# Patient Record
Sex: MALE | Race: WHITE | NOT HISPANIC OR LATINO | Employment: FULL TIME | URBAN - METROPOLITAN AREA
[De-identification: names, ages, dates, MRNs, and addresses within clinical notes are randomized per-mention and may not be internally consistent; named-entity substitution may affect disease eponyms.]

---

## 2017-02-15 RX ORDER — CYANOCOBALAMIN 1000 UG/ML
INJECTION, SOLUTION INTRAMUSCULAR; SUBCUTANEOUS
Qty: 1 ML | Refills: 11 | Status: SHIPPED | OUTPATIENT
Start: 2017-02-15 | End: 2018-02-24 | Stop reason: SDUPTHER

## 2017-04-20 ENCOUNTER — OFFICE VISIT (OUTPATIENT)
Dept: FAMILY MEDICINE CLINIC | Facility: CLINIC | Age: 57
End: 2017-04-20

## 2017-04-20 VITALS
SYSTOLIC BLOOD PRESSURE: 140 MMHG | OXYGEN SATURATION: 98 % | BODY MASS INDEX: 27.96 KG/M2 | DIASTOLIC BLOOD PRESSURE: 64 MMHG | WEIGHT: 211 LBS | TEMPERATURE: 98.2 F | HEIGHT: 73 IN | HEART RATE: 59 BPM

## 2017-04-20 DIAGNOSIS — M79.605 PAIN IN BOTH LOWER EXTREMITIES: Primary | ICD-10-CM

## 2017-04-20 DIAGNOSIS — M79.604 PAIN IN BOTH LOWER EXTREMITIES: Primary | ICD-10-CM

## 2017-04-20 DIAGNOSIS — M54.5 LOW BACK PAIN, UNSPECIFIED BACK PAIN LATERALITY, UNSPECIFIED CHRONICITY, WITH SCIATICA PRESENCE UNSPECIFIED: ICD-10-CM

## 2017-04-20 PROCEDURE — 99213 OFFICE O/P EST LOW 20 MIN: CPT | Performed by: FAMILY MEDICINE

## 2017-04-20 PROCEDURE — 72100 X-RAY EXAM L-S SPINE 2/3 VWS: CPT | Performed by: FAMILY MEDICINE

## 2017-04-20 NOTE — PROGRESS NOTES
SUBJECTIVE:  The patient is a 57-year-old white male who comes in with a one-week history of pain in the back of his thighs.  It is worse on the left.  At times the back of his legs get numb.  He doesn't have that much in the way of new back pain but has ongoing chronic back pain.  He denies chest pain or shortness of breath.  He has a history last year of having a DVT and PE.  He was on [place]      PAST MEDICAL HISTORY:  Reviewed.    REVIEW OF SYSTEMS:  Please see above; 14 point ROS otherwise negative.      OBJECTIVE: Vitals signs are reviewed and are stable.    HEENT: PERRLA.  []  Neck:  Supple.  []  Lungs:  Clear.    Heart:  Regular rate and rhythm.  []  Abdomen:   Soft, nontender.  []  Extremities:  No cyanosis, clubbing or edema.  []Full range of motion.  There is no Tenderness in the popliteal region.   Homans signs are negative.  There is no calf tenderness.  There is discomfort to palpation in both thighs posteriorly above the knee and below the buttocks.  Back: Full range of motion.  No tenderness.  Two-view x-ray of the lumbar spine is done here and interpreted by me.  Indication back pain and leg pain.  No old x-rays available for comparison.  X-ray shows degenerative changes with spurring.    ASSESSMENT:    []Bilateral thigh pain  Ongoing back pain    PLAN:  []There is no clinical evidence for suspicion of DVT at this time.  Should his situation change he will notify me or go to emergency room.  He is prescribed meloxicam 15 mg daily with food and Flexeril 5 mg 3 times a day when necessary.  He'll use warm compresses.  He will follow-up next week and let me know if he's better or not.  Further workup pending.    Much of this encounter note is an electronic transcription/translation of spoken language to printed text.  The electronic translation of spoken language may permit erroneous, or at times, nonsensical words or phrases to be inadvertently transcribed.  Although I have reviewed the note for such  errors, some may still exist.

## 2017-06-09 ENCOUNTER — HOSPITAL ENCOUNTER (OUTPATIENT)
Dept: CARDIOLOGY | Facility: HOSPITAL | Age: 57
Discharge: HOME | End: 2017-06-09
Admitting: FAMILY MEDICINE

## 2017-06-09 ENCOUNTER — OFFICE VISIT (OUTPATIENT)
Dept: FAMILY MEDICINE CLINIC | Facility: CLINIC | Age: 57
End: 2017-06-09

## 2017-06-09 ENCOUNTER — APPOINTMENT (OUTPATIENT)
Dept: CARDIOLOGY | Facility: HOSPITAL | Age: 57
End: 2017-06-09

## 2017-06-09 VITALS
RESPIRATION RATE: 18 BRPM | WEIGHT: 210 LBS | SYSTOLIC BLOOD PRESSURE: 130 MMHG | HEART RATE: 74 BPM | HEIGHT: 73 IN | TEMPERATURE: 98.6 F | DIASTOLIC BLOOD PRESSURE: 70 MMHG | OXYGEN SATURATION: 99 % | BODY MASS INDEX: 27.83 KG/M2

## 2017-06-09 DIAGNOSIS — M79.604 PAIN IN BOTH LOWER EXTREMITIES: Primary | ICD-10-CM

## 2017-06-09 DIAGNOSIS — M79.605 PAIN IN BOTH LOWER EXTREMITIES: ICD-10-CM

## 2017-06-09 DIAGNOSIS — M79.605 PAIN IN BOTH LOWER EXTREMITIES: Primary | ICD-10-CM

## 2017-06-09 DIAGNOSIS — M79.604 PAIN IN BOTH LOWER EXTREMITIES: ICD-10-CM

## 2017-06-09 DIAGNOSIS — Z86.711 HISTORY OF PULMONARY EMBOLUS (PE): ICD-10-CM

## 2017-06-09 LAB
BH CV LOWER VASCULAR LEFT COMMON FEMORAL AUGMENT: NORMAL
BH CV LOWER VASCULAR LEFT COMMON FEMORAL COMPETENT: NORMAL
BH CV LOWER VASCULAR LEFT COMMON FEMORAL COMPRESS: NORMAL
BH CV LOWER VASCULAR LEFT COMMON FEMORAL PHASIC: NORMAL
BH CV LOWER VASCULAR LEFT COMMON FEMORAL SPONT: NORMAL
BH CV LOWER VASCULAR LEFT DISTAL FEMORAL COMPRESS: NORMAL
BH CV LOWER VASCULAR LEFT GASTRONEMIUS COMPRESS: NORMAL
BH CV LOWER VASCULAR LEFT GREATER SAPH AK COMPRESS: NORMAL
BH CV LOWER VASCULAR LEFT GREATER SAPH BK COMPRESS: NORMAL
BH CV LOWER VASCULAR LEFT MID FEMORAL AUGMENT: NORMAL
BH CV LOWER VASCULAR LEFT MID FEMORAL COMPETENT: NORMAL
BH CV LOWER VASCULAR LEFT MID FEMORAL COMPRESS: NORMAL
BH CV LOWER VASCULAR LEFT MID FEMORAL PHASIC: NORMAL
BH CV LOWER VASCULAR LEFT MID FEMORAL SPONT: NORMAL
BH CV LOWER VASCULAR LEFT PERONEAL COMPRESS: NORMAL
BH CV LOWER VASCULAR LEFT POPLITEAL AUGMENT: NORMAL
BH CV LOWER VASCULAR LEFT POPLITEAL COMPETENT: NORMAL
BH CV LOWER VASCULAR LEFT POPLITEAL COMPRESS: NORMAL
BH CV LOWER VASCULAR LEFT POPLITEAL PHASIC: NORMAL
BH CV LOWER VASCULAR LEFT POPLITEAL SPONT: NORMAL
BH CV LOWER VASCULAR LEFT POSTERIOR TIBIAL COMPRESS: NORMAL
BH CV LOWER VASCULAR LEFT PROXIMAL FEMORAL COMPRESS: NORMAL
BH CV LOWER VASCULAR LEFT SAPHENOFEMORAL JUNCTION AUGMENT: NORMAL
BH CV LOWER VASCULAR LEFT SAPHENOFEMORAL JUNCTION COMPETENT: NORMAL
BH CV LOWER VASCULAR LEFT SAPHENOFEMORAL JUNCTION COMPRESS: NORMAL
BH CV LOWER VASCULAR LEFT SAPHENOFEMORAL JUNCTION PHASIC: NORMAL
BH CV LOWER VASCULAR LEFT SAPHENOFEMORAL JUNCTION SPONT: NORMAL
BH CV LOWER VASCULAR RIGHT COMMON FEMORAL AUGMENT: NORMAL
BH CV LOWER VASCULAR RIGHT COMMON FEMORAL COMPETENT: NORMAL
BH CV LOWER VASCULAR RIGHT COMMON FEMORAL COMPRESS: NORMAL
BH CV LOWER VASCULAR RIGHT COMMON FEMORAL PHASIC: NORMAL
BH CV LOWER VASCULAR RIGHT COMMON FEMORAL SPONT: NORMAL
BH CV LOWER VASCULAR RIGHT DISTAL FEMORAL COMPRESS: NORMAL
BH CV LOWER VASCULAR RIGHT GASTRONEMIUS COMPRESS: NORMAL
BH CV LOWER VASCULAR RIGHT GREATER SAPH AK COMPRESS: NORMAL
BH CV LOWER VASCULAR RIGHT GREATER SAPH BK COMPRESS: NORMAL
BH CV LOWER VASCULAR RIGHT MID FEMORAL AUGMENT: NORMAL
BH CV LOWER VASCULAR RIGHT MID FEMORAL COMPETENT: NORMAL
BH CV LOWER VASCULAR RIGHT MID FEMORAL COMPRESS: NORMAL
BH CV LOWER VASCULAR RIGHT MID FEMORAL PHASIC: NORMAL
BH CV LOWER VASCULAR RIGHT MID FEMORAL SPONT: NORMAL
BH CV LOWER VASCULAR RIGHT PERONEAL COMPRESS: NORMAL
BH CV LOWER VASCULAR RIGHT POPLITEAL AUGMENT: NORMAL
BH CV LOWER VASCULAR RIGHT POPLITEAL COMPETENT: NORMAL
BH CV LOWER VASCULAR RIGHT POPLITEAL COMPRESS: NORMAL
BH CV LOWER VASCULAR RIGHT POPLITEAL PHASIC: NORMAL
BH CV LOWER VASCULAR RIGHT POPLITEAL SPONT: NORMAL
BH CV LOWER VASCULAR RIGHT POSTERIOR TIBIAL COMPRESS: NORMAL
BH CV LOWER VASCULAR RIGHT PROXIMAL FEMORAL COMPRESS: NORMAL
BH CV LOWER VASCULAR RIGHT SAPHENOFEMORAL JUNCTION AUGMENT: NORMAL
BH CV LOWER VASCULAR RIGHT SAPHENOFEMORAL JUNCTION COMPETENT: NORMAL
BH CV LOWER VASCULAR RIGHT SAPHENOFEMORAL JUNCTION COMPRESS: NORMAL
BH CV LOWER VASCULAR RIGHT SAPHENOFEMORAL JUNCTION PHASIC: NORMAL
BH CV LOWER VASCULAR RIGHT SAPHENOFEMORAL JUNCTION SPONT: NORMAL

## 2017-06-09 PROCEDURE — 99213 OFFICE O/P EST LOW 20 MIN: CPT | Performed by: FAMILY MEDICINE

## 2017-06-09 PROCEDURE — 93970 EXTREMITY STUDY: CPT

## 2017-06-09 RX ORDER — MELOXICAM 15 MG/1
15 TABLET ORAL DAILY
Qty: 30 TABLET | Refills: 1 | Status: SHIPPED | OUTPATIENT
Start: 2017-06-09 | End: 2017-07-31

## 2017-06-09 NOTE — PROGRESS NOTES
SUBJECTIVE:  The patient is a 57-year-old white male was here because of bilateral posterior leg pain.  It's been going on several weeks.  He gets relief when he stands.  He denies any back pain.  He denies numbness or tingling in his legs.  He has a history of pulmonary embolus.  No recent injury.  No chest pain or shortness of breath.     PAST MEDICAL HISTORY:  Reviewed.    REVIEW OF SYSTEMS:  Please see above; 14 point ROS otherwise negative.      OBJECTIVE: Vitals signs are reviewed and are stable.    HEENT: PERRLA.    Neck:  Supple.    Lungs:  Clear.    Heart:  Regular rate and rhythm.    Abdomen:   Soft, nontender.    Back: Nontender full range of motion.  Extremities:  No cyanosis, clubbing or edema.  Dolores is present bilateral calves and bilateral posterior thighs.  Full range of motion is present.    ASSESSMENT:    Bilateral posterior leg pain  History of pulmonary embolus.    PLAN:  Venous Doppler both lower extremities is ordered.  I told the patient the presentation is somewhat atypical however with a history of pulmonary emboli this test should be done today.  If the test is negative he'll be prescribed meloxicam 15 mg daily with food.  He'll up date me on how he is doing over the next few days.  Should he have any problems he's advised to go the emergency room.    Much of this encounter note is an electronic transcription/translation of spoken language to printed text.  The electronic translation of spoken language may permit erroneous, or at times, nonsensical words or phrases to be inadvertently transcribed.  Although I have reviewed the note for such errors, some may still exist.

## 2017-07-31 ENCOUNTER — HOSPITAL ENCOUNTER (OUTPATIENT)
Dept: CARDIOLOGY | Facility: HOSPITAL | Age: 57
Discharge: HOME | End: 2017-07-31

## 2017-07-31 ENCOUNTER — OFFICE VISIT (OUTPATIENT)
Dept: FAMILY MEDICINE CLINIC | Facility: CLINIC | Age: 57
End: 2017-07-31

## 2017-07-31 VITALS
HEIGHT: 74 IN | HEART RATE: 71 BPM | DIASTOLIC BLOOD PRESSURE: 64 MMHG | RESPIRATION RATE: 16 BRPM | TEMPERATURE: 98.8 F | BODY MASS INDEX: 26.98 KG/M2 | OXYGEN SATURATION: 98 % | WEIGHT: 210.2 LBS | SYSTOLIC BLOOD PRESSURE: 112 MMHG

## 2017-07-31 VITALS — OXYGEN SATURATION: 98 % | DIASTOLIC BLOOD PRESSURE: 84 MMHG | SYSTOLIC BLOOD PRESSURE: 156 MMHG

## 2017-07-31 DIAGNOSIS — R53.83 FATIGUE, UNSPECIFIED TYPE: ICD-10-CM

## 2017-07-31 DIAGNOSIS — R07.9 CHEST PAIN, UNSPECIFIED TYPE: Primary | ICD-10-CM

## 2017-07-31 DIAGNOSIS — R07.9 CHEST PAIN, UNSPECIFIED TYPE: ICD-10-CM

## 2017-07-31 PROBLEM — D51.0 PERNICIOUS ANEMIA: Status: ACTIVE | Noted: 2017-07-31

## 2017-07-31 LAB
ALBUMIN SERPL-MCNC: 4.1 G/DL (ref 3.5–5.2)
ALBUMIN/GLOB SERPL: 1.5 G/DL
ALP SERPL-CCNC: 63 U/L (ref 39–117)
ALT SERPL W P-5'-P-CCNC: 23 U/L (ref 1–41)
ANION GAP SERPL CALCULATED.3IONS-SCNC: 13.7 MMOL/L
AST SERPL-CCNC: 21 U/L (ref 1–40)
BASOPHILS # BLD AUTO: 0.02 10*3/MM3 (ref 0–0.2)
BASOPHILS NFR BLD AUTO: 0.5 % (ref 0–1.5)
BILIRUB SERPL-MCNC: 0.5 MG/DL (ref 0.1–1.2)
BUN BLD-MCNC: 11 MG/DL (ref 6–20)
BUN/CREAT SERPL: 11.7 (ref 7–25)
CALCIUM SPEC-SCNC: 8.7 MG/DL (ref 8.6–10.5)
CHLORIDE SERPL-SCNC: 103 MMOL/L (ref 98–107)
CK MB BLD-MCNC: 1 NG/ML
CO2 SERPL-SCNC: 24.3 MMOL/L (ref 22–29)
CREAT BLD-MCNC: 0.94 MG/DL (ref 0.76–1.27)
DEPRECATED D DIMER PPP-ACNC: 100
DEPRECATED RDW RBC AUTO: 43.2 FL (ref 37–54)
EOSINOPHIL # BLD AUTO: 0.09 10*3/MM3 (ref 0–0.7)
EOSINOPHIL NFR BLD AUTO: 2.3 % (ref 0.3–6.2)
ERYTHROCYTE [DISTWIDTH] IN BLOOD BY AUTOMATED COUNT: 12.9 % (ref 11.5–14.5)
GFR SERPL CREATININE-BSD FRML MDRD: 83 ML/MIN/1.73
GLOBULIN UR ELPH-MCNC: 2.8 GM/DL
GLUCOSE BLD-MCNC: 130 MG/DL (ref 65–99)
HCT VFR BLD AUTO: 41.3 % (ref 40.4–52.2)
HGB BLD-MCNC: 14.2 G/DL (ref 13.7–17.6)
IMM GRANULOCYTES # BLD: 0 10*3/MM3 (ref 0–0.03)
IMM GRANULOCYTES NFR BLD: 0 % (ref 0–0.5)
LYMPHOCYTES # BLD AUTO: 1.09 10*3/MM3 (ref 0.9–4.8)
LYMPHOCYTES NFR BLD AUTO: 27.3 % (ref 19.6–45.3)
MCH RBC QN AUTO: 31.9 PG (ref 27–32.7)
MCHC RBC AUTO-ENTMCNC: 34.4 G/DL (ref 32.6–36.4)
MCV RBC AUTO: 92.8 FL (ref 79.8–96.2)
MONOCYTES # BLD AUTO: 0.5 10*3/MM3 (ref 0.2–1.2)
MONOCYTES NFR BLD AUTO: 12.5 % (ref 5–12)
NEUTROPHILS # BLD AUTO: 2.3 10*3/MM3 (ref 1.9–8.1)
NEUTROPHILS NFR BLD AUTO: 57.4 % (ref 42.7–76)
NT-PROBNP SERPL-MCNC: 55 PG/ML (ref 5–900)
PLATELET # BLD AUTO: 223 10*3/MM3 (ref 140–500)
PMV BLD AUTO: 10.3 FL (ref 6–12)
POC MYOGLOBIN: 51.8 NG/ML
POTASSIUM BLD-SCNC: 3.8 MMOL/L (ref 3.5–5.2)
PROT SERPL-MCNC: 6.9 G/DL (ref 6–8.5)
RBC # BLD AUTO: 4.45 10*6/MM3 (ref 4.6–6)
SODIUM BLD-SCNC: 141 MMOL/L (ref 136–145)
TROPONIN I SERPL-MCNC: 0.05 NG/ML (ref 0–0.6)
WBC NRBC COR # BLD: 4 10*3/MM3 (ref 4.5–10.7)

## 2017-07-31 PROCEDURE — 93017 CV STRESS TEST TRACING ONLY: CPT

## 2017-07-31 PROCEDURE — 93016 CV STRESS TEST SUPVJ ONLY: CPT | Performed by: INTERNAL MEDICINE

## 2017-07-31 PROCEDURE — 78452 HT MUSCLE IMAGE SPECT MULT: CPT

## 2017-07-31 PROCEDURE — 0 TECHNETIUM TETROFOSMIN KIT

## 2017-07-31 PROCEDURE — 93005 ELECTROCARDIOGRAM TRACING: CPT | Performed by: PHYSICIAN ASSISTANT

## 2017-07-31 PROCEDURE — A9502 TC99M TETROFOSMIN: HCPCS

## 2017-07-31 PROCEDURE — 78452 HT MUSCLE IMAGE SPECT MULT: CPT | Performed by: INTERNAL MEDICINE

## 2017-07-31 PROCEDURE — 99213 OFFICE O/P EST LOW 20 MIN: CPT | Performed by: NURSE PRACTITIONER

## 2017-07-31 PROCEDURE — 93018 CV STRESS TEST I&R ONLY: CPT | Performed by: INTERNAL MEDICINE

## 2017-07-31 PROCEDURE — 93000 ELECTROCARDIOGRAM COMPLETE: CPT | Performed by: NURSE PRACTITIONER

## 2017-07-31 PROCEDURE — 93010 ELECTROCARDIOGRAM REPORT: CPT | Performed by: PHYSICIAN ASSISTANT

## 2017-07-31 PROCEDURE — 99214 OFFICE O/P EST MOD 30 MIN: CPT | Performed by: PHYSICIAN ASSISTANT

## 2017-07-31 RX ORDER — NITROGLYCERIN 0.4 MG/1
0.4 TABLET SUBLINGUAL
Status: DISCONTINUED | OUTPATIENT
Start: 2017-07-31 | End: 2017-07-31

## 2017-07-31 RX ORDER — SODIUM CHLORIDE 0.9 % (FLUSH) 0.9 %
10 SYRINGE (ML) INJECTION AS NEEDED
Status: CANCELLED | OUTPATIENT
Start: 2017-07-31

## 2017-07-31 RX ORDER — SODIUM CHLORIDE 0.9 % (FLUSH) 0.9 %
10 SYRINGE (ML) INJECTION AS NEEDED
Status: DISCONTINUED | OUTPATIENT
Start: 2017-07-31 | End: 2017-07-31

## 2017-07-31 RX ORDER — NITROGLYCERIN 0.4 MG/1
0.4 TABLET SUBLINGUAL
Status: CANCELLED | OUTPATIENT
Start: 2017-07-31

## 2017-07-31 RX ADMIN — TETROFOSMIN 1 DOSE: 1.38 INJECTION, POWDER, LYOPHILIZED, FOR SOLUTION INTRAVENOUS at 14:35

## 2017-07-31 NOTE — PATIENT INSTRUCTIONS
EKG today, WNL.  D/t chest pain current, patient sent to chest pain clinic at this time, do not stop or eat/drink on the way, he was given copy of EKG to take with him, report called to nurse, Irma.  He will f/u in office next week, if symptoms worsens advised to go to the ER.  Refer to sleep medicine, will call with appt.  Can consider anemia f/u if fatigue persists.   Increase fluid intake, get plenty of rest.   Patient agrees with plan of care and understands instructions. Call if worsening symptoms or any problems or concerns.

## 2017-07-31 NOTE — PROGRESS NOTES
Procedure     ECG 12 Lead  Date/Time: 7/31/2017 12:01 PM  Performed by: ROGELIO MASON  Authorized by: ROGELIO MASON   Previous ECG: no previous ECG available  Rhythm: sinus rhythm  Rate: normal  QRS axis: normal  Clinical impression: normal ECG  Comments: Discussed with Dr. Peña.

## 2017-07-31 NOTE — PROGRESS NOTES
"Subjective   Niranjan Peacock is a 57 y.o. male.     History of Present Illness   C/o chest pain, he states his chest feels heavy, he feels achy, he has been fatigued, he states he feels like something is in his throat, denies productive cough, denies fever, he states he saw cardiology about 15 years to be checked after physical. He does have HA, denies SOA, he denies LE edema, change in vision. He states he has been sweaty at night. He states his brothers and father all had bypass surgeries. He states he has felt symptoms for about 1 week. He has a personal hx of PE. Hx of pernicious anemia, per chart he saw Dr. Mancilla last in 6/2/16 for fatigue, he was scheduled for sleep study but did not go to [facility] because he states \" he knows people that have gone, got equipment and do not use it.\" he states he does snore some.  He denies GERD or heartburn symptoms.     The following portions of the patient's history were reviewed and updated as appropriate: allergies, current medications, past family history, past medical history, past social history, past surgical history and problem list.    Review of Systems   Constitutional: Positive for diaphoresis and fatigue. Negative for chills and fever.   HENT: Negative for congestion, ear pain, sinus pressure and sore throat.    Eyes: Negative for photophobia, pain and visual disturbance.   Respiratory: Negative for cough, shortness of breath and wheezing.    Cardiovascular: Negative for palpitations and leg swelling.   Musculoskeletal: Positive for arthralgias and myalgias.   Skin: Negative for pallor.   Neurological: Positive for headaches. Negative for dizziness, syncope, weakness and light-headedness.   All other systems reviewed and are negative.      Objective   Physical Exam   Constitutional: He is oriented to person, place, and time. He appears well-developed and well-nourished.   HENT:   Head: Normocephalic.   Eyes: Pupils are equal, round, and reactive to light.   Neck: " Normal range of motion. Neck supple.   Cardiovascular: Normal rate, regular rhythm, normal heart sounds and intact distal pulses.    Pulses:       Radial pulses are 2+ on the right side, and 2+ on the left side.        Dorsalis pedis pulses are 2+ on the right side, and 2+ on the left side.        Posterior tibial pulses are 2+ on the right side, and 2+ on the left side.   Pulmonary/Chest: Effort normal and breath sounds normal. No respiratory distress. He has no decreased breath sounds. He has no wheezes.   Musculoskeletal: Normal range of motion.   Lymphadenopathy:     He has no cervical adenopathy.   Neurological: He is alert and oriented to person, place, and time.   Skin: Skin is warm and dry.   Psychiatric: He has a normal mood and affect. His behavior is normal. Judgment and thought content normal.   Nursing note and vitals reviewed.      Assessment/Plan   Niranjan was seen today for uri.    Diagnoses and all orders for this visit:    Chest pain, unspecified type    Fatigue, unspecified type  -     Ambulatory Referral to Sleep Medicine        EKG today, WNL.  D/t chest pain current, patient sent to chest pain clinic at this time, do not stop or eat/drink on the way, he was given copy of EKG to take with him, report called to nurse, Irma.  He will f/u in office next week, if symptoms worsens advised to go to the ER.  Refer to sleep medicine, will call with appt.  Can consider anemia f/u if fatigue persists.   Increase fluid intake, get plenty of rest.   Discussed plan of care and ekg with Dr. Peña.   Patient agrees with plan of care and understands instructions. Call if worsening symptoms or any problems or concerns.

## 2017-07-31 NOTE — PROGRESS NOTES
Date of Office Visit: [unfilled]  Encounter Provider: CADY Mahmood  Place of Service: [facility] CARDIOLOGY  Patient Name: Niranjan Peacock  :1960    Chief Complaint   Patient presents with   • Chest Pain   :     HPI: Niranjan Peacock is a 57 y.o. male , new to me, who presents today for complaints of chest pain.  He was referred by his primary care nurse practioner Luiza Dallas to [facility].  For about the past month he has been having pressure in the center of his chest on and off.  It has been particularly bad for the past week.  He is also had fatigue for about a year.  He presented to our office to see Dr. Mancilla in 2016 with similar complaints.  Dr. Mancilla remarked in his note from that visit that the patient had a normal nuclear stress test as well as a normal echocardiogram in .  He felt that his symptoms were unlikely to be cardiac in etiology, and recommended a sleep study because he felt his fatigue was probably sleep apnea.  The patient describes the chest pain as a heaviness that is in the center of his chest and radiates to both arms and shoulders.  It is not associated with shortness of breath, however he does have some nausea with it.  He has not vomited, he denies any diaphoresis.  He does state that he wakes up at night with the sheets wet.  He says the chest pain does not necessarily wake him up, but when he does wake up he notices that he has chest pain.  He has also been achy all over for about a year.  He denies any past medical history.  He denies any hypertension, hyperlipidemia, or diabetes.  He does not smoke and never did.  He drinks occasional alcohol on the weekends that none during the week.  He has not had a physical exam for several years according to the patient, and is really kind of unsure of his lipid status.  The chest pain was its worst last night, he rates it as a 5 out of 10.  He states that is pretty much  constant for the past week.  He did have pain when he walks to our office from his car today, and it was better when he sat down in the chair in our office.  He still had the pain at rest, he rates it a 1 out of 10 at rest.  He does have a strong family history of heart disease with 2 brothers and a father all having coronary artery bypass grafting.  One of his brothers had a CABG at age 43, his father had his CABG in his 60s.    Past Medical History:   Diagnosis Date   • B12 deficiency anemia    • Chest pain    • Headache    • History of blood clots     blood clot found in right lung        Past Surgical History:   Procedure Laterality Date   • COLONOSCOPY         Social History     Social History   • Marital status:      Spouse name: N/A   • Number of children: N/A   • Years of education: N/A     Occupational History   • Not on file.     Social History Main Topics   • Smoking status: Never Smoker   • Smokeless tobacco: Not on file   • Alcohol use Yes      Comment: social   • Drug use: No   • Sexual activity: Not on file     Other Topics Concern   • Not on file     Social History Narrative       Family History   Problem Relation Age of Onset   • Heart disease Other    • Hypertension Other    • Heart disease Father    • Prostate cancer Father    • Heart disease Mother        Review of Systems   Constitution: Positive for malaise/fatigue and night sweats. Negative for chills, fever, weight gain and weight loss.   HENT: Negative for ear pain, headaches, hearing loss, nosebleeds and sore throat.    Eyes: Negative for double vision, pain and visual disturbance.   Cardiovascular: Positive for chest pain. Negative for dyspnea on exertion, irregular heartbeat, leg swelling, near-syncope, orthopnea, palpitations, paroxysmal nocturnal dyspnea and syncope.   Respiratory: Negative for cough, shortness of breath, sleep disturbances due to breathing, snoring and wheezing.    Endocrine: Negative for cold intolerance, heat  intolerance and polyuria.   Skin: Negative for itching and rash.   Musculoskeletal: Positive for myalgias. Negative for joint pain and joint swelling.   Gastrointestinal: Negative for abdominal pain, diarrhea, melena, nausea and vomiting.   Genitourinary: Negative for frequency, hematuria and hesitancy.   Neurological: Negative for excessive daytime sleepiness, light-headedness, numbness, paresthesias and seizures.   Psychiatric/Behavioral: Negative for altered mental status and depression.   Allergic/Immunologic: Negative.    All other systems reviewed and are negative.      Allergies   Allergen Reactions   • Albuterol Anxiety         Current Outpatient Prescriptions:   •  cyanocobalamin 1000 MCG/ML injection, Inject 1 mL into the shoulder, thigh, or buttocks every 28 (twenty-eight) days., Disp: 2000 mL, Rfl: 3  No current facility-administered medications for this encounter.      Objective:     Vitals:    07/31/17 1238 07/31/17 1240   BP: 156/84 156/84   BP Location: Right arm Right arm   Patient Position: Sitting Sitting   SpO2: 98%      There is no height or weight on file to calculate BMI.    PHYSICAL EXAM:    Physical Exam   Constitutional: He is oriented to person, place, and time. He appears well-developed and well-nourished. No distress.   HENT:   Head: Normocephalic and atraumatic.   Eyes: Pupils are equal, round, and reactive to light.   Neck: No JVD present. No thyromegaly present.   Cardiovascular: Normal rate, regular rhythm, normal heart sounds and intact distal pulses.    No murmur heard.  Pulmonary/Chest: Effort normal and breath sounds normal. No respiratory distress.   Abdominal: Soft. Bowel sounds are normal. He exhibits no distension. There is no splenomegaly or hepatomegaly. There is no tenderness.   Musculoskeletal: Normal range of motion. He exhibits no edema.   Neurological: He is alert and oriented to person, place, and time.   Skin: Skin is warm and dry. He is not diaphoretic. No erythema.    Psychiatric: He has a normal mood and affect. His behavior is normal. Judgment normal.         ECG 12 Lead  Date/Time: 7/31/2017 4:00 PM  Performed by: JACK MARTIN.  Authorized by: JACK MARTIN   Comparison: not compared with previous ECG   Previous ECG: no previous ECG available  Rhythm: sinus rhythm  BPM: 63  Clinical impression: normal ECG  Comments: Indication: Chest pain        LABS:    Glucose 1:30, BUN 11, creatinine 0.94, sodium 141, potassium 3.8, chloride 103, CO2 24.3, CBC 4.0, hemoglobin 14.2, hematocrit 41.3, platelets 223.  ProBNP, troponin, CK-MB, myoglobin, and d-dimer are all within normal limits.      Assessment:      [unfilled]  Orders Placed This Encounter   Procedures   • Comprehensive Metabolic Panel   • proBNP     Standing Status:   Future     Number of Occurrences:   1     Standing Expiration Date:   7/31/2018   • proBNP     Standing Status:   Standing     Number of Occurrences:   1   • CBC Auto Differential   • NPO Diet     Standing Status:   Standing     Number of Occurrences:   1   • Cardiac Monitoring     Standing Status:   Standing     Number of Occurrences:   1   • Vital Signs - Once     Standing Status:   Standing     Number of Occurrences:   1   • Vital Signs - As Needed     Standing Status:   Standing     Number of Occurrences:   59909   • Bathroom Privileges With Assistance     Standing Status:   Standing     Number of Occurrences:   1   • Pulse Oximetry     Standing Status:   Standing     Number of Occurrences:   81825   • Oxygen Therapy- Nasal Cannula; Titrate for SPO2: 92%, equal to or greater than     1-4 lpm if patient symptomatic or if O2 sat is below 92%     Standing Status:   Standing     Number of Occurrences:   38617     Order Specific Question:   Device     Answer:   Nasal Cannula     Order Specific Question:   Titrate for SPO2     Answer:   92%     Order Specific Question:   Titrate for SPO2     Answer:   equal to or greater than   • Stress Test With Myocardial  Perfusion One Day     Standing Status:   Future     Number of Occurrences:   1     Standing Expiration Date:   7/31/2018     Order Specific Question:   Rest/stress, rest only or stress only?     Answer:   Rest Only     Order Specific Question:   What stress agent will be used?     Answer:   Exercise with possible pharmacologic     Comments:   rest only     Order Specific Question:   Reason for exam?     Answer:   Chest Pain     Order Specific Question:   Chest pain specification?     Answer:   Suspected CAD   • Stress Test With Myocardial Perfusion One Day     Standing Status:   Future     Standing Expiration Date:   7/31/2018     Order Specific Question:   Rest/stress, rest only or stress only?     Answer:   Stress Only     Order Specific Question:   What stress agent will be used?     Answer:   Exercise with possible pharmacologic     Order Specific Question:   Reason for exam?     Answer:   Chest Pain     Order Specific Question:   Chest pain specification?     Answer:   Suspected CAD   • POCT Troponin I   • POC CKMB, Rapid   • POCT Myoglobin   • POCT D-Dimer   • Insert Peripheral IV     Standing Status:   Standing     Number of Occurrences:   1   • CBC & Differential     Order Specific Question:   Manual Differential     Answer:   No          Plan:       The patient was having active chest pain, albeit pretty minimal.  Because of this I elected to avoid a stress test and just do a resting myocardial perfusion.  This was normal.  I'm recommending that he come back tomorrow for a stress myocardial perfusion study.  I've instructed the patient that he needs to go to the emergency room if he has any worsening chest pain between now and the time he comes back for his test tomorrow.  He indicated that he understood.  If all this comes back normal, I am going to refer him back to his primary care doctor for further workup.  I do think that he probably needs a sleep study, I think that some of his fatigue could easily  be explained by sleep apnea.  I discussed the plan of care with the patient, he indicated that he understood.  I also discussed this plan of care with Dr. Novak.    As always, it has been a pleasure to participate in your patient's care.      Sincerely,         Jolie Sanderson PA-C  I spoke with this patient after his resting nuclear perfusion scan that was not significantly abnormal.  His symptoms were vague agree with plan.

## 2017-08-01 ENCOUNTER — HOSPITAL ENCOUNTER (OUTPATIENT)
Dept: CARDIOLOGY | Facility: HOSPITAL | Age: 57
Discharge: HOME | End: 2017-08-01
Admitting: PHYSICIAN ASSISTANT

## 2017-08-01 ENCOUNTER — APPOINTMENT (OUTPATIENT)
Dept: CARDIOLOGY | Facility: HOSPITAL | Age: 57
End: 2017-08-01

## 2017-08-01 ENCOUNTER — TELEPHONE (OUTPATIENT)
Dept: CARDIOLOGY | Facility: CLINIC | Age: 57
End: 2017-08-01

## 2017-08-01 DIAGNOSIS — R94.39 ABNORMAL STRESS ECG: Primary | ICD-10-CM

## 2017-08-01 LAB
BH CV NUCLEAR PRIOR STUDY: 2
BH CV NUCLEAR PRIOR STUDY: 3
BH CV STRESS BP STAGE 1: NORMAL
BH CV STRESS BP STAGE 2: NORMAL
BH CV STRESS BP STAGE 3: NORMAL
BH CV STRESS DURATION MIN STAGE 1: 3
BH CV STRESS DURATION MIN STAGE 2: 3
BH CV STRESS DURATION MIN STAGE 3: 3
BH CV STRESS DURATION SEC STAGE 1: 0
BH CV STRESS DURATION SEC STAGE 2: 0
BH CV STRESS DURATION SEC STAGE 3: 0
BH CV STRESS GRADE STAGE 1: 10
BH CV STRESS GRADE STAGE 2: 12
BH CV STRESS GRADE STAGE 3: 14
BH CV STRESS HR STAGE 1: 91
BH CV STRESS HR STAGE 2: 118
BH CV STRESS HR STAGE 3: 147
BH CV STRESS METS STAGE 1: 5
BH CV STRESS METS STAGE 2: 7.5
BH CV STRESS METS STAGE 3: 10
BH CV STRESS PROTOCOL 1: NORMAL
BH CV STRESS RECOVERY BP: NORMAL MMHG
BH CV STRESS RECOVERY HR: 147 BPM
BH CV STRESS SPEED STAGE 1: 1.7
BH CV STRESS SPEED STAGE 2: 2.5
BH CV STRESS SPEED STAGE 3: 3.4
BH CV STRESS STAGE 1: 1
BH CV STRESS STAGE 2: 2
BH CV STRESS STAGE 3: 3
LV EF NUC BP: 69 %
MAXIMAL PREDICTED HEART RATE: 164 BPM
MAXIMAL PREDICTED HEART RATE: 164 BPM
PERCENT MAX PREDICTED HR: 89.63 %
STRESS BASELINE BP: NORMAL MMHG
STRESS BASELINE HR: 74 BPM
STRESS PERCENT HR: 105 %
STRESS POST ESTIMATED WORKLOAD: 10 METS
STRESS POST EXERCISE DUR MIN: 9 MIN
STRESS POST EXERCISE DUR SEC: 0 SEC
STRESS POST PEAK BP: NORMAL MMHG
STRESS POST PEAK HR: 147 BPM
STRESS TARGET HR: 139 BPM
STRESS TARGET HR: 139 BPM

## 2017-08-01 RX ADMIN — TETROFOSMIN 1 DOSE: 1.38 INJECTION, POWDER, LYOPHILIZED, FOR SOLUTION INTRAVENOUS at 08:45

## 2017-08-01 NOTE — TELEPHONE ENCOUNTER
I left a message for the patient to call me with his test results.  He has an abnormal stress test and a strong family history of coronary disease.  I did discuss this with Dr. Novak, we are recommending cardiac catheterization.

## 2017-08-02 ENCOUNTER — LAB (OUTPATIENT)
Dept: LAB | Facility: HOSPITAL | Age: 57
End: 2017-08-02
Attending: INTERNAL MEDICINE

## 2017-08-02 ENCOUNTER — TRANSCRIBE ORDERS (OUTPATIENT)
Dept: CARDIOLOGY | Facility: CLINIC | Age: 57
End: 2017-08-02

## 2017-08-02 DIAGNOSIS — Z01.810 PRE-OPERATIVE CARDIOVASCULAR EXAMINATION: Primary | ICD-10-CM

## 2017-08-02 DIAGNOSIS — I25.10 LIMITATION OF ACTIVITIES DUE TO CARDIOVASCULAR DISORDER: ICD-10-CM

## 2017-08-02 DIAGNOSIS — Z73.6 LIMITATION OF ACTIVITIES DUE TO CARDIOVASCULAR DISORDER: ICD-10-CM

## 2017-08-02 DIAGNOSIS — R94.39 OTHER NONSPECIFIC ABNORMAL CARDIOVASCULAR SYSTEM FUNCTION STUDY: ICD-10-CM

## 2017-08-02 DIAGNOSIS — Z01.810 PRE-OPERATIVE CARDIOVASCULAR EXAMINATION: ICD-10-CM

## 2017-08-02 LAB
ANION GAP SERPL CALCULATED.3IONS-SCNC: 11.9 MMOL/L
BASOPHILS # BLD AUTO: 0.02 10*3/MM3 (ref 0–0.2)
BASOPHILS NFR BLD AUTO: 0.4 % (ref 0–1.5)
BUN BLD-MCNC: 12 MG/DL (ref 6–20)
BUN/CREAT SERPL: 11.2 (ref 7–25)
CALCIUM SPEC-SCNC: 9.6 MG/DL (ref 8.6–10.5)
CHLORIDE SERPL-SCNC: 101 MMOL/L (ref 98–107)
CO2 SERPL-SCNC: 28.1 MMOL/L (ref 22–29)
CREAT BLD-MCNC: 1.07 MG/DL (ref 0.76–1.27)
DEPRECATED RDW RBC AUTO: 44.1 FL (ref 37–54)
EOSINOPHIL # BLD AUTO: 0.11 10*3/MM3 (ref 0–0.7)
EOSINOPHIL NFR BLD AUTO: 2.3 % (ref 0.3–6.2)
ERYTHROCYTE [DISTWIDTH] IN BLOOD BY AUTOMATED COUNT: 12.9 % (ref 11.5–14.5)
GFR SERPL CREATININE-BSD FRML MDRD: 71 ML/MIN/1.73
GLUCOSE BLD-MCNC: 99 MG/DL (ref 65–99)
HCT VFR BLD AUTO: 42.5 % (ref 40.4–52.2)
HGB BLD-MCNC: 14.1 G/DL (ref 13.7–17.6)
IMM GRANULOCYTES # BLD: 0 10*3/MM3 (ref 0–0.03)
IMM GRANULOCYTES NFR BLD: 0 % (ref 0–0.5)
LYMPHOCYTES # BLD AUTO: 1.35 10*3/MM3 (ref 0.9–4.8)
LYMPHOCYTES NFR BLD AUTO: 27.7 % (ref 19.6–45.3)
MCH RBC QN AUTO: 31.1 PG (ref 27–32.7)
MCHC RBC AUTO-ENTMCNC: 33.2 G/DL (ref 32.6–36.4)
MCV RBC AUTO: 93.6 FL (ref 79.8–96.2)
MONOCYTES # BLD AUTO: 0.49 10*3/MM3 (ref 0.2–1.2)
MONOCYTES NFR BLD AUTO: 10 % (ref 5–12)
NEUTROPHILS # BLD AUTO: 2.91 10*3/MM3 (ref 1.9–8.1)
NEUTROPHILS NFR BLD AUTO: 59.6 % (ref 42.7–76)
PLATELET # BLD AUTO: 223 10*3/MM3 (ref 140–500)
PMV BLD AUTO: 10.3 FL (ref 6–12)
POTASSIUM BLD-SCNC: 4 MMOL/L (ref 3.5–5.2)
RBC # BLD AUTO: 4.54 10*6/MM3 (ref 4.6–6)
SODIUM BLD-SCNC: 141 MMOL/L (ref 136–145)
WBC NRBC COR # BLD: 4.88 10*3/MM3 (ref 4.5–10.7)

## 2017-08-02 PROCEDURE — 36415 COLL VENOUS BLD VENIPUNCTURE: CPT

## 2017-08-02 PROCEDURE — 85025 COMPLETE CBC W/AUTO DIFF WBC: CPT

## 2017-08-02 PROCEDURE — 80048 BASIC METABOLIC PNL TOTAL CA: CPT

## 2017-08-02 RX ORDER — CYANOCOBALAMIN 1000 UG/ML
1000 INJECTION, SOLUTION INTRAMUSCULAR; SUBCUTANEOUS ONCE
COMMUNITY
End: 2018-04-04 | Stop reason: SDUPTHER

## 2017-08-03 ENCOUNTER — HOSPITAL ENCOUNTER (OUTPATIENT)
Facility: HOSPITAL | Age: 57
Setting detail: HOSPITAL OUTPATIENT SURGERY
Discharge: HOME | End: 2017-08-03
Attending: INTERNAL MEDICINE | Admitting: INTERNAL MEDICINE

## 2017-08-03 VITALS
HEIGHT: 74 IN | OXYGEN SATURATION: 97 % | HEART RATE: 66 BPM | DIASTOLIC BLOOD PRESSURE: 87 MMHG | RESPIRATION RATE: 16 BRPM | SYSTOLIC BLOOD PRESSURE: 169 MMHG | TEMPERATURE: 98.7 F

## 2017-08-03 DIAGNOSIS — R94.39 ABNORMAL STRESS ECG: ICD-10-CM

## 2017-08-03 PROCEDURE — 93458 L HRT ARTERY/VENTRICLE ANGIO: CPT | Performed by: INTERNAL MEDICINE

## 2017-08-03 PROCEDURE — 85347 COAGULATION TIME ACTIVATED: CPT

## 2017-08-03 PROCEDURE — 0 IOPAMIDOL PER 1 ML: Performed by: INTERNAL MEDICINE

## 2017-08-03 PROCEDURE — 93571 IV DOP VEL&/PRESS C FLO 1ST: CPT | Performed by: INTERNAL MEDICINE

## 2017-08-03 PROCEDURE — 25010000002 BH (CUPID ONLY) ADENOSINE 6 MG/100ML MIXTURE: Performed by: INTERNAL MEDICINE

## 2017-08-03 PROCEDURE — C1769 GUIDE WIRE: HCPCS | Performed by: INTERNAL MEDICINE

## 2017-08-03 PROCEDURE — C9600 PERC DRUG-EL COR STENT SING: HCPCS | Performed by: INTERNAL MEDICINE

## 2017-08-03 PROCEDURE — 25010000002 MIDAZOLAM PER 1 MG: Performed by: INTERNAL MEDICINE

## 2017-08-03 PROCEDURE — C1874 STENT, COATED/COV W/DEL SYS: HCPCS | Performed by: INTERNAL MEDICINE

## 2017-08-03 PROCEDURE — C1887 CATHETER, GUIDING: HCPCS | Performed by: INTERNAL MEDICINE

## 2017-08-03 PROCEDURE — 93010 ELECTROCARDIOGRAM REPORT: CPT | Performed by: INTERNAL MEDICINE

## 2017-08-03 PROCEDURE — 25010000002 HEPARIN (PORCINE) PER 1000 UNITS: Performed by: INTERNAL MEDICINE

## 2017-08-03 PROCEDURE — 25010000002 FENTANYL CITRATE (PF) 100 MCG/2ML SOLUTION: Performed by: INTERNAL MEDICINE

## 2017-08-03 PROCEDURE — 99153 MOD SED SAME PHYS/QHP EA: CPT | Performed by: INTERNAL MEDICINE

## 2017-08-03 PROCEDURE — 93005 ELECTROCARDIOGRAM TRACING: CPT | Performed by: INTERNAL MEDICINE

## 2017-08-03 PROCEDURE — C1894 INTRO/SHEATH, NON-LASER: HCPCS | Performed by: INTERNAL MEDICINE

## 2017-08-03 PROCEDURE — 99152 MOD SED SAME PHYS/QHP 5/>YRS: CPT | Performed by: INTERNAL MEDICINE

## 2017-08-03 PROCEDURE — 92928 PRQ TCAT PLMT NTRAC ST 1 LES: CPT | Performed by: INTERNAL MEDICINE

## 2017-08-03 PROCEDURE — C1725 CATH, TRANSLUMIN NON-LASER: HCPCS | Performed by: INTERNAL MEDICINE

## 2017-08-03 DEVICE — XIENCE ALPINE EVEROLIMUS ELUTING CORONARY STENT SYSTEM 3.25 MM X 38 MM / RAPID-EXCHANGE
Type: IMPLANTABLE DEVICE | Status: FUNCTIONAL
Brand: XIENCE ALPINE

## 2017-08-03 RX ORDER — SODIUM CHLORIDE 0.9 % (FLUSH) 0.9 %
1-10 SYRINGE (ML) INJECTION AS NEEDED
Status: DISCONTINUED | OUTPATIENT
Start: 2017-08-03 | End: 2017-08-03 | Stop reason: HOSPADM

## 2017-08-03 RX ORDER — MIDAZOLAM HYDROCHLORIDE 1 MG/ML
INJECTION INTRAMUSCULAR; INTRAVENOUS AS NEEDED
Status: DISCONTINUED | OUTPATIENT
Start: 2017-08-03 | End: 2017-08-03 | Stop reason: HOSPADM

## 2017-08-03 RX ORDER — LIDOCAINE HYDROCHLORIDE 10 MG/ML
0.1 INJECTION, SOLUTION EPIDURAL; INFILTRATION; INTRACAUDAL; PERINEURAL ONCE AS NEEDED
Status: DISCONTINUED | OUTPATIENT
Start: 2017-08-03 | End: 2017-08-03 | Stop reason: HOSPADM

## 2017-08-03 RX ORDER — LIDOCAINE HYDROCHLORIDE 20 MG/ML
INJECTION, SOLUTION INFILTRATION; PERINEURAL AS NEEDED
Status: DISCONTINUED | OUTPATIENT
Start: 2017-08-03 | End: 2017-08-03 | Stop reason: HOSPADM

## 2017-08-03 RX ORDER — CLOPIDOGREL BISULFATE 75 MG/1
75 TABLET ORAL DAILY
Qty: 30 TABLET | Refills: 11 | Status: SHIPPED | OUTPATIENT
Start: 2017-08-03 | End: 2018-04-04 | Stop reason: SDUPTHER

## 2017-08-03 RX ORDER — ACETAMINOPHEN 325 MG/1
650 TABLET ORAL EVERY 4 HOURS PRN
Status: DISCONTINUED | OUTPATIENT
Start: 2017-08-03 | End: 2017-08-03 | Stop reason: HOSPADM

## 2017-08-03 RX ORDER — ATORVASTATIN CALCIUM 40 MG/1
40 TABLET, FILM COATED ORAL DAILY
Qty: 30 TABLET | Refills: 3 | Status: SHIPPED | OUTPATIENT
Start: 2017-08-03 | End: 2017-12-06 | Stop reason: SDUPTHER

## 2017-08-03 RX ORDER — CLOPIDOGREL BISULFATE 75 MG/1
TABLET ORAL AS NEEDED
Status: DISCONTINUED | OUTPATIENT
Start: 2017-08-03 | End: 2017-08-03 | Stop reason: HOSPADM

## 2017-08-03 RX ORDER — NITROGLYCERIN 5 MG/ML
INJECTION, SOLUTION INTRAVENOUS AS NEEDED
Status: DISCONTINUED | OUTPATIENT
Start: 2017-08-03 | End: 2017-08-03 | Stop reason: HOSPADM

## 2017-08-03 RX ORDER — SODIUM CHLORIDE 9 MG/ML
100 INJECTION, SOLUTION INTRAVENOUS CONTINUOUS
Status: DISCONTINUED | OUTPATIENT
Start: 2017-08-03 | End: 2017-08-03 | Stop reason: HOSPADM

## 2017-08-03 RX ORDER — FENTANYL CITRATE 50 UG/ML
INJECTION, SOLUTION INTRAMUSCULAR; INTRAVENOUS AS NEEDED
Status: DISCONTINUED | OUTPATIENT
Start: 2017-08-03 | End: 2017-08-03 | Stop reason: HOSPADM

## 2017-08-03 RX ORDER — VERAPAMIL HYDROCHLORIDE 2.5 MG/ML
INJECTION, SOLUTION INTRAVENOUS AS NEEDED
Status: DISCONTINUED | OUTPATIENT
Start: 2017-08-03 | End: 2017-08-03 | Stop reason: HOSPADM

## 2017-08-03 RX ORDER — ASPIRIN 325 MG
TABLET ORAL AS NEEDED
Status: DISCONTINUED | OUTPATIENT
Start: 2017-08-03 | End: 2017-08-03 | Stop reason: HOSPADM

## 2017-08-03 RX ORDER — SODIUM CHLORIDE 9 MG/ML
75 INJECTION, SOLUTION INTRAVENOUS CONTINUOUS
Status: DISCONTINUED | OUTPATIENT
Start: 2017-08-03 | End: 2017-08-03 | Stop reason: HOSPADM

## 2017-08-03 RX ORDER — HEPARIN SODIUM 1000 [USP'U]/ML
INJECTION, SOLUTION INTRAVENOUS; SUBCUTANEOUS AS NEEDED
Status: DISCONTINUED | OUTPATIENT
Start: 2017-08-03 | End: 2017-08-03 | Stop reason: HOSPADM

## 2017-08-03 RX ADMIN — SODIUM CHLORIDE 75 ML/HR: 9 INJECTION, SOLUTION INTRAVENOUS at 11:01

## 2017-08-03 NOTE — H&P (VIEW-ONLY)
Date of Office Visit: [unfilled]  Encounter Provider: CADY Mahmood  Place of Service: [facility] CARDIOLOGY  Patient Name: Niranjan Peacock  :1960    Chief Complaint   Patient presents with   • Chest Pain   :     HPI: Niranjan Peacock is a 57 y.o. male , new to me, who presents today for complaints of chest pain.  He was referred by his primary care nurse practioner Luiza Dallas [facility].  For about the past month he has been having pressure in the center of his chest on and off.  It has been particularly bad for the past week.  He is also had fatigue for about a year.  He presented to our office to see Dr. Mancilla in 2016 with similar complaints.  Dr. Mancilla remarked in his note from that visit that the patient had a normal nuclear stress test as well as a normal echocardiogram in .  He felt that his symptoms were unlikely to be cardiac in etiology, and recommended a sleep study because he felt his fatigue was probably sleep apnea.  The patient describes the chest pain as a heaviness that is in the center of his chest and radiates to both arms and shoulders.  It is not associated with shortness of breath, however he does have some nausea with it.  He has not vomited, he denies any diaphoresis.  He does state that he wakes up at night with the sheets wet.  He says the chest pain does not necessarily wake him up, but when he does wake up he notices that he has chest pain.  He has also been achy all over for about a year.  He denies any past medical history.  He denies any hypertension, hyperlipidemia, or diabetes.  He does not smoke and never did.  He drinks occasional alcohol on the weekends that none during the week.  He has not had a physical exam for several years according to the patient, and is really kind of unsure of his lipid status.  The chest pain was its worst last night, he rates it as a 5 out of 10.  He states that is pretty much  constant for the past week.  He did have pain when he walks to our office from his car today, and it was better when he sat down in the chair in our office.  He still had the pain at rest, he rates it a 1 out of 10 at rest.  He does have a strong family history of heart disease with 2 brothers and a father all having coronary artery bypass grafting.  One of his brothers had a CABG at age 43, his father had his CABG in his 60s.    Past Medical History:   Diagnosis Date   • B12 deficiency anemia    • Chest pain    • Headache    • History of blood clots     blood clot found in right lung        Past Surgical History:   Procedure Laterality Date   • COLONOSCOPY         Social History     Social History   • Marital status:      Spouse name: N/A   • Number of children: N/A   • Years of education: N/A     Occupational History   • Not on file.     Social History Main Topics   • Smoking status: Never Smoker   • Smokeless tobacco: Not on file   • Alcohol use Yes      Comment: social   • Drug use: No   • Sexual activity: Not on file     Other Topics Concern   • Not on file     Social History Narrative       Family History   Problem Relation Age of Onset   • Heart disease Other    • Hypertension Other    • Heart disease Father    • Prostate cancer Father    • Heart disease Mother        Review of Systems   Constitution: Positive for malaise/fatigue and night sweats. Negative for chills, fever, weight gain and weight loss.   HENT: Negative for ear pain, headaches, hearing loss, nosebleeds and sore throat.    Eyes: Negative for double vision, pain and visual disturbance.   Cardiovascular: Positive for chest pain. Negative for dyspnea on exertion, irregular heartbeat, leg swelling, near-syncope, orthopnea, palpitations, paroxysmal nocturnal dyspnea and syncope.   Respiratory: Negative for cough, shortness of breath, sleep disturbances due to breathing, snoring and wheezing.    Endocrine: Negative for cold intolerance, heat  intolerance and polyuria.   Skin: Negative for itching and rash.   Musculoskeletal: Positive for myalgias. Negative for joint pain and joint swelling.   Gastrointestinal: Negative for abdominal pain, diarrhea, melena, nausea and vomiting.   Genitourinary: Negative for frequency, hematuria and hesitancy.   Neurological: Negative for excessive daytime sleepiness, light-headedness, numbness, paresthesias and seizures.   Psychiatric/Behavioral: Negative for altered mental status and depression.   Allergic/Immunologic: Negative.    All other systems reviewed and are negative.      Allergies   Allergen Reactions   • Albuterol Anxiety         Current Outpatient Prescriptions:   •  cyanocobalamin 1000 MCG/ML injection, Inject 1 mL into the shoulder, thigh, or buttocks every 28 (twenty-eight) days., Disp: 2000 mL, Rfl: 3  No current facility-administered medications for this encounter.      Objective:     Vitals:    07/31/17 1238 07/31/17 1240   BP: 156/84 156/84   BP Location: Right arm Right arm   Patient Position: Sitting Sitting   SpO2: 98%      There is no height or weight on file to calculate BMI.    PHYSICAL EXAM:    Physical Exam   Constitutional: He is oriented to person, place, and time. He appears well-developed and well-nourished. No distress.   HENT:   Head: Normocephalic and atraumatic.   Eyes: Pupils are equal, round, and reactive to light.   Neck: No JVD present. No thyromegaly present.   Cardiovascular: Normal rate, regular rhythm, normal heart sounds and intact distal pulses.    No murmur heard.  Pulmonary/Chest: Effort normal and breath sounds normal. No respiratory distress.   Abdominal: Soft. Bowel sounds are normal. He exhibits no distension. There is no splenomegaly or hepatomegaly. There is no tenderness.   Musculoskeletal: Normal range of motion. He exhibits no edema.   Neurological: He is alert and oriented to person, place, and time.   Skin: Skin is warm and dry. He is not diaphoretic. No erythema.    Psychiatric: He has a normal mood and affect. His behavior is normal. Judgment normal.         ECG 12 Lead  Date/Time: 7/31/2017 4:00 PM  Performed by: JACK MARTIN.  Authorized by: JACK MARTIN   Comparison: not compared with previous ECG   Previous ECG: no previous ECG available  Rhythm: sinus rhythm  BPM: 63  Clinical impression: normal ECG  Comments: Indication: Chest pain        LABS:    Glucose 1:30, BUN 11, creatinine 0.94, sodium 141, potassium 3.8, chloride 103, CO2 24.3, CBC 4.0, hemoglobin 14.2, hematocrit 41.3, platelets 223.  ProBNP, troponin, CK-MB, myoglobin, and d-dimer are all within normal limits.      Assessment:      [unfilled]  Orders Placed This Encounter   Procedures   • Comprehensive Metabolic Panel   • proBNP     Standing Status:   Future     Number of Occurrences:   1     Standing Expiration Date:   7/31/2018   • proBNP     Standing Status:   Standing     Number of Occurrences:   1   • CBC Auto Differential   • NPO Diet     Standing Status:   Standing     Number of Occurrences:   1   • Cardiac Monitoring     Standing Status:   Standing     Number of Occurrences:   1   • Vital Signs - Once     Standing Status:   Standing     Number of Occurrences:   1   • Vital Signs - As Needed     Standing Status:   Standing     Number of Occurrences:   07839   • Bathroom Privileges With Assistance     Standing Status:   Standing     Number of Occurrences:   1   • Pulse Oximetry     Standing Status:   Standing     Number of Occurrences:   95859   • Oxygen Therapy- Nasal Cannula; Titrate for SPO2: 92%, equal to or greater than     1-4 lpm if patient symptomatic or if O2 sat is below 92%     Standing Status:   Standing     Number of Occurrences:   90529     Order Specific Question:   Device     Answer:   Nasal Cannula     Order Specific Question:   Titrate for SPO2     Answer:   92%     Order Specific Question:   Titrate for SPO2     Answer:   equal to or greater than   • Stress Test With Myocardial  Perfusion One Day     Standing Status:   Future     Number of Occurrences:   1     Standing Expiration Date:   7/31/2018     Order Specific Question:   Rest/stress, rest only or stress only?     Answer:   Rest Only     Order Specific Question:   What stress agent will be used?     Answer:   Exercise with possible pharmacologic     Comments:   rest only     Order Specific Question:   Reason for exam?     Answer:   Chest Pain     Order Specific Question:   Chest pain specification?     Answer:   Suspected CAD   • Stress Test With Myocardial Perfusion One Day     Standing Status:   Future     Standing Expiration Date:   7/31/2018     Order Specific Question:   Rest/stress, rest only or stress only?     Answer:   Stress Only     Order Specific Question:   What stress agent will be used?     Answer:   Exercise with possible pharmacologic     Order Specific Question:   Reason for exam?     Answer:   Chest Pain     Order Specific Question:   Chest pain specification?     Answer:   Suspected CAD   • POCT Troponin I   • POC CKMB, Rapid   • POCT Myoglobin   • POCT D-Dimer   • Insert Peripheral IV     Standing Status:   Standing     Number of Occurrences:   1   • CBC & Differential     Order Specific Question:   Manual Differential     Answer:   No          Plan:       The patient was having active chest pain, albeit pretty minimal.  Because of this I elected to avoid a stress test and just do a resting myocardial perfusion.  This was normal.  I'm recommending that he come back tomorrow for a stress myocardial perfusion study.  I've instructed the patient that he needs to go to the emergency room if he has any worsening chest pain between now and the time he comes back for his test tomorrow.  He indicated that he understood.  If all this comes back normal, I am going to refer him back to his primary care doctor for further workup.  I do think that he probably needs a sleep study, I think that some of his fatigue could easily  be explained by sleep apnea.  I discussed the plan of care with the patient, he indicated that he understood.  I also discussed this plan of care with Dr. Novak.    As always, it has been a pleasure to participate in your patient's care.      Sincerely,         Jolie Sanderson PA-C  I spoke with this patient after his resting nuclear perfusion scan that was not significantly abnormal.  His symptoms were vague agree with plan.

## 2017-08-03 NOTE — INTERVAL H&P NOTE
H&P updated. The patient was examined and the following changes are noted:  Treadmill portion of stress test was abnormal.  Plan for cardiac catheterization.  Procedure, risk, benefits explained to patient and wife and he agrees to proceed.

## 2017-08-03 NOTE — PLAN OF CARE
Problem: Patient Care Overview (Adult)  Goal: Plan of Care Review  Outcome: Outcome(s) achieved Date Met:  08/03/17 08/03/17 1955   Coping/Psychosocial Response Interventions   Plan Of Care Reviewed With patient;spouse   Patient Care Overview   Progress improving   Outcome Evaluation   Outcome Summary/Follow up Plan ready for discharge       Goal: Adult Individualization and Mutuality  Outcome: Outcome(s) achieved Date Met:  08/03/17  Goal: Discharge Needs Assessment  Outcome: Outcome(s) achieved Date Met:  08/03/17    Problem: Cardiac Catheterization with/without PCI (Adult)  Goal: Signs and Symptoms of Listed Potential Problems Will be Absent or Manageable (Cardiac Catheterization with/without PCI)  Outcome: Outcome(s) achieved Date Met:  08/03/17 08/03/17 1955   Cardiac Catheterization with/without PCI   Problems Assessed (Cardiac Catheterization) all   Problems Present (Cardiac Catheterization) none

## 2017-08-04 LAB
ACT BLD: 263 SECONDS (ref 82–152)
ACT BLD: 274 SECONDS (ref 82–152)

## 2017-08-09 ENCOUNTER — TELEPHONE (OUTPATIENT)
Dept: CARDIAC REHAB | Facility: HOSPITAL | Age: 57
End: 2017-08-09

## 2017-08-10 ENCOUNTER — OFFICE VISIT (OUTPATIENT)
Dept: CARDIOLOGY | Facility: CLINIC | Age: 57
End: 2017-08-10

## 2017-08-10 VITALS
HEART RATE: 61 BPM | SYSTOLIC BLOOD PRESSURE: 150 MMHG | HEIGHT: 74 IN | DIASTOLIC BLOOD PRESSURE: 78 MMHG | BODY MASS INDEX: 27.08 KG/M2 | WEIGHT: 211 LBS

## 2017-08-10 DIAGNOSIS — I25.10 CORONARY ARTERY DISEASE INVOLVING NATIVE CORONARY ARTERY OF NATIVE HEART WITHOUT ANGINA PECTORIS: Primary | ICD-10-CM

## 2017-08-10 PROCEDURE — 93000 ELECTROCARDIOGRAM COMPLETE: CPT | Performed by: NURSE PRACTITIONER

## 2017-08-10 PROCEDURE — 99213 OFFICE O/P EST LOW 20 MIN: CPT | Performed by: NURSE PRACTITIONER

## 2017-08-14 NOTE — PROGRESS NOTES
Date of Office Visit: 08/10/2017  Encounter Provider: GREG Herrera  Place of Service: [facility] CARDIOLOGY  Patient Name: Niranjan Peacock  :1960    Chief Complaint   Patient presents with   • Chest Pain   :     HPI: Niranjan Peacock is a 57 y.o. male comes in today for Hospital follow-up.  He is a patient of Dr. Mancilla.  I am seeing him for the first time today and have reviewed his records.      He has a history of coronary disease, syncope, and pulmonary embolism.  He was originally seen by Dr. Mancilla in 2016.  He had had a prior echocardiogram and stress test in .  The patient presented with atypical chest pain not thought to be related to ischemia.  He was referred for a sleep apnea workup.      On 2017, the patient was referred to the [facility] by his primary care physician.  He had been having chest pressure in the center of his chest and continued to feel fatigued.  It was noted that he has a strong family history of coronary artery disease with two brothers and a father all having coronary artery bypass grafting, one at the age of 43 and his father at the age of 60.  He was having chest pain while here, so a resting myocardial perfusion study was performed, which was normal.  He came back the next day for stress images, which was abnormal.  It was recommended that he have a cardiac catheterization.  On 2017, he underwent a cardiac catheterization showing zero stenosis of his left main, 10% proximal left anterior descending stenosis, 60% to 70% mid-left anterior descending stenosis with a FFR of 0.78 making this a significant lesion.  The mid to distal left anterior descending had a 50% stenosis and the remainder was zero percent.  The left circumflex had a 30% proximal stenosis and the right coronary artery had a 10% proximal stenosis.  He underwent drug-eluting stent to the mid-left anterior descending.  He was started  on Plavix and aspirin.      Today, he comes in for follow-up and has been feeling well.  He does not have any more chest pain.  He has been experiencing a cough with sore throat related to a head cold and sinus headache but otherwise has been feeling well.  He denies any palpitations, tachycardia, or edema. He denies dizziness or chest pain.  He denies syncope or presyncope.  He denies orthopnea or paroxysmal nocturnal dyspnea.  He does agree to go to cardiac rehabilitation.          Past Medical History:   Diagnosis Date   • B12 deficiency anemia    • Chest pain    • Coronary artery disease involving native coronary artery of native heart without angina pectoris 8/10/2017   • Headache    • History of blood clots     blood clot found in right lung    • Syncope     2 yrs ago one time       Past Surgical History:   Procedure Laterality Date   • CARDIAC CATHETERIZATION N/A 8/3/2017    Procedure: Coronary angiography;  Surgeon: Cynthia Farley MD;  Location: [facility] CATH INVASIVE LOCATION;  Service:    • CARDIAC CATHETERIZATION  8/3/2017    Procedure: Functional Flow [place];  Surgeon: Cynthia Farley MD;  Location: [facility] CATH INVASIVE LOCATION;  Service:    • CARDIAC CATHETERIZATION N/A 8/3/2017    Procedure: Stent YI coronary;  Surgeon: Cynthia Farley MD;  Location: [facility] CATH INVASIVE LOCATION;  Service:    • CARDIAC CATHETERIZATION N/A 8/3/2017    Procedure: Left ventriculography;  Surgeon: Cynthia Farley MD;  Location: [facility] CATH INVASIVE LOC[facility];  Service:    • CARDIAC CATHETERIZATION N/A 8/3/2017    Procedure: Left Heart Cath;  Surgeon: Cynthia Farley MD;  Location: [facility] CATH INVASIVE LOCATION;  Service:    • COLONOSCOPY             Review of Systems   Constitution: Negative for fever and malaise/fatigue.   HENT: Positive for stridor. Negative for ear pain, hearing loss, nosebleeds and sore throat.    Eyes: Negative for double vision, pain, vision loss in left eye, vision loss in right eye and visual  "disturbance.   Cardiovascular: Negative for claudication and leg swelling.   Respiratory: Positive for cough. Negative for snoring and wheezing.    Endocrine: Negative for cold intolerance, heat intolerance and polyuria.   Skin: Negative for color change, itching and rash.   Musculoskeletal: Positive for myalgias. Negative for joint pain, joint swelling and muscle cramps.   Gastrointestinal: Negative for abdominal pain, diarrhea, melena, nausea and vomiting.   Genitourinary: Negative for bladder incontinence and hematuria.   Neurological: Negative for excessive daytime sleepiness, dizziness, light-headedness, paresthesias and seizures.   Psychiatric/Behavioral: Negative for depression. The patient is not nervous/anxious.    All other systems reviewed and are negative.    All other systems reviewed and are negative    Allergies   Allergen Reactions   • Albuterol Anxiety       All aspects of family and social history reviewed.          Objective:     Vitals:    08/10/17 1410   BP: 150/78   BP Location: Left arm   Pulse: 61   Weight: 211 lb (95.7 kg)   Height: 74\" (188 cm)     Body mass index is 27.09 kg/(m^2).    PHYSICAL EXAM:  Physical Exam   Constitutional: He is oriented to person, place, and time. He appears well-developed and well-nourished.   HENT:   Head: Normocephalic and atraumatic.   Neck: Neck supple. No JVD present.   Cardiovascular: Normal rate, regular rhythm, normal heart sounds and intact distal pulses.    Pulses:       Carotid pulses are 2+ on the right side, and 2+ on the left side.       Radial pulses are 2+ on the right side, and 2+ on the left side.        Dorsalis pedis pulses are 2+ on the right side, and 2+ on the left side.   Pulmonary/Chest: Effort normal and breath sounds normal. No accessory muscle usage. No respiratory distress. He has no rales.   Abdominal: Soft. Normal appearance and bowel sounds are normal. There is no tenderness.   Musculoskeletal: Normal range of motion. He exhibits " no edema.   Neurological: He is alert and oriented to person, place, and time.   Skin: Skin is warm, dry and intact. He is not diaphoretic.   Psychiatric: He has a normal mood and affect. His speech is normal and behavior is normal. Judgment and thought content normal. Cognition and memory are normal.         ECG 12 Lead  Date/Time: 8/10/2017 1:13 PM  Performed by: XIANG SHIRLEY  Authorized by: XIANG SHIRLEY   Comparison: compared with previous ECG from 8/3/2017  Similar to previous ECG  Comparison to previous ECG: Previous with Q wave in V2  Rhythm: sinus rhythm  Rate: normal  Conduction: conduction normal  ST Segments: ST segments normal  T Waves: T waves normal  QRS axis: normal  Other: no other findings  Clinical impression: normal ECG  Comments: Indication: CAD                  Assessment:       Diagnosis Plan   1. Coronary artery disease involving native coronary artery of native heart without angina pectoris  Ambulatory Referral to Cardiac Rehab        Orders Placed This Encounter   Procedures   • Ambulatory Referral to Cardiac Rehab     Referral Priority:   Routine     Referral Type:   Rehabilitation - Outpatient     Referral Location:   [facility]     Number of Visits Requested:   1   • ECG 12 Lead     This order was created via procedure documentation       Current Outpatient Prescriptions   Medication Sig Dispense Refill   • aspirin 81 MG tablet Take 1 tablet by mouth Daily. 30 tablet 11   • atorvastatin (LIPITOR) 40 MG tablet Take 1 tablet by mouth Daily. 30 tablet 3   • clopidogrel (PLAVIX) 75 MG tablet Take 1 tablet by mouth Daily. 30 tablet 11   • cyanocobalamin 1000 MCG/ML injection Inject 1,000 mcg into the shoulder, thigh, or buttocks 1 (One) Time.     • Multiple Vitamin (MULTI VITAMIN DAILY PO) Take  by mouth Daily.       No current facility-administered medications for this visit.             Plan:       1. Coronary Artery Disease  Assessment  • The patient has no angina  • There  is a new diagnosis of stable angina in the past 12 months  • The patient is having symptoms consistent with unstable angina     Plan  • Lifestyle modifications discussed include adhering to a heart healthy diet, maintenance of a healthy weight, medication compliance, regular exercise and regular monitoring of cholesterol and blood pressure    Subjective - Objective  • There has been a previous stent procedure using YI  • Current antiplatelet therapy includes aspirin 81 mg and clopidogrel 75 mg  • The patient qualifies for cardiac rehabilitation, and has been referred to cardiac rehab      Stent placed to mid LAD. Atorvastatin 40 mg daily. Goal LDL < 70      Follow up in office in 3 weeks with Dr. Mancilla    As always, it has been a pleasure to participate in this patient's care.      Sincerely,      GREG Herrera

## 2017-08-25 ENCOUNTER — TELEPHONE (OUTPATIENT)
Dept: CARDIOLOGY | Facility: CLINIC | Age: 57
End: 2017-08-25

## 2017-08-25 NOTE — TELEPHONE ENCOUNTER
Having muscle and joint pain-advised to stop lipitor.  He had some swelling in his right hand, in the back of his hand.  This has resolved.  He had a small bruise next to his catheter site which he reports was new after his visit.  This is stable.  Has good feeling in his hand.  If worsens, will come in to the emergency room or come in on Monday to have it looked at.  Advised to increase his water intake for his dizziness.    I spoke to patient

## 2017-08-25 NOTE — TELEPHONE ENCOUNTER
Pt called and states that he is having muscle and joint pain, swelling in right arm, occasional lightheaded/ dizziness, headache.     Pt current med   aspirin 81 mg daily  Lipitor 40 mg daily  Plavix 75 mg daily    Pt wants to know if that is normal after his procedure (8/3/17) or does he need to come in? Pt is scheduled to see MI  9/7/17 at 2:20pm. Pt can be reached at [phone].....Please advise    Thanks  Ryann PEDRAZA

## 2017-09-05 ENCOUNTER — TRANSCRIBE ORDERS (OUTPATIENT)
Dept: CARDIAC REHAB | Facility: HOSPITAL | Age: 57
End: 2017-09-05

## 2017-09-07 ENCOUNTER — OFFICE VISIT (OUTPATIENT)
Dept: CARDIOLOGY | Facility: CLINIC | Age: 57
End: 2017-09-07

## 2017-09-07 VITALS
WEIGHT: 212 LBS | BODY MASS INDEX: 27.21 KG/M2 | SYSTOLIC BLOOD PRESSURE: 152 MMHG | HEIGHT: 74 IN | HEART RATE: 59 BPM | DIASTOLIC BLOOD PRESSURE: 90 MMHG

## 2017-09-07 DIAGNOSIS — I25.10 CORONARY ARTERY DISEASE INVOLVING NATIVE CORONARY ARTERY OF NATIVE HEART WITHOUT ANGINA PECTORIS: Primary | ICD-10-CM

## 2017-09-07 DIAGNOSIS — E78.2 MIXED HYPERLIPIDEMIA: ICD-10-CM

## 2017-09-07 DIAGNOSIS — I10 ESSENTIAL HYPERTENSION: ICD-10-CM

## 2017-09-07 PROCEDURE — 99214 OFFICE O/P EST MOD 30 MIN: CPT | Performed by: INTERNAL MEDICINE

## 2017-09-07 PROCEDURE — 93000 ELECTROCARDIOGRAM COMPLETE: CPT | Performed by: INTERNAL MEDICINE

## 2017-09-07 RX ORDER — LISINOPRIL 2.5 MG/1
2.5 TABLET ORAL DAILY
Qty: 30 TABLET | Refills: 11 | Status: SHIPPED | OUTPATIENT
Start: 2017-09-07 | End: 2017-12-07 | Stop reason: SDUPTHER

## 2017-09-07 NOTE — PROGRESS NOTES
Date of Office Visit: 17  Encounter Provider: Jason Mancilla MD  Place of Service: [facility] CARDIOLOGY  Patient Name: Niranjan Peacock  :1960      Chief Complaint   Patient presents with   • Coronary Artery Disease     History of Present Illness  HPI Comments: Mr Peacock is a pleasant 56 yo gentleman presented in 2017 with chest pain worrisome for angina.  He had a stress test that was abnormal he then underwent cardiac catheterization which showed normal left ventricular systolic function.  Severe disease of the proximal mid left anterior descending fractional flow wire reserve was abnormal he then underwent stenting with a 3.25 mm right 20 mm drug-eluting stent.  He also has hyperlipidemia and hypertension.  He was placed on atorvastatin.  He was having some atypical muscle aches stopped that.  He comes in for follow-up cystoscopy muscle aches really haven't changed any in the past week of being off of it.  He also gets headaches.  He'll set a cough.  He had a cough prior to all this was felt to be due to some sinus problems he got better on antibiotics but now it's back some.  He's noted that his blood pressure has been intermittently high.  On a positive note he's had no further chest pain or pressure.  He's had no shortness of breath, orthopnea, or PND.  He is starting cardiac rehabilitation next week.    Coronary Artery Disease   Pertinent negatives include no dizziness, muscle weakness or weight gain.         Past Medical History:   Diagnosis Date   • B12 deficiency anemia    • Chest pain    • Coronary artery disease involving native coronary artery of native heart without angina pectoris 8/10/2017   • Headache    • History of blood clots     blood clot found in right lung    • Syncope     2 yrs ago one time         Past Surgical History:   Procedure Laterality Date   • CARDIAC CATHETERIZATION N/A 8/3/2017    Procedure: Coronary angiography;  Surgeon: Cynthia  MD Martine;  [facility];  Service:    • CARDIAC CATHETERIZATION  8/3/2017    Procedure: Functional Flow [place];  Surgeon: Cynthia Farley MD;  [facility];  Service:    • CARDIAC CATHETERIZATION N/A 8/3/2017    Procedure: Stent YI coronary;  Surgeon: Cynthia Farley MD;  [facility];  Service:    • CARDIAC CATHETERIZATION N/A 8/3/2017    Procedure: Left ventriculography;  Surgeon: Cynthia Farley MD;  [facility];  Service:    • CARDIAC CATHETERIZATION N/A 8/3/2017    Procedure: Left Heart Cath;  Surgeon: Cynthia Farley MD;  Location: [facility] CATH INVASIVE LOCATION;  Service:    • COLONOSCOPY           Current Outpatient Prescriptions on File Prior to Visit   Medication Sig Dispense Refill   • aspirin 81 MG tablet Take 1 tablet by mouth Daily. 30 tablet 11   • clopidogrel (PLAVIX) 75 MG tablet Take 1 tablet by mouth Daily. 30 tablet 11   • cyanocobalamin 1000 MCG/ML injection Inject 1,000 mcg into the shoulder, thigh, or buttocks 1 (One) Time.     • Multiple Vitamin (MULTI VITAMIN DAILY PO) Take  by mouth Daily.     • atorvastatin (LIPITOR) 40 MG tablet Take 1 tablet by mouth Daily. 30 tablet 3     No current facility-administered medications on file prior to visit.          Social History     Social History   • Marital status:      Spouse name: N/A   • Number of children: N/A   • Years of education: N/A     Occupational History   • Not on file.     Social History Main Topics   • Smoking status: Never Smoker   • Smokeless tobacco: Not on file   • Alcohol use 1.2 - 6.0 oz/week     2 - 10 Cans of beer per week      Comment: social   • Drug use: No   • Sexual activity: Defer     Other Topics Concern   • Not on file     Social History Narrative       Family History   Problem Relation Age of Onset   • Heart disease Other    • Hypertension Other    • Heart disease Father    • Prostate cancer Father    •  "Heart disease Mother          Review of Systems   Constitution: Positive for malaise/fatigue. Negative for decreased appetite, diaphoresis, fever, weakness, weight gain and weight loss.   HENT: Positive for headaches. Negative for congestion, hearing loss, nosebleeds and tinnitus.    Eyes: Negative for blurred vision, double vision, vision loss in left eye, vision loss in right eye and visual disturbance.   Cardiovascular:        As noted in HPI   Respiratory:        As noted HPI   Endocrine: Negative for cold intolerance and heat intolerance.   Hematologic/Lymphatic: Negative for bleeding problem. Does not bruise/bleed easily.   Skin: Negative for color change, flushing, itching and rash.   Musculoskeletal: Positive for myalgias. Negative for arthritis, back pain, joint pain, joint swelling and muscle weakness.   Gastrointestinal: Negative for bloating, abdominal pain, constipation, diarrhea, dysphagia, heartburn, hematemesis, hematochezia, melena, nausea and vomiting.   Genitourinary: Negative for bladder incontinence, dysuria, frequency, nocturia and urgency.   Neurological: Negative for dizziness, focal weakness, light-headedness, loss of balance, numbness, paresthesias and vertigo.   Psychiatric/Behavioral: Negative for depression, memory loss and substance abuse.       Procedures      ECG 12 Lead  Date/Time: 9/7/2017 2:56 PM  Performed by: JUAN CLARK  Authorized by: JUAN CLARK   Comparison: compared with previous ECG   Similar to previous ECG  Rhythm: sinus rhythm  Rate: normal  QRS axis: normal                 Objective:    /90 (BP Location: Right arm)  Pulse 59  Ht 74\" (188 cm)  Wt 212 lb (96.2 kg)  BMI 27.22 kg/m2       Physical Exam  Physical Exam   Constitutional: He is oriented to person, place, and time. He appears well-developed and well-nourished. No distress.   HENT:   Head: Normocephalic.   Eyes: Conjunctivae are normal. Pupils are equal, round, and reactive to light. No " scleral icterus.   Neck: Normal carotid pulses, no hepatojugular reflux and no JVD present. Carotid bruit is not present. No tracheal deviation, no edema and no erythema present. No thyromegaly present.   Cardiovascular: Normal rate, regular rhythm, S1 normal, S2 normal, normal heart sounds and intact distal pulses.   No extrasystoles are present. PMI is not displaced.  Exam reveals no gallop, no distant heart sounds and no friction rub.    No murmur heard.  Pulses:       Carotid pulses are 2+ on the right side, and 2+ on the left side.       Radial pulses are 2+ on the right side, and 2+ on the left side.        Femoral pulses are 2+ on the right side, and 2+ on the left side.       Dorsalis pedis pulses are 2+ on the right side, and 2+ on the left side.        Posterior tibial pulses are 2+ on the right side, and 2+ on the left side.   Pulmonary/Chest: Effort normal and breath sounds normal. No respiratory distress. He has no decreased breath sounds. He has no wheezes. He has no rhonchi. He has no rales. He exhibits no tenderness.   Abdominal: Soft. Bowel sounds are normal. He exhibits no distension and no mass. There is no hepatosplenomegaly. There is no tenderness. There is no rebound and no guarding.   Musculoskeletal: He exhibits no edema, tenderness or deformity.   Neurological: He is alert and oriented to person, place, and time.   Skin: Skin is warm and dry. No rash noted. He is not diaphoretic. No cyanosis or erythema. No pallor. Nails show no clubbing.   Psychiatric: He has a normal mood and affect. His speech is normal and behavior is normal. Judgment and thought content normal.           Assessment:   1.  57-year-old gentleman with history of severe coronary disease preserved left ventricular systolic function status post 3.25 mm x 20 mm drug-eluting stent placed left anterior descending in August 2017.    He's now doing well.  He is to start cardiac rehabilitation on Monday he'll see skin follow up in  4 months.    2.  Hyperlipidemia on target dose atorvastatin I don't believe the muscle aches she's been having is from that he's can go back on the atorvastatin if they worsen he'll call us will then try him on rosuvastatin.  If stable will remain on that same dose.  3.  Hypertension blood pressures been elevated and elevated today.  Always got this cough but doesn't sound that significant and may be related to his sinuses and his heart rates too low to tolerate a beta blocker and expressed drug for him to be on would be an ACE inhibitor therefore been a start him on lisinopril 2.5 mg a day and is to keep track of his blood pressure.  4.  B12 deficiency.          Plan:

## 2017-09-11 ENCOUNTER — OFFICE VISIT (OUTPATIENT)
Dept: CARDIAC REHAB | Facility: HOSPITAL | Age: 57
End: 2017-09-11

## 2017-09-11 VITALS — HEIGHT: 74 IN | BODY MASS INDEX: 26.89 KG/M2 | WEIGHT: 209.5 LBS

## 2017-09-11 DIAGNOSIS — Z95.5 STATUS POST INSERTION OF DRUG ELUTING CORONARY ARTERY STENT: Primary | ICD-10-CM

## 2017-09-11 PROCEDURE — 93797 PHYS/QHP OP CAR RHAB WO ECG: CPT

## 2017-09-11 NOTE — PROGRESS NOTES
Cardiac Rehab Initial Assessment      Name: Niranjan Peacock  :1960 Allergies:Albuterol   MRN: [id number] 57 y.o. Physician: Silvano Peña MD   Primary Diagnosis:    Diagnosis Plan   1. Status post insertion of drug eluting coronary artery stent      Event Date:8/3/17 Specialist: Dr. Mancilla   Secondary Diagnosis:  Risk Stratification:Moderate Risk Note Author: Katiuska Posada RN     Cardiovascular History: Comments first heart event, has had lower energy and not feeling well was seen by PCP and had Cardiac work-up.      EXERCISE AT HOME  yes  60min  3 days per week Walks outside for an hour.    EF: 60%      Source: Cath 8/3/17          Ambulatory Status:Independent  Ambulatory Fall Risk Assessed on Initial Visit: yes 6 Minute Walk Pre- Cardiac Rehab:  Distance:1844ft      RPE:1  Max. HR: 93       SPO2:98    MET: 3.7  MPH: 3.5             RPD: 1  Resting BP: 138/78 /80 RA    Peak BP: 162/74  Recovery BP: 138/78  Comments: tolerated well.       NUTRITION  Lipids:have called PCP for results.  If yes, labs as follows;  Total: No components found for: CHOLESTEROL  HDL: No results found for: HDL Lipids continued:  LDL:No results found for: LDL  Triglyceride: No components found for: TRIGLYCERIDE   Weight Management:                 Weight: 209.5  Height: 74                           BMI: Body mass index is 26.9 kg/(m^2).  Waist Circumference: 41.25  inches   Alcohol Use: defer Diabetes:No    Last HGBA1C with date if applicable:No components found for: A1C         SOCIAL HISTORY  Social History     Social History   • Marital status:      Spouse name: N/A   • Number of children: N/A   • Years of education: N/A     Social History Main Topics   • Smoking status: Never Smoker   • Smokeless tobacco: Not on file   • Alcohol use 1.2 - 6.0 oz/week     2 - 10 Cans of beer per week      Comment: social   • Drug use: No   • Sexual activity: Defer     Other Topics Concern   • Not on file     Social History  Narrative       Educational Level (choose one that applies) [employer] Learning Barriers:Ready to Learn    Family Support:yes    Living Arrangement: lives with their spouse    Risk Factors: Stress  Yes, Clinical Depression  No, Heredity  Yes If Yes father and brothers one brother at 60 and one at about 45 years, Hyperlipidemia  No, Diabetes  No If Yes: Do you check blood glucose daily  N/A Today's glucose level na, Exercise prior to event  Yes If Yes: Activity walking , Minutes per day30, Days per week 3, Obesity  No and 0     Tobacco Adjunct: No  Never smoked      Comorbidities: none of these     PSYCHOSOCIAL  Clinical Depression: no    Stress: yes     Assess presence or absence of depression using a valid screening tool: yes      PHYSICAL ASSESSMENT  Influenza vaccine: no  Pneumococcal vaccine: no          Angina: no    Describe angina scale of 0 - 4: 0 = none    Today are you having incisional pain? N/A. If, Yes, Scale: na        Today are you having any other pain? Yes. If, Yes, Scale: 2    Some muscle aches appears to be from Lipitor.  Dr. Mancilla is aware of this.  Diagnosed with Hypertension:no    Heart Sounds: regular rhythm  Without murmur.       Lung Sounds: normal air entry, lungs clear to auscultation         Assessment: Has some slight ankle edema but this has been present prior to stent.   Orthopedic Problems: Left knee causes some discomfort with climbing steps or a ladder.    Are you being hurt, hit, or frightened by anyone at home or in your life? no    Are you being neglected by a caregiver? N/A Shoulder flexibility/Range of motion: Average   Some right shoulder pain with certain movements such as throwing over-handed.  Recommended arm activity: Any    Chair sit and reach within: 11 inches   Leg flexibility: Below average    Leg Strength/Balance/Five times sit to stand: 7 seconds.     Chose one: Average    Recommended stretching: Standing    Assessment: Good balance demonstrated.     Family  attends IA: no Time of arrival: 0925  Time of departure: 1015     Patient Goals: To continue cutting grass and weed-eating.  Doing a house renovation and might need to carry up to 100 lbs of material.    To be able to continue to play with grand-kids.           9/11/2017  10:25 AM  Katiuska Posada RN

## 2017-09-13 ENCOUNTER — TREATMENT (OUTPATIENT)
Dept: CARDIAC REHAB | Facility: HOSPITAL | Age: 57
End: 2017-09-13

## 2017-09-13 DIAGNOSIS — Z95.5 STATUS POST INSERTION OF DRUG ELUTING CORONARY ARTERY STENT: Primary | ICD-10-CM

## 2017-09-13 PROCEDURE — 93798 PHYS/QHP OP CAR RHAB W/ECG: CPT

## 2017-09-15 ENCOUNTER — TREATMENT (OUTPATIENT)
Dept: CARDIAC REHAB | Facility: HOSPITAL | Age: 57
End: 2017-09-15

## 2017-09-15 DIAGNOSIS — Z95.5 STATUS POST INSERTION OF DRUG ELUTING CORONARY ARTERY STENT: Primary | ICD-10-CM

## 2017-09-15 PROCEDURE — 93798 PHYS/QHP OP CAR RHAB W/ECG: CPT

## 2017-09-18 ENCOUNTER — TRANSCRIBE ORDERS (OUTPATIENT)
Dept: SLEEP MEDICINE | Facility: HOSPITAL | Age: 57
End: 2017-09-18

## 2017-09-18 ENCOUNTER — HOSPITAL ENCOUNTER (OUTPATIENT)
Dept: SLEEP MEDICINE | Facility: HOSPITAL | Age: 57
Discharge: HOME | End: 2017-09-18
Admitting: NURSE PRACTITIONER

## 2017-09-18 ENCOUNTER — TREATMENT (OUTPATIENT)
Dept: CARDIAC REHAB | Facility: HOSPITAL | Age: 57
End: 2017-09-18

## 2017-09-18 DIAGNOSIS — Z95.5 STATUS POST INSERTION OF DRUG ELUTING CORONARY ARTERY STENT: Primary | ICD-10-CM

## 2017-09-18 DIAGNOSIS — G47.33 OSA (OBSTRUCTIVE SLEEP APNEA): Primary | ICD-10-CM

## 2017-09-18 DIAGNOSIS — R53.83 FATIGUE, UNSPECIFIED TYPE: ICD-10-CM

## 2017-09-18 PROCEDURE — 93798 PHYS/QHP OP CAR RHAB W/ECG: CPT

## 2017-09-18 PROCEDURE — G0463 HOSPITAL OUTPT CLINIC VISIT: HCPCS

## 2017-09-18 NOTE — CONSULTS
Sleep Consultation    Patient Name: Niranjan Peacock  Age/Sex: 57 y.o. male  : 1960  MRN: [id number]    Date of Encounter Visit: 17  Encounter Provider: GREG Pinto  Referring Provider: Dr. Silvano Peña  Place of Service: [facility]  Patient Care Team:  Silvano Peña MD as PCP - General  Silvano Peña MD as PCP - Family Medicine    Subjective:     Reason for Consult: Excessive fatigue that has worsened after recent stent due to coronary artery disease    History of Present Illness:  Niranjan Peacock is a 57 y.o. male is here for evaluation of CHANTEL due to excessive fatigue.  He reports that he has had worsening fatigue symptoms and he did have mild improvement after having stenting for his coronary artery disease, but continues to complain of fatigue.  Please see sleep questionnaire on page 2 for more details.     Patient complains of daytime fatigue and sleepiness with an [place] Sleepiness Scale (ESS) of 5.  Patient complains of loud snoring that occurs mainly when lying on his back.  He does awaken with coughing, choking, respiratory discomfort and occasionally was sore throat or dry mouth.  He does complain of pain in his legs at night that is not typical of restless leg syndrome but may be due to addition of statin medication as the symptoms began with the initiation of statin therapy.  Patient has overall restless sleep, but denies any excessive sweating at night.  Patient denies any cataplexy, sleep paralysis or other symptoms to suggest narcolepsy.  Patient denies any parasomnias.  Denies any history of seizure disorder or recent head trauma.  Patient does complain of difficulty falling asleep as well as staying asleep and has frequent awakenings with averaging of 2 trips to the restroom at night.  Patient also reports that he feels more fatigued when he increases his amount of sleep time.  Patient spends adequate amount of time in bed with no evidence of sleep  restriction or improper sleep hygiene.  He reports that he goes to bed around 9 PM and wakes up around 6 AM averaging about 7 hours of sleep and this does not change from the weekend to the weekday.  He reports that it takes him 15 minutes to fall sleep, but he wakes up feeling tired in the mornings and occasionally takes a nap.  He denies any rotating or night shift work.  Onset of symptoms have been gradual over the past several years.  Comorbidities include: Coronary artery disease status post stenting and overweight.    Review of Systems:   Positive for nasal congestion, poor appetite, cough, shortness of breath, rash and changes in nails.  A twelve-system review was conducted and was otherwise negative.   Please refer to page 4 of on the sleep questionnaire for more details on system review findings.    Past Medical History:  Past Medical History:   Diagnosis Date   • B12 deficiency anemia    • Chest pain    • Coronary artery disease involving native coronary artery of native heart without angina pectoris 8/10/2017   • Headache    • History of blood clots     blood clot found in right lung    • Syncope     2 yrs ago one time       Past Surgical History:   Procedure Laterality Date   • CARDIAC CATHETERIZATION N/A 8/3/2017    Procedure: Coronary angiography;  Surgeon: Cynthia Farley MD;  Location:  INVASIVE LOCATION;  Service:    • CARDIAC CATHETERIZATION  8/3/2017    Procedure: Functional Flow [place];  Surgeon: Cynthia Farley MD;  Location:  INVASIVE LOCATION;  Service:    • CARDIAC CATHETERIZATION N/A 8/3/2017    Procedure: Stent YI coronary;  Surgeon: Cynthia Farley MD;  Location:  INVASIVE LOCATION;  Service:    • CARDIAC CATHETERIZATION N/A 8/3/2017    Procedure: Left ventriculography;  Surgeon: Cynthia Farley MD;  Location:  INVASIVE LOCATION;  Service:    • CARDIAC CATHETERIZATION N/A 8/3/2017    Procedure: Left Heart Cath;  Surgeon: Cynthia Farley MD;   Location: [facility] INVASIVE LOCATION;  Service:    • COLONOSCOPY         Home Medications:     Current Outpatient Prescriptions:   •  aspirin 81 MG tablet, Take 1 tablet by mouth Daily., Disp: 30 tablet, Rfl: 11  •  atorvastatin (LIPITOR) 40 MG tablet, Take 1 tablet by mouth Daily., Disp: 30 tablet, Rfl: 3  •  clopidogrel (PLAVIX) 75 MG tablet, Take 1 tablet by mouth Daily., Disp: 30 tablet, Rfl: 11  •  cyanocobalamin 1000 MCG/ML injection, Inject 1,000 mcg into the shoulder, thigh, or buttocks 1 (One) Time., Disp: , Rfl:   •  lisinopril (PRINIVIL,ZESTRIL) 2.5 MG tablet, Take 1 tablet by mouth Daily., Disp: 30 tablet, Rfl: 11  •  Multiple Vitamin (MULTI VITAMIN DAILY PO), Take  by mouth Daily., Disp: , Rfl:     Allergies:  Allergies   Allergen Reactions   • Albuterol Anxiety       Past Social History:  Social History     Social History   • Marital status:      Spouse name: N/A   • Number of children: N/A   • Years of education: N/A     Occupational History   •       Social History Main Topics   • Smoking status: Never Smoker   • Smokeless tobacco: None   • Alcohol use 1.2 - 6.0 oz/week     2 - 10 Cans of beer per week      Comment: social   • Drug use: No      Comment: Drinks 2-4 caffeinated beverages per day   • Sexual activity: Defer     Other Topics Concern   • None     Social History Narrative     Past Family History:  Family History   Problem Relation Age of Onset   • Heart disease Other    • Hypertension Other    • Thyroid disease Other    • Heart disease Father    • Prostate cancer Father    • Heart disease Mother        Objective:   Done and documented on sleep disorders center physical examination sheet   VS: Ht: 74 inches, Wt: 209 lbs, BMI 26, /68, HR 62, Neck size 16.5 inches    Physical Exam:   General: AAOx3. Normal mood and affect.   HEENT:  Conjunctiva normal.  PERRLA.  Bruising around the corner of the lateral right eye from recent blunt injury.  Moist mucous membranes.   Septum midline. Mallampati 4 airways. Normal tongue and edematous uvula. Enlarged tonsils.  No cervical or supraclavicular lymphadenopathy.  Neck supple trachea midline.  Normal thyroid.  LUNGS: Non-labored breathing. CTAB.  No wheezing  HEART: regular rate and rhythm. No murmur. Normal s1/s2  EXTREMITIES: no edema. No cyanosis or clubbing. Normal gait    Diagnostic Data:  none    Assessment and Plan:   1. Obstructive sleep apnea  2. Subjective excessive daytime sleepiness with ESS of 5  3. Coronary artery disease status post recent stenting to the LAD  4. Overweight      Recommendations:     Patient was educated in depth about CHANTEL and cardiovascular consequences if left untreated, including but not limited to CHF, CAD, arrhythmias, CVA, and/or HTN. Education also provided about the diagnostic tools for CHANTEL, including the polysomnography and the treatment modalities, including the CPAP. Adherence to the CPAP is a key factor in successful treatment of CHANTEL and the patient was encouraged to contact us in case of problem with the CPAP or the mask that can limit the tolerance of the compliance with the therapy.    Discussed home sleep study versus in lab sleep study with titration inpatient agreed to proceed with home sleep study.    Home sleep study with CPAP if AHI greater than 5.    Encourage weight loss as can help reduce symptoms of sleep apnea and improve overall health.    Hypersomnia likely secondary to obstructive sleep apnea and expected to improve with treatment of underlying sleep apnea.  Will treat sleep apnea and then reevaluate.    Follow up in 2 months.    GREG Pinto dictating for Dr. Ryley Ramirez  [facility]   09/18/17  5:25 PM    EMR Dragon/Transcription disclaimer:   Much of this encounter note is an electronic transcription/translation of spoken language to printed text. The electronic translation of spoken language may permit erroneous, or at times, nonsensical words or  phrases to be inadvertently transcribed; Although I have reviewed the note for such errors, some may still exist.

## 2017-09-28 ENCOUNTER — TRANSCRIBE ORDERS (OUTPATIENT)
Dept: ADMINISTRATIVE | Facility: HOSPITAL | Age: 57
End: 2017-09-28

## 2017-09-28 ENCOUNTER — HOSPITAL ENCOUNTER (OUTPATIENT)
Dept: CARDIOLOGY | Facility: HOSPITAL | Age: 57
Discharge: HOME | End: 2017-09-28
Admitting: NURSE PRACTITIONER

## 2017-09-28 ENCOUNTER — OFFICE VISIT (OUTPATIENT)
Dept: FAMILY MEDICINE CLINIC | Facility: CLINIC | Age: 57
End: 2017-09-28

## 2017-09-28 VITALS
HEIGHT: 74 IN | HEART RATE: 62 BPM | DIASTOLIC BLOOD PRESSURE: 70 MMHG | SYSTOLIC BLOOD PRESSURE: 112 MMHG | OXYGEN SATURATION: 98 % | WEIGHT: 209 LBS | BODY MASS INDEX: 26.82 KG/M2 | TEMPERATURE: 98.3 F | RESPIRATION RATE: 16 BRPM

## 2017-09-28 DIAGNOSIS — S89.92XA LEFT LEG INJURY, INITIAL ENCOUNTER: ICD-10-CM

## 2017-09-28 DIAGNOSIS — S89.92XA LEG INJURY, LEFT, INITIAL ENCOUNTER: Primary | ICD-10-CM

## 2017-09-28 DIAGNOSIS — M79.89 LEFT LEG SWELLING: ICD-10-CM

## 2017-09-28 DIAGNOSIS — S89.92XA LEG INJURY, LEFT, INITIAL ENCOUNTER: ICD-10-CM

## 2017-09-28 DIAGNOSIS — J06.9 ACUTE URI: ICD-10-CM

## 2017-09-28 DIAGNOSIS — J30.2 SEASONAL ALLERGIC RHINITIS, UNSPECIFIED ALLERGIC RHINITIS TRIGGER: ICD-10-CM

## 2017-09-28 DIAGNOSIS — Z86.711 HISTORY OF PULMONARY EMBOLISM: ICD-10-CM

## 2017-09-28 DIAGNOSIS — M79.89 LEFT LEG SWELLING: Primary | ICD-10-CM

## 2017-09-28 DIAGNOSIS — Z12.11 ENCOUNTER FOR SCREENING COLONOSCOPY: ICD-10-CM

## 2017-09-28 LAB
BH CV LOWER VASCULAR LEFT COMMON FEMORAL AUGMENT: NORMAL
BH CV LOWER VASCULAR LEFT COMMON FEMORAL COMPETENT: NORMAL
BH CV LOWER VASCULAR LEFT COMMON FEMORAL COMPRESS: NORMAL
BH CV LOWER VASCULAR LEFT COMMON FEMORAL PHASIC: NORMAL
BH CV LOWER VASCULAR LEFT COMMON FEMORAL SPONT: NORMAL
BH CV LOWER VASCULAR LEFT DISTAL FEMORAL COMPRESS: NORMAL
BH CV LOWER VASCULAR LEFT GASTRONEMIUS COMPRESS: NORMAL
BH CV LOWER VASCULAR LEFT GREATER SAPH AK COMPRESS: NORMAL
BH CV LOWER VASCULAR LEFT GREATER SAPH BK COMPRESS: NORMAL
BH CV LOWER VASCULAR LEFT MID FEMORAL AUGMENT: NORMAL
BH CV LOWER VASCULAR LEFT MID FEMORAL COMPETENT: NORMAL
BH CV LOWER VASCULAR LEFT MID FEMORAL COMPRESS: NORMAL
BH CV LOWER VASCULAR LEFT MID FEMORAL PHASIC: NORMAL
BH CV LOWER VASCULAR LEFT MID FEMORAL SPONT: NORMAL
BH CV LOWER VASCULAR LEFT PERONEAL COMPRESS: NORMAL
BH CV LOWER VASCULAR LEFT POPLITEAL AUGMENT: NORMAL
BH CV LOWER VASCULAR LEFT POPLITEAL COMPETENT: NORMAL
BH CV LOWER VASCULAR LEFT POPLITEAL COMPRESS: NORMAL
BH CV LOWER VASCULAR LEFT POPLITEAL PHASIC: NORMAL
BH CV LOWER VASCULAR LEFT POPLITEAL SPONT: NORMAL
BH CV LOWER VASCULAR LEFT POSTERIOR TIBIAL COMPRESS: NORMAL
BH CV LOWER VASCULAR LEFT PROXIMAL FEMORAL COMPRESS: NORMAL
BH CV LOWER VASCULAR LEFT SAPHENOFEMORAL JUNCTION AUGMENT: NORMAL
BH CV LOWER VASCULAR LEFT SAPHENOFEMORAL JUNCTION COMPETENT: NORMAL
BH CV LOWER VASCULAR LEFT SAPHENOFEMORAL JUNCTION COMPRESS: NORMAL
BH CV LOWER VASCULAR LEFT SAPHENOFEMORAL JUNCTION PHASIC: NORMAL
BH CV LOWER VASCULAR LEFT SAPHENOFEMORAL JUNCTION SPONT: NORMAL
BH CV LOWER VASCULAR RIGHT COMMON FEMORAL AUGMENT: NORMAL
BH CV LOWER VASCULAR RIGHT COMMON FEMORAL COMPETENT: NORMAL
BH CV LOWER VASCULAR RIGHT COMMON FEMORAL COMPRESS: NORMAL
BH CV LOWER VASCULAR RIGHT COMMON FEMORAL PHASIC: NORMAL
BH CV LOWER VASCULAR RIGHT COMMON FEMORAL SPONT: NORMAL

## 2017-09-28 PROCEDURE — 93971 EXTREMITY STUDY: CPT

## 2017-09-28 PROCEDURE — 99214 OFFICE O/P EST MOD 30 MIN: CPT | Performed by: NURSE PRACTITIONER

## 2017-09-28 RX ORDER — ASPIRIN 81 MG/1
81 TABLET ORAL DAILY
COMMUNITY
End: 2018-04-04 | Stop reason: SDUPTHER

## 2017-09-28 RX ORDER — AMOXICILLIN AND CLAVULANATE POTASSIUM 875; 125 MG/1; MG/1
1 TABLET, FILM COATED ORAL 2 TIMES DAILY
Qty: 14 TABLET | Refills: 0 | Status: SHIPPED | OUTPATIENT
Start: 2017-09-28 | End: 2017-10-05

## 2017-09-28 RX ORDER — BENZONATATE 100 MG/1
100 CAPSULE ORAL 3 TIMES DAILY PRN
Qty: 30 CAPSULE | Refills: 0 | Status: SHIPPED | OUTPATIENT
Start: 2017-09-28 | End: 2019-01-16

## 2017-09-28 RX ORDER — ATORVASTATIN CALCIUM 40 MG/1
40 TABLET, FILM COATED ORAL DAILY
COMMUNITY
End: 2018-08-20 | Stop reason: SDUPTHER

## 2017-09-28 RX ORDER — LISINOPRIL 2.5 MG/1
2.5 TABLET ORAL DAILY
COMMUNITY
End: 2018-04-04 | Stop reason: SDUPTHER

## 2017-09-28 RX ORDER — CLOPIDOGREL BISULFATE 75 MG/1
75 TABLET ORAL DAILY
COMMUNITY
End: 2019-01-16 | Stop reason: SDUPTHER

## 2017-09-28 NOTE — PROGRESS NOTES
Subjective   Niranjan Peacock is a 57 y.o. male.     History of Present Illness   C/o left leg injury, he states he was cutting grass, left fell in hole where water meter is, he kept moving forward, did not fall, he has soreness on his left tibia, he has noted warmth on leg, he denies trouble walking, he denies pain just soreness, this injury was about 10 days ago, he noticed a cut with bleeding at the time, he put ice on his leg, he states he did notice swelling at that time, he kept it covered with band aid.   He also c/o cough, congestion, HA, he coughs mostly at night, states he had sinus infection about 2 months ago, he went to [facility], was given amoxicillin which helped but cough persists. He has a productive cough, clear in color, he does get seasonal allergies, denies fever, he denies sore throat, he denies wheezing, he did not try any medication at home.   Sees Dr. Farley, cardiology, went for stess test on 8/3/17, had LAD-stent placed, he is currently on plavix, baby asa, and lipitor, lisinopril. He denies CP today. He has f/u with Dr. Farley 1 week ago and sees again in 3-6 months.     The following portions of the patient's history were reviewed and updated as appropriate: allergies, current medications, past family history, past medical history, past social history, past surgical history and problem list.    Review of Systems   Constitutional: Negative for chills, diaphoresis and fever.   HENT: Positive for congestion, postnasal drip and rhinorrhea. Negative for ear pain, sinus pressure and sore throat.    Respiratory: Positive for cough. Negative for chest tightness, shortness of breath and wheezing.    Cardiovascular: Negative for chest pain and leg swelling.   Musculoskeletal: Negative for arthralgias and myalgias.   Skin: Positive for wound.   Allergic/Immunologic: Positive for environmental allergies.   Neurological: Negative for dizziness, light-headedness and headaches.   All other systems reviewed and  are negative.      Objective   Physical Exam   Constitutional: He is oriented to person, place, and time. He appears well-developed and well-nourished.   HENT:   Head: Normocephalic.   Right Ear: Tympanic membrane and ear canal normal.   Left Ear: Tympanic membrane and ear canal normal.   Nose: Nose normal.   Mouth/Throat: Uvula is midline and mucous membranes are normal. Posterior oropharyngeal erythema present.   Eyes: Pupils are equal, round, and reactive to light.   Neck: Neck supple.   Cardiovascular: Normal rate, regular rhythm and normal heart sounds.    Pulmonary/Chest: Effort normal and breath sounds normal. He has no decreased breath sounds. He has no wheezes. He has no rhonchi. He has no rales.   Musculoskeletal: Normal range of motion.   Neurological: He is alert and oriented to person, place, and time.   Skin: Skin is warm and dry. Bruising and laceration noted. No ecchymosis noted. Rash is not urticarial. There is erythema.        Psychiatric: He has a normal mood and affect. His behavior is normal. Judgment and thought content normal.   Nursing note and vitals reviewed.      Assessment/Plan   Niranjan was seen today for leg injury and cough.    Diagnoses and all orders for this visit:    Leg injury, left, initial encounter  -     Duplex Venous Lower Extremity - Left CAR; Future    Left leg swelling  -     Duplex Venous Lower Extremity - Left CAR; Future    History of pulmonary embolism  -     Duplex Venous Lower Extremity - Left CAR; Future    Acute URI    Seasonal allergic rhinitis, unspecified allergic rhinitis trigger    Encounter for screening colonoscopy  -     Ambulatory Referral For Screening Colonoscopy    Other orders  -     amoxicillin-clavulanate (AUGMENTIN) 875-125 MG per tablet; Take 1 tablet by mouth 2 (Two) Times a Day for 7 days.  -     benzonatate (TESSALON PERLES) 100 MG capsule; Take 1 capsule by mouth 3 (Three) Times a Day As Needed for Cough. 1-2 tabs q8h prn  #30      Start  augmentin 875-125mg 2x day for 7 days.  May try flonase or coricidin hbp OTC as needed.  Tessalon perles 100mg up to 3x day as needed for cough, may try mucinex plain OTC as needed for cough.  May apply bacitracin OTC as needed to wound.   Stat doppler today, will call with results.  Refer for screening colonoscopy, will call with appt.  Discussed plan of care with Dr. Peña.  Cont scheduled f/u with cardiology.   Increase fluid intake, get plenty of rest.   If any SOA, CP, advised to go to the ER.   Patient agrees with plan of care and understands instructions. Call if worsening symptoms or any problems or concerns.

## 2017-09-28 NOTE — PATIENT INSTRUCTIONS
Start augmentin 875-125mg 2x day for 7 days.  May try flonase or coricidin hbp OTC as needed.  Tessalon perles 100mg up to 3x day as needed for cough, may try mucinex plain OTC as needed for cough.  May apply bacitracin OTC as needed to wound.   Stat doppler today, will call with results.  Refer for screening colonoscopy, will call with appt.  Discussed plan of care with Dr. Peña.  Cont scheduled f/u with cardiology.   Increase fluid intake, get plenty of rest.   If any SOA, CP, advised to go to the ER.   Patient agrees with plan of care and understands instructions. Call if worsening symptoms or any problems or concerns.

## 2017-10-09 ENCOUNTER — TREATMENT (OUTPATIENT)
Dept: CARDIAC REHAB | Facility: HOSPITAL | Age: 57
End: 2017-10-09

## 2017-10-09 DIAGNOSIS — Z95.5 STATUS POST INSERTION OF DRUG ELUTING CORONARY ARTERY STENT: Primary | ICD-10-CM

## 2017-10-09 PROCEDURE — 93798 PHYS/QHP OP CAR RHAB W/ECG: CPT

## 2017-10-11 ENCOUNTER — TREATMENT (OUTPATIENT)
Dept: CARDIAC REHAB | Facility: HOSPITAL | Age: 57
End: 2017-10-11

## 2017-10-11 DIAGNOSIS — Z95.5 STATUS POST INSERTION OF DRUG ELUTING CORONARY ARTERY STENT: Primary | ICD-10-CM

## 2017-10-11 PROCEDURE — 93798 PHYS/QHP OP CAR RHAB W/ECG: CPT

## 2017-10-16 ENCOUNTER — HOSPITAL ENCOUNTER (OUTPATIENT)
Dept: SLEEP MEDICINE | Facility: HOSPITAL | Age: 57
Discharge: HOME | End: 2017-10-16
Admitting: INTERNAL MEDICINE

## 2017-10-16 ENCOUNTER — TREATMENT (OUTPATIENT)
Dept: CARDIAC REHAB | Facility: HOSPITAL | Age: 57
End: 2017-10-16

## 2017-10-16 DIAGNOSIS — Z95.5 STATUS POST INSERTION OF DRUG ELUTING CORONARY ARTERY STENT: Primary | ICD-10-CM

## 2017-10-16 DIAGNOSIS — G47.33 OSA (OBSTRUCTIVE SLEEP APNEA): ICD-10-CM

## 2017-10-16 PROCEDURE — 95806 SLEEP STUDY UNATT&RESP EFFT: CPT

## 2017-10-16 PROCEDURE — 93798 PHYS/QHP OP CAR RHAB W/ECG: CPT

## 2017-10-18 ENCOUNTER — TREATMENT (OUTPATIENT)
Dept: CARDIAC REHAB | Facility: HOSPITAL | Age: 57
End: 2017-10-18

## 2017-10-18 DIAGNOSIS — Z95.5 STATUS POST INSERTION OF DRUG ELUTING CORONARY ARTERY STENT: Primary | ICD-10-CM

## 2017-10-18 PROCEDURE — 93798 PHYS/QHP OP CAR RHAB W/ECG: CPT

## 2017-10-20 ENCOUNTER — TREATMENT (OUTPATIENT)
Dept: CARDIAC REHAB | Facility: HOSPITAL | Age: 57
End: 2017-10-20

## 2017-10-20 DIAGNOSIS — Z95.5 STATUS POST INSERTION OF DRUG ELUTING CORONARY ARTERY STENT: Primary | ICD-10-CM

## 2017-10-20 PROCEDURE — 93798 PHYS/QHP OP CAR RHAB W/ECG: CPT

## 2017-10-23 ENCOUNTER — TREATMENT (OUTPATIENT)
Dept: CARDIAC REHAB | Facility: HOSPITAL | Age: 57
End: 2017-10-23

## 2017-10-23 DIAGNOSIS — Z95.5 STATUS POST INSERTION OF DRUG ELUTING CORONARY ARTERY STENT: Primary | ICD-10-CM

## 2017-10-23 PROCEDURE — 93798 PHYS/QHP OP CAR RHAB W/ECG: CPT

## 2017-10-23 NOTE — PROGRESS NOTES
CARDIAC/PULMONARY REHAB NUTRITION EDUCATION/ASSESSMENT      57 y.o.         Height: 74 in    Weight: 209  lb     BMI: 26.9 IBW:  200-220 lb.        Diet Survey Score: 59  Cardiac Risk Factors: HTN,Stress,Family Hisory Weight Assessment: Normal     Current Diet: Moderate calorie, low saturated fat  Appetite: good    Food records reviewed? Yes     Occupation:    Job Activity Level:mild    Routine Exercise: mild     Who does the patient live with: wife  Meals at home twenty percent of the time       Pertinent Lab Values:   Total: No components found for: CHOLESTEROL  HDL: No results found for: HDL  LDL:No results found for: LDL  Triglyceride: No components found for: TRIGLYCERIDE  Last HGBA1C with date if applicable:No components found for: A1C  Glucose:   Glucose   Date Value Ref Range Status   07/31/2017 130 (H) 65 - 99 mg/dL Final    Nutritional Supplements: Multivitamin, B-12 (1000mg)    Pertinent Nutrition-Related Medications:  N/A         Stated Problem Areas / Concerns: Overcoming family history     Assessment / Recommendations: We reviewed AHA guidelines, alternative theories and the benefits of a plant based diet. We discussed the Dash diet and Choose Your Plate. Supportive nutrition information was provided.  Motivation level toward diet compliance: moderate            Goals:  [place] diet changes to include more emphasis on high fiber, low saturated fat choices. Include good sources of Omega Fatty Acids                 11:04 AM  10/23/2017  Safia Hughes RD

## 2017-10-25 ENCOUNTER — TREATMENT (OUTPATIENT)
Dept: CARDIAC REHAB | Facility: HOSPITAL | Age: 57
End: 2017-10-25

## 2017-10-25 DIAGNOSIS — Z95.5 STATUS POST INSERTION OF DRUG ELUTING CORONARY ARTERY STENT: Primary | ICD-10-CM

## 2017-10-25 PROCEDURE — 93798 PHYS/QHP OP CAR RHAB W/ECG: CPT

## 2017-10-27 ENCOUNTER — TREATMENT (OUTPATIENT)
Dept: CARDIAC REHAB | Facility: HOSPITAL | Age: 57
End: 2017-10-27

## 2017-10-27 DIAGNOSIS — Z95.5 STATUS POST INSERTION OF DRUG ELUTING CORONARY ARTERY STENT: Primary | ICD-10-CM

## 2017-10-27 PROCEDURE — 93798 PHYS/QHP OP CAR RHAB W/ECG: CPT

## 2017-10-30 ENCOUNTER — TREATMENT (OUTPATIENT)
Dept: CARDIAC REHAB | Facility: HOSPITAL | Age: 57
End: 2017-10-30

## 2017-10-30 DIAGNOSIS — Z95.5 STATUS POST INSERTION OF DRUG ELUTING CORONARY ARTERY STENT: Primary | ICD-10-CM

## 2017-10-30 PROCEDURE — 93798 PHYS/QHP OP CAR RHAB W/ECG: CPT

## 2017-10-31 ENCOUNTER — TELEPHONE (OUTPATIENT)
Dept: SLEEP MEDICINE | Facility: HOSPITAL | Age: 57
End: 2017-10-31

## 2017-10-31 NOTE — TELEPHONE ENCOUNTER
Tech called pt reviewed study results and informed sending orders to [facility]. Pt requested to wait as he was unsure what he wanted to do. Pt stated he would not wear pap therapy as he has already tried once before. Pt is to return call with decision if will or will not use pap equipment. MARIELLA

## 2017-11-01 ENCOUNTER — TREATMENT (OUTPATIENT)
Dept: CARDIAC REHAB | Facility: HOSPITAL | Age: 57
End: 2017-11-01

## 2017-11-01 DIAGNOSIS — Z95.5 STATUS POST INSERTION OF DRUG ELUTING CORONARY ARTERY STENT: Primary | ICD-10-CM

## 2017-11-01 PROCEDURE — 93798 PHYS/QHP OP CAR RHAB W/ECG: CPT

## 2017-11-03 ENCOUNTER — TREATMENT (OUTPATIENT)
Dept: CARDIAC REHAB | Facility: HOSPITAL | Age: 57
End: 2017-11-03

## 2017-11-03 DIAGNOSIS — Z95.5 STATUS POST INSERTION OF DRUG ELUTING CORONARY ARTERY STENT: Primary | ICD-10-CM

## 2017-11-03 PROCEDURE — 93798 PHYS/QHP OP CAR RHAB W/ECG: CPT

## 2017-11-06 ENCOUNTER — TREATMENT (OUTPATIENT)
Dept: CARDIAC REHAB | Facility: HOSPITAL | Age: 57
End: 2017-11-06

## 2017-11-06 DIAGNOSIS — Z95.5 STATUS POST INSERTION OF DRUG ELUTING CORONARY ARTERY STENT: Primary | ICD-10-CM

## 2017-11-06 PROCEDURE — 93798 PHYS/QHP OP CAR RHAB W/ECG: CPT

## 2017-11-08 ENCOUNTER — TREATMENT (OUTPATIENT)
Dept: CARDIAC REHAB | Facility: HOSPITAL | Age: 57
End: 2017-11-08

## 2017-11-08 DIAGNOSIS — Z95.5 STATUS POST INSERTION OF DRUG ELUTING CORONARY ARTERY STENT: Primary | ICD-10-CM

## 2017-11-08 PROCEDURE — 93798 PHYS/QHP OP CAR RHAB W/ECG: CPT

## 2017-11-13 ENCOUNTER — TREATMENT (OUTPATIENT)
Dept: CARDIAC REHAB | Facility: HOSPITAL | Age: 57
End: 2017-11-13

## 2017-11-13 DIAGNOSIS — Z95.5 STATUS POST INSERTION OF DRUG ELUTING CORONARY ARTERY STENT: Primary | ICD-10-CM

## 2017-11-13 PROCEDURE — 93798 PHYS/QHP OP CAR RHAB W/ECG: CPT

## 2017-11-15 ENCOUNTER — TREATMENT (OUTPATIENT)
Dept: CARDIAC REHAB | Facility: HOSPITAL | Age: 57
End: 2017-11-15

## 2017-11-15 DIAGNOSIS — I25.10 CORONARY ARTERY DISEASE INVOLVING NATIVE CORONARY ARTERY OF NATIVE HEART WITHOUT ANGINA PECTORIS: Primary | ICD-10-CM

## 2017-11-15 PROCEDURE — 93798 PHYS/QHP OP CAR RHAB W/ECG: CPT

## 2017-11-17 ENCOUNTER — TREATMENT (OUTPATIENT)
Dept: CARDIAC REHAB | Facility: HOSPITAL | Age: 57
End: 2017-11-17

## 2017-11-17 DIAGNOSIS — I25.10 CORONARY ARTERY DISEASE INVOLVING NATIVE CORONARY ARTERY OF NATIVE HEART WITHOUT ANGINA PECTORIS: Primary | ICD-10-CM

## 2017-11-17 PROCEDURE — 93798 PHYS/QHP OP CAR RHAB W/ECG: CPT

## 2017-11-20 ENCOUNTER — OFFICE VISIT (OUTPATIENT)
Dept: FAMILY MEDICINE CLINIC | Facility: CLINIC | Age: 57
End: 2017-11-20

## 2017-11-20 ENCOUNTER — TREATMENT (OUTPATIENT)
Dept: CARDIAC REHAB | Facility: HOSPITAL | Age: 57
End: 2017-11-20

## 2017-11-20 VITALS
BODY MASS INDEX: 26.44 KG/M2 | TEMPERATURE: 98.1 F | SYSTOLIC BLOOD PRESSURE: 124 MMHG | HEART RATE: 63 BPM | HEIGHT: 74 IN | OXYGEN SATURATION: 98 % | DIASTOLIC BLOOD PRESSURE: 64 MMHG | WEIGHT: 206 LBS

## 2017-11-20 DIAGNOSIS — I10 ESSENTIAL HYPERTENSION: ICD-10-CM

## 2017-11-20 DIAGNOSIS — I25.10 CORONARY ARTERY DISEASE INVOLVING NATIVE CORONARY ARTERY OF NATIVE HEART WITHOUT ANGINA PECTORIS: Primary | ICD-10-CM

## 2017-11-20 DIAGNOSIS — D51.0 PERNICIOUS ANEMIA: ICD-10-CM

## 2017-11-20 PROCEDURE — 93798 PHYS/QHP OP CAR RHAB W/ECG: CPT

## 2017-11-20 PROCEDURE — 99213 OFFICE O/P EST LOW 20 MIN: CPT | Performed by: FAMILY MEDICINE

## 2017-11-20 NOTE — PROGRESS NOTES
SUBJECTIVE:  The patient is  a 57-year-old white male who comes in for follow-up.  He has a history of coronary artery disease.  He states his blood pressures of been running high for the last couple weeks.  He brings his readings from home in the 130s and 140s systolic.  He got a stent about 2 months ago.  He feels much better since being stented.  He currently takes lisinopril 2.5 mg daily    PAST MEDICAL HISTORY:  Reviewed.    REVIEW OF SYSTEMS:  Please see above; 14 point ROS otherwise negative.      OBJECTIVE: Vitals signs are reviewed and are stable.    HEENT: PERRLA.    Neck:  Supple.    Lungs:  Clear.    Heart:  Regular rate and rhythm.    Abdomen:   Soft, nontender.    Extremities:  No cyanosis, clubbing or edema.      ASSESSMENT:    Coronary artery disease  Hyperlipidemia  Elevated blood pressures    PLAN:  He will increase lisinopril to 5 mg daily.  He will call me his readings.  CMP fasting lipids and CBC are ordered.  He will follow up on his labs.  He will let me know if any problems.    Much of this encounter note is an electronic transcription/translation of spoken language to printed text.  The electronic translation of spoken language may permit erroneous, or at times, nonsensical words or phrases to be inadvertently transcribed.  Although I have reviewed the note for such errors, some may still exist.

## 2017-11-21 LAB
ALBUMIN SERPL-MCNC: 4.2 G/DL (ref 3.5–5.2)
ALBUMIN/GLOB SERPL: 1.7 G/DL
ALP SERPL-CCNC: 61 U/L (ref 39–117)
ALT SERPL W P-5'-P-CCNC: 32 U/L (ref 1–41)
ANION GAP SERPL CALCULATED.3IONS-SCNC: 14.6 MMOL/L
AST SERPL-CCNC: 28 U/L (ref 1–40)
BILIRUB SERPL-MCNC: 0.5 MG/DL (ref 0.1–1.2)
BUN BLD-MCNC: 13 MG/DL (ref 6–20)
BUN/CREAT SERPL: 12.7 (ref 7–25)
CALCIUM SPEC-SCNC: 9.4 MG/DL (ref 8.6–10.5)
CHLORIDE SERPL-SCNC: 108 MMOL/L (ref 98–107)
CHOLEST SERPL-MCNC: 111 MG/DL (ref 0–200)
CO2 SERPL-SCNC: 26.4 MMOL/L (ref 22–29)
CREAT BLD-MCNC: 1.02 MG/DL (ref 0.76–1.27)
ERYTHROCYTE [DISTWIDTH] IN BLOOD BY AUTOMATED COUNT: 12.5 % (ref 4.5–15)
GFR SERPL CREATININE-BSD FRML MDRD: 76 ML/MIN/1.73
GLOBULIN UR ELPH-MCNC: 2.5 GM/DL
GLUCOSE BLD-MCNC: 96 MG/DL (ref 65–99)
HCT VFR BLD AUTO: 42.4 % (ref 35–60)
HDLC SERPL-MCNC: 40 MG/DL (ref 40–60)
HGB BLD-MCNC: 14 G/DL (ref 13.5–18)
LDLC SERPL CALC-MCNC: 58 MG/DL (ref 0–100)
LDLC/HDLC SERPL: 1.45 {RATIO}
LYMPHOCYTES # BLD AUTO: 1.2 10*3/MM3 (ref 1.2–3.4)
LYMPHOCYTES NFR BLD AUTO: 22 % (ref 21–51)
MCH RBC QN AUTO: 30.6 PG (ref 26.1–33.1)
MCHC RBC AUTO-ENTMCNC: 33 G/DL (ref 33–37)
MCV RBC AUTO: 92.8 FL (ref 80–99)
MONOCYTES # BLD AUTO: 0.2 10*3/MM3 (ref 0.1–0.6)
MONOCYTES NFR BLD AUTO: 4.5 % (ref 2–9)
NEUTROPHILS # BLD AUTO: 3.9 10*3/MM3 (ref 1.4–6.5)
NEUTROPHILS NFR BLD AUTO: 73.5 % (ref 42–75)
PLATELET # BLD AUTO: 249 10*3/MM3 (ref 150–450)
PMV BLD AUTO: 7.5 FL (ref 7.1–10.5)
POTASSIUM BLD-SCNC: 4.5 MMOL/L (ref 3.5–5.2)
PROT SERPL-MCNC: 6.7 G/DL (ref 6–8.5)
RBC # BLD AUTO: 4.56 10*6/MM3 (ref 4–6)
SODIUM BLD-SCNC: 149 MMOL/L (ref 136–145)
TRIGL SERPL-MCNC: 65 MG/DL (ref 0–150)
VLDLC SERPL-MCNC: 13 MG/DL (ref 5–40)
WBC NRBC COR # BLD: 5.3 10*3/MM3 (ref 4.5–10)

## 2017-11-21 PROCEDURE — 36415 COLL VENOUS BLD VENIPUNCTURE: CPT | Performed by: FAMILY MEDICINE

## 2017-11-21 PROCEDURE — 80061 LIPID PANEL: CPT | Performed by: FAMILY MEDICINE

## 2017-11-21 PROCEDURE — 80053 COMPREHEN METABOLIC PANEL: CPT | Performed by: FAMILY MEDICINE

## 2017-11-21 PROCEDURE — 85025 COMPLETE CBC W/AUTO DIFF WBC: CPT | Performed by: FAMILY MEDICINE

## 2017-11-27 ENCOUNTER — TREATMENT (OUTPATIENT)
Dept: CARDIAC REHAB | Facility: HOSPITAL | Age: 57
End: 2017-11-27

## 2017-11-27 DIAGNOSIS — I25.10 CORONARY ARTERY DISEASE INVOLVING NATIVE CORONARY ARTERY OF NATIVE HEART WITHOUT ANGINA PECTORIS: Primary | ICD-10-CM

## 2017-11-27 PROCEDURE — 93798 PHYS/QHP OP CAR RHAB W/ECG: CPT

## 2017-12-04 ENCOUNTER — APPOINTMENT (OUTPATIENT)
Dept: CARDIAC REHAB | Facility: HOSPITAL | Age: 57
End: 2017-12-04

## 2017-12-06 ENCOUNTER — APPOINTMENT (OUTPATIENT)
Dept: CARDIAC REHAB | Facility: HOSPITAL | Age: 57
End: 2017-12-06

## 2017-12-07 ENCOUNTER — TELEPHONE (OUTPATIENT)
Dept: FAMILY MEDICINE CLINIC | Facility: CLINIC | Age: 57
End: 2017-12-07

## 2017-12-07 RX ORDER — LISINOPRIL 2.5 MG/1
2.5 TABLET ORAL DAILY
Qty: 90 TABLET | Refills: 3 | Status: SHIPPED | OUTPATIENT
Start: 2017-12-07 | End: 2017-12-07 | Stop reason: DRUGHIGH

## 2017-12-07 RX ORDER — ATORVASTATIN CALCIUM 40 MG/1
TABLET, FILM COATED ORAL
Qty: 30 TABLET | Refills: 2 | Status: SHIPPED | OUTPATIENT
Start: 2017-12-07 | End: 2018-03-06 | Stop reason: SDUPTHER

## 2017-12-07 RX ORDER — LISINOPRIL 2.5 MG/1
2.5 TABLET ORAL 2 TIMES DAILY
Qty: 60 TABLET | Refills: 5 | Status: SHIPPED | OUTPATIENT
Start: 2017-12-07 | End: 2018-06-06 | Stop reason: SDUPTHER

## 2017-12-07 NOTE — TELEPHONE ENCOUNTER
Pt bp for oct-nov was average 138-72 and dec 129/70  Also since gwd doubled his lisinopril from 1 a day to 2 a day he is running out needs new rx    jaspreet parrish

## 2017-12-08 ENCOUNTER — APPOINTMENT (OUTPATIENT)
Dept: CARDIAC REHAB | Facility: HOSPITAL | Age: 57
End: 2017-12-08

## 2017-12-11 ENCOUNTER — TELEPHONE (OUTPATIENT)
Dept: CARDIOLOGY | Facility: HOSPITAL | Age: 57
End: 2017-12-11

## 2017-12-11 ENCOUNTER — APPOINTMENT (OUTPATIENT)
Dept: CARDIAC REHAB | Facility: HOSPITAL | Age: 57
End: 2017-12-11

## 2017-12-13 ENCOUNTER — APPOINTMENT (OUTPATIENT)
Dept: CARDIAC REHAB | Facility: HOSPITAL | Age: 57
End: 2017-12-13

## 2017-12-15 ENCOUNTER — APPOINTMENT (OUTPATIENT)
Dept: CARDIAC REHAB | Facility: HOSPITAL | Age: 57
End: 2017-12-15

## 2017-12-18 ENCOUNTER — APPOINTMENT (OUTPATIENT)
Dept: CARDIAC REHAB | Facility: HOSPITAL | Age: 57
End: 2017-12-18

## 2017-12-20 ENCOUNTER — APPOINTMENT (OUTPATIENT)
Dept: CARDIAC REHAB | Facility: HOSPITAL | Age: 57
End: 2017-12-20

## 2017-12-22 ENCOUNTER — APPOINTMENT (OUTPATIENT)
Dept: CARDIAC REHAB | Facility: HOSPITAL | Age: 57
End: 2017-12-22

## 2017-12-27 ENCOUNTER — APPOINTMENT (OUTPATIENT)
Dept: CARDIAC REHAB | Facility: HOSPITAL | Age: 57
End: 2017-12-27

## 2017-12-29 ENCOUNTER — APPOINTMENT (OUTPATIENT)
Dept: CARDIAC REHAB | Facility: HOSPITAL | Age: 57
End: 2017-12-29

## 2018-01-18 ENCOUNTER — OFFICE VISIT (OUTPATIENT)
Dept: CARDIOLOGY | Facility: CLINIC | Age: 58
End: 2018-01-18

## 2018-01-18 VITALS
BODY MASS INDEX: 26.18 KG/M2 | HEART RATE: 64 BPM | SYSTOLIC BLOOD PRESSURE: 150 MMHG | WEIGHT: 204 LBS | HEIGHT: 74 IN | DIASTOLIC BLOOD PRESSURE: 78 MMHG

## 2018-01-18 DIAGNOSIS — I10 ESSENTIAL HYPERTENSION: ICD-10-CM

## 2018-01-18 DIAGNOSIS — E78.2 MIXED HYPERLIPIDEMIA: ICD-10-CM

## 2018-01-18 DIAGNOSIS — I25.10 CORONARY ARTERY DISEASE INVOLVING NATIVE CORONARY ARTERY OF NATIVE HEART WITHOUT ANGINA PECTORIS: Primary | ICD-10-CM

## 2018-01-18 PROCEDURE — 99214 OFFICE O/P EST MOD 30 MIN: CPT | Performed by: INTERNAL MEDICINE

## 2018-01-18 PROCEDURE — 93000 ELECTROCARDIOGRAM COMPLETE: CPT | Performed by: INTERNAL MEDICINE

## 2018-01-18 NOTE — PROGRESS NOTES
Date of Office Visit: 18  Encounter Provider: Jason Mancilla MD  Place of Service: [facility] CARDIOLOGY  Patient Name: Niranjan Peacock  :1960      Chief Complaint   Patient presents with   • Coronary Artery Disease     History of Present Illness  HPI Comments: Mr Peacock is a pleasant 56 yo gentleman presented in 2017 with chest pain worrisome for angina.  He had a stress test that was abnormal he then underwent cardiac catheterization which showed normal left ventricular systolic function.  Severe disease of the proximal mid left anterior descending fractional flow wire reserve was abnormal he then underwent stenting with a 3.25 mm right 20 mm drug-eluting stent.  He also has hyperlipidemia and hypertension.  He was placed on atorvastatin.  He was having some atypical muscle aches stopped that.  He comes in for follow-up. The patient denies chest pain, pressure and heaviness. No shortness of breath, othopnea or PND. No palpitations, near syncope or syncope. No stroke type symptoms like paralysis, paresthesia, abrupt vision loss and dysarthria. No bleeding like blood in the stool or dark stools.   He's had no real further muscle aches.  He gets some dizziness if he changes position quickly he exercises in our [place] every day 10 minutes on elliptical and the treadmill for 25 minutes at 3.5 miles per hour and a 2% grade and lives weights for 20 minutes again without any problems.  Overall he feels he's doing great.  Things at home are good.    Coronary Artery Disease   Pertinent negatives include no dizziness, muscle weakness or weight gain.         Past Medical History:   Diagnosis Date   • B12 deficiency anemia    • Chest pain    • Coronary artery disease involving native coronary artery of native heart without angina pectoris 8/10/2017   • Headache    • History of blood clots     blood clot found in right lung    • Syncope     2 yrs ago one time         Past Surgical  History:   Procedure Laterality Date   • CARDIAC CATHETERIZATION N/A 8/3/2017    Procedure: Coronary angiography;  Surgeon: Cynthia Farley MD;  Location:  [facility];  Service:    • CARDIAC CATHETERIZATION  8/3/2017    Procedure: Functional Flow [place];  Surgeon: Cynthia Farley MD;  Location:  [facility];  Service:    • CARDIAC CATHETERIZATION N/A 8/3/2017    Procedure: Stent YI coronary;  Surgeon: Cynthia Farley MD;  Location:  [facility];  Service:    • CARDIAC CATHETERIZATION N/A 8/3/2017    Procedure: Left ventriculography;  Surgeon: Cynthia Farley MD;  Location:  [facility];  Service:    • CARDIAC CATHETERIZATION N/A 8/3/2017    Procedure: Left Heart Cath;  Surgeon: Cynthia Farley MD;  Location:  [facility];  Service:    • CAROTID STENT     • COLONOSCOPY           Current Outpatient Prescriptions on File Prior to Visit   Medication Sig Dispense Refill   • aspirin 81 MG tablet Take 1 tablet by mouth Daily. 30 tablet 11   • atorvastatin (LIPITOR) 40 MG tablet TAKE ONE TABLET BY MOUTH DAILY 30 tablet 2   • clopidogrel (PLAVIX) 75 MG tablet Take 1 tablet by mouth Daily. 30 tablet 11   • cyanocobalamin 1000 MCG/ML injection Inject 1,000 mcg into the shoulder, thigh, or buttocks 1 (One) Time.     • lisinopril (ZESTRIL) 2.5 MG tablet Take 1 tablet by mouth 2 (Two) Times a Day. 60 tablet 5   • Multiple Vitamin (MULTI VITAMIN DAILY PO) Take  by mouth Daily.       No current facility-administered medications on file prior to visit.          Social History     Social History   • Marital status:      Spouse name: N/A   • Number of children: N/A   • Years of education: N/A     Occupational History   •       Social History Main Topics   • Smoking status: Never Smoker   • Smokeless tobacco: Not on file   • Alcohol use 1.2 - 6.0 oz/week     2 - 10 Cans of beer per week      Comment: social   • Drug use: No       Comment: Drinks 2-4 caffeinated beverages per day   • Sexual activity: Defer     Other Topics Concern   • Not on file     Social History Narrative       Family History   Problem Relation Age of Onset   • Heart disease Other    • Hypertension Other    • Thyroid disease Other    • Heart disease Father    • Prostate cancer Father    • Heart disease Mother          Review of Systems   Constitution: Negative for decreased appetite, diaphoresis, fever, weakness, malaise/fatigue, weight gain and weight loss.   HENT: Negative for congestion, hearing loss, nosebleeds and tinnitus.    Eyes: Negative for blurred vision, double vision, vision loss in left eye, vision loss in right eye and visual disturbance.   Cardiovascular:        As noted in HPI   Respiratory:        As noted HPI   Endocrine: Negative for cold intolerance and heat intolerance.   Hematologic/Lymphatic: Negative for bleeding problem. Does not bruise/bleed easily.   Skin: Positive for itching. Negative for color change, flushing and rash.   Musculoskeletal: Negative for arthritis, back pain, joint pain, joint swelling, muscle weakness and myalgias.   Gastrointestinal: Negative for bloating, abdominal pain, constipation, diarrhea, dysphagia, heartburn, hematemesis, hematochezia, melena, nausea and vomiting.   Genitourinary: Positive for frequency. Negative for bladder incontinence, dysuria, nocturia and urgency.   Neurological: Negative for dizziness, focal weakness, headaches, light-headedness, loss of balance, numbness, paresthesias and vertigo.   Psychiatric/Behavioral: Negative for depression, memory loss and substance abuse.       Procedures      ECG 12 Lead  Date/Time: 1/18/2018 2:03 PM  Performed by: JUAN CLARK  Authorized by: JUAN CLARK   Comparison: compared with previous ECG   Similar to previous ECG  Rhythm: sinus rhythm  Rate: normal  QRS axis: normal                 Objective:    /78 (BP Location: Left arm)  Pulse 64  Ht 188 cm  "(74\")  Wt 92.5 kg (204 lb)  BMI 26.19 kg/m2       Physical Exam  Physical Exam   Constitutional: He is oriented to person, place, and time. He appears well-developed and well-nourished. No distress.   HENT:   Head: Normocephalic.   Eyes: Conjunctivae are normal. Pupils are equal, round, and reactive to light. No scleral icterus.   Neck: Normal carotid pulses, no hepatojugular reflux and no JVD present. Carotid bruit is not present. No tracheal deviation, no edema and no erythema present. No thyromegaly present.   Cardiovascular: Normal rate, regular rhythm, S1 normal, S2 normal, normal heart sounds and intact distal pulses.   No extrasystoles are present. PMI is not displaced.  Exam reveals no gallop, no distant heart sounds and no friction rub.    No murmur heard.  Pulses:       Carotid pulses are 2+ on the right side, and 2+ on the left side.       Radial pulses are 2+ on the right side, and 2+ on the left side.        Femoral pulses are 2+ on the right side, and 2+ on the left side.       Dorsalis pedis pulses are 2+ on the right side, and 2+ on the left side.        Posterior tibial pulses are 2+ on the right side, and 2+ on the left side.   Pulmonary/Chest: Effort normal and breath sounds normal. No respiratory distress. He has no decreased breath sounds. He has no wheezes. He has no rhonchi. He has no rales. He exhibits no tenderness.   Abdominal: Soft. Bowel sounds are normal. He exhibits no distension and no mass. There is no hepatosplenomegaly. There is no tenderness. There is no rebound and no guarding.   Musculoskeletal: He exhibits no edema, tenderness or deformity.   Neurological: He is alert and oriented to person, place, and time.   Skin: Skin is warm and dry. No rash noted. He is not diaphoretic. No cyanosis or erythema. No pallor. Nails show no clubbing.   Psychiatric: He has a normal mood and affect. His speech is normal and behavior is normal. Judgment and thought content normal.         "   Assessment:   1.  57-year-old gentleman with history of severe coronary disease preserved left ventricular systolic function status post 3.25 mm x 20 mm drug-eluting stent placed left anterior descending in August 2017.  He has a dual antiplatelet score of 0 which is low risk.   Coronary Artery Disease  Assessment  • The patient has no angina  • There is a new diagnosis of stable angina in the past 12 months  • The patient is having symptoms consistent with unstable angina     Plan  • Lifestyle modifications discussed include adhering to a heart healthy diet, avoidance of tobacco products, maintenance of a healthy weight, medication compliance, regular exercise and regular monitoring of cholesterol and blood pressure    Subjective - Objective  • There has been a previous stent procedure using YI  • Current antiplatelet therapy includes aspirin 81 mg and clopidogrel 75 mg  • The patient qualifies for cardiac rehabilitation, and has been referred to cardiac rehab      He is to continue the same.  He's to return for follow-up in a year at that time will consider stopping the clopidogrel.     2.  Hyperlipidemia on target dose atorvastatin last LDL was 58.  3.  Hypertension blood pressures this is been under good control.  He brought in a number blood pressure readings from home that looked great he's to continue the same.  4.  B12 deficiency.           Plan:

## 2018-01-29 ENCOUNTER — OFFICE VISIT (OUTPATIENT)
Dept: FAMILY MEDICINE CLINIC | Facility: CLINIC | Age: 58
End: 2018-01-29

## 2018-01-29 VITALS
TEMPERATURE: 98.2 F | WEIGHT: 207 LBS | OXYGEN SATURATION: 99 % | HEIGHT: 74 IN | BODY MASS INDEX: 26.56 KG/M2 | HEART RATE: 65 BPM | DIASTOLIC BLOOD PRESSURE: 60 MMHG | SYSTOLIC BLOOD PRESSURE: 104 MMHG

## 2018-01-29 DIAGNOSIS — H10.11 ALLERGIC CONJUNCTIVITIS, RIGHT EYE: ICD-10-CM

## 2018-01-29 DIAGNOSIS — H10.12 ALLERGIC CONJUNCTIVITIS, LEFT EYE: ICD-10-CM

## 2018-01-29 DIAGNOSIS — B35.4 TINEA CORPORIS: Primary | ICD-10-CM

## 2018-01-29 PROCEDURE — 99213 OFFICE O/P EST LOW 20 MIN: CPT | Performed by: NURSE PRACTITIONER

## 2018-01-29 RX ORDER — CLOTRIMAZOLE 1 %
CREAM (GRAM) TOPICAL EVERY 12 HOURS SCHEDULED
Qty: 45 G | Refills: 0 | Status: SHIPPED | OUTPATIENT
Start: 2018-01-29 | End: 2019-01-16

## 2018-01-29 NOTE — PROGRESS NOTES
"Subjective   Niranjan Peacock is a 57 y.o. male.     History of Present Illness   C/o eye burning, itching, he states he has \"stressed eyes\" at night, he does look at a computer, wears glasses, last eye exam recent, normal. He does get seasonal allergies, he has noticed drainage from eyes, noticed crusting in corners of eyes, started about 2 weeks ago, he has noticed redness. Denies morning crusty drainage.  He also c/o rash under bilat arms, circular rash, started about 2 weeks, spread to other arm, around back. He states the rash itches, denies pain, denies burning. He did try lotion, helps some, he denies changes in detergent, soap, lotions, he states he started drinking orange juice d/t cold but stopped and rash still persists. He does see derm, saw last in summer.     The following portions of the patient's history were reviewed and updated as appropriate: allergies, current medications, past family history, past medical history, past social history, past surgical history and problem list.    Review of Systems   Constitutional: Negative for chills, diaphoresis and fever.   Eyes: Positive for discharge and itching. Negative for photophobia, pain, redness and visual disturbance.   Respiratory: Negative for cough and shortness of breath.    Cardiovascular: Negative for chest pain.   Musculoskeletal: Negative for arthralgias and myalgias.   Skin: Positive for rash.   Allergic/Immunologic: Positive for environmental allergies.   Neurological: Negative for dizziness, light-headedness and headaches.   All other systems reviewed and are negative.      Objective   Physical Exam   Constitutional: He is oriented to person, place, and time. He appears well-developed and well-nourished.   HENT:   Head: Normocephalic.   Eyes: Conjunctivae, EOM and lids are normal. Pupils are equal, round, and reactive to light. Right eye exhibits no discharge and no exudate. Left eye exhibits no discharge and no exudate.   Neck: Neck supple. "   Cardiovascular: Normal rate, regular rhythm and normal heart sounds.    Pulmonary/Chest: Effort normal and breath sounds normal.   Musculoskeletal: Normal range of motion.   Neurological: He is alert and oriented to person, place, and time.   Skin: Skin is warm and dry. Rash noted. Rash is maculopapular and urticarial. There is erythema.        Psychiatric: He has a normal mood and affect. His behavior is normal. Judgment and thought content normal.   Nursing note and vitals reviewed.      Assessment/Plan   Niranjan was seen today for rash and burning eyes.    Diagnoses and all orders for this visit:    Tinea corporis    Allergic conjunctivitis, left eye    Allergic conjunctivitis, right eye    Other orders  -     clotrimazole (LOTRIMIN) 1 % cream; Apply  topically Every 12 (Twelve) Hours.          Apply clortrimazole cream to rash areas, extend about 1 inch past rash. Keep skin clean and dry. Avoid excess sweating and change clothes and shower after work outs.   May try OTC zaditor eye drops for itching, if persists recommend to f/u with patient's eye doctor.   If rash persists call office.   Increase fluid intake, get plenty of rest.   Patient agrees with plan of care and understands instructions. Call if worsening symptoms or any problems or concerns.

## 2018-01-29 NOTE — PATIENT INSTRUCTIONS
Apply clortrimazole cream to rash areas, extend about 1 inch past rash. Keep skin clean and dry. Avoid excess sweating and change clothes and shower after work outs.   May try OTC zaditor eye drops for itching, if persists recommend to f/u with patient's eye doctor.   If rash persists call office.   Increase fluid intake, get plenty of rest.   Patient agrees with plan of care and understands instructions. Call if worsening symptoms or any problems or concerns.

## 2018-02-24 RX ORDER — CYANOCOBALAMIN 1000 UG/ML
INJECTION, SOLUTION INTRAMUSCULAR; SUBCUTANEOUS
Qty: 1000 ML | Refills: 10 | Status: SHIPPED | OUTPATIENT
Start: 2018-02-24 | End: 2019-03-19 | Stop reason: SDUPTHER

## 2018-03-06 RX ORDER — ATORVASTATIN CALCIUM 40 MG/1
TABLET, FILM COATED ORAL
Qty: 30 TABLET | Refills: 5 | Status: SHIPPED | OUTPATIENT
Start: 2018-03-06 | End: 2018-04-04 | Stop reason: SDUPTHER

## 2018-04-04 ENCOUNTER — OFFICE VISIT (OUTPATIENT)
Dept: FAMILY MEDICINE CLINIC | Facility: CLINIC | Age: 58
End: 2018-04-04

## 2018-04-04 ENCOUNTER — TELEPHONE (OUTPATIENT)
Dept: FAMILY MEDICINE CLINIC | Facility: CLINIC | Age: 58
End: 2018-04-04

## 2018-04-04 VITALS
HEART RATE: 78 BPM | OXYGEN SATURATION: 98 % | HEIGHT: 74 IN | DIASTOLIC BLOOD PRESSURE: 56 MMHG | TEMPERATURE: 99.1 F | SYSTOLIC BLOOD PRESSURE: 116 MMHG | BODY MASS INDEX: 26.44 KG/M2 | WEIGHT: 206 LBS

## 2018-04-04 DIAGNOSIS — H10.33 ACUTE CONJUNCTIVITIS OF BOTH EYES, UNSPECIFIED ACUTE CONJUNCTIVITIS TYPE: ICD-10-CM

## 2018-04-04 DIAGNOSIS — B35.3 TINEA PEDIS OF BOTH FEET: ICD-10-CM

## 2018-04-04 DIAGNOSIS — L30.9 ECZEMA, UNSPECIFIED TYPE: Primary | ICD-10-CM

## 2018-04-04 PROCEDURE — 99213 OFFICE O/P EST LOW 20 MIN: CPT | Performed by: FAMILY MEDICINE

## 2018-04-04 RX ORDER — GENTAMICIN SULFATE 3 MG/ML
1 SOLUTION/ DROPS OPHTHALMIC 4 TIMES DAILY
Qty: 5 ML | Refills: 0 | Status: SHIPPED | OUTPATIENT
Start: 2018-04-04 | End: 2019-06-05

## 2018-04-04 RX ORDER — CLOTRIMAZOLE AND BETAMETHASONE DIPROPIONATE 10; .64 MG/G; MG/G
CREAM TOPICAL 2 TIMES DAILY
Qty: 1 EACH | Refills: 0 | Status: SHIPPED | OUTPATIENT
Start: 2018-04-04 | End: 2019-01-16

## 2018-04-04 NOTE — PROGRESS NOTES
SUBJECTIVE:  The patient is a 58-year-old white male who presents because of rash and itching of his eyes.  Rashes on his eyelids.  Sometimes worse another.  His eyes matted up and are crusty in the morning.  They become red in the evening.  He had a rash on his trunk with this improved with Lotrisone lotion.  He also applied to the same lotion to his feet but although improved this did not clear.     PAST MEDICAL HISTORY:  Reviewed.    REVIEW OF SYSTEMS:  Please see above; 14 point ROS otherwise negative.      OBJECTIVE: Vitals signs are reviewed and are stable.    HEENT: PERRLA.  [minimal redness of the conjunctiva. dry scaliness over the upper eyelids.  Neck:  Supple.    Lungs:  Clear.    Heart:  Regular rate and rhythm.    Abdomen:   Soft, nontender.    Extremities:  No cyanosis, clubbing or edema.  Dry scaliness present on both lower aspects of the feet.  Area involves roughly 3.5 cm.    ASSESSMENT:    Tinea pedis  Eczema  Conjunctivitis    PLAN:  The patient be treated with gentamicin ophthalmic drops 2 to affected eye 4 times a day. Eucrisa cream samples are given to apply twice a day to the area upper eyelids.  Repeat apical Lotrisone is prescribed.  A drug interaction is possible with oral ketoconazole.  Therefore, this will not be used due to the fact that he is on a statin.  He will call me if not improved in the next week or so.

## 2018-04-04 NOTE — TELEPHONE ENCOUNTER
04/04 Per Dr. Peña call pt and tell him he found a cross reaction with the Lotion for his feet. He will call something else in. [facility]  For pt.

## 2018-06-06 RX ORDER — LISINOPRIL 2.5 MG/1
TABLET ORAL
Qty: 60 TABLET | Refills: 4 | Status: SHIPPED | OUTPATIENT
Start: 2018-06-06 | End: 2018-11-11 | Stop reason: SDUPTHER

## 2018-07-12 ENCOUNTER — TELEPHONE (OUTPATIENT)
Dept: FAMILY MEDICINE CLINIC | Facility: CLINIC | Age: 58
End: 2018-07-12

## 2018-07-12 NOTE — TELEPHONE ENCOUNTER
PT STATES THAT HE HASN'T BEEN FEELING WELL RECENTLY, AND HE WOULD PREFER TO SEE DR. REID RATHER THAN ONE OF THE APRNS

## 2018-07-12 NOTE — TELEPHONE ENCOUNTER
Patient informed Dr Peña is out of office tomorrow apt scheduled for Monday and patient advised to go to ER if sx worsen over weekend.

## 2018-07-16 ENCOUNTER — OFFICE VISIT (OUTPATIENT)
Dept: FAMILY MEDICINE CLINIC | Facility: CLINIC | Age: 58
End: 2018-07-16

## 2018-07-16 VITALS
HEIGHT: 74 IN | SYSTOLIC BLOOD PRESSURE: 110 MMHG | TEMPERATURE: 98.4 F | OXYGEN SATURATION: 98 % | HEART RATE: 57 BPM | DIASTOLIC BLOOD PRESSURE: 60 MMHG | WEIGHT: 202 LBS | BODY MASS INDEX: 25.93 KG/M2 | RESPIRATION RATE: 18 BRPM

## 2018-07-16 DIAGNOSIS — R53.83 OTHER FATIGUE: ICD-10-CM

## 2018-07-16 DIAGNOSIS — I25.10 CORONARY ARTERY DISEASE INVOLVING NATIVE CORONARY ARTERY OF NATIVE HEART WITHOUT ANGINA PECTORIS: Primary | ICD-10-CM

## 2018-07-16 DIAGNOSIS — R06.09 DYSPNEA ON EXERTION: ICD-10-CM

## 2018-07-16 LAB
ALBUMIN SERPL-MCNC: 4.5 G/DL (ref 3.5–5.2)
ALBUMIN/GLOB SERPL: 3 G/DL
ALP SERPL-CCNC: 61 U/L (ref 39–117)
ALT SERPL W P-5'-P-CCNC: 22 U/L (ref 1–41)
ANION GAP SERPL CALCULATED.3IONS-SCNC: 12.2 MMOL/L
AST SERPL-CCNC: 23 U/L (ref 1–40)
BILIRUB SERPL-MCNC: 0.9 MG/DL (ref 0.1–1.2)
BUN BLD-MCNC: 13 MG/DL (ref 6–20)
BUN/CREAT SERPL: 13.4 (ref 7–25)
CALCIUM SPEC-SCNC: 9.9 MG/DL (ref 8.6–10.5)
CHLORIDE SERPL-SCNC: 100 MMOL/L (ref 98–107)
CHOLEST SERPL-MCNC: 131 MG/DL (ref 0–200)
CO2 SERPL-SCNC: 28.8 MMOL/L (ref 22–29)
CREAT BLD-MCNC: 0.97 MG/DL (ref 0.76–1.27)
ERYTHROCYTE [DISTWIDTH] IN BLOOD BY AUTOMATED COUNT: 13 % (ref 4.5–15)
GFR SERPL CREATININE-BSD FRML MDRD: 80 ML/MIN/1.73
GLOBULIN UR ELPH-MCNC: 1.5 GM/DL
GLUCOSE BLD-MCNC: 97 MG/DL (ref 65–99)
HCT VFR BLD AUTO: 44.1 % (ref 35–60)
HDLC SERPL-MCNC: 47 MG/DL (ref 40–60)
HGB BLD-MCNC: 14.8 G/DL (ref 13.5–18)
LDLC SERPL CALC-MCNC: 64 MG/DL (ref 0–100)
LDLC/HDLC SERPL: 1.37 {RATIO}
LYMPHOCYTES # BLD AUTO: 1.3 10*3/MM3 (ref 1.2–3.4)
LYMPHOCYTES NFR BLD AUTO: 24 % (ref 21–51)
MCH RBC QN AUTO: 31 PG (ref 26.1–33.1)
MCHC RBC AUTO-ENTMCNC: 33.6 G/DL (ref 33–37)
MCV RBC AUTO: 92.1 FL (ref 80–99)
MONOCYTES # BLD AUTO: 0.3 10*3/MM3 (ref 0.1–0.6)
MONOCYTES NFR BLD AUTO: 5.8 % (ref 2–9)
NEUTROPHILS # BLD AUTO: 3.9 10*3/MM3 (ref 1.4–6.5)
NEUTROPHILS NFR BLD AUTO: 70.2 % (ref 42–75)
PLATELET # BLD AUTO: 264 10*3/MM3 (ref 150–450)
PMV BLD AUTO: 7.5 FL (ref 7.1–10.5)
POTASSIUM BLD-SCNC: 4.4 MMOL/L (ref 3.5–5.2)
PROT SERPL-MCNC: 6 G/DL (ref 6–8.5)
RBC # BLD AUTO: 4.78 10*6/MM3 (ref 4–6)
SODIUM BLD-SCNC: 141 MMOL/L (ref 136–145)
T-UPTAKE NFR SERPL: 1.14 TBI (ref 0.8–1.3)
T4 SERPL-MCNC: 7.74 MCG/DL (ref 4.5–11.7)
TRIGL SERPL-MCNC: 98 MG/DL (ref 0–150)
TSH SERPL DL<=0.05 MIU/L-ACNC: 2.03 MIU/ML (ref 0.27–4.2)
VLDLC SERPL-MCNC: 19.6 MG/DL (ref 5–40)
WBC NRBC COR # BLD: 5.5 10*3/MM3 (ref 4.5–10)

## 2018-07-16 PROCEDURE — 85025 COMPLETE CBC W/AUTO DIFF WBC: CPT | Performed by: FAMILY MEDICINE

## 2018-07-16 PROCEDURE — 36415 COLL VENOUS BLD VENIPUNCTURE: CPT | Performed by: FAMILY MEDICINE

## 2018-07-16 PROCEDURE — 84436 ASSAY OF TOTAL THYROXINE: CPT | Performed by: FAMILY MEDICINE

## 2018-07-16 PROCEDURE — 99214 OFFICE O/P EST MOD 30 MIN: CPT | Performed by: FAMILY MEDICINE

## 2018-07-16 PROCEDURE — 80053 COMPREHEN METABOLIC PANEL: CPT | Performed by: FAMILY MEDICINE

## 2018-07-16 PROCEDURE — 84479 ASSAY OF THYROID (T3 OR T4): CPT | Performed by: FAMILY MEDICINE

## 2018-07-16 PROCEDURE — 93000 ELECTROCARDIOGRAM COMPLETE: CPT | Performed by: FAMILY MEDICINE

## 2018-07-16 PROCEDURE — 84443 ASSAY THYROID STIM HORMONE: CPT | Performed by: FAMILY MEDICINE

## 2018-07-16 PROCEDURE — 80061 LIPID PANEL: CPT | Performed by: FAMILY MEDICINE

## 2018-07-16 RX ORDER — UREA 40 %
CREAM (GRAM) TOPICAL
Refills: 2 | COMMUNITY
Start: 2018-07-06 | End: 2019-01-16

## 2018-07-24 ENCOUNTER — HOSPITAL ENCOUNTER (OUTPATIENT)
Dept: CARDIOLOGY | Facility: HOSPITAL | Age: 58
Discharge: HOME | End: 2018-07-24
Admitting: FAMILY MEDICINE

## 2018-07-24 VITALS
HEART RATE: 64 BPM | HEIGHT: 74 IN | WEIGHT: 202 LBS | SYSTOLIC BLOOD PRESSURE: 110 MMHG | BODY MASS INDEX: 25.93 KG/M2 | DIASTOLIC BLOOD PRESSURE: 62 MMHG

## 2018-07-24 LAB
ASCENDING AORTA: 3.9 CM
BH CV ECHO MEAS - ACS: 2.3 CM
BH CV ECHO MEAS - AO MAX PG: 5 MMHG
BH CV ECHO MEAS - AO ROOT AREA (BSA CORRECTED): 1.6
BH CV ECHO MEAS - AO ROOT AREA: 9.8 CM^2
BH CV ECHO MEAS - AO ROOT DIAM: 3.5 CM
BH CV ECHO MEAS - AO V2 MAX: 111.8 CM/SEC
BH CV ECHO MEAS - ASC AORTA: 3.9 CM
BH CV ECHO MEAS - BSA(HAYCOCK): 2.2 M^2
BH CV ECHO MEAS - BSA: 2.2 M^2
BH CV ECHO MEAS - BZI_BMI: 25.9 KILOGRAMS/M^2
BH CV ECHO MEAS - BZI_METRIC_HEIGHT: 188 CM
BH CV ECHO MEAS - BZI_METRIC_WEIGHT: 91.6 KG
BH CV ECHO MEAS - CONTRAST EF (2CH): 57.6 ML/M^2
BH CV ECHO MEAS - CONTRAST EF 4CH: 75.4 ML/M^2
BH CV ECHO MEAS - EDV(MOD-SP2): 59 ML
BH CV ECHO MEAS - EDV(MOD-SP4): 65 ML
BH CV ECHO MEAS - EDV(TEICH): 109.7 ML
BH CV ECHO MEAS - EF(CUBED): 78.8 %
BH CV ECHO MEAS - EF(MOD-BP): 59 %
BH CV ECHO MEAS - EF(MOD-SP2): 57.6 %
BH CV ECHO MEAS - EF(MOD-SP4): 75.4 %
BH CV ECHO MEAS - EF(TEICH): 70.9 %
BH CV ECHO MEAS - ESV(MOD-SP2): 25 ML
BH CV ECHO MEAS - ESV(MOD-SP4): 16 ML
BH CV ECHO MEAS - ESV(TEICH): 31.9 ML
BH CV ECHO MEAS - IVS/LVPW: 1.1
BH CV ECHO MEAS - IVSD: 1.1 CM
BH CV ECHO MEAS - LAT PEAK E' VEL: 11 CM/SEC
BH CV ECHO MEAS - LV DIASTOLIC VOL/BSA (35-75): 29.8 ML/M^2
BH CV ECHO MEAS - LV MASS(C)D: 186.3 GRAMS
BH CV ECHO MEAS - LV MASS(C)DI: 85.4 GRAMS/M^2
BH CV ECHO MEAS - LV SYSTOLIC VOL/BSA (12-30): 7.3 ML/M^2
BH CV ECHO MEAS - LVIDD: 4.8 CM
BH CV ECHO MEAS - LVIDS: 2.9 CM
BH CV ECHO MEAS - LVLD AP2: 7.8 CM
BH CV ECHO MEAS - LVLD AP4: 7.7 CM
BH CV ECHO MEAS - LVLS AP2: 7.2 CM
BH CV ECHO MEAS - LVLS AP4: 5.9 CM
BH CV ECHO MEAS - LVPWD: 1 CM
BH CV ECHO MEAS - MED PEAK E' VEL: 10 CM/SEC
BH CV ECHO MEAS - MV A DUR: 0.11 SEC
BH CV ECHO MEAS - MV A MAX VEL: 61.7 CM/SEC
BH CV ECHO MEAS - MV DEC SLOPE: 330.8 CM/SEC^2
BH CV ECHO MEAS - MV DEC TIME: 0.23 SEC
BH CV ECHO MEAS - MV E MAX VEL: 85.3 CM/SEC
BH CV ECHO MEAS - MV E/A: 1.4
BH CV ECHO MEAS - MV P1/2T MAX VEL: 90.4 CM/SEC
BH CV ECHO MEAS - MV P1/2T: 80.1 MSEC
BH CV ECHO MEAS - MVA P1/2T LCG: 2.4 CM^2
BH CV ECHO MEAS - MVA(P1/2T): 2.7 CM^2
BH CV ECHO MEAS - PULM A REVS DUR: 0.12 SEC
BH CV ECHO MEAS - PULM A REVS VEL: 28 CM/SEC
BH CV ECHO MEAS - PULM DIAS VEL: 40.3 CM/SEC
BH CV ECHO MEAS - PULM S/D: 0.59
BH CV ECHO MEAS - PULM SYS VEL: 23.8 CM/SEC
BH CV ECHO MEAS - SI(CUBED): 41 ML/M^2
BH CV ECHO MEAS - SI(MOD-SP2): 15.6 ML/M^2
BH CV ECHO MEAS - SI(MOD-SP4): 22.5 ML/M^2
BH CV ECHO MEAS - SI(TEICH): 35.6 ML/M^2
BH CV ECHO MEAS - SV(CUBED): 89.4 ML
BH CV ECHO MEAS - SV(MOD-SP2): 34 ML
BH CV ECHO MEAS - SV(MOD-SP4): 49 ML
BH CV ECHO MEAS - SV(TEICH): 77.8 ML
BH CV ECHO MEAS - TAPSE (>1.6): 2.6 CM2
BH CV ECHO MEASUREMENTS AVERAGE E/E' RATIO: 8.12
BH CV STRESS BP STAGE 1: NORMAL
BH CV STRESS BP STAGE 2: NORMAL
BH CV STRESS BP STAGE 3: NORMAL
BH CV STRESS DURATION MIN STAGE 1: 3
BH CV STRESS DURATION MIN STAGE 2: 3
BH CV STRESS DURATION MIN STAGE 3: 3
BH CV STRESS DURATION MIN STAGE 4: 1
BH CV STRESS DURATION SEC STAGE 1: 0
BH CV STRESS DURATION SEC STAGE 2: 0
BH CV STRESS DURATION SEC STAGE 3: 0
BH CV STRESS DURATION SEC STAGE 4: 0
BH CV STRESS ECHO POST STRESS EJECTION FRACTION EF: 75 %
BH CV STRESS GRADE STAGE 1: 10
BH CV STRESS GRADE STAGE 2: 12
BH CV STRESS GRADE STAGE 3: 14
BH CV STRESS GRADE STAGE 4: 16
BH CV STRESS HR STAGE 1: 107
BH CV STRESS HR STAGE 2: 110
BH CV STRESS HR STAGE 3: 139
BH CV STRESS HR STAGE 4: 148
BH CV STRESS METS STAGE 1: 5
BH CV STRESS METS STAGE 2: 7.5
BH CV STRESS METS STAGE 3: 10
BH CV STRESS METS STAGE 4: 13.5
BH CV STRESS PROTOCOL 1: NORMAL
BH CV STRESS SPEED STAGE 1: 1.7
BH CV STRESS SPEED STAGE 2: 2.5
BH CV STRESS SPEED STAGE 3: 3.4
BH CV STRESS SPEED STAGE 4: 4.2
BH CV STRESS STAGE 1: 1
BH CV STRESS STAGE 2: 2
BH CV STRESS STAGE 3: 3
BH CV STRESS STAGE 4: 4
BH CV XLRA - RV BASE: 2.8 CM
BH CV XLRA - TDI S': 12 CM/SEC
LEFT ATRIUM VOLUME INDEX: 18 ML/M2
LV EF 2D ECHO EST: 59 %
MAXIMAL PREDICTED HEART RATE: 163 BPM
PERCENT MAX PREDICTED HR: 90.8 %
SINUS: 3.9 CM
STJ: 3.5 CM
STRESS BASELINE BP: NORMAL MMHG
STRESS BASELINE HR: 64 BPM
STRESS PERCENT HR: 107 %
STRESS POST ESTIMATED WORKLOAD: 11 METS
STRESS POST EXERCISE DUR MIN: 10 MIN
STRESS POST EXERCISE DUR SEC: 0 SEC
STRESS POST PEAK BP: NORMAL MMHG
STRESS POST PEAK HR: 148 BPM
STRESS TARGET HR: 139 BPM

## 2018-07-24 PROCEDURE — 93017 CV STRESS TEST TRACING ONLY: CPT

## 2018-07-24 PROCEDURE — 93350 STRESS TTE ONLY: CPT

## 2018-07-24 PROCEDURE — 93016 CV STRESS TEST SUPVJ ONLY: CPT | Performed by: INTERNAL MEDICINE

## 2018-07-24 PROCEDURE — 93325 DOPPLER ECHO COLOR FLOW MAPG: CPT

## 2018-07-24 PROCEDURE — 93320 DOPPLER ECHO COMPLETE: CPT

## 2018-07-24 PROCEDURE — 93018 CV STRESS TEST I&R ONLY: CPT | Performed by: INTERNAL MEDICINE

## 2018-07-24 PROCEDURE — 93325 DOPPLER ECHO COLOR FLOW MAPG: CPT | Performed by: INTERNAL MEDICINE

## 2018-07-24 PROCEDURE — 93350 STRESS TTE ONLY: CPT | Performed by: INTERNAL MEDICINE

## 2018-07-24 PROCEDURE — 93320 DOPPLER ECHO COMPLETE: CPT | Performed by: INTERNAL MEDICINE

## 2018-07-31 RX ORDER — CLOPIDOGREL BISULFATE 75 MG/1
TABLET ORAL
Qty: 30 TABLET | Refills: 10 | Status: SHIPPED | OUTPATIENT
Start: 2018-07-31 | End: 2019-06-05

## 2018-08-01 ENCOUNTER — TELEPHONE (OUTPATIENT)
Dept: FAMILY MEDICINE CLINIC | Facility: CLINIC | Age: 58
End: 2018-08-01

## 2018-08-20 RX ORDER — ATORVASTATIN CALCIUM 40 MG/1
TABLET, FILM COATED ORAL
Qty: 30 TABLET | Refills: 4 | Status: SHIPPED | OUTPATIENT
Start: 2018-08-20 | End: 2019-01-28 | Stop reason: SDUPTHER

## 2018-11-12 RX ORDER — LISINOPRIL 2.5 MG/1
TABLET ORAL
Qty: 60 TABLET | Refills: 0 | Status: SHIPPED | OUTPATIENT
Start: 2018-11-12 | End: 2018-12-08 | Stop reason: SDUPTHER

## 2018-12-08 RX ORDER — LISINOPRIL 2.5 MG/1
TABLET ORAL
Qty: 60 TABLET | Refills: 0 | Status: SHIPPED | OUTPATIENT
Start: 2018-12-08 | End: 2019-01-11 | Stop reason: SDUPTHER

## 2018-12-15 ENCOUNTER — APPOINTMENT (OUTPATIENT)
Dept: CT IMAGING | Facility: HOSPITAL | Age: 58
End: 2018-12-15

## 2018-12-15 ENCOUNTER — HOSPITAL ENCOUNTER (EMERGENCY)
Facility: HOSPITAL | Age: 58
Discharge: HOME | End: 2018-12-15
Attending: EMERGENCY MEDICINE | Admitting: EMERGENCY MEDICINE

## 2018-12-15 VITALS
HEIGHT: 74 IN | TEMPERATURE: 96.3 F | BODY MASS INDEX: 26.98 KG/M2 | WEIGHT: 210.2 LBS | HEART RATE: 63 BPM | SYSTOLIC BLOOD PRESSURE: 150 MMHG | DIASTOLIC BLOOD PRESSURE: 80 MMHG | OXYGEN SATURATION: 98 % | RESPIRATION RATE: 18 BRPM

## 2018-12-15 DIAGNOSIS — R07.89 ATYPICAL CHEST PAIN: Primary | ICD-10-CM

## 2018-12-15 LAB
ALBUMIN SERPL-MCNC: 3.8 G/DL (ref 3.5–5.2)
ALBUMIN/GLOB SERPL: 1.7 G/DL
ALP SERPL-CCNC: 57 U/L (ref 39–117)
ALT SERPL W P-5'-P-CCNC: 22 U/L (ref 1–41)
ANION GAP SERPL CALCULATED.3IONS-SCNC: 7.1 MMOL/L
AST SERPL-CCNC: 21 U/L (ref 1–40)
BASOPHILS # BLD AUTO: 0.02 10*3/MM3 (ref 0–0.2)
BASOPHILS NFR BLD AUTO: 0.4 % (ref 0–1.5)
BILIRUB SERPL-MCNC: 0.3 MG/DL (ref 0.1–1.2)
BUN BLD-MCNC: 15 MG/DL (ref 6–20)
BUN/CREAT SERPL: 15 (ref 7–25)
CALCIUM SPEC-SCNC: 8.8 MG/DL (ref 8.6–10.5)
CHLORIDE SERPL-SCNC: 106 MMOL/L (ref 98–107)
CO2 SERPL-SCNC: 26.9 MMOL/L (ref 22–29)
CREAT BLD-MCNC: 1 MG/DL (ref 0.76–1.27)
D DIMER PPP FEU-MCNC: <0.27 MCGFEU/ML (ref 0–0.49)
DEPRECATED RDW RBC AUTO: 41.8 FL (ref 37–54)
EOSINOPHIL # BLD AUTO: 0.2 10*3/MM3 (ref 0–0.7)
EOSINOPHIL NFR BLD AUTO: 3.6 % (ref 0.3–6.2)
ERYTHROCYTE [DISTWIDTH] IN BLOOD BY AUTOMATED COUNT: 12.8 % (ref 11.5–14.5)
GFR SERPL CREATININE-BSD FRML MDRD: 77 ML/MIN/1.73
GLOBULIN UR ELPH-MCNC: 2.2 GM/DL
GLUCOSE BLD-MCNC: 128 MG/DL (ref 65–99)
HCT VFR BLD AUTO: 36.3 % (ref 40.4–52.2)
HGB BLD-MCNC: 12.6 G/DL (ref 13.7–17.6)
IMM GRANULOCYTES # BLD: 0.01 10*3/MM3 (ref 0–0.03)
IMM GRANULOCYTES NFR BLD: 0.2 % (ref 0–0.5)
INR PPP: 1.06 (ref 0.9–1.1)
LYMPHOCYTES # BLD AUTO: 1.79 10*3/MM3 (ref 0.9–4.8)
LYMPHOCYTES NFR BLD AUTO: 31.9 % (ref 19.6–45.3)
MCH RBC QN AUTO: 31.1 PG (ref 27–32.7)
MCHC RBC AUTO-ENTMCNC: 34.7 G/DL (ref 32.6–36.4)
MCV RBC AUTO: 89.6 FL (ref 79.8–96.2)
MONOCYTES # BLD AUTO: 0.43 10*3/MM3 (ref 0.2–1.2)
MONOCYTES NFR BLD AUTO: 7.7 % (ref 5–12)
NEUTROPHILS # BLD AUTO: 3.17 10*3/MM3 (ref 1.9–8.1)
NEUTROPHILS NFR BLD AUTO: 56.4 % (ref 42.7–76)
NRBC BLD MANUAL-RTO: 0 /100 WBC (ref 0–0)
PLATELET # BLD AUTO: 203 10*3/MM3 (ref 140–500)
PMV BLD AUTO: 9.6 FL (ref 6–12)
POTASSIUM BLD-SCNC: 3.6 MMOL/L (ref 3.5–5.2)
PROT SERPL-MCNC: 6 G/DL (ref 6–8.5)
PROTHROMBIN TIME: 13.6 SECONDS (ref 11.7–14.2)
RBC # BLD AUTO: 4.05 10*6/MM3 (ref 4.6–6)
SODIUM BLD-SCNC: 140 MMOL/L (ref 136–145)
TROPONIN T SERPL-MCNC: <0.01 NG/ML (ref 0–0.03)
WBC NRBC COR # BLD: 5.61 10*3/MM3 (ref 4.5–10.7)

## 2018-12-15 PROCEDURE — 84484 ASSAY OF TROPONIN QUANT: CPT | Performed by: EMERGENCY MEDICINE

## 2018-12-15 PROCEDURE — 85610 PROTHROMBIN TIME: CPT | Performed by: EMERGENCY MEDICINE

## 2018-12-15 PROCEDURE — 0 IOPAMIDOL PER 1 ML: Performed by: EMERGENCY MEDICINE

## 2018-12-15 PROCEDURE — 93005 ELECTROCARDIOGRAM TRACING: CPT

## 2018-12-15 PROCEDURE — 80053 COMPREHEN METABOLIC PANEL: CPT | Performed by: EMERGENCY MEDICINE

## 2018-12-15 PROCEDURE — 99284 EMERGENCY DEPT VISIT MOD MDM: CPT

## 2018-12-15 PROCEDURE — 85025 COMPLETE CBC W/AUTO DIFF WBC: CPT | Performed by: EMERGENCY MEDICINE

## 2018-12-15 PROCEDURE — 93005 ELECTROCARDIOGRAM TRACING: CPT | Performed by: EMERGENCY MEDICINE

## 2018-12-15 PROCEDURE — 71275 CT ANGIOGRAPHY CHEST: CPT

## 2018-12-15 PROCEDURE — 93010 ELECTROCARDIOGRAM REPORT: CPT | Performed by: INTERNAL MEDICINE

## 2018-12-15 PROCEDURE — 85379 FIBRIN DEGRADATION QUANT: CPT | Performed by: EMERGENCY MEDICINE

## 2018-12-15 RX ORDER — SODIUM CHLORIDE 0.9 % (FLUSH) 0.9 %
10 SYRINGE (ML) INJECTION AS NEEDED
Status: DISCONTINUED | OUTPATIENT
Start: 2018-12-15 | End: 2018-12-16 | Stop reason: HOSPADM

## 2018-12-15 RX ORDER — LANSOPRAZOLE 30 MG/1
30 CAPSULE, DELAYED RELEASE ORAL DAILY
Qty: 30 CAPSULE | Refills: 0 | Status: SHIPPED | OUTPATIENT
Start: 2018-12-15 | End: 2019-01-16 | Stop reason: ALTCHOICE

## 2018-12-15 RX ORDER — ALUMINA, MAGNESIA, AND SIMETHICONE 2400; 2400; 240 MG/30ML; MG/30ML; MG/30ML
15 SUSPENSION ORAL ONCE
Status: COMPLETED | OUTPATIENT
Start: 2018-12-15 | End: 2018-12-15

## 2018-12-15 RX ADMIN — IOPAMIDOL 95 ML: 755 INJECTION, SOLUTION INTRAVENOUS at 21:15

## 2018-12-15 RX ADMIN — LIDOCAINE HYDROCHLORIDE 15 ML: 20 SOLUTION ORAL; TOPICAL at 21:44

## 2018-12-15 RX ADMIN — NITROGLYCERIN 0.5 INCH: 20 OINTMENT TOPICAL at 20:22

## 2018-12-15 RX ADMIN — ALUMINUM HYDROXIDE, MAGNESIUM HYDROXIDE, AND DIMETHICONE 15 ML: 400; 400; 40 SUSPENSION ORAL at 21:44

## 2018-12-16 NOTE — ED NOTES
Pt reports having CP for about 3 weeks and it just got really bad in the last day or two. Pt reports that he feels a weight sitting on his chest and his pain radiates to his shoulders. Pt reports some SOB. Pt reports hx of cardiac stents.      Guera Posada RN  12/15/18 1944

## 2018-12-16 NOTE — DISCHARGE INSTRUCTIONS
Try the prevacid and see if it helps your symptoms.  You can use over the counter Tylenol as needed.  Do follow up with your primary doctor.  Please return to the ED if symptoms worsen with increasing pain, dizziness or sudden sweating.

## 2018-12-16 NOTE — ED PROVIDER NOTES
" EMERGENCY DEPARTMENT ENCOUNTER    CHIEF COMPLAINT  Chief Complaint: Chest pain  History given by: pt  History limited by: none  Room Number: 41/41  PMD: Silvano Peña MD      HPI:  Pt is a 58 y.o. male who presents complaining of constant, waxing and waning, \"weight on chest\" pain that started two weeks ago and became progressively worse yesterday. Pt reports yesterday he also noticed pain going down bilateral upper extremities but reports this has resolved. Pt admits to SOA and productive cough with clear sputum but denies diaphoresis, nausea, and vomiting. Pt reports the pain is not exertional and is not worsened by a deep breath. Pt reports his job requires him to travel in a vehicle quite a bit. Pt pain is pleuritic.     Duration:  yesterday  Onset: gradual  Timing: constant wax and wane  Location: chest  Radiation: into bilateral shoulders  Quality: \"pain\"  Intensity/Severity: moderate  Progression: progressively worse  Associated Symptoms: SOA, productive cough with clear sputum  Aggravating Factors: none  Alleviating Factors: none  Previous Episodes: Pt has struggled with similar pain for two weeks.  Treatment before arrival: none    PAST MEDICAL HISTORY  Active Ambulatory Problems     Diagnosis Date Noted   • Multiple thyroid nodules    • Pernicious anemia    • Chronic fatigue 03/09/2016   • H/O multiple allergies 03/09/2016   • Pernicious anemia 07/31/2017   • Abnormal stress ECG 08/01/2017   • Coronary artery disease involving native coronary artery of native heart without angina pectoris 08/10/2017     Resolved Ambulatory Problems     Diagnosis Date Noted   • No Resolved Ambulatory Problems     Past Medical History:   Diagnosis Date   • B12 deficiency anemia    • Chest pain    • Coronary artery disease involving native coronary artery of native heart without angina pectoris 8/10/2017   • Headache    • History of blood clots    • Multiple thyroid nodules    • Multiple thyroid nodules    • Pernicious " anemia    • Syncope        PAST SURGICAL HISTORY  Past Surgical History:   Procedure Laterality Date   • CAROTID STENT     • COLONOSCOPY  MMVI    NORMAL.   • COLONOSCOPY     • FINE NEEDLE ASPIRATION  12/2015    Left thyroid nodule.  [place] category II.  Dr. Socrates Sanchez       FAMILY HISTORY  Family History   Problem Relation Age of Onset   • Heart disease Other    • Hypertension Other    • Thyroid disease Other    • Heart disease Father    • Prostate cancer Father    • Heart disease Mother    • Hypertension Mother    • Hypertension Father    • Heart disease Brother    • Thyroid disease Sister    • Heart disease Brother        SOCIAL HISTORY  Social History     Socioeconomic History   • Marital status:      Spouse name: Not on file   • Number of children: Not on file   • Years of education: Not on file   • Highest education level: Not on file   Social Needs   • Financial resource strain: Not on file   • Food insecurity - worry: Not on file   • Food insecurity - inability: Not on file   • Transportation needs - medical: Not on file   • Transportation needs - non-medical: Not on file   Occupational History   • Occupation:    Tobacco Use   • Smoking status: Never Smoker   • Smokeless tobacco: Never Used   Substance and Sexual Activity   • Alcohol use: Yes     Alcohol/week: 1.2 - 6.0 oz     Types: 2 - 10 Cans of beer per week     Comment: social   • Drug use: No     Comment: Drinks 2-4 caffeinated beverages per day   • Sexual activity: Defer   Other Topics Concern   • Not on file   Social History Narrative    ** Merged History Encounter **            ALLERGIES  Albuterol    REVIEW OF SYSTEMS  Review of Systems   Constitutional: Negative for activity change, appetite change and fever.   HENT: Negative for congestion and sore throat.    Eyes: Negative.    Respiratory: Positive for cough (productive; clear sputum) and shortness of breath.    Cardiovascular: Positive for chest pain. Negative for leg  swelling.   Gastrointestinal: Negative for abdominal pain, diarrhea, nausea and vomiting.   Endocrine: Negative.    Genitourinary: Negative for decreased urine volume and dysuria.   Musculoskeletal: Negative for neck pain.   Skin: Negative for rash and wound.   Allergic/Immunologic: Negative.    Neurological: Negative for weakness, numbness and headaches.   Hematological: Negative.    Psychiatric/Behavioral: Negative.    All other systems reviewed and are negative.      PHYSICAL EXAM  ED Triage Vitals   Temp Heart Rate Resp BP SpO2   12/15/18 1944 12/15/18 1944 12/15/18 1944 12/15/18 2000 12/15/18 1944   96.3 °F (35.7 °C) 80 16 154/84 96 %      Temp src Heart Rate Source Patient Position BP Location FiO2 (%)   12/15/18 1944 12/15/18 1944 12/15/18 2000 12/15/18 2000 --   Tympanic Monitor Lying Left arm        Physical Exam   Constitutional: He is oriented to person, place, and time. No distress.   HENT:   Head: Normocephalic and atraumatic.   Mouth/Throat: Oropharynx is clear and moist.   Eyes: EOM are normal. Pupils are equal, round, and reactive to light.   Neck: Normal range of motion. Neck supple.   Cardiovascular: Normal rate, regular rhythm and normal heart sounds.   No murmur heard.  Pulmonary/Chest: Effort normal and breath sounds normal. No respiratory distress. He exhibits no tenderness.   Abdominal: Soft. Bowel sounds are normal. He exhibits no distension. There is no tenderness. There is no rebound and no guarding.   Musculoskeletal: Normal range of motion. He exhibits no edema (LE) or tenderness (calf).   Lymphadenopathy:     He has no cervical adenopathy.   Neurological: He is alert and oriented to person, place, and time. He has normal sensation and normal strength.   Skin: Skin is warm and dry.   Psychiatric: Mood and affect normal.   Nursing note and vitals reviewed.      LAB RESULTS  Lab Results (last 24 hours)     Procedure Component Value Units Date/Time    CBC & Differential [[id number]]  Collected:  12/15/18 2020    Specimen:  Blood Updated:  12/15/18 2035    Narrative:       The following orders were created for panel order CBC & Differential.  Procedure                               Abnormality         Status                     ---------                               -----------         ------                     CBC Auto Differential[[id number]]        Abnormal            Final result                 Please view results for these tests on the individual orders.    Comprehensive Metabolic Panel [[id number]]  (Abnormal) Collected:  12/15/18 2020    Specimen:  Blood Updated:  12/15/18 2058     Glucose 128 mg/dL      BUN 15 mg/dL      Creatinine 1.00 mg/dL      Sodium 140 mmol/L      Potassium 3.6 mmol/L      Chloride 106 mmol/L      CO2 26.9 mmol/L      Calcium 8.8 mg/dL      Total Protein 6.0 g/dL      Albumin 3.80 g/dL      ALT (SGPT) 22 U/L      AST (SGOT) 21 U/L      Alkaline Phosphatase 57 U/L      Total Bilirubin 0.3 mg/dL      eGFR Non African Amer 77 mL/min/1.73      Globulin 2.2 gm/dL      A/G Ratio 1.7 g/dL      BUN/Creatinine Ratio 15.0     Anion Gap 7.1 mmol/L     D-dimer, Quantitative [[id number]]  (Normal) Collected:  12/15/18 2020    Specimen:  Blood Updated:  12/15/18 2053     D-Dimer, Quantitative <0.27 MCGFEU/mL     Narrative:       The [place] D-Dimer test used in conjunction with a clinical pretest probability (PTP) assessment model, has been approved by the FDA to rule out the presence of venous thromboembolism (VTE) in outpatients suspected of deep venous thrombosis (DVT) or pulmonary embolism (PE).     Troponin [[id number]]  (Normal) Collected:  12/15/18 2020    Specimen:  Blood Updated:  12/15/18 2058     Troponin T <0.010 ng/mL     Narrative:       Troponin T Reference Ranges:  Less than 0.03 ng/mL:    Negative for AMI  0.03 to 0.09 ng/mL:      Indeterminant for AMI  Greater than 0.09 ng/mL: Positive for AMI    Protime-INR [[id number]]  (Normal) Collected:  12/15/18 2020     Specimen:  Blood Updated:  12/15/18 2053     Protime 13.6 Seconds      INR 1.06    CBC Auto Differential [[id number]]  (Abnormal) Collected:  12/15/18 2020    Specimen:  Blood Updated:  12/15/18 2035     WBC 5.61 10*3/mm3      RBC 4.05 10*6/mm3      Hemoglobin 12.6 g/dL      Hematocrit 36.3 %      MCV 89.6 fL      MCH 31.1 pg      MCHC 34.7 g/dL      RDW 12.8 %      RDW-SD 41.8 fl      MPV 9.6 fL      Platelets 203 10*3/mm3      Neutrophil % 56.4 %      Lymphocyte % 31.9 %      Monocyte % 7.7 %      Eosinophil % 3.6 %      Basophil % 0.4 %      Immature Grans % 0.2 %      Neutrophils, Absolute 3.17 10*3/mm3      Lymphocytes, Absolute 1.79 10*3/mm3      Monocytes, Absolute 0.43 10*3/mm3      Eosinophils, Absolute 0.20 10*3/mm3      Basophils, Absolute 0.02 10*3/mm3      Immature Grans, Absolute 0.01 10*3/mm3      nRBC 0.0 /100 WBC           I ordered the above labs and reviewed the results    RADIOLOGY  CT Angiogram Chest With Contrast   Final Result   No acute intrathoracic process identified.       Radiation dose reduction techniques were utilized, including automated   exposure control and exposure modulation based on body size.       This report was finalized on 12/15/2018 9:41 PM by Dr. Sheridan Hernandez M.D.               I ordered the above noted radiological studies. Interpreted by radiologist. Reviewed by me in PACS.       PROCEDURES  Procedures    EKG          EKG time: 1947  Rhythm/Rate: sinus rhythm rate of 71  P waves and OK: normal  QRS, axis: normal   ST and T waves: 1mm ST depression inferiorly     Interpreted Contemporaneously by me, independently viewed  changed compared to prior 8/3/2017    PROGRESS AND CONSULTS       2014  Ordered labs and Chest CTA for further viewing. Ordered NTG for chest pain.    2104  Rechecked patient who is resting but reports his pain was not changed with the NTG. Discussed all lab and test results. Discussed plan to do chest CTA. Pt understands and agrees with the plan.  All questions have been answered.    2122  Ordered maalox max and xylocaine for chest pain to r/o GERD.    2146  Rechecked patient who is resting comfortably. Discussed all lab and test results. Discussed plan to discharge. Pt understands and agrees with the plan. All questions have been answered.    2149  With constant pain for 1-2 weeks and a negative troponin, it is doubtful that pain is cardiac in origin. This was discussed with the pt in detail.      MEDICAL DECISION MAKING  Results were reviewed/discussed with the patient and they were also made aware of online access. Pt also made aware that some labs, such as cultures, will not be resulted during ER visit and follow up with PMD is necessary.     MDM  Number of Diagnoses or Management Options     Amount and/or Complexity of Data Reviewed  Clinical lab tests: ordered and reviewed (Negative Troponin, Negative D- Dimer, RBC 4.05,   HGB 12.6)  Tests in the radiology section of CPT®: reviewed and ordered (Chest CTA - NAD)  Tests in the medicine section of CPT®: ordered and reviewed (Refer to the procedure section of the note for EKG results)  Decide to obtain previous medical records or to obtain history from someone other than the patient: yes (EPIC)  Review and summarize past medical records: yes (Stent placed in mid LAD in 8/2017. Pt has minimal disease in left circe (30 %) and RCA was 10%.  Seen yesterday at [facility] and diagnosed with acute bronchitis and prescribed doxycycline.  Chest XR from yesterday was negative)          HEART Score (for prediction of 6-week risk of major adverse cardiac event) reviewed and/or performed as part of the patient evaluation and treatment planning process.  The result associated with this review/performance is: 3  Due to age, nonspecific repolarization disturbance on EKG, and 1-2 risk factors    DIAGNOSIS  Final diagnoses:   Atypical chest pain       DISPOSITION  DISCHARGE    Patient discharged in stable  condition.    Reviewed implications of results, diagnosis, meds, responsibility to follow up, warning signs and symptoms of possible worsening, potential complications and reasons to return to ER.    Patient/Family voiced understanding of above instructions.    Discussed plan for discharge, as there is no emergent indication for admission. Patient referred to primary care provider for BP management due to today's BP. Pt/family is agreeable and understands need for follow up and repeat testing.  Pt is aware that discharge does not mean that nothing is wrong but it indicates no emergency is present that requires admission and they must continue care with follow-up as given below or physician of their choice.     FOLLOW-UP  Silvano Peña MD  [address] [id number] [phone]    Schedule an appointment as soon as possible for a visit       Jason Mancilla MD  [address] [facility] [address]  [facility] [id number] [phone]    Schedule an appointment as soon as possible for a visit            Medication List      New Prescriptions    lansoprazole 30 MG capsule  Commonly known as:  PREVACID  Take 1 capsule by mouth Daily.              Latest Documented Vital Signs:  As of 9:54 PM  BP- 153/83 HR- 59 Temp- 96.3 °F (35.7 °C) (Tympanic) O2 sat- 97%    --  Documentation assistance provided by ady Thompson for Dr Eaton.  Information recorded by the scribe was done at my direction and has been verified and validated by me.         Cori Thompson  12/15/18 2154       Niranjan Eaton MD  12/15/18 2200

## 2019-01-06 ENCOUNTER — HOSPITAL ENCOUNTER (EMERGENCY)
Facility: HOSPITAL | Age: 59
Discharge: HOME | End: 2019-01-06
Attending: EMERGENCY MEDICINE | Admitting: EMERGENCY MEDICINE

## 2019-01-06 ENCOUNTER — APPOINTMENT (OUTPATIENT)
Dept: CT IMAGING | Facility: HOSPITAL | Age: 59
End: 2019-01-06

## 2019-01-06 VITALS
SYSTOLIC BLOOD PRESSURE: 144 MMHG | WEIGHT: 200 LBS | HEIGHT: 74 IN | DIASTOLIC BLOOD PRESSURE: 71 MMHG | OXYGEN SATURATION: 98 % | RESPIRATION RATE: 16 BRPM | HEART RATE: 70 BPM | TEMPERATURE: 98.2 F | BODY MASS INDEX: 25.67 KG/M2

## 2019-01-06 DIAGNOSIS — S01.81XA FOREHEAD LACERATION, INITIAL ENCOUNTER: Primary | ICD-10-CM

## 2019-01-06 PROCEDURE — 99283 EMERGENCY DEPT VISIT LOW MDM: CPT

## 2019-01-06 PROCEDURE — 70450 CT HEAD/BRAIN W/O DYE: CPT

## 2019-01-06 RX ORDER — LIDOCAINE HYDROCHLORIDE AND EPINEPHRINE 10; 10 MG/ML; UG/ML
10 INJECTION, SOLUTION INFILTRATION; PERINEURAL ONCE
Status: COMPLETED | OUTPATIENT
Start: 2019-01-06 | End: 2019-01-06

## 2019-01-06 RX ADMIN — LIDOCAINE HYDROCHLORIDE,EPINEPHRINE BITARTRATE 10 ML: 10; .01 INJECTION, SOLUTION INFILTRATION; PERINEURAL at 19:56

## 2019-01-06 NOTE — ED PROVIDER NOTES
EMERGENCY DEPARTMENT ENCOUNTER    Room Number:  04/04  Date seen:  1/6/2019  Time seen: 6:40 PM  PCP: Silvano Peña MD    HPI:  Chief complaint: head laceration  Context:Niranjan Peacock is a 58 y.o. male who presents to the ED with c/o head laceration which occurred w/ blunt end of hammer at 0900 today. There is no active bleeding. Pt is currently taking Plavix. Hx CAD w/ cardiac stenting. TDAP UTD, August 2013.     Duration: 10 hrs  Location: forehead  Radiation: none  Quality: lac  Intensity/Severity: mild  Associated Symptoms: none  Aggravating Factors: none  Alleviating Factors: none  Previous Episodes: none  Treatment before arrival: bandage to lac      ALLERGIES  Albuterol    PAST MEDICAL HISTORY  Active Ambulatory Problems     Diagnosis Date Noted   • Multiple thyroid nodules    • Pernicious anemia    • Chronic fatigue 03/09/2016   • H/O multiple allergies 03/09/2016   • Pernicious anemia 07/31/2017   • Abnormal stress ECG 08/01/2017   • Coronary artery disease involving native coronary artery of native heart without angina pectoris 08/10/2017     Resolved Ambulatory Problems     Diagnosis Date Noted   • No Resolved Ambulatory Problems     Past Medical History:   Diagnosis Date   • B12 deficiency anemia    • Chest pain    • Coronary artery disease involving native coronary artery of native heart without angina pectoris 8/10/2017   • Headache    • History of blood clots    • Multiple thyroid nodules    • Multiple thyroid nodules    • Pernicious anemia    • Syncope        PAST SURGICAL HISTORY  Past Surgical History:   Procedure Laterality Date   • CARDIAC CATHETERIZATION N/A 8/3/2017    Procedure: Coronary angiography;  Surgeon: Cynthia Farley MD;  Location: [facility] CATH INVASIVE LOCATION;  Service:    • CARDIAC CATHETERIZATION  8/3/2017    Procedure: Functional Flow [place];  Surgeon: Cynthia Farley MD;  Location: [facility] CATH INVASIVE LOCATION;  Service:    • CARDIAC CATHETERIZATION N/A 8/3/2017     Procedure: Stent YI coronary;  Surgeon: Cynthia Farley MD;  Location:  [facility] CATH INVASIVE LOCATION;  Service:    • CARDIAC CATHETERIZATION N/A 8/3/2017    Procedure: Left ventriculography;  Surgeon: Cynthia Farley MD;  Location:  [facility] CATH INVASIVE LOCATION;  Service:    • CARDIAC CATHETERIZATION N/A 8/3/2017    Procedure: Left Heart Cath;  Surgeon: Cynthia Farley MD;  Location:  [facility] CATH INVASIVE LOCATION;  Service:    • CAROTID STENT     • COLONOSCOPY  MMVI    NORMAL.   • COLONOSCOPY     • FINE NEEDLE ASPIRATION  12/2015    Left thyroid nodule.  [place] category II.  Dr. Socrates Sanchez       FAMILY HISTORY  Family History   Problem Relation Age of Onset   • Heart disease Other    • Hypertension Other    • Thyroid disease Other    • Heart disease Father    • Prostate cancer Father    • Heart disease Mother    • Hypertension Mother    • Hypertension Father    • Heart disease Brother    • Thyroid disease Sister    • Heart disease Brother        SOCIAL HISTORY  Social History     Socioeconomic History   • Marital status:      Spouse name: Not on file   • Number of children: Not on file   • Years of education: Not on file   • Highest education level: Not on file   Social Needs   • Financial resource strain: Not on file   • Food insecurity - worry: Not on file   • Food insecurity - inability: Not on file   • Transportation needs - medical: Not on file   • Transportation needs - non-medical: Not on file   Occupational History   • Occupation:    Tobacco Use   • Smoking status: Never Smoker   • Smokeless tobacco: Never Used   Substance and Sexual Activity   • Alcohol use: Yes     Alcohol/week: 1.2 - 6.0 oz     Types: 2 - 10 Cans of beer per week     Comment: social   • Drug use: No     Comment: Drinks 2-4 caffeinated beverages per day   • Sexual activity: Defer   Other Topics Concern   • Not on file   Social History Narrative    ** Merged History Encounter **            REVIEW OF SYSTEMS  Review of  Systems   Constitutional: Negative for activity change, appetite change, diaphoresis and fever.   HENT: Negative for trouble swallowing.    Eyes: Negative for visual disturbance.   Respiratory: Negative for cough, chest tightness, shortness of breath and wheezing.    Cardiovascular: Negative for chest pain, palpitations and leg swelling.   Gastrointestinal: Negative for abdominal pain, diarrhea, nausea and vomiting.   Genitourinary: Negative for dysuria.   Musculoskeletal: Negative for back pain.   Skin: Positive for wound (S shaped lac to forehead). Negative for rash.   Allergic/Immunologic: Negative for food allergies.   Neurological: Negative for dizziness, speech difficulty and light-headedness.       PHYSICAL EXAM  ED Triage Vitals [01/06/19 1825]   Temp Heart Rate Resp BP SpO2   98.2 °F (36.8 °C) 88 16 -- 97 %      Temp src Heart Rate Source Patient Position BP Location FiO2 (%)   Tympanic Monitor -- -- --     Physical Exam   Constitutional: He is oriented to person, place, and time and well-developed, well-nourished, and in no distress. No distress.   HENT:   Head: Normocephalic.       Eyes: EOM are normal. Pupils are equal, round, and reactive to light.   Neck: Normal range of motion. Neck supple.   Cardiovascular: Normal rate, regular rhythm, S1 normal, S2 normal and normal heart sounds. Exam reveals no gallop and no friction rub.   No murmur heard.  Pulmonary/Chest: Effort normal and breath sounds normal. No respiratory distress. He has no wheezes. He has no rales. He exhibits no tenderness.   Abdominal: Soft. There is no rebound and no guarding.   Musculoskeletal: Normal range of motion. He exhibits no deformity.   Lymphadenopathy:     He has no cervical adenopathy.   Neurological: He is alert and oriented to person, place, and time.   Skin: Skin is warm and dry.   S shaped lac to forehead; hemostasis achieved.   Psychiatric: Affect normal.   Nursing note and vitals reviewed.        RADIOLOGY  CT Head  Without Contrast   Final Result   1. No acute intracranial abnormality.                           This report was finalized on 1/6/2019 7:56 PM by Rigoberto White M.D.              I ordered the above noted radiological studies and reviewed the images on the PACS system.      MEDICATIONS GIVEN IN ER  Medications   lidocaine-EPINEPHrine (XYLOCAINE W/EPI) 1 %-1:837246 injection 10 mL (10 mL Injection Given by Other 1/6/19 1956)       EKG  Interpreted by ED Physician    PROCEDURES  Laceration Repair  Date/Time: 1/6/2019 8:04 PM  Performed by: Messi Coyle MD  Authorized by: Messi Coyle MD     Consent:     Consent obtained:  Verbal    Consent given by:  Patient    Risks discussed:  Poor cosmetic result and need for additional repair    Alternatives discussed:  Delayed treatment and no treatment  Anesthesia (see MAR for exact dosages):     Anesthesia method:  Local infiltration    Local anesthetic:  Lidocaine 1% WITH epi  Laceration details:     Location:  Face    Face location:  Forehead  Repair type:     Repair type:  Simple  Pre-procedure details:     Preparation:  Patient was prepped and draped in usual sterile fashion and imaging obtained to evaluate for foreign bodies  Exploration:     Hemostasis obtained with: Hemostasis acheived PTA.    Wound exploration: wound explored through full range of motion and entire depth of wound probed and visualized    Treatment:     Area cleansed with:  Shur-Clens    Amount of cleaning:  Extensive    Irrigation solution:  Sterile saline    Irrigation method:  Syringe    Visualized foreign bodies/material removed: no    Skin repair:     Repair method:  Sutures    Suture size:  7-0    Suture material:  Nylon    Suture technique: 3 simple interupted and running.    Number of sutures:  4  Approximation:     Approximation:  Close    Vermilion border: well-aligned    Post-procedure details:     Dressing:  Antibiotic ointment and adhesive bandage    Patient tolerance of procedure:   "Tolerated well, no immediate complications          COURSE & MEDICAL DECISION MAKING  Pertinent Labs and Imaging studies that were ordered and reviewed are noted above.  Results were reviewed/discussed with the patient and they were also made aware of online access.  Pt also made aware that some labs, such as cultures, will not be resulted during ER visit and follow up with PMD is necessary.     PROGRESS AND CONSULTS    Progress Notes:       1846 - Ordered xylocaine and CT head    1850 - Reviewed pt's history and workup with Dr. Coyle.  After a bedside evaluation, Dr. Coyle agrees with the plan of care.    2020 -The patient's history, physical exam, and lab findings were discussed with the physician, who also performed a face to face history and physical exam.  I discussed all results and noted any abnormalities with patient.  Discussed absoute need to recheck abnormalities with their family physician.  I answered any of the patient's questions.  Discussed plan for discharge, as there is no emergent indication for admission.  Pt is agreeable and understands need for follow up and repeat testing.  Pt is aware that discharge does not mean that nothing is wrong but it indicates no emergency is present and they must continue care with their family physician.  Pt is discharged with instructions to follow up with primary care doctor to have their blood pressure rechecked.     Disposition vitals:  /79 (Patient Position: Lying)   Pulse 88   Temp 98.2 °F (36.8 °C) (Tympanic)   Resp 16   Ht 188 cm (74\")   Wt 90.7 kg (200 lb)   SpO2 97%   BMI 25.68 kg/m²       DIAGNOSIS  Final diagnoses:   Forehead laceration, initial encounter       FOLLOW UP   Silvano Peña MD  [address] [place], [place] [id number] [phone]    Schedule an appointment as soon as possible for a visit   5-7 days, For suture removal      RX     Medication List      No changes were made to your prescriptions during this visit.   "       Documentation assistance provided by ady eRinoso for GREG Colmenares.  Information recorded by the ady was done at my direction and has been verified and validated by me.  Electronically signed by Natalia Reinoso on 1/6/2019 at time 8:24 PM                Natalia Reinoso  01/06/19 2024       Danay Orr APRN  01/06/19 2027

## 2019-01-07 NOTE — ED PROVIDER NOTES
Pt presents to the ED c/o head pain and laceration secondary to accidental strike to the head by a hammer. Pt was attempting to hammer apart a board when the hammer came through and struck him in the head. He denies LOC, HA, vision disturbances, and other complaints    PHYSICAL EXAM  GENERAL: not distressed  HENT: nares patent, irregular laceration to the center of the forehead b/w the eyebrows, no active bleeding  EYES: EOMI, PERRL  NECK: FROM  CV: regular rhythm, regular rate  RESPIRATORY: normal effort  MUSCULOSKELETAL: no deformity  NEURO: alert, oriented X 3  SKIN: warm, dry    Vital signs and nursing notes reviewed.    LAB RESULTS AND RADIOLOGY  I have reviewed the patient's labs and imaging studies.     PROCEDURE    PROGRESS NOTES  1940  Spoke to midlevel provider GREG Colmenares, about the pt. After performing my own physical exam, I agree w/ the plan of care.    Attestation:    The GIOVANNI and I have discussed this patient's history, physical exam, and treatment plan.  I have reviewed the documentation and personally had a face to face interaction with the patient. I affirm the documentation and agree with the treatment and plan.  The attached note describes my personal findings.    Documentation assistance provided by ady Tineo for Dr. Coyle. Information recorded by the scribe was done at my direction and has been verified and validated by me.     Petra Tineo  01/06/19 1940       Messi Coyle MD  01/06/19 2111

## 2019-01-07 NOTE — DISCHARGE INSTRUCTIONS
Apply topical antibiotic ointment to the suture line twice a day, clean with soap and water twice a day.    After sutures are removed in 5-7 days (not any longer than 7) apply vaseline to suture line nightly.  After 2 weeks use a daily sunblock for faces with minimal SPF 30    Return Precautions    Although you are being discharged from the ED today, I encourage you to return for worsening symptoms.  Things can, and do, change such that treatment at home with medication may not be adequate.      Specifically, return for any of the following:    Chest pain, shortness of breath, pain or nausea and vomiting not controlled by medications provided.    Please make a follow up with your Primary Care Provider for a blood pressure recheck.

## 2019-01-11 RX ORDER — LISINOPRIL 2.5 MG/1
TABLET ORAL
Qty: 60 TABLET | Refills: 0 | Status: SHIPPED | OUTPATIENT
Start: 2019-01-11 | End: 2019-02-08 | Stop reason: SDUPTHER

## 2019-01-16 ENCOUNTER — LAB (OUTPATIENT)
Dept: LAB | Facility: HOSPITAL | Age: 59
End: 2019-01-16

## 2019-01-16 ENCOUNTER — OFFICE VISIT (OUTPATIENT)
Dept: CARDIOLOGY | Facility: CLINIC | Age: 59
End: 2019-01-16

## 2019-01-16 ENCOUNTER — TELEPHONE (OUTPATIENT)
Dept: CARDIOLOGY | Facility: CLINIC | Age: 59
End: 2019-01-16

## 2019-01-16 VITALS
HEART RATE: 64 BPM | DIASTOLIC BLOOD PRESSURE: 52 MMHG | HEIGHT: 74 IN | BODY MASS INDEX: 27.34 KG/M2 | SYSTOLIC BLOOD PRESSURE: 140 MMHG | WEIGHT: 213 LBS

## 2019-01-16 DIAGNOSIS — I10 ESSENTIAL HYPERTENSION: ICD-10-CM

## 2019-01-16 DIAGNOSIS — I25.10 CORONARY ARTERY DISEASE INVOLVING NATIVE CORONARY ARTERY OF NATIVE HEART WITHOUT ANGINA PECTORIS: Primary | ICD-10-CM

## 2019-01-16 DIAGNOSIS — E78.2 MIXED HYPERLIPIDEMIA: ICD-10-CM

## 2019-01-16 DIAGNOSIS — Z86.711 HISTORY OF PULMONARY EMBOLISM: ICD-10-CM

## 2019-01-16 LAB
ALBUMIN SERPL-MCNC: 4.2 G/DL (ref 3.5–5.2)
ALP SERPL-CCNC: 57 U/L (ref 39–117)
ALT SERPL W P-5'-P-CCNC: 30 U/L (ref 1–41)
AST SERPL-CCNC: 24 U/L (ref 1–40)
BILIRUB CONJ SERPL-MCNC: <0.2 MG/DL (ref 0–0.3)
BILIRUB INDIRECT SERPL-MCNC: NORMAL MG/DL
BILIRUB SERPL-MCNC: 0.5 MG/DL (ref 0.1–1.2)
CHOLEST SERPL-MCNC: 121 MG/DL (ref 0–200)
HDLC SERPL-MCNC: 46 MG/DL (ref 40–60)
LDLC SERPL CALC-MCNC: 44 MG/DL (ref 0–100)
LDLC/HDLC SERPL: 0.95 {RATIO}
PROT SERPL-MCNC: 6.9 G/DL (ref 6–8.5)
TRIGL SERPL-MCNC: 157 MG/DL (ref 0–150)
VLDLC SERPL-MCNC: 31.4 MG/DL (ref 5–40)

## 2019-01-16 PROCEDURE — 93000 ELECTROCARDIOGRAM COMPLETE: CPT | Performed by: INTERNAL MEDICINE

## 2019-01-16 PROCEDURE — 36415 COLL VENOUS BLD VENIPUNCTURE: CPT

## 2019-01-16 PROCEDURE — 80061 LIPID PANEL: CPT | Performed by: INTERNAL MEDICINE

## 2019-01-16 PROCEDURE — 80076 HEPATIC FUNCTION PANEL: CPT

## 2019-01-16 PROCEDURE — 99214 OFFICE O/P EST MOD 30 MIN: CPT | Performed by: INTERNAL MEDICINE

## 2019-01-16 NOTE — PROGRESS NOTES
Date of Office Visit: 19  Encounter Provider: Jason Mancilla MD  Place of Service: [facility] CARDIOLOGY  Patient Name: Niranjan Peacock  :1960  Referral Provider:No ref. provider found      Chief Complaint   Patient presents with   • Coronary Artery Disease     History of Present Illness  Mr Peacock is a pleasant 59 yo gentleman presented in 2017 with chest pain worrisome for angina.  He had a stress test that was abnormal he then underwent cardiac catheterization which showed normal left ventricular systolic function.  Severe disease of the proximal mid left anterior descending fractional flow wire reserve was abnormal he then underwent stenting with a 3.25 mm right 20 mm drug-eluting stent.  He also has hyperlipidemia and hypertension.  He was placed on atorvastatin.  He was having some atypical muscle aches stopped that.  He comes in for follow-up. The patient denies chest pain, pressure and heaviness. No shortness of breath, othopnea or PND. No palpitations, near syncope or syncope. No stroke type symptoms like paralysis, paresthesia, abrupt vision loss and dysarthria. No bleeding like blood in the stool or dark stools.  He doesn't do any structured exercise but is very active [facility] [place] [facility] he has been under a lot of stress and is been a little tired.  Also in December he had some bronchitis and still has a little bit of dyspnea and strain from that.  Overall though he feels like he's doing well.  She feels better when he's active.      Coronary Artery Disease   Pertinent negatives include no dizziness, muscle weakness or weight gain.         Past Medical History:   Diagnosis Date   • B12 deficiency anemia    • Chest pain    • Coronary artery disease involving native coronary artery of native heart without angina pectoris 8/10/2017   • Headache    • History of blood clots     blood clot found in right lung    • Multiple thyroid nodules    • Multiple thyroid  nodules    • Pernicious anemia    • Syncope     2 yrs ago one time         Past Surgical History:   Procedure Laterality Date   • CARDIAC CATHETERIZATION N/A 8/3/2017    Procedure: Coronary angiography;  Surgeon: Cynthia Farley MD;  Location:  [facility];  Service:    • CARDIAC CATHETERIZATION  8/3/2017    Procedure: Functional Flow [place];  Surgeon: Cynthia Farley MD;  Location:  [facility];  Service:    • CARDIAC CATHETERIZATION N/A 8/3/2017    Procedure: Stent YI coronary;  Surgeon: Cynthia Farley MD;  Location:  [facility];  Service:    • CARDIAC CATHETERIZATION N/A 8/3/2017    Procedure: Left ventriculography;  Surgeon: Cynthia Farley MD;  Location:  [facility];  Service:    • CARDIAC CATHETERIZATION N/A 8/3/2017    Procedure: Left Heart Cath;  Surgeon: Cynthia Farley MD;  Location:  [facility];  Service:    • CAROTID STENT     • COLONOSCOPY  MMVI    NORMAL.   • COLONOSCOPY     • FINE NEEDLE ASPIRATION  12/2015    Left thyroid nodule.  [place] category II.  Dr. Socrates Sanchez         Current Outpatient Medications on File Prior to Visit   Medication Sig Dispense Refill   • aspirin 81 MG tablet Take 1 tablet by mouth Daily. 30 tablet 11   • atorvastatin (LIPITOR) 40 MG tablet TAKE ONE TABLET BY MOUTH DAILY 30 tablet 4   • clopidogrel (PLAVIX) 75 MG tablet TAKE ONE TABLET BY MOUTH DAILY 30 tablet 10   • cyanocobalamin 1000 MCG/ML injection INJECT 1 MILLILITER EVERY 30 DAYS AS DIRECTED 1000 mL 10   • gentamicin (GARAMYCIN) 0.3 % ophthalmic solution Administer 1 drop to both eyes 4 (Four) Times a Day. 5 mL 0   • lisinopril (PRINIVIL,ZESTRIL) 2.5 MG tablet TAKE ONE TABLET BY MOUTH TWICE A DAY 60 tablet 0   • Multiple Vitamin (MULTI VITAMIN DAILY PO) Take  by mouth Daily.     • [DISCONTINUED] benzonatate (TESSALON PERLES) 100 MG capsule Take 1 capsule by mouth 3 (Three) Times a Day As Needed for Cough. 1-2 tabs q8h prn   #30 30 capsule 0   • [DISCONTINUED] clopidogrel (PLAVIX) 75 MG tablet Take 75 mg by mouth Daily.     • [DISCONTINUED] clotrimazole (LOTRIMIN) 1 % cream Apply  topically Every 12 (Twelve) Hours. 45 g 0   • [DISCONTINUED] clotrimazole-betamethasone (LOTRISONE) 1-0.05 % cream Apply  topically 2 (Two) Times a Day. aaa BID prn 1 each 0   • [DISCONTINUED] lansoprazole (PREVACID) 30 MG capsule Take 1 capsule by mouth Daily. 30 capsule 0   • [DISCONTINUED] urea (CARMOL) 40 % cream   2     No current facility-administered medications on file prior to visit.          Social History     Socioeconomic History   • Marital status:      Spouse name: Not on file   • Number of children: Not on file   • Years of education: Not on file   • Highest education level: Not on file   Social Needs   • Financial resource strain: Not on file   • Food insecurity - worry: Not on file   • Food insecurity - inability: Not on file   • Transportation needs - medical: Not on file   • Transportation needs - non-medical: Not on file   Occupational History   • Occupation:    Tobacco Use   • Smoking status: Never Smoker   • Smokeless tobacco: Never Used   Substance and Sexual Activity   • Alcohol use: Yes     Alcohol/week: 1.2 - 6.0 oz     Types: 2 - 10 Cans of beer per week     Comment: social   • Drug use: No     Comment: Drinks 2-4 caffeinated beverages per day   • Sexual activity: Defer   Other Topics Concern   • Not on file   Social History Narrative    ** Merged History Encounter **            Family History   Problem Relation Age of Onset   • Heart disease Other    • Hypertension Other    • Thyroid disease Other    • Heart disease Father    • Prostate cancer Father    • Hypertension Father    • Stroke Father    • Heart disease Mother    • Hypertension Mother    • Heart disease Brother    • Thyroid disease Sister    • Heart disease Brother          Review of Systems   Constitution: Negative for decreased appetite, diaphoresis,  "fever, weakness, malaise/fatigue, weight gain and weight loss.   HENT: Negative for congestion, hearing loss, nosebleeds and tinnitus.    Eyes: Negative for blurred vision, double vision, vision loss in left eye, vision loss in right eye and visual disturbance.   Cardiovascular:        As noted in HPI   Respiratory:        As noted HPI   Endocrine: Negative for cold intolerance and heat intolerance.   Hematologic/Lymphatic: Negative for bleeding problem. Does not bruise/bleed easily.   Skin: Negative for color change, flushing, itching and rash.   Musculoskeletal: Negative for arthritis, back pain, joint pain, joint swelling, muscle weakness and myalgias.   Gastrointestinal: Negative for bloating, abdominal pain, constipation, diarrhea, dysphagia, heartburn, hematemesis, hematochezia, melena, nausea and vomiting.   Genitourinary: Negative for bladder incontinence, dysuria, frequency, nocturia and urgency.   Neurological: Negative for dizziness, focal weakness, headaches, light-headedness, loss of balance, numbness, paresthesias and vertigo.   Psychiatric/Behavioral: Negative for depression, memory loss and substance abuse.       Procedures      ECG 12 Lead  Date/Time: 1/16/2019 12:45 PM  Performed by: Jason Mancilla MD  Authorized by: Jason Mancilla MD   Comparison: compared with previous ECG   Similar to previous ECG  Rhythm: sinus rhythm  Rate: normal  QRS axis: normal                  Objective:    /52 (BP Location: Left arm, Patient Position: Sitting)   Pulse 64   Ht 188 cm (74\")   Wt 96.6 kg (213 lb)   BMI 27.35 kg/m²        Physical Exam  Physical Exam   Constitutional: He is oriented to person, place, and time. He appears well-developed and well-nourished. No distress.   HENT:   Head: Normocephalic.   Eyes: Conjunctivae are normal. Pupils are equal, round, and reactive to light. No scleral icterus.   Neck: Normal carotid pulses, no hepatojugular reflux and no JVD present. Carotid bruit is " not present. No tracheal deviation, no edema and no erythema present. No thyromegaly present.   Cardiovascular: Normal rate, regular rhythm, S1 normal, S2 normal, normal heart sounds and intact distal pulses.  No extrasystoles are present. PMI is not displaced. Exam reveals no gallop, no distant heart sounds and no friction rub.   No murmur heard.  Pulses:       Carotid pulses are 2+ on the right side, and 2+ on the left side.       Radial pulses are 2+ on the right side, and 2+ on the left side.        Femoral pulses are 2+ on the right side, and 2+ on the left side.       Dorsalis pedis pulses are 2+ on the right side, and 2+ on the left side.        Posterior tibial pulses are 2+ on the right side, and 2+ on the left side.   Pulmonary/Chest: Effort normal and breath sounds normal. No respiratory distress. He has no decreased breath sounds. He has no wheezes. He has no rhonchi. He has no rales. He exhibits no tenderness.   Abdominal: Soft. Bowel sounds are normal. He exhibits no distension and no mass. There is no hepatosplenomegaly. There is no tenderness. There is no rebound and no guarding.   Musculoskeletal: He exhibits no edema, tenderness or deformity.   Neurological: He is alert and oriented to person, place, and time.   Skin: Skin is warm and dry. No rash noted. He is not diaphoretic. No cyanosis or erythema. No pallor. Nails show no clubbing.   Psychiatric: He has a normal mood and affect. His speech is normal and behavior is normal. Judgment and thought content normal.           Assessment:   1.  57-year-old gentleman with history of severe coronary disease preserved left ventricular systolic function status post 3.25 mm x 20 mm drug-eluting stent placed left anterior descending in August 2017.  He has a dual antiplatelet score of 0 which is low risk. Normal stress echocardiogram July 2018.  Coronary Artery Disease  Assessment  • The patient has no angina  • There is a new diagnosis of stable angina in  the past 12 months  • The patient is having symptoms consistent with unstable angina     Plan  • Lifestyle modifications discussed include adhering to a heart healthy diet, avoidance of tobacco products, maintenance of a healthy weight, medication compliance, regular exercise and regular monitoring of cholesterol and blood pressure    Subjective - Objective  • There has been a previous stent procedure using YI  • Current antiplatelet therapy includes aspirin 81 mg and clopidogrel 75 mg  • The patient qualifies for cardiac rehabilitation, and has been referred to cardiac rehab      He is to stop the clopidogrel.  She will see us skin in follow-up in a year and call back if problems.     2.  Hyperlipidemia on target dose atorvastatin.  He's have follow-up lipid profile today.  3.  Hypertension blood pressures this is been under good control.   continue the same.  4.  B12 deficiency.            Plan:

## 2019-01-16 NOTE — TELEPHONE ENCOUNTER
Niranjan Peacock  Male, 58 y.o., 1960  PCP:   Silvano Peña MD  Language:   English  Need Interp:   No  Last Weight:   96.6 kg (213 lb)  Phone:   M: [phone] H: [phone]  Allergies  Albuterol  Health Maintenance:   Due  FYI:   None  Primary Ins.:   [employer] ROACH  MRN:   [id number]  MyChart:   Active  Pharmacy:   [address] [phone] [facility] [phone] FX [[id number]]  Preferred Lab:   None  Next Appt with Me:   01/16/2020  Next Appt Date by Dept:   01/16/2020        Contains abnormal data Lipid Panel   Order: [id number]   Status:  Final result   Visible to patient:  No (Not Released) Dx:  Mixed hyperlipidemia    Ref Range & Units 13:01 6mo ago   Total Cholesterol 0 - 200 mg/dL 121  131    Triglycerides 0 - 150 mg/dL 157 Abnormally high   98    HDL Cholesterol 40 - 60 mg/dL 46  47    LDL Cholesterol  0 - 100 mg/dL 44  64    VLDL Cholesterol 5 - 40 mg/dL 31.4  19.6    LDL/HDL Ratio  0.95  1.37    Resulting Agency   [facility]      Narrative   Performed by:  [facility]   Cholesterol Reference Ranges  (U.S. Department of Health and Human Services ATP III Classifications)    Desirable          <200 mg/dL  Borderline High    200-239 mg/dL  High Risk          >240 mg/dL      Triglyceride Reference Ranges  (U.S. Department of Health and Human Services ATP III Classifications)    Normal           <150 mg/dL  Borderline High  150-199 mg/dL  High             200-499 mg/dL  Very High        >500 mg/dL    HDL Reference Ranges  (U.S. Department of Health and Human Services ATP III Classifcations)    Low     <40 mg/dl (major risk factor for CHD)  High    >60 mg/dl ('negative' risk factor for CHD)        LDL Reference Ranges  (U.S. Department of Health and Human Services ATP III Classifcations)    Optimal          <100 mg/dL  Near Optimal     100-129 mg/dL  Borderline High  130-159 mg/dL  High             160-189 mg/dL  Very High         >189 mg/dL      Specimen Collected: 01/16/19 13:01 Last Resulted: 01/16/19 13:56         Lab Flowsheet       Order Details       View Encounter       Lab and Collection Details       Routing       Result History            Related Result Highlights         Hepatic Function Panel  Final result 1/16/2019                  Status of Other Orders     Completed     Hepatic Function Panel  01/16/19   ECG 12 Lead  01/16/19          Encounter Notes      All notes         Routing History     Priority Sent On From To Message Type    1/16/2019  1:56 PM Lab, Background User Jason Mancilla MD Results

## 2019-01-28 RX ORDER — ATORVASTATIN CALCIUM 40 MG/1
TABLET, FILM COATED ORAL
Qty: 30 TABLET | Refills: 6 | Status: SHIPPED | OUTPATIENT
Start: 2019-01-28 | End: 2019-08-31 | Stop reason: SDUPTHER

## 2019-02-06 ENCOUNTER — OFFICE VISIT (OUTPATIENT)
Dept: FAMILY MEDICINE CLINIC | Facility: CLINIC | Age: 59
End: 2019-02-06

## 2019-02-06 VITALS
SYSTOLIC BLOOD PRESSURE: 134 MMHG | HEIGHT: 74 IN | TEMPERATURE: 97.8 F | BODY MASS INDEX: 27.72 KG/M2 | OXYGEN SATURATION: 98 % | DIASTOLIC BLOOD PRESSURE: 66 MMHG | WEIGHT: 216 LBS | HEART RATE: 67 BPM

## 2019-02-06 DIAGNOSIS — J20.9 ACUTE BRONCHITIS, UNSPECIFIED ORGANISM: Primary | ICD-10-CM

## 2019-02-06 PROCEDURE — 99213 OFFICE O/P EST LOW 20 MIN: CPT | Performed by: FAMILY MEDICINE

## 2019-02-06 RX ORDER — BENZONATATE 100 MG/1
100 CAPSULE ORAL 3 TIMES DAILY PRN
Qty: 21 CAPSULE | Refills: 0 | Status: SHIPPED | OUTPATIENT
Start: 2019-02-06 | End: 2019-06-05

## 2019-02-06 NOTE — PROGRESS NOTES
SUBJECTIVE:  The patient is a 59-year-old white male who is here for follow-up.  He was diagnosed with the flu roughly a week and a half ago.  He didn't 4 days into his symptoms and was elected not to treating with Tamiflu.  He was given a Z-Jorge for secondary infection.  He presents today stating he feels much better but he still has an annoying cough.  It's productive of whitish sputum    PAST MEDICAL HISTORY:  Reviewed.    REVIEW OF SYSTEMS:  Please see above; 14 point ROS negative.      OBJECTIVE: Vitals signs are reviewed and are stable.    HEENT: PERRLA.   Neck:  Supple.   Lungs:  Clear.    Heart:  Regular rate and rhythm.   Abdomen:   Soft, nontender.   Extremities:  No cyanosis, clubbing or edema.     ASSESSMENT:    Influenza A-improved  Secondary bronchitis-improving  PLAN: Tessalon Perles one to 2 every 8 hours when necessary cough.  Mucinex.  Follow-up if symptoms don't resolve.  Call if problems.    Dictated utilizing Dragon dictation.

## 2019-02-08 RX ORDER — LISINOPRIL 2.5 MG/1
TABLET ORAL
Qty: 60 TABLET | Refills: 3 | Status: SHIPPED | OUTPATIENT
Start: 2019-02-08 | End: 2019-06-07 | Stop reason: SDUPTHER

## 2019-03-19 RX ORDER — CYANOCOBALAMIN 1000 UG/ML
INJECTION, SOLUTION INTRAMUSCULAR; SUBCUTANEOUS
Qty: 1 ML | Refills: 3 | Status: SHIPPED | OUTPATIENT
Start: 2019-03-19 | End: 2019-08-03 | Stop reason: SDUPTHER

## 2019-03-28 ENCOUNTER — TELEPHONE (OUTPATIENT)
Dept: FAMILY MEDICINE CLINIC | Facility: CLINIC | Age: 59
End: 2019-03-28

## 2019-03-28 NOTE — TELEPHONE ENCOUNTER
PATIENT NEEDS NEEDLES TO GIVE HIMSELF HIS B12 INJECTIONS CALLED IN     PATIENT WOULD LIKE 12 NEEDLES FOR THE WHOLE YEAR

## 2019-05-02 ENCOUNTER — OFFICE VISIT (OUTPATIENT)
Dept: FAMILY MEDICINE CLINIC | Facility: CLINIC | Age: 59
End: 2019-05-02

## 2019-05-02 VITALS
DIASTOLIC BLOOD PRESSURE: 66 MMHG | WEIGHT: 212 LBS | BODY MASS INDEX: 27.21 KG/M2 | HEART RATE: 60 BPM | TEMPERATURE: 98.6 F | OXYGEN SATURATION: 98 % | HEIGHT: 74 IN | SYSTOLIC BLOOD PRESSURE: 126 MMHG

## 2019-05-02 DIAGNOSIS — Z00.00 ENCOUNTER FOR PREVENTATIVE ADULT HEALTH CARE EXAMINATION: Primary | ICD-10-CM

## 2019-05-02 LAB
25(OH)D3 SERPL-MCNC: 28.5 NG/ML (ref 30–100)
ALBUMIN SERPL-MCNC: 4.2 G/DL (ref 3.5–5.2)
ALBUMIN/GLOB SERPL: 1.6 G/DL
ALP SERPL-CCNC: 64 U/L (ref 39–117)
ALT SERPL W P-5'-P-CCNC: 28 U/L (ref 1–41)
ANION GAP SERPL CALCULATED.3IONS-SCNC: 8.9 MMOL/L
AST SERPL-CCNC: 25 U/L (ref 1–40)
BACTERIA UR QL AUTO: NORMAL /HPF
BILIRUB SERPL-MCNC: 0.6 MG/DL (ref 0.2–1.2)
BILIRUB UR QL STRIP: NEGATIVE
BUN BLD-MCNC: 13 MG/DL (ref 6–20)
BUN/CREAT SERPL: 12.4 (ref 7–25)
CALCIUM SPEC-SCNC: 9.8 MG/DL (ref 8.6–10.5)
CHLORIDE SERPL-SCNC: 103 MMOL/L (ref 98–107)
CHOLEST SERPL-MCNC: 121 MG/DL (ref 0–200)
CLARITY UR: CLEAR
CO2 SERPL-SCNC: 28.1 MMOL/L (ref 22–29)
COLOR UR: YELLOW
CREAT BLD-MCNC: 1.05 MG/DL (ref 0.76–1.27)
DEVELOPER EXPIRATION DATE: NORMAL
DEVELOPER LOT NUMBER: NORMAL
ERYTHROCYTE [DISTWIDTH] IN BLOOD BY AUTOMATED COUNT: 12.6 % (ref 12.3–15.4)
EXPIRATION DATE: NORMAL
FECAL OCCULT BLOOD SCREEN, POC: NEGATIVE
GFR SERPL CREATININE-BSD FRML MDRD: 73 ML/MIN/1.73
GLOBULIN UR ELPH-MCNC: 2.7 GM/DL
GLUCOSE BLD-MCNC: 104 MG/DL (ref 65–99)
GLUCOSE UR STRIP-MCNC: NEGATIVE MG/DL
HCT VFR BLD AUTO: 43.3 % (ref 37.5–51)
HDLC SERPL-MCNC: 49 MG/DL (ref 40–60)
HGB BLD-MCNC: 14.5 G/DL (ref 13–17.7)
HGB UR QL STRIP.AUTO: NEGATIVE
KETONES UR QL STRIP: ABNORMAL
LDLC SERPL CALC-MCNC: 59 MG/DL (ref 0–100)
LDLC/HDLC SERPL: 1.2 {RATIO}
LEUKOCYTE ESTERASE UR QL STRIP.AUTO: NEGATIVE
LYMPHOCYTES # BLD AUTO: 1.6 10*3/MM3 (ref 0.7–3.1)
LYMPHOCYTES NFR BLD AUTO: 26.6 % (ref 19.6–45.3)
Lab: NORMAL
MCH RBC QN AUTO: 31.2 PG (ref 26.6–33)
MCHC RBC AUTO-ENTMCNC: 33.5 G/DL (ref 31.5–35.7)
MCV RBC AUTO: 93.1 FL (ref 79–97)
MONOCYTES # BLD AUTO: 0.5 10*3/MM3 (ref 0.1–0.9)
MONOCYTES NFR BLD AUTO: 7.4 % (ref 5–12)
NEGATIVE CONTROL: NEGATIVE
NEUTROPHILS # BLD AUTO: 4 10*3/MM3 (ref 1.7–7)
NEUTROPHILS NFR BLD AUTO: 66 % (ref 42.7–76)
NITRITE UR QL STRIP: NEGATIVE
PH UR STRIP.AUTO: 6.5 [PH] (ref 4.6–8)
PLATELET # BLD AUTO: 259 10*3/MM3 (ref 140–450)
PMV BLD AUTO: 7.4 FL (ref 6–12)
POSITIVE CONTROL: POSITIVE
POTASSIUM BLD-SCNC: 4 MMOL/L (ref 3.5–5.2)
PROT SERPL-MCNC: 6.9 G/DL (ref 6–8.5)
PROT UR QL STRIP: NEGATIVE
PSA SERPL-MCNC: 2.66 NG/ML (ref 0–4)
RBC # BLD AUTO: 4.65 10*6/MM3 (ref 4.14–5.8)
RBC # UR: NORMAL /HPF
REF LAB TEST METHOD: NORMAL
SODIUM BLD-SCNC: 140 MMOL/L (ref 136–145)
SP GR UR STRIP: 1.02 (ref 1–1.03)
SQUAMOUS #/AREA URNS HPF: NORMAL /HPF
T-UPTAKE NFR SERPL: 1.15 TBI (ref 0.8–1.3)
T4 SERPL-MCNC: 6.32 MCG/DL (ref 4.5–11.7)
TRIGL SERPL-MCNC: 65 MG/DL (ref 0–150)
TSH SERPL DL<=0.05 MIU/L-ACNC: 1.8 MIU/ML (ref 0.27–4.2)
UROBILINOGEN UR QL STRIP: ABNORMAL
VLDLC SERPL-MCNC: 13 MG/DL (ref 5–40)
WBC NRBC COR # BLD: 6.1 10*3/MM3 (ref 3.4–10.8)
WBC UR QL AUTO: NORMAL /HPF

## 2019-05-02 PROCEDURE — 82270 OCCULT BLOOD FECES: CPT | Performed by: FAMILY MEDICINE

## 2019-05-02 PROCEDURE — 99396 PREV VISIT EST AGE 40-64: CPT | Performed by: FAMILY MEDICINE

## 2019-05-02 PROCEDURE — 82306 VITAMIN D 25 HYDROXY: CPT | Performed by: FAMILY MEDICINE

## 2019-05-02 PROCEDURE — 84436 ASSAY OF TOTAL THYROXINE: CPT | Performed by: FAMILY MEDICINE

## 2019-05-02 PROCEDURE — 85025 COMPLETE CBC W/AUTO DIFF WBC: CPT | Performed by: FAMILY MEDICINE

## 2019-05-02 PROCEDURE — 81001 URINALYSIS AUTO W/SCOPE: CPT | Performed by: FAMILY MEDICINE

## 2019-05-02 PROCEDURE — 84479 ASSAY OF THYROID (T3 OR T4): CPT | Performed by: FAMILY MEDICINE

## 2019-05-02 PROCEDURE — G0103 PSA SCREENING: HCPCS | Performed by: FAMILY MEDICINE

## 2019-05-02 PROCEDURE — 84443 ASSAY THYROID STIM HORMONE: CPT | Performed by: FAMILY MEDICINE

## 2019-05-02 PROCEDURE — 80053 COMPREHEN METABOLIC PANEL: CPT | Performed by: FAMILY MEDICINE

## 2019-05-02 PROCEDURE — 80061 LIPID PANEL: CPT | Performed by: FAMILY MEDICINE

## 2019-05-02 PROCEDURE — 36415 COLL VENOUS BLD VENIPUNCTURE: CPT | Performed by: FAMILY MEDICINE

## 2019-05-02 NOTE — PROGRESS NOTES
SUBJECTIVE:   Niranjan Peacock is a 59 y.o., male, who presents for a complete physical examination.    Occupation: Reviewed  Family History: Reviewed  Past Medical History: Reviewed  Surgical History: Reviewed  Social History: Reviewed    Review of Systems:  Constitutional:  Negative for chills, fever, dizziness, weight loss, diaphoresis, the patient does complain of feeling weak not having energy.  Eyes:  Negative for blurred vision, redness, discharge, diplopia, photophobia or itching.  ENT:  Negative for earache, ST, toothache, rhinorrhea, hoarseness, or PND.  Musculoskeletal:  Negative for neck pain.  No heat, myalgia, redness or joint pain.  Cardiovascular:  Negative for orthopnea, chest pain, LE edema, palpitations, or rapid heart rate.    Skin:  Negative for bruising, swelling, rash, abrasions, or itching.    Respiratory:  Negatie for pleuritic chest pain, shortness of air, nonproductive/productive cough, hemoptysis, or BOWMAN.    Neurologic:  Negative for history of weakness, numbness, paresthesia, loss of consciousness, speech change, or ataxia.    Gastrointestinal:  Negative for melena, hematochezia, nausea, vomiting, change in bowel habits.    Genitourinary:  Negative for flank pain, dysuria, frequency, or hematuria.    Psychiatric:  Negative for agitation, suicidal ideation, change in mental status, depression, confusion, or insomnia.  Hematologic:  Negative for nodes, bruising, bleeding, or petechiae.  Immunologic:  Negative for atopic dermatitis, sneezing, rhinorrhea, or hives.  Integument: No suspicious lesions. Skin turgor good.  Patient complains of dry lesions on his feet which has been worked up before but this is not gone away.    ALLERGIES: Reviewed    OBJECTIVE:    Vital Signs: Reviewed  Constitutional:  Alert and oriented x3, in no acute distress.  Eyes:  Sclerae white, conjunctivae clear.  Lids are without lag.  PERRLA. Discs sharp.    Ears:  No scars, lesions or masses.  Tympanic membranes  translucent, nonbulging, and mobile. Canal walls are pink.  Septum is midline.    Mouth:  Lips are pink and symmetrical.  Gums are pink.  Good dentition.    Throat:  Oral mucosa pink and moist.  Salivery glands intact.  Soft and hard palates contiguous.  Tongue moist without ulcers.  Gag reflex present.    Neck:  Full range of motion.  Trachea midline position.  No thyromegaly.   Respiratory:  Respirations are even and unlabored.  Lung fields with no flatness, dullness, or hyperresonance.  Clear and equal breath sounds with no adventitious sounds bilaterally.    Cardiovascular:  No lifts, heaves, or thrills.  PMI present.  S1 and S2 not exaggerated or diminished.  Regular rate and rhythm, without murmurs, rubs or gallops.  Normal carotids.  Pedal pulses are within normal limits bilaterally.  No edema.  No varicosities.    Chest:  Equal bilateral expansion.  Abdomen:  No masses or tenderness.  Bowel sounds active x4 quadrants.  Liver and spleen are without tenderness or enlargement.  No hernias. No organomegaly.   Digital Rectal Exam:  No masses.  Hemoccult negative.  Prostate soft 1-2+.  Right lobe slightly larger.  Genitalia: Normal male.  No scrotal masses or tenderness  Lymphatic:  Areas palpated are not enlarged.    Extremities:  Full range of motion. No cyanosis, clubbing, or edema.  Musculoskeletal:  Gait coordinated and smooth.    Skin:  No rashes, lesions, or ulcers.  No discoloration.  Warm and dry.  Normal turgor.    Neurologic:  Cranial nerves are intact.  Deep tendon reflexes are 2+ bilaterally.  Superficial touch and pain sensation intact bilaterally.  Psychiatric:  Judgement and insight are normal.  Orientation to time, place and person.  Normal mood and affect.  Memory intact.    EKG-patient has had a previous EKG recently  Chest x-rays ordered.  The patient left prior to having this done.  He was called and he will return for a chest x-ray at his convenience.  LABORATORY: CBC CMP PSA fasting lipids  CBC urinalysis vitamin D level TSH thyroid profile ordered    ASSESSMENT:  Complete physical examination.      PLSAM: Healthy lifestyle discussed.  He is going to see his dermatologist.  He is going to schedule his colonoscopy which is due.  He will follow-up on labs.  Healthy lifestyle discussed.

## 2019-05-03 ENCOUNTER — DOCUMENTATION (OUTPATIENT)
Dept: FAMILY MEDICINE CLINIC | Facility: CLINIC | Age: 59
End: 2019-05-03

## 2019-05-03 DIAGNOSIS — E55.9 VITAMIN D DEFICIENCY: Primary | ICD-10-CM

## 2019-06-04 ENCOUNTER — TELEPHONE (OUTPATIENT)
Dept: CARDIOLOGY | Facility: CLINIC | Age: 59
End: 2019-06-04

## 2019-06-04 NOTE — TELEPHONE ENCOUNTER
06/04/19  10:16 AM  Niranjan Peacock  1960  Home Phone [phone]   Mobile [phone]       Niranjan Peacock is a patient of Dr. Mancilla. He is calling in this morning saying he is having extreme fatigue, chest heaviness on exertion X 2 weeks, radiating in to his shoulders and arms stacey, w/BOWMAN, and diaphoresis. He denies any nausea w/these s/s.    Current cardiac meds are:    Lisinopril 2.5mg BID  Atorvastatin 40mg daily  ASA 81mg daily    He was at work and had no way to take a BP/HR.    Shira: How do you wish to proceed w/this pt?    Anna Cleary RN

## 2019-06-05 ENCOUNTER — OFFICE VISIT (OUTPATIENT)
Dept: CARDIOLOGY | Facility: CLINIC | Age: 59
End: 2019-06-05

## 2019-06-05 VITALS
HEIGHT: 74 IN | SYSTOLIC BLOOD PRESSURE: 160 MMHG | WEIGHT: 211 LBS | BODY MASS INDEX: 27.08 KG/M2 | HEART RATE: 60 BPM | DIASTOLIC BLOOD PRESSURE: 74 MMHG

## 2019-06-05 DIAGNOSIS — I25.10 CORONARY ARTERY DISEASE INVOLVING NATIVE CORONARY ARTERY OF NATIVE HEART WITHOUT ANGINA PECTORIS: Primary | ICD-10-CM

## 2019-06-05 DIAGNOSIS — R06.09 DYSPNEA ON EXERTION: ICD-10-CM

## 2019-06-05 DIAGNOSIS — E78.2 MIXED HYPERLIPIDEMIA: ICD-10-CM

## 2019-06-05 DIAGNOSIS — R07.2 PRECORDIAL PAIN: ICD-10-CM

## 2019-06-05 DIAGNOSIS — I10 ESSENTIAL HYPERTENSION: ICD-10-CM

## 2019-06-05 PROCEDURE — 93000 ELECTROCARDIOGRAM COMPLETE: CPT | Performed by: NURSE PRACTITIONER

## 2019-06-05 PROCEDURE — 99214 OFFICE O/P EST MOD 30 MIN: CPT | Performed by: NURSE PRACTITIONER

## 2019-06-05 NOTE — PROGRESS NOTES
Date of Office Visit: 2019  Encounter Provider: GREG Carrasquillo  Place of Service: [facility] CARDIOLOGY  Patient Name: Niranjan Peacock  :1960    Chief Complaint   Patient presents with   • Coronary Artery Disease   • Follow-up   • Shortness of Breath   • Chest Pain   :     HPI: Niranjan Peacock is a 59 y.o. male is a patient of Dr. Mancilla I will be seeing him today for the first time and have reviewed his medical record.    His past medical history is significant of coronary artery disease status post coronary artery stent placement, hypertension, and hyperlipidemia.    In 2017 he presented with chest pain worrisome for angina.  He had a stress test which was abnormal with ECG changes and underwent cardiac catheterization which showed normal left ventricular systolic function.  There was severe disease of the proximal mid LAD with abnormal fractional flow wire reserve and he underwent stenting with a 3.25 mm x 20 mm drug-eluting stent.  Dual antiplatelet score was low so after his year Plavix was dropped.  He complained of dyspnea on exertion at the end of 2018 and had a stress echocardiogram which was negative for ischemia.  Left ventricular ejection fraction was 59, diastolic function was normal.  There was no significant valvular disease.    Patient presents today for reevaluation.  He called yesterday complaining of chest heaviness with exertion x2 weeks with associated fatigue and diaphoreses.  The pain will radiate across his chest down both arms to the elbow.  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  He does not perform any structured exercise.  He has been doing physical work helping to build a house and has noticed increased dyspnea compared to 6 months ago.  It is unclear if the symptoms correlate with his experience with stent placement.  He recalls just feeling extreme fatigue overall.  He  states his blood pressure usually mid 120 systolic at home.      Allergies   Allergen Reactions   • Albuterol Anxiety       Past Medical History:   Diagnosis Date   • B12 deficiency anemia    • Chest pain    • Coronary artery disease involving native coronary artery of native heart without angina pectoris 8/10/2017   • Headache    • History of blood clots     blood clot found in right lung    • Multiple thyroid nodules    • Multiple thyroid nodules    • Pernicious anemia    • Syncope     2 yrs ago one time       Past Surgical History:   Procedure Laterality Date   • CARDIAC CATHETERIZATION N/A 8/3/2017    Procedure: Coronary angiography;  Surgeon: Cynthia Farley MD;  Location:  [facility];  Service:    • CARDIAC CATHETERIZATION  8/3/2017    Procedure: Functional Flow Dorothy;  Surgeon: Cynthia Farley MD;  Location:  [facility];  Service:    • CARDIAC CATHETERIZATION N/A 8/3/2017    Procedure: Stent YI coronary;  Surgeon: Cynthia Farley MD;  Location:  [facility];  Service:    • CARDIAC CATHETERIZATION N/A 8/3/2017    Procedure: Left ventriculography;  Surgeon: Cynthia Farley MD;  Location:  [facility];  Service:    • CARDIAC CATHETERIZATION N/A 8/3/2017    Procedure: Left Heart Cath;  Surgeon: Cynthia Farley MD;  Location:  [facility];  Service:    • CAROTID STENT     • COLONOSCOPY  MMVI    NORMAL.   • COLONOSCOPY     • FINE NEEDLE ASPIRATION  12/2015    Left thyroid nodule.  [place] category II.  Dr. Socrates Sanchez         Family and social history reviewed.     Review of Systems   Constitution: Positive for malaise/fatigue.   Cardiovascular: Positive for chest pain and dyspnea on exertion.   Neurological: Positive for excessive daytime sleepiness.   Psychiatric/Behavioral: The patient is nervous/anxious.      All other systems were reviewed and are negative          Objective:     Vitals:    06/05/19 1459   BP: 160/74  "  BP Location: Left arm   Patient Position: Sitting   Pulse: 60   Weight: 95.7 kg (211 lb)   Height: 188 cm (74\")     Body mass index is 27.09 kg/m².    PHYSICAL EXAM:  Physical Exam   Constitutional: He is oriented to person, place, and time. He appears well-developed and well-nourished. No distress.   HENT:   Head: Normocephalic.   Eyes: Conjunctivae are normal.   Neck: Normal range of motion. No JVD present.   Cardiovascular: Normal rate, regular rhythm, normal heart sounds and intact distal pulses.   No murmur heard.  Pulses:       Carotid pulses are 2+ on the right side, and 2+ on the left side.       Radial pulses are 2+ on the right side, and 2+ on the left side.        Posterior tibial pulses are 2+ on the right side, and 2+ on the left side.   Pulmonary/Chest: Effort normal and breath sounds normal. No respiratory distress. He has no wheezes. He has no rhonchi. He has no rales. He exhibits no tenderness.   Abdominal: Soft. Bowel sounds are normal. He exhibits no distension.   Musculoskeletal: Normal range of motion. He exhibits no edema.   Neurological: He is alert and oriented to person, place, and time.   Skin: Skin is warm, dry and intact. No rash noted. He is not diaphoretic. No cyanosis.   Psychiatric: He has a normal mood and affect. His behavior is normal. Judgment and thought content normal.         ECG 12 Lead  Date/Time: 6/5/2019 4:05 PM  Performed by: Shira Isaacs APRN  Authorized by: Shira Isaacs APRN   Comparison: compared with previous ECG from 1/16/2019  Similar to previous ECG  Rhythm: sinus rhythm  Rate: normal  T flattening: III  QRS axis: normal    Clinical impression: non-specific ECG  Comments: No significant change form prior            Current Outpatient Medications   Medication Sig Dispense Refill   • aspirin 81 MG tablet Take 1 tablet by mouth Daily. 30 tablet 11   • atorvastatin (LIPITOR) 40 MG tablet TAKE ONE TABLET BY MOUTH DAILY 30 tablet 6   • Cholecalciferol (VITAMIN D3) " "5000 units capsule capsule Take 5,000 Units by mouth Daily.     • cyanocobalamin 1000 MCG/ML injection INJECT 1 MILLILITER INTRAMUSCULARLY EVERY 30 DAYS AS DIRECTED 1 mL 3   • lisinopril (PRINIVIL,ZESTRIL) 2.5 MG tablet TAKE ONE TABLET BY MOUTH TWICE A DAY 60 tablet 3   • Multiple Vitamin (MULTI VITAMIN DAILY PO) Take  by mouth Daily.     • Needle, Disp, (BD DISP NEEDLES) 25G X 5/8\" misc 1 mL Every 30 (Thirty) Days. 12 each 3   • clopidogrel (PLAVIX) 75 MG tablet TAKE ONE TABLET BY MOUTH DAILY 30 tablet 10     No current facility-administered medications for this visit.      Assessment:       Diagnosis Plan   1. Coronary artery disease involving native coronary artery of native heart without angina pectoris  Stress Test With Myocardial Perfusion One Day    ECG 12 Lead   2. Mixed hyperlipidemia     3. Essential hypertension  ECG 12 Lead   4. Precordial pain  Stress Test With Myocardial Perfusion One Day    ECG 12 Lead   5. Dyspnea on exertion  Stress Test With Myocardial Perfusion One Day    ECG 12 Lead        Orders Placed This Encounter   Procedures   • Stress Test With Myocardial Perfusion One Day     Standing Status:   Future     Standing Expiration Date:   6/4/2020     Order Specific Question:   What stress agent will be used?     Answer:   Exercise with possible pharmacologic     Order Specific Question:   Reason for exam?     Answer:   Chest Pain     Order Specific Question:   Chest pain specification?     Answer:   Ischemic Equivalent   • ECG 12 Lead     This order was created via procedure documentation         Plan:         1.  History of severe coronary artery disease with preserved left ventricular systolic function status post 3.25 x 20 mm drug-eluting stent to the LAD 08/2018.  Normal stress echocardiogram July 2018.  Now with recurrent chest pain radiating down the bilateral arms with associated dyspnea and diaphoresis.  He will return for walking protocol perfusion stress test to rule out ischemia.  " That is unremarkable he continues to have worrisome symptoms we will need to proceed with cardiac catheterization.  Coronary Artery Disease  Assessment  • The patient has no angina  • There is a new diagnosis of stable angina in the past 12 months  • The patient has stable angina     Plan  • Lifestyle modifications discussed include adhering to a heart healthy diet, avoidance of tobacco products, maintenance of a healthy weight, medication compliance, regular exercise and regular monitoring of cholesterol and blood pressure    Subjective - Objective  • There has been a previous stent procedure using YI  • Current antiplatelet therapy includes aspirin 81 mg  • The patient qualifies for cardiac rehabilitation, and has been referred to cardiac rehab        2.  Hypertension-blood pressure is elevated in the office today but reportedly better at home.  We discussed his goal 130/80 or less.  3.  Hyperlipidemia on target dose atorvastatin reviewed lipid panel May 2019 LDL 59 continue the same  4.  B12 anemia  5.  Excessive daytime sleepiness.  We may need to consider sleep study.      Follow up in 01/2020 as scheduled if stress test is unremarkable.       It has been a pleasure to participate in this patient's care.      Thank you,  GREG Carrasquillo      **Abeba Disclaimer:**  Much of this encounter note is an electronic transcription/translation of spoken language to printed text. The electronic translation of spoken language may permit erroneous, or at times, nonsensical words or phrases to be inadvertently transcribed. Although I have reviewed the note for such errors, some may still exist.

## 2019-06-07 RX ORDER — LISINOPRIL 2.5 MG/1
TABLET ORAL
Qty: 60 TABLET | Refills: 2 | Status: SHIPPED | OUTPATIENT
Start: 2019-06-07 | End: 2019-08-31 | Stop reason: SDUPTHER

## 2019-06-12 ENCOUNTER — TELEPHONE (OUTPATIENT)
Dept: CARDIOLOGY | Facility: CLINIC | Age: 59
End: 2019-06-12

## 2019-06-12 ENCOUNTER — HOSPITAL ENCOUNTER (OUTPATIENT)
Dept: CARDIOLOGY | Facility: HOSPITAL | Age: 59
Discharge: HOME | End: 2019-06-12
Admitting: NURSE PRACTITIONER

## 2019-06-12 VITALS — WEIGHT: 211.64 LBS | BODY MASS INDEX: 27.16 KG/M2 | HEIGHT: 74 IN

## 2019-06-12 DIAGNOSIS — R07.2 PRECORDIAL PAIN: ICD-10-CM

## 2019-06-12 DIAGNOSIS — R06.09 DYSPNEA ON EXERTION: ICD-10-CM

## 2019-06-12 DIAGNOSIS — I25.10 CORONARY ARTERY DISEASE INVOLVING NATIVE CORONARY ARTERY OF NATIVE HEART WITHOUT ANGINA PECTORIS: ICD-10-CM

## 2019-06-12 LAB
BH CV NUCLEAR PRIOR STUDY: 3
BH CV STRESS BP STAGE 1: NORMAL
BH CV STRESS BP STAGE 2: NORMAL
BH CV STRESS BP STAGE 3: NORMAL
BH CV STRESS DURATION MIN STAGE 1: 3
BH CV STRESS DURATION MIN STAGE 2: 3
BH CV STRESS DURATION MIN STAGE 3: 3
BH CV STRESS DURATION SEC STAGE 1: 0
BH CV STRESS DURATION SEC STAGE 2: 0
BH CV STRESS DURATION SEC STAGE 3: 0
BH CV STRESS GRADE STAGE 1: 10
BH CV STRESS GRADE STAGE 2: 12
BH CV STRESS GRADE STAGE 3: 14
BH CV STRESS HR STAGE 1: 92
BH CV STRESS HR STAGE 2: 113
BH CV STRESS HR STAGE 3: 141
BH CV STRESS METS STAGE 1: 5
BH CV STRESS METS STAGE 2: 7.5
BH CV STRESS METS STAGE 3: 10
BH CV STRESS PROTOCOL 1: NORMAL
BH CV STRESS RECOVERY BP: NORMAL MMHG
BH CV STRESS RECOVERY HR: 91 BPM
BH CV STRESS SPEED STAGE 1: 1.7
BH CV STRESS SPEED STAGE 2: 2.5
BH CV STRESS SPEED STAGE 3: 3.4
BH CV STRESS STAGE 1: 1
BH CV STRESS STAGE 2: 2
BH CV STRESS STAGE 3: 3
LV EF NUC BP: 66 %
MAXIMAL PREDICTED HEART RATE: 162 BPM
PERCENT MAX PREDICTED HR: 87.04 %
STRESS BASELINE BP: NORMAL MMHG
STRESS BASELINE HR: 68 BPM
STRESS PERCENT HR: 102 %
STRESS POST ESTIMATED WORKLOAD: 10 METS
STRESS POST EXERCISE DUR MIN: 9 MIN
STRESS POST EXERCISE DUR SEC: 0 SEC
STRESS POST PEAK BP: NORMAL MMHG
STRESS POST PEAK HR: 141 BPM
STRESS TARGET HR: 138 BPM

## 2019-06-12 PROCEDURE — 78452 HT MUSCLE IMAGE SPECT MULT: CPT

## 2019-06-12 PROCEDURE — 93018 CV STRESS TEST I&R ONLY: CPT | Performed by: INTERNAL MEDICINE

## 2019-06-12 PROCEDURE — 93017 CV STRESS TEST TRACING ONLY: CPT

## 2019-06-12 PROCEDURE — 93016 CV STRESS TEST SUPVJ ONLY: CPT | Performed by: INTERNAL MEDICINE

## 2019-06-12 PROCEDURE — A9502 TC99M TETROFOSMIN: HCPCS | Performed by: NURSE PRACTITIONER

## 2019-06-12 PROCEDURE — 0 TECHNETIUM TETROFOSMIN KIT: Performed by: NURSE PRACTITIONER

## 2019-06-12 PROCEDURE — 78452 HT MUSCLE IMAGE SPECT MULT: CPT | Performed by: INTERNAL MEDICINE

## 2019-06-12 RX ADMIN — TETROFOSMIN 1 DOSE: 1.38 INJECTION, POWDER, LYOPHILIZED, FOR SOLUTION INTRAVENOUS at 07:40

## 2019-06-12 RX ADMIN — TETROFOSMIN 1 DOSE: 1.38 INJECTION, POWDER, LYOPHILIZED, FOR SOLUTION INTRAVENOUS at 08:43

## 2019-06-12 NOTE — TELEPHONE ENCOUNTER
Called nuclear negative for ischemia. He had no CP on treadmill.  Reports some shortness of breath.  We agree that he continue to follow clinically for now and if he has persistent or worsening dyspnea to call we would need to proceed to cardiac cath.

## 2019-07-22 ENCOUNTER — TELEPHONE (OUTPATIENT)
Dept: FAMILY MEDICINE CLINIC | Facility: CLINIC | Age: 59
End: 2019-07-22

## 2019-07-24 ENCOUNTER — LAB (OUTPATIENT)
Dept: FAMILY MEDICINE CLINIC | Facility: CLINIC | Age: 59
End: 2019-07-24

## 2019-07-24 DIAGNOSIS — E55.9 VITAMIN D DEFICIENCY: ICD-10-CM

## 2019-07-24 LAB — 25(OH)D3 SERPL-MCNC: 40.2 NG/ML (ref 30–100)

## 2019-07-24 PROCEDURE — 82306 VITAMIN D 25 HYDROXY: CPT | Performed by: FAMILY MEDICINE

## 2019-07-24 PROCEDURE — 36415 COLL VENOUS BLD VENIPUNCTURE: CPT | Performed by: FAMILY MEDICINE

## 2019-07-30 ENCOUNTER — TELEPHONE (OUTPATIENT)
Dept: CARDIOLOGY | Facility: CLINIC | Age: 59
End: 2019-07-30

## 2019-07-30 NOTE — TELEPHONE ENCOUNTER
He is cleared. He had a negative nuclear 06/12/2019.. Ok to hold Plavix 3-5 days. Continue ASA throughout procedure.     He may actually stop Plavix all together after he completes his current bottle and continue on ASA 81 mg long term. Please notify him.      AL

## 2019-07-30 NOTE — TELEPHONE ENCOUNTER
I called patient and relayed Shira's instructions.  Patient stated he may not even be taking Plavix currently even though he asked me about it he was just reading it off the note from Dr. Benton.  I informed patient he can't stop ASA per Shira.  Patient verbalized understanding.     I called and left a message for Kaylene with Dr. Benton's office [phone] Ext. [phone] informing her patient is cleared and she can call me if she needs it in writing or view the telephone message in Epic. / RAMY

## 2019-07-30 NOTE — TELEPHONE ENCOUNTER
Patient called to report he is scheduled for a colonoscopy next Thursday.  He needs clearance and advise re holding Plavix and ASA./ RAMY     Patient's phone number is [phone]

## 2019-08-03 RX ORDER — CYANOCOBALAMIN 1000 UG/ML
INJECTION, SOLUTION INTRAMUSCULAR; SUBCUTANEOUS
Qty: 30 ML | Refills: 2 | Status: SHIPPED | OUTPATIENT
Start: 2019-08-03 | End: 2020-09-17

## 2019-08-16 ENCOUNTER — OFFICE VISIT (OUTPATIENT)
Dept: FAMILY MEDICINE CLINIC | Facility: CLINIC | Age: 59
End: 2019-08-16

## 2019-08-16 ENCOUNTER — HOSPITAL ENCOUNTER (OUTPATIENT)
Dept: CARDIOLOGY | Facility: HOSPITAL | Age: 59
Discharge: HOME | End: 2019-08-16
Admitting: INTERNAL MEDICINE

## 2019-08-16 VITALS
DIASTOLIC BLOOD PRESSURE: 74 MMHG | BODY MASS INDEX: 27.8 KG/M2 | WEIGHT: 216.6 LBS | OXYGEN SATURATION: 99 % | TEMPERATURE: 98.5 F | SYSTOLIC BLOOD PRESSURE: 124 MMHG | HEIGHT: 74 IN | HEART RATE: 58 BPM

## 2019-08-16 VITALS
SYSTOLIC BLOOD PRESSURE: 134 MMHG | HEART RATE: 59 BPM | OXYGEN SATURATION: 97 % | DIASTOLIC BLOOD PRESSURE: 80 MMHG | BODY MASS INDEX: 26.95 KG/M2 | HEIGHT: 74 IN | WEIGHT: 210 LBS

## 2019-08-16 DIAGNOSIS — M25.50 ARTHRALGIA, UNSPECIFIED JOINT: ICD-10-CM

## 2019-08-16 DIAGNOSIS — R53.83 FATIGUE, UNSPECIFIED TYPE: ICD-10-CM

## 2019-08-16 DIAGNOSIS — I20.8 ANGINA AT REST (HCC): Primary | ICD-10-CM

## 2019-08-16 DIAGNOSIS — I25.10 CORONARY ARTERY DISEASE INVOLVING NATIVE CORONARY ARTERY OF NATIVE HEART WITHOUT ANGINA PECTORIS: ICD-10-CM

## 2019-08-16 DIAGNOSIS — R07.9 CHEST PAIN, UNSPECIFIED TYPE: Primary | ICD-10-CM

## 2019-08-16 DIAGNOSIS — D64.9 ANEMIA, UNSPECIFIED TYPE: ICD-10-CM

## 2019-08-16 DIAGNOSIS — R05.9 COUGH: ICD-10-CM

## 2019-08-16 DIAGNOSIS — R94.31 ABNORMAL EKG: ICD-10-CM

## 2019-08-16 DIAGNOSIS — R07.9 CHEST PAIN, UNSPECIFIED TYPE: ICD-10-CM

## 2019-08-16 DIAGNOSIS — Z83.49 FH: THYROID CONDITION: ICD-10-CM

## 2019-08-16 DIAGNOSIS — R06.02 SHORTNESS OF BREATH: ICD-10-CM

## 2019-08-16 PROBLEM — I20.89 ANGINA AT REST: Status: ACTIVE | Noted: 2019-08-16

## 2019-08-16 LAB
ALBUMIN SERPL-MCNC: 4.3 G/DL (ref 3.5–5.2)
ALBUMIN SERPL-MCNC: 4.7 G/DL (ref 3.5–5.2)
ALBUMIN/GLOB SERPL: 1.7 G/DL
ALBUMIN/GLOB SERPL: 2.6 G/DL
ALP SERPL-CCNC: 60 U/L (ref 39–117)
ALP SERPL-CCNC: 64 U/L (ref 39–117)
ALT SERPL W P-5'-P-CCNC: 20 U/L (ref 1–41)
ALT SERPL W P-5'-P-CCNC: 23 U/L (ref 1–41)
ANION GAP SERPL CALCULATED.3IONS-SCNC: 10.2 MMOL/L (ref 5–15)
ANION GAP SERPL CALCULATED.3IONS-SCNC: 10.8 MMOL/L (ref 5–15)
AST SERPL-CCNC: 22 U/L (ref 1–40)
AST SERPL-CCNC: 24 U/L (ref 1–40)
BASOPHILS # BLD AUTO: 0.03 10*3/MM3 (ref 0–0.2)
BASOPHILS NFR BLD AUTO: 0.5 % (ref 0–1.5)
BILIRUB SERPL-MCNC: 0.6 MG/DL (ref 0.2–1.2)
BILIRUB SERPL-MCNC: 0.6 MG/DL (ref 0.2–1.2)
BUN BLD-MCNC: 14 MG/DL (ref 6–20)
BUN BLD-MCNC: 14 MG/DL (ref 6–20)
BUN/CREAT SERPL: 13.5 (ref 7–25)
BUN/CREAT SERPL: 14.6 (ref 7–25)
CALCIUM SPEC-SCNC: 9.1 MG/DL (ref 8.6–10.5)
CALCIUM SPEC-SCNC: 9.5 MG/DL (ref 8.6–10.5)
CHLORIDE SERPL-SCNC: 102 MMOL/L (ref 98–107)
CHLORIDE SERPL-SCNC: 103 MMOL/L (ref 98–107)
CHROMATIN AB SERPL-ACNC: <10 IU/ML (ref 0–14)
CO2 SERPL-SCNC: 25.8 MMOL/L (ref 22–29)
CO2 SERPL-SCNC: 28.2 MMOL/L (ref 22–29)
CORTIS SERPL-MCNC: 7.7 MCG/DL
CREAT BLD-MCNC: 0.96 MG/DL (ref 0.76–1.27)
CREAT BLD-MCNC: 1.04 MG/DL (ref 0.76–1.27)
CRP SERPL-MCNC: 0.04 MG/DL (ref 0–0.5)
D DIMER PPP FEU-MCNC: <0.27 MCGFEU/ML (ref 0–0.49)
DEPRECATED RDW RBC AUTO: 42.8 FL (ref 37–54)
EOSINOPHIL # BLD AUTO: 0.23 10*3/MM3 (ref 0–0.4)
EOSINOPHIL NFR BLD AUTO: 3.9 % (ref 0.3–6.2)
ERYTHROCYTE [DISTWIDTH] IN BLOOD BY AUTOMATED COUNT: 12.4 % (ref 12.3–15.4)
ERYTHROCYTE [DISTWIDTH] IN BLOOD BY AUTOMATED COUNT: 12.7 % (ref 12.3–15.4)
ERYTHROCYTE [SEDIMENTATION RATE] IN BLOOD: 5 MM/HR (ref 0–20)
FERRITIN SERPL-MCNC: 29.8 NG/ML (ref 30–400)
GFR SERPL CREATININE-BSD FRML MDRD: 73 ML/MIN/1.73
GFR SERPL CREATININE-BSD FRML MDRD: 80 ML/MIN/1.73
GLOBULIN UR ELPH-MCNC: 1.8 GM/DL
GLOBULIN UR ELPH-MCNC: 2.6 GM/DL
GLUCOSE BLD-MCNC: 106 MG/DL (ref 65–99)
GLUCOSE BLD-MCNC: 113 MG/DL (ref 65–99)
HCT VFR BLD AUTO: 44.3 % (ref 37.5–51)
HCT VFR BLD AUTO: 44.8 % (ref 37.5–51)
HGB BLD-MCNC: 14.7 G/DL (ref 13–17.7)
HGB BLD-MCNC: 14.7 G/DL (ref 13–17.7)
IMM GRANULOCYTES # BLD AUTO: 0.02 10*3/MM3 (ref 0–0.05)
IMM GRANULOCYTES NFR BLD AUTO: 0.3 % (ref 0–0.5)
IRON 24H UR-MRATE: 87 MCG/DL (ref 59–158)
IRON SATN MFR SERPL: 24 % (ref 20–50)
LYMPHOCYTES # BLD AUTO: 1.58 10*3/MM3 (ref 0.7–3.1)
LYMPHOCYTES # BLD AUTO: 1.8 10*3/MM3 (ref 0.7–3.1)
LYMPHOCYTES NFR BLD AUTO: 25.6 % (ref 19.6–45.3)
LYMPHOCYTES NFR BLD AUTO: 26.6 % (ref 19.6–45.3)
MCH RBC QN AUTO: 30.8 PG (ref 26.6–33)
MCH RBC QN AUTO: 30.8 PG (ref 26.6–33)
MCHC RBC AUTO-ENTMCNC: 32.8 G/DL (ref 31.5–35.7)
MCHC RBC AUTO-ENTMCNC: 33.2 G/DL (ref 31.5–35.7)
MCV RBC AUTO: 92.8 FL (ref 79–97)
MCV RBC AUTO: 93.7 FL (ref 79–97)
MONOCYTES # BLD AUTO: 0.3 10*3/MM3 (ref 0.1–0.9)
MONOCYTES # BLD AUTO: 0.45 10*3/MM3 (ref 0.1–0.9)
MONOCYTES NFR BLD AUTO: 4.3 % (ref 5–12)
MONOCYTES NFR BLD AUTO: 7.6 % (ref 5–12)
NEUTROPHILS # BLD AUTO: 3.62 10*3/MM3 (ref 1.7–7)
NEUTROPHILS # BLD AUTO: 4.9 10*3/MM3 (ref 1.7–7)
NEUTROPHILS NFR BLD AUTO: 61.1 % (ref 42.7–76)
NEUTROPHILS NFR BLD AUTO: 70.1 % (ref 42.7–76)
NRBC BLD AUTO-RTO: 0 /100 WBC (ref 0–0.2)
NT-PROBNP SERPL-MCNC: 64.3 PG/ML (ref 5–900)
PLATELET # BLD AUTO: 253 10*3/MM3 (ref 140–450)
PLATELET # BLD AUTO: 266 10*3/MM3 (ref 140–450)
PMV BLD AUTO: 10.1 FL (ref 6–12)
PMV BLD AUTO: 7.8 FL (ref 6–12)
POTASSIUM BLD-SCNC: 4 MMOL/L (ref 3.5–5.2)
POTASSIUM BLD-SCNC: 4.4 MMOL/L (ref 3.5–5.2)
PROT SERPL-MCNC: 6.5 G/DL (ref 6–8.5)
PROT SERPL-MCNC: 6.9 G/DL (ref 6–8.5)
RBC # BLD AUTO: 4.77 10*6/MM3 (ref 4.14–5.8)
RBC # BLD AUTO: 4.78 10*6/MM3 (ref 4.14–5.8)
SODIUM BLD-SCNC: 139 MMOL/L (ref 136–145)
SODIUM BLD-SCNC: 141 MMOL/L (ref 136–145)
TIBC SERPL-MCNC: 370 MCG/DL (ref 298–536)
TRANSFERRIN SERPL-MCNC: 248 MG/DL (ref 200–360)
TROPONIN T SERPL-MCNC: <0.01 NG/ML (ref 0–0.03)
TROPONIN T SERPL-MCNC: <0.01 NG/ML (ref 0–0.03)
TSH SERPL DL<=0.05 MIU/L-ACNC: 1.64 MIU/ML (ref 0.27–4.2)
WBC NRBC COR # BLD: 5.93 10*3/MM3 (ref 3.4–10.8)
WBC NRBC COR # BLD: 7 10*3/MM3 (ref 3.4–10.8)

## 2019-08-16 PROCEDURE — 36415 COLL VENOUS BLD VENIPUNCTURE: CPT | Performed by: INTERNAL MEDICINE

## 2019-08-16 PROCEDURE — 71046 X-RAY EXAM CHEST 2 VIEWS: CPT | Performed by: INTERNAL MEDICINE

## 2019-08-16 PROCEDURE — 93010 ELECTROCARDIOGRAM REPORT: CPT | Performed by: INTERNAL MEDICINE

## 2019-08-16 PROCEDURE — 85652 RBC SED RATE AUTOMATED: CPT | Performed by: INTERNAL MEDICINE

## 2019-08-16 PROCEDURE — 99214 OFFICE O/P EST MOD 30 MIN: CPT | Performed by: INTERNAL MEDICINE

## 2019-08-16 PROCEDURE — 93000 ELECTROCARDIOGRAM COMPLETE: CPT | Performed by: INTERNAL MEDICINE

## 2019-08-16 PROCEDURE — 83540 ASSAY OF IRON: CPT | Performed by: INTERNAL MEDICINE

## 2019-08-16 PROCEDURE — 82728 ASSAY OF FERRITIN: CPT | Performed by: INTERNAL MEDICINE

## 2019-08-16 PROCEDURE — 85025 COMPLETE CBC W/AUTO DIFF WBC: CPT | Performed by: INTERNAL MEDICINE

## 2019-08-16 PROCEDURE — 86431 RHEUMATOID FACTOR QUANT: CPT | Performed by: INTERNAL MEDICINE

## 2019-08-16 PROCEDURE — 86140 C-REACTIVE PROTEIN: CPT | Performed by: INTERNAL MEDICINE

## 2019-08-16 PROCEDURE — 36415 COLL VENOUS BLD VENIPUNCTURE: CPT

## 2019-08-16 PROCEDURE — 82533 TOTAL CORTISOL: CPT | Performed by: INTERNAL MEDICINE

## 2019-08-16 PROCEDURE — 80053 COMPREHEN METABOLIC PANEL: CPT | Performed by: INTERNAL MEDICINE

## 2019-08-16 PROCEDURE — 84466 ASSAY OF TRANSFERRIN: CPT | Performed by: INTERNAL MEDICINE

## 2019-08-16 PROCEDURE — 84484 ASSAY OF TROPONIN QUANT: CPT | Performed by: INTERNAL MEDICINE

## 2019-08-16 PROCEDURE — 83880 ASSAY OF NATRIURETIC PEPTIDE: CPT | Performed by: INTERNAL MEDICINE

## 2019-08-16 PROCEDURE — 85379 FIBRIN DEGRADATION QUANT: CPT | Performed by: INTERNAL MEDICINE

## 2019-08-16 PROCEDURE — 94760 N-INVAS EAR/PLS OXIMETRY 1: CPT

## 2019-08-16 PROCEDURE — 84443 ASSAY THYROID STIM HORMONE: CPT | Performed by: INTERNAL MEDICINE

## 2019-08-16 PROCEDURE — 93005 ELECTROCARDIOGRAM TRACING: CPT | Performed by: INTERNAL MEDICINE

## 2019-08-16 RX ORDER — NITROGLYCERIN 0.4 MG/1
0.4 TABLET SUBLINGUAL
Status: DISCONTINUED | OUTPATIENT
Start: 2019-08-16 | End: 2021-02-02

## 2019-08-16 RX ORDER — SODIUM CHLORIDE 0.9 % (FLUSH) 0.9 %
10 SYRINGE (ML) INJECTION AS NEEDED
Status: DISCONTINUED | OUTPATIENT
Start: 2019-08-16 | End: 2021-02-02

## 2019-08-16 NOTE — PROGRESS NOTES
[facility]      Patient Name: Niranjan Peacock  :1960  Age: 59 y.o.  Encounter Provider:  SIOMARA CPU      Chief Complaint:   Chief Complaint   Patient presents with   • Chest Pain     Patient c/o chest pain for one month. Pain is heaviness but sometimes stabbing pain in the center of chest.  Does not get worse with exertion. Positive for SOA and fatigue         HPI  Niranjan Peacock is a 59 y.o. male past medical history of coronary artery disease status post PCI to LAD in 2017, hyperlipidemia who presents for evaluation of chest pain and abnormal EKG.  For the past 2-1/2 months patient has been experiencing transient chest pain and fatigue.  For these reasons he underwent treadmill nuclear stress study in  which showed no evidence of perfusion defect but showed 1 mm horizontal ST depression in the inferolateral leads at peak stress.  Over the past 2 weeks he feels that the frequency and intensity of the chest pain is increased.  The pain that he describes as typical and atypical features.  It is similar to his previous presentation which resulted in drug-eluting stent to LAD.  He notes significant fatigue and has decreased exercise tolerance.  He denies palpitations dizziness or syncope.  He denies orthopnea PND or edema.  He is a non-smoker social alcohol use and denies illicit drug use.  Family history reviewed and noncontributory.  EKG at primary care office abnormal and he was sent to the [facility] for evaluation.      The following portions of the patient's history were reviewed and updated as appropriate: allergies, current medications, past family history, past medical history, past social history, past surgical history and problem list.      Review of Systems   Constitution: Positive for malaise/fatigue. Negative for chills and fever.   HENT: Negative for hoarse voice and sore throat.    Eyes: Negative for double vision and photophobia.   Cardiovascular: Positive for chest pain.  "Negative for leg swelling, near-syncope, orthopnea, palpitations, paroxysmal nocturnal dyspnea and syncope.   Respiratory: Negative for cough and wheezing.    Skin: Negative for poor wound healing and rash.   Musculoskeletal: Negative for arthritis and joint swelling.   Gastrointestinal: Negative for bloating, abdominal pain, hematemesis and hematochezia.   Neurological: Negative for dizziness and focal weakness.   Psychiatric/Behavioral: Negative for depression and suicidal ideas.       OBJECTIVE:   Vital Signs  Vitals:    08/16/19 1017   BP: 134/80   Pulse: 59   SpO2: 97%     Estimated body mass index is 26.96 kg/m² as calculated from the following:    Height as of this encounter: 188 cm (74\").    Weight as of this encounter: 95.3 kg (210 lb).    Physical Exam   Constitutional: He is oriented to person, place, and time. He appears well-developed and well-nourished.   HENT:   Head: Normocephalic and atraumatic.   Eyes: Conjunctivae are normal. Pupils are equal, round, and reactive to light.   Neck: No JVD present. No thyromegaly present.   Cardiovascular: Exam reveals no gallop and no friction rub.   No murmur heard.  Pulmonary/Chest: No respiratory distress. He exhibits no tenderness.   Abdominal: Bowel sounds are normal. He exhibits no distension.   Musculoskeletal: He exhibits no edema or tenderness.   Neurological: He is alert and oriented to person, place, and time.   Skin: No rash noted. No erythema.   Psychiatric: He has a normal mood and affect. Judgment normal.   Vitals reviewed.        ECG 12 Lead  Date/Time: 8/16/2019 2:45 PM  Performed by: Tiago Srivastava Jr., MD  Authorized by: Tiago Srivastava Jr., MD   Comparison: compared with previous ECG from 6/5/2019  Comparison to previous ECG: ST depression in inferior and lateral leads are less pronounced on current EKG  Rhythm: sinus rhythm  ST Depression: III and aVF  T inversion: III    Clinical impression: abnormal EKG            Cardiac Procedures:  1. Left " heart catheterization 2017 with drug-eluting stent to mid LAD  2. Nuclear perfusion study June 2019 with no perfusion defect but positive EKG changes        ASSESSMENT:      Diagnosis Plan   1. Angina at rest (CMS/HCC)  Case Request Cath Lab: Left Heart Cath   2. Chest pain, unspecified type  Cardiac Monitoring    Vital Signs - Once    Vital Signs - As Needed    Pulse Oximetry    Insert Peripheral IV    sodium chloride 0.9 % flush 10 mL    nitroglycerin (NITROSTAT) SL tablet 0.4 mg    NPO Diet    Bathroom Privileges With Assistance    CBC & Differential    Comprehensive Metabolic Panel    Troponin T    D-Dimer    proBNP    Cardiac Monitoring    Vital Signs - Once    Insert Peripheral IV    NPO Diet    Bathroom Privileges With Assistance    Troponin T    Troponin T    D-Dimer    D-Dimer    proBNP    proBNP    CBC Auto Differential    Troponin   3. Abnormal EKG  Cardiac Monitoring    Vital Signs - Once    Vital Signs - As Needed    Pulse Oximetry    Insert Peripheral IV    sodium chloride 0.9 % flush 10 mL    nitroglycerin (NITROSTAT) SL tablet 0.4 mg    NPO Diet    Bathroom Privileges With Assistance    CBC & Differential    Comprehensive Metabolic Panel    Troponin T    D-Dimer    proBNP    Cardiac Monitoring    Vital Signs - Once    Insert Peripheral IV    NPO Diet    Bathroom Privileges With Assistance    Troponin T    Troponin T    D-Dimer    D-Dimer    proBNP    proBNP    CBC Auto Differential    Case Request Cath Lab: Left Heart Cath   4. Shortness of breath  Cardiac Monitoring    Vital Signs - Once    Vital Signs - As Needed    Pulse Oximetry    Insert Peripheral IV    sodium chloride 0.9 % flush 10 mL    nitroglycerin (NITROSTAT) SL tablet 0.4 mg    NPO Diet    Bathroom Privileges With Assistance    CBC & Differential    Comprehensive Metabolic Panel    Troponin T    D-Dimer    proBNP    Cardiac Monitoring    Vital Signs - Once    Insert Peripheral IV    NPO Diet    Bathroom Privileges With Assistance     "Troponin T    Troponin T    D-Dimer    D-Dimer    proBNP    proBNP    CBC Auto Differential   5. Coronary artery disease involving native coronary artery of native heart without angina pectoris           PLAN OF CARE:     1. Chest pain -concerning for unstable angina.  Recent stress test with no imaging evidence for ischemia but positive EKG changes.  Given the recurrent nature of the pain we will perform a left heart catheterization.  Continue aspirin ACE inhibitor and statin.  We will add long-acting nitrate.  Resting bradycardia is noted cannot start beta-blocker.  One concern for the fatigue would be chronotropic incompetence however he was able to easily achieve target heart rate during treadmill study.  He was noted to have exercise-induced hypertension with systolic blood pressure nearing 200 mmHg.  I have asked him to check a twice daily but blood pressure log.  2. Hypertension -as above  3. Hyperlipidemia -continue statin    Follow-up with Dr. Mancilla as scheduled           Discharge Medications      Continue These Medications      Instructions Start Date   Needle (Disp) 25G X 5/8\" misc  Commonly known as:  BD DISP NEEDLES   1 mL, Does not apply, Every 30 Days         ASK your doctor about these medications      Instructions Start Date   aspirin 81 MG tablet   81 mg, Oral, Daily      atorvastatin 40 MG tablet  Commonly known as:  LIPITOR   TAKE ONE TABLET BY MOUTH DAILY      cyanocobalamin 1000 MCG/ML injection   INJECT 1ML INTRAMUSCULARY EVERY 30 DAYS AS DIRECTED      lisinopril 2.5 MG tablet  Commonly known as:  PRINIVIL,ZESTRIL   TAKE ONE TABLET BY MOUTH TWICE A DAY      MULTI VITAMIN DAILY PO   Oral, Daily      vitamin D3 5000 units capsule capsule   5,000 Units, Oral, Daily             Thank you for allowing me to participate in the care of your patient,      Sincerely,   Tiago Srivastava Jr, MD  [facility]  08/16/19  2:39 PM    **Abeba Disclaimer:**  Much of this encounter note is an " electronic transcription/translation of spoken language to printed text. The electronic translation of spoken language may permit erroneous, or at times, nonsensical words or phrases to be inadvertently transcribed. Although I have reviewed the note for such errors, some may still exist.

## 2019-08-16 NOTE — PROGRESS NOTES
Subjective   Niranjan Peacock is a 59 y.o. male.   Tired for a lng while additional nonexertional chest pain more so last week with known stent fatigue is what bothers patient's most minimal cough which has subsided  History of Present Illness   Fatigue over years duration history of mild anemia no overt etiology no personal history of thyroid but family history for same.  No known cortisol or other problems does have known CAD with cardiac stent having some nonexertional heavy sensation on his chest more so last week will occur intermittently had a work-up for chest pain in June with I believe a PET rubidium not showing any active problems.  He is also had minimal coughing which is subsided with small amount of curious thick piece of sputum versus other no reported temperature with this also arthralgias of all joints including hands and feet very short-lived morning stiffness only a few minutes resolved with stretching no family history for rheumatologic disorders.  Weights been stable recent colonoscopy with the last years time is not known to have sleep apnea be a loud snore we will await pending lab if all these tests are negative strong consider getting sleep lab evaluation.  Chest pain is dull heavy sensation but is not exertional related last several minutes at a time relieved with resting.  Drug allergies are albuterol caused anxiety.  Non-smoker.  Family history positive for heart disease hypertension thyroid as reference prostate cancer stroke.  Review of Systems   Constitutional: Positive for fatigue.   Musculoskeletal: Positive for myalgias.   All other systems reviewed and are negative.      Objective   Vitals:    08/16/19 0905   BP: 124/74   Pulse: 58   Temp: 98.5 °F (36.9 °C)   SpO2: 99%   Weight: 98.2 kg (216 lb 9.6 oz)     Physical Exam   Constitutional: He appears well-developed and well-nourished.   HENT:   Head: Normocephalic and atraumatic.   Eyes: Conjunctivae are normal. Pupils are equal,  round, and reactive to light. No scleral icterus.   Neck:   No carotid bruits left right carotid are 2+   Cardiovascular: Normal rate, regular rhythm and normal heart sounds.   Pulmonary/Chest: Effort normal and breath sounds normal.   Abdominal: Soft. Bowel sounds are normal.   Neurological: He is alert.   Unremarkable gait and station   Skin: Skin is warm and dry.   Nursing note and vitals reviewed.      Lab Results   Component Value Date    INR 1.06 12/15/2018    INR 0.93 08/17/2016         ECG 12 Lead  Date/Time: 8/16/2019 9:49 AM  Performed by: Socrates Prakash Jr., MD  Authorized by: Socrates Prakash Jr., MD   Comparison: compared with previous ECG   Comparison to previous ECG: Comparison EKG from 6/5/2019 prior EKG had T wave inversion in lead III but not the one today it has inversion V1 and lead III.  Sinus bradycardia with a rate of 52.  Which is a minor change as well.  Rhythm: sinus bradycardia  Rate: bradycardic  T inversion: III and V1  QRS axis: normal    Clinical impression: abnormal EKG  Comments: Shows sinus bradycardia with a rate of 52 T wave inversion in V1 and V3.  Rate 52 this reference.  PA interval is 176 MS, QRS duration 80 MS, QT to QT duration is 391 MS.  The T wave inversion was not present in comparison EKG of 6/5/2019 which was a sinus rhythm not a sinus bradycardia as well          Chest x-ray PA lateral obtained.  No comparisons available.  No active parenchymal infiltrate seen.  No bony or soft tissue normalities are noted.  Assessment/Plan   1.  Chest pain undefined plan to chest pain unit now copy of EKG    2.  Fatigue await pending lab further direction tests are negative consider sleep evaluation    3.  Anemia mild by history get updated values he does take B12 injections and will pursue mainly that artificially anemias    4.  Family history of thyroid disease getting TSH to the work-up    5.  Arthralgias undefined await rheumatologic studies    6.  Cough by history has  subsided chest x-ray is unremarkable observe for now.      Much of this encounter note is an electronic transcription/translation of spoken language to printed text.  The electronic translation of spoken language may permit erroneous, or at times, nonsensical words or phrases to be inadvertently transcribed.  Although I have reviewed the note for such errors, some may still exist. If there are questions or for further clarification, please contact me.

## 2019-08-16 NOTE — H&P (VIEW-ONLY)
[facility]      Patient Name: Niranjan Peacock  :1960  Age: 59 y.o.  Encounter Provider:  SIOMARA CPU      Chief Complaint:   Chief Complaint   Patient presents with   • Chest Pain     Patient c/o chest pain for one month. Pain is heaviness but sometimes stabbing pain in the center of chest.  Does not get worse with exertion. Positive for SOA and fatigue         HPI  Niranjan Peacock is a 59 y.o. male past medical history of coronary artery disease status post PCI to LAD in 2017, hyperlipidemia who presents for evaluation of chest pain and abnormal EKG.  For the past 2-1/2 months patient has been experiencing transient chest pain and fatigue.  For these reasons he underwent treadmill nuclear stress study in  which showed no evidence of perfusion defect but showed 1 mm horizontal ST depression in the inferolateral leads at peak stress.  Over the past 2 weeks he feels that the frequency and intensity of the chest pain is increased.  The pain that he describes as typical and atypical features.  It is similar to his previous presentation which resulted in drug-eluting stent to LAD.  He notes significant fatigue and has decreased exercise tolerance.  He denies palpitations dizziness or syncope.  He denies orthopnea PND or edema.  He is a non-smoker social alcohol use and denies illicit drug use.  Family history reviewed and noncontributory.  EKG at primary care office abnormal and he was sent to the [facility] for evaluation.      The following portions of the patient's history were reviewed and updated as appropriate: allergies, current medications, past family history, past medical history, past social history, past surgical history and problem list.      Review of Systems   Constitution: Positive for malaise/fatigue. Negative for chills and fever.   HENT: Negative for hoarse voice and sore throat.    Eyes: Negative for double vision and photophobia.   Cardiovascular: Positive for chest pain.  "Negative for leg swelling, near-syncope, orthopnea, palpitations, paroxysmal nocturnal dyspnea and syncope.   Respiratory: Negative for cough and wheezing.    Skin: Negative for poor wound healing and rash.   Musculoskeletal: Negative for arthritis and joint swelling.   Gastrointestinal: Negative for bloating, abdominal pain, hematemesis and hematochezia.   Neurological: Negative for dizziness and focal weakness.   Psychiatric/Behavioral: Negative for depression and suicidal ideas.       OBJECTIVE:   Vital Signs  Vitals:    08/16/19 1017   BP: 134/80   Pulse: 59   SpO2: 97%     Estimated body mass index is 26.96 kg/m² as calculated from the following:    Height as of this encounter: 188 cm (74\").    Weight as of this encounter: 95.3 kg (210 lb).    Physical Exam   Constitutional: He is oriented to person, place, and time. He appears well-developed and well-nourished.   HENT:   Head: Normocephalic and atraumatic.   Eyes: Conjunctivae are normal. Pupils are equal, round, and reactive to light.   Neck: No JVD present. No thyromegaly present.   Cardiovascular: Exam reveals no gallop and no friction rub.   No murmur heard.  Pulmonary/Chest: No respiratory distress. He exhibits no tenderness.   Abdominal: Bowel sounds are normal. He exhibits no distension.   Musculoskeletal: He exhibits no edema or tenderness.   Neurological: He is alert and oriented to person, place, and time.   Skin: No rash noted. No erythema.   Psychiatric: He has a normal mood and affect. Judgment normal.   Vitals reviewed.        ECG 12 Lead  Date/Time: 8/16/2019 2:45 PM  Performed by: Tiago Srivastava Jr., MD  Authorized by: Tiago Srivastava Jr., MD   Comparison: compared with previous ECG from 6/5/2019  Comparison to previous ECG: ST depression in inferior and lateral leads are less pronounced on current EKG  Rhythm: sinus rhythm  ST Depression: III and aVF  T inversion: III    Clinical impression: abnormal EKG            Cardiac Procedures:  1. Left " heart catheterization 2017 with drug-eluting stent to mid LAD  2. Nuclear perfusion study June 2019 with no perfusion defect but positive EKG changes        ASSESSMENT:      Diagnosis Plan   1. Angina at rest (CMS/HCC)  Case Request Cath Lab: Left Heart Cath   2. Chest pain, unspecified type  Cardiac Monitoring    Vital Signs - Once    Vital Signs - As Needed    Pulse Oximetry    Insert Peripheral IV    sodium chloride 0.9 % flush 10 mL    nitroglycerin (NITROSTAT) SL tablet 0.4 mg    NPO Diet    Bathroom Privileges With Assistance    CBC & Differential    Comprehensive Metabolic Panel    Troponin T    D-Dimer    proBNP    Cardiac Monitoring    Vital Signs - Once    Insert Peripheral IV    NPO Diet    Bathroom Privileges With Assistance    Troponin T    Troponin T    D-Dimer    D-Dimer    proBNP    proBNP    CBC Auto Differential    Troponin   3. Abnormal EKG  Cardiac Monitoring    Vital Signs - Once    Vital Signs - As Needed    Pulse Oximetry    Insert Peripheral IV    sodium chloride 0.9 % flush 10 mL    nitroglycerin (NITROSTAT) SL tablet 0.4 mg    NPO Diet    Bathroom Privileges With Assistance    CBC & Differential    Comprehensive Metabolic Panel    Troponin T    D-Dimer    proBNP    Cardiac Monitoring    Vital Signs - Once    Insert Peripheral IV    NPO Diet    Bathroom Privileges With Assistance    Troponin T    Troponin T    D-Dimer    D-Dimer    proBNP    proBNP    CBC Auto Differential    Case Request Cath Lab: Left Heart Cath   4. Shortness of breath  Cardiac Monitoring    Vital Signs - Once    Vital Signs - As Needed    Pulse Oximetry    Insert Peripheral IV    sodium chloride 0.9 % flush 10 mL    nitroglycerin (NITROSTAT) SL tablet 0.4 mg    NPO Diet    Bathroom Privileges With Assistance    CBC & Differential    Comprehensive Metabolic Panel    Troponin T    D-Dimer    proBNP    Cardiac Monitoring    Vital Signs - Once    Insert Peripheral IV    NPO Diet    Bathroom Privileges With Assistance     "Troponin T    Troponin T    D-Dimer    D-Dimer    proBNP    proBNP    CBC Auto Differential   5. Coronary artery disease involving native coronary artery of native heart without angina pectoris           PLAN OF CARE:     1. Chest pain -concerning for unstable angina.  Recent stress test with no imaging evidence for ischemia but positive EKG changes.  Given the recurrent nature of the pain we will perform a left heart catheterization.  Continue aspirin ACE inhibitor and statin.  We will add long-acting nitrate.  Resting bradycardia is noted cannot start beta-blocker.  One concern for the fatigue would be chronotropic incompetence however he was able to easily achieve target heart rate during treadmill study.  He was noted to have exercise-induced hypertension with systolic blood pressure nearing 200 mmHg.  I have asked him to check a twice daily but blood pressure log.  2. Hypertension -as above  3. Hyperlipidemia -continue statin    Follow-up with Dr. Mancilla as scheduled           Discharge Medications      Continue These Medications      Instructions Start Date   Needle (Disp) 25G X 5/8\" misc  Commonly known as:  BD DISP NEEDLES   1 mL, Does not apply, Every 30 Days         ASK your doctor about these medications      Instructions Start Date   aspirin 81 MG tablet   81 mg, Oral, Daily      atorvastatin 40 MG tablet  Commonly known as:  LIPITOR   TAKE ONE TABLET BY MOUTH DAILY      cyanocobalamin 1000 MCG/ML injection   INJECT 1ML INTRAMUSCULARY EVERY 30 DAYS AS DIRECTED      lisinopril 2.5 MG tablet  Commonly known as:  PRINIVIL,ZESTRIL   TAKE ONE TABLET BY MOUTH TWICE A DAY      MULTI VITAMIN DAILY PO   Oral, Daily      vitamin D3 5000 units capsule capsule   5,000 Units, Oral, Daily             Thank you for allowing me to participate in the care of your patient,      Sincerely,   Tiago Srivastava Jr, MD  [facility]  08/16/19  2:39 PM    **Abeba Disclaimer:**  Much of this encounter note is an " electronic transcription/translation of spoken language to printed text. The electronic translation of spoken language may permit erroneous, or at times, nonsensical words or phrases to be inadvertently transcribed. Although I have reviewed the note for such errors, some may still exist.

## 2019-08-19 LAB — ANA SER QL: NEGATIVE

## 2019-08-20 ENCOUNTER — HOSPITAL ENCOUNTER (OUTPATIENT)
Facility: HOSPITAL | Age: 59
Setting detail: HOSPITAL OUTPATIENT SURGERY
Discharge: HOME | End: 2019-08-20
Attending: INTERNAL MEDICINE | Admitting: INTERNAL MEDICINE

## 2019-08-20 VITALS
BODY MASS INDEX: 26.95 KG/M2 | WEIGHT: 210 LBS | TEMPERATURE: 98.7 F | OXYGEN SATURATION: 97 % | HEIGHT: 74 IN | HEART RATE: 54 BPM | RESPIRATION RATE: 16 BRPM | SYSTOLIC BLOOD PRESSURE: 150 MMHG | DIASTOLIC BLOOD PRESSURE: 85 MMHG

## 2019-08-20 DIAGNOSIS — I20.8 ANGINA AT REST (HCC): ICD-10-CM

## 2019-08-20 DIAGNOSIS — R94.31 ABNORMAL EKG: ICD-10-CM

## 2019-08-20 PROCEDURE — 93458 L HRT ARTERY/VENTRICLE ANGIO: CPT | Performed by: INTERNAL MEDICINE

## 2019-08-20 PROCEDURE — 99152 MOD SED SAME PHYS/QHP 5/>YRS: CPT | Performed by: INTERNAL MEDICINE

## 2019-08-20 PROCEDURE — 25010000002 FENTANYL CITRATE (PF) 100 MCG/2ML SOLUTION: Performed by: INTERNAL MEDICINE

## 2019-08-20 PROCEDURE — 0 IOPAMIDOL PER 1 ML: Performed by: INTERNAL MEDICINE

## 2019-08-20 PROCEDURE — 25010000002 MIDAZOLAM PER 1 MG: Performed by: INTERNAL MEDICINE

## 2019-08-20 PROCEDURE — 25010000002 HEPARIN (PORCINE) PER 1000 UNITS: Performed by: INTERNAL MEDICINE

## 2019-08-20 PROCEDURE — C1894 INTRO/SHEATH, NON-LASER: HCPCS | Performed by: INTERNAL MEDICINE

## 2019-08-20 PROCEDURE — C1769 GUIDE WIRE: HCPCS | Performed by: INTERNAL MEDICINE

## 2019-08-20 RX ORDER — SODIUM CHLORIDE 0.9 % (FLUSH) 0.9 %
3-10 SYRINGE (ML) INJECTION AS NEEDED
Status: DISCONTINUED | OUTPATIENT
Start: 2019-08-20 | End: 2019-08-20 | Stop reason: HOSPADM

## 2019-08-20 RX ORDER — MIDAZOLAM HYDROCHLORIDE 1 MG/ML
INJECTION INTRAMUSCULAR; INTRAVENOUS AS NEEDED
Status: DISCONTINUED | OUTPATIENT
Start: 2019-08-20 | End: 2019-08-20 | Stop reason: HOSPADM

## 2019-08-20 RX ORDER — SODIUM CHLORIDE 9 MG/ML
125 INJECTION, SOLUTION INTRAVENOUS CONTINUOUS
Status: DISCONTINUED | OUTPATIENT
Start: 2019-08-20 | End: 2019-08-20 | Stop reason: HOSPADM

## 2019-08-20 RX ORDER — SODIUM CHLORIDE 0.9 % (FLUSH) 0.9 %
3 SYRINGE (ML) INJECTION EVERY 12 HOURS SCHEDULED
Status: DISCONTINUED | OUTPATIENT
Start: 2019-08-20 | End: 2019-08-20 | Stop reason: HOSPADM

## 2019-08-20 RX ORDER — SODIUM CHLORIDE 9 MG/ML
100 INJECTION, SOLUTION INTRAVENOUS CONTINUOUS
Status: DISCONTINUED | OUTPATIENT
Start: 2019-08-20 | End: 2019-08-20 | Stop reason: HOSPADM

## 2019-08-20 RX ORDER — FENTANYL CITRATE 50 UG/ML
INJECTION, SOLUTION INTRAMUSCULAR; INTRAVENOUS AS NEEDED
Status: DISCONTINUED | OUTPATIENT
Start: 2019-08-20 | End: 2019-08-20 | Stop reason: HOSPADM

## 2019-08-20 RX ORDER — LIDOCAINE HYDROCHLORIDE 10 MG/ML
0.1 INJECTION, SOLUTION EPIDURAL; INFILTRATION; INTRACAUDAL; PERINEURAL ONCE AS NEEDED
Status: DISCONTINUED | OUTPATIENT
Start: 2019-08-20 | End: 2019-08-20 | Stop reason: HOSPADM

## 2019-08-20 RX ORDER — LIDOCAINE HYDROCHLORIDE 20 MG/ML
INJECTION, SOLUTION INFILTRATION; PERINEURAL AS NEEDED
Status: DISCONTINUED | OUTPATIENT
Start: 2019-08-20 | End: 2019-08-20 | Stop reason: HOSPADM

## 2019-08-20 RX ADMIN — SODIUM CHLORIDE 125 ML/HR: 9 INJECTION, SOLUTION INTRAVENOUS at 10:22

## 2019-09-01 RX ORDER — LISINOPRIL 2.5 MG/1
TABLET ORAL
Qty: 60 TABLET | Refills: 1 | Status: SHIPPED | OUTPATIENT
Start: 2019-09-01 | End: 2019-10-30 | Stop reason: SDUPTHER

## 2019-09-03 RX ORDER — ATORVASTATIN CALCIUM 40 MG/1
TABLET, FILM COATED ORAL
Qty: 30 TABLET | Refills: 5 | Status: SHIPPED | OUTPATIENT
Start: 2019-09-03 | End: 2020-03-03

## 2019-09-19 ENCOUNTER — OFFICE VISIT (OUTPATIENT)
Dept: CARDIOLOGY | Facility: CLINIC | Age: 59
End: 2019-09-19

## 2019-09-19 VITALS
DIASTOLIC BLOOD PRESSURE: 80 MMHG | HEIGHT: 74 IN | BODY MASS INDEX: 27.39 KG/M2 | HEART RATE: 63 BPM | SYSTOLIC BLOOD PRESSURE: 150 MMHG | WEIGHT: 213.4 LBS

## 2019-09-19 DIAGNOSIS — E78.2 MIXED HYPERLIPIDEMIA: ICD-10-CM

## 2019-09-19 DIAGNOSIS — I25.10 CORONARY ARTERY DISEASE INVOLVING NATIVE CORONARY ARTERY OF NATIVE HEART WITHOUT ANGINA PECTORIS: Primary | ICD-10-CM

## 2019-09-19 DIAGNOSIS — I10 ESSENTIAL HYPERTENSION: ICD-10-CM

## 2019-09-19 PROCEDURE — 93000 ELECTROCARDIOGRAM COMPLETE: CPT | Performed by: INTERNAL MEDICINE

## 2019-09-19 PROCEDURE — 99213 OFFICE O/P EST LOW 20 MIN: CPT | Performed by: INTERNAL MEDICINE

## 2019-09-19 NOTE — PROGRESS NOTES
Date of Office Visit: 19  Encounter Provider: Jason Mancilla MD  Place of Service: [facility] CARDIOLOGY  Patient Name: Niranjan Peacock  :1960  Referral Provider:No ref. provider found      Chief Complaint   Patient presents with   • Follow-up     History of Present Illness  Mr Peacock is a pleasant 60 yo gentleman presented in 2017 with chest pain worrisome for angina.  He had a stress test that was abnormal he then underwent cardiac catheterization which showed normal left ventricular systolic function.  Severe disease of the proximal mid left anterior descending fractional flow wire reserve was abnormal he then underwent stenting with a 3.25 mm right 20 mm drug-eluting stent.  He also has hyperlipidemia and hypertension.  He was placed on atorvastatin.  He was having some atypical muscle aches stopped that.   In 2019 he presented had a normal perfusion stress test.  He then presented to hospital 2019 ruled out for infarct.  But had cardiac catheterization that showed 40 to 50% stenosis of the distal left anterior descending 40% stenosis of the posterior lateral artery but no significant disease.  He comes in for follow-up.  At some chest pain may be 2 times a week but this typically occurs at the end of a long day when he sitting at rest it resolved spontaneously no exertional pain or pressure.  No shortness of breath, othopnea or PND. No palpitations, near syncope or syncope. No stroke type symptoms like paralysis, paresthesia, abrupt vision loss and dysarthria. No bleeding like blood in the stool or dark stools.  Overall he feels like it is probably not heart related.      Coronary Artery Disease   Pertinent negatives include no dizziness, muscle weakness or weight gain.         Past Medical History:   Diagnosis Date   • B12 deficiency anemia    • Chest pain    • Coronary artery disease involving native coronary artery of native heart without angina  pectoris 8/10/2017   • Deep vein thrombosis (CMS/HCC)    • Headache    • History of blood clots     blood clot found in right lung    • Multiple thyroid nodules    • Multiple thyroid nodules    • Pernicious anemia    • Pulmonary embolism (CMS/HCC)     2015   • Syncope     2 yrs ago one time         Past Surgical History:   Procedure Laterality Date   • CARDIAC CATHETERIZATION N/A 8/3/2017    Procedure: Coronary angiography;  Surgeon: Cynthia Farley MD;  Location:  [facility];  Service:    • CARDIAC CATHETERIZATION  8/3/2017    Procedure: Functional Flow [place];  Surgeon: Cynthia Farley MD;  Location:  [facility];  Service:    • CARDIAC CATHETERIZATION N/A 8/3/2017    Procedure: Stent YI coronary;  Surgeon: Cynthia Farley MD;  Location:  [facility];  Service:    • CARDIAC CATHETERIZATION N/A 8/3/2017    Procedure: Left ventriculography;  Surgeon: Cynthia Farley MD;  Location[facility];  Service:    • CARDIAC CATHETERIZATION N/A 8/3/2017    Procedure: Left Heart Cath;  Surgeon: Cynthia Farley MD;  Location: [facility] CATH [facility] LOC[facility];  Service:    • CARDIAC CATHETERIZATION N/A 8/20/2019    Procedure: Left Heart Cath;  Surgeon: Mulugeta Ac MD;  Location: [facility] CATH [facility] LOC[facility];  Service: Cardiology   • CARDIAC CATHETERIZATION N/A 8/20/2019    Procedure: Coronary angiography;  Surgeon: Mulugeta Ac MD;  Location: [facility] CATH [facility] LOC[facility];  Service: Cardiology   • CARDIAC CATHETERIZATION N/A 8/20/2019    Procedure: Left ventriculography;  Surgeon: Mulugeta Ac MD;  Location: [facility] CATH INVASIVE LOCATION;  Service: Cardiology   • COLONOSCOPY  MMVI    NORMAL.   • COLONOSCOPY     • FINE NEEDLE ASPIRATION  12/2015    Left thyroid nodule.  [place] category II.  Dr. Socrates Sanchez         Current Outpatient Medications on File Prior to Visit   Medication Sig Dispense Refill   • aspirin 81 MG tablet Take 1 tablet by  mouth Daily. 30 tablet 11   • atorvastatin (LIPITOR) 40 MG tablet TAKE ONE TABLET BY MOUTH DAILY 30 tablet 5   • Cholecalciferol (VITAMIN D3) 5000 units capsule capsule Take 5,000 Units by mouth Daily.     • cyanocobalamin 1000 MCG/ML injection INJECT 1ML INTRAMUSCULARY EVERY 30 DAYS AS DIRECTED 30 mL 2   • lisinopril (PRINIVIL,ZESTRIL) 2.5 MG tablet TAKE ONE TABLET BY MOUTH TWICE A DAY 60 tablet 1   • Multiple Vitamin (MULTI VITAMIN DAILY PO) Take  by mouth Daily.       Current Facility-Administered Medications on File Prior to Visit   Medication Dose Route Frequency Provider Last Rate Last Dose   • nitroglycerin (NITROSTAT) SL tablet 0.4 mg  0.4 mg Sublingual Q5 Min PRN Tiago Srivastava Jr., MD       • sodium chloride 0.9 % flush 10 mL  10 mL Intravenous PRN Tiago Srivastava Jr., MD             Social History     Socioeconomic History   • Marital status:      Spouse name: Not on file   • Number of children: Not on file   • Years of education: Not on file   • Highest education level: Not on file   Occupational History   • Occupation:    Tobacco Use   • Smoking status: Never Smoker   • Smokeless tobacco: Never Used   • Tobacco comment: caffeine use    Substance and Sexual Activity   • Alcohol use: Yes     Binge frequency: Monthly     Comment: occasional   • Drug use: No     Comment: Drinks 2-4 caffeinated beverages per day   • Sexual activity: Defer   Lifestyle   • Physical activity:     Days per week: Patient refused     Minutes per session: Patient refused   • Stress: Not on file   Social History Narrative    ** Merged History Encounter **            Family History   Problem Relation Age of Onset   • Heart disease Other    • Hypertension Other    • Thyroid disease Other    • Heart disease Father    • Prostate cancer Father    • Hypertension Father    • Stroke Father    • Heart disease Mother    • Hypertension Mother    • Heart disease Brother    • Hypertension Brother    • Thyroid disease Sister    •  "Heart disease Brother    • Hypertension Brother          Review of Systems   Constitution: Negative for decreased appetite, diaphoresis, fever, weakness, malaise/fatigue, weight gain and weight loss.   HENT: Positive for hearing loss. Negative for congestion, nosebleeds and tinnitus.    Eyes: Negative for blurred vision, double vision, vision loss in left eye, vision loss in right eye and visual disturbance.   Cardiovascular:        As noted in HPI   Respiratory:        As noted HPI   Endocrine: Negative for cold intolerance and heat intolerance.   Hematologic/Lymphatic: Negative for bleeding problem. Does not bruise/bleed easily.   Skin: Negative for color change, flushing, itching and rash.   Musculoskeletal: Positive for joint pain. Negative for arthritis, back pain, joint swelling, muscle weakness and myalgias.   Gastrointestinal: Negative for bloating, abdominal pain, constipation, diarrhea, dysphagia, heartburn, hematemesis, hematochezia, melena, nausea and vomiting.   Genitourinary: Positive for hesitancy. Negative for bladder incontinence, dysuria, frequency, nocturia and urgency.   Neurological: Negative for dizziness, focal weakness, headaches, light-headedness, loss of balance, numbness, paresthesias and vertigo.   Psychiatric/Behavioral: Negative for depression, memory loss and substance abuse.       Procedures      ECG 12 Lead  Date/Time: 9/19/2019 10:20 AM  Performed by: Jason Mancilla MD  Authorized by: Jason Mancilla MD   Comparison: compared with previous ECG   Similar to previous ECG  Rhythm: sinus rhythm  Rate: normal  QRS axis: normal                  Objective:    /80 (BP Location: Right arm)   Pulse 63   Ht 188 cm (74\")   Wt 96.8 kg (213 lb 6.4 oz)   BMI 27.40 kg/m²        Physical Exam  Physical Exam   Constitutional: He is oriented to person, place, and time. He appears well-developed and well-nourished. No distress.   HENT:   Head: Normocephalic.   Eyes: Conjunctivae are " normal. Pupils are equal, round, and reactive to light. No scleral icterus.   Neck: Normal carotid pulses, no hepatojugular reflux and no JVD present. Carotid bruit is not present. No tracheal deviation, no edema and no erythema present. No thyromegaly present.   Cardiovascular: Normal rate, regular rhythm, S1 normal, S2 normal, normal heart sounds and intact distal pulses.  No extrasystoles are present. PMI is not displaced. Exam reveals no gallop, no distant heart sounds and no friction rub.   No murmur heard.  Pulses:       Carotid pulses are 2+ on the right side, and 2+ on the left side.       Radial pulses are 2+ on the right side, and 2+ on the left side.        Femoral pulses are 2+ on the right side, and 2+ on the left side.       Dorsalis pedis pulses are 2+ on the right side, and 2+ on the left side.        Posterior tibial pulses are 2+ on the right side, and 2+ on the left side.   Pulmonary/Chest: Effort normal and breath sounds normal. No respiratory distress. He has no decreased breath sounds. He has no wheezes. He has no rhonchi. He has no rales. He exhibits no tenderness.   Abdominal: Soft. Bowel sounds are normal. He exhibits no distension and no mass. There is no hepatosplenomegaly. There is no tenderness. There is no rebound and no guarding.   Musculoskeletal: He exhibits no edema, tenderness or deformity.   Neurological: He is alert and oriented to person, place, and time.   Skin: Skin is warm and dry. No rash noted. He is not diaphoretic. No cyanosis or erythema. No pallor. Nails show no clubbing.   Psychiatric: He has a normal mood and affect. His speech is normal and behavior is normal. Judgment and thought content normal.           Assessment:   1.  57-year-old gentleman with history of severe coronary disease preserved left ventricular systolic function status post 3.25 mm x 20 mm drug-eluting stent placed left anterior descending in August 2017.  He has a dual antiplatelet score of 0 which  is low risk. Normal stress echocardiogram July 2018.  Normal perfusion stress test June 2019.  Cardiac catheterization August 2019 no evidence of restenosis, mild to moderate disease 40% to 50% stenosis of the distal left anterior descending and posterior lateral artery and normal LV function.  Coronary Artery Disease  Assessment  • The patient has no angina  • There is a new diagnosis of stable angina in the past 12 months  • The patient is having symptoms consistent with unstable angina      Plan  • Lifestyle modifications discussed include adhering to a heart healthy diet, avoidance of tobacco products, maintenance of a healthy weight, medication compliance, regular exercise and regular monitoring of cholesterol and blood pressure     Subjective - Objective  • There has been a previous stent procedure using YI  • Current antiplatelet therapy includes aspirin 81 mg and clopidogrel 75 mg  • The patient qualifies for cardiac rehabilitation, and has been referred to cardiac rehab  His pain is very atypical and I do not believe it is cardiac.  We did discuss over switching him to amlodipine from lisinopril to see if this helps he prefer to stay the same he will see our nurse practitioner in 6 months.  See me in a year and he will call if he has any problems.     2.  Hyperlipidemia on target dose atorvastatin.    Continue the same.  3.  Hypertension blood pressures this is been under good control.   continue the same.  4.  B12 deficiency.             Plan:

## 2019-10-02 NOTE — PERIOPERATIVE NURSING NOTE
CHIEF COMPLAINT:  Chief Complaint   Patient presents with   • Routine Foot Care     last visit Maggy Ely 6/10/19       SUBJECTIVE:     Sandra Bangura is a 67 year old female who presents to the clinic today complaining of long, thickened and painful toenails which are difficult to debride by the patient. Patient relates pain has been present for several months' duration. The patient denies any nausea, vomiting, fever, chills or calf pain.  No other significant pedal complaints are noted at this time.  The patient does relate that periodic debridements with a podiatric physician do help resolve symptoms. Patient is not diabetic.    PHYSICAL EXAMINATION:  INTEGUMENT:  Nails are long, thickened, discolored, dystrophic, and friable with subungual debris 1-5 bilaterally and painful on palpation bilaterally.  Skin is somewhat cool, mildly dry, and somewhat atrophic. No current open lesions or signs of infection. Pedal hair is absent. No calluses are present. No other significant integumentary findings.  Vascular: Dorsalis pedis 1/4; posterior tibial 1/4 bilaterally. No Edema present bilaterally. Capillary Filling Time = 3 seconds bilaterally  Neurologic: Intact to light touch and painful stimuli as well as [place] monofilament.  Musculoskeletal: Dorsally contracted digits 2-5 and mildly laterally deviated hallux bilaterally. Pain at all nails on palpation bilaterally.    ASSESSMENT:  1. Dermatophytosis of nail    2. Bilateral foot pain    3. Metatarsalgia of left foot        PLAN:  Discussed in depth, in detail with the patient conditions as well as treatment options.  All nails were debrided sharply and mechanically as medically necessary to a level safe and comfortable for the patient to include all necrotic nail down to the nailbed.  I did discuss proper foot care and hygiene with the patient.  Advised patient on use of emollients.  I did advise the patient to continue to monitor for signs of infection and  Cardiac Rehab nurse at bedside to speak with patient and family   return to the clinic in [place] in about 3 months (around 12/25/2019).    Russell Mcclure DPM

## 2019-10-30 RX ORDER — LISINOPRIL 2.5 MG/1
2.5 TABLET ORAL 2 TIMES DAILY
Qty: 60 TABLET | Refills: 2 | Status: SHIPPED | OUTPATIENT
Start: 2019-10-30 | End: 2020-01-19

## 2020-01-16 ENCOUNTER — OFFICE VISIT (OUTPATIENT)
Dept: CARDIOLOGY | Facility: CLINIC | Age: 60
End: 2020-01-16

## 2020-01-16 VITALS
HEART RATE: 60 BPM | DIASTOLIC BLOOD PRESSURE: 70 MMHG | BODY MASS INDEX: 28.06 KG/M2 | SYSTOLIC BLOOD PRESSURE: 130 MMHG | WEIGHT: 218.6 LBS | HEIGHT: 74 IN

## 2020-01-16 DIAGNOSIS — I10 ESSENTIAL HYPERTENSION: ICD-10-CM

## 2020-01-16 DIAGNOSIS — E78.2 MIXED HYPERLIPIDEMIA: ICD-10-CM

## 2020-01-16 DIAGNOSIS — Z86.711 HISTORY OF PULMONARY EMBOLISM: ICD-10-CM

## 2020-01-16 DIAGNOSIS — R09.89 RIGHT CAROTID BRUIT: ICD-10-CM

## 2020-01-16 DIAGNOSIS — I25.10 CORONARY ARTERY DISEASE INVOLVING NATIVE CORONARY ARTERY OF NATIVE HEART WITHOUT ANGINA PECTORIS: Primary | ICD-10-CM

## 2020-01-16 PROCEDURE — 99214 OFFICE O/P EST MOD 30 MIN: CPT | Performed by: INTERNAL MEDICINE

## 2020-01-16 PROCEDURE — 93000 ELECTROCARDIOGRAM COMPLETE: CPT | Performed by: INTERNAL MEDICINE

## 2020-01-16 NOTE — PROGRESS NOTES
Date of Office Visit: 20  Encounter Provider: Jason Mancilla MD  Place of Service: [facility] CARDIOLOGY  Patient Name: Niranjan Peacock  :1960  Referral Provider:No ref. provider found      Chief Complaint   Patient presents with   • Follow-up     History of Present Illness  Mr Peacock is a pleasant 58 yo gentleman presented in 2017 with chest pain worrisome for angina.  He had a stress test that was abnormal he then underwent cardiac catheterization which showed normal left ventricular systolic function.  Severe disease of the proximal mid left anterior descending fractional flow wire reserve was abnormal he then underwent stenting with a 3.25 mm right 20 mm drug-eluting stent.  He also has hyperlipidemia and hypertension.  He was placed on atorvastatin.  He was having some atypical muscle aches stopped that.   In 2019 he presented had a normal perfusion stress test.  He then presented to hospital 2019 ruled out for infarct.  But had cardiac catheterization that showed 40 to 50% stenosis of the distal left anterior descending 40% stenosis of the posterior lateral artery but no significant disease.  He comes in for follow-up.  He may get some occasional chest pain with mental stress, no exertional chest pain, pressure and heaviness. No shortness of breath, othopnea or PND. No palpitations, near syncope or syncope. No stroke type symptoms like paralysis, paresthesia, abrupt vision loss and dysarthria. No bleeding like blood in the stool or dark stools.  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    Coronary Artery Disease   Pertinent negatives include no dizziness, muscle weakness or weight gain.         Past Medical History:   Diagnosis Date   • B12 deficiency anemia    • Chest pain    • Coronary artery disease involving native coronary  artery of native heart without angina pectoris 8/10/2017   • Deep vein thrombosis (CMS/HCC)    • Headache    • History of blood clots     blood clot found in right lung    • Multiple thyroid nodules    • Multiple thyroid nodules    • Pernicious anemia    • Pulmonary embolism (CMS/HCC)     2015   • Syncope     2 yrs ago one time         Past Surgical History:   Procedure Laterality Date   • CARDIAC CATHETERIZATION N/A 8/3/2017    Procedure: Coronary angiography;  Surgeon: Cynthia Farley MD;  Location:  [facility]VASIVE [facility];  Service:    • CARDIAC CATHETERIZATION  8/3/2017    Procedure: Functional Flow [place];  Surgeon: Cynthia Farley MD;  Location:  [facility];  Service:    • CARDIAC CATHETERIZATION N/A 8/3/2017    Procedure: Stent YI coronary;  Surgeon: Cynthia Farley MD;  Location:  [facility];  Service:    • CARDIAC CATHETERIZATION N/A 8/3/2017    Procedure: Left ventriculography;  Surgeon: Cynthia Farley MD;  Location: [facility] CATH INVASIVE LOCATION;  Service:    • CARDIAC CATHETERIZATION N/A 8/3/2017    Procedure: Left Heart Cath;  Surgeon: Cynthia Farley MD;  Location: [facility] CATH INVASIVE LOCATION;  Service:    • CARDIAC CATHETERIZATION N/A 8/20/2019    Procedure: Left Heart Cath;  Surgeon: Mulugeta Ac MD;  Location: [facility] CATH INVASIVE LOCATION;  Service: Cardiology   • CARDIAC CATHETERIZATION N/A 8/20/2019    Procedure: Coronary angiography;  Surgeon: Mulugeta Ac MD;  Location: [facility] CATH INVASIVE LOCATION;  Service: Cardiology   • CARDIAC CATHETERIZATION N/A 8/20/2019    Procedure: Left ventriculography;  Surgeon: Mulugeta Ac MD;  Location: [facility] CATH INVASIVE LOCATION;  Service: Cardiology   • COLONOSCOPY  MMVI    NORMAL.   • COLONOSCOPY     • FINE NEEDLE ASPIRATION  12/2015    Left thyroid nodule.  [place] category II.  Dr. Socrates Sanchez         Current Outpatient Medications on File Prior to Visit   Medication Sig Dispense Refill   •  aspirin 81 MG tablet Take 1 tablet by mouth Daily. 30 tablet 11   • atorvastatin (LIPITOR) 40 MG tablet TAKE ONE TABLET BY MOUTH DAILY 30 tablet 5   • Cholecalciferol (VITAMIN D3) 5000 units capsule capsule Take 5,000 Units by mouth Daily.     • cyanocobalamin 1000 MCG/ML injection INJECT 1ML INTRAMUSCULARY EVERY 30 DAYS AS DIRECTED 30 mL 2   • lisinopril (PRINIVIL,ZESTRIL) 2.5 MG tablet Take 1 tablet by mouth 2 (Two) Times a Day. 60 tablet 2   • Multiple Vitamin (MULTI VITAMIN DAILY PO) Take  by mouth Daily.       Current Facility-Administered Medications on File Prior to Visit   Medication Dose Route Frequency Provider Last Rate Last Dose   • nitroglycerin (NITROSTAT) SL tablet 0.4 mg  0.4 mg Sublingual Q5 Min PRN Tiago Srivastava Jr., MD       • sodium chloride 0.9 % flush 10 mL  10 mL Intravenous PRN Tiago Srivastava Jr., MD             Social History     Socioeconomic History   • Marital status:      Spouse name: Not on file   • Number of children: Not on file   • Years of education: Not on file   • Highest education level: Not on file   Occupational History   • Occupation:    Tobacco Use   • Smoking status: Never Smoker   • Smokeless tobacco: Never Used   • Tobacco comment: caffeine use    Substance and Sexual Activity   • Alcohol use: Yes     Binge frequency: Monthly     Comment: occasional   • Drug use: No     Comment: Drinks 2-4 caffeinated beverages per day   • Sexual activity: Defer   Lifestyle   • Physical activity:     Days per week: 0 days     Minutes per session: Not on file   • Stress: Not on file   Social History Narrative    ** Merged History Encounter **            Family History   Problem Relation Age of Onset   • Heart disease Other    • Hypertension Other    • Thyroid disease Other    • Heart disease Father    • Prostate cancer Father    • Hypertension Father    • Stroke Father    • Heart disease Mother    • Hypertension Mother    • Heart disease Brother    • Hypertension Brother   "  • Thyroid disease Sister    • Heart disease Brother    • Hypertension Brother          Review of Systems   Constitution: Positive for malaise/fatigue. Negative for decreased appetite, diaphoresis, fever, weight gain and weight loss.   HENT: Negative for congestion, hearing loss, nosebleeds and tinnitus.    Eyes: Negative for blurred vision, double vision, vision loss in left eye, vision loss in right eye and visual disturbance.   Cardiovascular:        As noted in HPI   Respiratory:        As noted HPI   Endocrine: Negative for cold intolerance and heat intolerance.   Hematologic/Lymphatic: Negative for bleeding problem. Does not bruise/bleed easily.   Skin: Positive for itching. Negative for color change, flushing and rash.   Musculoskeletal: Positive for joint pain. Negative for arthritis, back pain, joint swelling, muscle weakness and myalgias.   Gastrointestinal: Negative for bloating, abdominal pain, constipation, diarrhea, dysphagia, heartburn, hematemesis, hematochezia, melena, nausea and vomiting.   Genitourinary: Negative for bladder incontinence, dysuria, frequency, nocturia and urgency.   Neurological: Negative for dizziness, focal weakness, headaches, light-headedness, loss of balance, numbness, paresthesias, vertigo and weakness.   Psychiatric/Behavioral: Negative for depression, memory loss and substance abuse.       Procedures      ECG 12 Lead  Date/Time: 1/16/2020 12:29 PM  Performed by: Jason Mancilla MD  Authorized by: Jason Mancilla MD   Comparison: compared with previous ECG   Similar to previous ECG  Rhythm: sinus rhythm  Rate: normal  QRS axis: normal                  Objective:    /70 (BP Location: Right arm)   Pulse 60   Ht 188 cm (74\")   Wt 99.2 kg (218 lb 9.6 oz)   BMI 28.07 kg/m²        Physical Exam  Physical Exam   Constitutional: He is oriented to person, place, and time. He appears well-developed and well-nourished. No distress.   HENT:   Head: Normocephalic. "   Eyes: Pupils are equal, round, and reactive to light. Conjunctivae are normal. No scleral icterus.   Neck: Normal carotid pulses, no hepatojugular reflux and no JVD present. Carotid bruit is not present. No tracheal deviation, no edema and no erythema present. No thyromegaly present.   Cardiovascular: Normal rate, regular rhythm, S1 normal, S2 normal, normal heart sounds and intact distal pulses.  No extrasystoles are present. PMI is not displaced. Exam reveals no gallop, no distant heart sounds and no friction rub.   No murmur heard.  Pulses:       Carotid pulses are 2+ on the right side with bruit, and 2+ on the left side.       Radial pulses are 2+ on the right side, and 2+ on the left side.        Femoral pulses are 2+ on the right side, and 2+ on the left side.       Dorsalis pedis pulses are 2+ on the right side, and 2+ on the left side.        Posterior tibial pulses are 2+ on the right side, and 2+ on the left side.   Pulmonary/Chest: Effort normal and breath sounds normal. No respiratory distress. He has no decreased breath sounds. He has no wheezes. He has no rhonchi. He has no rales. He exhibits no tenderness.   Abdominal: Soft. Bowel sounds are normal. He exhibits no distension and no mass. There is no hepatosplenomegaly. There is no tenderness. There is no rebound and no guarding.   Musculoskeletal: He exhibits no edema, tenderness or deformity.   Neurological: He is alert and oriented to person, place, and time.   Skin: Skin is warm and dry. No rash noted. He is not diaphoretic. No cyanosis or erythema. No pallor. Nails show no clubbing.   Psychiatric: He has a normal mood and affect. His speech is normal and behavior is normal. Judgment and thought content normal.           Assessment:   1.  59-year-old gentleman with history of severe coronary disease preserved left ventricular systolic function status post 3.25 mm x 20 mm drug-eluting stent placed left anterior descending in August 2017.  He has a  dual antiplatelet score of 0 which is low risk. Normal stress echocardiogram July 2018.  Normal perfusion stress test June 2019.  Cardiac catheterization August 2019 no evidence of restenosis, mild to moderate disease 40% to 50% stenosis of the distal left anterior descending and posterior lateral artery and normal LV function.  He seems to be doing very well.  He had no angina or heart failure.  He is to continue the same will see me in follow-up in a year.  2.  Hyperlipidemia on target dose atorvastatin.      LDL May 2019 was 59 at goal.  Continue the same.  3.  Hypertension blood pressures this is been under good control.   continue the same.  4.  B12 deficiency.      5.  Right carotid bruit I do not believe I have heard this in the past he will return for carotid ultrasound.         Plan:

## 2020-01-19 RX ORDER — LISINOPRIL 2.5 MG/1
TABLET ORAL
Qty: 60 TABLET | Refills: 1 | Status: SHIPPED | OUTPATIENT
Start: 2020-01-19 | End: 2020-01-28

## 2020-01-21 ENCOUNTER — TELEPHONE (OUTPATIENT)
Dept: CARDIOLOGY | Facility: CLINIC | Age: 60
End: 2020-01-21

## 2020-01-21 ENCOUNTER — HOSPITAL ENCOUNTER (OUTPATIENT)
Dept: CARDIOLOGY | Facility: HOSPITAL | Age: 60
Discharge: HOME | End: 2020-01-21
Admitting: INTERNAL MEDICINE

## 2020-01-21 LAB
BH CV XLRA MEAS LEFT DIST CCA EDV: 32.9 CM/SEC
BH CV XLRA MEAS LEFT DIST CCA PSV: 156.1 CM/SEC
BH CV XLRA MEAS LEFT DIST ICA EDV: -42.7 CM/SEC
BH CV XLRA MEAS LEFT DIST ICA PSV: -124.4 CM/SEC
BH CV XLRA MEAS LEFT ICA/CCA RATIO: 1
BH CV XLRA MEAS LEFT MID CCA EDV: 31.7 CM/SEC
BH CV XLRA MEAS LEFT MID CCA PSV: 154.9 CM/SEC
BH CV XLRA MEAS LEFT MID ICA EDV: -51.2 CM/SEC
BH CV XLRA MEAS LEFT MID ICA PSV: -159.8 CM/SEC
BH CV XLRA MEAS LEFT PROX CCA EDV: 32.9 CM/SEC
BH CV XLRA MEAS LEFT PROX CCA PSV: 140.2 CM/SEC
BH CV XLRA MEAS LEFT PROX ECA PSV: -178 CM/SEC
BH CV XLRA MEAS LEFT PROX ICA EDV: -40.2 CM/SEC
BH CV XLRA MEAS LEFT PROX ICA PSV: -139 CM/SEC
BH CV XLRA MEAS LEFT PROX SCLA PSV: 191 CM/SEC
BH CV XLRA MEAS LEFT VERTEBRAL A PSV: -69.5 CM/SEC
BH CV XLRA MEAS RIGHT DIST CCA EDV: 25.2 CM/SEC
BH CV XLRA MEAS RIGHT DIST CCA PSV: 99.5 CM/SEC
BH CV XLRA MEAS RIGHT DIST ICA EDV: -32.9 CM/SEC
BH CV XLRA MEAS RIGHT DIST ICA PSV: -107.4 CM/SEC
BH CV XLRA MEAS RIGHT ICA/CCA RATIO: 1.1
BH CV XLRA MEAS RIGHT MID CCA EDV: 20.3 CM/SEC
BH CV XLRA MEAS RIGHT MID CCA PSV: 105.8 CM/SEC
BH CV XLRA MEAS RIGHT MID ICA EDV: -29 CM/SEC
BH CV XLRA MEAS RIGHT MID ICA PSV: -95.6 CM/SEC
BH CV XLRA MEAS RIGHT PROX CCA EDV: 20.3 CM/SEC
BH CV XLRA MEAS RIGHT PROX CCA PSV: 123.3 CM/SEC
BH CV XLRA MEAS RIGHT PROX ECA PSV: -135.6 CM/SEC
BH CV XLRA MEAS RIGHT PROX ICA EDV: -24.3 CM/SEC
BH CV XLRA MEAS RIGHT PROX ICA PSV: -79.2 CM/SEC
BH CV XLRA MEAS RIGHT PROX SCLA PSV: 222.6 CM/SEC
BH CV XLRA MEAS RIGHT VERTEBRAL A PSV: -56.4 CM/SEC

## 2020-01-21 PROCEDURE — 93880 EXTRACRANIAL BILAT STUDY: CPT

## 2020-01-21 PROCEDURE — 93880 EXTRACRANIAL BILAT STUDY: CPT | Performed by: INTERNAL MEDICINE

## 2020-01-21 NOTE — TELEPHONE ENCOUNTER
Reading Physician Reading Date Result Priority   Jason Mancilla MD 1/21/2020 Routine      Result Text     · Right CCA Dist: Imaging indicates stenosis. < 50%.  · Right ICA Prox: Imaging indicates 1%-15% stenosis.  · Left CCA Dist: Imaging indicates stenosis <50%.  · Left ICA Prox: Imaging indicates 1-15% stenosis.

## 2020-01-28 RX ORDER — LISINOPRIL 2.5 MG/1
TABLET ORAL
Qty: 60 TABLET | Refills: 1 | Status: SHIPPED | OUTPATIENT
Start: 2020-01-28 | End: 2020-04-11

## 2020-02-17 ENCOUNTER — HOSPITAL ENCOUNTER (OUTPATIENT)
Dept: CARDIOLOGY | Facility: HOSPITAL | Age: 60
Discharge: HOME | End: 2020-02-17
Admitting: INTERNAL MEDICINE

## 2020-02-17 ENCOUNTER — HOSPITAL ENCOUNTER (OUTPATIENT)
Dept: CARDIOLOGY | Facility: HOSPITAL | Age: 60
Discharge: HOME | End: 2020-02-17

## 2020-02-17 ENCOUNTER — OFFICE VISIT (OUTPATIENT)
Dept: FAMILY MEDICINE CLINIC | Facility: CLINIC | Age: 60
End: 2020-02-17

## 2020-02-17 VITALS
OXYGEN SATURATION: 97 % | SYSTOLIC BLOOD PRESSURE: 141 MMHG | WEIGHT: 215 LBS | HEART RATE: 62 BPM | HEIGHT: 74 IN | DIASTOLIC BLOOD PRESSURE: 82 MMHG | BODY MASS INDEX: 27.59 KG/M2

## 2020-02-17 VITALS
WEIGHT: 218 LBS | HEIGHT: 74 IN | SYSTOLIC BLOOD PRESSURE: 132 MMHG | BODY MASS INDEX: 27.98 KG/M2 | HEART RATE: 58 BPM | TEMPERATURE: 98.1 F | DIASTOLIC BLOOD PRESSURE: 64 MMHG | OXYGEN SATURATION: 98 %

## 2020-02-17 DIAGNOSIS — I25.10 CORONARY ARTERY DISEASE INVOLVING NATIVE CORONARY ARTERY OF NATIVE HEART WITHOUT ANGINA PECTORIS: ICD-10-CM

## 2020-02-17 DIAGNOSIS — R07.9 CHEST PAIN, UNSPECIFIED TYPE: ICD-10-CM

## 2020-02-17 DIAGNOSIS — R06.02 SHORTNESS OF BREATH: ICD-10-CM

## 2020-02-17 DIAGNOSIS — R06.02 SOB (SHORTNESS OF BREATH) ON EXERTION: ICD-10-CM

## 2020-02-17 DIAGNOSIS — M79.672 PAIN OF LEFT HEEL: ICD-10-CM

## 2020-02-17 DIAGNOSIS — R07.9 CHEST PAIN, UNSPECIFIED TYPE: Primary | ICD-10-CM

## 2020-02-17 LAB
ALBUMIN SERPL-MCNC: 4.3 G/DL (ref 3.5–5.2)
ALBUMIN/GLOB SERPL: 2 G/DL
ALP SERPL-CCNC: 59 U/L (ref 39–117)
ALT SERPL W P-5'-P-CCNC: 22 U/L (ref 1–41)
ANION GAP SERPL CALCULATED.3IONS-SCNC: 10.1 MMOL/L (ref 5–15)
AORTIC ARCH: 2.3 CM
AORTIC ROOT ANNULUS: 2.2 CM
ASCENDING AORTA: 3.1 CM
AST SERPL-CCNC: 25 U/L (ref 1–40)
BASOPHILS # BLD AUTO: 0.03 10*3/MM3 (ref 0–0.2)
BASOPHILS NFR BLD AUTO: 0.6 % (ref 0–1.5)
BH CV ECHO MEAS - ACS: 2.3 CM
BH CV ECHO MEAS - AO MAX PG (FULL): 1 MMHG
BH CV ECHO MEAS - AO MAX PG: 4.8 MMHG
BH CV ECHO MEAS - AO MEAN PG (FULL): 1 MMHG
BH CV ECHO MEAS - AO MEAN PG: 3 MMHG
BH CV ECHO MEAS - AO V2 MAX: 109 CM/SEC
BH CV ECHO MEAS - AO V2 MEAN: 74.5 CM/SEC
BH CV ECHO MEAS - AO V2 VTI: 26.2 CM
BH CV ECHO MEAS - ASC AORTA: 3.1 CM
BH CV ECHO MEAS - AVA(I,A): 2.9 CM^2
BH CV ECHO MEAS - AVA(I,D): 2.9 CM^2
BH CV ECHO MEAS - AVA(V,A): 2.8 CM^2
BH CV ECHO MEAS - AVA(V,D): 2.8 CM^2
BH CV ECHO MEAS - BSA(HAYCOCK): 2.3 M^2
BH CV ECHO MEAS - BSA: 2.2 M^2
BH CV ECHO MEAS - BZI_BMI: 27.6 KILOGRAMS/M^2
BH CV ECHO MEAS - BZI_METRIC_HEIGHT: 188 CM
BH CV ECHO MEAS - BZI_METRIC_WEIGHT: 97.5 KG
BH CV ECHO MEAS - EDV(MOD-SP2): 75 ML
BH CV ECHO MEAS - EDV(MOD-SP4): 94 ML
BH CV ECHO MEAS - EDV(TEICH): 78.6 ML
BH CV ECHO MEAS - EF(CUBED): 83.6 %
BH CV ECHO MEAS - EF(MOD-BP): 61 %
BH CV ECHO MEAS - EF(MOD-SP2): 60 %
BH CV ECHO MEAS - EF(MOD-SP4): 61.7 %
BH CV ECHO MEAS - EF(TEICH): 76.9 %
BH CV ECHO MEAS - ESV(MOD-SP2): 30 ML
BH CV ECHO MEAS - ESV(MOD-SP4): 36 ML
BH CV ECHO MEAS - ESV(TEICH): 18.1 ML
BH CV ECHO MEAS - FS: 45.2 %
BH CV ECHO MEAS - IVS/LVPW: 1.2
BH CV ECHO MEAS - IVSD: 1.2 CM
BH CV ECHO MEAS - LAT PEAK E' VEL: 12 CM/SEC
BH CV ECHO MEAS - LV DIASTOLIC VOL/BSA (35-75): 41.9 ML/M^2
BH CV ECHO MEAS - LV MASS(C)D: 157.1 GRAMS
BH CV ECHO MEAS - LV MASS(C)DI: 70.1 GRAMS/M^2
BH CV ECHO MEAS - LV MAX PG: 3.7 MMHG
BH CV ECHO MEAS - LV MEAN PG: 2 MMHG
BH CV ECHO MEAS - LV SYSTOLIC VOL/BSA (12-30): 16.1 ML/M^2
BH CV ECHO MEAS - LV V1 MAX: 96.6 CM/SEC
BH CV ECHO MEAS - LV V1 MEAN: 69.8 CM/SEC
BH CV ECHO MEAS - LV V1 VTI: 24 CM
BH CV ECHO MEAS - LVIDD: 4.2 CM
BH CV ECHO MEAS - LVIDS: 2.3 CM
BH CV ECHO MEAS - LVLD AP2: 9 CM
BH CV ECHO MEAS - LVLD AP4: 8.9 CM
BH CV ECHO MEAS - LVLS AP2: 7.9 CM
BH CV ECHO MEAS - LVLS AP4: 7.4 CM
BH CV ECHO MEAS - LVOT AREA (M): 3.1 CM^2
BH CV ECHO MEAS - LVOT AREA: 3.1 CM^2
BH CV ECHO MEAS - LVOT DIAM: 2 CM
BH CV ECHO MEAS - LVPWD: 1 CM
BH CV ECHO MEAS - MED PEAK E' VEL: 9 CM/SEC
BH CV ECHO MEAS - MR MAX PG: 9.1 MMHG
BH CV ECHO MEAS - MR MAX VEL: 151 CM/SEC
BH CV ECHO MEAS - MV A DUR: 0.12 SEC
BH CV ECHO MEAS - MV A MAX VEL: 73.7 CM/SEC
BH CV ECHO MEAS - MV DEC SLOPE: 539 CM/SEC^2
BH CV ECHO MEAS - MV DEC TIME: 0.17 SEC
BH CV ECHO MEAS - MV E MAX VEL: 92.5 CM/SEC
BH CV ECHO MEAS - MV E/A: 1.3
BH CV ECHO MEAS - MV MAX PG: 3.3 MMHG
BH CV ECHO MEAS - MV MEAN PG: 1 MMHG
BH CV ECHO MEAS - MV P1/2T MAX VEL: 91.3 CM/SEC
BH CV ECHO MEAS - MV P1/2T: 49.6 MSEC
BH CV ECHO MEAS - MV V2 MAX: 91.2 CM/SEC
BH CV ECHO MEAS - MV V2 MEAN: 45.6 CM/SEC
BH CV ECHO MEAS - MV V2 VTI: 33.1 CM
BH CV ECHO MEAS - MVA P1/2T LCG: 2.4 CM^2
BH CV ECHO MEAS - MVA(P1/2T): 4.4 CM^2
BH CV ECHO MEAS - MVA(VTI): 2.3 CM^2
BH CV ECHO MEAS - PA ACC TIME: 0.18 SEC
BH CV ECHO MEAS - PA MAX PG (FULL): 2.6 MMHG
BH CV ECHO MEAS - PA MAX PG: 4.2 MMHG
BH CV ECHO MEAS - PA PR(ACCEL): -0.2 MMHG
BH CV ECHO MEAS - PA V2 MAX: 103 CM/SEC
BH CV ECHO MEAS - PULM A REVS DUR: 0.13 SEC
BH CV ECHO MEAS - PULM A REVS VEL: 35.7 CM/SEC
BH CV ECHO MEAS - PULM DIAS VEL: 49.5 CM/SEC
BH CV ECHO MEAS - PULM S/D: 1.2
BH CV ECHO MEAS - PULM SYS VEL: 57.6 CM/SEC
BH CV ECHO MEAS - PVA(V,A): 1.6 CM^2
BH CV ECHO MEAS - PVA(V,D): 1.6 CM^2
BH CV ECHO MEAS - QP/QS: 0.5
BH CV ECHO MEAS - RAP SYSTOLE: 3 MMHG
BH CV ECHO MEAS - RV MAX PG: 1.6 MMHG
BH CV ECHO MEAS - RV MEAN PG: 1 MMHG
BH CV ECHO MEAS - RV V1 MAX: 63.3 CM/SEC
BH CV ECHO MEAS - RV V1 MEAN: 43.5 CM/SEC
BH CV ECHO MEAS - RV V1 VTI: 14.8 CM
BH CV ECHO MEAS - RVOT AREA: 2.5 CM^2
BH CV ECHO MEAS - RVOT DIAM: 1.8 CM
BH CV ECHO MEAS - SI(CUBED): 27.6 ML/M^2
BH CV ECHO MEAS - SI(LVOT): 33.6 ML/M^2
BH CV ECHO MEAS - SI(MOD-SP2): 20.1 ML/M^2
BH CV ECHO MEAS - SI(MOD-SP4): 25.9 ML/M^2
BH CV ECHO MEAS - SI(TEICH): 27 ML/M^2
BH CV ECHO MEAS - SUP REN AO DIAM: 2.2 CM
BH CV ECHO MEAS - SV(CUBED): 61.9 ML
BH CV ECHO MEAS - SV(LVOT): 75.4 ML
BH CV ECHO MEAS - SV(MOD-SP2): 45 ML
BH CV ECHO MEAS - SV(MOD-SP4): 58 ML
BH CV ECHO MEAS - SV(RVOT): 37.7 ML
BH CV ECHO MEAS - SV(TEICH): 60.5 ML
BH CV ECHO MEAS - TAPSE (>1.6): 2.3 CM2
BH CV ECHO MEASUREMENTS AVERAGE E/E' RATIO: 8.81
BH CV XLRA - RV BASE: 2.7 CM
BH CV XLRA - RV LENGTH: 6.8 CM
BH CV XLRA - RV MID: 2.1 CM
BH CV XLRA - TDI S': 15 CM/SEC
BILIRUB SERPL-MCNC: 0.8 MG/DL (ref 0.2–1.2)
BUN BLD-MCNC: 13 MG/DL (ref 6–20)
BUN/CREAT SERPL: 14.3 (ref 7–25)
CALCIUM SPEC-SCNC: 8.9 MG/DL (ref 8.6–10.5)
CHLORIDE SERPL-SCNC: 101 MMOL/L (ref 98–107)
CO2 SERPL-SCNC: 26.9 MMOL/L (ref 22–29)
CREAT BLD-MCNC: 0.91 MG/DL (ref 0.76–1.27)
D DIMER PPP FEU-MCNC: <0.27 MCGFEU/ML (ref 0–0.49)
DEPRECATED RDW RBC AUTO: 41 FL (ref 37–54)
EOSINOPHIL # BLD AUTO: 0.1 10*3/MM3 (ref 0–0.4)
EOSINOPHIL NFR BLD AUTO: 2.1 % (ref 0.3–6.2)
ERYTHROCYTE [DISTWIDTH] IN BLOOD BY AUTOMATED COUNT: 12.3 % (ref 12.3–15.4)
GFR SERPL CREATININE-BSD FRML MDRD: 85 ML/MIN/1.73
GLOBULIN UR ELPH-MCNC: 2.2 GM/DL
GLUCOSE BLD-MCNC: 102 MG/DL (ref 65–99)
HCT VFR BLD AUTO: 42.2 % (ref 37.5–51)
HGB BLD-MCNC: 14.4 G/DL (ref 13–17.7)
IMM GRANULOCYTES # BLD AUTO: 0.01 10*3/MM3 (ref 0–0.05)
IMM GRANULOCYTES NFR BLD AUTO: 0.2 % (ref 0–0.5)
LEFT ATRIUM VOLUME INDEX: 25 ML/M2
LYMPHOCYTES # BLD AUTO: 1.38 10*3/MM3 (ref 0.7–3.1)
LYMPHOCYTES NFR BLD AUTO: 28.9 % (ref 19.6–45.3)
MAXIMAL PREDICTED HEART RATE: 161 BPM
MCH RBC QN AUTO: 31.4 PG (ref 26.6–33)
MCHC RBC AUTO-ENTMCNC: 34.1 G/DL (ref 31.5–35.7)
MCV RBC AUTO: 91.9 FL (ref 79–97)
MONOCYTES # BLD AUTO: 0.42 10*3/MM3 (ref 0.1–0.9)
MONOCYTES NFR BLD AUTO: 8.8 % (ref 5–12)
NEUTROPHILS # BLD AUTO: 2.83 10*3/MM3 (ref 1.7–7)
NEUTROPHILS NFR BLD AUTO: 59.4 % (ref 42.7–76)
NRBC BLD AUTO-RTO: 0 /100 WBC (ref 0–0.2)
NT-PROBNP SERPL-MCNC: 64.6 PG/ML (ref 5–900)
PLATELET # BLD AUTO: 233 10*3/MM3 (ref 140–450)
PMV BLD AUTO: 9.7 FL (ref 6–12)
POTASSIUM BLD-SCNC: 3.9 MMOL/L (ref 3.5–5.2)
PROT SERPL-MCNC: 6.5 G/DL (ref 6–8.5)
RBC # BLD AUTO: 4.59 10*6/MM3 (ref 4.14–5.8)
SINUS: 3.3 CM
SODIUM BLD-SCNC: 138 MMOL/L (ref 136–145)
STJ: 2.9 CM
STRESS TARGET HR: 137 BPM
TROPONIN T SERPL-MCNC: <0.01 NG/ML (ref 0–0.03)
WBC NRBC COR # BLD: 4.77 10*3/MM3 (ref 3.4–10.8)

## 2020-02-17 PROCEDURE — 93000 ELECTROCARDIOGRAM COMPLETE: CPT | Performed by: NURSE PRACTITIONER

## 2020-02-17 PROCEDURE — 85025 COMPLETE CBC W/AUTO DIFF WBC: CPT | Performed by: INTERNAL MEDICINE

## 2020-02-17 PROCEDURE — 94760 N-INVAS EAR/PLS OXIMETRY 1: CPT

## 2020-02-17 PROCEDURE — 93306 TTE W/DOPPLER COMPLETE: CPT | Performed by: INTERNAL MEDICINE

## 2020-02-17 PROCEDURE — 99213 OFFICE O/P EST LOW 20 MIN: CPT | Performed by: NURSE PRACTITIONER

## 2020-02-17 PROCEDURE — 80053 COMPREHEN METABOLIC PANEL: CPT | Performed by: INTERNAL MEDICINE

## 2020-02-17 PROCEDURE — 93306 TTE W/DOPPLER COMPLETE: CPT

## 2020-02-17 PROCEDURE — 84484 ASSAY OF TROPONIN QUANT: CPT | Performed by: INTERNAL MEDICINE

## 2020-02-17 PROCEDURE — 83880 ASSAY OF NATRIURETIC PEPTIDE: CPT | Performed by: INTERNAL MEDICINE

## 2020-02-17 PROCEDURE — 36415 COLL VENOUS BLD VENIPUNCTURE: CPT

## 2020-02-17 PROCEDURE — 85379 FIBRIN DEGRADATION QUANT: CPT | Performed by: INTERNAL MEDICINE

## 2020-02-17 RX ORDER — NITROGLYCERIN 0.4 MG/1
0.4 TABLET SUBLINGUAL
Status: DISCONTINUED | OUTPATIENT
Start: 2020-02-17 | End: 2020-12-25 | Stop reason: HOSPADM

## 2020-02-17 RX ORDER — SODIUM CHLORIDE 0.9 % (FLUSH) 0.9 %
10 SYRINGE (ML) INJECTION AS NEEDED
Status: DISCONTINUED | OUTPATIENT
Start: 2020-02-17 | End: 2021-02-02

## 2020-02-17 NOTE — PATIENT INSTRUCTIONS
ekg today,   Patient sent to [facility] chest pain unit at this time for further eval, deferred ambulance, advised to go straight to office at this time.   He will try ice, stretches, tylenol OTC, shoe inserts and good shoes, if symptoms persist call office will refer to ortho.  If any worsening symptoms, chest pain SOA advised ER.   Increase fluid intake, get plenty of rest.   Patient agrees with plan of care and understands instructions. Call if worsening symptoms or any problems or concerns.

## 2020-02-17 NOTE — PROGRESS NOTES
Procedure     ECG 12 Lead  Date/Time: 2/17/2020 11:46 AM  Performed by: Luiza Healy APRN  Authorized by: Luiza Healy APRN   Comparison: compared with previous ECG from 9/19/2019  Comparison to previous ECG: Sinus rhythm   Rhythm: sinus bradycardia  Rate: bradycardic  T inversion: III  QRS axis: normal    Clinical impression: non-specific ECG  Comments: Patient sent to chest pain unit.

## 2020-02-17 NOTE — PROGRESS NOTES
Subjective   Niranjan Peacock is a 60 y.o. male.     History of Present Illness   C/o fatigue, SOA, hx of CAD, sees cardiology Dr. Mancilla, hx of stent, last visit 1/16/2020, he has chest pressure, heaviness, he states started about 1 week ago, he feels palpitations, denies dizziness, does have HA at times, he does not check BP at home, denies LE edema, denies cough. Taking baby asa 81mg daily, also taking lipitor 40mg daily, also taking lisinopril 2.5mg daily. He does have chest heaviness currently. Denies smoking, does have family hx of heart disease. States SOB with exertion and rest. 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   Also c/o left heel pain, states sore to touch, trouble bending foot in am, denies any injury, started about 1 week ago. He did not try anything for this at home.       The following portions of the patient's history were reviewed and updated as appropriate: allergies, current medications, past family history, past medical history, past social history, past surgical history and problem list.    Review of Systems   Constitutional: Positive for fatigue. Negative for chills, diaphoresis and fever.   Respiratory: Positive for shortness of breath. Negative for cough and wheezing.    Cardiovascular: Positive for chest pain and palpitations. Negative for leg swelling.   Musculoskeletal: Positive for arthralgias. Negative for myalgias.   Neurological: Negative for dizziness, light-headedness and headache.   All other systems reviewed and are negative.      Objective   Physical Exam   Constitutional: He is oriented to person, place, and time. He appears well-developed and well-nourished.   HENT:   Head: Normocephalic.   Eyes: Pupils are equal, round, and reactive to light.   Neck: Normal range of motion.   Cardiovascular: Normal rate, regular rhythm and normal heart sounds.   Pulses:       Radial pulses are 2+ on the right side, and 2+ on  the left side.        Dorsalis pedis pulses are 2+ on the right side, and 2+ on the left side.        Posterior tibial pulses are 2+ on the right side, and 2+ on the left side.   Pulmonary/Chest: Effort normal and breath sounds normal.   Musculoskeletal: Normal range of motion.        Left foot: There is tenderness. There is normal range of motion, no bony tenderness, no swelling and normal capillary refill.   Neurological: He is alert and oriented to person, place, and time.   Skin: Skin is warm and dry.   Psychiatric: He has a normal mood and affect. His behavior is normal.   Nursing note and vitals reviewed.        Assessment/Plan   Niranjan was seen today for fatigue, shortness of breath and foot problem.    Diagnoses and all orders for this visit:    Chest pain, unspecified type  -     ECG 12 Lead    SOB (shortness of breath) on exertion  -     ECG 12 Lead    Coronary artery disease involving native coronary artery of native heart without angina pectoris  -     ECG 12 Lead    Pain of left heel        ekg today,   Patient sent to [facility] chest pain unit at this time for further eval, deferred ambulance, advised to go straight to office at this time.   He will try ice, stretches, tylenol OTC, shoe inserts and good shoes, if symptoms persist call office will refer to ortho.  If any worsening symptoms, chest pain SOA advised ER.   Increase fluid intake, get plenty of rest.   Patient agrees with plan of care and understands instructions. Call if worsening symptoms or any problems or concerns.

## 2020-02-17 NOTE — PROGRESS NOTES
Date of Hospital Visit:20  Encounter Provider: Jason Mancilla MD  Place of Service: [facility] CARDIOLOGY  Patient Name: Niranjan Peacock  :1960  Referral Provider: Luiza Healy APRN    Chief complaint: CP    History of Present Illness:  Mr Peacock is a pleasant 59 yo gentleman presented in 2017 with chest pain worrisome for angina.  He had a stress test that was abnormal he then underwent cardiac catheterization which showed normal left ventricular systolic function.  Severe disease of the proximal mid left anterior descending fractional flow wire reserve was abnormal he then underwent stenting with a 3.25 mm right 20 mm drug-eluting stent.  He also has hyperlipidemia and hypertension.  He was placed on atorvastatin.  He was having some atypical muscle aches stopped that.   In 2019 he presented had a normal perfusion stress test.  He then presented to hospital 2019 ruled out for infarct.  But had cardiac catheterization that showed 40 to 50% stenosis of the distal left anterior descending 40% stenosis of the posterior lateral artery but no significant disease.  He now comes to our chest pain unit with 3 days of constant chest pain.  Sort of an achy nests.  It is actually better if he is up moving around, takes his mind off of it.  No associated shortness of breath, nausea vomiting or diaphoresis.  No fever chills or sweats.  No palpitations, near-syncope or syncope.  He has been under a lot of stress there is been a lot going on he feels like he tends to notice it more with that.        Past Medical History:   Diagnosis Date   • B12 deficiency anemia    • Chest pain    • Coronary artery disease involving native coronary artery of native heart without angina pectoris 8/10/2017   • Deep vein thrombosis (CMS/HCC)    • Headache    • History of blood clots     blood clot found in right lung    • Multiple thyroid nodules    • Multiple thyroid nodules    •  Pernicious anemia    • Pulmonary embolism (CMS/HCC)     2015   • Syncope     2 yrs ago one time       Past Surgical History:   Procedure Laterality Date   • CARDIAC CATHETERIZATION N/A 8/3/2017    Procedure: Coronary angiography;  Surgeon: Cynthia Farley MD;  Location:  [facility] C[facility] INVASIVE [facility];  Service:    • CARDIAC CATHETERIZATION  8/3/2017    Procedure: Functional Flow [place];  Surgeon: Cynthia Farley MD;  [facility];  Service:    • CARDIAC CATHETERIZATION N/A 8/3/2017    Procedure: Stent YI coronary;  Surgeon: Cynthia Farley MD;  Location:  [facility] INVASIVE [facility];  Service:    • CARDIAC CATHETERIZATION N/A 8/3/2017    Procedure: Left ventriculography;  Surgeon: Cynthia Farley MD;  Location: [facility] CATH INV[facility] LOCATION;  Service:    • CARDIAC CATHETERIZATION N/A 8/3/2017    Procedure: Left Heart Cath;  Surgeon: Cynthia Farley MD;  Location: [facility] CATH INVASIVE LOCATION;  Service:    • CARDIAC CATHETERIZATION N/A 8/20/2019    Procedure: Left Heart Cath;  Surgeon: Mulugeta Ac MD;  Location: [facility] CATH INVASIVE LOCATION;  Service: Cardiology   • CARDIAC CATHETERIZATION N/A 8/20/2019    Procedure: Coronary angiography;  Surgeon: Mulugeta Ac MD;  Location: [facility] CATH INVASIVE LOCATION;  Service: Cardiology   • CARDIAC CATHETERIZATION N/A 8/20/2019    Procedure: Left ventriculography;  Surgeon: Mulugeta Ac MD;  Location: [facility] CATH INVASIVE LOCATION;  Service: Cardiology   • COLONOSCOPY  MMVI    NORMAL.   • COLONOSCOPY     • FINE NEEDLE ASPIRATION  12/2015    Left thyroid nodule.  [place] category II.  Dr. Socrates Sanchez       Current Outpatient Medications on File Prior to Encounter   Medication Sig Dispense Refill   • aspirin 81 MG tablet Take 1 tablet by mouth Daily. 30 tablet 11   • atorvastatin (LIPITOR) 40 MG tablet TAKE ONE TABLET BY MOUTH DAILY 30 tablet 5   • Cholecalciferol (VITAMIN D3) 5000 units capsule capsule Take 5,000 Units by mouth  Daily.     • cyanocobalamin 1000 MCG/ML injection INJECT 1ML INTRAMUSCULARY EVERY 30 DAYS AS DIRECTED 30 mL 2   • lisinopril (PRINIVIL,ZESTRIL) 2.5 MG tablet TAKE ONE TABLET BY MOUTH TWICE A DAY 60 tablet 1   • Multiple Vitamin (MULTI VITAMIN DAILY PO) Take  by mouth Daily.       Current Facility-Administered Medications on File Prior to Encounter   Medication Dose Route Frequency Provider Last Rate Last Dose   • nitroglycerin (NITROSTAT) SL tablet 0.4 mg  0.4 mg Sublingual Q5 Min PRN Tiago Srivastava Jr., MD       • sodium chloride 0.9 % flush 10 mL  10 mL Intravenous PRN Tiago Srivastava Jr., MD           Social History     Socioeconomic History   • Marital status:      Spouse name: Not on file   • Number of children: Not on file   • Years of education: Not on file   • Highest education level: Not on file   Occupational History   • Occupation:    Tobacco Use   • Smoking status: Never Smoker   • Smokeless tobacco: Never Used   • Tobacco comment: caffeine use    Substance and Sexual Activity   • Alcohol use: Yes     Binge frequency: Monthly     Comment: occasional   • Drug use: No     Comment: Drinks 2-4 caffeinated beverages per day   • Sexual activity: Defer   Lifestyle   • Physical activity:     Days per week: 0 days     Minutes per session: Not on file   • Stress: Not on file   Social History Narrative    ** Merged History Encounter **            Family History   Problem Relation Age of Onset   • Heart disease Other    • Hypertension Other    • Thyroid disease Other    • Heart disease Father    • Prostate cancer Father    • Hypertension Father    • Stroke Father    • Heart disease Mother    • Hypertension Mother    • Heart disease Brother    • Hypertension Brother    • Thyroid disease Sister    • Heart disease Brother    • Hypertension Brother         REVIEW OF SYSTEMS:   All ROS was performed and is Negative except as outlined in HPI     Objective:     Vitals:    02/17/20 1209   BP: 141/82   BP  "Location: Right arm   Patient Position: Sitting   Pulse: 62   SpO2: 97%   Weight: 97.5 kg (215 lb)   Height: 188 cm (74\")     Body mass index is 27.6 kg/m².  Flowsheet Rows      First Filed Value   Admission Height  188 cm (74\") Documented at 02/17/2020 1209   Admission Weight  97.5 kg (215 lb) Documented at 02/17/2020 1209          Physical Exam   Physical Exam   Constitutional: He is oriented to person, place, and time. He appears well-developed and well-nourished. No distress.   HENT:   Head: Normocephalic.   Eyes: Pupils are equal, round, and reactive to light. Conjunctivae are normal. No scleral icterus.   Neck: Normal carotid pulses, no hepatojugular reflux and no JVD present. Carotid bruit is not present. No tracheal deviation, no edema and no erythema present. No thyromegaly present.   Cardiovascular: Normal rate, regular rhythm, S1 normal, S2 normal, normal heart sounds and intact distal pulses.  No extrasystoles are present. PMI is not displaced. Exam reveals no gallop, no distant heart sounds and no friction rub.   No murmur heard.  Pulses:       Carotid pulses are 2+ on the right side with bruit, and 2+ on the left side with bruit.       Radial pulses are 2+ on the right side, and 2+ on the left side.        Femoral pulses are 2+ on the right side, and 2+ on the left side.       Dorsalis pedis pulses are 2+ on the right side, and 2+ on the left side.        Posterior tibial pulses are 2+ on the right side, and 2+ on the left side.   Pulmonary/Chest: Effort normal and breath sounds normal. No respiratory distress. He has no decreased breath sounds. He has no wheezes. He has no rhonchi. He has no rales. He exhibits no tenderness.   Abdominal: Soft. Bowel sounds are normal. He exhibits no distension and no mass. There is no hepatosplenomegaly. There is no tenderness. There is no rebound and no guarding.   Musculoskeletal: He exhibits no edema, tenderness or deformity.   Neurological: He is alert and " oriented to person, place, and time.   Skin: Skin is warm and dry. No rash noted. He is not diaphoretic. No cyanosis or erythema. No pallor. Nails show no clubbing.   Psychiatric: He has a normal mood and affect. His speech is normal and behavior is normal. Judgment and thought content normal.       Lab Review:                                            @LABRCNT(bnp)@    I personally viewed and interpreted the patient's EKG/Telemetry data     Assessment:   1.  60-year-old gentleman with very nonspecific chest pain clearly noncardiac gets better when he is up moving around its been constant for 3 days if his troponins negative we are also checking an echocardiogram and if his wall motion is normal will discharge him home to follow back up with his PCP.  2. History of severe coronary disease preserved left ventricular systolic function status post 3.25 mm x 20 mm drug-eluting stent placed left anterior descending in August 2017.  He has a dual antiplatelet score of 0 which is low risk. Normal stress echocardiogram July 2018.  Normal perfusion stress test June 2019.  Cardiac catheterization August 2019 no evidence of restenosis, mild to moderate disease 40% to 50% stenosis of the distal left anterior descending and posterior lateral artery and normal LV function.   As above  3.  Hyperlipidemia on target dose atorvastatin.      LDL May 2019 was 59 at goal.  Continue the same.  4.  Hypertension blood pressures this is been under good control.   continue the same.  5.  B12 deficiency.      6.    Carotid stenosis.  Carotid ultrasound January 2020 showed mild disease of the left common carotid artery and ICA and mild disease of the right common carotid artery and ICA.                  Plan:

## 2020-02-24 DIAGNOSIS — M79.672 PAIN OF LEFT HEEL: Primary | ICD-10-CM

## 2020-03-03 RX ORDER — ATORVASTATIN CALCIUM 40 MG/1
TABLET, FILM COATED ORAL
Qty: 30 TABLET | Refills: 3 | Status: SHIPPED | OUTPATIENT
Start: 2020-03-03 | End: 2020-06-29

## 2020-04-11 RX ORDER — LISINOPRIL 2.5 MG/1
TABLET ORAL
Qty: 60 TABLET | Refills: 0 | Status: SHIPPED | OUTPATIENT
Start: 2020-04-11 | End: 2020-05-18

## 2020-05-04 NOTE — DISCHARGE INSTRUCTIONS
[facility]  [address] [place] [address]    Coronary Angiogram with Stent (Radial Approach) After Care    Refer to this sheet in the next few weeks. These instructions provide you with information on caring for yourself after your procedure. Your health care provider may also give you more specific instructions. Your treatment has been planned according to current medical practices, but problems sometimes occur. Call your health care provider if you have any problems or questions after your procedure.       Home Care Instructions:  · You may shower the day after the procedure. Remove the bandage (dressing) and gently wash the site with plain soap and water. Gently pat the site dry. You may apply a band aid daily for 2 days if desired.    · Do not apply powder or lotion to the site.  · Do not submerge the affected site in water for 3 to 5 days or until the site is completely healed.   · Do not flex or bend the affected arm for 24 hours.  · Do not lift, push or pull anything over 10 pounds for 2 days after your procedure.  · Do not operate machinery or power tools for 24 hours.  · Inspect the site at least twice daily. You may notice some bruising at the site and it may be tender for 1 to 2 weeks.     · Increase your fluid intake for the next 2 days.    · Keep arm elevated for 24 hours.  For the remainder of the day, keep your arm in the “Pledge of Allegiance” position when up and about.    · Limit your activity for the next 48 hours and avoid strenuous activity as directed by your physician.   · Cardiac Rehab may or may not be ordered.  Please consult with your physician  · You may drive 24 hours after the procedure unless otherwise instructed by your caregiver.  · A responsible adult should be with you for the first 24 hours after you arrive home.   · Do not make any important legal decisions or sign legal papers for 24 hours.    · Take medicines only as directed by your health care provider.   Blood thinners may be prescribed after your procedure to improve blood flow through the stent.    · Eat a heart-healthy diet. This should include plenty of fresh fruits and vegetables. Meat should be lean cuts. Avoid the following types of food:    ¨ Food that is high in salt.    ¨ Canned or highly processed food.    ¨ Food that is high in saturated fat or sugar.    ¨ Fried food.    · Make any other lifestyle changes recommended by your health care provider. This may include:    ¨ Not using any tobacco products including cigarettes, chewing tobacco, or electronic cigarettes.   ¨ Managing your weight.    ¨ Getting regular exercise.    ¨ Managing your blood pressure.    ¨ Limiting your alcohol intake.    ¨ Managing other health problems, such as diabetes.    · If you need an MRI after your heart stent was placed, be sure to tell the health care provider who orders the MRI that you have a heart stent.    · Keep all follow-up visits as directed by your health care provider.    ·   Call Your Doctor If:  · You have unusual pain at the radial/ulnar (wrist) site.  · You have redness, warmth, swelling, or pain at the radial/ulnar (wrist) site.  · You have drainage (other than a small amount of blood on the dressing).  · `You have chills or a fever > 101.  · Your arm becomes pale or dark, cool, tingly, or numb.  · You develop chest pain, shortness of breath, feel faint or pass out.    · You have heavy bleeding from the site, hold pressure on the site for 20 minutes.  If the bleeding stops, apply a fresh bandage and call your cardiologist.  However, if you continue to have bleeding, call 911.        Make Sure You:   · Understand these instructions.  · Will watch your condition.  · Will get help right away if you are not doing well or get worse.       Split-Thickness Skin Graft Text: The defect edges were debeveled with a #15 scalpel blade.  Given the location of the defect, shape of the defect and the proximity to free margins a split thickness skin graft was deemed most appropriate.  Using a sterile surgical marker, the primary defect shape was transferred to the donor site. The split thickness graft was then harvested.  The skin graft was then placed in the primary defect and oriented appropriately.

## 2020-05-18 RX ORDER — LISINOPRIL 2.5 MG/1
TABLET ORAL
Qty: 60 TABLET | Refills: 0 | Status: SHIPPED | OUTPATIENT
Start: 2020-05-18 | End: 2020-06-17

## 2020-06-17 RX ORDER — LISINOPRIL 2.5 MG/1
TABLET ORAL
Qty: 60 TABLET | Refills: 0 | Status: SHIPPED | OUTPATIENT
Start: 2020-06-17 | End: 2020-07-19

## 2020-06-29 RX ORDER — ATORVASTATIN CALCIUM 40 MG/1
TABLET, FILM COATED ORAL
Qty: 30 TABLET | Refills: 3 | Status: SHIPPED | OUTPATIENT
Start: 2020-06-29 | End: 2020-10-16 | Stop reason: SDUPTHER

## 2020-07-19 RX ORDER — LISINOPRIL 2.5 MG/1
TABLET ORAL
Qty: 60 TABLET | Refills: 0 | Status: SHIPPED | OUTPATIENT
Start: 2020-07-19 | End: 2020-08-22

## 2020-07-22 ENCOUNTER — TELEPHONE (OUTPATIENT)
Dept: CARDIOLOGY | Facility: CLINIC | Age: 60
End: 2020-07-22

## 2020-08-13 ENCOUNTER — HOSPITAL ENCOUNTER (OUTPATIENT)
Dept: CARDIOLOGY | Facility: HOSPITAL | Age: 60
Discharge: HOME | End: 2020-08-13
Admitting: FAMILY MEDICINE

## 2020-08-13 ENCOUNTER — OFFICE VISIT (OUTPATIENT)
Dept: FAMILY MEDICINE CLINIC | Facility: CLINIC | Age: 60
End: 2020-08-13

## 2020-08-13 VITALS
HEART RATE: 76 BPM | HEIGHT: 74 IN | BODY MASS INDEX: 26.95 KG/M2 | WEIGHT: 210 LBS | DIASTOLIC BLOOD PRESSURE: 70 MMHG | RESPIRATION RATE: 20 BRPM | TEMPERATURE: 97.8 F | OXYGEN SATURATION: 98 % | SYSTOLIC BLOOD PRESSURE: 122 MMHG

## 2020-08-13 DIAGNOSIS — M79.604 RIGHT LEG PAIN: Primary | ICD-10-CM

## 2020-08-13 DIAGNOSIS — M79.604 RIGHT LEG PAIN: ICD-10-CM

## 2020-08-13 LAB
BH CV LOWER VASCULAR LEFT COMMON FEMORAL AUGMENT: NORMAL
BH CV LOWER VASCULAR LEFT COMMON FEMORAL COMPETENT: NORMAL
BH CV LOWER VASCULAR LEFT COMMON FEMORAL COMPRESS: NORMAL
BH CV LOWER VASCULAR LEFT COMMON FEMORAL PHASIC: NORMAL
BH CV LOWER VASCULAR LEFT COMMON FEMORAL SPONT: NORMAL
BH CV LOWER VASCULAR RIGHT COMMON FEMORAL AUGMENT: NORMAL
BH CV LOWER VASCULAR RIGHT COMMON FEMORAL COMPETENT: NORMAL
BH CV LOWER VASCULAR RIGHT COMMON FEMORAL COMPRESS: NORMAL
BH CV LOWER VASCULAR RIGHT COMMON FEMORAL PHASIC: NORMAL
BH CV LOWER VASCULAR RIGHT COMMON FEMORAL SPONT: NORMAL
BH CV LOWER VASCULAR RIGHT DISTAL FEMORAL COMPRESS: NORMAL
BH CV LOWER VASCULAR RIGHT GASTRONEMIUS COMPRESS: NORMAL
BH CV LOWER VASCULAR RIGHT GREATER SAPH AK COMPRESS: NORMAL
BH CV LOWER VASCULAR RIGHT GREATER SAPH BK COMPRESS: NORMAL
BH CV LOWER VASCULAR RIGHT MID FEMORAL AUGMENT: NORMAL
BH CV LOWER VASCULAR RIGHT MID FEMORAL COMPETENT: NORMAL
BH CV LOWER VASCULAR RIGHT MID FEMORAL COMPRESS: NORMAL
BH CV LOWER VASCULAR RIGHT MID FEMORAL PHASIC: NORMAL
BH CV LOWER VASCULAR RIGHT MID FEMORAL SPONT: NORMAL
BH CV LOWER VASCULAR RIGHT PERONEAL COMPRESS: NORMAL
BH CV LOWER VASCULAR RIGHT POPLITEAL AUGMENT: NORMAL
BH CV LOWER VASCULAR RIGHT POPLITEAL COMPETENT: NORMAL
BH CV LOWER VASCULAR RIGHT POPLITEAL COMPRESS: NORMAL
BH CV LOWER VASCULAR RIGHT POPLITEAL PHASIC: NORMAL
BH CV LOWER VASCULAR RIGHT POPLITEAL SPONT: NORMAL
BH CV LOWER VASCULAR RIGHT POSTERIOR TIBIAL COMPRESS: NORMAL
BH CV LOWER VASCULAR RIGHT PROXIMAL FEMORAL COMPRESS: NORMAL
BH CV LOWER VASCULAR RIGHT SAPHENOFEMORAL JUNCTION COMPRESS: NORMAL

## 2020-08-13 PROCEDURE — 93971 EXTREMITY STUDY: CPT

## 2020-08-13 PROCEDURE — 99213 OFFICE O/P EST LOW 20 MIN: CPT | Performed by: FAMILY MEDICINE

## 2020-08-13 RX ORDER — CYCLOBENZAPRINE HCL 10 MG
10 TABLET ORAL
COMMUNITY
Start: 2020-07-14 | End: 2021-02-02 | Stop reason: ALTCHOICE

## 2020-08-13 NOTE — PROGRESS NOTES
"SUBJECTIVE:  The patient is 60-year-old white male.  Over the last week he has been having right leg pain.  He also has numbness.  He is currently getting epidurals for lumbar and radicular pain.  He is not sure whether his leg pain is from his back or a blood clot.  He has had a blood clot before and states this feels similar.    PAST MEDICAL HISTORY:  Reviewed.    REVIEW OF SYSTEMS:  Please see above.  All others reviewed and are negative.      OBJECTIVE:   /70 (BP Location: Left arm, Patient Position: Sitting)   Pulse 76   Temp 97.8 °F (36.6 °C) (Infrared)   Resp 20   Ht 188 cm (74\")   Wt 95.3 kg (210 lb)   SpO2 98%   BMI 26.96 kg/m²    Vitals signs are reviewed and are stable.    General:  Well-nourished.  Alert and oriented x3 in no acute distress.  HEENT: PERRLA.   Neck:  Supple.   Lungs:  Clear.    Heart:  Regular rate and rhythm.   Abdomen:   Soft, nontender.   Extremities:  No cyanosis, clubbing or edema.  Tenderness is present in the superior portion of the right popliteal region.  No calf tenderness.  Neurological:  Grossly intact without motor or sensory deficits.     ASSESSMENT:      Niranjan was seen today for right leg pain.    Diagnoses and all orders for this visit:    Right leg pain         PLAN: Stat venous Doppler..  If this is positive he will get samples of Xarelto or Eliquis from this office.  If not he will follow-up with his pain management people regarding his radicular pain.    Dictated utilizing Dragon dictation.  "

## 2020-08-22 RX ORDER — LISINOPRIL 2.5 MG/1
TABLET ORAL
Qty: 60 TABLET | Refills: 0 | Status: SHIPPED | OUTPATIENT
Start: 2020-08-22 | End: 2020-09-17

## 2020-09-17 RX ORDER — LISINOPRIL 2.5 MG/1
TABLET ORAL
Qty: 60 TABLET | Refills: 0 | Status: SHIPPED | OUTPATIENT
Start: 2020-09-17 | End: 2020-10-16

## 2020-09-17 RX ORDER — CYANOCOBALAMIN 1000 UG/ML
INJECTION, SOLUTION INTRAMUSCULAR; SUBCUTANEOUS
Qty: 30 ML | Refills: 2 | Status: SHIPPED | OUTPATIENT
Start: 2020-09-17 | End: 2021-04-19 | Stop reason: SDUPTHER

## 2020-10-16 ENCOUNTER — OFFICE VISIT (OUTPATIENT)
Dept: INTERNAL MEDICINE | Facility: CLINIC | Age: 60
End: 2020-10-16

## 2020-10-16 VITALS
BODY MASS INDEX: 27.05 KG/M2 | WEIGHT: 210.8 LBS | OXYGEN SATURATION: 98 % | HEART RATE: 74 BPM | HEIGHT: 74 IN | SYSTOLIC BLOOD PRESSURE: 130 MMHG | DIASTOLIC BLOOD PRESSURE: 78 MMHG

## 2020-10-16 DIAGNOSIS — D51.0 PERNICIOUS ANEMIA: ICD-10-CM

## 2020-10-16 DIAGNOSIS — Z00.00 ANNUAL PHYSICAL EXAM: Primary | ICD-10-CM

## 2020-10-16 DIAGNOSIS — M54.16 RIGHT LUMBAR RADICULOPATHY: ICD-10-CM

## 2020-10-16 DIAGNOSIS — M51.26 HERNIATED NUCLEUS PULPOSUS, L3-4 RIGHT: ICD-10-CM

## 2020-10-16 DIAGNOSIS — E04.2 MULTIPLE THYROID NODULES: ICD-10-CM

## 2020-10-16 DIAGNOSIS — I25.10 CORONARY ARTERY DISEASE INVOLVING NATIVE CORONARY ARTERY OF NATIVE HEART WITHOUT ANGINA PECTORIS: ICD-10-CM

## 2020-10-16 DIAGNOSIS — H11.001 PTERYGIUM OF RIGHT EYE: ICD-10-CM

## 2020-10-16 PROBLEM — I20.8 ANGINA AT REST: Status: RESOLVED | Noted: 2019-08-16 | Resolved: 2020-10-16

## 2020-10-16 PROBLEM — I20.89 ANGINA AT REST: Status: RESOLVED | Noted: 2019-08-16 | Resolved: 2020-10-16

## 2020-10-16 PROCEDURE — 99396 PREV VISIT EST AGE 40-64: CPT | Performed by: FAMILY MEDICINE

## 2020-10-16 RX ORDER — ATORVASTATIN CALCIUM 40 MG/1
40 TABLET, FILM COATED ORAL DAILY
Qty: 90 TABLET | Refills: 3 | Status: SHIPPED | OUTPATIENT
Start: 2020-10-16 | End: 2021-10-11

## 2020-10-16 RX ORDER — LISINOPRIL 2.5 MG/1
TABLET ORAL
Qty: 180 TABLET | Refills: 1 | Status: SHIPPED | OUTPATIENT
Start: 2020-10-16 | End: 2021-02-09 | Stop reason: SDUPTHER

## 2020-10-16 RX ORDER — KETOTIFEN FUMARATE 0.35 MG/ML
1 SOLUTION/ DROPS OPHTHALMIC 2 TIMES DAILY PRN
Qty: 5 ML | Refills: 3 | Status: SHIPPED | OUTPATIENT
Start: 2020-10-16 | End: 2021-02-02 | Stop reason: SDDI

## 2020-10-16 RX ORDER — GABAPENTIN 100 MG/1
100 CAPSULE ORAL 3 TIMES DAILY PRN
Qty: 90 CAPSULE | Refills: 0 | Status: SHIPPED | OUTPATIENT
Start: 2020-10-16 | End: 2020-11-16

## 2020-10-16 NOTE — PROGRESS NOTES
Date of Encounter: 10/16/2020  Patient: Niranjan Peacock,  1960    Subjective   History of Presenting Illness  Chief complaint: Annual physical    Right lumbar radiculopathy: Herniated disc with persistent right lower leg symptoms down to his foot, to cause difficulty with walking.  He has had this for approximately 5 months.  Status post physical therapy, 2 epidural injections with minimal improvement in symptoms.  Using Tylenol currently.  Would like to avoid surgery if possible.    Pernicious anemia on twice monthly B12 injections.    Multiple thyroid nodules, last ultrasound in  with history of attempted biopsy.  No recent follow-up.    Coronary artery disease: Angina with subsequent LAD stent 2017, recatheterization 2019 showed 40 to 50% restenosis.  No angina.    Pterygium right eye followed by ophthalmology.  Occasional bilateral eye itching possibly related to allergies.    Lives with wife Nolvia.  Children are adults.  Never smoker.  Less than 5 alcoholic drinks per week.  Exercises by biking 2-3 times per week.  Is a healthy diet low in red meat.  Works as an  for commercial real estate.  Does not have a living will.  Last colonoscopy  with no polyps.  Up-to-date on vaccinations with the exception of Shingrix.    Review of Systems:  Negative for fever, congestion, chest pain upon exertion, shortness of breath, vision changes, vomiting, dysuria, lymphadenopathy, muscle weakness, mood changes, rashes.    Positive for numbness right lower leg    The following portions of the patient's history were reviewed and updated as appropriate: allergies, current medications, past family history, past medical history, past social history, past surgical history and problem list.    Patient Active Problem List   Diagnosis   • Coronary artery disease involving native coronary artery of native heart without angina pectoris   • Multiple thyroid nodules   • Pernicious anemia   • H/O multiple  allergies   • Abnormal EKG   • Herniated nucleus pulposus, L3-4 right   • Right lumbar radiculopathy   • Pterygium of right eye     Past Medical History:   Diagnosis Date   • Angina at rest (CMS/HCC) 8/16/2019    Added automatically from request for surgery [id number]   • B12 deficiency anemia    • Chest pain    • Chronic fatigue 3/9/2016   • Coronary artery disease involving native coronary artery of native heart without angina pectoris 8/10/2017   • Deep vein thrombosis (CMS/[facility])    • Headache    • History of blood clots     blood clot found in right lung    • Multiple thyroid nodules    • Multiple thyroid nodules    • Pernicious anemia    • Pulmonary embolism (CMS/[facility])     2015   • Syncope     2 yrs ago one time     Past Surgical History:   Procedure Laterality Date   • CARDIAC CATHETERIZATION N/A 8/3/2017    Procedure: Coronary angiography;  Surgeon: Cynthia Farley MD;  Location: [facility] CATH INVASIVE LOCATION;  Service:    • CARDIAC CATHETERIZATION  8/3/2017    Procedure: Functional Flow [place];  Surgeon: Cynthia Farley MD;  Location: [facility] C[facility]ASIVE LOC[facility];  Service:    • CARDIAC CATHETERIZATION N/A 8/3/2017    Procedure: Stent YI coronary;  Surgeon: Cynthia Farley MD;  Location: [facility] CATH INVASIVE LOCATION;  Service:    • CARDIAC CATHETERIZATION N/A 8/3/2017    Procedure: Left ventriculography;  Surgeon: Cynthia Farley MD;  Location: [facility] CATH INVASIVE LOCATION;  Service:    • CARDIAC CATHETERIZATION N/A 8/3/2017    Procedure: Left Heart Cath;  Surgeon: Cynthia Farley MD;  Location: [facility] CATH INVASIVE LOCATION;  Service:    • CARDIAC CATHETERIZATION N/A 8/20/2019    Procedure: Left Heart Cath;  Surgeon: uMlugeta Ac MD;  Location: [facility] CATH INVASIVE LOCATION;  Service: Cardiology   • CARDIAC CATHETERIZATION N/A 8/20/2019    Procedure: Coronary angiography;  Surgeon: Mulugeta Ac MD;  Location: [facility] CATH INVASIVE LOCATION;  Service: Cardiology   • CARDIAC CATHETERIZATION N/A  8/20/2019    Procedure: Left ventriculography;  Surgeon: Mulugeta Ac MD;  Location: [facility] INVASIVE LOCATION;  Service: Cardiology   • COLONOSCOPY  MMVI    NORMAL.   • COLONOSCOPY     • FINE NEEDLE ASPIRATION  12/2015    Left thyroid nodule.  [place] category II.  Dr. Socrates Sanchez     Family History   Problem Relation Age of Onset   • Heart disease Other    • Hypertension Other    • Thyroid disease Other    • Heart disease Father    • Prostate cancer Father    • Hypertension Father    • Stroke Father    • Heart disease Mother    • Hypertension Mother    • Heart disease Brother    • Hypertension Brother    • Thyroid disease Sister    • Heart disease Brother    • Hypertension Brother        Current Outpatient Medications:   •  aspirin 81 MG tablet, Take 1 tablet by mouth Daily., Disp: 30 tablet, Rfl: 11  •  atorvastatin (LIPITOR) 40 MG tablet, Take 1 tablet by mouth Daily., Disp: 90 tablet, Rfl: 3  •  Cholecalciferol (VITAMIN D3) 5000 units capsule capsule, Take 5,000 Units by mouth Daily., Disp: , Rfl:   •  cyanocobalamin 1000 MCG/ML injection, INJECT 1ML INTRAMUSCULARLY  EVERY  30 DAYS AS DIRECTED, Disp: 30 mL, Rfl: 2  •  Multiple Vitamin (MULTI VITAMIN DAILY PO), Take  by mouth Daily., Disp: , Rfl:   •  cyclobenzaprine (FLEXERIL) 10 MG tablet, Take 10 mg by mouth., Disp: , Rfl:   •  gabapentin (NEURONTIN) 100 MG capsule, Take 1 capsule by mouth 3 (Three) Times a Day As Needed (leg numbness)., Disp: 90 capsule, Rfl: 0  •  ketotifen (ZADITOR) 0.025 % ophthalmic solution, Administer 1 drop to both eyes 2 (Two) Times a Day As Needed (eye itching)., Disp: 5 mL, Rfl: 3  •  lisinopril (PRINIVIL,ZESTRIL) 2.5 MG tablet, TAKE ONE TABLET BY MOUTH TWICE A DAY, Disp: 180 tablet, Rfl: 1    Current Facility-Administered Medications:   •  nitroglycerin (NITROSTAT) SL tablet 0.4 mg, 0.4 mg, Sublingual, Q5 Min PRN, Tiago Srivastava Jr., MD  •  nitroglycerin (NITROSTAT) SL tablet 0.4 mg, 0.4 mg, Sublingual, Q5 Min PRN,  "Jason Mancilla MD  •  sodium chloride 0.9 % flush 10 mL, 10 mL, Intravenous, PRN, Tiago Srivastava Jr., MD  •  sodium chloride 0.9 % flush 10 mL, 10 mL, Intravenous, PRN, Jason Mancilla MD  No Active Allergies  Social History     Tobacco Use   • Smoking status: Never Smoker   • Smokeless tobacco: Never Used   • Tobacco comment: caffeine use    Substance Use Topics   • Alcohol use: Yes     Binge frequency: Monthly     Comment: occasional   • Drug use: No     Comment: Drinks 2-4 caffeinated beverages per day          Objective   Physical Exam  Vitals:    10/16/20 0827   BP: 130/78   Pulse: 74   SpO2: 98%   Weight: 95.6 kg (210 lb 12.8 oz)   Height: 188 cm (74\")     Body mass index is 27.07 kg/m².    Constitutional: NAD.  Eyes: EOMI. PERRLA.  Medial pterygium right eye not occluding the pupil.  Ear, nose, mouth, throat: No tonsillar exudates or erythema.   Normal nasal mucosa. Normal external ear canals and TMs bilaterally.  Cardiovascular: RRR. No murmurs. No LE edema b/l. Radial pulses 2+ bilaterally.  Pulmonary: CTA b/l. Good effort.  Integumentary: No rashes or wounds on face or upper extremities.  Lymphatic: No anterior cervical lymphadenopathy.  Endocrine: No thyromegaly or palpable thyroid nodules.  Psychiatric: Normal affect. Normal thought content.     Assessment/Plan   Assessment and Plan  Pleasant 60-year-old male with CAD, hypertension, pernicious anemia, multiple thyroid nodules, right lumbar radiculopathy, who presents with the following:    Diagnoses and all orders for this visit:    1. Annual physical exam (Primary):We discussed preventative care including age and patient-appropriate immunizations and cancer screening. We also discussed the importance of exercise and a healthy diet. This is their annual preventative exam.  Encourage continued exercise, Shingrix vaccination.    2. Right lumbar radiculopathy: Discussed the risks and benefits of gabapentin.  Okay to fill for up to 3 months, after " that time we will do a UDS and controlled substance contract if he wants to continue.  -     gabapentin (NEURONTIN) 100 MG capsule; Take 1 capsule by mouth 3 (Three) Times a Day As Needed (leg numbness).  Dispense: 90 capsule; Refill: 0  3. Herniated nucleus pulposus, L3-4 right    4. Pernicious anemia  -     CBC & Differential  -     Vitamin B12    5. Multiple thyroid nodules: Ultrasound for follow-up due to greater than 1 cm nodules.  -     Thyroid Panel With TSH  -     US Thyroid    6. Coronary artery disease involving native coronary artery of native heart without angina pectoris: Stable  -     Comprehensive Metabolic Panel  -     Hemoglobin A1c  -     Lipid Panel    7. Pterygium of right eye  -     ketotifen (ZADITOR) 0.025 % ophthalmic solution; Administer 1 drop to both eyes 2 (Two) Times a Day As Needed (eye itching).  Dispense: 5 mL; Refill: 3      Follow-up in 3 months for gabapentin refill if effective, otherwise return to office in 1 year for annual physical or earlier as needed.    Brian Marr MD  Family Medicine  O: [phone]  C: [phone]    Disclaimer: Parts of this note were dictated by speech recognition. Minor errors in transcription may be present. Please call if questions.

## 2020-10-17 LAB
ALBUMIN SERPL-MCNC: 4.3 G/DL (ref 3.8–4.9)
ALBUMIN/GLOB SERPL: 1.9 {RATIO} (ref 1.2–2.2)
ALP SERPL-CCNC: 78 IU/L (ref 39–117)
ALT SERPL-CCNC: 27 IU/L (ref 0–44)
AST SERPL-CCNC: 30 IU/L (ref 0–40)
BASOPHILS # BLD AUTO: 0.1 X10E3/UL (ref 0–0.2)
BASOPHILS NFR BLD AUTO: 1 %
BILIRUB SERPL-MCNC: 0.7 MG/DL (ref 0–1.2)
BUN SERPL-MCNC: 8 MG/DL (ref 6–24)
BUN/CREAT SERPL: 8 (ref 9–20)
CALCIUM SERPL-MCNC: 9.5 MG/DL (ref 8.7–10.2)
CHLORIDE SERPL-SCNC: 102 MMOL/L (ref 96–106)
CHOLEST SERPL-MCNC: 129 MG/DL (ref 100–199)
CO2 SERPL-SCNC: 26 MMOL/L (ref 20–29)
CREAT SERPL-MCNC: 0.97 MG/DL (ref 0.76–1.27)
EOSINOPHIL # BLD AUTO: 0.3 X10E3/UL (ref 0–0.4)
EOSINOPHIL NFR BLD AUTO: 5 %
ERYTHROCYTE [DISTWIDTH] IN BLOOD BY AUTOMATED COUNT: 12.3 % (ref 11.6–15.4)
FT4I SERPL CALC-MCNC: 2 (ref 1.2–4.9)
GLOBULIN SER CALC-MCNC: 2.3 G/DL (ref 1.5–4.5)
GLUCOSE SERPL-MCNC: 98 MG/DL (ref 65–99)
HBA1C MFR BLD: 5.6 % (ref 4.8–5.6)
HCT VFR BLD AUTO: 45.5 % (ref 37.5–51)
HDLC SERPL-MCNC: 43 MG/DL
HGB BLD-MCNC: 15.4 G/DL (ref 13–17.7)
IMM GRANULOCYTES # BLD AUTO: 0 X10E3/UL (ref 0–0.1)
IMM GRANULOCYTES NFR BLD AUTO: 0 %
LDLC SERPL CALC-MCNC: 68 MG/DL (ref 0–99)
LYMPHOCYTES # BLD AUTO: 1.7 X10E3/UL (ref 0.7–3.1)
LYMPHOCYTES NFR BLD AUTO: 29 %
MCH RBC QN AUTO: 31.4 PG (ref 26.6–33)
MCHC RBC AUTO-ENTMCNC: 33.8 G/DL (ref 31.5–35.7)
MCV RBC AUTO: 93 FL (ref 79–97)
MONOCYTES # BLD AUTO: 0.4 X10E3/UL (ref 0.1–0.9)
MONOCYTES NFR BLD AUTO: 7 %
NEUTROPHILS # BLD AUTO: 3.4 X10E3/UL (ref 1.4–7)
NEUTROPHILS NFR BLD AUTO: 58 %
PLATELET # BLD AUTO: 317 X10E3/UL (ref 150–450)
POTASSIUM SERPL-SCNC: 4.8 MMOL/L (ref 3.5–5.2)
PROT SERPL-MCNC: 6.6 G/DL (ref 6–8.5)
RBC # BLD AUTO: 4.9 X10E6/UL (ref 4.14–5.8)
SODIUM SERPL-SCNC: 141 MMOL/L (ref 134–144)
T3RU NFR SERPL: 26 % (ref 24–39)
T4 SERPL-MCNC: 7.5 UG/DL (ref 4.5–12)
TRIGL SERPL-MCNC: 96 MG/DL (ref 0–149)
TSH SERPL DL<=0.005 MIU/L-ACNC: 1.83 UIU/ML (ref 0.45–4.5)
VIT B12 SERPL-MCNC: 243 PG/ML (ref 232–1245)
VLDLC SERPL CALC-MCNC: 18 MG/DL (ref 5–40)
WBC # BLD AUTO: 6 X10E3/UL (ref 3.4–10.8)

## 2020-10-21 ENCOUNTER — TELEPHONE (OUTPATIENT)
Dept: INTERNAL MEDICINE | Facility: CLINIC | Age: 60
End: 2020-10-21

## 2020-10-21 NOTE — TELEPHONE ENCOUNTER
Pt called back for lab results. Pt was advised that all lab results are stable, and no changes will be made to medications at this time.  Pt demonstrated understanding. KD

## 2020-10-21 NOTE — TELEPHONE ENCOUNTER
----- Message from Charlee Powell MD sent at 10/20/2020 10:53 AM EDT -----  Please call him with test results.  Blood count is normal--not currently anemic and B12 is within normal range.  Lipid panel looks great.  Metabolic panel is normal with normal sugar, electrolytes, liver and kidneys.  No change in medications.

## 2020-10-28 ENCOUNTER — HOSPITAL ENCOUNTER (OUTPATIENT)
Dept: ULTRASOUND IMAGING | Facility: HOSPITAL | Age: 60
Discharge: HOME | End: 2020-10-28
Admitting: FAMILY MEDICINE

## 2020-10-28 PROCEDURE — 76536 US EXAM OF HEAD AND NECK: CPT

## 2020-11-14 DIAGNOSIS — M54.16 RIGHT LUMBAR RADICULOPATHY: ICD-10-CM

## 2020-11-16 RX ORDER — GABAPENTIN 100 MG/1
CAPSULE ORAL
Qty: 90 CAPSULE | Refills: 0 | Status: SHIPPED | OUTPATIENT
Start: 2020-11-16 | End: 2020-12-14

## 2020-11-16 NOTE — TELEPHONE ENCOUNTER
Last OV 10/16/2020  Next OV not Scheduled  Last RF 10/16/2020 #90 No RF  KSP Not On File  Contract Not On File  UDS Not On File

## 2020-12-14 ENCOUNTER — OFFICE VISIT (OUTPATIENT)
Dept: INTERNAL MEDICINE | Facility: CLINIC | Age: 60
End: 2020-12-14

## 2020-12-14 VITALS
OXYGEN SATURATION: 99 % | DIASTOLIC BLOOD PRESSURE: 76 MMHG | HEART RATE: 61 BPM | TEMPERATURE: 97.3 F | HEIGHT: 74 IN | BODY MASS INDEX: 27.72 KG/M2 | SYSTOLIC BLOOD PRESSURE: 138 MMHG | WEIGHT: 216 LBS

## 2020-12-14 DIAGNOSIS — R42 DIZZINESS: ICD-10-CM

## 2020-12-14 DIAGNOSIS — G44.86 CERVICOGENIC HEADACHE: Primary | ICD-10-CM

## 2020-12-14 PROCEDURE — 99214 OFFICE O/P EST MOD 30 MIN: CPT | Performed by: FAMILY MEDICINE

## 2020-12-14 NOTE — PROGRESS NOTES
Date of Encounter: 2020  Patient: Niranjan Peacock,  1960    Subjective   History of Presenting Illness  Chief complaint: Headache    Dull, constant right sided headache radiating from the back of his neck to the front scalp. Has had headaches like this for several years. Feels it is worsening.  Associated with a lot of crepitus, pain in his neck.  She had multiple neck injuries.  Occasionally takes acetaminophen for the discomfort.  No weakness or numbness in extremities.  There was one episode of dizziness after standing up from sitting on the couch, patient called EMS who performed an EKG which was unremarkable.  The dizziness resolved and he did not go to the ER.  He has not had any dizziness since.    Review of Systems:  Negative for fever, congestion, cough, shortness of breath, focal weakness or numbness, abdominal pain, slurring of speech, syncope    The following portions of the patient's history were reviewed and updated as appropriate: allergies, current medications, past family history, past medical history, past social history, past surgical history and problem list.    Patient Active Problem List   Diagnosis   • Coronary artery disease involving native coronary artery of native heart without angina pectoris   • Multiple thyroid nodules   • Pernicious anemia   • H/O multiple allergies   • Abnormal EKG   • Herniated nucleus pulposus, L3-4 right   • Right lumbar radiculopathy   • Pterygium of right eye     Past Medical History:   Diagnosis Date   • Angina at rest (CMS/HCC) 2019    Added automatically from request for surgery [id number]   • B12 deficiency anemia    • Chest pain    • Chronic fatigue 3/9/2016   • Coronary artery disease involving native coronary artery of native heart without angina pectoris 8/10/2017   • Deep vein thrombosis (CMS/HCC)    • Headache    • History of blood clots     blood clot found in right lung    • Multiple thyroid nodules    • Multiple thyroid nodules    •  Pernicious anemia    • Pulmonary embolism (CMS/HCC)     2015   • Syncope     2 yrs ago one time     Past Surgical History:   Procedure Laterality Date   • CARDIAC CATHETERIZATION N/A 8/3/2017    Procedure: Coronary angiography;  Surgeon: Cynthia Farley MD;  Location:  [facility] CATH INVASIVE LOCATION;  Service:    • CARDIAC CATHETERIZATION  8/3/2017    Procedure: Functional Flow [place];  Surgeon: Cynthia Farley MD;  Location:  [facility] CATH INVASIVE LOCATION;  Service:    • CARDIAC CATHETERIZATION N/A 8/3/2017    Procedure: Stent YI coronary;  Surgeon: Cynthia Farley MD;  Location:  [facility] CATH INVASIVE LOCATION;  Service:    • CARDIAC CATHETERIZATION N/A 8/3/2017    Procedure: Left ventriculography;  Surgeon: Cynthia Farley MD;  Location:  [facility] CATH INVASIVE LOCATION;  Service:    • CARDIAC CATHETERIZATION N/A 8/3/2017    Procedure: Left Heart Cath;  Surgeon: Cynthia Farley MD;  [facility];  Service:    • CARDIAC CATHETERIZATION N/A 8/20/2019    Procedure: Left Heart Cath;  Surgeon: Mulugeta Ac MD;  Location: [facility] CATH [facility]IVE LOCATION;  Service: Cardiology   • CARDIAC CATHETERIZATION N/A 8/20/2019    Procedure: Coronary angiography;  Surgeon: Mulugeta Ac MD;  Location: [facility] CATH [facility] LOC[facility];  Service: Cardiology   • CARDIAC CATHETERIZATION N/A 8/20/2019    Procedure: Left ventriculography;  Surgeon: Mulugeat Ac MD;  Location: [facility] C[facility] LOC[facility];  Service: Cardiology   • COLONOSCOPY  MMVI    NORMAL.   • COLONOSCOPY     • FINE NEEDLE ASPIRATION  12/2015    Left thyroid nodule.  [place] category II.  Dr. Socrates Sanchez     Family History   Problem Relation Age of Onset   • Heart disease Other    • Hypertension Other    • Thyroid disease Other    • Heart disease Father    • Prostate cancer Father    • Hypertension Father    • Stroke Father    • Heart disease Mother    • Hypertension Mother    • Heart disease Brother    • Hypertension Brother    • Thyroid  "disease Sister    • Heart disease Brother    • Hypertension Brother        Current Outpatient Medications:   •  aspirin 81 MG tablet, Take 1 tablet by mouth Daily., Disp: 30 tablet, Rfl: 11  •  atorvastatin (LIPITOR) 40 MG tablet, Take 1 tablet by mouth Daily., Disp: 90 tablet, Rfl: 3  •  Cholecalciferol (VITAMIN D3) 5000 units capsule capsule, Take 5,000 Units by mouth Daily., Disp: , Rfl:   •  cyanocobalamin 1000 MCG/ML injection, INJECT 1ML INTRAMUSCULARLY  EVERY  30 DAYS AS DIRECTED, Disp: 30 mL, Rfl: 2  •  cyclobenzaprine (FLEXERIL) 10 MG tablet, Take 10 mg by mouth., Disp: , Rfl:   •  ketotifen (ZADITOR) 0.025 % ophthalmic solution, Administer 1 drop to both eyes 2 (Two) Times a Day As Needed (eye itching)., Disp: 5 mL, Rfl: 3  •  lisinopril (PRINIVIL,ZESTRIL) 2.5 MG tablet, TAKE ONE TABLET BY MOUTH TWICE A DAY, Disp: 180 tablet, Rfl: 1  •  Multiple Vitamin (MULTI VITAMIN DAILY PO), Take  by mouth Daily., Disp: , Rfl:     Current Facility-Administered Medications:   •  nitroglycerin (NITROSTAT) SL tablet 0.4 mg, 0.4 mg, Sublingual, Q5 Min PRN, Tiago Srivastava Jr., MD  •  nitroglycerin (NITROSTAT) SL tablet 0.4 mg, 0.4 mg, Sublingual, Q5 Min PRN, Jason Mancilla MD  •  sodium chloride 0.9 % flush 10 mL, 10 mL, Intravenous, PRN, Tiago Srivastava Jr., MD  •  sodium chloride 0.9 % flush 10 mL, 10 mL, Intravenous, PRN, Jason Mancilla MD  No Known Allergies  Social History     Tobacco Use   • Smoking status: Never Smoker   • Smokeless tobacco: Never Used   • Tobacco comment: caffeine use    Substance Use Topics   • Alcohol use: Yes     Binge frequency: Monthly     Comment: occasional   • Drug use: No     Comment: Drinks 2-4 caffeinated beverages per day          Objective   Physical Exam  Vitals:    12/14/20 1047   BP: 138/76   BP Location: Right arm   Patient Position: Sitting   Pulse: 61   Temp: 97.3 °F (36.3 °C)   TempSrc: Temporal   SpO2: 99%   Weight: 98 kg (216 lb)   Height: 188 cm (74\")     Body mass " index is 27.73 kg/m².    Constitutional: NAD.  Eyes: EOMI. PERRLA. Normal conjunctiva.  Ear, nose, mouth, throat: No tonsillar exudates or erythema.   Normal nasal mucosa. Normal external ear canals and TMs bilaterally.  Cardiovascular: RRR. No murmurs. No LE edema b/l. Radial pulses 2+ bilaterally.  Pulmonary: CTA b/l. Good effort.  Integumentary: No rashes or wounds on face or upper extremities.  Lymphatic: No anterior cervical lymphadenopathy.  Endocrine: No thyromegaly or palpable thyroid nodules.  Psychiatric: Normal affect. Normal thought content.  Neurologic: Cranial nerves intact.  Normal Romberg.  Normal heel shin.  Normal finger-nose.  Musculoskeletal: Reduced range of motion in rotation, flexion, and extension of neck.  Discomfort with these maneuvers.  No tenderness to palpation of the cervical spine.     Assessment/Plan   Assessment and Plan  Pleasant 60-year-old male with CAD, hypertension, pernicious anemia, multiple thyroid nodules, right lumbar radiculopathy, who presents with the following:     Diagnoses and all orders for this visit:    1. Cervicogenic headache (Primary): Exam most consistent with cervical genic headache from cervical degenerative disc disease.  Would likely benefit physical therapy.  X-ray before referral.  Continue acetaminophen or ibuprofen as needed.  -     XR Spine Cervical 2 or 3 View    2. Dizziness: Reassuringly recent CT head 2019, normal cranial nerve exam, EKG strip he brings from EMS is normal sinus rhythm.  No chest discomfort, recent catheterization with patent LAD stent. I think this is probably vasovagal since it was positional and has not recurred.  Recommend he increase fluid intake.  If having additional episodes would want to reevaluate.    Brian Marr MD  Family Medicine  O: [phone]  C: [phone]    Disclaimer: Parts of this note were dictated by speech recognition. Minor errors in transcription may be present. Please call if questions.

## 2020-12-15 ENCOUNTER — HOSPITAL ENCOUNTER (OUTPATIENT)
Dept: GENERAL RADIOLOGY | Facility: HOSPITAL | Age: 60
Discharge: HOME | End: 2020-12-15
Admitting: FAMILY MEDICINE

## 2020-12-15 DIAGNOSIS — M54.16 RIGHT LUMBAR RADICULOPATHY: ICD-10-CM

## 2020-12-15 PROCEDURE — 72040 X-RAY EXAM NECK SPINE 2-3 VW: CPT

## 2020-12-15 RX ORDER — GABAPENTIN 100 MG/1
CAPSULE ORAL
Qty: 90 CAPSULE | Refills: 0 | OUTPATIENT
Start: 2020-12-15

## 2020-12-16 ENCOUNTER — TELEPHONE (OUTPATIENT)
Dept: INTERNAL MEDICINE | Facility: CLINIC | Age: 60
End: 2020-12-16

## 2020-12-16 DIAGNOSIS — M47.812 SPONDYLOSIS OF CERVICAL REGION WITHOUT MYELOPATHY OR RADICULOPATHY: Primary | ICD-10-CM

## 2020-12-22 ENCOUNTER — OFFICE VISIT (OUTPATIENT)
Dept: INTERNAL MEDICINE | Facility: CLINIC | Age: 60
End: 2020-12-22

## 2020-12-22 ENCOUNTER — HOSPITAL ENCOUNTER (EMERGENCY)
Facility: HOSPITAL | Age: 60
Discharge: HOME | End: 2020-12-22
Attending: EMERGENCY MEDICINE | Admitting: EMERGENCY MEDICINE

## 2020-12-22 VITALS
RESPIRATION RATE: 20 BRPM | HEIGHT: 74 IN | TEMPERATURE: 98.9 F | WEIGHT: 216 LBS | SYSTOLIC BLOOD PRESSURE: 128 MMHG | OXYGEN SATURATION: 97 % | HEART RATE: 74 BPM | DIASTOLIC BLOOD PRESSURE: 70 MMHG | BODY MASS INDEX: 27.72 KG/M2

## 2020-12-22 VITALS
HEIGHT: 74 IN | OXYGEN SATURATION: 98 % | DIASTOLIC BLOOD PRESSURE: 68 MMHG | SYSTOLIC BLOOD PRESSURE: 120 MMHG | TEMPERATURE: 97.3 F | WEIGHT: 218.2 LBS | BODY MASS INDEX: 28 KG/M2 | HEART RATE: 69 BPM

## 2020-12-22 DIAGNOSIS — L03.115 CELLULITIS OF LEG, RIGHT: Primary | ICD-10-CM

## 2020-12-22 DIAGNOSIS — L03.115 CELLULITIS OF RIGHT LOWER EXTREMITY: Primary | ICD-10-CM

## 2020-12-22 PROBLEM — R94.31 ABNORMAL EKG: Status: RESOLVED | Noted: 2019-08-16 | Resolved: 2020-12-22

## 2020-12-22 PROCEDURE — 99282 EMERGENCY DEPT VISIT SF MDM: CPT

## 2020-12-22 PROCEDURE — 99213 OFFICE O/P EST LOW 20 MIN: CPT | Performed by: FAMILY MEDICINE

## 2020-12-22 RX ORDER — CEPHALEXIN 500 MG/1
500 CAPSULE ORAL 3 TIMES DAILY
Qty: 21 CAPSULE | Refills: 0 | Status: SHIPPED | OUTPATIENT
Start: 2020-12-22 | End: 2020-12-25 | Stop reason: HOSPADM

## 2020-12-22 RX ORDER — SULFAMETHOXAZOLE AND TRIMETHOPRIM 800; 160 MG/1; MG/1
1 TABLET ORAL 2 TIMES DAILY
COMMUNITY
Start: 2020-12-20 | End: 2020-12-25 | Stop reason: HOSPADM

## 2020-12-22 NOTE — PROGRESS NOTES
Date of Encounter: 2020  Patient: Niranjan Peacock,  1960    Subjective   History of Presenting Illness  Chief complaint: Leg infection    Patient presents with 4 days of right knee cellulitis, initially seen at [facility]  and started on Bactrim DS.  He has noticed no improvement in the cellulitis and in fact thinks the problem is worsening.  He describes new swelling extending up the inside of his right thigh, worsening pain, and chills.  He is currently limping when he walks.  He denies fever.    Review of Systems:  Negative for fever, cough, shortness of breath, loss of taste or smell    The following portions of the patient's history were reviewed and updated as appropriate: allergies, current medications, past family history, past medical history, past social history, past surgical history and problem list.    Patient Active Problem List   Diagnosis   • Coronary artery disease involving native coronary artery of native heart without angina pectoris   • Multiple thyroid nodules   • Pernicious anemia   • H/O multiple allergies   • Abnormal EKG   • Herniated nucleus pulposus, L3-4 right   • Right lumbar radiculopathy   • Pterygium of right eye   • Degenerative arthritis of cervical spine     Past Medical History:   Diagnosis Date   • Angina at rest (CMS/HCC) 2019    Added automatically from request for surgery [id number]   • B12 deficiency anemia    • Chest pain    • Chronic fatigue 3/9/2016   • Coronary artery disease involving native coronary artery of native heart without angina pectoris 8/10/2017   • Deep vein thrombosis (CMS/HCC)    • Headache    • History of blood clots     blood clot found in right lung    • Multiple thyroid nodules    • Multiple thyroid nodules    • Pernicious anemia    • Pulmonary embolism (CMS/HCC)        • Syncope     2 yrs ago one time     Past Surgical History:   Procedure Laterality Date   • CARDIAC CATHETERIZATION N/A 8/3/2017    Procedure: Coronary  angiography;  Surgeon: Cynthia Farley MD;  Location: [facility] CATH INVASIVE LOCATION;  Service:    • CARDIAC CATHETERIZATION  8/3/2017    Procedure: Functional Flow [place];  Surgeon: Cynthia Farley MD;  Location: [facility] CATH [facility]ASIVE LOCATION;  Service:    • CARDIAC CATHETERIZATION N/A 8/3/2017    Procedure: Stent YI coronary;  Surgeon: Cynthia Farley MD;  Location: [facility] CATH INVASIVE LOCATION;  Service:    • CARDIAC CATHETERIZATION N/A 8/3/2017    Procedure: Left ventriculography;  Surgeon: Cynthia Farley MD;  Location: [facility] CATH INVASIVE LOCATION;  Service:    • CARDIAC CATHETERIZATION N/A 8/3/2017    Procedure: Left Heart Cath;  Surgeon: Cynthia Farley MD;  Location: [facility] C[facility] LOC[facility];  Service:    • CARDIAC CATHETERIZATION N/A 8/20/2019    Procedure: Left Heart Cath;  Surgeon: Mulugeta Ac MD;  Location: [facility] CATH INVASIVE LOCATION;  Service: Cardiology   • CARDIAC CATHETERIZATION N/A 8/20/2019    Procedure: Coronary angiography;  Surgeon: Mulugeta Ac MD;  Location: [facility] CATH INVASIVE LOCATION;  Service: Cardiology   • CARDIAC CATHETERIZATION N/A 8/20/2019    Procedure: Left ventriculography;  Surgeon: Mulugeta Ac MD;  Location: [facility] CATH INVASIVE LOCATION;  Service: Cardiology   • COLONOSCOPY  MMVI    NORMAL.   • COLONOSCOPY     • FINE NEEDLE ASPIRATION  12/2015    Left thyroid nodule.  [place] category II.  Dr. Socrates Sanchez     Family History   Problem Relation Age of Onset   • Heart disease Other    • Hypertension Other    • Thyroid disease Other    • Heart disease Father    • Prostate cancer Father    • Hypertension Father    • Stroke Father    • Heart disease Mother    • Hypertension Mother    • Heart disease Brother    • Hypertension Brother    • Thyroid disease Sister    • Heart disease Brother    • Hypertension Brother        Current Outpatient Medications:   •  aspirin 81 MG tablet, Take 1 tablet by mouth Daily., Disp: 30 tablet, Rfl: 11  •  atorvastatin  "(LIPITOR) 40 MG tablet, Take 1 tablet by mouth Daily., Disp: 90 tablet, Rfl: 3  •  Cholecalciferol (VITAMIN D3) 5000 units capsule capsule, Take 5,000 Units by mouth Daily., Disp: , Rfl:   •  cyanocobalamin 1000 MCG/ML injection, INJECT 1ML INTRAMUSCULARLY  EVERY  30 DAYS AS DIRECTED, Disp: 30 mL, Rfl: 2  •  cyclobenzaprine (FLEXERIL) 10 MG tablet, Take 10 mg by mouth., Disp: , Rfl:   •  ketotifen (ZADITOR) 0.025 % ophthalmic solution, Administer 1 drop to both eyes 2 (Two) Times a Day As Needed (eye itching)., Disp: 5 mL, Rfl: 3  •  lisinopril (PRINIVIL,ZESTRIL) 2.5 MG tablet, TAKE ONE TABLET BY MOUTH TWICE A DAY, Disp: 180 tablet, Rfl: 1  •  Multiple Vitamin (MULTI VITAMIN DAILY PO), Take  by mouth Daily., Disp: , Rfl:   •  sulfamethoxazole-trimethoprim (BACTRIM DS,SEPTRA DS) 800-160 MG per tablet, Take 1 tablet by mouth., Disp: , Rfl:     Current Facility-Administered Medications:   •  nitroglycerin (NITROSTAT) SL tablet 0.4 mg, 0.4 mg, Sublingual, Q5 Min PRN, Tiago Srivastava Jr., MD  •  nitroglycerin (NITROSTAT) SL tablet 0.4 mg, 0.4 mg, Sublingual, Q5 Min PRN, Jason Mancilla MD  •  sodium chloride 0.9 % flush 10 mL, 10 mL, Intravenous, PRN, Tiago Srivastava Jr., MD  •  sodium chloride 0.9 % flush 10 mL, 10 mL, Intravenous, PRN, Jason Mancilla MD  No Known Allergies  Social History     Tobacco Use   • Smoking status: Never Smoker   • Smokeless tobacco: Never Used   • Tobacco comment: caffeine use    Substance Use Topics   • Alcohol use: Yes     Binge frequency: Monthly     Comment: occasional   • Drug use: No     Comment: Drinks 2-4 caffeinated beverages per day          Objective   Physical Exam  Vitals:    12/22/20 1042   BP: 120/68   Pulse: 69   Temp: 97.3 °F (36.3 °C)   TempSrc: Temporal   SpO2: 98%   Weight: 99 kg (218 lb 3.2 oz)   Height: 188 cm (74\")     Body mass index is 28.02 kg/m².    Constitutional: NAD.  Eyes: EOMI. Normal conjunctiva.  Right lower extremity: 6.5 cm area of erythema and " induration, just superior and medial to the right knee joint, with palpable edema and swelling with tenderness on the medial aspect of the thigh to the groin.  There is no tenderness to palpation of the knee joint including the medial and lateral aspects and the popliteal fossa.  He does not have any calf tenderness to palpation.  He is walking with a limp.  Psychiatric: Normal affect. Normal thought content.     Assessment/Plan   Assessment and Plan  Pleasant 60-year-old male with CAD, hypertension, pernicious anemia, multiple thyroid nodules, right lumbar radiculopathy, who presents with the following:    Diagnoses and all orders for this visit:    1. Cellulitis of leg, right (Primary)    Concern for phlegmon development tracking up the right thigh with possible thrombophlebitis despite Bactrim.  I do not think that there is knee joint involvement at this time but he needs imaging and labs for further evaluation, and likely IV antibiotics with surgery consultation.    Discussed this with the patient, recommend he proceed to the nearest emergency room, we decided upon [facility] and I have given them a verbal checkout.    Brian Marr MD  Family Medicine  O: [phone]  C: [phone]    Disclaimer: Parts of this note were dictated by speech recognition. Minor errors in transcription may be present. Please call if questions.

## 2020-12-22 NOTE — ED PROVIDER NOTES
EMERGENCY DEPARTMENT ENCOUNTER      Room Number: HALA/HA    History is provided by the patient, no translation services needed    HPI:    Chief complaint: Tender red area    Location: Right leg    Quality/Severity: Aching/3 out of 10    Timing/Duration: 4 days/constant    Modifying Factors: Worsening with movement    Associated Symptoms: Chills    Narrative: Pt is a 60 y.o. male who presents complaining of right leg redness and swelling x4 days.  Patient states the 4 days ago he noticed a small little bump to the area which he squeezed and had a clear discharge.  Patient states that since then area has become larger with redness and tenderness.  Patient states that he was seen in urgent care and started on Bactrim.  Patient states that yesterday the swelling was worse but today the redness and swelling has seemed to improve.  Patient denies any drainage at this time.  Patient states that he has had chills but no fever.  Patient states that this pain is worse with walking.  Patient states that he feels a little bit stiff when he first stands up due to the pain but that after walking for a while it gets much better.      PMD: Brian Marr MD    REVIEW OF SYSTEMS  Review of Systems   Constitutional: Positive for chills. Negative for fever.   Eyes: Negative for pain and visual disturbance.   Respiratory: Negative for cough and shortness of breath.    Cardiovascular: Negative for chest pain and palpitations.   Gastrointestinal: Negative for abdominal pain, nausea and vomiting.   Genitourinary: Negative for difficulty urinating, dysuria and flank pain.   Musculoskeletal: Positive for gait problem. Negative for myalgias.   Skin: Positive for color change and wound. Negative for rash.   Neurological: Negative for dizziness, syncope, numbness and headaches.   Psychiatric/Behavioral: Negative for confusion and suicidal ideas. The patient is not nervous/anxious.          PAST MEDICAL HISTORY  Active Ambulatory Problems      Diagnosis Date Noted   • Multiple thyroid nodules    • Pernicious anemia    • H/O multiple allergies 03/09/2016   • Coronary artery disease involving native coronary artery of native heart without angina pectoris 08/10/2017   • Herniated nucleus pulposus, L3-4 right 09/09/2020   • Right lumbar radiculopathy 09/09/2020   • Pterygium of right eye 10/16/2020   • Degenerative arthritis of cervical spine 12/16/2020     Resolved Ambulatory Problems     Diagnosis Date Noted   • Chronic fatigue 03/09/2016   • Pernicious anemia 07/31/2017   • Abnormal EKG 08/16/2019   • Angina at rest (CMS/HCC) 08/16/2019     Past Medical History:   Diagnosis Date   • B12 deficiency anemia    • Chest pain    • Deep vein thrombosis (CMS/HCC)    • Headache    • History of blood clots    • Pulmonary embolism (CMS/HCC)    • Syncope        PAST SURGICAL HISTORY  Past Surgical History:   Procedure Laterality Date   • CARDIAC CATHETERIZATION N/A 8/3/2017    Procedure: Coronary angiography;  Surgeon: Cynthia Farley MD;  Location: [facility] CATH INVASIVE LOCATION;  Service:    • CARDIAC CATHETERIZATION  8/3/2017    Procedure: Functional Flow [place];  Surgeon: Cynthia Farley MD;  Location: [facility] CATH INVASIVE LOCATION;  Service:    • CARDIAC CATHETERIZATION N/A 8/3/2017    Procedure: Stent YI coronary;  Surgeon: Cynthia Farley MD;  Location: [facility] CATH INVASIVE LOCATION;  Service:    • CARDIAC CATHETERIZATION N/A 8/3/2017    Procedure: Left ventriculography;  Surgeon: Cynthia Farley MD;  Location: [facility] CATH INVASIVE LOCATION;  Service:    • CARDIAC CATHETERIZATION N/A 8/3/2017    Procedure: Left Heart Cath;  Surgeon: Cynthia Farley MD;  Location: [facility] CATH INVASIVE LOCATION;  Service:    • CARDIAC CATHETERIZATION N/A 8/20/2019    Procedure: Left Heart Cath;  Surgeon: Mulugeta Ac MD;  Location: [facility] CATH INVASIVE LOCATION;  Service: Cardiology   • CARDIAC CATHETERIZATION N/A 8/20/2019    Procedure: Coronary angiography;  Surgeon:  Mulugeta Ac MD;  Location:  [facility] CATH INVASIVE LOCATION;  Service: Cardiology   • CARDIAC CATHETERIZATION N/A 8/20/2019    Procedure: Left ventriculography;  Surgeon: Mulugeta Ac MD;  Location:  [facility] CATH INVASIVE LOCATION;  Service: Cardiology   • COLONOSCOPY  MMVI    NORMAL.   • COLONOSCOPY     • FINE NEEDLE ASPIRATION  12/2015    Left thyroid nodule.  [place] category II.  Dr. Socrates Sanchez       FAMILY HISTORY  Family History   Problem Relation Age of Onset   • Heart disease Other    • Hypertension Other    • Thyroid disease Other    • Heart disease Father    • Prostate cancer Father    • Hypertension Father    • Stroke Father    • Heart disease Mother    • Hypertension Mother    • Heart disease Brother    • Hypertension Brother    • Thyroid disease Sister    • Heart disease Brother    • Hypertension Brother        SOCIAL HISTORY  Social History     Socioeconomic History   • Marital status:      Spouse name: Not on file   • Number of children: Not on file   • Years of education: Not on file   • Highest education level: Not on file   Occupational History   • Occupation:    Tobacco Use   • Smoking status: Never Smoker   • Smokeless tobacco: Never Used   • Tobacco comment: caffeine use    Substance and Sexual Activity   • Alcohol use: Yes     Binge frequency: Monthly     Comment: occasional   • Drug use: No     Comment: Drinks 2-4 caffeinated beverages per day   • Sexual activity: Defer   Lifestyle   • Physical activity     Days per week: 0 days     Minutes per session: Not on file   • Stress: Not on file   Social History Narrative    ** Merged History Encounter **            ALLERGIES  Patient has no known allergies.      Current Facility-Administered Medications:   •  nitroglycerin (NITROSTAT) SL tablet 0.4 mg, 0.4 mg, Sublingual, Q5 Min PRN, Tiago Srivastava Jr., MD  •  nitroglycerin (NITROSTAT) SL tablet 0.4 mg, 0.4 mg, Sublingual, Q5 Min PRN, Jason Mancilla MD  •  sodium  chloride 0.9 % flush 10 mL, 10 mL, Intravenous, PRN, Tiago Srivastava Jr., MD  •  sodium chloride 0.9 % flush 10 mL, 10 mL, Intravenous, PRN, Jason Mancilla MD    Current Outpatient Medications:   •  aspirin 81 MG tablet, Take 1 tablet by mouth Daily., Disp: 30 tablet, Rfl: 11  •  atorvastatin (LIPITOR) 40 MG tablet, Take 1 tablet by mouth Daily., Disp: 90 tablet, Rfl: 3  •  Cholecalciferol (VITAMIN D3) 5000 units capsule capsule, Take 5,000 Units by mouth Daily., Disp: , Rfl:   •  cyanocobalamin 1000 MCG/ML injection, INJECT 1ML INTRAMUSCULARLY  EVERY  30 DAYS AS DIRECTED, Disp: 30 mL, Rfl: 2  •  lisinopril (PRINIVIL,ZESTRIL) 2.5 MG tablet, TAKE ONE TABLET BY MOUTH TWICE A DAY, Disp: 180 tablet, Rfl: 1  •  Multiple Vitamin (MULTI VITAMIN DAILY PO), Take  by mouth Daily., Disp: , Rfl:   •  sulfamethoxazole-trimethoprim (BACTRIM DS,SEPTRA DS) 800-160 MG per tablet, Take 1 tablet by mouth 2 (Two) Times a Day. For 7 days, Disp: , Rfl:   •  cephalexin (KEFLEX) 500 MG capsule, Take 1 capsule by mouth 3 (Three) Times a Day for 7 days., Disp: 21 capsule, Rfl: 0  •  cyclobenzaprine (FLEXERIL) 10 MG tablet, Take 10 mg by mouth., Disp: , Rfl:   •  ketotifen (ZADITOR) 0.025 % ophthalmic solution, Administer 1 drop to both eyes 2 (Two) Times a Day As Needed (eye itching)., Disp: 5 mL, Rfl: 3    PHYSICAL EXAM  ED Triage Vitals [12/22/20 1233]   Temp Heart Rate Resp BP SpO2   98.9 °F (37.2 °C) 74 20 128/70 97 %      Temp src Heart Rate Source Patient Position BP Location FiO2 (%)   Oral Monitor Sitting Left arm --       Physical Exam   Constitutional: He is oriented to person, place, and time and well-developed, well-nourished, and in no distress.   HENT:   Head: Normocephalic and atraumatic.   Eyes: Conjunctivae and EOM are normal.   Neck: Normal range of motion. Neck supple.   Cardiovascular: Normal rate, regular rhythm and normal heart sounds.   Pulmonary/Chest: Effort normal and breath sounds normal. No respiratory  distress.   Abdominal: Soft. Bowel sounds are normal. He exhibits no distension. There is no abdominal tenderness.   Musculoskeletal: Normal range of motion.         General: No edema.   Neurological: He is alert and oriented to person, place, and time. GCS score is 15.   Skin: Skin is warm and dry.        Psychiatric: Mood and affect normal.   Nursing note and vitals reviewed.        LAB RESULTS  Lab Results (last 24 hours)     ** No results found for the last 24 hours. **            I ordered the above labs and reviewed the results    RADIOLOGY  No Radiology Exams Resulted Within Past 24 Hours    I ordered the above radiologic testing and reviewed the results    PROCEDURES  Procedures      PROGRESS AND CONSULTS  ED Course as of Dec 22 1434   Tue Dec 22, 2020   1429 60-year-old male known to the ED with complaints of erythematous tender area to his right leg.  Patient states that the area started 4 days ago with a small bump.  Patient states that he was seen in another facility and was given antibiotics which she is taking.  Patient states that he began the antibiotics 3 days ago.  Patient states that yesterday he noticed that the area was worsening but today it has improved slightly.  Patient denies any drainage from the area.  After history and physical exam patient was noted to have an erythematous warm area to his medial right leg.  Patient was noted to have an area with a scab with no drainage.  Ultrasound was used to see if there is any areas of fluid collection which did not find any.  Discussed with the patient that I would add Keflex to his antibiotic regimen.  Instructed patient that he would need to follow-up with his PCP.  Also instructed patient that he could use warm compress to the area.  Instructed patient that if he had any worsening symptoms to return to the ED.  Patient expressed verbal understanding of plan of care.    [GT]   9143 Dr. Brian Marr the patient's PCP was contacted regarding plan of  care.  He has agreed to see patient in the office as follow-up.    [GT]   1431 Ultrasound was used to see if there was any fluid collections, cobblestoning was seen but no fluid collection.    [GT]      ED Course User Index  [GT] Angelika Jorge PA-C           MEDICAL DECISION MAKING  Results were reviewed/discussed with the patient and they were also made aware of online access. Pt also made aware that some labs, such as cultures, will not be resulted during ER visit and follow up with PMD is necessary.     MDM       DIAGNOSIS  Final diagnoses:   Cellulitis of right lower extremity       Latest Documented Vital Signs:  As of 14:34 EST  BP- 128/70 HR- 74 Temp- 98.9 °F (37.2 °C) (Oral) O2 sat- 97%    DISPOSITION  Discharged home        Discussed pertinent labs and imaging findings with the patient/family.  Patient/Family voiced understanding of need to follow-up for recheck, further testing as needed.  Return to the emergency Department warnings were given.         Medication List      New Prescriptions    cephalexin 500 MG capsule  Commonly known as: KEFLEX  Take 1 capsule by mouth 3 (Three) Times a Day for 7 days.           Where to Get Your Medications      These medications were sent to [address] [facility] AT [address] - [phone]  - [phone]   [address] [facility] [address]    Phone: [phone]   · cephalexin 500 MG capsule             Follow-up Information     Brian Marr MD. Call in 1 day.    Specialties: Family Medicine, Emergency Medicine, Urgent Care  Why: To schedule a follow up appointment  Contact information:  [address] [facility] [address] [phone]                     Dictated utilizing Dragon dictation     Angelika Jorge PA-C  12/22/20 0345

## 2020-12-23 ENCOUNTER — HOSPITAL ENCOUNTER (OUTPATIENT)
Facility: HOSPITAL | Age: 60
Setting detail: OBSERVATION
Discharge: HOME | End: 2020-12-25
Attending: EMERGENCY MEDICINE | Admitting: STUDENT IN AN ORGANIZED HEALTH CARE EDUCATION/TRAINING PROGRAM

## 2020-12-23 ENCOUNTER — APPOINTMENT (OUTPATIENT)
Dept: GENERAL RADIOLOGY | Facility: HOSPITAL | Age: 60
End: 2020-12-23

## 2020-12-23 DIAGNOSIS — L02.415 ABSCESS OF SKIN OF RIGHT KNEE: ICD-10-CM

## 2020-12-23 DIAGNOSIS — L03.115 CELLULITIS OF RIGHT KNEE: Primary | ICD-10-CM

## 2020-12-23 PROBLEM — M71.061 ABSCESS OF BURSA OF RIGHT KNEE: Status: ACTIVE | Noted: 2020-12-23

## 2020-12-23 LAB
ALBUMIN SERPL-MCNC: 3.7 G/DL (ref 3.5–5.2)
ALBUMIN/GLOB SERPL: 1.2 G/DL
ALP SERPL-CCNC: 66 U/L (ref 39–117)
ALT SERPL W P-5'-P-CCNC: 21 U/L (ref 1–41)
ANION GAP SERPL CALCULATED.3IONS-SCNC: 9.3 MMOL/L (ref 5–15)
AST SERPL-CCNC: 20 U/L (ref 1–40)
BASOPHILS # BLD AUTO: 0.04 10*3/MM3 (ref 0–0.2)
BASOPHILS NFR BLD AUTO: 0.4 % (ref 0–1.5)
BILIRUB SERPL-MCNC: 0.6 MG/DL (ref 0–1.2)
BUN SERPL-MCNC: 11 MG/DL (ref 6–20)
BUN/CREAT SERPL: 10.5 (ref 7–25)
CALCIUM SPEC-SCNC: 9 MG/DL (ref 8.6–10.5)
CHLORIDE SERPL-SCNC: 100 MMOL/L (ref 98–107)
CO2 SERPL-SCNC: 25.7 MMOL/L (ref 22–29)
CREAT SERPL-MCNC: 1.05 MG/DL (ref 0.76–1.27)
D-LACTATE SERPL-SCNC: 0.9 MMOL/L (ref 0.5–2)
DEPRECATED RDW RBC AUTO: 40.6 FL (ref 37–54)
EOSINOPHIL # BLD AUTO: 0.08 10*3/MM3 (ref 0–0.4)
EOSINOPHIL NFR BLD AUTO: 0.7 % (ref 0.3–6.2)
ERYTHROCYTE [DISTWIDTH] IN BLOOD BY AUTOMATED COUNT: 11.7 % (ref 12.3–15.4)
GFR SERPL CREATININE-BSD FRML MDRD: 72 ML/MIN/1.73
GLOBULIN UR ELPH-MCNC: 3.2 GM/DL
GLUCOSE SERPL-MCNC: 109 MG/DL (ref 65–99)
HCT VFR BLD AUTO: 41.3 % (ref 37.5–51)
HGB BLD-MCNC: 14 G/DL (ref 13–17.7)
IMM GRANULOCYTES # BLD AUTO: 0.04 10*3/MM3 (ref 0–0.05)
IMM GRANULOCYTES NFR BLD AUTO: 0.4 % (ref 0–0.5)
LYMPHOCYTES # BLD AUTO: 1.62 10*3/MM3 (ref 0.7–3.1)
LYMPHOCYTES NFR BLD AUTO: 14.6 % (ref 19.6–45.3)
MCH RBC QN AUTO: 32 PG (ref 26.6–33)
MCHC RBC AUTO-ENTMCNC: 33.9 G/DL (ref 31.5–35.7)
MCV RBC AUTO: 94.5 FL (ref 79–97)
MONOCYTES # BLD AUTO: 1 10*3/MM3 (ref 0.1–0.9)
MONOCYTES NFR BLD AUTO: 9 % (ref 5–12)
NEUTROPHILS NFR BLD AUTO: 74.9 % (ref 42.7–76)
NEUTROPHILS NFR BLD AUTO: 8.31 10*3/MM3 (ref 1.7–7)
NRBC BLD AUTO-RTO: 0 /100 WBC (ref 0–0.2)
PLATELET # BLD AUTO: 267 10*3/MM3 (ref 140–450)
PMV BLD AUTO: 9.5 FL (ref 6–12)
POTASSIUM SERPL-SCNC: 4 MMOL/L (ref 3.5–5.2)
PROCALCITONIN SERPL-MCNC: 0.08 NG/ML (ref 0–0.25)
PROT SERPL-MCNC: 6.9 G/DL (ref 6–8.5)
RBC # BLD AUTO: 4.37 10*6/MM3 (ref 4.14–5.8)
SODIUM SERPL-SCNC: 135 MMOL/L (ref 136–145)
WBC # BLD AUTO: 11.09 10*3/MM3 (ref 3.4–10.8)

## 2020-12-23 PROCEDURE — 96367 TX/PROPH/DG ADDL SEQ IV INF: CPT

## 2020-12-23 PROCEDURE — 25010000002 ONDANSETRON PER 1 MG: Performed by: EMERGENCY MEDICINE

## 2020-12-23 PROCEDURE — 73562 X-RAY EXAM OF KNEE 3: CPT

## 2020-12-23 PROCEDURE — 96375 TX/PRO/DX INJ NEW DRUG ADDON: CPT

## 2020-12-23 PROCEDURE — 87040 BLOOD CULTURE FOR BACTERIA: CPT | Performed by: EMERGENCY MEDICINE

## 2020-12-23 PROCEDURE — 25010000002 VANCOMYCIN 10 G RECONSTITUTED SOLUTION: Performed by: EMERGENCY MEDICINE

## 2020-12-23 PROCEDURE — 80053 COMPREHEN METABOLIC PANEL: CPT | Performed by: EMERGENCY MEDICINE

## 2020-12-23 PROCEDURE — 25010000002 CEFAZOLIN 1-4 GM/50ML-% SOLUTION: Performed by: EMERGENCY MEDICINE

## 2020-12-23 PROCEDURE — 96365 THER/PROPH/DIAG IV INF INIT: CPT

## 2020-12-23 PROCEDURE — 99284 EMERGENCY DEPT VISIT MOD MDM: CPT

## 2020-12-23 PROCEDURE — 84145 PROCALCITONIN (PCT): CPT | Performed by: EMERGENCY MEDICINE

## 2020-12-23 PROCEDURE — 83605 ASSAY OF LACTIC ACID: CPT | Performed by: EMERGENCY MEDICINE

## 2020-12-23 PROCEDURE — 25010000002 MORPHINE PER 10 MG: Performed by: EMERGENCY MEDICINE

## 2020-12-23 PROCEDURE — 85025 COMPLETE CBC W/AUTO DIFF WBC: CPT | Performed by: EMERGENCY MEDICINE

## 2020-12-23 RX ORDER — BISACODYL 10 MG
10 SUPPOSITORY, RECTAL RECTAL DAILY PRN
Status: DISCONTINUED | OUTPATIENT
Start: 2020-12-23 | End: 2020-12-25 | Stop reason: HOSPADM

## 2020-12-23 RX ORDER — CEFAZOLIN SODIUM 1 G/50ML
1 INJECTION, SOLUTION INTRAVENOUS ONCE
Status: COMPLETED | OUTPATIENT
Start: 2020-12-23 | End: 2020-12-23

## 2020-12-23 RX ORDER — BISACODYL 5 MG/1
5 TABLET, DELAYED RELEASE ORAL DAILY PRN
Status: DISCONTINUED | OUTPATIENT
Start: 2020-12-23 | End: 2020-12-25 | Stop reason: HOSPADM

## 2020-12-23 RX ORDER — ONDANSETRON 2 MG/ML
4 INJECTION INTRAMUSCULAR; INTRAVENOUS ONCE
Status: COMPLETED | OUTPATIENT
Start: 2020-12-23 | End: 2020-12-23

## 2020-12-23 RX ORDER — ONDANSETRON 4 MG/1
4 TABLET, FILM COATED ORAL EVERY 6 HOURS PRN
Status: DISCONTINUED | OUTPATIENT
Start: 2020-12-23 | End: 2020-12-25 | Stop reason: HOSPADM

## 2020-12-23 RX ORDER — MORPHINE SULFATE 2 MG/ML
4 INJECTION, SOLUTION INTRAMUSCULAR; INTRAVENOUS ONCE
Status: COMPLETED | OUTPATIENT
Start: 2020-12-23 | End: 2020-12-23

## 2020-12-23 RX ORDER — ONDANSETRON 2 MG/ML
4 INJECTION INTRAMUSCULAR; INTRAVENOUS EVERY 6 HOURS PRN
Status: DISCONTINUED | OUTPATIENT
Start: 2020-12-23 | End: 2020-12-25 | Stop reason: HOSPADM

## 2020-12-23 RX ORDER — ALUMINA, MAGNESIA, AND SIMETHICONE 2400; 2400; 240 MG/30ML; MG/30ML; MG/30ML
15 SUSPENSION ORAL EVERY 6 HOURS PRN
Status: DISCONTINUED | OUTPATIENT
Start: 2020-12-23 | End: 2020-12-25 | Stop reason: HOSPADM

## 2020-12-23 RX ORDER — SODIUM CHLORIDE 0.9 % (FLUSH) 0.9 %
10 SYRINGE (ML) INJECTION AS NEEDED
Status: DISCONTINUED | OUTPATIENT
Start: 2020-12-23 | End: 2020-12-25 | Stop reason: HOSPADM

## 2020-12-23 RX ORDER — ACETAMINOPHEN 325 MG/1
650 TABLET ORAL EVERY 4 HOURS PRN
Status: DISCONTINUED | OUTPATIENT
Start: 2020-12-23 | End: 2020-12-25 | Stop reason: HOSPADM

## 2020-12-23 RX ORDER — LIDOCAINE HYDROCHLORIDE AND EPINEPHRINE 10; 10 MG/ML; UG/ML
10 INJECTION, SOLUTION INFILTRATION; PERINEURAL ONCE
Status: COMPLETED | OUTPATIENT
Start: 2020-12-23 | End: 2020-12-24

## 2020-12-23 RX ORDER — SODIUM CHLORIDE 0.9 % (FLUSH) 0.9 %
10 SYRINGE (ML) INJECTION AS NEEDED
Status: DISCONTINUED | OUTPATIENT
Start: 2020-12-23 | End: 2020-12-24

## 2020-12-23 RX ADMIN — CEFAZOLIN SODIUM 1 G: 1 INJECTION, SOLUTION INTRAVENOUS at 22:12

## 2020-12-23 RX ADMIN — MORPHINE SULFATE 4 MG: 2 INJECTION, SOLUTION INTRAMUSCULAR; INTRAVENOUS at 22:08

## 2020-12-23 RX ADMIN — VANCOMYCIN HYDROCHLORIDE 2000 MG: 10 INJECTION, POWDER, LYOPHILIZED, FOR SOLUTION INTRAVENOUS at 23:01

## 2020-12-23 RX ADMIN — ONDANSETRON 4 MG: 2 INJECTION INTRAMUSCULAR; INTRAVENOUS at 22:07

## 2020-12-24 PROBLEM — L03.115 CELLULITIS OF RIGHT LOWER EXTREMITY: Status: ACTIVE | Noted: 2020-12-24

## 2020-12-24 PROBLEM — I10 ESSENTIAL HYPERTENSION: Status: ACTIVE | Noted: 2020-12-24

## 2020-12-24 PROBLEM — L03.119 CELLULITIS OF EXTREMITY: Status: ACTIVE | Noted: 2020-12-24

## 2020-12-24 PROBLEM — Z86.718 HISTORY OF DVT (DEEP VEIN THROMBOSIS): Status: ACTIVE | Noted: 2020-12-24

## 2020-12-24 LAB
ANION GAP SERPL CALCULATED.3IONS-SCNC: 5.1 MMOL/L (ref 5–15)
BUN SERPL-MCNC: 11 MG/DL (ref 6–20)
BUN/CREAT SERPL: 10.4 (ref 7–25)
CALCIUM SPEC-SCNC: 8.9 MG/DL (ref 8.6–10.5)
CHLORIDE SERPL-SCNC: 103 MMOL/L (ref 98–107)
CO2 SERPL-SCNC: 27.9 MMOL/L (ref 22–29)
CREAT SERPL-MCNC: 1.06 MG/DL (ref 0.76–1.27)
DEPRECATED RDW RBC AUTO: 36.4 FL (ref 37–54)
ERYTHROCYTE [DISTWIDTH] IN BLOOD BY AUTOMATED COUNT: 11.3 % (ref 12.3–15.4)
GFR SERPL CREATININE-BSD FRML MDRD: 72 ML/MIN/1.73
GLUCOSE SERPL-MCNC: 88 MG/DL (ref 65–99)
HCT VFR BLD AUTO: 40.2 % (ref 37.5–51)
HGB BLD-MCNC: 13.9 G/DL (ref 13–17.7)
MCH RBC QN AUTO: 31.1 PG (ref 26.6–33)
MCHC RBC AUTO-ENTMCNC: 34.6 G/DL (ref 31.5–35.7)
MCV RBC AUTO: 89.9 FL (ref 79–97)
PLATELET # BLD AUTO: 277 10*3/MM3 (ref 140–450)
PMV BLD AUTO: 9.7 FL (ref 6–12)
POTASSIUM SERPL-SCNC: 4.6 MMOL/L (ref 3.5–5.2)
RBC # BLD AUTO: 4.47 10*6/MM3 (ref 4.14–5.8)
SARS-COV-2 ORF1AB RESP QL NAA+PROBE: NOT DETECTED
SODIUM SERPL-SCNC: 136 MMOL/L (ref 136–145)
WBC # BLD AUTO: 8.05 10*3/MM3 (ref 3.4–10.8)

## 2020-12-24 PROCEDURE — 87186 SC STD MICRODIL/AGAR DIL: CPT | Performed by: EMERGENCY MEDICINE

## 2020-12-24 PROCEDURE — 36415 COLL VENOUS BLD VENIPUNCTURE: CPT | Performed by: NURSE PRACTITIONER

## 2020-12-24 PROCEDURE — 96366 THER/PROPH/DIAG IV INF ADDON: CPT

## 2020-12-24 PROCEDURE — 87070 CULTURE OTHR SPECIMN AEROBIC: CPT | Performed by: EMERGENCY MEDICINE

## 2020-12-24 PROCEDURE — G0378 HOSPITAL OBSERVATION PER HR: HCPCS

## 2020-12-24 PROCEDURE — 25010000002 CEFAZOLIN 1-4 GM/50ML-% SOLUTION: Performed by: NURSE PRACTITIONER

## 2020-12-24 PROCEDURE — 87147 CULTURE TYPE IMMUNOLOGIC: CPT | Performed by: EMERGENCY MEDICINE

## 2020-12-24 PROCEDURE — 96367 TX/PROPH/DG ADDL SEQ IV INF: CPT

## 2020-12-24 PROCEDURE — 87205 SMEAR GRAM STAIN: CPT | Performed by: EMERGENCY MEDICINE

## 2020-12-24 PROCEDURE — 25010000002 VANCOMYCIN PER 500 MG: Performed by: NURSE PRACTITIONER

## 2020-12-24 PROCEDURE — 25010000003 CEFAZOLIN IN DEXTROSE 2-4 GM/100ML-% SOLUTION: Performed by: NURSE PRACTITIONER

## 2020-12-24 PROCEDURE — 80048 BASIC METABOLIC PNL TOTAL CA: CPT | Performed by: NURSE PRACTITIONER

## 2020-12-24 PROCEDURE — U0004 COV-19 TEST NON-CDC HGH THRU: HCPCS | Performed by: EMERGENCY MEDICINE

## 2020-12-24 PROCEDURE — 85027 COMPLETE CBC AUTOMATED: CPT | Performed by: NURSE PRACTITIONER

## 2020-12-24 RX ORDER — CEFAZOLIN SODIUM 2 G/100ML
2 INJECTION, SOLUTION INTRAVENOUS EVERY 8 HOURS
Status: DISCONTINUED | OUTPATIENT
Start: 2020-12-24 | End: 2020-12-25

## 2020-12-24 RX ORDER — LISINOPRIL 2.5 MG/1
2.5 TABLET ORAL 2 TIMES DAILY
Status: DISCONTINUED | OUTPATIENT
Start: 2020-12-24 | End: 2020-12-25 | Stop reason: HOSPADM

## 2020-12-24 RX ORDER — VANCOMYCIN HYDROCHLORIDE 1 G/200ML
1000 INJECTION, SOLUTION INTRAVENOUS EVERY 12 HOURS
Status: DISCONTINUED | OUTPATIENT
Start: 2020-12-24 | End: 2020-12-25

## 2020-12-24 RX ORDER — ATORVASTATIN CALCIUM 20 MG/1
40 TABLET, FILM COATED ORAL DAILY
Status: DISCONTINUED | OUTPATIENT
Start: 2020-12-24 | End: 2020-12-25 | Stop reason: HOSPADM

## 2020-12-24 RX ORDER — HYDROCODONE BITARTRATE AND ACETAMINOPHEN 5; 325 MG/1; MG/1
1 TABLET ORAL EVERY 6 HOURS PRN
Status: DISCONTINUED | OUTPATIENT
Start: 2020-12-24 | End: 2020-12-25 | Stop reason: HOSPADM

## 2020-12-24 RX ORDER — DIPHENOXYLATE HYDROCHLORIDE AND ATROPINE SULFATE 2.5; .025 MG/1; MG/1
1 TABLET ORAL DAILY
Status: DISCONTINUED | OUTPATIENT
Start: 2020-12-24 | End: 2020-12-25 | Stop reason: HOSPADM

## 2020-12-24 RX ORDER — CEFAZOLIN SODIUM 1 G/50ML
1 INJECTION, SOLUTION INTRAVENOUS EVERY 8 HOURS
Status: DISCONTINUED | OUTPATIENT
Start: 2020-12-24 | End: 2020-12-24

## 2020-12-24 RX ORDER — ASPIRIN 81 MG/1
81 TABLET ORAL DAILY
Status: DISCONTINUED | OUTPATIENT
Start: 2020-12-24 | End: 2020-12-25 | Stop reason: HOSPADM

## 2020-12-24 RX ADMIN — LIDOCAINE HYDROCHLORIDE AND EPINEPHRINE 10 ML: 10; 10 INJECTION, SOLUTION INFILTRATION; PERINEURAL at 00:07

## 2020-12-24 RX ADMIN — LISINOPRIL 2.5 MG: 2.5 TABLET ORAL at 19:55

## 2020-12-24 RX ADMIN — VANCOMYCIN HYDROCHLORIDE 1000 MG: 1 INJECTION, SOLUTION INTRAVENOUS at 11:04

## 2020-12-24 RX ADMIN — HYDROCODONE BITARTRATE AND ACETAMINOPHEN 1 TABLET: 5; 325 TABLET ORAL at 01:51

## 2020-12-24 RX ADMIN — CEFAZOLIN SODIUM 1 G: 1 INJECTION, SOLUTION INTRAVENOUS at 05:21

## 2020-12-24 RX ADMIN — HYDROCODONE BITARTRATE AND ACETAMINOPHEN 1 TABLET: 5; 325 TABLET ORAL at 13:43

## 2020-12-24 RX ADMIN — HYDROCODONE BITARTRATE AND ACETAMINOPHEN 1 TABLET: 5; 325 TABLET ORAL at 19:50

## 2020-12-24 RX ADMIN — LISINOPRIL 2.5 MG: 2.5 TABLET ORAL at 07:46

## 2020-12-24 RX ADMIN — ATORVASTATIN CALCIUM 40 MG: 20 TABLET, FILM COATED ORAL at 07:46

## 2020-12-24 RX ADMIN — Medication 1 TABLET: at 07:46

## 2020-12-24 RX ADMIN — ASPIRIN 81 MG: 81 TABLET, COATED ORAL at 07:46

## 2020-12-24 RX ADMIN — VANCOMYCIN HYDROCHLORIDE 1000 MG: 1 INJECTION, SOLUTION INTRAVENOUS at 20:00

## 2020-12-24 RX ADMIN — CEFAZOLIN SODIUM 2 G: 2 INJECTION, SOLUTION INTRAVENOUS at 22:39

## 2020-12-24 RX ADMIN — HYDROCODONE BITARTRATE AND ACETAMINOPHEN 1 TABLET: 5; 325 TABLET ORAL at 07:47

## 2020-12-24 RX ADMIN — CEFAZOLIN SODIUM 2 G: 2 INJECTION, SOLUTION INTRAVENOUS at 13:43

## 2020-12-24 NOTE — PLAN OF CARE
Goal Outcome Evaluation:  Plan of Care Reviewed With: patient  Progress: no change  Outcome Summary: HTN well controlled. pain well controlled

## 2020-12-24 NOTE — CONSULTS
Orthopedic Consult      Patient: Niranjan Peacock  YOB: 1960     Date of Admission: 12/23/2020  7:29 PM    Medical Record Number: [id number]    Attending Physician: Anthony Mcdonald MD    Consulting Physician: Chente Ely MD    Reason for Consult: Right knee cellulitis, abscess of bursa right knee    History of Present Illness: 60 y.o. male admitted to [facility] with Abscess of bursa of right knee [M71.061]  Cellulitis of right knee [L03.115]  Abscess of skin of right knee [L02.415].     The patient was evaluated in the emergency room and was diagnosed with a abscess right knee superficial and underwent a incision and drainage and packing.  Cultures were sent.    Secondary to the age and multiple medical co morbidities the patient was admitted to the hospitalist.   As I was on call for the emergency room I was consulted for further evaluation and treatment.   The patient was in the usual state of health and   started noticing some pain and swelling associated with redness on the front of the right knee..     He has been on IV antibiotics since admission.  He states that the knee feels better.  He works as an .  He does not have any history of MRSA but reports that his son had history of MRSA in the toe about 1 year back.      Past medical history, Past surgical history, family history, Social history, current medications, home medications Have been reviewed by me.    Past Medical History:   Diagnosis Date   • Angina at rest (CMS/HCC) 8/16/2019    Added automatically from request for surgery [id number]   • B12 deficiency anemia    • Chest pain    • Chronic fatigue 3/9/2016   • Coronary artery disease involving native coronary artery of native heart without angina pectoris 8/10/2017   • Deep vein thrombosis (CMS/HCC)    • Headache    • History of blood clots     blood clot found in right lung    • Hypertension    • Multiple thyroid nodules    • Multiple thyroid nodules    •  Pernicious anemia    • Pulmonary embolism (CMS/HCC)     2015   • Syncope     2 yrs ago one time     Past Surgical History:   Procedure Laterality Date   • CARDIAC CATHETERIZATION N/A 8/3/2017    Procedure: Coronary angiography;  Surgeon: Cynthia Farley MD;  Location:  [facility];  Service:    • CARDIAC CATHETERIZATION  8/3/2017    Procedure: Functional Flow [place];  Surgeon: Cynthia Farley MD;  Location:  [facility];  Service:    • CARDIAC CATHETERIZATION N/A 8/3/2017    Procedure: Stent YI coronary;  Surgeon: Cynthia Farley MD;  Location:  [facility];  Service:    • CARDIAC CATHETERIZATION N/A 8/3/2017    Procedure: Left ventriculography;  Surgeon: Cynthia Farley MD;  Location:  [facility];  Service:    • CARDIAC CATHETERIZATION N/A 8/3/2017    Procedure: Left Heart Cath;  Surgeon: Cynthia Farley MD;  [facility];  Service:    • CARDIAC CATHETERIZATION N/A 8/20/2019    Procedure: Left Heart Cath;  Surgeon: Mulugeta Ac MD;  Location:  [facility];  Service: Cardiology   • CARDIAC CATHETERIZATION N/A 8/20/2019    Procedure: Coronary angiography;  Surgeon: Mulugeta Ac MD;  [facility];  Service: Cardiology   • CARDIAC CATHETERIZATION N/A 8/20/2019    Procedure: Left ventriculography;  Surgeon: Mulugeta Ac MD;  Location:  [facility];  Service: Cardiology   • COLONOSCOPY  MMVI    NORMAL.   • COLONOSCOPY     • FINE NEEDLE ASPIRATION  12/2015    Left thyroid nodule.  [place] category II.  Dr. Socrates Sanchez     Social History     Occupational History   • Occupation:    Tobacco Use   • Smoking status: Never Smoker   • Smokeless tobacco: Never Used   • Tobacco comment: caffeine use    Substance and Sexual Activity   • Alcohol use: Yes     Binge frequency: Monthly     Comment: occasional   • Drug use: No     Comment: Drinks 2-4  caffeinated beverages per day   • Sexual activity: Defer      Social History     Social History Narrative    ** Merged History Encounter **          Family History   Problem Relation Age of Onset   • Heart disease Other    • Hypertension Other    • Thyroid disease Other    • Heart disease Father    • Prostate cancer Father    • Hypertension Father    • Stroke Father    • Heart disease Mother    • Hypertension Mother    • Heart disease Brother    • Hypertension Brother    • Thyroid disease Sister    • Heart disease Brother    • Hypertension Brother         No Known Allergies     Home Medications:  Facility-Administered Medications Prior to Admission   Medication Dose Route Frequency Provider Last Rate Last Admin   • nitroglycerin (NITROSTAT) SL tablet 0.4 mg  0.4 mg Sublingual Q5 Min PRN Tiago Srivastava Jr., MD       • nitroglycerin (NITROSTAT) SL tablet 0.4 mg  0.4 mg Sublingual Q5 Min PRN Jason Mancilla MD       • sodium chloride 0.9 % flush 10 mL  10 mL Intravenous PRN Tiago Srivastava Jr., MD       • sodium chloride 0.9 % flush 10 mL  10 mL Intravenous PRN Jason Mancilla MD         Medications Prior to Admission   Medication Sig Dispense Refill Last Dose   • aspirin 81 MG tablet Take 1 tablet by mouth Daily. 30 tablet 11    • atorvastatin (LIPITOR) 40 MG tablet Take 1 tablet by mouth Daily. 90 tablet 3    • cephalexin (KEFLEX) 500 MG capsule Take 1 capsule by mouth 3 (Three) Times a Day for 7 days. 21 capsule 0    • Cholecalciferol (VITAMIN D3) 5000 units capsule capsule Take 5,000 Units by mouth Daily.      • cyanocobalamin 1000 MCG/ML injection INJECT 1ML INTRAMUSCULARLY  EVERY  30 DAYS AS DIRECTED (Patient taking differently: Med is overdue) 30 mL 2 11/10/2020   • ketotifen (ZADITOR) 0.025 % ophthalmic solution Administer 1 drop to both eyes 2 (Two) Times a Day As Needed (eye itching). 5 mL 3    • lisinopril (PRINIVIL,ZESTRIL) 2.5 MG tablet TAKE ONE TABLET BY MOUTH TWICE A  tablet 1    • Multiple  Vitamin (MULTI VITAMIN DAILY PO) Take  by mouth Daily.      • sulfamethoxazole-trimethoprim (BACTRIM DS,SEPTRA DS) 800-160 MG per tablet Take 1 tablet by mouth 2 (Two) Times a Day. For 7 days      • cyclobenzaprine (FLEXERIL) 10 MG tablet Take 10 mg by mouth.          Current Medications:  Scheduled Meds:aspirin, 81 mg, Oral, Daily  atorvastatin, 40 mg, Oral, Daily  ceFAZolin, 2 g, Intravenous, Q8H  lisinopril, 2.5 mg, Oral, BID  multivitamin, 1 tablet, Oral, Daily  vancomycin, 1,000 mg, Intravenous, Q12H      Continuous Infusions:Pharmacy to dose vancomycin,       PRN Meds:.•  acetaminophen  •  aluminum-magnesium hydroxide-simethicone  •  bisacodyl  •  bisacodyl  •  HYDROcodone-acetaminophen  •  ondansetron **OR** ondansetron  •  Pharmacy to dose vancomycin  •  sodium chloride    Review of Systems:   A 12 point system review was reviewed with the patient and from the chart  and is negative except as mentioned in history of present illness.      Physical Exam: 60 y.o. male                    Vitals:    12/24/20 0039 12/24/20 0241 12/24/20 0659 12/24/20 1049   BP: 157/84 112/58 122/59 116/61   BP Location: Right arm Right arm Right arm Right arm   Patient Position: Lying Lying Lying Lying   Pulse: 74 69 64 59   Resp: 16 16 16 16   Temp: 97.7 °F (36.5 °C) 97.7 °F (36.5 °C) 97.5 °F (36.4 °C) 97.7 °F (36.5 °C)   TempSrc: Skin Skin Skin Skin   SpO2: 98% 97% 98% 98%   Weight:       Height:            Gait: Not evaluated.     Mental/HEENT/cardio/skin: The patient's general appearance was well-nourished, well-hydrated, no acute distress.  Orientation was alert and oriented ×3.  The patient's mood was normal.   Pulmonary exam shows normal late exchange, no labored breathing, or shortness of breath.    The  skin exam showed mild redness around the right knee.    Extremities: Right knee has a small incision that was utilized for draining his abscess in the emergency room.  It is packed with iodoform gauze.  There is some  surrounding redness evidence of cellulitis.  He has excellent range of motion at the knee from 0 to 100 degrees.  Good active ankle range of motion.    Pulses:  Pulses palpable and equal bilaterally    Diagnostic Tests:    Results from last 7 days   Lab Units 12/24/20  0706 12/23/20  2206   WBC 10*3/mm3 8.05 11.09*   HEMOGLOBIN g/dL 13.9 14.0   HEMATOCRIT % 40.2 41.3   PLATELETS 10*3/mm3 277 267     Results from last 7 days   Lab Units 12/24/20  0706 12/23/20  2206   SODIUM mmol/L 136 135*   POTASSIUM mmol/L 4.6 4.0   CHLORIDE mmol/L 103 100   CO2 mmol/L 27.9 25.7   BUN mg/dL 11 11   CREATININE mg/dL 1.06 1.05   GLUCOSE mg/dL 88 109*   CALCIUM mg/dL 8.9 9.0         No results found for: URICACID  No results found for: CRYSTAL  Microbiology Results (last 10 days)     Procedure Component Value - Date/Time    Wound Culture - Wound, Knee, Right [[id number]] Collected: 12/24/20 0008    Lab Status: Preliminary result Specimen: Wound from Knee, Right Updated: 12/24/20 0209     Gram Stain No WBCs or organisms seen        Xr Knee 3 View Right    Result Date: 12/23/2020  No fracture or joint effusion.  This report was finalized on 12/23/2020 8:56 PM by Dr. Reji Posada M.D.        The labs, X-ray results for preoperative evaluation have been reviewed by me.    Assessment: Right knee cellulitis, abscess superficial right knee s/p incision and drainage    Patient Active Problem List   Diagnosis   • Coronary artery disease involving native coronary artery of native heart without angina pectoris   • Multiple thyroid nodules   • Pernicious anemia   • H/O multiple allergies   • Herniated nucleus pulposus, L3-4 right   • Right lumbar radiculopathy   • Pterygium of right eye   • Degenerative arthritis of cervical spine   • Abscess of bursa of right knee   • Essential hypertension   • Cellulitis of right lower extremity   • History of DVT (deep vein thrombosis)       Plan:    Options and alternatives were discussed in detail with  the patient.   The patient is indicated for continued observation and continued antibiotics for cellulitis and superficial abscess.   Wound and blood cultures are pending  Okay for weightbearing as tolerated  There is no suspicion for septic arthritis  He will need wound care for continued packing of the abscess  Okay from orthopedic perspective for discharge home with antibiotics and wound care to be arranged  I will sign off but will be available    Date: 12/24/2020    Chente Ely MD     CC: Brian Marr MD; MD Harry Ramirez Jonathan, MD

## 2020-12-24 NOTE — ED PROVIDER NOTES
EMERGENCY DEPARTMENT ENCOUNTER  Patient was placed in face mask in first look and the following protective measures were taken unless additional measures were taken and documented below in the ED course. Patient was wearing facemask when I entered the room and throughout our encounter. I wore full protective equipment throughout this patient encounter including a face mask, and gloves. Hand hygiene was performed before donning protective equipment and after removal when leaving the room.    Room Number:  12/12  Date of encounter:  12/23/2020  PCP: Brian Marr MD    HPI:  Context: Niranjan Peacock is a 60 y.o. male who presents to the ED c/o chief complaint of increasing redness and pain to his right knee.  Patient states he develop itching to the anterior aspect of his right knee 5 days ago.  4 days ago, he developed redness and a tender bump on the anterior aspect of his right knee.  He squeezed it and try to express fluid but only got a small amount of clear fluid.  Since then he has had increasing redness daily.  He went to an [facility] on Sunday and was started on Bactrim.  The redness increase along with increasing pain to his right knee.  He went to [facility] ER 2 days ago and they added Keflex to his regimen.  Today, he developed temperature of 101 with chills.  He states it hurts to walk on his right knee and is noted the redness has spread up his right thigh.  Has had nausea but denies vomiting or diarrhea.    MEDICAL HISTORY REVIEW  Reviewed in EPIC    PAST MEDICAL HISTORY  Active Ambulatory Problems     Diagnosis Date Noted   • Multiple thyroid nodules    • Pernicious anemia    • H/O multiple allergies 03/09/2016   • Coronary artery disease involving native coronary artery of native heart without angina pectoris 08/10/2017   • Herniated nucleus pulposus, L3-4 right 09/09/2020   • Right lumbar radiculopathy 09/09/2020   • Pterygium of right eye 10/16/2020   • Degenerative arthritis of cervical spine  12/16/2020     Resolved Ambulatory Problems     Diagnosis Date Noted   • Chronic fatigue 03/09/2016   • Pernicious anemia 07/31/2017   • Abnormal EKG 08/16/2019   • Angina at rest (CMS/HCC) 08/16/2019     Past Medical History:   Diagnosis Date   • B12 deficiency anemia    • Chest pain    • Deep vein thrombosis (CMS/HCC)    • Headache    • History of blood clots    • Pulmonary embolism (CMS/[facility])    • Syncope        PAST SURGICAL HISTORY  Past Surgical History:   Procedure Laterality Date   • CARDIAC CATHETERIZATION N/A 8/3/2017    Procedure: Coronary angiography;  Surgeon: Cynthia Farley MD;  [facility];  Service:    • CARDIAC CATHETERIZATION  8/3/2017    Procedure: Functional Flow [place];  Surgeon: Cynthia Farley MD;  [facility];  Service:    • CARDIAC CATHETERIZATION N/A 8/3/2017    Procedure: Stent YI coronary;  Surgeon: Cynthia Farley MD;  [facility];  Service:    • CARDIAC CATHETERIZATION N/A 8/3/2017    Procedure: Left ventriculography;  Surgeon: Cynthia Farley MD;  [facility]: [facility];  Service:    • CARDIAC CATHETERIZATION N/A 8/3/2017    Procedure: Left Heart Cath;  Surgeon: Cynthia Farley MD;  [facility]: [facility];  Service:    • CARDIAC CATHETERIZATION N/A 8/20/2019    Procedure: Left Heart Cath;  Surgeon: Mulugeta Ac MD;  Location: [facility] CATH INVASIVE LOCATION;  Service: Cardiology   • CARDIAC CATHETERIZATION N/A 8/20/2019    Procedure: Coronary angiography;  Surgeon: Mulugeta Ac MD;  [facility];  Service: Cardiology   • CARDIAC CATHETERIZATION N/A 8/20/2019    Procedure: Left ventriculography;  Surgeon: Mulugeta Ac MD;  Location: [facility] CATH INVASIVE LOCATION;  Service: Cardiology   • COLONOSCOPY  MMVI    NORMAL.   • COLONOSCOPY     • FINE NEEDLE ASPIRATION  12/2015    Left thyroid nodule.  [place] category II.  Dr. Socrates Sanchez        FAMILY HISTORY  Family History   Problem Relation Age of Onset   • Heart disease Other    • Hypertension Other    • Thyroid disease Other    • Heart disease Father    • Prostate cancer Father    • Hypertension Father    • Stroke Father    • Heart disease Mother    • Hypertension Mother    • Heart disease Brother    • Hypertension Brother    • Thyroid disease Sister    • Heart disease Brother    • Hypertension Brother        SOCIAL HISTORY  Social History     Socioeconomic History   • Marital status:      Spouse name: Not on file   • Number of children: Not on file   • Years of education: Not on file   • Highest education level: Not on file   Occupational History   • Occupation:    Tobacco Use   • Smoking status: Never Smoker   • Smokeless tobacco: Never Used   • Tobacco comment: caffeine use    Substance and Sexual Activity   • Alcohol use: Yes     Binge frequency: Monthly     Comment: occasional   • Drug use: No     Comment: Drinks 2-4 caffeinated beverages per day   • Sexual activity: Defer   Lifestyle   • Physical activity     Days per week: 0 days     Minutes per session: Not on file   • Stress: Not on file   Social History Narrative    ** Merged History Encounter **            ALLERGIES  Patient has no known allergies.    The patient's allergies have been reviewed    REVIEW OF SYSTEMS  All systems reviewed and negative except for those discussed in HPI.     PHYSICAL EXAM  I have reviewed the triage vital signs and nursing notes.  ED Triage Vitals [12/23/20 1927]   Temp Heart Rate Resp BP SpO2   97.8 °F (36.6 °C) 82 16 -- 92 %      Temp src Heart Rate Source Patient Position BP Location FiO2 (%)   Tympanic Monitor -- -- --         General: Mild distress  HENT: NCAT, PERRL, Nares patent  Eyes: no scleral icterus  Neck: trachea midline, no ROM limitations  CV: regular rhythm, regular rate  Respiratory: normal effort, CTAB  Abdomen: soft, nondistended, nontender to palpation, no rebound  tenderness, no guarding or rigidity  : deferred  Musculoskeletal: no deformity.  There is extensive cellulitis on the anterior and medial aspect of patient's right knee.  He has lymphangitis going up the medial aspect of his right thigh.  He has mild right inguinal lymphadenopathy.  He does not have a knee effusion.  He has an approximate 1 cm tender area of induration on the anterior aspect of his right knee.  It is not fluctuant.  Neuro: alert, moves all extremities, follows commands  Skin: warm, dry.  Please see the musculoskeletal exam.    LAB RESULTS  Recent Results (from the past 24 hour(s))   Comprehensive Metabolic Panel    Collection Time: 12/23/20 10:06 PM    Specimen: Blood   Result Value Ref Range    Glucose 109 (H) 65 - 99 mg/dL    BUN 11 6 - 20 mg/dL    Creatinine 1.05 0.76 - 1.27 mg/dL    Sodium 135 (L) 136 - 145 mmol/L    Potassium 4.0 3.5 - 5.2 mmol/L    Chloride 100 98 - 107 mmol/L    CO2 25.7 22.0 - 29.0 mmol/L    Calcium 9.0 8.6 - 10.5 mg/dL    Total Protein 6.9 6.0 - 8.5 g/dL    Albumin 3.70 3.50 - 5.20 g/dL    ALT (SGPT) 21 1 - 41 U/L    AST (SGOT) 20 1 - 40 U/L    Alkaline Phosphatase 66 39 - 117 U/L    Total Bilirubin 0.6 0.0 - 1.2 mg/dL    eGFR Non African Amer 72 >60 mL/min/1.73    Globulin 3.2 gm/dL    A/G Ratio 1.2 g/dL    BUN/Creatinine Ratio 10.5 7.0 - 25.0    Anion Gap 9.3 5.0 - 15.0 mmol/L   Lactic Acid, Plasma    Collection Time: 12/23/20 10:06 PM    Specimen: Blood   Result Value Ref Range    Lactate 0.9 0.5 - 2.0 mmol/L   Procalcitonin    Collection Time: 12/23/20 10:06 PM    Specimen: Blood   Result Value Ref Range    Procalcitonin 0.08 0.00 - 0.25 ng/mL   CBC Auto Differential    Collection Time: 12/23/20 10:06 PM    Specimen: Blood   Result Value Ref Range    WBC 11.09 (H) 3.40 - 10.80 10*3/mm3    RBC 4.37 4.14 - 5.80 10*6/mm3    Hemoglobin 14.0 13.0 - 17.7 g/dL    Hematocrit 41.3 37.5 - 51.0 %    MCV 94.5 79.0 - 97.0 fL    MCH 32.0 26.6 - 33.0 pg    MCHC 33.9 31.5 - 35.7 g/dL     RDW 11.7 (L) 12.3 - 15.4 %    RDW-SD 40.6 37.0 - 54.0 fl    MPV 9.5 6.0 - 12.0 fL    Platelets 267 140 - 450 10*3/mm3    Neutrophil % 74.9 42.7 - 76.0 %    Lymphocyte % 14.6 (L) 19.6 - 45.3 %    Monocyte % 9.0 5.0 - 12.0 %    Eosinophil % 0.7 0.3 - 6.2 %    Basophil % 0.4 0.0 - 1.5 %    Immature Grans % 0.4 0.0 - 0.5 %    Neutrophils, Absolute 8.31 (H) 1.70 - 7.00 10*3/mm3    Lymphocytes, Absolute 1.62 0.70 - 3.10 10*3/mm3    Monocytes, Absolute 1.00 (H) 0.10 - 0.90 10*3/mm3    Eosinophils, Absolute 0.08 0.00 - 0.40 10*3/mm3    Basophils, Absolute 0.04 0.00 - 0.20 10*3/mm3    Immature Grans, Absolute 0.04 0.00 - 0.05 10*3/mm3    nRBC 0.0 0.0 - 0.2 /100 WBC       I ordered the above labs and reviewed the results.    RADIOLOGY  Xr Knee 3 View Right    Result Date: 12/23/2020  HISTORY: Right knee pain with swelling  COMPARISON: None  3 view(s) obtained.  FINDINGS: There is no evidence of fracture or dislocation. No evidence of joint effusion. Soft tissue swelling anterior to the knee.      No fracture or joint effusion.  This report was finalized on 12/23/2020 8:56 PM by Dr. Reji Posada M.D.        I ordered the above noted radiological studies. I reviewed the images and results. I agree with the radiologist interpretation.    PROCEDURES  Incision & Drainage    Date/Time: 12/23/2020 11:38 PM  Performed by: Niranjan Eaton MD  Authorized by: Niranjan Eaton MD     Consent:     Consent obtained:  Verbal    Consent given by:  Patient    Risks discussed:  Bleeding, incomplete drainage and infection    Alternatives discussed:  No treatment  Location:     Type:  Abscess    Location:  Lower extremity    Lower extremity location:  Knee    Knee location:  R knee  Pre-procedure details:     Skin preparation:  Betadine  Anesthesia (see MAR for exact dosages):     Anesthesia method:  Local infiltration    Local anesthetic:  Lidocaine 1% WITH epi  Procedure type:     Complexity:  Simple  Procedure details:     Needle  "aspiration: no      Incision types:  Stab incision    Incision depth:  Subcutaneous    Scalpel blade:  11    Wound management:  Probed and deloculated and irrigated with saline    Drainage:  Purulent and bloody    Drainage amount:  Moderate    Wound treatment:  Wound left open    Packing materials:  1/4 in iodoform gauze    Amount 1/4\" iodoform:  7CM  Post-procedure details:     Patient tolerance of procedure:  Tolerated well, no immediate complications        MEDICATIONS GIVEN IN ER  Medications   sodium chloride 0.9 % flush 10 mL (has no administration in time range)   lidocaine-EPINEPHrine (XYLOCAINE W/EPI) 1 %-1:699800 injection 10 mL (has no administration in time range)   sodium chloride 0.9 % flush 10 mL (has no administration in time range)   acetaminophen (TYLENOL) tablet 650 mg (has no administration in time range)   bisacodyl (DULCOLAX) EC tablet 5 mg (has no administration in time range)   bisacodyl (DULCOLAX) suppository 10 mg (has no administration in time range)   ondansetron (ZOFRAN) tablet 4 mg (has no administration in time range)     Or   ondansetron (ZOFRAN) injection 4 mg (has no administration in time range)   aluminum-magnesium hydroxide-simethicone (MAALOX MAX) 400-400-40 MG/5ML suspension 15 mL (has no administration in time range)   vancomycin 2000 mg/500 mL 0.9% NS IVPB ([facility]) (2,000 mg Intravenous New Bag 12/23/20 2301)   ceFAZolin (ANCEF) IVPB 1 g (1 g Intravenous New Bag 12/23/20 2212)   morphine injection 4 mg (4 mg Intravenous Given 12/23/20 2208)   ondansetron (ZOFRAN) injection 4 mg (4 mg Intravenous Given 12/23/20 2207)       PROGRESS, DATA ANALYSIS, CONSULTS, AND MEDICAL DECISION MAKING  A complete history and physical exam have been performed.  All available laboratory and imaging results have been reviewed by myself prior to disposition.    MDM  After the initial H&P, I discussed pertinent information from history and physical exam with patient/family.  Discussed differential " diagnosis.  Discussed plan for ED evaluation/work-up/treatment.  All questions answered.  Patient/family is agreeable with plan.  ED Course as of Dec 23 2359   Wed Dec 23, 2020   2133 Patient does not have a joint effusion.  We will not attempt aspirating the joint.    [DE]   2303 Patient is not septic but he does have right knee cellulitis that has failed outpatient therapy with worsening erythema and lymphangitis.  He does not have a knee effusion so I cannot perform an arthrocentesis of his knee.    [DE]   2307 Discussed the case with Carla, nurse practitioner for Dr. Loyola with [facility].  They are admit patient to the hospital for further evaluation and treatment.    [DE]      ED Course User Index  [DE] Niranjan Eaton MD       AS OF 23:59 EST VITALS:    BP -    HR - 82  TEMP - 97.8 °F (36.6 °C) (Tympanic)  O2 SATS - 92%    DIAGNOSIS  Final diagnoses:   Cellulitis of right knee   Abscess of skin of right knee         DISPOSITION  ADMISSION    Discussed treatment plan and reason for admission with pt/family and admitting physician.  Pt/family voiced understanding of the plan for admission for further testing/treatment as needed.            Niranjan Eaton MD  12/23/20 1579       Niranjan Eaton MD  12/23/20 7219

## 2020-12-24 NOTE — ED NOTES
"Nursing report ED to floor  Niranjan Peacock  60 y.o.  male    HPI (triage note):   Chief Complaint   Patient presents with   • Knee Pain     right knee infection has been on two different oral antibiotics and not working. now running fever        Admitting doctor:   Tiago Loyola MD    Admitting diagnosis:   The primary encounter diagnosis was Abscess of bursa of right knee. A diagnosis of Cellulitis of right knee was also pertinent to this visit.    Code status:   Current Code Status     Date Active Code Status Order ID Comments User Context       12/23/2020 2311 CPR [id number]  Carla Heard, [facility]     Advance Care Planning Activity      Questions for Current Code Status     Question Answer Comment    Code Status CPR     Medical Interventions (Level of Support Prior to Arrest) Full           Allergies:   Patient has no known allergies.    Weight:   There were no vitals filed for this visit.    Most recent vitals:   Vitals:    12/23/20 1927   Pulse: 82   Resp: 16   Temp: 97.8 °F (36.6 °C)   TempSrc: Tympanic   SpO2: 92%   Height: 188 cm (74\")       Active LDAs/IV Access:   Lines, Drains & Airways    Active LDAs     Name:   Placement date:   Placement time:   Site:   Days:    Peripheral IV 12/23/20 2208 Left Forearm   12/23/20 2208    Forearm   less than 1                Labs (abnormal labs have a star):   Labs Reviewed   COMPREHENSIVE METABOLIC PANEL - Abnormal; Notable for the following components:       Result Value    Glucose 109 (*)     Sodium 135 (*)     All other components within normal limits    Narrative:     GFR Normal >60  Chronic Kidney Disease <60  Kidney Failure <15     CBC WITH AUTO DIFFERENTIAL - Abnormal; Notable for the following components:    WBC 11.09 (*)     RDW 11.7 (*)     Lymphocyte % 14.6 (*)     Neutrophils, Absolute 8.31 (*)     Monocytes, Absolute 1.00 (*)     All other components within normal limits   LACTIC ACID, PLASMA - Normal   PROCALCITONIN - Normal    Narrative:     " "As a Marker for Sepsis (Non-Neonates):   1. <0.5 ng/mL represents a low risk of severe sepsis and/or septic shock.  1. >2 ng/mL represents a high risk of severe sepsis and/or septic shock.    As a Marker for Lower Respiratory Tract Infections that require antibiotic therapy:  PCT on Admission     Antibiotic Therapy             6-12 Hrs later  > 0.5                Strongly Recommended            >0.25 - <0.5         Recommended  0.1 - 0.25           Discouraged                   Remeasure/reassess PCT  <0.1                 Strongly Discouraged          Remeasure/reassess PCT      As 28 day mortality risk marker: \"Change in Procalcitonin Result\" (> 80 % or <=80 %) if Day 0 (or Day 1) and Day 4 values are available. Refer to http://www.[employer] Interactivepct-calculator.com/   Change in PCT <=80 %   A decrease of PCT levels below or equal to 80 % defines a positive change in PCT test result representing a higher risk for 28-day all-cause mortality of patients diagnosed with severe sepsis or septic shock.  Change in PCT > 80 %   A decrease of PCT levels of more than 80 % defines a negative change in PCT result representing a lower risk for 28-day all-cause mortality of patients diagnosed with severe sepsis or septic shock.                Results may be falsely decreased if patient taking Biotin.    BLOOD CULTURE   BLOOD CULTURE   COVID PRE-OP / PRE-PROCEDURE SCREENING ORDER (NO ISOLATION)    Narrative:     The following orders were created for panel order COVID PRE-OP / PRE-PROCEDURE SCREENING ORDER (NO ISOLATION) - Swab, Nasopharynx.  Procedure                               Abnormality         Status                     ---------                               -----------         ------                     COVID-19,APTIMA PANTHER,...[[id number]]                                                   Please view results for these tests on the individual orders.   COVID-19,APTIMA PANTHERCHRISTOPHER IN-HOUSE,NP/OP SWAB IN UTM/VTM/SALINE TRANSPORT " MEDIA,24 HR TAT   WOUND CULTURE   CBC AND DIFFERENTIAL    Narrative:     The following orders were created for panel order CBC & Differential.  Procedure                               Abnormality         Status                     ---------                               -----------         ------                     CBC Auto Differential[[id number]]        Abnormal            Final result                 Please view results for these tests on the individual orders.       EKG:   No orders to display       Meds given in ED:   Medications   sodium chloride 0.9 % flush 10 mL (has no administration in time range)   lidocaine-EPINEPHrine (XYLOCAINE W/EPI) 1 %-1:[id number] injection 10 mL (has no administration in time range)   sodium chloride 0.9 % flush 10 mL (has no administration in time range)   acetaminophen (TYLENOL) tablet 650 mg (has no administration in time range)   bisacodyl (DULCOLAX) EC tablet 5 mg (has no administration in time range)   bisacodyl (DULCOLAX) suppository 10 mg (has no administration in time range)   ondansetron (ZOFRAN) tablet 4 mg (has no administration in time range)     Or   ondansetron (ZOFRAN) injection 4 mg (has no administration in time range)   aluminum-magnesium hydroxide-simethicone (MAALOX MAX) 400-400-40 MG/5ML suspension 15 mL (has no administration in time range)   vancomycin 2000 mg/500 mL 0.9% NS IVPB ([facility]) (2,000 mg Intravenous New Bag 12/23/20 2301)   ceFAZolin (ANCEF) IVPB 1 g (1 g Intravenous New Bag 12/23/20 2212)   morphine injection 4 mg (4 mg Intravenous Given 12/23/20 2208)   ondansetron (ZOFRAN) injection 4 mg (4 mg Intravenous Given 12/23/20 2207)       Imaging results:  Xr Knee 3 View Right    Result Date: 12/23/2020  No fracture or joint effusion.  This report was finalized on 12/23/2020 8:56 PM by Dr. Reji Posada M.D.        Ambulatory status:   - with assist    Social issues:   Social History     Socioeconomic History   • Marital status:      Spouse name:  Not on file   • Number of children: Not on file   • Years of education: Not on file   • Highest education level: Not on file   Occupational History   • Occupation:    Tobacco Use   • Smoking status: Never Smoker   • Smokeless tobacco: Never Used   • Tobacco comment: caffeine use    Substance and Sexual Activity   • Alcohol use: Yes     Binge frequency: Monthly     Comment: occasional   • Drug use: No     Comment: Drinks 2-4 caffeinated beverages per day   • Sexual activity: Defer   Lifestyle   • Physical activity     Days per week: 0 days     Minutes per session: Not on file   • Stress: Not on file   Social History Narrative    ** Merged History Encounter **         Nursing report ED to floor       April Rebollar RN  12/24/20 0002

## 2020-12-24 NOTE — PLAN OF CARE
Goal Outcome Evaluation:  Plan of Care Reviewed With: patient  Progress: improving  Outcome Summary: vss. dsg cdi. ambulates well with x1 assist. con't with IV ABX. wound cultures pending. voiding per brp. plan to dc home when stable. educated on bp monitoring.

## 2020-12-24 NOTE — PROGRESS NOTES
"[facility]  Clinical Pharmacy Department     Vancomycin Pharmacokinetic Note    Niranjan Peacock is a 60 y.o. male who is on day 1 of pharmacy to dose vancomycin for complicated skin and soft tissue infection (cellulitis/abscess of right knee).    Target level: AUC24 400-600  Consulting Provider:  GREG Plascencia  Planned Duration of Therapy: 5 days  Other Antimicrobials: cefazolin 1 gm IV q8h    Allergies  Allergies as of 12/23/2020   • (No Known Allergies)       Microbiology:  Microbiology Results (last 10 days)     ** No results found for the last 240 hours. **            Vitals/Labs/I&O  188 cm (74\")  97.5 kg (215 lb)  Body mass index is 27.6 kg/m².   Temp:  [97.7 °F (36.5 °C)-97.8 °F (36.6 °C)] 97.7 °F (36.5 °C)  Heart Rate:  [74-82] 74  Resp:  [16] 16  BP: (139-157)/(66-84) 157/84    Results from last 7 days   Lab Units 12/23/20  2206   WBC 10*3/mm3 11.09*     Results from last 7 days   Lab Units 12/23/20  2206   LACTATE mmol/L 0.9      Results from last 7 days   Lab Units 12/23/20  2206   PROCALCITONIN ng/mL 0.08                  Estimated Creatinine Clearance: 104.5 mL/min (by C-G formula based on SCr of 1.05 mg/dL).  Results from last 7 days   Lab Units 12/23/20  2206   BUN mg/dL 11   CREATININE mg/dL 1.05     Intake & Output (last 3 days)       12/21 0701 - 12/22 0700 12/22 0701 - 12/23 0700 12/23 0701 - 12/24 0700    IV Piggyback   500    Total Intake(mL/kg)   500 (5.1)    Urine (mL/kg/hr)   150    Total Output   150    Net   +350                 Vancomycin Dosing and Level History:  Last Dose Received in the ED/Outside Facility: 2 gm on 12/23 at 2301                  Assessment/Plan:    Assessment:  Bayesian analysis of the most recent level(s) using InitMe provides the following patient-specific pharmacokinetic parameters:   CL: 3.99 L/h   Vd: 81.4 L   T1/2: 14.4 h  If the predicted trough on this regimen is not within what was previously considered a \"target trough range\", the AUC24 (a " stronger predictor of vancomycin efficacy) is predicted to achieve the accepted target of 400-600 mg*h/L. To best balance efficacy and toxicity, we will be targeting AUC24 in this patient rather than steady-state trough levels.     Initiating the regimen of 1000 mg (10.3mg/kg) IV every 12 hours gives a predicted steady-state trough of 16.2 mg/L and AUC24 of 496 mg*h/L based upon population pharmacokinetics and this patient's specific parameters.    Recommendations/Plan:  -Initiate vancomycin 1000 mg (10.3 mg/kg) IV every 12 hours   -Obtain vancomycin level on 12/24 prior to the dose due at 2300 or sooner if acute changes   -Continue to monitor SCr    Thank you for involving pharmacy in this patient's care. Please contact pharmacy with any questions or concerns.                           Beti Mao [facility]  Clinical Pharmacist  12/24/20 02:05 EST

## 2020-12-24 NOTE — ED TRIAGE NOTES
Pt in mask at triage as well as triage wearing appropriate PPE  Chief Complaint   Patient presents with   • Knee Pain     right knee infection has been on two different oral antibiotics and not working. now running fever

## 2020-12-24 NOTE — H&P
Patient Name:  Niranjan Peacock  YOB: 1960  MRN:  [id number]  Admit Date:  12/23/2020  Patient Care Team:  Brian Marr MD as PCP - General (Family Medicine)      Subjective   History Present Illness     Chief Complaint   Patient presents with   • Knee Pain     right knee infection has been on two different oral antibiotics and not working. now running fever      History of Present Illness   Mr. Peacock is a 60 y.o. former smoker with a history of CAD, hypertension, DVT, and pernicious anemia that presents to [facility] complaining of increasing redness and right knee pain.  Patient reports his knee pain began on Friday and started out as a small bump.  He went to [place] urgent care facility and was given Bactrim.  His symptoms did not improve so he was seen by his PCP who sent him to [facility] ER for further evaluation.  In addition to his Bactrim, Keflex was added by the ER to his antibiotic regimen and he was instructed to follow-up with his PCP.  He now presents with fever a 101 and chills.  Patient states he took Tylenol prior to presenting to the emergency department.  Patient reports he has not noticed any drainage coming from his knee.  Labs obtained emergency department show white blood cell count 11, lactate 0.9, procalcitonin 0.08.  X-ray of knee shows no fracture or joint effusion.    Review of Systems   Constitutional: Positive for chills and fever.   HENT: Negative for congestion and rhinorrhea.    Eyes: Negative for photophobia and visual disturbance.   Respiratory: Negative for cough and shortness of breath.    Cardiovascular: Negative for chest pain and palpitations.   Gastrointestinal: Negative for constipation, diarrhea, nausea and vomiting.   Endocrine: Negative for cold intolerance and heat intolerance.   Genitourinary: Negative for difficulty urinating and dysuria.   Musculoskeletal: Positive for joint swelling. Negative for gait problem.   Skin: Negative  for rash and wound.   Neurological: Negative for dizziness, light-headedness and headaches.   Psychiatric/Behavioral: Negative for sleep disturbance and suicidal ideas.        Personal History     Past Medical History:   Diagnosis Date   • Angina at rest (CMS/[facility]) 8/16/2019    Added automatically from request for surgery [id number]   • B12 deficiency anemia    • Chest pain    • Chronic fatigue 3/9/2016   • Coronary artery disease involving native coronary artery of native heart without angina pectoris 8/10/2017   • Deep vein thrombosis (CMS/[facility])    • Headache    • History of blood clots     blood clot found in right lung    • Hypertension    • Multiple thyroid nodules    • Multiple thyroid nodules    • Pernicious anemia    • Pulmonary embolism (CMS/[facility])     2015   • Syncope     2 yrs ago one time     Past Surgical History:   Procedure Laterality Date   • CARDIAC CATHETERIZATION N/A 8/3/2017    Procedure: Coronary angiography;  Surgeon: Cynthia Farley MD;  Location: [facility] CATH INVASIVE LOCATION;  Service:    • CARDIAC CATHETERIZATION  8/3/2017    Procedure: Functional [place];  Surgeon: Cynthia Farley MD;  Location: [facility] CATH INVASIVE LOCATION;  Service:    • CARDIAC CATHETERIZATION N/A 8/3/2017    Procedure: Stent YI coronary;  Surgeon: Cynthia Farley MD;  Location: [facility] CATH INVASIVE LOCATION;  Service:    • CARDIAC CATHETERIZATION N/A 8/3/2017    Procedure: Left ventriculography;  Surgeon: Cynthia Farley MD;  Location: [facility] CATH INVASIVE LOCATION;  Service:    • CARDIAC CATHETERIZATION N/A 8/3/2017    Procedure: Left Heart Cath;  Surgeon: Cynthia Farley MD;  Location: [facility] CATH INVASIVE LOCATION;  Service:    • CARDIAC CATHETERIZATION N/A 8/20/2019    Procedure: Left Heart Cath;  Surgeon: Mulugeta Ac MD;  Location: [facility] CATH INVASIVE LOCATION;  Service: Cardiology   • CARDIAC CATHETERIZATION N/A 8/20/2019    Procedure: Coronary angiography;  Surgeon: Mulugeta Ac MD;  Location: [facility]  CATH INVASIVE LOCATION;  Service: Cardiology   • CARDIAC CATHETERIZATION N/A 8/20/2019    Procedure: Left ventriculography;  Surgeon: Mulugeta Ac MD;  Location: [facility] CATH INVASIVE LOCATION;  Service: Cardiology   • COLONOSCOPY  MMVI    NORMAL.   • COLONOSCOPY     • FINE NEEDLE ASPIRATION  12/2015    Left thyroid nodule.  Neely category II.  Dr. Socrates Sanchez     Family History   Problem Relation Age of Onset   • Heart disease Other    • Hypertension Other    • Thyroid disease Other    • Heart disease Father    • Prostate cancer Father    • Hypertension Father    • Stroke Father    • Heart disease Mother    • Hypertension Mother    • Heart disease Brother    • Hypertension Brother    • Thyroid disease Sister    • Heart disease Brother    • Hypertension Brother      Social History     Tobacco Use   • Smoking status: Never Smoker   • Smokeless tobacco: Never Used   • Tobacco comment: caffeine use    Substance Use Topics   • Alcohol use: Yes     Binge frequency: Monthly     Comment: occasional   • Drug use: No     Comment: Drinks 2-4 caffeinated beverages per day     No current facility-administered medications on file prior to encounter.      Current Outpatient Medications on File Prior to Encounter   Medication Sig Dispense Refill   • aspirin 81 MG tablet Take 1 tablet by mouth Daily. 30 tablet 11   • atorvastatin (LIPITOR) 40 MG tablet Take 1 tablet by mouth Daily. 90 tablet 3   • cephalexin (KEFLEX) 500 MG capsule Take 1 capsule by mouth 3 (Three) Times a Day for 7 days. 21 capsule 0   • Cholecalciferol (VITAMIN D3) 5000 units capsule capsule Take 5,000 Units by mouth Daily.     • cyanocobalamin 1000 MCG/ML injection INJECT 1ML INTRAMUSCULARLY  EVERY  30 DAYS AS DIRECTED (Patient taking differently: Med is overdue) 30 mL 2   • ketotifen (ZADITOR) 0.025 % ophthalmic solution Administer 1 drop to both eyes 2 (Two) Times a Day As Needed (eye itching). 5 mL 3   • lisinopril (PRINIVIL,ZESTRIL) 2.5 MG tablet TAKE  ONE TABLET BY MOUTH TWICE A  tablet 1   • Multiple Vitamin (MULTI VITAMIN DAILY PO) Take  by mouth Daily.     • sulfamethoxazole-trimethoprim (BACTRIM DS,SEPTRA DS) 800-160 MG per tablet Take 1 tablet by mouth 2 (Two) Times a Day. For 7 days     • cyclobenzaprine (FLEXERIL) 10 MG tablet Take 10 mg by mouth.       No Known Allergies    Objective    Objective     Vital Signs  Temp:  [97.7 °F (36.5 °C)-97.8 °F (36.6 °C)] 97.7 °F (36.5 °C)  Heart Rate:  [74-82] 74  Resp:  [16] 16  BP: (139-157)/(66-84) 157/84  SpO2:  [92 %-98 %] 98 %  on   ;   Device (Oxygen Therapy): room air  Body mass index is 27.6 kg/m².    Physical Exam  Vitals signs and nursing note reviewed.   Constitutional:       General: He is not in acute distress.     Appearance: He is well-developed. He is not toxic-appearing.   HENT:      Head: Normocephalic and atraumatic.   Eyes:      General: No scleral icterus.        Right eye: No discharge.         Left eye: No discharge.      Conjunctiva/sclera: Conjunctivae normal.   Neck:      Musculoskeletal: Normal range of motion and neck supple.      Vascular: No JVD.   Cardiovascular:      Rate and Rhythm: Normal rate and regular rhythm.      Heart sounds: Normal heart sounds. No murmur. No friction rub. No gallop.    Pulmonary:      Effort: Pulmonary effort is normal. No respiratory distress.      Breath sounds: Normal breath sounds. No wheezing or rales.   Abdominal:      General: Bowel sounds are normal. There is no distension.      Palpations: Abdomen is soft.      Tenderness: There is no abdominal tenderness. There is no guarding.   Musculoskeletal: Normal range of motion.         General: Swelling and tenderness present. No deformity.   Skin:     General: Skin is warm and dry.      Capillary Refill: Capillary refill takes less than 2 seconds.      Findings: Erythema present.   Neurological:      Mental Status: He is alert and oriented to person, place, and time.   Psychiatric:         Behavior:  Behavior normal.       Results Review:  I reviewed the patient's new clinical results.  Discussed with ED provider.    Lab Results (last 24 hours)     Procedure Component Value Units Date/Time    CBC & Differential [[id number]]  (Abnormal) Collected: 12/23/20 2206    Specimen: Blood Updated: 12/23/20 2228    Narrative:      The following orders were created for panel order CBC & Differential.  Procedure                               Abnormality         Status                     ---------                               -----------         ------                     CBC Auto Differential[[id number]]        Abnormal            Final result                 Please view results for these tests on the individual orders.    Comprehensive Metabolic Panel [[id number]]  (Abnormal) Collected: 12/23/20 2206    Specimen: Blood Updated: 12/23/20 2254     Glucose 109 mg/dL      BUN 11 mg/dL      Creatinine 1.05 mg/dL      Sodium 135 mmol/L      Potassium 4.0 mmol/L      Chloride 100 mmol/L      CO2 25.7 mmol/L      Calcium 9.0 mg/dL      Total Protein 6.9 g/dL      Albumin 3.70 g/dL      ALT (SGPT) 21 U/L      AST (SGOT) 20 U/L      Alkaline Phosphatase 66 U/L      Total Bilirubin 0.6 mg/dL      eGFR Non African Amer 72 mL/min/1.73      Globulin 3.2 gm/dL      A/G Ratio 1.2 g/dL      BUN/Creatinine Ratio 10.5     Anion Gap 9.3 mmol/L     Narrative:      GFR Normal >60  Chronic Kidney Disease <60  Kidney Failure <15      Blood Culture - Blood, Arm, Left [[id number]] Collected: 12/23/20 2206    Specimen: Blood from Arm, Left Updated: 12/23/20 2218    Lactic Acid, Plasma [[id number]]  (Normal) Collected: 12/23/20 2206    Specimen: Blood Updated: 12/23/20 2236     Lactate 0.9 mmol/L     Procalcitonin [[id number]]  (Normal) Collected: 12/23/20 2206    Specimen: Blood Updated: 12/23/20 2300     Procalcitonin 0.08 ng/mL     Narrative:      As a Marker for Sepsis (Non-Neonates):   1. <0.5 ng/mL represents a low risk of severe sepsis and/or  "septic shock.  1. >2 ng/mL represents a high risk of severe sepsis and/or septic shock.    As a Marker for Lower Respiratory Tract Infections that require antibiotic therapy:  PCT on Admission     Antibiotic Therapy             6-12 Hrs later  > 0.5                Strongly Recommended            >0.25 - <0.5         Recommended  0.1 - 0.25           Discouraged                   Remeasure/reassess PCT  <0.1                 Strongly Discouraged          Remeasure/reassess PCT      As 28 day mortality risk marker: \"Change in Procalcitonin Result\" (> 80 % or <=80 %) if Day 0 (or Day 1) and Day 4 values are available. Refer to http://www.[employer]cal[employer].com/   Change in PCT <=80 %   A decrease of PCT levels below or equal to 80 % defines a positive change in PCT test result representing a higher risk for 28-day all-cause mortality of patients diagnosed with severe sepsis or septic shock.  Change in PCT > 80 %   A decrease of PCT levels of more than 80 % defines a negative change in PCT result representing a lower risk for 28-day all-cause mortality of patients diagnosed with severe sepsis or septic shock.                Results may be falsely decreased if patient taking Biotin.     CBC Auto Differential [[id number]]  (Abnormal) Collected: 12/23/20 2206    Specimen: Blood Updated: 12/23/20 2228     WBC 11.09 10*3/mm3      RBC 4.37 10*6/mm3      Hemoglobin 14.0 g/dL      Hematocrit 41.3 %      MCV 94.5 fL      MCH 32.0 pg      MCHC 33.9 g/dL      RDW 11.7 %      RDW-SD 40.6 fl      MPV 9.5 fL      Platelets 267 10*3/mm3      Neutrophil % 74.9 %      Lymphocyte % 14.6 %      Monocyte % 9.0 %      Eosinophil % 0.7 %      Basophil % 0.4 %      Immature Grans % 0.4 %      Neutrophils, Absolute 8.31 10*3/mm3      Lymphocytes, Absolute 1.62 10*3/mm3      Monocytes, Absolute 1.00 10*3/mm3      Eosinophils, Absolute 0.08 10*3/mm3      Basophils, Absolute 0.04 10*3/mm3      Immature Grans, Absolute 0.04 10*3/mm3      nRBC 0.0 " /100 WBC     Blood Culture - Blood, Arm, Right [[id number]] Collected: 12/23/20 2212    Specimen: Blood from Arm, Right Updated: 12/23/20 2218    COVID PRE-OP / PRE-PROCEDURE SCREENING ORDER (NO ISOLATION) - Swab, Nasopharynx [[id number]] Collected: 12/24/20 0008    Specimen: Swab from Nasopharynx Updated: 12/24/20 0026    Narrative:      The following orders were created for panel order COVID PRE-OP / PRE-PROCEDURE SCREENING ORDER (NO ISOLATION) - Swab, Nasopharynx.  Procedure                               Abnormality         Status                     ---------                               -----------         ------                     COVID-19,APTIMA PANTHER,...[[id number]]                      In process                   Please view results for these tests on the individual orders.    COVID-19,APTIMA PANTHER,[facility] IN-HOUSE, NP/OP SWAB IN UTM/VTM/[facility] TRANSPORT MEDIA,24 HR TAT - Swab, Nasopharynx [[id number]] Collected: 12/24/20 0008    Specimen: Swab from Nasopharynx Updated: 12/24/20 0026    Wound Culture - Wound, Knee, Right [[id number]] Collected: 12/24/20 0008    Specimen: Wound from Knee, Right Updated: 12/24/20 0026          Imaging Results (Last 24 Hours)     Procedure Component Value Units Date/Time    XR Knee 3 View Right [[id number]] Collected: 12/23/20 2055     Updated: 12/23/20 2059    Narrative:      HISTORY: Right knee pain with swelling     COMPARISON: None     3 view(s) obtained.      FINDINGS: There is no evidence of fracture or dislocation. No evidence  of joint effusion. Soft tissue swelling anterior to the knee.       Impression:      No fracture or joint effusion.     This report was finalized on 12/23/2020 8:56 PM by Dr. Reji Posada M.D.             Results for orders placed during the hospital encounter of 02/17/20   Adult Transthoracic Echo Complete W/ Cont if Necessary Per Protocol    Narrative · Left ventricular systolic function is normal. Calculated EF = 61%.   Estimated EF was in  agreement with the calculated EF. Normal left   ventricular cavity size and wall thickness noted. All left ventricular   wall segments contract normally.  · Left ventricular diastolic function is normal.  · No evidence of a patent foramen ovale. Saline test results are negative.  · No significant valvular stenosis or insufficiency noted.          No orders to display        Assessment/Plan     Active Hospital Problems    Diagnosis  POA   • **Abscess of bursa of right knee [M71.061]  Yes   • Essential hypertension [I10]  Yes   • Cellulitis of right lower extremity [L03.115]  Yes   • History of DVT (deep vein thrombosis) [Z86.718]  Not Applicable   • Coronary artery disease involving native coronary artery of native heart without angina pectoris [I25.10]  Yes   • Pernicious anemia [D51.0]  Yes      Resolved Hospital Problems   No resolved problems to display.       Mr. Peacock is a 60 y.o. non-smoker with a history of essential hypertension, pernicious anemia, DVT, and CAD who presents with right knee pain secondary to cellulitis and abscess.    Cellulitis of right lower extremity/abscess  -X-ray shows no evidence of fracture, dislocation, or joint effusion.  -I&D performed in the emergency department, incision packed with iodoform  -Consult orthopedics to follow status post I&D  -Patient given Ancef and vancomycin in ED, will continue for now  -Wound and blood cultures pending  -Supportive care with IV hydration and analgesics    Essential hypertension   -Stable, resume home regimen    CAD  -Denies chest pain on assessment, continue aspirin and Lipitor    Pernicious anemia  -Hg stable   -CBC in AM     I discussed the patient's findings and my recommendations with patient and ED provider.    VTE Prophylaxis - SCDs.  Code Status - Full code.       GREG Santacruz  [facility]  12/24/20  01:52 EST

## 2020-12-25 ENCOUNTER — READMISSION MANAGEMENT (OUTPATIENT)
Dept: CALL CENTER | Facility: HOSPITAL | Age: 60
End: 2020-12-25

## 2020-12-25 VITALS
HEIGHT: 74 IN | RESPIRATION RATE: 16 BRPM | HEART RATE: 71 BPM | BODY MASS INDEX: 27.59 KG/M2 | OXYGEN SATURATION: 98 % | WEIGHT: 215 LBS | SYSTOLIC BLOOD PRESSURE: 123 MMHG | DIASTOLIC BLOOD PRESSURE: 74 MMHG | TEMPERATURE: 97.3 F

## 2020-12-25 PROBLEM — M71.061 ABSCESS OF BURSA OF RIGHT KNEE: Status: RESOLVED | Noted: 2020-12-23 | Resolved: 2020-12-25

## 2020-12-25 LAB — VANCOMYCIN TROUGH SERPL-MCNC: 7.4 MCG/ML (ref 5–20)

## 2020-12-25 PROCEDURE — 80202 ASSAY OF VANCOMYCIN: CPT | Performed by: HOSPITALIST

## 2020-12-25 PROCEDURE — G0378 HOSPITAL OBSERVATION PER HR: HCPCS

## 2020-12-25 PROCEDURE — 25010000003 CEFAZOLIN IN DEXTROSE 2-4 GM/100ML-% SOLUTION: Performed by: NURSE PRACTITIONER

## 2020-12-25 PROCEDURE — 96366 THER/PROPH/DIAG IV INF ADDON: CPT

## 2020-12-25 RX ORDER — DOXYCYCLINE 100 MG/1
100 CAPSULE ORAL EVERY 12 HOURS SCHEDULED
Status: DISCONTINUED | OUTPATIENT
Start: 2020-12-25 | End: 2020-12-25 | Stop reason: HOSPADM

## 2020-12-25 RX ORDER — CEPHALEXIN 500 MG/1
500 CAPSULE ORAL EVERY 6 HOURS SCHEDULED
Status: DISCONTINUED | OUTPATIENT
Start: 2020-12-25 | End: 2020-12-25 | Stop reason: HOSPADM

## 2020-12-25 RX ORDER — CEPHALEXIN 500 MG/1
500 CAPSULE ORAL EVERY 6 HOURS SCHEDULED
Qty: 48 CAPSULE | Refills: 0 | Status: SHIPPED | OUTPATIENT
Start: 2020-12-25 | End: 2020-12-25 | Stop reason: HOSPADM

## 2020-12-25 RX ORDER — DOXYCYCLINE 100 MG/1
100 CAPSULE ORAL EVERY 12 HOURS SCHEDULED
Qty: 24 CAPSULE | Refills: 0 | Status: SHIPPED | OUTPATIENT
Start: 2020-12-25 | End: 2021-01-06

## 2020-12-25 RX ORDER — DOXYCYCLINE 100 MG/1
100 CAPSULE ORAL EVERY 12 HOURS SCHEDULED
Qty: 24 CAPSULE | Refills: 0 | Status: SHIPPED | OUTPATIENT
Start: 2020-12-25 | End: 2020-12-25 | Stop reason: HOSPADM

## 2020-12-25 RX ORDER — CEPHALEXIN 500 MG/1
500 CAPSULE ORAL EVERY 6 HOURS SCHEDULED
Qty: 48 CAPSULE | Refills: 0 | Status: SHIPPED | OUTPATIENT
Start: 2020-12-25 | End: 2021-01-06

## 2020-12-25 RX ADMIN — ATORVASTATIN CALCIUM 40 MG: 20 TABLET, FILM COATED ORAL at 08:11

## 2020-12-25 RX ADMIN — HYDROCODONE BITARTRATE AND ACETAMINOPHEN 1 TABLET: 5; 325 TABLET ORAL at 02:39

## 2020-12-25 RX ADMIN — CEFAZOLIN SODIUM 2 G: 2 INJECTION, SOLUTION INTRAVENOUS at 05:57

## 2020-12-25 RX ADMIN — LISINOPRIL 2.5 MG: 2.5 TABLET ORAL at 08:11

## 2020-12-25 RX ADMIN — Medication 1 TABLET: at 08:11

## 2020-12-25 RX ADMIN — ASPIRIN 81 MG: 81 TABLET, COATED ORAL at 08:11

## 2020-12-25 RX ADMIN — HYDROCODONE BITARTRATE AND ACETAMINOPHEN 1 TABLET: 5; 325 TABLET ORAL at 08:11

## 2020-12-25 NOTE — PROGRESS NOTES
He is feeling better.  His cultures from the wound are positive for MRSA.  He has had a dressing change yesterday.  He continues to drain.  Okay from orthopedic perspective for discharge if oral antibiotic can be arranged for 3 weeks.    He will need wound care next week.    He can do daily dressing changes meanwhile at home.

## 2020-12-25 NOTE — PLAN OF CARE
Goal Outcome Evaluation:  Plan of Care Reviewed With: patient  Progress: improving  Outcome Summary: vss, dsg c/d/i, neurovascular status wnl, voiding well, reports adequate pain control, cont on iv abx, educated on monitoring b/p due to hx hypertension, discharge plans pending, will cont to monitor

## 2020-12-25 NOTE — DISCHARGE SUMMARY
Patient Name: Niranjan Peacock  : 1960  MRN: [id number]    Date of Admission: 2020  Date of Discharge:  2020  Primary Care Physician: Brian Marr MD      Chief Complaint:   Knee Pain (right knee infection has been on two different oral antibiotics and not working. now running fever )      Discharge Diagnoses     Active Hospital Problems    Diagnosis  POA   • Essential hypertension [I10]  Yes   • Cellulitis of right lower extremity [L03.115]  Yes   • History of DVT (deep vein thrombosis) [Z86.718]  Not Applicable   • Coronary artery disease involving native coronary artery of native heart without angina pectoris [I25.10]  Yes   • Pernicious anemia [D51.0]  Yes      Resolved Hospital Problems    Diagnosis Date Resolved POA   • **Abscess of bursa of right knee [M71.061] 2020 Yes        Hospital Course     Mr. Peacock is a 60 y.o. male with a history of CAD, hypertension, DVT, and pernicious anemia who presented to [facility] initially complaining of worsening pain and redness of his right knee.  Please see the admitting history and physical for further details.  He was found to have an abscess with cellulitis from MRSA and was admitted to the hospital for further evaluation and treatment.     Patient underwent I&D in the ED, and culture were sent.  Ortho was consulted and saw the patient for possible septic arthritis, but there was no effusion on x-ray and Ortho felt that this was a skin and soft tissue infection only.  Patient initially placed on vancomycin and cefazolin IV.  His erythema, swelling, pain improved dramatically after incision and drainage and IV antibiotics.  Wound culture grew MRSA.  Patient was switched to oral doxycycline 100 mg twice daily (patient had failed outpatient Bactrim) and Keflex 500 mg 4 times daily for an additional 12 days of therapy to complete 2 weeks total of therapy since drainage.  Patient is to follow-up with his primary care doctor  within a week to ensure that his cellulitis is improving.      Day of Discharge     Subjective:  Patient feels well this morning.  Reports to me that his swelling and erythema has improved dramatically since admission.  Says that induration on thigh has worsened since Tuesday but is unsure if it is changed for better or worse since admission.    Physical Exam:  Temp:  [97.3 °F (36.3 °C)-99 °F (37.2 °C)] 97.3 °F (36.3 °C)  Heart Rate:  [59-71] 71  Resp:  [14-16] 16  BP: (112-123)/(52-74) 123/74  Body mass index is 27.6 kg/m².  Physical Exam  Constitutional:       General: He is not in acute distress.  Cardiovascular:      Rate and Rhythm: Normal rate and regular rhythm.      Heart sounds: Normal heart sounds.   Pulmonary:      Effort: Pulmonary effort is normal.      Breath sounds: Normal breath sounds.   Abdominal:      General: Bowel sounds are normal.      Palpations: Abdomen is soft.   Skin:     Findings: Erythema and rash present.      Comments: Right knee with overlying bandage over drainage site.  Erythema and induration on anterior and medial aspect of knee, extending about two thirds the way up his inner thigh   Neurological:      Mental Status: He is alert.         Consultants     Consult Orders (all) (From admission, onward)     Start     Ordered    12/24/20 0154  Inpatient Orthopedic Surgery Consult  Once     Specialty:  Orthopedic Surgery  Provider:  Chente Ely MD    12/24/20 0153 12/23/20 2304  [facility] (on-call MD unless specified) Details  Once     Specialty:  Hospitalist  Provider:  (Not yet assigned)    12/23/20 2303              Procedures     Imaging Results (All)     Procedure Component Value Units Date/Time    XR Knee 3 View Right [[id number]] Collected: 12/23/20 2055     Updated: 12/23/20 2059    Narrative:      HISTORY: Right knee pain with swelling     COMPARISON: None     3 view(s) obtained.      FINDINGS: There is no evidence of fracture or dislocation. No evidence  of joint  effusion. Soft tissue swelling anterior to the knee.       Impression:      No fracture or joint effusion.     This report was finalized on 12/23/2020 8:56 PM by Dr. Reji Posada M.D.             Pertinent Labs     Results from last 7 days   Lab Units 12/24/20  0706 12/23/20  2206   WBC 10*3/mm3 8.05 11.09*   HEMOGLOBIN g/dL 13.9 14.0   PLATELETS 10*3/mm3 277 267     Results from last 7 days   Lab Units 12/24/20  0706 12/23/20  2206   SODIUM mmol/L 136 135*   POTASSIUM mmol/L 4.6 4.0   CHLORIDE mmol/L 103 100   CO2 mmol/L 27.9 25.7   BUN mg/dL 11 11   CREATININE mg/dL 1.06 1.05   GLUCOSE mg/dL 88 109*   Estimated Creatinine Clearance: 103.5 mL/min (by C-G formula based on SCr of 1.06 mg/dL).  Results from last 7 days   Lab Units 12/23/20  2206   ALBUMIN g/dL 3.70   BILIRUBIN mg/dL 0.6   ALK PHOS U/L 66   AST (SGOT) U/L 20   ALT (SGPT) U/L 21     Results from last 7 days   Lab Units 12/24/20  0706 12/23/20  2206   CALCIUM mg/dL 8.9 9.0   ALBUMIN g/dL  --  3.70               Invalid input(s): LDLCALC  Results from last 7 days   Lab Units 12/24/20  0008 12/23/20  2212 12/23/20  2206   BLOODCX   --  No growth at 24 hours No growth at 24 hours   WOUNDCX  Scant growth (1+) Staphylococcus aureus, MRSA*  --   --        Test Results Pending at Discharge     Pending Labs     Order Current Status    Blood Culture - Blood, Arm, Left Preliminary result    Blood Culture - Blood, Arm, Right Preliminary result    Wound Culture - Wound, Knee, Right Preliminary result          Discharge Details        Discharge Medications      New Medications      Instructions Start Date   doxycycline 100 MG capsule  Commonly known as: MONODOX   100 mg, Oral, Every 12 Hours Scheduled         Changes to Medications      Instructions Start Date   cephalexin 500 MG capsule  Commonly known as: KEFLEX  What changed: when to take this   500 mg, Oral, Every 6 Hours Scheduled      cyanocobalamin 1000 MCG/ML injection  What changed: See the new  instructions.   INJECT 1ML INTRAMUSCULARLY  EVERY  30 DAYS AS DIRECTED         Continue These Medications      Instructions Start Date   aspirin 81 MG tablet   81 mg, Oral, Daily      atorvastatin 40 MG tablet  Commonly known as: LIPITOR   40 mg, Oral, Daily      cyclobenzaprine 10 MG tablet  Commonly known as: FLEXERIL   10 mg, Oral      ketotifen 0.025 % ophthalmic solution  Commonly known as: ZADITOR   1 drop, Both Eyes, 2 Times Daily PRN      lisinopril 2.5 MG tablet  Commonly known as: PRINIVIL,ZESTRIL   TAKE ONE TABLET BY MOUTH TWICE A DAY      multivitamin tablet tablet  Commonly known as: THERAGRAN   Oral, Daily      vitamin D3 125 MCG (5000 UT) capsule capsule   5,000 Units, Oral, Daily         Stop These Medications    sulfamethoxazole-trimethoprim 800-160 MG per tablet  Commonly known as: BACTRIM DS,SEPTRA DS            No Known Allergies      Discharge Disposition:  Home or Self Care    Discharge Diet:  Diet Order   Procedures   • Diet Regular; Cardiac       Discharge Activity:   Activity Instructions     Activity as Tolerated            CODE STATUS:    Code Status and Medical Interventions:   Ordered at: 12/23/20 2311     Code Status:    CPR     Medical Interventions (Level of Support Prior to Arrest):    Full       Future Appointments   Date Time Provider Department Center   1/20/2021 11:20 AM Jason Mancilla MD [facility] None     Additional Instructions for the Follow-ups that You Need to Schedule     Call MD With Problems / Concerns   As directed      Call primary care physician if you experience fevers, chills, night sweats, drainage of puss from your wound, or worsening of your symptoms    Order Comments: Call primary care physician if you experience fevers, chills, night sweats, drainage of puss from your wound, or worsening of your symptoms          Discharge Follow-up with PCP   As directed       Currently Documented PCP:    Brian Marr MD    PCP Phone Number:    [phone]     Follow  Up Details: follow up with pcp within a week           Follow-up Information     Brian Marr MD .    Specialties: Family Medicine, Emergency Medicine, Urgent Care  Why: follow up with pcp within a week  Contact information:  [address] [place] [address] [phone]                   Additional Instructions for the Follow-ups that You Need to Schedule     Call MD With Problems / Concerns   As directed      Call primary care physician if you experience fevers, chills, night sweats, drainage of puss from your wound, or worsening of your symptoms    Order Comments: Call primary care physician if you experience fevers, chills, night sweats, drainage of puss from your wound, or worsening of your symptoms          Discharge Follow-up with PCP   As directed       Currently Documented PCP:    Brian Marr MD    PCP Phone Number:    [phone]     Follow Up Details: follow up with pcp within a week           Time Spent on Discharge:  Greater than 30 minutes      Abhilash Wynne MD  [facility]  12/25/20  10:18 EST

## 2020-12-25 NOTE — OUTREACH NOTE
Prep Survey      Responses   [facility] patient discharged from?  [place]   Is LACE score < 7 ?  Yes   Emergency Room discharge w/ pulse ox?  No   Eligibility  [facility]   Date of Admission  12/23/20   Date of Discharge  12/25/20   Discharge Disposition  Home or Self Care   Discharge diagnosis  Abscess of bursa of right knee   Does the patient have one of the following disease processes/diagnoses(primary or secondary)?  Other   Does the patient have Home health ordered?  No   Is there a DME ordered?  No   Prep survey completed?  Yes          Lelo Mendoza RN

## 2020-12-26 LAB
BACTERIA SPEC AEROBE CULT: ABNORMAL
GRAM STN SPEC: ABNORMAL

## 2020-12-28 ENCOUNTER — TRANSITIONAL CARE MANAGEMENT TELEPHONE ENCOUNTER (OUTPATIENT)
Dept: CALL CENTER | Facility: HOSPITAL | Age: 60
End: 2020-12-28

## 2020-12-28 LAB
BACTERIA SPEC AEROBE CULT: NORMAL
BACTERIA SPEC AEROBE CULT: NORMAL

## 2020-12-28 NOTE — PROGRESS NOTES
Case Management Discharge Note      Final Note: Home with family/friend support. No d/c orders for DME/RH/HH noted.         Selected Continued Care - Discharged on 12/25/2020 Admission date: 12/23/2020 - Discharge disposition: Home or Self Care    Destination    No services have been selected for the patient.              Durable Medical Equipment    No services have been selected for the patient.              Dialysis/Infusion    No services have been selected for the patient.              Home Medical Care    No services have been selected for the patient.              Therapy    No services have been selected for the patient.              Community Resources    No services have been selected for the patient.                       Final Discharge Disposition Code: 01 - home or self-care

## 2020-12-28 NOTE — OUTREACH NOTE
Call Center TCM Note      Responses   [facility] patient discharged from?  [place]   Does the patient have one of the following disease processes/diagnoses(primary or secondary)?  Other   TCM attempt successful?  Yes   Call start time  1218   Call end time  1221   Discharge diagnosis  Abscess of bursa of right knee   Meds reviewed with patient/caregiver?  Yes   Is the patient having any side effects they believe may be caused by any medication additions or changes?  No   Does the patient have all medications ordered at discharge?  Yes   Is the patient taking all medications as directed (includes completed medication regime)?  Yes   Does the patient have a primary care provider?   Yes   Does the patient have an appointment with their PCP within 7 days of discharge?  Yes   Comments regarding PCP  Patient has a hospital followup scheduled for 12/29/2020 with Dr Marr   Has the patient kept scheduled appointments due by today?  Yes   Has home health visited the patient within 72 hours of discharge?  N/A   Psychosocial issues?  No   Did the patient receive a copy of their discharge instructions?  Yes   Nursing interventions  Reviewed instructions with patient   What is the patient's perception of their health status since discharge?  Improving   Is the patient/caregiver able to teach back signs and symptoms related to disease process for when to call PCP?  Yes   Is the patient/caregiver able to teach back signs and symptoms related to disease process for when to call 911?  Yes   Is the patient/caregiver able to teach back the hierarchy of who to call/visit for symptoms/problems? PCP, Specialist, Home health nurse, Urgent Care, ED, 911  Yes   If the patient is a current smoker, are they able to teach back resources for cessation?  Not a smoker   TCM call completed?  Yes          Jose M Munguia RN    12/28/2020, 12:21 EST

## 2020-12-29 ENCOUNTER — OFFICE VISIT (OUTPATIENT)
Dept: INTERNAL MEDICINE | Facility: CLINIC | Age: 60
End: 2020-12-29

## 2020-12-29 VITALS
TEMPERATURE: 97.1 F | WEIGHT: 218.2 LBS | SYSTOLIC BLOOD PRESSURE: 140 MMHG | HEIGHT: 74 IN | DIASTOLIC BLOOD PRESSURE: 78 MMHG | OXYGEN SATURATION: 98 % | HEART RATE: 67 BPM | BODY MASS INDEX: 28 KG/M2

## 2020-12-29 DIAGNOSIS — L03.115 CELLULITIS OF RIGHT LOWER EXTREMITY: Primary | ICD-10-CM

## 2020-12-29 PROCEDURE — 99213 OFFICE O/P EST LOW 20 MIN: CPT | Performed by: FAMILY MEDICINE
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
## 2021-01-19 ENCOUNTER — TELEPHONE (OUTPATIENT)
Dept: PEDIATRICS | Facility: OTHER | Age: 61
End: 2021-01-19

## 2021-01-19 ENCOUNTER — OFFICE VISIT (OUTPATIENT)
Dept: INTERNAL MEDICINE | Facility: CLINIC | Age: 61
End: 2021-01-19

## 2021-01-19 VITALS
HEIGHT: 74 IN | WEIGHT: 215.6 LBS | OXYGEN SATURATION: 98 % | HEART RATE: 82 BPM | BODY MASS INDEX: 27.67 KG/M2 | SYSTOLIC BLOOD PRESSURE: 132 MMHG | TEMPERATURE: 97.1 F | DIASTOLIC BLOOD PRESSURE: 78 MMHG

## 2021-01-19 DIAGNOSIS — H04.553 NASOLACRIMAL DUCT STENOSIS, BILATERAL: ICD-10-CM

## 2021-01-19 DIAGNOSIS — H01.139 ECZEMA OF EYELID, UNSPECIFIED LATERALITY: ICD-10-CM

## 2021-01-19 DIAGNOSIS — H10.13 ALLERGIC CONJUNCTIVITIS OF BOTH EYES: Primary | ICD-10-CM

## 2021-01-19 PROCEDURE — 99214 OFFICE O/P EST MOD 30 MIN: CPT | Performed by: FAMILY MEDICINE

## 2021-01-19 RX ORDER — ERYTHROMYCIN 5 MG/G
OINTMENT OPHTHALMIC NIGHTLY PRN
Qty: 1 G | Refills: 0 | Status: CANCELLED | OUTPATIENT
Start: 2021-01-19

## 2021-01-19 RX ORDER — DIAPER,BRIEF,INFANT-TODD,DISP
EACH MISCELLANEOUS 2 TIMES DAILY PRN
Qty: 15 G | Refills: 0 | Status: SHIPPED | OUTPATIENT
Start: 2021-01-19

## 2021-01-19 RX ORDER — ERYTHROMYCIN 5 MG/G
OINTMENT OPHTHALMIC NIGHTLY PRN
Qty: 1 G | Refills: 0 | Status: SHIPPED | OUTPATIENT
Start: 2021-01-19 | End: 2021-01-19 | Stop reason: SDUPTHER

## 2021-01-19 RX ORDER — ERYTHROMYCIN 5 MG/G
OINTMENT OPHTHALMIC NIGHTLY PRN
Qty: 1 G | Refills: 0 | Status: SHIPPED | OUTPATIENT
Start: 2021-01-19 | End: 2021-02-02

## 2021-01-19 NOTE — TELEPHONE ENCOUNTER
PATIENTS PHARMACY RECEIVED THE ORDER FOR    erythromycin (ROMYCIN) 5 MG/GM ophthalmic ointment   PHARMACIST STATED SHE ACCIDENTALLY DELETED THE PRESCRIPTION AND NEEDS IT TO BE RESENT TO THEM.

## 2021-01-19 NOTE — PROGRESS NOTES
Date of Encounter: 2021  Patient: Niranjan Peacock,  1960    Subjective   History of Presenting Illness  Chief complaint: Eye infection    1 year history of intermittent purulent material in his eyes, itching and flaking of his eyelids, eye irritation.  Usually happens for several weeks at a time then resolves.  He recently had a flareup of this 1 week ago and he changed his shampoo which seems to be helping.  He does have chronic allergic rhinitis, does not notice any better or worse during these eye symptoms.  He does feel his classes are not working as well as they used to, but otherwise has no major changes or visual field deficits    Review of Systems:  Negative for fever, cough, shortness of breath, eye pain    The following portions of the patient's history were reviewed and updated as appropriate: allergies, current medications, past family history, past medical history, past social history, past surgical history and problem list.    Patient Active Problem List   Diagnosis   • Coronary artery disease involving native coronary artery of native heart without angina pectoris   • Multiple thyroid nodules   • Pernicious anemia   • H/O multiple allergies   • Herniated nucleus pulposus, L3-4 right   • Right lumbar radiculopathy   • Pterygium of right eye   • Degenerative arthritis of cervical spine   • Essential hypertension   • Cellulitis of right lower extremity   • History of DVT (deep vein thrombosis)   • Allergic conjunctivitis of both eyes     Past Medical History:   Diagnosis Date   • Angina at rest (CMS/HCC) 2019    Added automatically from request for surgery [id number]   • B12 deficiency anemia    • Chest pain    • Chronic fatigue 3/9/2016   • Coronary artery disease involving native coronary artery of native heart without angina pectoris 8/10/2017   • Deep vein thrombosis (CMS/HCC)    • Headache    • History of blood clots     blood clot found in right lung    • Hypertension    • Multiple  thyroid nodules    • Multiple thyroid nodules    • Pernicious anemia    • Pulmonary embolism (CMS/HCC)     2015   • Syncope     2 yrs ago one time     Past Surgical History:   Procedure Laterality Date   • CARDIAC CATHETERIZATION N/A 8/3/2017    Procedure: Coronary angiography;  Surgeon: Cynthia Farley MD;  Location:  [facility];  Service:    • CARDIAC CATHETERIZATION  8/3/2017    Procedure: Functional Flow [place];  Surgeon: Cynthia Farley MD;  Location:  [facility];  Service:    • CARDIAC CATHETERIZATION N/A 8/3/2017    Procedure: Stent YI coronary;  Surgeon: Cynthia Farley MD;  Location:  [facility];  Service:    • CARDIAC CATHETERIZATION N/A 8/3/2017    Procedure: Left ventriculography;  Surgeon: Cynthia Farley MD;  Location:  [facility];  Service:    • CARDIAC CATHETERIZATION N/A 8/3/2017    Procedure: Left Heart Cath;  Surgeon: Cynthia Farley MD;  Location:  [facility];  Service:    • CARDIAC CATHETERIZATION N/A 8/20/2019    Procedure: Left Heart Cath;  Surgeon: Mulugeta Ac MD;  Location:  [facility];  Service: Cardiology   • CARDIAC CATHETERIZATION N/A 8/20/2019    Procedure: Coronary angiography;  Surgeon: Mulugeta Ac MD;  Location:  [facility];  Service: Cardiology   • CARDIAC CATHETERIZATION N/A 8/20/2019    Procedure: Left ventriculography;  Surgeon: Mulugeta Ac MD;  Location:  [facility];  Service: Cardiology   • COLONOSCOPY  MMVI    NORMAL.   • COLONOSCOPY     • FINE NEEDLE ASPIRATION  12/2015    Left thyroid nodule.  [place] category II.  Dr. Socrates Sanchez     Family History   Problem Relation Age of Onset   • Heart disease Other    • Hypertension Other    • Thyroid disease Other    • Heart disease Father    • Prostate cancer Father    • Hypertension Father    • Stroke Father    • Heart disease Mother    • Hypertension Mother    • Heart  "disease Brother    • Hypertension Brother    • Thyroid disease Sister    • Heart disease Brother    • Hypertension Brother        Current Outpatient Medications:   •  aspirin 81 MG tablet, Take 1 tablet by mouth Daily., Disp: 30 tablet, Rfl: 11  •  atorvastatin (LIPITOR) 40 MG tablet, Take 1 tablet by mouth Daily., Disp: 90 tablet, Rfl: 3  •  Cholecalciferol (VITAMIN D3) 5000 units capsule capsule, Take 5,000 Units by mouth Daily., Disp: , Rfl:   •  cyanocobalamin 1000 MCG/ML injection, INJECT 1ML INTRAMUSCULARLY  EVERY  30 DAYS AS DIRECTED (Patient taking differently: Med is overdue), Disp: 30 mL, Rfl: 2  •  cyclobenzaprine (FLEXERIL) 10 MG tablet, Take 10 mg by mouth., Disp: , Rfl:   •  lisinopril (PRINIVIL,ZESTRIL) 2.5 MG tablet, TAKE ONE TABLET BY MOUTH TWICE A DAY, Disp: 180 tablet, Rfl: 1  •  Multiple Vitamin (MULTI VITAMIN DAILY PO), Take  by mouth Daily., Disp: , Rfl:   •  ketotifen (ZADITOR) 0.025 % ophthalmic solution, Administer 1 drop to both eyes 2 (Two) Times a Day As Needed (eye itching)., Disp: 5 mL, Rfl: 3    Current Facility-Administered Medications:   •  nitroglycerin (NITROSTAT) SL tablet 0.4 mg, 0.4 mg, Sublingual, Q5 Min PRN, Tiago Srivastava Jr., MD  •  sodium chloride 0.9 % flush 10 mL, 10 mL, Intravenous, PRN, Tiago Srivastava Jr., MD  •  sodium chloride 0.9 % flush 10 mL, 10 mL, Intravenous, PRN, Jason Mancilla MD  No Known Allergies  Social History     Tobacco Use   • Smoking status: Never Smoker   • Smokeless tobacco: Never Used   • Tobacco comment: caffeine use    Substance Use Topics   • Alcohol use: Yes     Binge frequency: Monthly     Comment: occasional   • Drug use: No     Comment: Drinks 2-4 caffeinated beverages per day          Objective   Physical Exam  Vitals:    01/19/21 0822   BP: 132/78   Pulse: 82   Temp: 97.1 °F (36.2 °C)   TempSrc: Temporal   SpO2: 98%   Weight: 97.8 kg (215 lb 9.6 oz)   Height: 188 cm (74\")     Body mass index is 27.68 kg/m².    Constitutional: " NAD.  Eyes: EOMI. PERRLA.  Pterygium medial aspect right eye.  No nasal lacrimal duct obstruction.  Normal tear production.  Flaking of the eyelids with some mild erythema bilaterally.  Psychiatric: Normal affect. Normal thought content.       Assessment/Plan   Assessment and Plan  Pleasant 60-year-old male with CAD, hypertension, pernicious anemia, multiple thyroid nodules, right lumbar radiculopathy, who presents with the following:     Diagnoses and all orders for this visit:    1. Allergic conjunctivitis of both eyes (Primary)    2. Nasolacrimal duct stenosis, bilateral  -     erythromycin (ROMYCIN) 5 MG/GM ophthalmic ointment; Administer  to both eyes At Night As Needed (eye infection).  Dispense: 1 g; Refill: 0    3. Eczema of eyelid, unspecified laterality  -     hydrocortisone 0.5 % cream; Apply  topically to the appropriate area as directed 2 (Two) Times a Day As Needed for Irritation.  Dispense: 15 g; Refill: 0    Symptoms likely allergic in nature.  Will provide erythromycin antibiotic ointment for as needed use to prevent secondary conjunctivitis during flareups, we also discussed nasolacrimal duct massage and warm rags to help with eye drainage.  Small amounts of hydrocortisone to be used for eyelid eczema.  Recommend he continue antiallergy medications and search for a trigger.  Also recommend he get appointment with ophthalmologist for regular vision checkup    Brian Marr MD  Family Medicine  O: [phone]  C: [phone]    Disclaimer: Parts of this note were dictated by speech recognition. Minor errors in transcription may be present. Please call if questions.

## 2021-02-02 ENCOUNTER — OFFICE VISIT (OUTPATIENT)
Dept: CARDIOLOGY | Facility: CLINIC | Age: 61
End: 2021-02-02

## 2021-02-02 VITALS
WEIGHT: 217 LBS | DIASTOLIC BLOOD PRESSURE: 60 MMHG | HEART RATE: 61 BPM | HEIGHT: 74 IN | SYSTOLIC BLOOD PRESSURE: 160 MMHG | BODY MASS INDEX: 27.85 KG/M2

## 2021-02-02 DIAGNOSIS — R94.31 ABNORMAL EKG: ICD-10-CM

## 2021-02-02 DIAGNOSIS — E78.2 MIXED HYPERLIPIDEMIA: ICD-10-CM

## 2021-02-02 DIAGNOSIS — I25.10 CORONARY ARTERY DISEASE INVOLVING NATIVE CORONARY ARTERY OF NATIVE HEART WITHOUT ANGINA PECTORIS: Primary | ICD-10-CM

## 2021-02-02 DIAGNOSIS — I10 ESSENTIAL HYPERTENSION: ICD-10-CM

## 2021-02-02 PROCEDURE — 93000 ELECTROCARDIOGRAM COMPLETE: CPT | Performed by: NURSE PRACTITIONER

## 2021-02-02 PROCEDURE — 99214 OFFICE O/P EST MOD 30 MIN: CPT | Performed by: NURSE PRACTITIONER

## 2021-02-09 ENCOUNTER — PATIENT MESSAGE (OUTPATIENT)
Dept: CARDIOLOGY | Facility: CLINIC | Age: 61
End: 2021-02-09

## 2021-02-09 RX ORDER — LISINOPRIL 5 MG/1
2.5 TABLET ORAL 2 TIMES DAILY
Qty: 90 TABLET | Refills: 1
Start: 2021-02-09 | End: 2021-03-12 | Stop reason: SDUPTHER

## 2021-02-09 NOTE — TELEPHONE ENCOUNTER
From: Niranjan Peacock  To: GREG Carrasquillo  Sent: 2/9/2021 8:53 AM EST  Subject: Visit Follow-Up Question    Shira,   Here are blood pressure readings for a few days as requested.    Nain Peacock

## 2021-02-16 ENCOUNTER — PATIENT MESSAGE (OUTPATIENT)
Dept: CARDIOLOGY | Facility: CLINIC | Age: 61
End: 2021-02-16

## 2021-03-12 ENCOUNTER — LAB (OUTPATIENT)
Dept: LAB | Facility: HOSPITAL | Age: 61
End: 2021-03-12

## 2021-03-12 ENCOUNTER — TELEPHONE (OUTPATIENT)
Dept: CARDIOLOGY | Facility: CLINIC | Age: 61
End: 2021-03-12

## 2021-03-12 DIAGNOSIS — R94.31 ABNORMAL EKG: ICD-10-CM

## 2021-03-12 DIAGNOSIS — I25.10 CORONARY ARTERY DISEASE INVOLVING NATIVE CORONARY ARTERY OF NATIVE HEART WITHOUT ANGINA PECTORIS: ICD-10-CM

## 2021-03-12 DIAGNOSIS — E78.2 MIXED HYPERLIPIDEMIA: ICD-10-CM

## 2021-03-12 DIAGNOSIS — I10 ESSENTIAL HYPERTENSION: ICD-10-CM

## 2021-03-12 LAB
ANION GAP SERPL CALCULATED.3IONS-SCNC: 8.7 MMOL/L (ref 5–15)
BUN SERPL-MCNC: 10 MG/DL (ref 8–23)
BUN/CREAT SERPL: 10.1 (ref 7–25)
CALCIUM SPEC-SCNC: 8.9 MG/DL (ref 8.6–10.5)
CHLORIDE SERPL-SCNC: 102 MMOL/L (ref 98–107)
CO2 SERPL-SCNC: 26.3 MMOL/L (ref 22–29)
CREAT SERPL-MCNC: 0.99 MG/DL (ref 0.76–1.27)
GFR SERPL CREATININE-BSD FRML MDRD: 77 ML/MIN/1.73
GLUCOSE SERPL-MCNC: 112 MG/DL (ref 65–99)
POTASSIUM SERPL-SCNC: 3.7 MMOL/L (ref 3.5–5.2)
SODIUM SERPL-SCNC: 137 MMOL/L (ref 136–145)

## 2021-03-12 PROCEDURE — 80048 BASIC METABOLIC PNL TOTAL CA: CPT

## 2021-03-12 PROCEDURE — 36415 COLL VENOUS BLD VENIPUNCTURE: CPT

## 2021-03-12 RX ORDER — LISINOPRIL 10 MG/1
10 TABLET ORAL DAILY
Qty: 30 TABLET | Refills: 5 | Status: SHIPPED | OUTPATIENT
Start: 2021-03-12 | End: 2021-03-15 | Stop reason: SDUPTHER

## 2021-03-12 NOTE — TELEPHONE ENCOUNTER
Thank you for your help.. Please let patient know renal function is good and it it safe to increase lisinopril from 5 mg to 10 mg and to send his readings via [employer] in 1-2 weeks please. I am sending a new prescription of lisinopril 10 mg to his local [facility]. He should have 5 mg tablets and its ok to take two at once.

## 2021-03-15 ENCOUNTER — TELEPHONE (OUTPATIENT)
Dept: CARDIOLOGY | Facility: CLINIC | Age: 61
End: 2021-03-15

## 2021-03-15 RX ORDER — LISINOPRIL 10 MG/1
20 TABLET ORAL DAILY
Qty: 30 TABLET | Refills: 5
Start: 2021-03-15 | End: 2021-03-18 | Stop reason: SDUPTHER

## 2021-03-16 ENCOUNTER — BULK ORDERING (OUTPATIENT)
Dept: CASE MANAGEMENT | Facility: OTHER | Age: 61
End: 2021-03-16

## 2021-03-16 DIAGNOSIS — Z23 IMMUNIZATION DUE: ICD-10-CM

## 2021-03-18 ENCOUNTER — TELEPHONE (OUTPATIENT)
Dept: CARDIOLOGY | Facility: CLINIC | Age: 61
End: 2021-03-18

## 2021-03-18 RX ORDER — LISINOPRIL 20 MG/1
20 TABLET ORAL DAILY
Qty: 90 TABLET | Refills: 1 | Status: ON HOLD | OUTPATIENT
Start: 2021-03-18 | End: 2021-07-18 | Stop reason: SDUPTHER

## 2021-04-05 ENCOUNTER — OFFICE VISIT (OUTPATIENT)
Dept: INTERNAL MEDICINE | Facility: CLINIC | Age: 61
End: 2021-04-05

## 2021-04-05 ENCOUNTER — TELEPHONE (OUTPATIENT)
Dept: INTERNAL MEDICINE | Facility: CLINIC | Age: 61
End: 2021-04-05

## 2021-04-05 VITALS
BODY MASS INDEX: 27.46 KG/M2 | SYSTOLIC BLOOD PRESSURE: 126 MMHG | WEIGHT: 214 LBS | HEART RATE: 74 BPM | HEIGHT: 74 IN | OXYGEN SATURATION: 98 % | TEMPERATURE: 99.1 F | DIASTOLIC BLOOD PRESSURE: 70 MMHG

## 2021-04-05 DIAGNOSIS — S80.861A: ICD-10-CM

## 2021-04-05 DIAGNOSIS — H60.02 FURUNCLE OF LEFT EAR: Primary | ICD-10-CM
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
## 2021-04-05 DIAGNOSIS — Z22.322 MRSA (METHICILLIN RESISTANT STAPHYLOCOCCUS AUREUS) CARRIER: ICD-10-CM

## 2021-04-05 PROBLEM — L03.115 CELLULITIS OF RIGHT LOWER EXTREMITY: Status: RESOLVED | Noted: 2020-12-24 | Resolved: 2021-04-05

## 2021-04-05 PROCEDURE — 99214 OFFICE O/P EST MOD 30 MIN: CPT | Performed by: FAMILY MEDICINE

## 2021-04-05 RX ORDER — DOXYCYCLINE HYCLATE 100 MG/1
100 TABLET, DELAYED RELEASE ORAL 2 TIMES DAILY
Qty: 10 TABLET | Refills: 0 | Status: SHIPPED | OUTPATIENT
Start: 2021-04-05 | End: 2021-07-01 | Stop reason: SDUPTHER

## 2021-04-05 RX ORDER — CHLORHEXIDINE GLUCONATE 4 G/100ML
SOLUTION TOPICAL
Qty: 473 ML | Refills: 0 | Status: ON HOLD | OUTPATIENT
Start: 2021-04-05 | End: 2021-07-17

## 2021-04-05 NOTE — TELEPHONE ENCOUNTER
"Pt states he has a spot about 3/8\" diameter  along his jaw line below his ear and is concerned it could be MRSA again.  I directed him to  as there is not any availability today.  He did state that he has squeezed the spot 3 different times and got quite a bit of puss/cyst like material out.  Pt did say that did relieve some of the pressure.  He is concerned they will not do the correct thing for him again.  Reassured the patient I would send you the message and he will proceed to UC.  "

## 2021-04-05 NOTE — TELEPHONE ENCOUNTER
PATIENT CALLED IN STATING HE HAS AN EAR INFECTION AND WOULD LIKE TO COME IN TODAY TO SEE THE DOCTOR.    PLEASE CALL BACK TO JESUS @ [phone]

## 2021-04-05 NOTE — PROGRESS NOTES
Date of Encounter: 2021  Patient: Niranjan Peacock,  1960    Subjective   History of Presenting Illness  Chief complaint: Spot on jaw    3-day history of swelling right below his left ear, purulent discharge.  He denies fever and chills.  Also has an erythematous, painful area on his right anterior thigh that he is concerned could be developing into MRSA.  Before he came to the office he pushed hard on the spot below his jaw and got significant purulent discharge and has had some improvement in the discomfort in size of the swelling.    Review of Systems:  Negative for fever, chills, headache, lymphadenopathy    The following portions of the patient's history were reviewed and updated as appropriate: allergies, current medications, past family history, past medical history, past social history, past surgical history and problem list.    Patient Active Problem List   Diagnosis   • Coronary artery disease involving native coronary artery of native heart without angina pectoris   • Multiple thyroid nodules   • Pernicious anemia   • H/O multiple allergies   • Herniated nucleus pulposus, L3-4 right   • Right lumbar radiculopathy   • Pterygium of right eye   • Degenerative arthritis of cervical spine   • Essential hypertension   • Cellulitis of right lower extremity   • History of DVT (deep vein thrombosis)   • Allergic conjunctivitis of both eyes     Past Medical History:   Diagnosis Date   • Angina at rest (CMS/HCC) 2019    Added automatically from request for surgery [id number]   • B12 deficiency anemia    • Chest pain    • Chronic fatigue 3/9/2016   • Coronary artery disease involving native coronary artery of native heart without angina pectoris 8/10/2017   • Deep vein thrombosis (CMS/HCC)    • Headache    • History of blood clots     blood clot found in right lung    • Hypertension    • Multiple thyroid nodules    • Multiple thyroid nodules    • Pernicious anemia    • Pulmonary embolism (CMS/HCC)      2015   • Syncope     2 yrs ago one time     Past Surgical History:   Procedure Laterality Date   • CARDIAC CATHETERIZATION N/A 8/3/2017    Procedure: Coronary angiography;  Surgeon: Cynthia Farley MD;  Location:  [facility];  Service:    • CARDIAC CATHETERIZATION  8/3/2017    Procedure: Functional Flow [place];  Surgeon: Cynthia Farley MD;  Location:  [facility];  Service:    • CARDIAC CATHETERIZATION N/A 8/3/2017    Procedure: Stent YI coronary;  Surgeon: Cynthia Farley MD;  Location:  [facility];  Service:    • CARDIAC CATHETERIZATION N/A 8/3/2017    Procedure: Left ventriculography;  Surgeon: Cynthia Farley MD;  Location:  [facility];  Service:    • CARDIAC CATHETERIZATION N/A 8/3/2017    Procedure: Left Heart Cath;  Surgeon: Cynthia Farley MD;  Location:  [facility];  Service:    • CARDIAC CATHETERIZATION N/A 8/20/2019    Procedure: Left Heart Cath;  Surgeon: Mulugeta Ac MD;  Location:  [facility];  Service: Cardiology   • CARDIAC CATHETERIZATION N/A 8/20/2019    Procedure: Coronary angiography;  Surgeon: Mulugeta Ac MD;  Location:  [facility];  Service: Cardiology   • CARDIAC CATHETERIZATION N/A 8/20/2019    Procedure: Left ventriculography;  Surgeon: Mulugeta Ac MD;  Location:  [facility];  Service: Cardiology   • COLONOSCOPY  MMVI    NORMAL.   • COLONOSCOPY     • FINE NEEDLE ASPIRATION  12/2015    Left thyroid nodule.  [place] category II.  Dr. Socrates Sanchez     Family History   Problem Relation Age of Onset   • Heart disease Other    • Hypertension Other    • Thyroid disease Other    • Heart disease Father    • Prostate cancer Father    • Hypertension Father    • Stroke Father    • Heart disease Mother    • Hypertension Mother    • Heart disease Brother    • Hypertension Brother    • Thyroid disease Sister    • Heart disease Brother    •  "Hypertension Brother        Current Outpatient Medications:   •  aspirin 81 MG tablet, Take 1 tablet by mouth Daily., Disp: 30 tablet, Rfl: 11  •  atorvastatin (LIPITOR) 40 MG tablet, Take 1 tablet by mouth Daily., Disp: 90 tablet, Rfl: 3  •  Cholecalciferol (VITAMIN D3) 5000 units capsule capsule, Take 5,000 Units by mouth Daily., Disp: , Rfl:   •  cyanocobalamin 1000 MCG/ML injection, INJECT 1ML INTRAMUSCULARLY  EVERY  30 DAYS AS DIRECTED (Patient taking differently: Med is overdue), Disp: 30 mL, Rfl: 2  •  hydrocortisone 0.5 % cream, Apply  topically to the appropriate area as directed 2 (Two) Times a Day As Needed for Irritation., Disp: 15 g, Rfl: 0  •  lisinopril (PRINIVIL,ZESTRIL) 20 MG tablet, Take 1 tablet by mouth Daily., Disp: 90 tablet, Rfl: 1  •  Multiple Vitamin (MULTI VITAMIN DAILY PO), Take  by mouth Daily., Disp: , Rfl:   •  chlorhexidine (HIBICLENS) 4 % external liquid, Apply in shower to whole body once per day for 5 days, Disp: 473 mL, Rfl: 0  •  doxycycline (DORYX) 100 MG enteric coated tablet, Take 1 tablet by mouth 2 (Two) Times a Day for 5 days., Disp: 10 tablet, Rfl: 0  No Known Allergies  Social History     Tobacco Use   • Smoking status: Never Smoker   • Smokeless tobacco: Never Used   • Tobacco comment: caffeine use - 2 cups coffee dailyl    Vaping Use   • Vaping Use: Never used   Substance Use Topics   • Alcohol use: Yes     Comment: occasional   • Drug use: No          Objective   Physical Exam  Vitals:    04/05/21 1630   BP: 126/70   Pulse: 74   Temp: 99.1 °F (37.3 °C)   TempSrc: Temporal   SpO2: 98%   Weight: 97.1 kg (214 lb)   Height: 188 cm (74\")     Body mass index is 27.48 kg/m².    Constitutional: NAD.  Integumentary: 6 mm area of induration with overlying scab where the left earlobe meets the jaw.  With gentle pressure, approximately 1 mL purulent material was expressed.  There is no evidence of cellulitis.  No left parotid tenderness.  No warmth.  3 cm ovoid area of erythema " with central arthropod bite visible on anterior thigh, nonfluctuant but firm and tender centrally.  No warmth.  Lymphatic: No anterior cervical lymphadenopathy.  Psychiatric: Normal affect. Normal thought content.     Assessment/Plan   Assessment and Plan  Pleasant 61-year-old male with CAD, hypertension, pernicious anemia, multiple thyroid nodules, right lumbar radiculopathy, who presents with the following:    Diagnoses and all orders for this visit:    1. Furuncle of left ear (Primary)  -     doxycycline (DORYX) 100 MG enteric coated tablet; Take 1 tablet by mouth 2 (Two) Times a Day for 5 days.  Dispense: 10 tablet; Refill: 0    2. Arthropod bite of lower leg, right, initial encounter  -     doxycycline (DORYX) 100 MG enteric coated tablet; Take 1 tablet by mouth 2 (Two) Times a Day for 5 days.  Dispense: 10 tablet; Refill: 0    3. MRSA (methicillin resistant Staphylococcus aureus) carrier  -     chlorhexidine (HIBICLENS) 4 % external liquid; Apply in shower to whole body once per day for 5 days  Dispense: 473 mL; Refill: 0    Left ear furuncle already draining, discussed how we should continue to help to strain with gentle pressure in the shower after hot compress.  Arthropod bite in the right lower leg may be early erysipelas, for both of these problems due to his history of MRSA with complication we will treat with doxycycline and give him a protocol for decolonization with chlorhexidine liquid.    Brian Marr MD  Family Medicine  O: [phone]  C: [phone]    Disclaimer: Parts of this note were dictated by speech recognition. Minor errors in transcription may be present. Please call if questions.

## 2021-04-13 RX ORDER — LISINOPRIL 2.5 MG/1
TABLET ORAL
Qty: 60 TABLET | Refills: 0 | OUTPATIENT
Start: 2021-04-13

## 2021-04-18 ENCOUNTER — PATIENT MESSAGE (OUTPATIENT)
Dept: INTERNAL MEDICINE | Facility: CLINIC | Age: 61
End: 2021-04-18

## 2021-04-18 DIAGNOSIS — E53.8 B12 DEFICIENCY: Primary | ICD-10-CM

## 2021-04-19 RX ORDER — CYANOCOBALAMIN 1000 UG/ML
1000 INJECTION, SOLUTION INTRAMUSCULAR; SUBCUTANEOUS
Qty: 12 ML | Refills: 1 | Status: SHIPPED | OUTPATIENT
Start: 2021-04-19 | End: 2022-05-31

## 2021-04-19 NOTE — TELEPHONE ENCOUNTER
From: Niranjan Peacock  To: Brian Marr MD  Sent: 4/18/2021 6:46 PM EDT  Subject: Prescription Question    Brian  Will you call in a prescription for syringes for my B12 injections(cyanocobalamin 1000 MCG/ML)  They said they cannot sell them anymore without one.   It would be good to get 12 of them so I only need to deal with this once a year.  Thank you   Nain

## 2021-04-29 ENCOUNTER — OFFICE VISIT (OUTPATIENT)
Dept: INTERNAL MEDICINE | Facility: CLINIC | Age: 61
End: 2021-04-29

## 2021-04-29 VITALS
DIASTOLIC BLOOD PRESSURE: 70 MMHG | HEIGHT: 74 IN | WEIGHT: 218.8 LBS | HEART RATE: 64 BPM | SYSTOLIC BLOOD PRESSURE: 122 MMHG | OXYGEN SATURATION: 98 % | BODY MASS INDEX: 28.08 KG/M2 | TEMPERATURE: 97.1 F

## 2021-04-29 DIAGNOSIS — D51.0 PERNICIOUS ANEMIA: ICD-10-CM

## 2021-04-29 DIAGNOSIS — E78.5 HYPERLIPIDEMIA, UNSPECIFIED HYPERLIPIDEMIA TYPE: ICD-10-CM

## 2021-04-29 DIAGNOSIS — R53.83 FATIGUE, UNSPECIFIED TYPE: Primary | ICD-10-CM

## 2021-04-29 DIAGNOSIS — Z12.5 SCREENING FOR MALIGNANT NEOPLASM OF PROSTATE: ICD-10-CM

## 2021-04-29 DIAGNOSIS — I10 ESSENTIAL HYPERTENSION: ICD-10-CM

## 2021-04-29 PROCEDURE — 99213 OFFICE O/P EST LOW 20 MIN: CPT | Performed by: FAMILY MEDICINE

## 2021-04-29 NOTE — PROGRESS NOTES
Date of Encounter: 2021  Patient: Niranjan Peacock,  1960    Subjective   History of Presenting Illness  Chief complaint: Fatigue    Patient initially made appointment for furuncle of left earlobe that was not improving but this is resolved at the time of this appointment.      He does note worsening fatigue over the past few months, nonspecific, usually worse at the end of a long workday.  Work is been no more stressful than usual.  He does have aches and pains in his joints at the end of a workday that does not improve with acetaminophen.  He was told to avoid NSAIDs due to his cardiac history.  No focal joint swelling but just generalized aches.  Last B12 injection .  He has completed his COVID-19 series.    Review of Systems:  Negative for fever, cough, shortness of breath    The following portions of the patient's history were reviewed and updated as appropriate: allergies, current medications, past family history, past medical history, past social history, past surgical history and problem list.    Patient Active Problem List   Diagnosis   • Coronary artery disease involving native coronary artery of native heart without angina pectoris   • Multiple thyroid nodules   • Pernicious anemia   • H/O multiple allergies   • Herniated nucleus pulposus, L3-4 right   • Right lumbar radiculopathy   • Pterygium of right eye   • Degenerative arthritis of cervical spine   • Essential hypertension   • History of DVT (deep vein thrombosis)   • Allergic conjunctivitis of both eyes     Past Medical History:   Diagnosis Date   • Angina at rest (CMS/HCC) 2019    Added automatically from request for surgery [id number]   • B12 deficiency anemia    • Cellulitis of right lower extremity 2020   • Chest pain    • Chronic fatigue 3/9/2016   • Coronary artery disease involving native coronary artery of native heart without angina pectoris 8/10/2017   • Deep vein thrombosis (CMS/HCC)    • Headache    • History of  blood clots     blood clot found in right lung    • Hypertension    • Multiple thyroid nodules    • Multiple thyroid nodules    • Pernicious anemia    • Pulmonary embolism (CMS/HCC)     2015   • Syncope     2 yrs ago one time     Past Surgical History:   Procedure Laterality Date   • CARDIAC CATHETERIZATION N/A 8/3/2017    Procedure: Coronary angiography;  Surgeon: Cynthia Farley MD;  Location:  [facility];  Service:    • CARDIAC CATHETERIZATION  8/3/2017    Procedure: Functional Flow [place];  Surgeon: Cynthia Farley MD;  Location:  [facility];  Service:    • CARDIAC CATHETERIZATION N/A 8/3/2017    Procedure: Stent YI coronary;  Surgeon: Cynthia Farley MD;  Location:  [facility];  Service:    • CARDIAC CATHETERIZATION N/A 8/3/2017    Procedure: Left ventriculography;  Surgeon: Cynthia Farley MD;  Location:  [facility];  Service:    • CARDIAC CATHETERIZATION N/A 8/3/2017    Procedure: Left Heart Cath;  Surgeon: Cynthia Farley MD;  Location:  [facility];  Service:    • CARDIAC CATHETERIZATION N/A 8/20/2019    Procedure: Left Heart Cath;  Surgeon: Mulugeta Ac MD;  Location:  [facility];  Service: Cardiology   • CARDIAC CATHETERIZATION N/A 8/20/2019    Procedure: Coronary angiography;  Surgeon: Mulugeta Ac MD;  Location:  [facility];  Service: Cardiology   • CARDIAC CATHETERIZATION N/A 8/20/2019    Procedure: Left ventriculography;  Surgeon: Mulugeta Ac MD;  Location:  [facility];  Service: Cardiology   • COLONOSCOPY  MMVI    NORMAL.   • COLONOSCOPY     • FINE NEEDLE ASPIRATION  12/2015    Left thyroid nodule.  [place] category II.  Dr. Socrates Sanchez     Family History   Problem Relation Age of Onset   • Heart disease Other    • Hypertension Other    • Thyroid disease Other    • Heart disease Father    • Prostate cancer Father    • Hypertension Father    •  "Stroke Father    • Heart disease Mother    • Hypertension Mother    • Heart disease Brother    • Hypertension Brother    • Thyroid disease Sister    • Heart disease Brother    • Hypertension Brother        Current Outpatient Medications:   •  aspirin 81 MG tablet, Take 1 tablet by mouth Daily., Disp: 30 tablet, Rfl: 11  •  atorvastatin (LIPITOR) 40 MG tablet, Take 1 tablet by mouth Daily., Disp: 90 tablet, Rfl: 3  •  chlorhexidine (HIBICLENS) 4 % external liquid, Apply in shower to whole body once per day for 5 days, Disp: 473 mL, Rfl: 0  •  Cholecalciferol (VITAMIN D3) 5000 units capsule capsule, Take 5,000 Units by mouth Daily., Disp: , Rfl:   •  cyanocobalamin 1000 MCG/ML injection, Inject 1 mL into the appropriate muscle as directed by prescriber Every 30 (Thirty) Days., Disp: 12 mL, Rfl: 1  •  hydrocortisone 0.5 % cream, Apply  topically to the appropriate area as directed 2 (Two) Times a Day As Needed for Irritation., Disp: 15 g, Rfl: 0  •  lisinopril (PRINIVIL,ZESTRIL) 20 MG tablet, Take 1 tablet by mouth Daily., Disp: 90 tablet, Rfl: 1  •  Multiple Vitamin (MULTI VITAMIN DAILY PO), Take  by mouth Daily., Disp: , Rfl:   •  Syringe/Needle, Disp, (SecureSafe Syringe/Needle) 25G X 1\" 1 ML misc, Use to administer B12 inj, Disp: 25 each, Rfl: 1  No Known Allergies  Social History     Tobacco Use   • Smoking status: Never Smoker   • Smokeless tobacco: Never Used   • Tobacco comment: caffeine use - 2 cups coffee dailyl    Vaping Use   • Vaping Use: Never used   Substance Use Topics   • Alcohol use: Yes     Comment: occasional   • Drug use: No          Objective   Physical Exam  Vitals:    04/29/21 1354   BP: 122/70   Pulse: 64   Temp: 97.1 °F (36.2 °C)   TempSrc: Temporal   SpO2: 98%   Weight: 99.2 kg (218 lb 12.8 oz)   Height: 188 cm (74\")     Body mass index is 28.09 kg/m².    Constitutional: NAD.  Eyes: EOMI. Normal conjunctiva.  Right eye pterygium  Cardiovascular: RRR. No murmurs. No LE edema b/l. Radial pulses " 2+ bilaterally.  Pulmonary: CTA b/l. Good effort.  Integumentary: No rashes or wounds on face or upper extremities.  Lymphatic: No anterior cervical lymphadenopathy.  Endocrine: No thyromegaly or palpable thyroid nodules.  Psychiatric: Slightly blunted affect. Normal thought content.     Assessment/Plan   Assessment and Plan  Pleasant 61-year-old male with CAD, hypertension, pernicious anemia, multiple thyroid nodules, right lumbar radiculopathy, who presents with the following:    Diagnoses and all orders for this visit:    1. Fatigue, unspecified type (Primary)  -     Thyroid Panel With TSH  -     Testosterone  -     Comprehensive Metabolic Panel  -     Sedimentation Rate    2. Pernicious anemia  -     Vitamin B12  -     CBC & Differential    3. Essential hypertension    4. Screening for malignant neoplasm of prostate  -     PSA DIAGNOSTIC    5. Hyperlipidemia, unspecified hyperlipidemia type  -     Lipid Panel    Nonspecific fatigue that may have been related to deconditioning from recent cellulitis episode that left him bedridden for a period of time, will check labs as above to evaluate for other causes.  Also encourage patient to return to regular cardiovascular exercise which he has not done.    Brian Marr MD  Family Medicine  O: [phone]  C: [phone]    Disclaimer: Parts of this note were dictated by speech recognition. Minor errors in transcription may be present. Please call if questions.

## 2021-04-30 LAB
ALBUMIN SERPL-MCNC: 4.2 G/DL (ref 3.8–4.9)
ALBUMIN/GLOB SERPL: 2.1 {RATIO} (ref 1.2–2.2)
ALP SERPL-CCNC: 62 IU/L (ref 39–117)
ALT SERPL-CCNC: 22 IU/L (ref 0–44)
AST SERPL-CCNC: 25 IU/L (ref 0–40)
BASOPHILS # BLD AUTO: 0 X10E3/UL (ref 0–0.2)
BASOPHILS NFR BLD AUTO: 1 %
BILIRUB SERPL-MCNC: 0.6 MG/DL (ref 0–1.2)
BUN SERPL-MCNC: 11 MG/DL (ref 8–27)
BUN/CREAT SERPL: 9 (ref 10–24)
CALCIUM SERPL-MCNC: 9.3 MG/DL (ref 8.6–10.2)
CHLORIDE SERPL-SCNC: 106 MMOL/L (ref 96–106)
CHOLEST SERPL-MCNC: 118 MG/DL (ref 100–199)
CO2 SERPL-SCNC: 27 MMOL/L (ref 20–29)
CREAT SERPL-MCNC: 1.16 MG/DL (ref 0.76–1.27)
EOSINOPHIL # BLD AUTO: 0.1 X10E3/UL (ref 0–0.4)
EOSINOPHIL NFR BLD AUTO: 2 %
ERYTHROCYTE [DISTWIDTH] IN BLOOD BY AUTOMATED COUNT: 12.5 % (ref 11.6–15.4)
ERYTHROCYTE [SEDIMENTATION RATE] IN BLOOD BY WESTERGREN METHOD: 2 MM/HR (ref 0–30)
FT4I SERPL CALC-MCNC: 1.8 (ref 1.2–4.9)
GLOBULIN SER CALC-MCNC: 2 G/DL (ref 1.5–4.5)
GLUCOSE SERPL-MCNC: 100 MG/DL (ref 65–99)
HCT VFR BLD AUTO: 40.2 % (ref 37.5–51)
HDLC SERPL-MCNC: 41 MG/DL
HGB BLD-MCNC: 13.6 G/DL (ref 13–17.7)
IMM GRANULOCYTES # BLD AUTO: 0 X10E3/UL (ref 0–0.1)
IMM GRANULOCYTES NFR BLD AUTO: 0 %
LDLC SERPL CALC-MCNC: 56 MG/DL (ref 0–99)
LYMPHOCYTES # BLD AUTO: 2.1 X10E3/UL (ref 0.7–3.1)
LYMPHOCYTES NFR BLD AUTO: 37 %
MCH RBC QN AUTO: 31.4 PG (ref 26.6–33)
MCHC RBC AUTO-ENTMCNC: 33.8 G/DL (ref 31.5–35.7)
MCV RBC AUTO: 93 FL (ref 79–97)
MONOCYTES # BLD AUTO: 0.5 X10E3/UL (ref 0.1–0.9)
MONOCYTES NFR BLD AUTO: 9 %
NEUTROPHILS # BLD AUTO: 2.9 X10E3/UL (ref 1.4–7)
NEUTROPHILS NFR BLD AUTO: 51 %
PLATELET # BLD AUTO: 255 X10E3/UL (ref 150–450)
POTASSIUM SERPL-SCNC: 4.3 MMOL/L (ref 3.5–5.2)
PROT SERPL-MCNC: 6.2 G/DL (ref 6–8.5)
PSA SERPL-MCNC: 2.5 NG/ML (ref 0–4)
RBC # BLD AUTO: 4.33 X10E6/UL (ref 4.14–5.8)
SODIUM SERPL-SCNC: 145 MMOL/L (ref 134–144)
T3RU NFR SERPL: 26 % (ref 24–39)
T4 SERPL-MCNC: 7.1 UG/DL (ref 4.5–12)
TESTOST SERPL-MCNC: 419 NG/DL (ref 264–916)
TRIGL SERPL-MCNC: 118 MG/DL (ref 0–149)
TSH SERPL DL<=0.005 MIU/L-ACNC: 1.63 UIU/ML (ref 0.45–4.5)
VIT B12 SERPL-MCNC: 296 PG/ML (ref 232–1245)
VLDLC SERPL CALC-MCNC: 21 MG/DL (ref 5–40)
WBC # BLD AUTO: 5.6 X10E3/UL (ref 3.4–10.8)

## 2021-07-01 ENCOUNTER — OFFICE VISIT (OUTPATIENT)
Dept: INTERNAL MEDICINE | Facility: CLINIC | Age: 61
End: 2021-07-01

## 2021-07-01 VITALS
HEART RATE: 87 BPM | BODY MASS INDEX: 27.98 KG/M2 | OXYGEN SATURATION: 97 % | HEIGHT: 74 IN | SYSTOLIC BLOOD PRESSURE: 118 MMHG | WEIGHT: 218 LBS | DIASTOLIC BLOOD PRESSURE: 60 MMHG

## 2021-07-01 DIAGNOSIS — L30.9 CHRONIC ECZEMA OF HAND: ICD-10-CM

## 2021-07-01 DIAGNOSIS — L03.011 PARONYCHIA OF RIGHT THUMB: Primary | ICD-10-CM

## 2021-07-01 PROCEDURE — 99214 OFFICE O/P EST MOD 30 MIN: CPT | Performed by: FAMILY MEDICINE

## 2021-07-01 RX ORDER — TRIAMCINOLONE ACETONIDE 1 MG/G
CREAM TOPICAL 2 TIMES DAILY
Qty: 28.4 G | Refills: 1 | Status: SHIPPED | OUTPATIENT
Start: 2021-07-01

## 2021-07-01 RX ORDER — DOXYCYCLINE HYCLATE 100 MG/1
100 TABLET, DELAYED RELEASE ORAL 2 TIMES DAILY
Qty: 10 TABLET | Refills: 0 | Status: SHIPPED | OUTPATIENT
Start: 2021-07-01 | End: 2021-07-06

## 2021-07-01 NOTE — PROGRESS NOTES
Date of Encounter: 2021  Patient: Niranjan Peacock,  1960    Subjective   History of Presenting Illness  Chief complaint: Thumb infection    Chronic cracking of into fingertips, recently developed an infection on the lateral aspect of his right thumb, painful, red, draining pus, yesterday he squeezed it and got a large amount of pus out of it and the thumb feels a lot better today.  He is concerned due to his history of MRSA that the infection is worsening despite this recent drainage.  Denies fever and chills.  He did have some amount of lymphatic streaking to the wrist yesterday although not present today.    The following portions of the patient's history were reviewed and updated as appropriate: allergies, current medications, past family history, past medical history, past social history, past surgical history and problem list.    Patient Active Problem List   Diagnosis   • Coronary artery disease involving native coronary artery of native heart without angina pectoris   • Multiple thyroid nodules   • Pernicious anemia   • H/O multiple allergies   • Herniated nucleus pulposus, L3-4 right   • Right lumbar radiculopathy   • Pterygium of right eye   • Degenerative arthritis of cervical spine   • Essential hypertension   • History of DVT (deep vein thrombosis)   • Allergic conjunctivitis of both eyes   • Chronic eczema of hand     Past Medical History:   Diagnosis Date   • Angina at rest (CMS/HCC) 2019    Added automatically from request for surgery [id number]   • B12 deficiency anemia    • Cellulitis of right lower extremity 2020   • Chest pain    • Chronic fatigue 3/9/2016   • Coronary artery disease involving native coronary artery of native heart without angina pectoris 8/10/2017   • Deep vein thrombosis (CMS/HCC)    • Headache    • History of blood clots     blood clot found in right lung    • Hypertension    • Multiple thyroid nodules    • Multiple thyroid nodules    • Pernicious anemia     • Pulmonary embolism (CMS/HCC)     2015   • Syncope     2 yrs ago one time     Past Surgical History:   Procedure Laterality Date   • CARDIAC CATHETERIZATION N/A 8/3/2017    Procedure: Coronary angiography;  Surgeon: Cynthia Farley MD;  Location:  [facility];  Service:    • CARDIAC CATHETERIZATION  8/3/2017    Procedure: Functional Flow [place];  Surgeon: Cynthia Farley MD;  Location:  [facility];  Service:    • CARDIAC CATHETERIZATION N/A 8/3/2017    Procedure: Stent YI coronary;  Surgeon: Cynthia Farley MD;  Location:  [facility];  Service:    • CARDIAC CATHETERIZATION N/A 8/3/2017    Procedure: Left ventriculography;  Surgeon: Cynthia Farley MD;  Location:  [facility];  Service:    • CARDIAC CATHETERIZATION N/A 8/3/2017    Procedure: Left Heart Cath;  Surgeon: Cynthia Farley MD;  Location:  [facility];  Service:    • CARDIAC CATHETERIZATION N/A 8/20/2019    Procedure: Left Heart Cath;  Surgeon: Mulugeta Ac MD;  Location:  [facility];  Service: Cardiology   • CARDIAC CATHETERIZATION N/A 8/20/2019    Procedure: Coronary angiography;  Surgeon: Mulugeta Ac MD;  [facility];  Service: Cardiology   • CARDIAC CATHETERIZATION N/A 8/20/2019    Procedure: Left ventriculography;  Surgeon: Mulugeta Ac MD;  [facility];  Service: Cardiology   • COLONOSCOPY  MMVI    NORMAL.   • COLONOSCOPY     • FINE NEEDLE ASPIRATION  12/2015    Left thyroid nodule.  [place] category II.  Dr. Socrates Sanchez     Family History   Problem Relation Age of Onset   • Heart disease Other    • Hypertension Other    • Thyroid disease Other    • Heart disease Father    • Prostate cancer Father    • Hypertension Father    • Stroke Father    • Heart disease Mother    • Hypertension Mother    • Heart disease Brother    • Hypertension Brother    • Thyroid disease Sister   "  • Heart disease Brother    • Hypertension Brother        Current Outpatient Medications:   •  aspirin 81 MG tablet, Take 1 tablet by mouth Daily., Disp: 30 tablet, Rfl: 11  •  atorvastatin (LIPITOR) 40 MG tablet, Take 1 tablet by mouth Daily., Disp: 90 tablet, Rfl: 3  •  Cholecalciferol (VITAMIN D3) 5000 units capsule capsule, Take 5,000 Units by mouth Daily., Disp: , Rfl:   •  cyanocobalamin 1000 MCG/ML injection, Inject 1 mL into the appropriate muscle as directed by prescriber Every 30 (Thirty) Days., Disp: 12 mL, Rfl: 1  •  hydrocortisone 0.5 % cream, Apply  topically to the appropriate area as directed 2 (Two) Times a Day As Needed for Irritation., Disp: 15 g, Rfl: 0  •  lisinopril (PRINIVIL,ZESTRIL) 20 MG tablet, Take 1 tablet by mouth Daily., Disp: 90 tablet, Rfl: 1  •  Multiple Vitamin (MULTI VITAMIN DAILY PO), Take  by mouth Daily., Disp: , Rfl:   •  Syringe/Needle, Disp, (SecureSafe Syringe/Needle) 25G X 1\" 1 ML misc, Use to administer B12 inj, Disp: 25 each, Rfl: 1  •  chlorhexidine (HIBICLENS) 4 % external liquid, Apply in shower to whole body once per day for 5 days, Disp: 473 mL, Rfl: 0  •  doxycycline (DORYX) 100 MG enteric coated tablet, Take 1 tablet by mouth 2 (Two) Times a Day for 5 days., Disp: 10 tablet, Rfl: 0  •  triamcinolone (KENALOG) 0.1 % cream, Apply  topically to the appropriate area as directed 2 (Two) Times a Day., Disp: 28.4 g, Rfl: 1  No Known Allergies  Social History     Tobacco Use   • Smoking status: Never Smoker   • Smokeless tobacco: Never Used   • Tobacco comment: caffeine use - 2 cups coffee dailyl    Vaping Use   • Vaping Use: Never used   Substance Use Topics   • Alcohol use: Yes     Comment: occasional   • Drug use: No          Objective   Physical Exam  Vitals:    07/01/21 1528   BP: 118/60   Pulse: 87   SpO2: 97%   Weight: 98.9 kg (218 lb)   Height: 188 cm (74\")     Body mass index is 27.99 kg/m².    Constitutional: NAD.  Integumentary: Paronychia of the lateral aspect " of the right thumb with palpable abscess, also hyperkeratosis with cracking of the distal end of most fingertips of both hands.  Unable to get significant amount of purulent drainage with gentle pressure along nail fold.  There is overlying erythema but no lymphatic streaking noted.  No warmth.  Psychiatric: Normal affect. Normal thought content.     Assessment/Plan   Assessment and Plan  Pleasant 61-year-old male with CAD, hypertension, pernicious anemia, multiple thyroid nodules, right lumbar radiculopathy, who presents with the following:    Diagnoses and all orders for this visit:    1. Paronychia of right thumb (Primary): Will likely resolve on its own without antibiotic therapy since he has already drained the abscess but due to his history of MRSA will treat for 5 days with doxycycline.  Recommend salt water soaks, continue gentle drainage of any remaining purulent material.  -     doxycycline (DORYX) 100 MG enteric coated tablet; Take 1 tablet by mouth 2 (Two) Times a Day for 5 days.  Dispense: 10 tablet; Refill: 0    2. Chronic eczema of hand: Triamcinolone to the fingertips to reduce cracking, discussed appropriate use  -     triamcinolone (KENALOG) 0.1 % cream; Apply  topically to the appropriate area as directed 2 (Two) Times a Day.  Dispense: 28.4 g; Refill: 1    Brian Marr MD  Family Medicine  O: [phone]  C: [phone]    Disclaimer: Parts of this note were dictated by speech recognition. Minor errors in transcription may be present. Please call if questions.

## 2021-07-17 ENCOUNTER — HOSPITAL ENCOUNTER (OUTPATIENT)
Facility: HOSPITAL | Age: 61
Setting detail: OBSERVATION
Discharge: HOME | End: 2021-07-18
Attending: EMERGENCY MEDICINE | Admitting: INTERNAL MEDICINE

## 2021-07-17 ENCOUNTER — APPOINTMENT (OUTPATIENT)
Dept: GENERAL RADIOLOGY | Facility: HOSPITAL | Age: 61
End: 2021-07-17

## 2021-07-17 DIAGNOSIS — I25.10 CORONARY ARTERY DISEASE INVOLVING NATIVE CORONARY ARTERY OF NATIVE HEART WITHOUT ANGINA PECTORIS: ICD-10-CM

## 2021-07-17 DIAGNOSIS — R07.9 CHEST PAIN, UNSPECIFIED TYPE: Primary | ICD-10-CM

## 2021-07-17 DIAGNOSIS — I10 ESSENTIAL HYPERTENSION: ICD-10-CM

## 2021-07-17 LAB
ALBUMIN SERPL-MCNC: 4.1 G/DL (ref 3.5–5.2)
ALBUMIN/GLOB SERPL: 1.7 G/DL
ALP SERPL-CCNC: 59 U/L (ref 39–117)
ALT SERPL W P-5'-P-CCNC: 22 U/L (ref 1–41)
ANION GAP SERPL CALCULATED.3IONS-SCNC: 9.3 MMOL/L (ref 5–15)
AST SERPL-CCNC: 22 U/L (ref 1–40)
BASOPHILS # BLD AUTO: 0.03 10*3/MM3 (ref 0–0.2)
BASOPHILS NFR BLD AUTO: 0.5 % (ref 0–1.5)
BILIRUB SERPL-MCNC: 0.5 MG/DL (ref 0–1.2)
BUN SERPL-MCNC: 13 MG/DL (ref 8–23)
BUN/CREAT SERPL: 12.6 (ref 7–25)
CALCIUM SPEC-SCNC: 9 MG/DL (ref 8.6–10.5)
CHLORIDE SERPL-SCNC: 104 MMOL/L (ref 98–107)
CO2 SERPL-SCNC: 27.7 MMOL/L (ref 22–29)
CREAT SERPL-MCNC: 1.03 MG/DL (ref 0.76–1.27)
D DIMER PPP FEU-MCNC: 0.34 MCGFEU/ML (ref 0–0.49)
DEPRECATED RDW RBC AUTO: 45.4 FL (ref 37–54)
EOSINOPHIL # BLD AUTO: 0.23 10*3/MM3 (ref 0–0.4)
EOSINOPHIL NFR BLD AUTO: 4.2 % (ref 0.3–6.2)
ERYTHROCYTE [DISTWIDTH] IN BLOOD BY AUTOMATED COUNT: 12.7 % (ref 12.3–15.4)
GFR SERPL CREATININE-BSD FRML MDRD: 74 ML/MIN/1.73
GLOBULIN UR ELPH-MCNC: 2.4 GM/DL
GLUCOSE SERPL-MCNC: 98 MG/DL (ref 65–99)
HCT VFR BLD AUTO: 43.4 % (ref 37.5–51)
HGB BLD-MCNC: 14.5 G/DL (ref 13–17.7)
IMM GRANULOCYTES # BLD AUTO: 0.01 10*3/MM3 (ref 0–0.05)
IMM GRANULOCYTES NFR BLD AUTO: 0.2 % (ref 0–0.5)
LIPASE SERPL-CCNC: 34 U/L (ref 13–60)
LYMPHOCYTES # BLD AUTO: 1.58 10*3/MM3 (ref 0.7–3.1)
LYMPHOCYTES NFR BLD AUTO: 28.9 % (ref 19.6–45.3)
MCH RBC QN AUTO: 32.1 PG (ref 26.6–33)
MCHC RBC AUTO-ENTMCNC: 33.4 G/DL (ref 31.5–35.7)
MCV RBC AUTO: 96 FL (ref 79–97)
MONOCYTES # BLD AUTO: 0.5 10*3/MM3 (ref 0.1–0.9)
MONOCYTES NFR BLD AUTO: 9.2 % (ref 5–12)
NEUTROPHILS NFR BLD AUTO: 3.11 10*3/MM3 (ref 1.7–7)
NEUTROPHILS NFR BLD AUTO: 57 % (ref 42.7–76)
NRBC BLD AUTO-RTO: 0 /100 WBC (ref 0–0.2)
PLATELET # BLD AUTO: 245 10*3/MM3 (ref 140–450)
PMV BLD AUTO: 9.6 FL (ref 6–12)
POTASSIUM SERPL-SCNC: 3.9 MMOL/L (ref 3.5–5.2)
PROT SERPL-MCNC: 6.5 G/DL (ref 6–8.5)
QT INTERVAL: 388 MS
RBC # BLD AUTO: 4.52 10*6/MM3 (ref 4.14–5.8)
SARS-COV-2 ORF1AB RESP QL NAA+PROBE: NOT DETECTED
SODIUM SERPL-SCNC: 141 MMOL/L (ref 136–145)
TROPONIN T SERPL-MCNC: <0.01 NG/ML (ref 0–0.03)
TROPONIN T SERPL-MCNC: <0.01 NG/ML (ref 0–0.03)
WBC # BLD AUTO: 5.46 10*3/MM3 (ref 3.4–10.8)

## 2021-07-17 PROCEDURE — 84484 ASSAY OF TROPONIN QUANT: CPT | Performed by: EMERGENCY MEDICINE

## 2021-07-17 PROCEDURE — U0004 COV-19 TEST NON-CDC HGH THRU: HCPCS | Performed by: NURSE PRACTITIONER

## 2021-07-17 PROCEDURE — 71045 X-RAY EXAM CHEST 1 VIEW: CPT

## 2021-07-17 PROCEDURE — 83690 ASSAY OF LIPASE: CPT | Performed by: EMERGENCY MEDICINE

## 2021-07-17 PROCEDURE — G0378 HOSPITAL OBSERVATION PER HR: HCPCS

## 2021-07-17 PROCEDURE — 80053 COMPREHEN METABOLIC PANEL: CPT | Performed by: EMERGENCY MEDICINE

## 2021-07-17 PROCEDURE — 99220 PR INITIAL OBSERVATION CARE/DAY 70 MINUTES: CPT | Performed by: INTERNAL MEDICINE

## 2021-07-17 PROCEDURE — 99285 EMERGENCY DEPT VISIT HI MDM: CPT

## 2021-07-17 PROCEDURE — 84484 ASSAY OF TROPONIN QUANT: CPT | Performed by: NURSE PRACTITIONER

## 2021-07-17 PROCEDURE — 85379 FIBRIN DEGRADATION QUANT: CPT | Performed by: NURSE PRACTITIONER

## 2021-07-17 PROCEDURE — 93010 ELECTROCARDIOGRAM REPORT: CPT | Performed by: INTERNAL MEDICINE

## 2021-07-17 PROCEDURE — 96374 THER/PROPH/DIAG INJ IV PUSH: CPT

## 2021-07-17 PROCEDURE — 85025 COMPLETE CBC W/AUTO DIFF WBC: CPT | Performed by: EMERGENCY MEDICINE

## 2021-07-17 PROCEDURE — 93005 ELECTROCARDIOGRAM TRACING: CPT

## 2021-07-17 PROCEDURE — C9803 HOPD COVID-19 SPEC COLLECT: HCPCS

## 2021-07-17 RX ORDER — FAMOTIDINE 10 MG/ML
20 INJECTION, SOLUTION INTRAVENOUS ONCE
Status: COMPLETED | OUTPATIENT
Start: 2021-07-17 | End: 2021-07-17

## 2021-07-17 RX ORDER — DIPHENOXYLATE HYDROCHLORIDE AND ATROPINE SULFATE 2.5; .025 MG/1; MG/1
1 TABLET ORAL DAILY
Status: DISCONTINUED | OUTPATIENT
Start: 2021-07-17 | End: 2021-07-18 | Stop reason: HOSPADM

## 2021-07-17 RX ORDER — SODIUM CHLORIDE 0.9 % (FLUSH) 0.9 %
10 SYRINGE (ML) INJECTION AS NEEDED
Status: DISCONTINUED | OUTPATIENT
Start: 2021-07-17 | End: 2021-07-18 | Stop reason: HOSPADM

## 2021-07-17 RX ORDER — MELATONIN
5000 DAILY
Status: DISCONTINUED | OUTPATIENT
Start: 2021-07-17 | End: 2021-07-18 | Stop reason: HOSPADM

## 2021-07-17 RX ORDER — NITROGLYCERIN 0.4 MG/1
0.4 TABLET SUBLINGUAL
Status: DISCONTINUED | OUTPATIENT
Start: 2021-07-17 | End: 2021-07-18 | Stop reason: HOSPADM

## 2021-07-17 RX ORDER — ATORVASTATIN CALCIUM 20 MG/1
40 TABLET, FILM COATED ORAL DAILY
Status: DISCONTINUED | OUTPATIENT
Start: 2021-07-17 | End: 2021-07-18 | Stop reason: HOSPADM

## 2021-07-17 RX ORDER — SODIUM CHLORIDE 0.9 % (FLUSH) 0.9 %
10 SYRINGE (ML) INJECTION EVERY 12 HOURS SCHEDULED
Status: DISCONTINUED | OUTPATIENT
Start: 2021-07-17 | End: 2021-07-18 | Stop reason: HOSPADM

## 2021-07-17 RX ORDER — LISINOPRIL 20 MG/1
20 TABLET ORAL DAILY
Status: DISCONTINUED | OUTPATIENT
Start: 2021-07-17 | End: 2021-07-18 | Stop reason: HOSPADM

## 2021-07-17 RX ORDER — ASPIRIN 81 MG/1
81 TABLET ORAL ONCE
Status: DISCONTINUED | OUTPATIENT
Start: 2021-07-18 | End: 2021-07-18 | Stop reason: HOSPADM

## 2021-07-17 RX ORDER — ASPIRIN 325 MG
325 TABLET ORAL ONCE
Status: COMPLETED | OUTPATIENT
Start: 2021-07-17 | End: 2021-07-17

## 2021-07-17 RX ADMIN — NITROGLYCERIN 0.4 MG: 0.4 TABLET SUBLINGUAL at 14:59

## 2021-07-17 RX ADMIN — SODIUM CHLORIDE, PRESERVATIVE FREE 10 ML: 5 INJECTION INTRAVENOUS at 21:58

## 2021-07-17 RX ADMIN — Medication 5000 UNITS: at 21:57

## 2021-07-17 RX ADMIN — ASPIRIN 325 MG: 325 TABLET ORAL at 13:19

## 2021-07-17 RX ADMIN — NITROGLYCERIN 0.4 MG: 0.4 TABLET SUBLINGUAL at 14:17

## 2021-07-17 RX ADMIN — ATORVASTATIN CALCIUM 20 MG: 20 TABLET, FILM COATED ORAL at 22:03

## 2021-07-17 RX ADMIN — LISINOPRIL 20 MG: 20 TABLET ORAL at 21:56

## 2021-07-17 RX ADMIN — Medication 1 TABLET: at 21:56

## 2021-07-17 RX ADMIN — FAMOTIDINE 20 MG: 10 INJECTION INTRAVENOUS at 13:19

## 2021-07-17 NOTE — ED PROVIDER NOTES
EMERGENCY DEPARTMENT ENCOUNTER    Room Number:  31/31  Date seen:  7/17/2021  Time seen: 12:55 EDT  PCP: Brian Marr MD  Historian: Patient    HPI:  Chief complaint: Chest pain  A complete HPI/ROS/PMH/PSH/SH/FH are unobtainable due to: Not applicable  Context:Niranjan Peacock is a 61 y.o. male who presents to the ED with c/o 2 days of constant midsternal chest pain described as pressure and burning which increased in severity last night and is actually a little bit better today.  It is not made better or worse by anything.  He has not had this problem before.  He also endorses some mild lightheadedness over the past 1 to 2 days.  He denies any associated acute shortness of breath, diaphoresis or nausea vomiting.  He does have some exertional shortness of breath.  His cardiologist is Dr. Ac but he is not seen him since February of this year.  He also has a history of blood clots and is no longer anticoagulated.  Patient was placed in face mask in first look. Patient was wearing facemask when I entered the room and throughout our encounter. I wore full protective equipment throughout this patient encounter including a face mask, eye shield and gloves. Hand hygiene/washing of hands was performed before donning protective equipment and after removal when leaving the room.    MEDICAL RECORD REVIEW  I reviewed patient's most recent heart cath performed 8/20/2019 by Dr. Ac: Summary included normal LV systolic function and normal LVEDP, mild to moderate nonobstructive CAD and a patent LAD stent.  Recommendations were medical therapy.        ALLERGIES  Patient has no known allergies.    PAST MEDICAL HISTORY  Active Ambulatory Problems     Diagnosis Date Noted   • Multiple thyroid nodules    • Pernicious anemia    • H/O multiple allergies 03/09/2016   • Coronary artery disease involving native coronary artery of native heart without angina pectoris 08/10/2017   • Herniated nucleus pulposus, L3-4 right 09/09/2020   •  Right lumbar radiculopathy 09/09/2020   • Pterygium of right eye 10/16/2020   • Degenerative arthritis of cervical spine 12/16/2020   • Essential hypertension 12/24/2020   • History of DVT (deep vein thrombosis) 12/24/2020   • Allergic conjunctivitis of both eyes 01/19/2021   • Chronic eczema of hand 07/01/2021     Resolved Ambulatory Problems     Diagnosis Date Noted   • Chronic fatigue 03/09/2016   • Pernicious anemia 07/31/2017   • Abnormal EKG 08/16/2019   • Angina at rest (CMS/HCC) 08/16/2019   • Abscess of bursa of right knee 12/23/2020   • Cellulitis of right lower extremity 12/24/2020     Past Medical History:   Diagnosis Date   • B12 deficiency anemia    • Chest pain    • Deep vein thrombosis (CMS/HCC)    • Headache    • History of blood clots    • Hypertension    • Pulmonary embolism (CMS/HCC)    • Syncope        PAST SURGICAL HISTORY  Past Surgical History:   Procedure Laterality Date   • CARDIAC CATHETERIZATION N/A 8/3/2017    Procedure: Coronary angiography;  Surgeon: Cynthia Farley MD;  Location: [facility] CATH INVASIVE LOCATION;  Service:    • CARDIAC CATHETERIZATION  8/3/2017    Procedure: Functional Flow [place];  Surgeon: Cynthia Farley MD;  Location: [facility] CATH INVASIVE LOCATION;  Service:    • CARDIAC CATHETERIZATION N/A 8/3/2017    Procedure: Stent YI coronary;  Surgeon: Cynthia Farley MD;  Location: [facility] CATH INVASIVE LOCATION;  Service:    • CARDIAC CATHETERIZATION N/A 8/3/2017    Procedure: Left ventriculography;  Surgeon: Cynthia Farley MD;  Location: [facility] CATH INVASIVE LOCATION;  Service:    • CARDIAC CATHETERIZATION N/A 8/3/2017    Procedure: Left Heart Cath;  Surgeon: Cynthia Farley MD;  Location: [facility] CATH INVASIVE LOCATION;  Service:    • CARDIAC CATHETERIZATION N/A 8/20/2019    Procedure: Left Heart Cath;  Surgeon: Mulugeta Ac MD;  Location: [facility] CATH INVASIVE LOCATION;  Service: Cardiology   • CARDIAC CATHETERIZATION N/A 8/20/2019    Procedure: Coronary angiography;   Surgeon: Mulugeta Ac MD;  Location:  [facility] CATH INVASIVE LOCATION;  Service: Cardiology   • CARDIAC CATHETERIZATION N/A 8/20/2019    Procedure: Left ventriculography;  Surgeon: Mulugeta Ac MD;  Location:  [facility] CATH INVASIVE LOCATION;  Service: Cardiology   • COLONOSCOPY  MMVI    NORMAL.   • COLONOSCOPY     • FINE NEEDLE ASPIRATION  12/2015    Left thyroid nodule.  Athelstane category II.  Dr. Socrates Sanchez       FAMILY HISTORY  Family History   Problem Relation Age of Onset   • Heart disease Other    • Hypertension Other    • Thyroid disease Other    • Heart disease Father    • Prostate cancer Father    • Hypertension Father    • Stroke Father    • Heart disease Mother    • Hypertension Mother    • Heart disease Brother    • Hypertension Brother    • Thyroid disease Sister    • Heart disease Brother    • Hypertension Brother        SOCIAL HISTORY  Social History     Socioeconomic History   • Marital status:      Spouse name: Not on file   • Number of children: Not on file   • Years of education: Not on file   • Highest education level: Not on file   Tobacco Use   • Smoking status: Never Smoker   • Smokeless tobacco: Never Used   • Tobacco comment: caffeine use - 2 cups coffee dailyl    Vaping Use   • Vaping Use: Never used   Substance and Sexual Activity   • Alcohol use: Yes     Comment: occasional   • Drug use: No   • Sexual activity: Defer       REVIEW OF SYSTEMS  Review of Systems    All systems reviewed and negative except for those discussed in HPI.     PHYSICAL EXAM    ED Triage Vitals [07/17/21 1228]   Temp Pulse Resp BP SpO2   97.1 °F (36.2 °C) -- 16 -- 98 %      Temp src Heart Rate Source Patient Position BP Location FiO2 (%)   Tympanic -- -- -- --     Physical Exam    I have reviewed the triage vital signs and nursing notes.      GENERAL: not distressed  HENT: nares patent, membranes moist  EYES: no scleral icterus  NECK: no ROM limitations  CV: regular rhythm, regular rate, no murmur, no  rubs no gallop  RESPIRATORY: normal effort, to auscultate bilaterally  ABDOMEN: soft  : deferred  MUSCULOSKELETAL: no deformity, no edema to bilateral lower extremities.  The calves are nontender  NEURO: alert, moves all extremities, follows commands  SKIN: warm, dry    HEARTSCORE    History  Highly suspicious              2    Moderately suspicious             1    Slightly or non-suspicious             0    ECG  Significant ST depression              2    Nonspecific repol disturbance            1    Normal                           0    Age  > or = 65                          2     46-65                           1    < or = 45                          0    Risk factors (hypercholesterolemia, HTN, DM, smoking, pos fam hx, obesity)                            > or = to 3 RF for atherosclerotic dx   2    1 or 2                 1    No risk factors                0    Troponin > or = 3x normal limit               2    1-3x normal limit    1    < or = Normal limit    0    Score  0 - 3 is low risk (0.9-1.7% risk of adverse cardiac event)  Score  4 - 6 is moderate risk (12-16.6% risk of adverse cardiac event)  Score  6 - 9 is high risk (50-65% risk of adverse cardiac event)    This patient's HEART score is 4         LAB RESULTS  Recent Results (from the past 24 hour(s))   ECG 12 Lead    Collection Time: 07/17/21 12:33 PM   Result Value Ref Range    QT Interval 388 ms   Comprehensive Metabolic Panel    Collection Time: 07/17/21  1:04 PM    Specimen: Blood   Result Value Ref Range    Glucose 98 65 - 99 mg/dL    BUN 13 8 - 23 mg/dL    Creatinine 1.03 0.76 - 1.27 mg/dL    Sodium 141 136 - 145 mmol/L    Potassium 3.9 3.5 - 5.2 mmol/L    Chloride 104 98 - 107 mmol/L    CO2 27.7 22.0 - 29.0 mmol/L    Calcium 9.0 8.6 - 10.5 mg/dL    Total Protein 6.5 6.0 - 8.5 g/dL    Albumin 4.10 3.50 - 5.20 g/dL    ALT (SGPT) 22 1 - 41 U/L    AST (SGOT) 22 1 - 40 U/L    Alkaline Phosphatase 59 39 - 117 U/L    Total Bilirubin 0.5 0.0 - 1.2  mg/dL    eGFR Non African Amer 74 >60 mL/min/1.73    Globulin 2.4 gm/dL    A/G Ratio 1.7 g/dL    BUN/Creatinine Ratio 12.6 7.0 - 25.0    Anion Gap 9.3 5.0 - 15.0 mmol/L   Lipase    Collection Time: 07/17/21  1:04 PM    Specimen: Blood   Result Value Ref Range    Lipase 34 13 - 60 U/L   Troponin    Collection Time: 07/17/21  1:04 PM    Specimen: Blood   Result Value Ref Range    Troponin T <0.010 0.000 - 0.030 ng/mL   CBC Auto Differential    Collection Time: 07/17/21  1:04 PM    Specimen: Blood   Result Value Ref Range    WBC 5.46 3.40 - 10.80 10*3/mm3    RBC 4.52 4.14 - 5.80 10*6/mm3    Hemoglobin 14.5 13.0 - 17.7 g/dL    Hematocrit 43.4 37.5 - 51.0 %    MCV 96.0 79.0 - 97.0 fL    MCH 32.1 26.6 - 33.0 pg    MCHC 33.4 31.5 - 35.7 g/dL    RDW 12.7 12.3 - 15.4 %    RDW-SD 45.4 37.0 - 54.0 fl    MPV 9.6 6.0 - 12.0 fL    Platelets 245 140 - 450 10*3/mm3    Neutrophil % 57.0 42.7 - 76.0 %    Lymphocyte % 28.9 19.6 - 45.3 %    Monocyte % 9.2 5.0 - 12.0 %    Eosinophil % 4.2 0.3 - 6.2 %    Basophil % 0.5 0.0 - 1.5 %    Immature Grans % 0.2 0.0 - 0.5 %    Neutrophils, Absolute 3.11 1.70 - 7.00 10*3/mm3    Lymphocytes, Absolute 1.58 0.70 - 3.10 10*3/mm3    Monocytes, Absolute 0.50 0.10 - 0.90 10*3/mm3    Eosinophils, Absolute 0.23 0.00 - 0.40 10*3/mm3    Basophils, Absolute 0.03 0.00 - 0.20 10*3/mm3    Immature Grans, Absolute 0.01 0.00 - 0.05 10*3/mm3    nRBC 0.0 0.0 - 0.2 /100 WBC   D-dimer, Quantitative    Collection Time: 07/17/21  1:07 PM    Specimen: Blood   Result Value Ref Range    D-Dimer, Quantitative 0.34 0.00 - 0.49 MCGFEU/mL   Troponin    Collection Time: 07/17/21  2:59 PM    Specimen: Blood   Result Value Ref Range    Troponin T <0.010 0.000 - 0.030 ng/mL         RADIOLOGY RESULTS  XR Chest 1 View   Preliminary Result   1. No active disease is seen in the chest.   2. There is a 2.5 cm long coronary stent projecting over the superior   left heart border.                        PROGRESS, DATA ANALYSIS, CONSULTS  AND MEDICAL DECISION MAKING  All labs have been independently reviewed by me.  All radiology studies have been reviewed by me and discussed with radiologist dictating the report.  EKG's independently viewed and interpreted by me unless stated otherwise. Discussion below represents my analysis of pertinent findings related to patient's condition, differential diagnosis, treatment plan and final disposition.     ED Course as of Jul 17 1541   Sat Jul 17, 2021   1307 I have ordered basic labs, chest x-ray and EKG has been viewed already.  We will give some nitro, full dose aspirin and a dose of Pepcid.    My differential diagnosis for chest pain includes but is not limited to:  Muscle strain, costochondritis, myositis, pleurisy, rib fracture, intercostal neuritis, herpes zoster, tumor, myocardial infarction, coronary syndrome, unstable angina, angina, aortic dissection, mitral valve prolapse, pericarditis, palpitations, pulmonary embolus, pneumonia, pneumothorax, lung cancer, GERD, esophagitis, esophageal spasm      [EW]   1328 EKG    viewed by ER MD prior to my interpretation      EKG time: 1233  Rhythm/Rate: 59, sinus rhythm  P waves and LA: Normal LA, normal LUAN  QRS, axis: QRS, normal axis  ST and T waves: No acute ST-T wave abnormality    Interpreted Contemporaneously by me, independently viewed  Improved from prior dated 12/15/2018 with regard to ST depression      [EW]   1356 Labs normal, troponin negative, dimer normal.     [EW]   1500 Pt with no relief in symptoms after 1st dose of NTG, RN is administering 2nd dose.     [EW]   1530 Chest pressure with no improvement after 2nd nitro.  I have discussed patient with SIOMARA Avila and she agrees to admit.  I have updated the patient.     [EW]      ED Course User Index  [EW] Danay Maxwell, APRN     MDM:  No evidence of ACS, pericarditis, myocarditis, pulmonary embolism, pneumothorax, pneumonia, Zoster, or esophageal perforation. Historically not abrupt in  "onset, tearing or ripping, pulses symmetric, no evidence of aortic dissection. However, story is concerning, pt has stent in place and chest pressure that is not relieved by sl nitro.  Care was discussed with Dr. Valenzuela and she agrees to admit to hospital.      Reviewed pt's history and workup with Dr. Norman.  After a bedside evaluation, Dr. Norman agrees with the plan of care.    Based on the patient's lab findings and presenting symptoms, the doctor and I feel it is appropriate to admit the patient for further management, evaluation, and treatment.  I have discussed this with the admitting team.  I have also discussed this with the patient/family.  They are in agreement with admission.          Disposition vitals:  /93   Pulse 55   Temp 97.1 °F (36.2 °C) (Tympanic)   Resp 16   Ht 188 cm (74\")   Wt 97.5 kg (215 lb)   SpO2 98%   BMI 27.60 kg/m²       DIAGNOSIS  Final diagnoses:   Chest pain, unspecified type     Admission     Danay Maxwell, GREG  07/17/21 1541    "

## 2021-07-17 NOTE — ED PROVIDER NOTES
MD ATTESTATION NOTE    The GIOVANNI and I have discussed this patient's history, physical exam, and treatment plan.  I have reviewed the documentation and personally had a face to face interaction with the patient. I affirm the documentation and agree with the treatment and plan.  The attached note describes my personal findings.    61-year-old gentleman presents with a history of coronary artery disease and concerning chest pain for the last 2 days.  His EKG and enzymes are negative, but the pain really is concerning and its historical features and I have no other explanations for it.  His dimer is negative so VTE seems safely excluded, but I do think he would benefit from admission to the hospital and further ischemic evaluation by the cardiology service.     Maxim Norman MD  07/17/21 6744

## 2021-07-17 NOTE — ED NOTES
Nursing report ED to floor  Niranjan Peacock  61 y.o.  male    HPI (triage note):   Chief Complaint   Patient presents with   • Chest Pain       Admitting doctor:   Mariseal Valenzuela MD    Admitting diagnosis:   The encounter diagnosis was Chest pain, unspecified type.    Code status:   Current Code Status     Date Active Code Status Order ID Comments User Context       7/17/2021 1539 CPR [id number]  Heather Jacobs, RN ED     Advance Care Planning Activity      Questions for Current Code Status     Question Answer Comment    Code Status CPR     Medical Interventions (Level of Support Prior to Arrest) Full     Level Of Support Discussed With Patient           Allergies:   Patient has no known allergies.    Weight:       07/17/21  1306   Weight: 97.5 kg (215 lb)       Most recent vitals:   Vitals:    07/17/21 1405 07/17/21 1435 07/17/21 1459 07/17/21 1535   BP: 140/80 129/86 129/86 143/93   Pulse: 54 55 63 55   Resp: 16   14   Temp:       TempSrc:       SpO2: 98% 97%  98%   Weight:       Height:           Active LDAs/IV Access:   Lines, Drains & Airways    Active LDAs     Name:   Placement date:   Placement time:   Site:   Days:    Peripheral IV 07/17/21 1305 Left Antecubital   07/17/21    1305    Antecubital   less than 1                Labs (abnormal labs have a star):   Labs Reviewed   LIPASE - Normal   TROPONIN (IN-HOUSE) - Normal    Narrative:     Troponin T Reference Range:  <= 0.03 ng/mL-   Negative for AMI  >0.03 ng/mL-     Abnormal for myocardial necrosis.  Clinicians would have to utilize clinical acumen, EKG, Troponin and serial changes to determine if it is an Acute Myocardial Infarction or myocardial injury due to an underlying chronic condition.       Results may be falsely decreased if patient taking Biotin.     CBC WITH AUTO DIFFERENTIAL - Normal   D-DIMER, QUANTITATIVE - Normal    Narrative:     The Stago D-Dimer test used in conjunction with a clinical pretest probability (PTP) assessment model,  has been approved by the FDA to rule out the presence of venous thromboembolism (VTE) in outpatients suspected of deep venous thrombosis (DVT) or pulmonary embolism (PE). The cut-off for negative predictive value is <0.50 MCGFEU/mL.   TROPONIN (IN-HOUSE) - Normal    Narrative:     Troponin T Reference Range:  <= 0.03 ng/mL-   Negative for AMI  >0.03 ng/mL-     Abnormal for myocardial necrosis.  Clinicians would have to utilize clinical acumen, EKG, Troponin and serial changes to determine if it is an Acute Myocardial Infarction or myocardial injury due to an underlying chronic condition.       Results may be falsely decreased if patient taking Biotin.     COVID PRE-OP / PRE-PROCEDURE SCREENING ORDER (NO ISOLATION)    Narrative:     The following orders were created for panel order COVID PRE-OP / PRE-PROCEDURE SCREENING ORDER (NO ISOLATION) - Swab, Nasopharynx.  Procedure                               Abnormality         Status                     ---------                               -----------         ------                     COVID-19,APTIMA PANTHER,...[[id number]]                                                   Please view results for these tests on the individual orders.   COVID-19,APTIMA PANTHER,[facility] IN-HOUSE,NP/OP SWAB IN UTM/VTM/SALINE TRANSPORT MEDIA,24 HR TAT   COMPREHENSIVE METABOLIC PANEL    Narrative:     GFR Normal >60  Chronic Kidney Disease <60  Kidney Failure <15     CBC AND DIFFERENTIAL    Narrative:     The following orders were created for panel order CBC & Differential.  Procedure                               Abnormality         Status                     ---------                               -----------         ------                     CBC Auto Differential[[id number]]        Normal              Final result                 Please view results for these tests on the individual orders.       EKG:   ECG 12 Lead   Final Result   HEART RATE= 59  bpm   RR Interval= 1012  ms   [place] Interval= 186   ms   P Horizontal Axis= 19  deg   P Front Axis= 39  deg   QRSD Interval= 83  ms   QT Interval= 388  ms   QRS Axis= 47  deg   T Wave Axis= 21  deg   - NORMAL ECG -   Sinus rhythm   When compared with ECG of 15-Dec-2018 19:47:01,   No significant change   Electronically Signed By: Albert Grier ([facility]) 17-Jul-2021 14:58:09   Date and Time of Study: 2021-07-17 12:33:04          Meds given in ED:   Medications   sodium chloride 0.9 % flush 10 mL (has no administration in time range)   sodium chloride 0.9 % flush 10 mL (has no administration in time range)   nitroglycerin (NITROSTAT) SL tablet 0.4 mg (0.4 mg Sublingual Given 7/17/21 3169)   sodium chloride 0.9 % flush 10 mL (has no administration in time range)   sodium chloride 0.9 % flush 10 mL (has no administration in time range)   aspirin tablet 325 mg (325 mg Oral Given 7/17/21 1319)   famotidine (PEPCID) injection 20 mg (20 mg Intravenous Given 7/17/21 1319)       Imaging results:  XR Chest 1 View    Result Date: 7/17/2021  1. No active disease is seen in the chest. 2. There is a 2.5 cm long coronary stent projecting over the superior left heart border.          Ambulatory status:   - as tolerated    Social issues:   Social History     Socioeconomic History   • Marital status:      Spouse name: Not on file   • Number of children: Not on file   • Years of education: Not on file   • Highest education level: Not on file   Tobacco Use   • Smoking status: Never Smoker   • Smokeless tobacco: Never Used   • Tobacco comment: caffeine use - 2 cups coffee dailyl    Vaping Use   • Vaping Use: Never used   Substance and Sexual Activity   • Alcohol use: Yes     Comment: occasional   • Drug use: No   • Sexual activity: Defer    Nursing report ED to floor       Zita Peacock RN  07/17/21 4980

## 2021-07-17 NOTE — H&P
Patient Name: Niranjan Peacock  :1960  61 y.o.    Date of Admission: 2021  Encounter Provider: Shonna Renteria MD  Date of Encounter Visit: 21  Place of Service: [facility] CARDIOLOGY  Referring Provider: Marisela Valenzuela MD  Patient Care Team:  Brian Marr MD as PCP - General (Family Medicine)      Chief complaint: Chest pain     History of Present Illness:    This is a patient who followed with Dr. Mancilla.  He has a history of coronary artery disease.  He had a stent to the proximal mid LAD in 2018.  Heart catheterization 2019 showed mild to moderate nonobstructive coronary disease.  Echocardiogram in 2021 showed his ejection fraction to be 61%.  He last saw Shira in 2021 and reported some intermittent chest discomfort with no ischemic changes on his EKG.  He is scheduled to see me for followup in 2022.      He also has a history of an MRSA cellulitis in 2020, hypertension, hyperlipidemia, B12 anemia, and mild bilateral carotid stenosis.  Last ultrasound was in 2020.  He has a history of a DVT but is no longer on anticoagulation.    He came to the emergency room with chest pain over the last 2 days.  He describes the pain as a pressure and a burning.  There are no exacerbating or alleviating factors.  There is no associated shortness of breath, nausea, vomiting or diaphoresis.    Troponins are negative x2.  He had 2 nitroglycerin in the ER that did not relieve his chest pain.  EKG did not show acute changes.  He had 2 out of 10 chest pain overnight but said it was mild and did not want any treatment for it.      Prior Cardiac Testing:   Echo 2020  · Left ventricular systolic function is normal. Calculated EF = 61%. Estimated EF was in agreement with the calculated EF. Normal left ventricular cavity size and wall thickness noted. All left ventricular wall segments contract normally.  · Left ventricular diastolic function is  normal.  · No evidence of a patent foramen ovale. Saline test results are negative.  · No significant valvular stenosis or insufficiency noted.    Cardiac Catheterization 8/20/2019      Stress Test 6/12/2019  · Myocardial perfusion imaging indicates a normal myocardial perfusion study with no evidence of ischemia.  · Left ventricular ejection fraction is normal (Calculated EF = 66%).  · Impressions are consistent with a low risk study.         Past Medical History:   Diagnosis Date   • Angina at rest (CMS/HCC) 8/16/2019    Added automatically from request for surgery [id number]   • B12 deficiency anemia    • Cellulitis of right lower extremity 12/24/2020   • Chest pain    • Chronic fatigue 3/9/2016   • Coronary artery disease involving native coronary artery of native heart without angina pectoris 8/10/2017   • Deep vein thrombosis (CMS/Formerly [facility])    • Headache    • History of blood clots     blood clot found in right lung    • Hypertension    • Multiple thyroid nodules    • Multiple thyroid nodules    • Pernicious anemia    • Pulmonary embolism (CMS/Formerly [facility])     2015   • Syncope     2 yrs ago one time       Past Surgical History:   Procedure Laterality Date   • CARDIAC CATHETERIZATION N/A 8/3/2017    Procedure: Coronary angiography;  Surgeon: Cynthia Farley MD;  Location: [facility] CATH INVASIVE LOCATION;  Service:    • CARDIAC CATHETERIZATION  8/3/2017    Procedure: Functional Flow [place];  Surgeon: Cynthia Farley MD;  Location: [facility] CATH INVASIVE LOCATION;  Service:    • CARDIAC CATHETERIZATION N/A 8/3/2017    Procedure: Stent YI coronary;  Surgeon: Cynthia Farley MD;  Location: [facility] CATH INVASIVE LOCATION;  Service:    • CARDIAC CATHETERIZATION N/A 8/3/2017    Procedure: Left ventriculography;  Surgeon: Cynthia Farley MD;  Location: [facility] CATH INVASIVE LOCATION;  Service:    • CARDIAC CATHETERIZATION N/A 8/3/2017    Procedure: Left Heart Cath;  Surgeon: Cynthia Farley MD;  Location: [facility] CATH INVASIVE LOCATION;   "Service:    • CARDIAC CATHETERIZATION N/A 8/20/2019    Procedure: Left Heart Cath;  Surgeon: Mulugeta Ac MD;  Location:  [facility] CATH INVASIVE LOCATION;  Service: Cardiology   • CARDIAC CATHETERIZATION N/A 8/20/2019    Procedure: Coronary angiography;  Surgeon: Mulugeta Ac MD;  Location:  [facility] CATH INVASIVE LOCATION;  Service: Cardiology   • CARDIAC CATHETERIZATION N/A 8/20/2019    Procedure: Left ventriculography;  Surgeon: Mulugeta Ac MD;  Location:  [facility] CATH INVASIVE LOCATION;  Service: Cardiology   • COLONOSCOPY  MMVI    NORMAL.   • COLONOSCOPY     • FINE NEEDLE ASPIRATION  12/2015    Left thyroid nodule.  [place] category II.  Dr. Socrates Sanchez         Prior to Admission medications    Medication Sig Start Date End Date Taking? Authorizing Provider   aspirin 81 MG tablet Take 1 tablet by mouth Daily. 8/3/17   Cynthia Farley MD   atorvastatin (LIPITOR) 40 MG tablet Take 1 tablet by mouth Daily. 10/16/20   Brian Marr MD   chlorhexidine (HIBICLENS) 4 % external liquid Apply in shower to whole body once per day for 5 days 4/5/21   Brian Marr MD   Cholecalciferol (VITAMIN D3) 5000 units capsule capsule Take 5,000 Units by mouth Daily.    Provider, MD Edwar   cyanocobalamin 1000 MCG/ML injection Inject 1 mL into the appropriate muscle as directed by prescriber Every 30 (Thirty) Days. 4/19/21   Brian Marr MD   hydrocortisone 0.5 % cream Apply  topically to the appropriate area as directed 2 (Two) Times a Day As Needed for Irritation. 1/19/21   Brian Marr MD   lisinopril (PRINIVIL,ZESTRIL) 20 MG tablet Take 1 tablet by mouth Daily. 3/18/21   Shira Isaacs APRN   Multiple Vitamin (MULTI VITAMIN DAILY PO) Take  by mouth Daily.    ProviderEdwar MD   Syringe/Needle, Disp, (SecureSafe Syringe/Needle) 25G X 1\" 1 ML misc Use to administer B12 inj 4/23/21   Brian Marr MD   triamcinolone (KENALOG) 0.1 % cream Apply  topically to the appropriate area as directed 2 (Two) Times a " Day. 7/1/21   Brian Marr MD       No Known Allergies    Social History     Socioeconomic History   • Marital status:      Spouse name: Not on file   • Number of children: Not on file   • Years of education: Not on file   • Highest education level: Not on file   Tobacco Use   • Smoking status: Never Smoker   • Smokeless tobacco: Never Used   • Tobacco comment: caffeine use - 2 cups coffee dailyl    Vaping Use   • Vaping Use: Never used   Substance and Sexual Activity   • Alcohol use: Yes     Comment: occasional   • Drug use: No   • Sexual activity: Defer       Family History   Problem Relation Age of Onset   • Heart disease Other    • Hypertension Other    • Thyroid disease Other    • Heart disease Father    • Prostate cancer Father    • Hypertension Father    • Stroke Father    • Heart disease Mother    • Hypertension Mother    • Heart disease Brother    • Hypertension Brother    • Thyroid disease Sister    • Heart disease Brother    • Hypertension Brother        REVIEW OF SYSTEMS:   All other systems reviewed and negative.        Objective:     Vitals:    07/17/21 1924 07/17/21 2311 07/18/21 0340 07/18/21 0824   BP: 137/77 148/77 132/68 153/88   BP Location: Right arm Right arm Right arm Right arm   Patient Position: Lying Lying Lying Sitting   Pulse: 61 62 63 53   Resp: 16 18 18 16   Temp: 98.3 °F (36.8 °C) 97.9 °F (36.6 °C) 98.4 °F (36.9 °C) 98.3 °F (36.8 °C)   TempSrc: Oral Oral Oral Oral   SpO2: 97% 93% 98% 98%   Weight:       Height:         Body mass index is 27.6 kg/m².  No intake or output data in the 24 hours ending 07/18/21 0920    Constitutional: He is oriented to person, place, and time. He appears well-developed. He does not appear ill.   HENT:   Head: Normocephalic and atraumatic. Head is without contusion.   Right Ear: Hearing normal. No drainage.   Left Ear: Hearing normal. No drainage.   Nose: No nasal deformity. No epistaxis.   Eyes: Lids are normal. Right eye exhibits no exudate. Left  eye exhibits no exudate.  Neck: No JVD present. Carotid bruit is not present. No tracheal deviation present. No thyroid mass and no thyromegaly present.   Cardiovascular: Normal rate, regular rhythm and normal heart sounds.    Pulses:       Posterior tibial pulses are 2+ on the right side, and 2+ on the left side.   Pulmonary/Chest: Effort normal and breath sounds normal.   Abdominal: Soft. Normal appearance and bowel sounds are normal. There is no tenderness.   Musculoskeletal: Normal range of motion.        Right shoulder: He exhibits no deformity.        Left shoulder: He exhibits no deformity.   Neurological: He is alert and oriented to person, place, and time. He has normal strength.   Skin: Skin is warm, dry and intact. No rash noted.   Psychiatric: He has a normal mood and affect. His behavior is normal. Thought content normal.   Vitals reviewed      Lab Review:     Results from last 7 days   Lab Units 07/18/21  0508 07/17/21  1304   SODIUM mmol/L 141 141   POTASSIUM mmol/L 4.1 3.9   CHLORIDE mmol/L 105 104   CO2 mmol/L 27.5 27.7   BUN mg/dL 11 13   CREATININE mg/dL 1.08 1.03   CALCIUM mg/dL 9.0 9.0   BILIRUBIN mg/dL  --  0.5   ALK PHOS U/L  --  59   ALT (SGPT) U/L  --  22   AST (SGOT) U/L  --  22   GLUCOSE mg/dL 101* 98     Results from last 7 days   Lab Units 07/18/21  0508 07/17/21  1459 07/17/21  1304   TROPONIN T ng/mL <0.010 <0.010 <0.010     Results from last 7 days   Lab Units 07/18/21  0508   WBC 10*3/mm3 6.57   HEMOGLOBIN g/dL 14.3   HEMATOCRIT % 43.6   PLATELETS 10*3/mm3 246                    EKG 7/17/2021      Prior EKG 2/2/2021      I personally viewed and interpreted the patient's EKG/Telemetry data.        Assessment and Plan:       1.  Chest pain.  Atypical.  2 negative troponins.  Normal EKG.  2.  History of coronary artery disease with stent to the mid LAD in 2018.  Heart catheterization with nonobstructive disease in August 2019.  I am going to discharge him today with some sublingual  nitroglycerin.  Plan will be to have him come in for an exercise nuclear stress test in the office next week.  3.  Hypertension.  Blood pressure is elevated.  I am going to increase his lisinopril to 40 mg a day.  I will have him follow-up with Shira in 1 to 2 weeks.  He is going to monitor his blood pressure at home and bring in a list.  4.  Hyperlipidemia  5.  History of MRSA cellulitis  6.  History of B12 anemia  7.  History of mild bilateral carotid stenosis.  Last Doppler June 2020.  8.  Remote history of DVT no longer on anticoagulation.    Shonna Renteria MD  07/18/21  09:20 EDT

## 2021-07-18 ENCOUNTER — READMISSION MANAGEMENT (OUTPATIENT)
Dept: CALL CENTER | Facility: HOSPITAL | Age: 61
End: 2021-07-18

## 2021-07-18 VITALS
BODY MASS INDEX: 27.59 KG/M2 | HEIGHT: 74 IN | WEIGHT: 215 LBS | TEMPERATURE: 98.3 F | DIASTOLIC BLOOD PRESSURE: 88 MMHG | SYSTOLIC BLOOD PRESSURE: 153 MMHG | HEART RATE: 53 BPM | RESPIRATION RATE: 16 BRPM | OXYGEN SATURATION: 98 %

## 2021-07-18 LAB
ANION GAP SERPL CALCULATED.3IONS-SCNC: 8.5 MMOL/L (ref 5–15)
BASOPHILS # BLD AUTO: 0.04 10*3/MM3 (ref 0–0.2)
BASOPHILS NFR BLD AUTO: 0.6 % (ref 0–1.5)
BUN SERPL-MCNC: 11 MG/DL (ref 8–23)
BUN/CREAT SERPL: 10.2 (ref 7–25)
CALCIUM SPEC-SCNC: 9 MG/DL (ref 8.6–10.5)
CHLORIDE SERPL-SCNC: 105 MMOL/L (ref 98–107)
CO2 SERPL-SCNC: 27.5 MMOL/L (ref 22–29)
CREAT SERPL-MCNC: 1.08 MG/DL (ref 0.76–1.27)
DEPRECATED RDW RBC AUTO: 44.8 FL (ref 37–54)
EOSINOPHIL # BLD AUTO: 0.38 10*3/MM3 (ref 0–0.4)
EOSINOPHIL NFR BLD AUTO: 5.8 % (ref 0.3–6.2)
ERYTHROCYTE [DISTWIDTH] IN BLOOD BY AUTOMATED COUNT: 12.6 % (ref 12.3–15.4)
GFR SERPL CREATININE-BSD FRML MDRD: 70 ML/MIN/1.73
GLUCOSE SERPL-MCNC: 101 MG/DL (ref 65–99)
HCT VFR BLD AUTO: 43.6 % (ref 37.5–51)
HGB BLD-MCNC: 14.3 G/DL (ref 13–17.7)
IMM GRANULOCYTES # BLD AUTO: 0.02 10*3/MM3 (ref 0–0.05)
IMM GRANULOCYTES NFR BLD AUTO: 0.3 % (ref 0–0.5)
LYMPHOCYTES # BLD AUTO: 1.6 10*3/MM3 (ref 0.7–3.1)
LYMPHOCYTES NFR BLD AUTO: 24.4 % (ref 19.6–45.3)
MCH RBC QN AUTO: 31.4 PG (ref 26.6–33)
MCHC RBC AUTO-ENTMCNC: 32.8 G/DL (ref 31.5–35.7)
MCV RBC AUTO: 95.8 FL (ref 79–97)
MONOCYTES # BLD AUTO: 0.66 10*3/MM3 (ref 0.1–0.9)
MONOCYTES NFR BLD AUTO: 10 % (ref 5–12)
NEUTROPHILS NFR BLD AUTO: 3.87 10*3/MM3 (ref 1.7–7)
NEUTROPHILS NFR BLD AUTO: 58.9 % (ref 42.7–76)
NRBC BLD AUTO-RTO: 0 /100 WBC (ref 0–0.2)
PLATELET # BLD AUTO: 246 10*3/MM3 (ref 140–450)
PMV BLD AUTO: 9.6 FL (ref 6–12)
POTASSIUM SERPL-SCNC: 4.1 MMOL/L (ref 3.5–5.2)
RBC # BLD AUTO: 4.55 10*6/MM3 (ref 4.14–5.8)
SODIUM SERPL-SCNC: 141 MMOL/L (ref 136–145)
TROPONIN T SERPL-MCNC: <0.01 NG/ML (ref 0–0.03)
WBC # BLD AUTO: 6.57 10*3/MM3 (ref 3.4–10.8)

## 2021-07-18 PROCEDURE — 84484 ASSAY OF TROPONIN QUANT: CPT | Performed by: INTERNAL MEDICINE

## 2021-07-18 PROCEDURE — 80048 BASIC METABOLIC PNL TOTAL CA: CPT | Performed by: INTERNAL MEDICINE

## 2021-07-18 PROCEDURE — G0378 HOSPITAL OBSERVATION PER HR: HCPCS

## 2021-07-18 PROCEDURE — 85025 COMPLETE CBC W/AUTO DIFF WBC: CPT | Performed by: INTERNAL MEDICINE

## 2021-07-18 PROCEDURE — 36415 COLL VENOUS BLD VENIPUNCTURE: CPT | Performed by: INTERNAL MEDICINE

## 2021-07-18 RX ORDER — LISINOPRIL 20 MG/1
40 TABLET ORAL DAILY
Qty: 90 TABLET | Refills: 3 | Status: SHIPPED | OUTPATIENT
Start: 2021-07-18 | End: 2021-09-15

## 2021-07-18 RX ORDER — NITROGLYCERIN 0.4 MG/1
0.4 TABLET SUBLINGUAL
Qty: 100 TABLET | Refills: 3 | Status: SHIPPED | OUTPATIENT
Start: 2021-07-18

## 2021-07-18 RX ADMIN — SODIUM CHLORIDE, PRESERVATIVE FREE 10 ML: 5 INJECTION INTRAVENOUS at 09:42

## 2021-07-18 NOTE — OUTREACH NOTE
Prep Survey      Responses   [facility] facility patient discharged from?  [facility]   Is LACE score < 7 ?  Yes   Emergency Room discharge w/ pulse ox?  No   Eligibility  [facility]   Date of Admission  07/17/21   Date of Discharge  07/18/21   Discharge Disposition  Home or Self Care   Discharge diagnosis  chest pain, Hx CAD   Does the patient have one of the following disease processes/diagnoses(primary or secondary)?  Other   Does the patient have Home health ordered?  No   Is there a DME ordered?  No   Prep survey completed?  Yes          Lashon Posada RN

## 2021-07-19 ENCOUNTER — TRANSITIONAL CARE MANAGEMENT TELEPHONE ENCOUNTER (OUTPATIENT)
Dept: CALL CENTER | Facility: HOSPITAL | Age: 61
End: 2021-07-19

## 2021-07-19 NOTE — OUTREACH NOTE
Call Center TCM Note      Responses   [facility] patient discharged from?  [facility]   Does the patient have one of the following disease processes/diagnoses(primary or secondary)?  Other   TCM attempt successful?  Yes   Call start time  1454   Call end time  1457   Discharge diagnosis  chest pain, Hx CAD   Meds reviewed with patient/caregiver?  Yes   Is the patient having any side effects they believe may be caused by any medication additions or changes?  No   Does the patient have all medications ordered at discharge?  Yes   Is the patient taking all medications as directed (includes completed medication regime)?  Yes   Does the patient have a primary care provider?   Yes   Does the patient have an appointment with their PCP within 7 days of discharge?  No   What is preventing the patient from scheduling follow up appointments within 7 days of discharge?  Haven't had time   Nursing Interventions  Educated patient on importance of making appointment, Advised patient to make appointment   Has the patient kept scheduled appointments due by today?  N/A   Has home health visited the patient within 72 hours of discharge?  N/A   Psychosocial issues?  No   Did the patient receive a copy of their discharge instructions?  Yes   Nursing interventions  Reviewed instructions with patient   What is the patient's perception of their health status since discharge?  Improving   Is the patient/caregiver able to teach back signs and symptoms related to disease process for when to call PCP?  Yes   Is the patient/caregiver able to teach back signs and symptoms related to disease process for when to call 911?  Yes   Is the patient/caregiver able to teach back the hierarchy of who to call/visit for symptoms/problems? PCP, Specialist, Home health nurse, Urgent Care, ED, 911  Yes   If the patient is a current smoker, are they able to teach back resources for cessation?  Not a smoker   TCM call completed?  Yes   Wrap up additional  comments  Pt. awaiting call back for stress test appt. Pt. states he is doing okay, no questions at this time.  Told pt. any increased chest pain, SOB or other worsening symptoms to return to ED.          Kaitlynn Moses RN    7/19/2021, 14:59 EDT

## 2021-07-22 ENCOUNTER — HOSPITAL ENCOUNTER (OUTPATIENT)
Dept: CARDIOLOGY | Facility: HOSPITAL | Age: 61
Discharge: HOME | End: 2021-07-22
Admitting: INTERNAL MEDICINE

## 2021-07-22 VITALS — WEIGHT: 216.05 LBS | HEIGHT: 74 IN | BODY MASS INDEX: 27.73 KG/M2

## 2021-07-22 DIAGNOSIS — R07.9 CHEST PAIN, UNSPECIFIED TYPE: ICD-10-CM

## 2021-07-22 DIAGNOSIS — I25.10 CORONARY ARTERY DISEASE INVOLVING NATIVE CORONARY ARTERY OF NATIVE HEART WITHOUT ANGINA PECTORIS: ICD-10-CM

## 2021-07-22 DIAGNOSIS — I10 ESSENTIAL HYPERTENSION: ICD-10-CM

## 2021-07-22 LAB
BH CV NUCLEAR PRIOR STUDY: 1
BH CV REST NUCLEAR ISOTOPE DOSE: 10.8 MCI
BH CV STRESS BP STAGE 1: NORMAL
BH CV STRESS BP STAGE 2: NORMAL
BH CV STRESS BP STAGE 3: NORMAL
BH CV STRESS BP STAGE 4: NORMAL
BH CV STRESS DURATION MIN STAGE 1: 3
BH CV STRESS DURATION MIN STAGE 2: 3
BH CV STRESS DURATION MIN STAGE 3: 3
BH CV STRESS DURATION SEC STAGE 1: 0
BH CV STRESS DURATION SEC STAGE 2: 0
BH CV STRESS DURATION SEC STAGE 3: 0
BH CV STRESS GRADE STAGE 1: 10
BH CV STRESS GRADE STAGE 2: 12
BH CV STRESS GRADE STAGE 3: 14
BH CV STRESS HR STAGE 1: 89
BH CV STRESS HR STAGE 2: 117
BH CV STRESS HR STAGE 3: 141
BH CV STRESS METS STAGE 1: 5
BH CV STRESS METS STAGE 2: 7.5
BH CV STRESS METS STAGE 3: 10
BH CV STRESS NUCLEAR ISOTOPE DOSE: 34.7 MCI
BH CV STRESS PROTOCOL 1: NORMAL
BH CV STRESS RECOVERY BP: NORMAL MMHG
BH CV STRESS RECOVERY HR: 81 BPM
BH CV STRESS SPEED STAGE 1: 1.7
BH CV STRESS SPEED STAGE 2: 2.5
BH CV STRESS SPEED STAGE 3: 3.4
BH CV STRESS STAGE 1: 1
BH CV STRESS STAGE 2: 2
BH CV STRESS STAGE 3: 3
BH CV STRESS STAGE 4: NORMAL
LV EF NUC BP: 67 %
MAXIMAL PREDICTED HEART RATE: 160 BPM
PERCENT MAX PREDICTED HR: 88.13 %
STRESS BASELINE BP: NORMAL MMHG
STRESS BASELINE HR: 63 BPM
STRESS PERCENT HR: 104 %
STRESS POST ESTIMATED WORKLOAD: 10 METS
STRESS POST EXERCISE DUR MIN: 9 MIN
STRESS POST EXERCISE DUR SEC: 0 SEC
STRESS POST PEAK BP: NORMAL MMHG
STRESS POST PEAK HR: 141 BPM
STRESS TARGET HR: 136 BPM

## 2021-07-22 PROCEDURE — 78452 HT MUSCLE IMAGE SPECT MULT: CPT | Performed by: INTERNAL MEDICINE

## 2021-07-22 PROCEDURE — 93017 CV STRESS TEST TRACING ONLY: CPT

## 2021-07-22 PROCEDURE — 0 TECHNETIUM TETROFOSMIN KIT: Performed by: INTERNAL MEDICINE

## 2021-07-22 PROCEDURE — 93018 CV STRESS TEST I&R ONLY: CPT | Performed by: INTERNAL MEDICINE

## 2021-07-22 PROCEDURE — 78452 HT MUSCLE IMAGE SPECT MULT: CPT

## 2021-07-22 PROCEDURE — 93016 CV STRESS TEST SUPVJ ONLY: CPT | Performed by: INTERNAL MEDICINE

## 2021-07-22 PROCEDURE — A9502 TC99M TETROFOSMIN: HCPCS | Performed by: INTERNAL MEDICINE

## 2021-07-22 RX ADMIN — TETROFOSMIN 1 DOSE: 1.38 INJECTION, POWDER, LYOPHILIZED, FOR SOLUTION INTRAVENOUS at 08:06

## 2021-07-22 RX ADMIN — TETROFOSMIN 1 DOSE: 1.38 INJECTION, POWDER, LYOPHILIZED, FOR SOLUTION INTRAVENOUS at 07:24

## 2021-08-04 ENCOUNTER — TELEPHONE (OUTPATIENT)
Dept: INTERNAL MEDICINE | Facility: CLINIC | Age: 61
End: 2021-08-04

## 2021-08-04 NOTE — TELEPHONE ENCOUNTER
PATIENT HAS A BUMP ON HIS KNEE THAT HE BELIEVES IS THE BEGINNING OF MERSA-HE HAS HAD TWICE BEFORE. HE WOULD LIKE TO GET A REFILL OF THE DOXYCICYLIN  FOR 5 DAYS.    PLEASE ADVISE  [phone]    [facility] [address] [place], [place] - [address] [facility] AT [address] - [phone] [facility] [phone]

## 2021-08-10 ENCOUNTER — LAB (OUTPATIENT)
Dept: LAB | Facility: HOSPITAL | Age: 61
End: 2021-08-10

## 2021-08-10 ENCOUNTER — OFFICE VISIT (OUTPATIENT)
Dept: CARDIOLOGY | Facility: CLINIC | Age: 61
End: 2021-08-10

## 2021-08-10 VITALS
DIASTOLIC BLOOD PRESSURE: 56 MMHG | HEART RATE: 56 BPM | HEIGHT: 74 IN | SYSTOLIC BLOOD PRESSURE: 112 MMHG | WEIGHT: 217 LBS | BODY MASS INDEX: 27.85 KG/M2

## 2021-08-10 DIAGNOSIS — I10 ESSENTIAL HYPERTENSION: ICD-10-CM

## 2021-08-10 DIAGNOSIS — I25.118 CORONARY ARTERY DISEASE OF NATIVE ARTERY OF NATIVE HEART WITH STABLE ANGINA PECTORIS (HCC): ICD-10-CM

## 2021-08-10 DIAGNOSIS — Z51.81 ENCOUNTER FOR THERAPEUTIC DRUG LEVEL MONITORING: ICD-10-CM

## 2021-08-10 DIAGNOSIS — I10 ESSENTIAL HYPERTENSION: Primary | ICD-10-CM

## 2021-08-10 LAB
ANION GAP SERPL CALCULATED.3IONS-SCNC: 9.1 MMOL/L (ref 5–15)
BUN SERPL-MCNC: 12 MG/DL (ref 8–23)
BUN/CREAT SERPL: 11.9 (ref 7–25)
CALCIUM SPEC-SCNC: 9.1 MG/DL (ref 8.6–10.5)
CHLORIDE SERPL-SCNC: 105 MMOL/L (ref 98–107)
CO2 SERPL-SCNC: 24.9 MMOL/L (ref 22–29)
CREAT SERPL-MCNC: 1.01 MG/DL (ref 0.76–1.27)
GFR SERPL CREATININE-BSD FRML MDRD: 75 ML/MIN/1.73
GLUCOSE SERPL-MCNC: 86 MG/DL (ref 65–99)
POTASSIUM SERPL-SCNC: 3.8 MMOL/L (ref 3.5–5.2)
SODIUM SERPL-SCNC: 139 MMOL/L (ref 136–145)

## 2021-08-10 PROCEDURE — 99214 OFFICE O/P EST MOD 30 MIN: CPT | Performed by: NURSE PRACTITIONER

## 2021-08-10 PROCEDURE — 80048 BASIC METABOLIC PNL TOTAL CA: CPT

## 2021-08-10 PROCEDURE — 36415 COLL VENOUS BLD VENIPUNCTURE: CPT

## 2021-08-10 PROCEDURE — 93000 ELECTROCARDIOGRAM COMPLETE: CPT | Performed by: NURSE PRACTITIONER

## 2021-08-10 NOTE — PROGRESS NOTES
Date of Office Visit: 08/10/21  Encounter Provider: GREG Carrasquillo  Place of Service: [facility] CARDIOLOGY  Patient Name: Niranjan Peacock  :1960    Chief Complaint   Patient presents with   • Coronary artery disease involving native coronary artery of    • Chest Pain   • Fatigue   • Dizziness   • Follow-up   :     HPI: Niranjan Peacock is a 61 y.o. male  with history of coronary artery disease status post coronary artery stent placement, hypertension, and hyperlipidemia.  He has been followed by Dr. Jason Mancilla.  I will visit with him in follow-up today    In 2017 he presented with chest pain worrisome for angina.  He had a stress test which was abnormal with ECG changes and underwent cardiac catheterization which showed normal left ventricular systolic function.  There was severe disease of the proximal mid LAD with abnormal fractional flow wire reserve and he underwent stenting with a 3.25 mm x 20 mm drug-eluting stent.  Dual antiplatelet score was low so after his year Plavix was dropped.  He complained of dyspnea on exertion at the end of 2018 and had a stress echocardiogram which was negative for ischemia.  Left ventricular ejection fraction was 59, diastolic function was normal.  There was no significant valvular disease.  He had some recurrent symptoms and had a perfusion stress test 2019 which was negative for ischemia.  He continued to have symptoms in 2019 had cardiac catheterization which showed normal ventricular systolic function, normal ventricular end-diastolic pressure, mild to moderate nonobstructive coronary artery disease and patent LAD stent.  Medical therapy was recommended.  He had another echo 2020 which showed normal ventricular systolic function, no evidence patent foramen ovale and no significant valvular stenosis or insufficiency.  He was admitted 2020 for right lower extremity MRSA cellulitis.  He was  "treated with vancomycin and cefazolin and switch to doxycycline and Keflex.       He was admitted July 2021 with chest pain that was atypical.  He had 2 - troponins and normal EKG.  His blood pressure was elevated and lisinopril was increased to 40 mg a day.  He was advised to monitor his blood pressure and have an outpatient perfusion stress test.    He presents today for hospital discharge follow-up reporting his systolic blood pressure has been less than 130 since starting higher dose lisinopril.  He rode his bike outside for 30 minutes achieving over 3 miles last night with no chest pain tightness or pressure.  He had no unusual shortness of breath.  He ride stationary bike on occasion.  He has had some intermittent chest pain that has been relieved with belching.  He has some chest pain associated with stress at times but no exertional chest pain.  He was questioning going back to cardiac rehab which was beneficial after his stent was placed.  He denies palpitation, dizziness or lightheadedness.      No Known Allergies        Family and social history reviewed.     ROS  All other systems were reviewed and are negative          Objective:     Vitals:    08/10/21 1413   BP: 112/56   BP Location: Left arm   Patient Position: Sitting   Pulse: 56   Weight: 98.4 kg (217 lb)   Height: 188 cm (74\")     Body mass index is 27.86 kg/m².    PHYSICAL EXAM:  Constitutional:       General: Not in acute distress.     Appearance: Well-developed. Not diaphoretic.   HENT:      Head: Normocephalic.   Pulmonary:      Effort: Pulmonary effort is normal. No respiratory distress.      Breath sounds: Normal breath sounds. No wheezing. No rhonchi. No rales.   Cardiovascular:      Normal rate. Regular rhythm.   Pulses:     Radial: 2+ bilaterally.  Skin:     General: Skin is warm and dry. There is no cyanosis.      Findings: No rash.   Neurological:      Mental Status: Alert and oriented to person, place, and time.   Psychiatric:         " "Behavior: Behavior normal.         Thought Content: Thought content normal.         Judgment: Judgment normal.           ECG 12 Lead    Date/Time: 8/10/2021 2:54 PM  Performed by: Shira Isaacs APRN  Authorized by: Shira Isaacs APRN   Comparison: compared with previous ECG   Similar to previous ECG  Rhythm: sinus rhythm  Rate: normal  T inversion: III  Comments: No change              Current Outpatient Medications   Medication Sig Dispense Refill   • aspirin 81 MG tablet Take 1 tablet by mouth Daily. 30 tablet 11   • atorvastatin (LIPITOR) 40 MG tablet Take 1 tablet by mouth Daily. 90 tablet 3   • Cholecalciferol (VITAMIN D3) 5000 units capsule capsule Take 5,000 Units by mouth Daily.     • cyanocobalamin 1000 MCG/ML injection Inject 1 mL into the appropriate muscle as directed by prescriber Every 30 (Thirty) Days. 12 mL 1   • doxycycline (VIBRAMYCIN) 100 MG capsule 1 po bid 20 capsule 0   • hydrocortisone 0.5 % cream Apply  topically to the appropriate area as directed 2 (Two) Times a Day As Needed for Irritation. 15 g 0   • lisinopril (PRINIVIL,ZESTRIL) 20 MG tablet Take 2 tablets by mouth Daily. 90 tablet 3   • Multiple Vitamin (MULTI VITAMIN DAILY PO) Take  by mouth Daily.     • mupirocin (Bactroban) 2 % ointment Apply to affected area with each dressing change.  Change dressing QD or more often as needed. 22 g 0   • nitroglycerin (NITROSTAT) 0.4 MG SL tablet Place 1 tablet under the tongue Every 5 (Five) Minutes As Needed for Chest Pain for up to 1 dose. Take no more than 3 doses in 15 minutes. 100 tablet 3   • Syringe/Needle, Disp, (SecureSafe Syringe/Needle) 25G X 1\" 1 ML misc Use to administer B12 inj 25 each 1   • triamcinolone (KENALOG) 0.1 % cream Apply  topically to the appropriate area as directed 2 (Two) Times a Day. 28.4 g 1     No current facility-administered medications for this visit.     Assessment:       Diagnosis Plan   1. Essential hypertension  Basic Metabolic Panel   2. Encounter for " therapeutic drug level monitoring  Basic Metabolic Panel        Orders Placed This Encounter   Procedures   • Basic Metabolic Panel     Standing Status:   Future     Standing Expiration Date:   8/10/2022     Order Specific Question:   Release to patient     Answer:   Immediate         Plan:     1. 61 year old gentleman with History of coronary artery disease with stent to the mid LAD in 2018.  Heart catheterization with nonobstructive disease in August 2019.   Negative perfusion stress test July 2021.  Some atypical chest pain occasionally relieved with belching but other times associated with stress.  He is not needed any sublingual nitroglycerin since discharge.  He would like to go to phase 3 cardiac rehab for stable angina so I placed a referral we will see if this is feasible for him out-of-pocket  2.  Hypertension.  Blood pressure appears at goal since increase lisinopril.  We will check his BMP today and if stable he will continue current dose follow-up in 6 months  3.  Hyperlipidemia on atorvastatin 40 mg.  Target LDL 70 or less and LDL was at goal in 04/2021  4.  Remote history of DVT no longer on anticoagulation.  5.  History of MRSA cellulitis  6.  History of B12 anemia  7.  History of mild bilateral carotid stenosis.  Last Doppler June 2020.  8.  History of B12 anemia  9. Excessive daytime sleepiness-he has not had a sleep study    Call with questions or concerns.          It has been a pleasure to participate in this patient's care.      Thank you,  GREG Carrasquillo      **I used Dragon to dictate this note:**

## 2021-09-15 RX ORDER — LISINOPRIL 20 MG/1
TABLET ORAL
Qty: 180 TABLET | Refills: 3 | Status: SHIPPED | OUTPATIENT
Start: 2021-09-15 | End: 2021-10-14

## 2021-10-11 RX ORDER — ATORVASTATIN CALCIUM 40 MG/1
TABLET, FILM COATED ORAL
Qty: 90 TABLET | Refills: 3 | Status: SHIPPED | OUTPATIENT
Start: 2021-10-11 | End: 2022-07-05

## 2021-10-13 ENCOUNTER — APPOINTMENT (OUTPATIENT)
Dept: GENERAL RADIOLOGY | Facility: HOSPITAL | Age: 61
End: 2021-10-13

## 2021-10-13 ENCOUNTER — TELEPHONE (OUTPATIENT)
Dept: INTERNAL MEDICINE | Facility: CLINIC | Age: 61
End: 2021-10-13

## 2021-10-13 ENCOUNTER — APPOINTMENT (OUTPATIENT)
Dept: CT IMAGING | Facility: HOSPITAL | Age: 61
End: 2021-10-13

## 2021-10-13 ENCOUNTER — HOSPITAL ENCOUNTER (EMERGENCY)
Facility: HOSPITAL | Age: 61
Discharge: LEFT BEFORE DISCHARGE | End: 2021-10-13
Attending: EMERGENCY MEDICINE | Admitting: EMERGENCY MEDICINE

## 2021-10-13 VITALS
RESPIRATION RATE: 16 BRPM | OXYGEN SATURATION: 98 % | HEIGHT: 74 IN | DIASTOLIC BLOOD PRESSURE: 86 MMHG | WEIGHT: 220 LBS | BODY MASS INDEX: 28.23 KG/M2 | SYSTOLIC BLOOD PRESSURE: 162 MMHG | TEMPERATURE: 97.1 F | HEART RATE: 71 BPM

## 2021-10-13 DIAGNOSIS — R21 RASH: ICD-10-CM

## 2021-10-13 DIAGNOSIS — R07.9 CHEST PAIN, UNSPECIFIED TYPE: Primary | ICD-10-CM

## 2021-10-13 LAB
ALBUMIN SERPL-MCNC: 4.2 G/DL (ref 3.5–5.2)
ALBUMIN/GLOB SERPL: 2.2 G/DL
ALP SERPL-CCNC: 66 U/L (ref 39–117)
ALT SERPL W P-5'-P-CCNC: 24 U/L (ref 1–41)
ANION GAP SERPL CALCULATED.3IONS-SCNC: 10.5 MMOL/L (ref 5–15)
AST SERPL-CCNC: 21 U/L (ref 1–40)
BASOPHILS # BLD AUTO: 0.01 10*3/MM3 (ref 0–0.2)
BASOPHILS NFR BLD AUTO: 0.1 % (ref 0–1.5)
BILIRUB SERPL-MCNC: 0.7 MG/DL (ref 0–1.2)
BUN SERPL-MCNC: 14 MG/DL (ref 8–23)
BUN/CREAT SERPL: 15.4 (ref 7–25)
CALCIUM SPEC-SCNC: 9.4 MG/DL (ref 8.6–10.5)
CHLORIDE SERPL-SCNC: 105 MMOL/L (ref 98–107)
CO2 SERPL-SCNC: 22.5 MMOL/L (ref 22–29)
CREAT SERPL-MCNC: 0.91 MG/DL (ref 0.76–1.27)
DEPRECATED RDW RBC AUTO: 41.6 FL (ref 37–54)
EOSINOPHIL # BLD AUTO: 0.09 10*3/MM3 (ref 0–0.4)
EOSINOPHIL NFR BLD AUTO: 1.1 % (ref 0.3–6.2)
ERYTHROCYTE [DISTWIDTH] IN BLOOD BY AUTOMATED COUNT: 12.5 % (ref 12.3–15.4)
GFR SERPL CREATININE-BSD FRML MDRD: 85 ML/MIN/1.73
GLOBULIN UR ELPH-MCNC: 1.9 GM/DL
GLUCOSE SERPL-MCNC: 118 MG/DL (ref 65–99)
HCT VFR BLD AUTO: 41.3 % (ref 37.5–51)
HGB BLD-MCNC: 14.2 G/DL (ref 13–17.7)
HOLD SPECIMEN: NORMAL
HOLD SPECIMEN: NORMAL
IMM GRANULOCYTES # BLD AUTO: 0.02 10*3/MM3 (ref 0–0.05)
IMM GRANULOCYTES NFR BLD AUTO: 0.2 % (ref 0–0.5)
INR PPP: 1.01 (ref 0.9–1.1)
LYMPHOCYTES # BLD AUTO: 1.04 10*3/MM3 (ref 0.7–3.1)
LYMPHOCYTES NFR BLD AUTO: 12.2 % (ref 19.6–45.3)
MCH RBC QN AUTO: 31.6 PG (ref 26.6–33)
MCHC RBC AUTO-ENTMCNC: 34.4 G/DL (ref 31.5–35.7)
MCV RBC AUTO: 91.8 FL (ref 79–97)
MONOCYTES # BLD AUTO: 0.53 10*3/MM3 (ref 0.1–0.9)
MONOCYTES NFR BLD AUTO: 6.2 % (ref 5–12)
NEUTROPHILS NFR BLD AUTO: 6.85 10*3/MM3 (ref 1.7–7)
NEUTROPHILS NFR BLD AUTO: 80.2 % (ref 42.7–76)
NRBC BLD AUTO-RTO: 0 /100 WBC (ref 0–0.2)
PLATELET # BLD AUTO: 250 10*3/MM3 (ref 140–450)
PMV BLD AUTO: 9.7 FL (ref 6–12)
POTASSIUM SERPL-SCNC: 4.2 MMOL/L (ref 3.5–5.2)
PROT SERPL-MCNC: 6.1 G/DL (ref 6–8.5)
PROTHROMBIN TIME: 13.1 SECONDS (ref 11.7–14.2)
QT INTERVAL: 366 MS
RBC # BLD AUTO: 4.5 10*6/MM3 (ref 4.14–5.8)
SARS-COV-2 RNA PNL SPEC NAA+PROBE: NOT DETECTED
SODIUM SERPL-SCNC: 138 MMOL/L (ref 136–145)
TROPONIN T SERPL-MCNC: <0.01 NG/ML (ref 0–0.03)
TROPONIN T SERPL-MCNC: <0.01 NG/ML (ref 0–0.03)
WBC # BLD AUTO: 8.54 10*3/MM3 (ref 3.4–10.8)
WHOLE BLOOD HOLD SPECIMEN: NORMAL
WHOLE BLOOD HOLD SPECIMEN: NORMAL

## 2021-10-13 PROCEDURE — 85610 PROTHROMBIN TIME: CPT | Performed by: EMERGENCY MEDICINE

## 2021-10-13 PROCEDURE — 99284 EMERGENCY DEPT VISIT MOD MDM: CPT

## 2021-10-13 PROCEDURE — 80053 COMPREHEN METABOLIC PANEL: CPT | Performed by: EMERGENCY MEDICINE

## 2021-10-13 PROCEDURE — 85025 COMPLETE CBC W/AUTO DIFF WBC: CPT | Performed by: EMERGENCY MEDICINE

## 2021-10-13 PROCEDURE — 93010 ELECTROCARDIOGRAM REPORT: CPT | Performed by: INTERNAL MEDICINE

## 2021-10-13 PROCEDURE — G0378 HOSPITAL OBSERVATION PER HR: HCPCS

## 2021-10-13 PROCEDURE — 84484 ASSAY OF TROPONIN QUANT: CPT | Performed by: EMERGENCY MEDICINE

## 2021-10-13 PROCEDURE — 25010000002 DIPHENHYDRAMINE PER 50 MG: Performed by: EMERGENCY MEDICINE

## 2021-10-13 PROCEDURE — 93005 ELECTROCARDIOGRAM TRACING: CPT | Performed by: EMERGENCY MEDICINE

## 2021-10-13 PROCEDURE — 96375 TX/PRO/DX INJ NEW DRUG ADDON: CPT

## 2021-10-13 PROCEDURE — 0 IOPAMIDOL PER 1 ML: Performed by: INTERNAL MEDICINE

## 2021-10-13 PROCEDURE — 87635 SARS-COV-2 COVID-19 AMP PRB: CPT | Performed by: EMERGENCY MEDICINE

## 2021-10-13 PROCEDURE — 71275 CT ANGIOGRAPHY CHEST: CPT

## 2021-10-13 PROCEDURE — 93005 ELECTROCARDIOGRAM TRACING: CPT

## 2021-10-13 PROCEDURE — 96374 THER/PROPH/DIAG INJ IV PUSH: CPT

## 2021-10-13 PROCEDURE — 71045 X-RAY EXAM CHEST 1 VIEW: CPT

## 2021-10-13 RX ORDER — SODIUM CHLORIDE 0.9 % (FLUSH) 0.9 %
10 SYRINGE (ML) INJECTION AS NEEDED
Status: DISCONTINUED | OUTPATIENT
Start: 2021-10-13 | End: 2021-10-13 | Stop reason: HOSPADM

## 2021-10-13 RX ORDER — SUCRALFATE 1 G/1
1 TABLET ORAL 4 TIMES DAILY
Qty: 16 TABLET | Refills: 0 | Status: SHIPPED | OUTPATIENT
Start: 2021-10-13 | End: 2022-02-07 | Stop reason: ALTCHOICE

## 2021-10-13 RX ORDER — FAMOTIDINE 10 MG/ML
20 INJECTION, SOLUTION INTRAVENOUS ONCE
Status: COMPLETED | OUTPATIENT
Start: 2021-10-13 | End: 2021-10-13

## 2021-10-13 RX ORDER — DIPHENHYDRAMINE HYDROCHLORIDE 50 MG/ML
12.5 INJECTION INTRAMUSCULAR; INTRAVENOUS ONCE
Status: COMPLETED | OUTPATIENT
Start: 2021-10-13 | End: 2021-10-13

## 2021-10-13 RX ORDER — ASPIRIN 325 MG
325 TABLET ORAL ONCE
Status: DISCONTINUED | OUTPATIENT
Start: 2021-10-13 | End: 2021-10-13 | Stop reason: HOSPADM

## 2021-10-13 RX ORDER — PANTOPRAZOLE SODIUM 40 MG/1
40 TABLET, DELAYED RELEASE ORAL DAILY
Qty: 14 TABLET | Refills: 0 | Status: SHIPPED | OUTPATIENT
Start: 2021-10-13 | End: 2021-10-27

## 2021-10-13 RX ORDER — ASPIRIN 81 MG/1
324 TABLET, CHEWABLE ORAL ONCE
Status: COMPLETED | OUTPATIENT
Start: 2021-10-13 | End: 2021-10-13

## 2021-10-13 RX ADMIN — FAMOTIDINE 20 MG: 10 INJECTION, SOLUTION INTRAVENOUS at 10:35

## 2021-10-13 RX ADMIN — DIPHENHYDRAMINE HYDROCHLORIDE 12.5 MG: 50 INJECTION, SOLUTION INTRAMUSCULAR; INTRAVENOUS at 10:34

## 2021-10-13 RX ADMIN — IOPAMIDOL 95 ML: 755 INJECTION, SOLUTION INTRAVENOUS at 14:56

## 2021-10-13 RX ADMIN — ASPIRIN 324 MG: 81 TABLET, CHEWABLE ORAL at 09:57

## 2021-10-13 NOTE — ED PROVIDER NOTES
EMERGENCY DEPARTMENT ENCOUNTER    CHIEF COMPLAINT  Chief Complaint: Rash, chest pain  History given by: Patient  History limited by: Nothing  Room Number: 10/10  PMD: Brian Marr MD  Cardiologist: Dr. Higinio Mancilla    HPI:  Pt is a 61 y.o. male presents complaining of sternal chest discomfort which he describes as pressure-like constant in nature onset at approximately 1 PM yesterday.  Patient denies exacerbating or relieving factors, denies fever, shortness of air but does report a cough that started yesterday productive of clear sputum.  Patient denies abdominal pain, nausea/vomiting, swelling of extremities.  Patient also complaining of rash to his underarms, groin area at 4 PM yesterday with some subjective feeling of swelling of his tongue/lips and hands that he noted around midnight last night which appear to be improved now.  Patient reports that his symptoms were improved with a hot bath.  Patient reports he took Allegra yesterday at the onset of his rash.    Duration: 1 day  Associated Symptoms: Chest pain, rash, swelling of hands, lips, mouth  Aggravating Factors: Nothing  Alleviating Factors: Allegra and hot bath  Treatment before arrival: Allegra, hot bath    Upon review the patient's chart is noted:  Patient had stress test 7/22/2021 with an EF of 67%, no ischemia on perfusion study, abnormal EKG stress test with horizontal ST depression during stress patient with stent of the proximal/mid LAD in August 2017.  Patient had cardiac cath in August 2019 with mild to moderate nonobstructive CAD and patent LAD stent    PAST MEDICAL HISTORY  Active Ambulatory Problems     Diagnosis Date Noted   • Multiple thyroid nodules    • Pernicious anemia    • H/O multiple allergies 03/09/2016   • Coronary artery disease involving native coronary artery of native heart without angina pectoris 08/10/2017   • Herniated nucleus pulposus, L3-4 right 09/09/2020   • Right lumbar radiculopathy 09/09/2020   • Pterygium of  right eye 10/16/2020   • Degenerative arthritis of cervical spine 12/16/2020   • Essential hypertension 12/24/2020   • History of DVT (deep vein thrombosis) 12/24/2020   • Allergic conjunctivitis of both eyes 01/19/2021   • Chronic eczema of hand 07/01/2021   • Chest pain 07/17/2021     Resolved Ambulatory Problems     Diagnosis Date Noted   • Chronic fatigue 03/09/2016   • Pernicious anemia 07/31/2017   • Abnormal EKG 08/16/2019   • Angina at rest (HCC) 08/16/2019   • Abscess of bursa of right knee 12/23/2020   • Cellulitis of right lower extremity 12/24/2020     Past Medical History:   Diagnosis Date   • B12 deficiency anemia    • Deep vein thrombosis (HCC)    • Headache    • History of blood clots    • Hypertension    • Pulmonary embolism (HCC)    • Syncope        PAST SURGICAL HISTORY  Past Surgical History:   Procedure Laterality Date   • CARDIAC CATHETERIZATION N/A 8/3/2017    Procedure: Coronary angiography;  Surgeon: Cynthia Farley MD;  Location: [facility] CATH INVASIVE LOCATION;  Service:    • CARDIAC CATHETERIZATION  8/3/2017    Procedure: Functional Flow [place];  Surgeon: Cynthia Farley MD;  Location: [facility] CATH INVASIVE LOCATION;  Service:    • CARDIAC CATHETERIZATION N/A 8/3/2017    Procedure: Stent YI coronary;  Surgeon: Cynthia Farley MD;  Location: [facility] CATH [facility] LOC[facility];  Service:    • CARDIAC CATHETERIZATION N/A 8/3/2017    Procedure: Left ventriculography;  Surgeon: Cynthia Farley MD;  Location: [facility] CATH INVASIVE LOCATION;  Service:    • CARDIAC CATHETERIZATION N/A 8/3/2017    Procedure: Left Heart Cath;  Surgeon: Cynthia Farley MD;  Location: [facility] CATH [facility] LOC[facility];  Service:    • CARDIAC CATHETERIZATION N/A 8/20/2019    Procedure: Left Heart Cath;  Surgeon: Mulugeta Ac MD;  Location: [facility] CATH INVASIVE LOC[facility];  Service: Cardiology   • CARDIAC CATHETERIZATION N/A 8/20/2019    Procedure: Coronary angiography;  Surgeon: Mulugeta Ac MD;  Location: [facility] CATH  INVASIVE LOCATION;  Service: Cardiology   • CARDIAC CATHETERIZATION N/A 8/20/2019    Procedure: Left ventriculography;  Surgeon: Mulugeta Ac MD;  [facility];  Service: Cardiology   • COLONOSCOPY  MMVI    NORMAL.   • COLONOSCOPY     • FINE NEEDLE ASPIRATION  12/2015    Left thyroid nodule.  [place] category II.  Dr. Socrates Sanchez       FAMILY HISTORY  Family History   Problem Relation Age of Onset   • Heart disease Other    • Hypertension Other    • Thyroid disease Other    • Heart disease Father    • Prostate cancer Father    • Hypertension Father    • Stroke Father    • Heart disease Mother    • Hypertension Mother    • Heart disease Brother    • Hypertension Brother    • Thyroid disease Sister    • Heart disease Brother    • Hypertension Brother        SOCIAL HISTORY  Social History     Socioeconomic History   • Marital status:    Tobacco Use   • Smoking status: Never Smoker   • Smokeless tobacco: Never Used   • Tobacco comment: caffeine use - 2 cups coffee dailyl    Vaping Use   • Vaping Use: Never used   Substance and Sexual Activity   • Alcohol use: Yes     Comment: occasional   • Drug use: No   • Sexual activity: Defer       ALLERGIES  Patient has no known allergies.    REVIEW OF SYSTEMS  Review of Systems   Constitutional: Negative for chills and fever.   HENT: Negative for sore throat and trouble swallowing.    Eyes: Negative for visual disturbance.   Respiratory: Positive for cough ( For 1 day, productive of clear sputum). Negative for shortness of breath.    Cardiovascular: Positive for chest pain ( Different than usual for him at approximately 4 PM yesterday). Negative for leg swelling.   Gastrointestinal: Negative for abdominal pain, diarrhea and vomiting.   Endocrine: Negative.    Genitourinary: Negative for decreased urine volume and frequency.   Musculoskeletal: Negative for neck pain.   Skin: Positive for rash ( To bilateral groin, armpits, chest onset  yesterday at 4 PM).        Patient reports sensation of swelling of his lips and tongue and hands   Allergic/Immunologic: Negative.    Neurological: Negative for weakness and numbness.   Hematological: Negative.    Psychiatric/Behavioral: Negative.    All other systems reviewed and are negative.      PHYSICAL EXAM  ED Triage Vitals   Temp Heart Rate Resp BP SpO2   10/13/21 0911 10/13/21 0911 10/13/21 0911 10/13/21 0932 10/13/21 0911   97.1 °F (36.2 °C) 76 18 138/63 97 %      Temp src Heart Rate Source Patient Position BP Location FiO2 (%)   10/13/21 0911 -- -- -- --   Tympanic           Physical Exam  Vitals and nursing note reviewed.   Constitutional:       General: He is in acute distress.   HENT:      Head: Normocephalic and atraumatic.      Comments: No objective swelling of patient's mouth or throat, voice normal  Cardiovascular:      Rate and Rhythm: Normal rate and regular rhythm.      Pulses:           Posterior tibial pulses are 2+ on the right side and 2+ on the left side.      Heart sounds: Normal heart sounds. No murmur heard.      Pulmonary:      Effort: Pulmonary effort is normal. No respiratory distress.      Breath sounds: Normal breath sounds. No wheezing.   Abdominal:      General: Bowel sounds are normal.      Palpations: Abdomen is soft.      Tenderness: There is no abdominal tenderness. There is no guarding or rebound.   Musculoskeletal:         General: Normal range of motion.      Cervical back: Normal range of motion.   Skin:     General: Skin is warm and dry.      Capillary Refill: Capillary refill takes less than 2 seconds.      Comments: Patchy erythema without swelling to bilateral groin area.   Neurological:      Mental Status: He is alert and oriented to person, place, and time.   Psychiatric:         Mood and Affect: Affect normal.         LAB RESULTS  Lab Results (last 24 hours)     Procedure Component Value Units Date/Time    CBC & Differential [[id number]]  (Abnormal) Collected:  10/13/21 0936    Specimen: Blood Updated: 10/13/21 0950    Narrative:      The following orders were created for panel order CBC & Differential.  Procedure                               Abnormality         Status                     ---------                               -----------         ------                     CBC Auto Differential[[id number]]        Abnormal            Final result                 Please view results for these tests on the individual orders.    Comprehensive Metabolic Panel [[id number]]  (Abnormal) Collected: 10/13/21 0936    Specimen: Blood Updated: 10/13/21 1149     Glucose 118 mg/dL      BUN 14 mg/dL      Creatinine 0.91 mg/dL      Sodium 138 mmol/L      Potassium 4.2 mmol/L      Chloride 105 mmol/L      CO2 22.5 mmol/L      Calcium 9.4 mg/dL      Total Protein 6.1 g/dL      Albumin 4.20 g/dL      ALT (SGPT) 24 U/L      AST (SGOT) 21 U/L      Alkaline Phosphatase 66 U/L      Total Bilirubin 0.7 mg/dL      eGFR Non African Amer 85 mL/min/1.73      Globulin 1.9 gm/dL      A/G Ratio 2.2 g/dL      BUN/Creatinine Ratio 15.4     Anion Gap 10.5 mmol/L     Narrative:      GFR Normal >60  Chronic Kidney Disease <60  Kidney Failure <15      Troponin [[id number]]  (Normal) Collected: 10/13/21 0936    Specimen: Blood Updated: 10/13/21 1148     Troponin T <0.010 ng/mL     Narrative:      Troponin T Reference Range:  <= 0.03 ng/mL-   Negative for AMI  >0.03 ng/mL-     Abnormal for myocardial necrosis.  Clinicians would have to utilize clinical acumen, EKG, Troponin and serial changes to determine if it is an Acute Myocardial Infarction or myocardial injury due to an underlying chronic condition.       Results may be falsely decreased if patient taking Biotin.      Protime-INR [[id number]]  (Normal) Collected: 10/13/21 0936    Specimen: Blood Updated: 10/13/21 1006     Protime 13.1 Seconds      INR 1.01    CBC Auto Differential [[id number]]  (Abnormal) Collected: 10/13/21 0936    Specimen: Blood Updated:  10/13/21 0950     WBC 8.54 10*3/mm3      RBC 4.50 10*6/mm3      Hemoglobin 14.2 g/dL      Hematocrit 41.3 %      MCV 91.8 fL      MCH 31.6 pg      MCHC 34.4 g/dL      RDW 12.5 %      RDW-SD 41.6 fl      MPV 9.7 fL      Platelets 250 10*3/mm3      Neutrophil % 80.2 %      Lymphocyte % 12.2 %      Monocyte % 6.2 %      Eosinophil % 1.1 %      Basophil % 0.1 %      Immature Grans % 0.2 %      Neutrophils, Absolute 6.85 10*3/mm3      Lymphocytes, Absolute 1.04 10*3/mm3      Monocytes, Absolute 0.53 10*3/mm3      Eosinophils, Absolute 0.09 10*3/mm3      Basophils, Absolute 0.01 10*3/mm3      Immature Grans, Absolute 0.02 10*3/mm3      nRBC 0.0 /100 WBC     COVID-19,[facility]A NP SWAB IN TRANSPORT MEDIA 8-12 HR TAT - Swab, Nasopharynx [[id number]]  (Normal) Collected: 10/13/21 1038    Specimen: Swab from Nasopharynx Updated: 10/13/21 1123     COVID19 Not Detected    Narrative:      Fact sheet for providers: https://www.fda.gov/media/303419/download    Fact sheet for patients: https://www.fda.gov/media/990556/download    Test performed by PCR.    Troponin [[id number]]  (Normal) Collected: 10/13/21 1217    Specimen: Blood Updated: 10/13/21 1254     Troponin T <0.010 ng/mL     Narrative:      Troponin T Reference Range:  <= 0.03 ng/mL-   Negative for AMI  >0.03 ng/mL-     Abnormal for myocardial necrosis.  Clinicians would have to utilize clinical acumen, EKG, Troponin and serial changes to determine if it is an Acute Myocardial Infarction or myocardial injury due to an underlying chronic condition.       Results may be falsely decreased if patient taking Biotin.            I ordered the above labs and reviewed the results    RADIOLOGY  CT Angiogram Chest   Preliminary Result   1. No evidence of pulmonary embolus.   2. There is mild wall thickening of the mid to distal esophagus. Please   correlate with the clinical findings. Correlation with upper endoscopy   or upper GI series may be helpful.   3. Minimal  probable parenchymal scarring and stable small nodular   density in the right posterior costophrenic sulcus, unchanged from the   12/15/2018 study.       Radiation dose reduction techniques were utilized, including automated   exposure control and exposure modulation based on body size.              XR Chest 1 View   Final Result      Chest x-ray negative    I ordered the above noted radiological studies. Interpreted by radiologist. Viewed by me in PACS.       PROCEDURES  Procedures      PROGRESS AND CONSULTS  ED Course as of 10/13/21 1546   Wed Oct 13, 2021   1325 [address] with Dr. Keysha Voss, on-call for [facility], who is aware of patient's presentation, imaging, labs, reviewed previous charts and work-up and will admit for further testing, treatment as needed.  Request CTA chest in the ER [TO]      ED Course User Index  [TO] Suzanne Fortune MD     EKG          EKG time: 09 17  Rhythm/Rate: Sinus rhythm, rate in the 60s  P waves and TN: Normal P waves, normal LUAN  QRS, axis: Poor R wave progression is  ST and T waves: Nonspecific ST/T wave findings    Interpreted Contemporaneously by me, independently viewed  Minimally changed compared to prior 7/17/2021        MEDICAL DECISION MAKING  Results were reviewed/discussed with the patient and they were also made aware of online access. Pt also made aware that some labs, such as cultures, will not be resulted during ER visit and follow up with PMD is necessary.       MDM       DIAGNOSIS  Final diagnoses:   Chest pain, unspecified type       DISPOSITION  ADMISSION    Discussed treatment plan and reason for admission with pt/family and admitting physician.  Pt/family voiced understanding of the plan for admission for further testing/treatment as needed.         Latest Documented Vital Signs:  As of 15:46 EDT  BP- 153/82 HR- 59 Temp- 97.1 °F (36.2 °C) (Tympanic) O2 sat- 95%    --  Patient was wearing facemask when I entered the room and throughout our encounter. Full  protective equipment was worn throughout this patient encounter including a face mask, eye protection and gloves. Hand hygiene was performed before donning protective equipment and after removal when leaving the room.      Suzanne Fortune MD  10/13/21 1549

## 2021-10-13 NOTE — TELEPHONE ENCOUNTER
Caller: Niranjan Peacock    Relationship to patient: Self    Best call back number: [phone]    Chief complaint: PATIENT CALLED IN TO MAKE APPOINTMENT STATING THAT HE STARTED NOT FEELING WELL ON 10/12 WITH BEING SWEATY AND HOT AND IT PROGRESSED TO RASH AROUND WAIST WITH TONGUE SWELLING AND TROUBLE SWALLOWING. PATIENT ALSO STATED THAT HE HAD CHEST PAINS AND HIS HANDS WERE SWOLLEN SO BAD IT WAS HARD TO CLOSE THEM. HUB DIRECTED PATIENT TO ER IMMEDIATELY AND PATIENT AGREED.     Patient directed to call 911 or go to their nearest emergency room.     Patient verbalized understanding: [x] Yes  [] No

## 2021-10-13 NOTE — ED NOTES
Patient was wearing a face mask throughout our encounter.  I wore a CAPR throughout the encounter.  Hand hygiene was performed before and after patient encounter.        Madyson Allison RN  10/13/21 0955

## 2021-10-13 NOTE — PROGRESS NOTES
Clinical Pharmacy Services: Medication History    Niranjan Peacock is a 61 y.o. male presenting to [facility] for   Chief Complaint   Patient presents with   • Chest Pain       He  has a past medical history of Angina at rest (HCC) (8/16/2019), B12 deficiency anemia, Cellulitis of right lower extremity (12/24/2020), Chest pain, Chronic fatigue (3/9/2016), Coronary artery disease involving native coronary artery of native heart without angina pectoris (8/10/2017), Deep vein thrombosis (HCC), Headache, History of blood clots, Hypertension, Multiple thyroid nodules, Multiple thyroid nodules, Pernicious anemia, Pulmonary embolism ([facility]), and Syncope.    Allergies as of 10/13/2021   • (No Known Allergies)       Medication information was obtained from: Patient  Pharmacy and Phone Number:     Prior to Admission Medications     Prescriptions Last Dose Informant Patient Reported? Taking?    aspirin 81 MG tablet 10/12/2021 Self No Yes    Take 1 tablet by mouth Daily.    atorvastatin (LIPITOR) 40 MG tablet 10/12/2021 Self No Yes    TAKE ONE TABLET BY MOUTH DAILY    Patient taking differently:  Take 40 mg by mouth Daily.    Cholecalciferol (VITAMIN D3) 5000 units capsule capsule 10/12/2021 Self Yes Yes    Take 5,000 Units by mouth Daily.    cyanocobalamin 1000 MCG/ML injection  Self No Yes    Inject 1 mL into the appropriate muscle as directed by prescriber Every 30 (Thirty) Days.    hydrocortisone 0.5 % cream  Self No Yes    Apply  topically to the appropriate area as directed 2 (Two) Times a Day As Needed for Irritation.    Patient taking differently:  Apply 1 application topically to the appropriate area as directed 2 (Two) Times a Day As Needed for Irritation.    lisinopril (PRINIVIL,ZESTRIL) 20 MG tablet 10/12/2021 Self No Yes    Take two tablets by mouth daily    Patient taking differently:  Take 40 mg by mouth Daily.    Multiple Vitamin (MULTI VITAMIN DAILY PO) 10/12/2021 Self Yes Yes    Take 1 tablet by  mouth Daily.    nitroglycerin (NITROSTAT) 0.4 MG SL tablet 10/13/2021 Self No Yes    Place 1 tablet under the tongue Every 5 (Five) Minutes As Needed for Chest Pain for up to 1 dose. Take no more than 3 doses in 15 minutes.    triamcinolone (KENALOG) 0.1 % cream  Self No Yes    Apply  topically to the appropriate area as directed 2 (Two) Times a Day.    Patient taking differently:  Apply 1 application topically to the appropriate area as directed 2 (Two) Times a Day As Needed.            Medication notes:     This medication list is complete to the best of my knowledge as of 10/13/2021    Please call if questions.    Adeel Carlson  Medication History Technician  [phone]    10/13/2021 13:52 EDT

## 2021-10-13 NOTE — DISCHARGE INSTRUCTIONS
You are advised to follow closely with [facility] and your primary care provider in 2-3 days for recheck, final results of lab work and imaging testing, and further testing/treatment as needed.    Avoid new hygiene, food contacts.  Milmay diet, avoid tomato, mint, citrus, spicy/fatty foods.    Take an aspirin daily with food    Cool oatmeal baths as needed for rash/itching  Benadryl of 25 mg 4 times daily as needed for rash/itching    Please return to the emergency department immediately with chest pain different than usual for you, shortness of air, abdominal pain, persistent vomiting/fever, blood in emesis or stool, lightheadedness/fainting, problems with speech, one sided weakness/numbness, new incontinence, problems with vision, swelling of your mouth or throat, or for worsening of symptoms or other concerns.

## 2021-10-13 NOTE — ED NOTES
Pt requesting MD, states he does not want to stay and be admitted. MD notified.     Madyson Allison, RN  10/13/21 4219

## 2021-10-13 NOTE — ED TRIAGE NOTES
Pt to ER via [facility]. Pt states chest pain that has been going on for weeks - confirms cardiac hx. Pt also noticed rash in pelvic area, swelling in hands and tongue that started yesterday - denies any allergies or using any new products. Denies SOA    Patient in mask. This RN in appropriate PPE throughout the patient's entire encounter.

## 2021-10-14 ENCOUNTER — PATIENT MESSAGE (OUTPATIENT)
Dept: CARDIOLOGY | Facility: CLINIC | Age: 61
End: 2021-10-14

## 2021-10-14 NOTE — TELEPHONE ENCOUNTER
Have him stop lisinopril follow BP daily for 1 week then send BP values in may need to try alternative to lisinopril such as losartan. Let him know i've added lisinopril to allergy list.

## 2021-10-14 NOTE — TELEPHONE ENCOUNTER
Shira, he is calling in about this message.  He is breathing ok today.      He would like to know if he should stop the lisinopril?  Thanks  Modesta Ley RN  Triage nurse

## 2021-10-18 ENCOUNTER — PATIENT MESSAGE (OUTPATIENT)
Dept: CARDIOLOGY | Facility: CLINIC | Age: 61
End: 2021-10-18

## 2021-10-18 RX ORDER — LOSARTAN POTASSIUM 25 MG/1
25 TABLET ORAL DAILY
Qty: 30 TABLET | Refills: 5 | Status: SHIPPED | OUTPATIENT
Start: 2021-10-18 | End: 2021-10-27 | Stop reason: SDUPTHER

## 2021-10-18 NOTE — TELEPHONE ENCOUNTER
From: Niranjan Peacock  To: GREG Carrasquillo  Sent: 10/18/2021 7:40 AM EDT  Subject: Non-Urgent Medical Question    Additional blood pressure readings

## 2021-10-26 ENCOUNTER — PATIENT MESSAGE (OUTPATIENT)
Dept: CARDIOLOGY | Facility: CLINIC | Age: 61
End: 2021-10-26

## 2021-10-27 RX ORDER — LOSARTAN POTASSIUM 50 MG/1
50 TABLET ORAL DAILY
Qty: 90 TABLET | Refills: 1 | Status: SHIPPED | OUTPATIENT
Start: 2021-10-27 | End: 2021-12-14 | Stop reason: SDUPTHER

## 2021-10-27 NOTE — TELEPHONE ENCOUNTER
From: Niranjan Peacock  To: GREG Carrasquillo  Sent: 10/26/2021 7:22 PM EDT  Subject: Visit Follow-Up Question    It looks like my blood pressure has leveled out and is still high.    Nain Peacock

## 2021-12-08 NOTE — PROGRESS NOTES
See session report   Complex Repair And Graft Additional Text (Will Appearing After The Standard Complex Repair Text): The complex repair was not sufficient to completely close the primary defect. The remaining additional defect was repaired with the graft mentioned below.

## 2021-12-13 ENCOUNTER — PATIENT MESSAGE (OUTPATIENT)
Dept: CARDIOLOGY | Facility: CLINIC | Age: 61
End: 2021-12-13

## 2021-12-14 RX ORDER — LOSARTAN POTASSIUM 50 MG/1
100 TABLET ORAL DAILY
Qty: 90 TABLET | Refills: 1
Start: 2021-12-14 | End: 2022-02-28

## 2021-12-14 NOTE — TELEPHONE ENCOUNTER
From: Niranjan Peacock  To: GREG Carrasquillo  Sent: 12/13/2021 6:55 PM EST  Subject: Visit Follow-Up Question    My blood pressure is higher than it was a few weeks ago! I am having headaches and loud ringing in my ears    Nain

## 2022-01-01 NOTE — INTERVAL H&P NOTE
Known history of coronary artery disease recent angina stress testing which no clear picture showed no ischemia ECG was a little bit abnormal now referred for cardiac cath. H&P reviewed. The patient was examined and there are no changes to the H&P. I have explained the risks and benefits of the procedure to the patient.  The patient understands and agrees to proceed      
(0) blue, pale

## 2022-01-03 RX ORDER — LOSARTAN POTASSIUM 50 MG/1
TABLET ORAL
Qty: 90 TABLET | Refills: 1 | OUTPATIENT
Start: 2022-01-03

## 2022-02-02 ENCOUNTER — OFFICE VISIT (OUTPATIENT)
Dept: CARDIOLOGY | Facility: CLINIC | Age: 62
End: 2022-02-02

## 2022-02-02 VITALS
HEART RATE: 66 BPM | RESPIRATION RATE: 16 BRPM | WEIGHT: 212 LBS | SYSTOLIC BLOOD PRESSURE: 148 MMHG | OXYGEN SATURATION: 98 % | DIASTOLIC BLOOD PRESSURE: 82 MMHG | HEIGHT: 74 IN | BODY MASS INDEX: 27.21 KG/M2

## 2022-02-02 DIAGNOSIS — I10 ESSENTIAL HYPERTENSION: ICD-10-CM

## 2022-02-02 DIAGNOSIS — I25.10 CORONARY ARTERY DISEASE INVOLVING NATIVE CORONARY ARTERY OF NATIVE HEART WITHOUT ANGINA PECTORIS: Primary | ICD-10-CM

## 2022-02-02 DIAGNOSIS — Z86.718 HISTORY OF DVT (DEEP VEIN THROMBOSIS): ICD-10-CM

## 2022-02-02 DIAGNOSIS — R00.2 PALPITATIONS: ICD-10-CM

## 2022-02-02 DIAGNOSIS — R55 SYNCOPE AND COLLAPSE: ICD-10-CM

## 2022-02-02 DIAGNOSIS — E78.2 MIXED HYPERLIPIDEMIA: ICD-10-CM

## 2022-02-02 PROCEDURE — 99214 OFFICE O/P EST MOD 30 MIN: CPT | Performed by: INTERNAL MEDICINE

## 2022-02-02 PROCEDURE — 93000 ELECTROCARDIOGRAM COMPLETE: CPT | Performed by: INTERNAL MEDICINE

## 2022-02-02 NOTE — PROGRESS NOTES
CARDIOLOGY    Shonna Renteria MD    ENCOUNTER DATE:  02/02/2022    Niranjan Peacock / 62 y.o. / male        CHIEF COMPLAINT / REASON FOR OFFICE VISIT     Coronary Artery Disease (1 year follow up)      HISTORY OF PRESENT ILLNESS       HPI    Niranjan Peacock is a 62 y.o. male     This is a patient who followed with Dr. Mancilla. He has coronary artery disease. He has a history of stent to the proximal/mid-LAD in 2018, heart catheterization in 2019 showed mild to moderate nonobstructive disease. Echocardiogram 02/2020 showed a normal ejection fraction at 61%. He does have a history of MRSA cellulitis in 12/2020. He has hypertension, hyperlipidemia, B12 anemia, and mild bilateral carotid artery stenosis which was last checked in 06/2020. He has a history of DVT but is no longer on anticoagulation. He came to the emergency room in 07/2021 with chest pain which he described as a pressure or burning without exacerbating or relieving factors. He ruled out for myocardial infarction. He had a stress test which showed normal LV function and no evidence of ischemia, though he did have some EKG changes with exercise. He was discharged home and followed up with Shira in 08/2021, and was not having any change in his symptoms at that point in time.     He comes in today for follow-up.  He is not having any chest pain.  He walks and gets on his treadmill does not have any issues.  However he does have palpitations every couple of weeks or maybe once a month.  He says he feels like his heart is beating out of his throat but there is no associated symptoms.  It may be related to caffeine and he is cutting back on his caffeine intake.  Is also had a couple of syncope episodes in the past couple months.  One was associated with reaching for something up over his head.  Another was associated with reaching for something under a counter.  He has no warning sign or prodrome next thing he knows he is waking up on the  "ground.    REVIEW OF SYSTEMS     Review of Systems   Constitutional: Negative for chills, fever, weight gain and weight loss.   Cardiovascular: Negative for leg swelling.   Respiratory: Negative for cough, snoring and wheezing.    Hematologic/Lymphatic: Negative for bleeding problem. Does not bruise/bleed easily.   Skin: Negative for color change.   Musculoskeletal: Positive for joint pain and myalgias. Negative for falls.   Gastrointestinal: Negative for melena.   Genitourinary: Negative for hematuria.   Neurological: Negative for excessive daytime sleepiness.   Psychiatric/Behavioral: Negative for depression. The patient is not nervous/anxious.          VITAL SIGNS     Visit Vitals  /82 (BP Location: Right arm, Patient Position: Sitting, Cuff Size: Adult)   Pulse 66   Resp 16   Ht 188 cm (74\")   Wt 96.2 kg (212 lb)   SpO2 98%   BMI 27.22 kg/m²         Wt Readings from Last 3 Encounters:   02/02/22 96.2 kg (212 lb)   10/29/21 97.5 kg (215 lb)   10/13/21 99.8 kg (220 lb)     Body mass index is 27.22 kg/m².      PHYSICAL EXAMINATION     Constitutional:       General: Not in acute distress.  Neck:      Vascular: No carotid bruit or JVD.   Pulmonary:      Effort: Pulmonary effort is normal.      Breath sounds: Normal breath sounds.   Cardiovascular:      Normal rate. Regular rhythm.      Murmurs: There is no murmur.   Psychiatric:         Mood and Affect: Mood and affect normal.           REVIEWED DATA       ECG 12 Lead    Date/Time: 2/2/2022 11:15 AM  Performed by: Shonna Renteria MD  Authorized by: Shonna Renteria MD   Comparison: compared with previous ECG from 10/13/2021  Similar to previous ECG  Rhythm: sinus rhythm  BPM: 66  Conduction: conduction normal  ST Segments: ST segments normal  T Waves: T waves normal    Clinical impression: normal ECG              Lipid Panel    Lipid Panel 4/29/21   Total Cholesterol 118   Triglycerides 118   HDL Cholesterol 41   VLDL Cholesterol 21   LDL Cholesterol  56    "          Lab Results   Component Value Date    GLUCOSE 118 (H) 10/13/2021    BUN 14 10/13/2021    CREATININE 0.91 10/13/2021    EGFRIFNONA 85 10/13/2021    EGFRIFAFRI 79 04/29/2021    BCR 15.4 10/13/2021    K 4.2 10/13/2021    CO2 22.5 10/13/2021    CALCIUM 9.4 10/13/2021    PROTENTOTREF 6.2 04/29/2021    ALBUMIN 4.20 10/13/2021    LABIL2 2.1 04/29/2021    AST 21 10/13/2021    ALT 24 10/13/2021       ASSESSMENT & PLAN      Diagnosis Plan   1. Coronary artery disease involving native coronary artery of native heart without angina pectoris  Holter Monitor - 72 Hour Up To 15 Days    Adult Transthoracic Echo Complete W/ Cont if Necessary Per Protocol    Duplex Carotid Ultrasound CAR   2. Essential hypertension  Holter Monitor - 72 Hour Up To 15 Days    Adult Transthoracic Echo Complete W/ Cont if Necessary Per Protocol    Duplex Carotid Ultrasound CAR   3. History of DVT (deep vein thrombosis)  Holter Monitor - 72 Hour Up To 15 Days    Adult Transthoracic Echo Complete W/ Cont if Necessary Per Protocol    Duplex Carotid Ultrasound CAR   4. Mixed hyperlipidemia  Holter Monitor - 72 Hour Up To 15 Days    Adult Transthoracic Echo Complete W/ Cont if Necessary Per Protocol    Duplex Carotid Ultrasound CAR   5. Palpitations  Holter Monitor - 72 Hour Up To 15 Days    Adult Transthoracic Echo Complete W/ Cont if Necessary Per Protocol    Duplex Carotid Ultrasound CAR   6. Syncope and collapse  Holter Monitor - 72 Hour Up To 15 Days    Adult Transthoracic Echo Complete W/ Cont if Necessary Per Protocol    Duplex Carotid Ultrasound CAR       1.  Coronary artery disease with stent to the LAD in 2018.  Negative stress test in Jul 2021.  2.  Hypertension  3.  Hyperlipidemia.  On atorvastatin.  I reviewed his labs as above and he was at goal.  4.  History of DVT but no longer on anticoagulation  5.  History of MRSA cellulitis  6.  B12 anemia  7.  Mild bilateral carotid artery stenosis last checked Jun 2020  8.  Palpitations.  Be  patch.  9.  Syncope.  This sounds pretty concerning.  He is going to have his ZIO AT, echo and carotid Doppler to begin investigating this.      Orders Placed This Encounter   Procedures   • Holter Monitor - 72 Hour Up To 15 Days     Standing Status:   Future     Standing Expiration Date:   2/2/2023     Order Specific Question:   Reason for exam?     Answer:   Palpitations     Order Specific Question:   Reason for exam?     Answer:   Presyncope or Syncope     Order Specific Question:   Release to patient     Answer:   Immediate   • ECG 12 Lead     This order was created via procedure documentation     Order Specific Question:   Release to patient     Answer:   Immediate   • Adult Transthoracic Echo Complete W/ Cont if Necessary Per Protocol     Standing Status:   Future     Standing Expiration Date:   2/2/2023     Order Specific Question:   Reason for exam?     Answer:   Syncope           MEDICATIONS         Discharge Medications          Accurate as of February 2, 2022 11:16 AM. If you have any questions, ask your nurse or doctor.            Changes to Medications      Instructions Start Date   hydrocortisone 0.5 % cream  What changed: how much to take   Topical, 2 Times Daily PRN      triamcinolone 0.1 % cream  Commonly known as: KENALOG  What changed:   · how much to take  · when to take this  · reasons to take this   Topical, 2 Times Daily         Continue These Medications      Instructions Start Date   aspirin 81 MG tablet   81 mg, Oral, Daily      atorvastatin 40 MG tablet  Commonly known as: LIPITOR   TAKE ONE TABLET BY MOUTH DAILY      cyanocobalamin 1000 MCG/ML injection   1,000 mcg, Intramuscular, Every 30 Days      losartan 50 MG tablet  Commonly known as: Cozaar   100 mg, Oral, Daily      multivitamin tablet tablet  Commonly known as: THERAGRAN   1 tablet, Oral, Daily      nitroglycerin 0.4 MG SL tablet  Commonly known as: NITROSTAT   0.4 mg, Sublingual, Every 5 Minutes PRN, Take no more than 3 doses in  15 minutes.      sucralfate 1 g tablet  Commonly known as: CARAFATE   1 g, Oral, 4 Times Daily      vitamin D3 125 MCG (5000 UT) capsule capsule   5,000 Units, Oral, Daily         Stop These Medications    guaiFENesin-codeine 100-10 MG/5ML syrup  Commonly known as: ROMILAR-AC  Stopped by: MD Shonna Russ MD  02/02/22  11:16 EST    **Dragon Disclaimer:   Much of this encounter note is an electronic transcription/translation of spoken language to printed text. The electronic translation of spoken language may permit erroneous, or at times, nonsensical words or phrases to be inadvertently transcribed. Although I have reviewed the note for such errors, some may still exist.

## 2022-02-17 ENCOUNTER — HOSPITAL ENCOUNTER (OUTPATIENT)
Dept: CARDIOLOGY | Facility: HOSPITAL | Age: 62
Discharge: HOME | End: 2022-02-17

## 2022-02-17 VITALS
SYSTOLIC BLOOD PRESSURE: 160 MMHG | DIASTOLIC BLOOD PRESSURE: 88 MMHG | BODY MASS INDEX: 27.21 KG/M2 | HEIGHT: 74 IN | WEIGHT: 212 LBS | HEART RATE: 75 BPM

## 2022-02-17 DIAGNOSIS — E78.2 MIXED HYPERLIPIDEMIA: ICD-10-CM

## 2022-02-17 DIAGNOSIS — I10 ESSENTIAL HYPERTENSION: ICD-10-CM

## 2022-02-17 DIAGNOSIS — I25.10 CORONARY ARTERY DISEASE INVOLVING NATIVE CORONARY ARTERY OF NATIVE HEART WITHOUT ANGINA PECTORIS: ICD-10-CM

## 2022-02-17 DIAGNOSIS — R00.2 PALPITATIONS: ICD-10-CM

## 2022-02-17 DIAGNOSIS — Z86.718 HISTORY OF DVT (DEEP VEIN THROMBOSIS): ICD-10-CM

## 2022-02-17 DIAGNOSIS — R55 SYNCOPE AND COLLAPSE: ICD-10-CM

## 2022-02-17 LAB
AORTIC ARCH: 1.9 CM
ASCENDING AORTA: 3.2 CM
BH CV ECHO LEFT VENTRICLE GLOBAL LONGITUDINAL STRAIN: -22.4 %
BH CV ECHO MEAS - ACS: 2 CM
BH CV ECHO MEAS - AO MAX PG (FULL): 1.2 MMHG
BH CV ECHO MEAS - AO MAX PG: 4.6 MMHG
BH CV ECHO MEAS - AO MEAN PG (FULL): 0.7 MMHG
BH CV ECHO MEAS - AO MEAN PG: 2.9 MMHG
BH CV ECHO MEAS - AO ROOT AREA (BSA CORRECTED): 1.6
BH CV ECHO MEAS - AO ROOT AREA: 9.6 CM^2
BH CV ECHO MEAS - AO ROOT DIAM: 3.5 CM
BH CV ECHO MEAS - AO V2 MAX: 106.9 CM/SEC
BH CV ECHO MEAS - AO V2 MEAN: 81.4 CM/SEC
BH CV ECHO MEAS - AO V2 VTI: 27.5 CM
BH CV ECHO MEAS - ASC AORTA: 3.2 CM
BH CV ECHO MEAS - AVA(I,A): 2.5 CM^2
BH CV ECHO MEAS - AVA(I,D): 2.5 CM^2
BH CV ECHO MEAS - AVA(V,A): 2.7 CM^2
BH CV ECHO MEAS - AVA(V,D): 2.7 CM^2
BH CV ECHO MEAS - BSA(HAYCOCK): 2.3 M^2
BH CV ECHO MEAS - BSA: 2.2 M^2
BH CV ECHO MEAS - BZI_BMI: 27.2 KILOGRAMS/M^2
BH CV ECHO MEAS - BZI_METRIC_HEIGHT: 188 CM
BH CV ECHO MEAS - BZI_METRIC_WEIGHT: 96.2 KG
BH CV ECHO MEAS - EDV(MOD-SP2): 95 ML
BH CV ECHO MEAS - EDV(MOD-SP4): 101 ML
BH CV ECHO MEAS - EDV(TEICH): 91.9 ML
BH CV ECHO MEAS - EF(CUBED): 83.4 %
BH CV ECHO MEAS - EF(MOD-BP): 62.7 %
BH CV ECHO MEAS - EF(MOD-SP2): 67.4 %
BH CV ECHO MEAS - EF(MOD-SP4): 60.4 %
BH CV ECHO MEAS - EF(TEICH): 76.5 %
BH CV ECHO MEAS - ESV(MOD-SP2): 31 ML
BH CV ECHO MEAS - ESV(MOD-SP4): 40 ML
BH CV ECHO MEAS - ESV(TEICH): 21.6 ML
BH CV ECHO MEAS - FS: 45 %
BH CV ECHO MEAS - IVS/LVPW: 1.1
BH CV ECHO MEAS - IVSD: 1.4 CM
BH CV ECHO MEAS - LAT PEAK E' VEL: 10.4 CM/SEC
BH CV ECHO MEAS - LV DIASTOLIC VOL/BSA (35-75): 45.3 ML/M^2
BH CV ECHO MEAS - LV MASS(C)D: 217.1 GRAMS
BH CV ECHO MEAS - LV MASS(C)DI: 97.5 GRAMS/M^2
BH CV ECHO MEAS - LV MAX PG: 3.4 MMHG
BH CV ECHO MEAS - LV MEAN PG: 2.2 MMHG
BH CV ECHO MEAS - LV SYSTOLIC VOL/BSA (12-30): 18 ML/M^2
BH CV ECHO MEAS - LV V1 MAX: 92.1 CM/SEC
BH CV ECHO MEAS - LV V1 MEAN: 71.4 CM/SEC
BH CV ECHO MEAS - LV V1 VTI: 21.3 CM
BH CV ECHO MEAS - LVIDD: 4.5 CM
BH CV ECHO MEAS - LVIDS: 2.5 CM
BH CV ECHO MEAS - LVLD AP2: 9.1 CM
BH CV ECHO MEAS - LVLD AP4: 8.9 CM
BH CV ECHO MEAS - LVLS AP2: 6.6 CM
BH CV ECHO MEAS - LVLS AP4: 7.3 CM
BH CV ECHO MEAS - LVOT AREA (M): 3.1 CM^2
BH CV ECHO MEAS - LVOT AREA: 3.2 CM^2
BH CV ECHO MEAS - LVOT DIAM: 2 CM
BH CV ECHO MEAS - LVPWD: 1.2 CM
BH CV ECHO MEAS - MED PEAK E' VEL: 8.8 CM/SEC
BH CV ECHO MEAS - MV A DUR: 0.1 SEC
BH CV ECHO MEAS - MV A MAX VEL: 82.7 CM/SEC
BH CV ECHO MEAS - MV DEC SLOPE: 532.6 CM/SEC^2
BH CV ECHO MEAS - MV DEC TIME: 0.16 SEC
BH CV ECHO MEAS - MV E MAX VEL: 93.4 CM/SEC
BH CV ECHO MEAS - MV E/A: 1.1
BH CV ECHO MEAS - MV MAX PG: 4.5 MMHG
BH CV ECHO MEAS - MV MEAN PG: 1 MMHG
BH CV ECHO MEAS - MV P1/2T MAX VEL: 94.7 CM/SEC
BH CV ECHO MEAS - MV P1/2T: 52.1 MSEC
BH CV ECHO MEAS - MV V2 MAX: 106 CM/SEC
BH CV ECHO MEAS - MV V2 MEAN: 42.5 CM/SEC
BH CV ECHO MEAS - MV V2 VTI: 29.4 CM
BH CV ECHO MEAS - MVA P1/2T LCG: 2.3 CM^2
BH CV ECHO MEAS - MVA(P1/2T): 4.2 CM^2
BH CV ECHO MEAS - MVA(VTI): 2.3 CM^2
BH CV ECHO MEAS - PA ACC TIME: 0.13 SEC
BH CV ECHO MEAS - PA MAX PG (FULL): 0.97 MMHG
BH CV ECHO MEAS - PA MAX PG: 3.8 MMHG
BH CV ECHO MEAS - PA PR(ACCEL): 19.6 MMHG
BH CV ECHO MEAS - PA V2 MAX: 97.7 CM/SEC
BH CV ECHO MEAS - PULM A REVS DUR: 0.09 SEC
BH CV ECHO MEAS - PULM A REVS VEL: 26.6 CM/SEC
BH CV ECHO MEAS - PULM DIAS VEL: 51.8 CM/SEC
BH CV ECHO MEAS - PULM S/D: 1.1
BH CV ECHO MEAS - PULM SYS VEL: 56.6 CM/SEC
BH CV ECHO MEAS - PVA(V,A): 2.6 CM^2
BH CV ECHO MEAS - PVA(V,D): 2.6 CM^2
BH CV ECHO MEAS - QP/QS: 0.89
BH CV ECHO MEAS - RV MAX PG: 2.8 MMHG
BH CV ECHO MEAS - RV MEAN PG: 1.9 MMHG
BH CV ECHO MEAS - RV V1 MAX: 84.4 CM/SEC
BH CV ECHO MEAS - RV V1 MEAN: 65.6 CM/SEC
BH CV ECHO MEAS - RV V1 VTI: 20.3 CM
BH CV ECHO MEAS - RVOT AREA: 3 CM^2
BH CV ECHO MEAS - RVOT DIAM: 1.9 CM
BH CV ECHO MEAS - SI(AO): 118.2 ML/M^2
BH CV ECHO MEAS - SI(CUBED): 33.9 ML/M^2
BH CV ECHO MEAS - SI(LVOT): 30.3 ML/M^2
BH CV ECHO MEAS - SI(MOD-SP2): 28.7 ML/M^2
BH CV ECHO MEAS - SI(MOD-SP4): 27.4 ML/M^2
BH CV ECHO MEAS - SI(TEICH): 31.6 ML/M^2
BH CV ECHO MEAS - SUP REN AO DIAM: 2.1 CM
BH CV ECHO MEAS - SV(AO): 263.3 ML
BH CV ECHO MEAS - SV(CUBED): 75.4 ML
BH CV ECHO MEAS - SV(LVOT): 67.5 ML
BH CV ECHO MEAS - SV(MOD-SP2): 64 ML
BH CV ECHO MEAS - SV(MOD-SP4): 61 ML
BH CV ECHO MEAS - SV(RVOT): 60.3 ML
BH CV ECHO MEAS - SV(TEICH): 70.3 ML
BH CV ECHO MEAS - TAPSE (>1.6): 2.5 CM
BH CV ECHO MEASUREMENTS AVERAGE E/E' RATIO: 9.73
BH CV XLRA - RV BASE: 2.7 CM
BH CV XLRA - RV LENGTH: 6.9 CM
BH CV XLRA - RV MID: 2.3 CM
BH CV XLRA - TDI S': 13.1 CM/SEC
LEFT ATRIUM VOLUME INDEX: 19 ML/M2
LV EF 2D ECHO EST: 65 %
SINUS: 3.5 CM
STJ: 2.9 CM

## 2022-02-17 PROCEDURE — 93356 MYOCRD STRAIN IMG SPCKL TRCK: CPT | Performed by: INTERNAL MEDICINE

## 2022-02-17 PROCEDURE — 93356 MYOCRD STRAIN IMG SPCKL TRCK: CPT

## 2022-02-17 PROCEDURE — 93880 EXTRACRANIAL BILAT STUDY: CPT

## 2022-02-17 PROCEDURE — 93880 EXTRACRANIAL BILAT STUDY: CPT | Performed by: INTERNAL MEDICINE

## 2022-02-17 PROCEDURE — 93306 TTE W/DOPPLER COMPLETE: CPT | Performed by: INTERNAL MEDICINE

## 2022-02-17 PROCEDURE — 93306 TTE W/DOPPLER COMPLETE: CPT

## 2022-02-18 LAB
BH CV XLRA MEAS LEFT BULB EDV: -20.9 CM/SEC
BH CV XLRA MEAS LEFT BULB PSV: -68 CM/SEC
BH CV XLRA MEAS LEFT CAROTID BULB EDV: 21 CM/SEC
BH CV XLRA MEAS LEFT CAROTID BULB PSV: 68 CM/SEC
BH CV XLRA MEAS LEFT DIST CCA EDV: -25.2 CM/SEC
BH CV XLRA MEAS LEFT DIST CCA PSV: -131.7 CM/SEC
BH CV XLRA MEAS LEFT DIST ICA EDV: -27.4 CM/SEC
BH CV XLRA MEAS LEFT DIST ICA PSV: -108.7 CM/SEC
BH CV XLRA MEAS LEFT ICA/CCA RATIO: 1
BH CV XLRA MEAS LEFT MID CCA EDV: 22 CM/SEC
BH CV XLRA MEAS LEFT MID CCA PSV: 104.3 CM/SEC
BH CV XLRA MEAS LEFT MID ICA EDV: -45 CM/SEC
BH CV XLRA MEAS LEFT MID ICA PSV: -131.7 CM/SEC
BH CV XLRA MEAS LEFT PROX CCA EDV: 28.5 CM/SEC
BH CV XLRA MEAS LEFT PROX CCA PSV: 144.9 CM/SEC
BH CV XLRA MEAS LEFT PROX ECA PSV: -130.6 CM/SEC
BH CV XLRA MEAS LEFT PROX ICA EDV: -37.3 CM/SEC
BH CV XLRA MEAS LEFT PROX ICA PSV: -115.2 CM/SEC
BH CV XLRA MEAS LEFT PROX SCLA PSV: 140.5 CM/SEC
BH CV XLRA MEAS LEFT VERTEBRAL A PSV: -39.5 CM/SEC
BH CV XLRA MEAS RIGHT BULB EDV: -14.9 CM/SEC
BH CV XLRA MEAS RIGHT BULB PSV: -85.7 CM/SEC
BH CV XLRA MEAS RIGHT CAROTID BULB EDV: 15 CM/SEC
BH CV XLRA MEAS RIGHT CAROTID BULB PSV: 86 CM/SEC
BH CV XLRA MEAS RIGHT DIST CCA EDV: -23 CM/SEC
BH CV XLRA MEAS RIGHT DIST CCA PSV: -92.6 CM/SEC
BH CV XLRA MEAS RIGHT DIST ICA EDV: -33.5 CM/SEC
BH CV XLRA MEAS RIGHT DIST ICA PSV: -95.7 CM/SEC
BH CV XLRA MEAS RIGHT ICA/CCA RATIO: 1.11
BH CV XLRA MEAS RIGHT MID CCA EDV: -21.1 CM/SEC
BH CV XLRA MEAS RIGHT MID CCA PSV: -80.1 CM/SEC
BH CV XLRA MEAS RIGHT MID ICA EDV: -37.3 CM/SEC
BH CV XLRA MEAS RIGHT MID ICA PSV: -103.7 CM/SEC
BH CV XLRA MEAS RIGHT PROX CCA EDV: -19.3 CM/SEC
BH CV XLRA MEAS RIGHT PROX CCA PSV: -87.6 CM/SEC
BH CV XLRA MEAS RIGHT PROX ECA PSV: -152 CM/SEC
BH CV XLRA MEAS RIGHT PROX ICA EDV: -19.9 CM/SEC
BH CV XLRA MEAS RIGHT PROX ICA PSV: -74.6 CM/SEC
BH CV XLRA MEAS RIGHT PROX SCLA PSV: 215 CM/SEC
BH CV XLRA MEAS RIGHT VERTEBRAL A PSV: 40.7 CM/SEC
MAXIMAL PREDICTED HEART RATE: 159 BPM
STRESS TARGET HR: 135 BPM

## 2022-02-21 ENCOUNTER — TELEPHONE (OUTPATIENT)
Dept: CARDIOLOGY | Facility: CLINIC | Age: 62
End: 2022-02-21

## 2022-02-21 NOTE — TELEPHONE ENCOUNTER
Please let him know that his carotid Doppler looked unchanged compared to 2020.  He has less than 50% plaque buildup on both sides.  His echo showed normal heart function.  Monitor did not show any pauses or arrhythmia that would cause syncope.  Keep follow-up appointment with Shira on March 7

## 2022-02-22 NOTE — TELEPHONE ENCOUNTER
Notified pt of results and recommendations. He verbalized understanding.    Thank you,    Niya Zheng, RN  Triage [facility]

## 2022-02-28 RX ORDER — LOSARTAN POTASSIUM 50 MG/1
TABLET ORAL
Qty: 90 TABLET | Refills: 1 | Status: SHIPPED | OUTPATIENT
Start: 2022-02-28 | End: 2022-03-28

## 2022-03-28 ENCOUNTER — OFFICE VISIT (OUTPATIENT)
Dept: CARDIOLOGY | Facility: CLINIC | Age: 62
End: 2022-03-28

## 2022-03-28 VITALS
RESPIRATION RATE: 16 BRPM | OXYGEN SATURATION: 98 % | BODY MASS INDEX: 28.11 KG/M2 | HEART RATE: 64 BPM | WEIGHT: 219 LBS | SYSTOLIC BLOOD PRESSURE: 142 MMHG | HEIGHT: 74 IN | DIASTOLIC BLOOD PRESSURE: 82 MMHG

## 2022-03-28 DIAGNOSIS — E78.2 MIXED HYPERLIPIDEMIA: ICD-10-CM

## 2022-03-28 DIAGNOSIS — I25.10 CORONARY ARTERY DISEASE INVOLVING NATIVE CORONARY ARTERY OF NATIVE HEART WITHOUT ANGINA PECTORIS: ICD-10-CM

## 2022-03-28 DIAGNOSIS — I10 ESSENTIAL HYPERTENSION: Primary | ICD-10-CM

## 2022-03-28 PROCEDURE — 93000 ELECTROCARDIOGRAM COMPLETE: CPT | Performed by: INTERNAL MEDICINE

## 2022-03-28 PROCEDURE — 99214 OFFICE O/P EST MOD 30 MIN: CPT | Performed by: INTERNAL MEDICINE

## 2022-03-28 RX ORDER — VALSARTAN 320 MG/1
320 TABLET ORAL DAILY
Qty: 90 TABLET | Refills: 3 | Status: SHIPPED | OUTPATIENT
Start: 2022-03-28 | End: 2022-04-08

## 2022-03-28 NOTE — PROGRESS NOTES
CARDIOLOGY    Shonna Renteria MD    ENCOUNTER DATE:  03/28/2022    Niranjan Peacock / 62 y.o. / male        CHIEF COMPLAINT / REASON FOR OFFICE VISIT     Coronary Artery Disease (4 week follow up )      HISTORY OF PRESENT ILLNESS       HPI    Niranjan Peacock is a 62 y.o. male     This is a patient who followed with Dr. Mancilla. He has coronary artery disease. He has a history of stent to the proximal/mid-LAD in 2018, heart catheterization in 2019 showed mild to moderate nonobstructive disease. Echocardiogram 02/2020 showed a normal ejection fraction at 61%. He does have a history of MRSA cellulitis in 12/2020. He has hypertension, hyperlipidemia, B12 anemia, and mild bilateral carotid artery stenosis which was last checked in 06/2020. He has a history of DVT but is no longer on anticoagulation. He came to the emergency room in 07/2021 with chest pain which he described as a pressure or burning without exacerbating or relieving factors. He ruled out for myocardial infarction. He had a stress test which showed normal LV function and no evidence of ischemia, though he did have some EKG changes with exercise. He was discharged home and followed up with Shira in 08/2021, and was not having any change in his symptoms at that point in time.     I saw him in February 2022.  He had a syncopal episode.  He wore a Zio patch which is unremarkable.  Echocardiogram February 2022 showed normal LV function ejection fraction 65% and no significant valve disease.  Carotid Doppler of February 2022 showed mild bilateral carotid artery stenosis.    He says his blood pressure has not been well controlled.  He has some lightheadedness with positional change.  He denies palpitations, shortness of breath, edema or fatigue.  He is walking on a regular basis.  No exertional symptoms.    REVIEW OF SYSTEMS     Review of Systems   Constitutional: Negative for chills, fever, weight gain and weight loss.   Cardiovascular: Negative for leg  "swelling.   Respiratory: Negative for cough, snoring and wheezing.    Hematologic/Lymphatic: Negative for bleeding problem. Does not bruise/bleed easily.   Skin: Negative for color change.   Musculoskeletal: Negative for falls, joint pain and myalgias.   Gastrointestinal: Negative for melena.   Genitourinary: Negative for hematuria.   Neurological: Negative for excessive daytime sleepiness.   Psychiatric/Behavioral: Negative for depression. The patient is not nervous/anxious.          VITAL SIGNS     Visit Vitals  /82 (BP Location: Left arm, Patient Position: Sitting, Cuff Size: Adult)   Pulse 64   Resp 16   Ht 188 cm (74\")   Wt 99.3 kg (219 lb)   SpO2 98%   BMI 28.12 kg/m²         Wt Readings from Last 3 Encounters:   03/28/22 99.3 kg (219 lb)   02/17/22 96.2 kg (212 lb)   02/02/22 96.2 kg (212 lb)     Body mass index is 28.12 kg/m².      PHYSICAL EXAMINATION     Constitutional:       General: Not in acute distress.  Neck:      Vascular: No carotid bruit or JVD.   Pulmonary:      Effort: Pulmonary effort is normal.      Breath sounds: Normal breath sounds.   Cardiovascular:      Normal rate. Regular rhythm.      Murmurs: There is no murmur.   Psychiatric:         Mood and Affect: Mood and affect normal.           REVIEWED DATA       ECG 12 Lead    Date/Time: 3/28/2022 12:51 PM  Performed by: Shonna Renteria MD  Authorized by: Shonna Renteria MD   Comparison: compared with previous ECG from 2/2/2022  Similar to previous ECG  Rhythm: sinus rhythm  BPM: 64  Conduction: conduction normal  ST Segments: ST segments normal  T Waves: T waves normal    Clinical impression: normal ECG              Lipid Panel    Lipid Panel 4/29/21   Total Cholesterol 118   Triglycerides 118   HDL Cholesterol 41   VLDL Cholesterol 21   LDL Cholesterol  56             Lab Results   Component Value Date    GLUCOSE 118 (H) 10/13/2021    BUN 14 10/13/2021    CREATININE 0.91 10/13/2021    EGFRIFNONA 85 10/13/2021    EGFRIFAFRI 79 " 04/29/2021    BCR 15.4 10/13/2021    K 4.2 10/13/2021    CO2 22.5 10/13/2021    CALCIUM 9.4 10/13/2021    PROTENTOTREF 6.2 04/29/2021    ALBUMIN 4.20 10/13/2021    LABIL2 2.1 04/29/2021    AST 21 10/13/2021    ALT 24 10/13/2021       ASSESSMENT & PLAN      Diagnosis Plan   1. Essential hypertension     2. Coronary artery disease involving native coronary artery of native heart without angina pectoris     3. Mixed hyperlipidemia         1.  Coronary artery disease with stent to the LAD in 2018.  Negative stress test in Jul 2021.  2.  Hypertension.  He had a rash with lisinopril.  Change to losartan but his blood pressure has not been well controlled.  I recommend trying valsartan 320 mg a day and then adding amlodipine if that does not control his blood pressure.  He is going to send me some blood pressure numbers through my chart in 1 to 2 weeks.  3.  Hyperlipidemia.  On atorvastatin.  I reviewed his labs as above and he was at goal.  4.  History of DVT but no longer on anticoagulation  5.  History of MRSA cellulitis  6.  B12 anemia  7.  Mild bilateral carotid artery stenosis last checked Jun 2020  8.  Palpitations.  Zio patch.  9.  Syncope.    Echo, Zio patch and carotid Doppler all unremarkable.  Has some dizziness with position change.  I urged him to take his time and consider support socks.    Follow-up in 6 weeks.    Orders Placed This Encounter   Procedures   • ECG 12 Lead     This order was created via procedure documentation     Order Specific Question:   Release to patient     Answer:   Immediate           MEDICATIONS         Discharge Medications          Accurate as of March 28, 2022 12:52 PM. If you have any questions, ask your nurse or doctor.            New Medications      Instructions Start Date   valsartan 320 MG tablet  Commonly known as: DIOVAN  Started by: Shonna Renteria MD   320 mg, Oral, Daily         Changes to Medications      Instructions Start Date   hydrocortisone 0.5 % cream  What  changed: how much to take   Topical, 2 Times Daily PRN      triamcinolone 0.1 % cream  Commonly known as: KENALOG  What changed:   · how much to take  · when to take this  · reasons to take this   Topical, 2 Times Daily         Continue These Medications      Instructions Start Date   aspirin 81 MG tablet   81 mg, Oral, Daily      atorvastatin 40 MG tablet  Commonly known as: LIPITOR   TAKE ONE TABLET BY MOUTH DAILY      cyanocobalamin 1000 MCG/ML injection   1,000 mcg, Intramuscular, Every 30 Days      multivitamin tablet tablet  Commonly known as: THERAGRAN   1 tablet, Oral, Daily      nitroglycerin 0.4 MG SL tablet  Commonly known as: NITROSTAT   0.4 mg, Sublingual, Every 5 Minutes PRN, Take no more than 3 doses in 15 minutes.      vitamin D3 125 MCG (5000 UT) capsule capsule   5,000 Units, Oral, Daily         Stop These Medications    losartan 50 MG tablet  Commonly known as: COZAAR  Stopped by: MD Shonna Russ MD  03/28/22  12:52 EDT    **Dragon Disclaimer:   Much of this encounter note is an electronic transcription/translation of spoken language to printed text. The electronic translation of spoken language may permit erroneous, or at times, nonsensical words or phrases to be inadvertently transcribed. Although I have reviewed the note for such errors, some may still exist.

## 2022-04-08 ENCOUNTER — TELEPHONE (OUTPATIENT)
Dept: CARDIOLOGY | Facility: CLINIC | Age: 62
End: 2022-04-08

## 2022-04-08 ENCOUNTER — PATIENT MESSAGE (OUTPATIENT)
Dept: CARDIOLOGY | Facility: CLINIC | Age: 62
End: 2022-04-08

## 2022-04-08 RX ORDER — AMLODIPINE BESYLATE 5 MG/1
5 TABLET ORAL DAILY
Qty: 30 TABLET | Refills: 11 | Status: SHIPPED | OUTPATIENT
Start: 2022-04-08 | End: 2022-05-12

## 2022-04-08 NOTE — TELEPHONE ENCOUNTER
From: Niranjan Peacock  To: Shonna Renteria MD  Sent: 4/8/2022 12:45 PM EDT  Subject: Visit Follow-Up Question    The new blood pressure medicine is not working.   my blood pressure is  157/76 to 145/72   I think it may also be causing me to be extremely achy and tired.  My ears are ringing a lot and getting headaches often, I thing because of high blood pressure .   Since starting the new medicine I have also got patches of dry/ split open skin on my fingers.   Nain Peacock

## 2022-05-03 NOTE — PROGRESS NOTES
Date of Office Visit: 21  Encounter Provider: GREG Carrasquillo  Place of Service: [facility] CARDIOLOGY  Patient Name: Niranjan Peacock  :1960    Chief Complaint   Patient presents with   • Coronary artery disease involving native coronary artery of    • Follow-up   :     HPI: Niranjan Peacock is a 61 y.o. male  with history of coronary artery disease status post coronary artery stent placement, hypertension, and hyperlipidemia.  He has been followed by Dr. Jason Mancilla.  I will visit with him in follow-up today and have reviewed his medical record including last office visit note and last hospital discharge summary.      In 2017 he presented with chest pain worrisome for angina.  He had a stress test which was abnormal with ECG changes and underwent cardiac catheterization which showed normal left ventricular systolic function.  There was severe disease of the proximal mid LAD with abnormal fractional flow wire reserve and he underwent stenting with a 3.25 mm x 20 mm drug-eluting stent.  Dual antiplatelet score was low so after his year Plavix was dropped.  He complained of dyspnea on exertion at the end of 2018 and had a stress echocardiogram which was negative for ischemia.  Left ventricular ejection fraction was 59, diastolic function was normal.  There was no significant valvular disease.  He had some recurrent symptoms and had a perfusion stress test 2019 which was negative for ischemia.  He continued to have symptoms in 2019 had cardiac catheterization which showed normal ventricular systolic function, normal ventricular end-diastolic pressure, mild to moderate nonobstructive coronary artery disease and patent LAD stent.  Medical therapy was recommended.  He had another echo 2020 which showed normal ventricular systolic function, no evidence patent foramen ovale and no significant valvular stenosis or insufficiency.  He was  "admitted December 2020 for right lower extremity MRSA cellulitis.  He was treated with vancomycin and cefazolin and switch to doxycycline and Keflex.      We visit today for reassessment.  He continues have some intermittent chest pressure that does not radiate.  It is nonexertional however he is actually not done any exercise any this winter.  In the summer he walked and did not have exertional chest discomfort.  He has occasional palpitations and a little shortness of breath secondary to that but that is not new.  Has not been checking his blood pressure routinely at home but other values in the system has been 120-130 just a couple months ago.    No Known Allergies        Family and social history reviewed.     ROS  All other systems were reviewed and are negative          Objective:     Vitals:    02/02/21 1108   BP: 160/60   BP Location: Left arm   Patient Position: Sitting   Pulse: 61   Weight: 98.4 kg (217 lb)   Height: 188 cm (74\")     Body mass index is 27.86 kg/m².    PHYSICAL EXAM:  Constitutional:       General: Not in acute distress.     Appearance: Well-developed. Not diaphoretic.   HENT:      Head: Normocephalic.   Pulmonary:      Effort: Pulmonary effort is normal. No respiratory distress.      Breath sounds: Normal breath sounds. No wheezing. No rhonchi. No rales.   Cardiovascular:      Normal rate. Regular rhythm.   Pulses:     Radial: 2+ bilaterally.  Skin:     General: Skin is warm and dry. There is no cyanosis.      Findings: No rash.   Neurological:      Mental Status: Alert and oriented to person, place, and time.   Psychiatric:         Behavior: Behavior normal.         Thought Content: Thought content normal.         Judgment: Judgment normal.           ECG 12 Lead    Date/Time: 2/2/2021 6:21 PM  Performed by: Shria Isaacs APRN  Authorized by: Shira Isaacs APRN   Comparison: compared with previous ECG   Similar to previous ECG  Rhythm: sinus rhythm  Rate: normal  Other findings: " non-specific ST-T wave changes  Other findings comments: no change   Comments: No change from prior              Current Outpatient Medications   Medication Sig Dispense Refill   • aspirin 81 MG tablet Take 1 tablet by mouth Daily. 30 tablet 11   • atorvastatin (LIPITOR) 40 MG tablet Take 1 tablet by mouth Daily. 90 tablet 3   • Cholecalciferol (VITAMIN D3) 5000 units capsule capsule Take 5,000 Units by mouth Daily.     • cyanocobalamin 1000 MCG/ML injection INJECT 1ML INTRAMUSCULARLY  EVERY  30 DAYS AS DIRECTED (Patient taking differently: Med is overdue) 30 mL 2   • hydrocortisone 0.5 % cream Apply  topically to the appropriate area as directed 2 (Two) Times a Day As Needed for Irritation. 15 g 0   • lisinopril (PRINIVIL,ZESTRIL) 2.5 MG tablet TAKE ONE TABLET BY MOUTH TWICE A  tablet 1   • Multiple Vitamin (MULTI VITAMIN DAILY PO) Take  by mouth Daily.       No current facility-administered medications for this visit.      Assessment:       Diagnosis Plan   1. Coronary artery disease involving native coronary artery of native heart without angina pectoris     2. Essential hypertension     3. Abnormal EKG     4. Mixed hyperlipidemia          Orders Placed This Encounter   Procedures   • ECG 12 Lead     This order was created via procedure documentation         Plan:   1.  61-year-old gentleman with history of severe coronary artery disease, preserved ventricular systolic function, status post 3.25 x 20 mm drug-eluting stent to the LAD 08/2018.  Follow-up cath August 2019 showed mild to moderate nonobstructive coronary artery disease and patent LAD stent.  -He continues to have some chest discomfort which is not any more frequent and stable.  No ischemic changes on EKG.  He is to call me if that worsens.  2.  Hypertension-blood pressure is elevated I have advised that he check his blood pressure more routinely at home.  Discussed goal 130/80 or less.  Also discussed decreasing sodium intake and increasing  physical activity as tolerated.  He is to send me a list of his blood pressures via [employer] in 1-2 weeks.  3.  Lipidemia target LDL 70 or less  4.  History of B12 anemia  5.  Excessive daytime sleepiness-he has not had a sleep study  6.  Mild bilateral right and left internal carotid artery stenosis on duplex June 2020  7.  MRSA right lower extremity cellulitis with abscess 12/2020    Follow-up in 1 year.  I will have him meet with Dr. Renteria at that time.  He is to call me with any questions or concerns.          It has been a pleasure to participate in this patient's care.      Thank you,  GREG Carrasquillo      **I used Dragon to dictate this note:**   Inflammation Suggestive Of Cancer Camouflage Histology Text: There was a dense lymphocytic infiltrate which prevented adequate histologic evaluation of adjacent structures.

## 2022-05-11 ENCOUNTER — TELEPHONE (OUTPATIENT)
Dept: CARDIOLOGY | Facility: CLINIC | Age: 62
End: 2022-05-11

## 2022-05-11 ENCOUNTER — PATIENT MESSAGE (OUTPATIENT)
Dept: CARDIOLOGY | Facility: CLINIC | Age: 62
End: 2022-05-11

## 2022-05-11 NOTE — TELEPHONE ENCOUNTER
"Called Niranjan Peacock for clarification.    Patient is NOT currently taking Valsartan 360mg once daily.    Patient is taking amlodipine 5mg once daily.  (There is a patient message in the chart on 4/8 referencing this medication change made by RAY Isaacs.)  Patient stated that his body aches so bad now while on the amlodipine that he is \"about to just stop taking the medication\".    Please let me know how you would like to proceed.    Thank you,  Katy Swanson, RN  Triage Nurse [facility]       "

## 2022-05-11 NOTE — TELEPHONE ENCOUNTER
Blood pressure is still way too high.  I do not think the medicine should be causing him the symptoms.  He is on valsartan 360 mg once a day correct?  I think we should add some amlodipine to get his blood pressure under better control and see if that does not get him feeling better. Haven't put in an order yet. Want to confirm what he's taking

## 2022-05-11 NOTE — TELEPHONE ENCOUNTER
Reviewed recommendations with Niranjan Peacock and the patient verbalized understanding of the recommendations.    Patient is scheduled to see RAY Isaacs tomorrow at 3pm.  Patient stated he wanted to get this addressed as soon as possible.    Please let me know if there is anything else you would like me to do for this patient.    Thank you,  Katy Swanson RN  Triage Nurse [facility] [place]

## 2022-05-11 NOTE — TELEPHONE ENCOUNTER
----- Message from Danay Velasco MA sent at 5/11/2022 11:23 AM EDT -----  Regarding: FW: Blood pressure and general aches.    ----- Message -----  From: Niranjan Peacock  Sent: 5/11/2022  11:01 AM EDT  To: Emigdio Velasco [facility]  Subject: Blood pressure and general aches.                I have been taking the new blood pressure medicine for 3 or 4 weeks. My pressures are ranging 155-160 / 72-76.     I am very achy all over and tired all of the time. It is affecting my lifestyle. I don't feel good enough to do anything.   Do you think it could be any of the medicines I am taking?     Nain Peacock  [phone]

## 2022-05-12 ENCOUNTER — OFFICE VISIT (OUTPATIENT)
Dept: CARDIOLOGY | Facility: CLINIC | Age: 62
End: 2022-05-12

## 2022-05-12 VITALS
HEIGHT: 74 IN | HEART RATE: 63 BPM | SYSTOLIC BLOOD PRESSURE: 136 MMHG | DIASTOLIC BLOOD PRESSURE: 74 MMHG | OXYGEN SATURATION: 98 % | WEIGHT: 218 LBS | BODY MASS INDEX: 27.98 KG/M2

## 2022-05-12 DIAGNOSIS — E78.2 MIXED HYPERLIPIDEMIA: ICD-10-CM

## 2022-05-12 DIAGNOSIS — I10 ESSENTIAL HYPERTENSION: Primary | ICD-10-CM

## 2022-05-12 DIAGNOSIS — G47.33 OSA (OBSTRUCTIVE SLEEP APNEA): ICD-10-CM

## 2022-05-12 PROCEDURE — 99214 OFFICE O/P EST MOD 30 MIN: CPT | Performed by: NURSE PRACTITIONER

## 2022-05-12 RX ORDER — HYDRALAZINE HYDROCHLORIDE 25 MG/1
25 TABLET, FILM COATED ORAL 2 TIMES WEEKLY
Qty: 60 TABLET | Refills: 3 | Status: SHIPPED | OUTPATIENT
Start: 2022-05-12 | End: 2022-05-19 | Stop reason: SDUPTHER

## 2022-05-12 NOTE — PROGRESS NOTES
Date of Office Visit: 22  Encounter Provider: GREG Carrasquillo  Place of Service: [facility] CARDIOLOGY  Patient Name: Niranjan Peacock  :1960    Chief Complaint   Patient presents with   • Essential hypertension   • Coronary artery disease involving native coronary artery of   • Coronary Artery Disease   • Hyperlipidemia   • dvt   :     HPI: Niranjan Peacock is a 62 y.o. male  with coronary artery disease status post coronary artery stent placement, hypertension, CHANTEL (home test in ) and hyperlipidemia.  He was followed by Dr. Jason Mancilla.  He is now followed by Dr. Shonna Renteria I will visit with him in follow-up today     In 2017 he presented with chest pain worrisome for angina.  He had a stress test which was abnormal with ECG changes and underwent cardiac catheterization which showed normal left ventricular systolic function.  There was severe disease of the proximal mid LAD with abnormal fractional flow wire reserve and he underwent stenting with a 3.25 mm x 20 mm drug-eluting stent.  Dual antiplatelet score was low so after his year Plavix was dropped.  He complained of dyspnea on exertion at the end of 2018 and had a stress echocardiogram which was negative for ischemia.  Left ventricular ejection fraction was 59, diastolic function was normal.  There was no significant valvular disease.  He had some recurrent symptoms and had a perfusion stress test 2019 which was negative for ischemia.  He continued to have symptoms in 2019 had cardiac catheterization which showed normal ventricular systolic function, normal ventricular end-diastolic pressure, mild to moderate nonobstructive coronary artery disease and patent LAD stent.  Medical therapy was recommended.  He had another echo 2020 which showed normal ventricular systolic function, no evidence patent foramen ovale and no significant valvular stenosis or insufficiency.  He was  "admitted December 2020 for right lower extremity MRSA cellulitis.  He was treated with vancomycin and cefazolin and switch to doxycycline and Keflex.  In July 2021 his blood pressure was elevated and lisinopril was increased but then he had a rash so that was stopped and he was tried on valsartan but had myalgias and rash so that was stopped.        He now presents for reassessment.  He has been taking amlodipine 5 mg but believes he is having muscle aches from that.  He has no chest pain he has occasional shortness of breath but nothing new.  He walks 1-2 times a week and typically tolerates that well.  He has been checking his blood pressure at home which has been 150-160 systolic.  He has no lower extremity edema, near-syncope or syncope.    Allergies   Allergen Reactions   • Lisinopril Shortness Of Breath     Rash also    • Valsartan Rash           Family and social history reviewed.     ROS  All other systems were reviewed and are negative          Objective:     Vitals:    05/12/22 1527   BP: 118/68   BP Location: Right arm   Patient Position: Sitting   Cuff Size: Large Adult   Pulse: 63   SpO2: 98%   Weight: 98.9 kg (218 lb)   Height: 188 cm (74\")     Body mass index is 27.99 kg/m².    PHYSICAL EXAM:  Pulmonary:      Effort: Pulmonary effort is normal.   Cardiovascular:      Normal rate.         Procedures      Current Outpatient Medications   Medication Sig Dispense Refill   • amLODIPine (NORVASC) 5 MG tablet Take 1 tablet by mouth Daily. 30 tablet 11   • aspirin 81 MG tablet Take 1 tablet by mouth Daily. 30 tablet 11   • atorvastatin (LIPITOR) 40 MG tablet TAKE ONE TABLET BY MOUTH DAILY (Patient taking differently: Take 40 mg by mouth Daily.) 90 tablet 3   • Cholecalciferol (VITAMIN D3) 5000 units capsule capsule Take 5,000 Units by mouth Daily.     • cyanocobalamin 1000 MCG/ML injection Inject 1 mL into the appropriate muscle as directed by prescriber Every 30 (Thirty) Days. 12 mL 1   • hydrocortisone 0.5 " % cream Apply  topically to the appropriate area as directed 2 (Two) Times a Day As Needed for Irritation. (Patient taking differently: Apply 1 application topically to the appropriate area as directed 2 (Two) Times a Day As Needed for Irritation.) 15 g 0   • Multiple Vitamin (MULTI VITAMIN DAILY PO) Take 1 tablet by mouth Daily.     • nitroglycerin (NITROSTAT) 0.4 MG SL tablet Place 1 tablet under the tongue Every 5 (Five) Minutes As Needed for Chest Pain for up to 1 dose. Take no more than 3 doses in 15 minutes. 100 tablet 3   • triamcinolone (KENALOG) 0.1 % cream Apply  topically to the appropriate area as directed 2 (Two) Times a Day. (Patient taking differently: Apply 1 application topically to the appropriate area as directed 2 (Two) Times a Day As Needed.) 28.4 g 1     No current facility-administered medications for this visit.     Assessment:      No diagnosis found.     No orders of the defined types were placed in this encounter.        Plan:        1. 61 year old gentleman with History of coronary artery disease with stent to the mid LAD in 2018.  Heart catheterization with nonobstructive disease in August 2019.   Negative perfusion stress test July 2021.    No angina    2.  Hypertension.  Did not tolerate higher dose lisinopril due to rash.  He had myalgias with valsartan.  He has been on amlodipine 5 mg but suspects his muscle aches and fatigue to be related to that so I will stop it and try hydralazine at this time but we also discussed his history of untreated sleep apnea as a secondary hypertensive causes, see below  3.  Hyperlipidemia on atorvastatin 40 mg.  Target LDL 70 or less and LDL was at goal in 04/2021  4.  Remote history of DVT no longer on anticoagulation.  5.  History of MRSA cellulitis  6.  History of B12 anemia  7.  Carotid artery stenoses bilateral-this was mild to moderate in the left and mild on the right February 2022.  8.  History of B12 anemia  9.  Moderate to severe obstructive  sleep apnea on home sleep study in 2017 which was worse in supine position.  He is agreeable to revisit sleep apnea so I placed a referral.  He would like to try to avoid repeat study if possible       Further recommendation pending his blood pressure check along with his home blood pressure check in 2 weeks        It has been a pleasure to participate in this patient's care.      Thank you,  GREG Carrasquillo      **I used Dragon to dictate this note:**

## 2022-05-18 ENCOUNTER — PATIENT MESSAGE (OUTPATIENT)
Dept: CARDIOLOGY | Facility: CLINIC | Age: 62
End: 2022-05-18

## 2022-05-19 RX ORDER — HYDRALAZINE HYDROCHLORIDE 25 MG/1
25 TABLET, FILM COATED ORAL 2 TIMES DAILY
Qty: 60 TABLET | Refills: 3 | Status: SHIPPED | OUTPATIENT
Start: 2022-05-19 | End: 2022-06-07 | Stop reason: SDUPTHER

## 2022-05-19 NOTE — TELEPHONE ENCOUNTER
Leslie, I spoke with [facility] pharmacy and they are inquiring rather the directions are for Hydralazine 25 mg 1 tablet twice weekly or twice daily.  The patient informed them it is supposed to be twice daily.  That is not what is in the chart.  Can you please clarify and if twice daily please send new Rx to [facility]?  Patient has also sent another my chart message with his BP readings. I will forward that to you as well.  Thank you./ RAMY

## 2022-05-23 ENCOUNTER — OFFICE VISIT (OUTPATIENT)
Dept: SLEEP MEDICINE | Facility: HOSPITAL | Age: 62
End: 2022-05-23

## 2022-05-23 VITALS
HEIGHT: 74 IN | WEIGHT: 218 LBS | BODY MASS INDEX: 27.98 KG/M2 | OXYGEN SATURATION: 98 % | SYSTOLIC BLOOD PRESSURE: 164 MMHG | DIASTOLIC BLOOD PRESSURE: 78 MMHG | HEART RATE: 66 BPM

## 2022-05-23 DIAGNOSIS — E66.3 OVERWEIGHT (BMI 25.0-29.9): ICD-10-CM

## 2022-05-23 DIAGNOSIS — G47.33 OSA (OBSTRUCTIVE SLEEP APNEA): Primary | ICD-10-CM

## 2022-05-23 PROCEDURE — G0463 HOSPITAL OUTPT CLINIC VISIT: HCPCS

## 2022-05-23 NOTE — PROGRESS NOTES
[facility]  Telephone: [phone] / Fax: [phone] [place]  Telephone: [phone] / Fax: [phone] [place]    Referring Physician:  Shira Isaacs  PCP: Brian Marr MD    Reason for consult:  sleep apnea    Niranjan Peacock is a 62 y.o.male  was seen in the Sleep Disorders Center today for evaluation of sleep apnea. He had a sleep study through Dr Ramirez in 2017. It showed moderately severe CHANTEL(AHI 18 overall and AHI 53 in supine position). He did not return after HST due to concern for CPAP intolerance with full face masks. However, he never tried a CPAP device with any kind of mask. He has CAD and had stent placed in 2017. Cardiologist recommended re-evaluation/treatment of CHANTEL. He reports snoring. He wakes up feeling tired. He feels tired if he sits quietly. He continues to feel tired during evening hours. His weight has been about the same.  His sleep schedule is 2-17av-7-6am.    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    Current Medications:    Current Outpatient Medications:   •  aspirin 81 MG tablet, Take 1 tablet by mouth Daily., Disp: 30 tablet, Rfl: 11  •  atorvastatin (LIPITOR) 40 MG tablet, TAKE ONE TABLET BY MOUTH DAILY (Patient taking differently: Take 40 mg by mouth Daily.), Disp: 90 tablet, Rfl: 3  •  Cholecalciferol (VITAMIN D3) 5000 units capsule capsule, Take 5,000 Units by mouth  "Daily., Disp: , Rfl:   •  cyanocobalamin 1000 MCG/ML injection, Inject 1 mL into the appropriate muscle as directed by prescriber Every 30 (Thirty) Days., Disp: 12 mL, Rfl: 1  •  hydrALAZINE (APRESOLINE) 25 MG tablet, Take 1 tablet by mouth 2 (Two) Times a Day., Disp: 60 tablet, Rfl: 3  •  hydrocortisone 0.5 % cream, Apply  topically to the appropriate area as directed 2 (Two) Times a Day As Needed for Irritation. (Patient taking differently: Apply 1 application topically to the appropriate area as directed 2 (Two) Times a Day As Needed for Irritation.), Disp: 15 g, Rfl: 0  •  Multiple Vitamin (MULTI VITAMIN DAILY PO), Take 1 tablet by mouth Daily., Disp: , Rfl:   •  nitroglycerin (NITROSTAT) 0.4 MG SL tablet, Place 1 tablet under the tongue Every 5 (Five) Minutes As Needed for Chest Pain for up to 1 dose. Take no more than 3 doses in 15 minutes., Disp: 100 tablet, Rfl: 3  •  triamcinolone (KENALOG) 0.1 % cream, Apply  topically to the appropriate area as directed 2 (Two) Times a Day. (Patient taking differently: Apply 1 application topically to the appropriate area as directed 2 (Two) Times a Day As Needed.), Disp: 28.4 g, Rfl: 1    I have reviewed Past Medical History, Past Surgical History, Medication List, Social History and Family History as entered in Sleep Questionnaire and EPIC.    ESS  5   Vital Signs /78   Pulse 66   Ht 188 cm (74\")   Wt 98.9 kg (218 lb)   SpO2 98%   BMI 27.99 kg/m²  Body mass index is 27.99 kg/m².    General Alert and oriented. No acute distress noted   Pharynx/Throat Class IV  Mallampati airway, large tongue, no evidence of redundant lateral pharyngeal tissue. No oral lesions. No thrush. Moist mucous membranes.   Head Normocephalic. Symmetrical. Atraumatic.    Nose No septal deviation. No drainage   Chest Wall Normal shape. Symmetric expansion with respiration. No tenderness.   Neck Trachea midline, no thyromegaly or adenopathy    Lungs Clear to auscultation bilaterally. No " wheezes. No rhonchi. No rales. Respirations regular, even and unlabored.   Heart Regular rhythm and normal rate. Normal S1 and S2. No murmur   Abdomen Soft, non-tender and non-distended. Normal bowel sounds. No masses.   Extremities Moves all extremities well. No edema   Psychiatric Normal mood and affect.       HST 2017- Respiratory Disturbance Events:   · Patient had evidence of moderate CHANTEL with severe positional component, AHI total 18.7, up to 53.2 in the supine position/22.1 on the right position/4.7 in the left position  · Sleep-related hypoxemia that was no artifact with a pattern suggestive of Supine/CHANTEL component with a jai desat down to 77% however only 3.6 minutes  Were recorded with oxygen saturation below 89%  · Snoring was present at 76.6% of total recording time.        Impression:  1. CHANTEL (obstructive sleep apnea)    2. Overweight (BMI 25.0-29.9)          Plan:  I reviewed original sleep study from 2017 with patient and offered several options. Option 1-is to repeat sleep study either in the lab or at home to re-evaluate severity of CHANTEL. Option 2 is to try a CPAP device with either nasal mask or nasal cushion mask to see if he is able to tolerate it. I explained to Niranjan how CPAP works to correct CHANTEL. If he is intolerant of CPAP, he will need to repeat sleep study to see if he is a candidate for inspire device.  I explained to him how it works and what is the qualifying criteria. He must try and fail CPAP. His CHANTEL has to be at least moderate and not positional. Ideal BMI should be under 35. Another option will be to get an oral appliance. However, it will only provide about 50% correction of obstructive events. Weight loss will be strongly beneficial. I reviewed CHANTEL pathophysiology with Niranjan and went over the consequences of untreated sleep disorder breathing. After our discussion, he agrees to give CPAP a try. I asked him to try using the machine at least 4 hours, on at least 70% of the  nights in first 90 days. I will send order to DME to arrange auto CPAP 5-15cm H2O. If he does not hear from DME in next 2 weeks, he was asked to call us. I advised him about national CPAP shortage and about 2-3 months average delay in getting a CPAP at this time. Until he gets a machine, he was asked to avoid supine position sleep.    I appreciate the opportunity to participate in this patient's care.      GREG Buck  [facility]  Phone: [phone]      Part of this note may be an electronic transcription/translation of spoken language to printed text using the Dragon Dictation System. Some errors may exist even though the document was edited.

## 2022-05-24 ENCOUNTER — TELEPHONE (OUTPATIENT)
Dept: SLEEP MEDICINE | Facility: HOSPITAL | Age: 62
End: 2022-05-24

## 2022-05-26 ENCOUNTER — CLINICAL SUPPORT (OUTPATIENT)
Dept: CARDIOLOGY | Facility: CLINIC | Age: 62
End: 2022-05-26

## 2022-05-26 VITALS — SYSTOLIC BLOOD PRESSURE: 165 MMHG | DIASTOLIC BLOOD PRESSURE: 97 MMHG

## 2022-05-26 DIAGNOSIS — Z01.30 BLOOD PRESSURE CHECK: Primary | ICD-10-CM

## 2022-05-26 NOTE — PROGRESS NOTES
Patient is being seen in the office today for a B/P check. He was recently seen by Shira GARZA 05/12/22. She discontinued his Amlodipine 5 mg, and switched him to Hydralazine 25 twice daily.   Shira GARZA spoke to pt and suggested he get a new BP cuff, Relion or Omron, she increased his Hydralazine to 50mg BID, and we scheduled him for a BP check on 6/9 @1030.  He is going to bring his new BP cuff and a BP log with updated BP readings.      Patient presented to the office to monitor blood pressure due to medication change. Patient vitals have been recorded.

## 2022-05-27 ENCOUNTER — OFFICE VISIT (OUTPATIENT)
Dept: INTERNAL MEDICINE | Facility: CLINIC | Age: 62
End: 2022-05-27

## 2022-05-27 VITALS
TEMPERATURE: 96.8 F | BODY MASS INDEX: 27.67 KG/M2 | OXYGEN SATURATION: 96 % | HEIGHT: 74 IN | WEIGHT: 215.6 LBS | HEART RATE: 44 BPM | DIASTOLIC BLOOD PRESSURE: 80 MMHG | SYSTOLIC BLOOD PRESSURE: 134 MMHG

## 2022-05-27 DIAGNOSIS — M79.10 MYALGIA: Primary | ICD-10-CM

## 2022-05-27 DIAGNOSIS — E04.2 MULTIPLE THYROID NODULES: ICD-10-CM

## 2022-05-27 PROCEDURE — 99214 OFFICE O/P EST MOD 30 MIN: CPT | Performed by: FAMILY MEDICINE

## 2022-05-27 NOTE — PROGRESS NOTES
Date of Encounter: 2022  Patient: Niranjan Peacock,  1960    Subjective   History of Presenting Illness  Chief complaint: Myalgias    About 3 months of muscle aches from the elbows down and the knees down, intermittent.  No alleviating or exacerbating factors.  Has not found any improvement with several medication changes.  He has not held his statin.  He denies fever, chills, cough, shortness of breath, headache, joint swelling.  He does have arthritis pain.  This has not been limiting his activities of daily living.  He has never had this problem before.  There is no autoimmune disease in his family.    Review of Systems:  Per HPI    The following portions of the patient's history were reviewed and updated as appropriate: allergies, current medications, past family history, past medical history, past social history, past surgical history and problem list.    Patient Active Problem List   Diagnosis   • Coronary artery disease involving native coronary artery of native heart without angina pectoris   • Multiple thyroid nodules   • Pernicious anemia   • H/O multiple allergies   • Herniated nucleus pulposus, L3-4 right   • Right lumbar radiculopathy   • Pterygium of right eye   • Degenerative arthritis of cervical spine   • Essential hypertension   • History of DVT (deep vein thrombosis)   • Allergic conjunctivitis of both eyes   • Chronic eczema of hand   • Chest pain   • Mixed hyperlipidemia   • Myalgia     Past Medical History:   Diagnosis Date   • Angina at rest (HCC) 2019    Added automatically from request for surgery [id number]   • B12 deficiency anemia    • Cellulitis of right lower extremity 2020   • Chest pain    • Chronic fatigue 2016   • Coronary artery disease involving native coronary artery of native heart without angina pectoris 08/10/2017   • Deep vein thrombosis (HCC)    • Headache    • History of blood clots     blood clot found in right lung    • Hypertension    • Mixed  hyperlipidemia 02/02/2022   • Multiple thyroid nodules    • Multiple thyroid nodules    • CHANTEL (obstructive sleep apnea)    • Pernicious anemia    • Pulmonary embolism (HCC)     2015   • Syncope     2 yrs ago one time     Past Surgical History:   Procedure Laterality Date   • CARDIAC CATHETERIZATION N/A 8/3/2017    Procedure: Coronary angiography;  Surgeon: Cynthia Farley MD;  Location:  [facility];  Service:    • CARDIAC CATHETERIZATION  8/3/2017    Procedure: Functional Flow [place];  Surgeon: Cynthia Farley MD;  Location:  [facility];  Service:    • CARDIAC CATHETERIZATION N/A 8/3/2017    Procedure: Stent YI coronary;  Surgeon: Cynthia Farley MD;  Location:  [facility];  Service:    • CARDIAC CATHETERIZATION N/A 8/3/2017    Procedure: Left ventriculography;  Surgeon: Cynthia Farley MD;  Location:  [facility];  Service:    • CARDIAC CATHETERIZATION N/A 8/3/2017    Procedure: Left Heart Cath;  Surgeon: Cynthia Farley MD;  Location:  [facility];  Service:    • CARDIAC CATHETERIZATION N/A 8/20/2019    Procedure: Left Heart Cath;  Surgeon: Mulugeta Ac MD;  Location:  [facility];  Service: Cardiology   • CARDIAC CATHETERIZATION N/A 8/20/2019    Procedure: Coronary angiography;  Surgeon: Mulugeta Ac MD;  Location:  [facility];  Service: Cardiology   • CARDIAC CATHETERIZATION N/A 8/20/2019    Procedure: Left ventriculography;  Surgeon: Mulugeta Ac MD;  [facility];  Service: Cardiology   • COLONOSCOPY  MMVI    NORMAL.   • COLONOSCOPY     • FINE NEEDLE ASPIRATION  12/2015    Left thyroid nodule.  [place] category II.  Dr. Socrates Sanchez     Family History   Problem Relation Age of Onset   • Heart disease Other    • Hypertension Other    • Thyroid disease Other    • Heart disease Father    • Prostate cancer Father    • Hypertension Father    • Stroke  Father    • Heart disease Mother    • Hypertension Mother    • Heart disease Brother    • Hypertension Brother    • Thyroid disease Sister    • Heart disease Brother    • Hypertension Brother        Current Outpatient Medications:   •  aspirin 81 MG tablet, Take 1 tablet by mouth Daily., Disp: 30 tablet, Rfl: 11  •  atorvastatin (LIPITOR) 40 MG tablet, TAKE ONE TABLET BY MOUTH DAILY (Patient taking differently: Take 40 mg by mouth Daily.), Disp: 90 tablet, Rfl: 3  •  Cholecalciferol (VITAMIN D3) 5000 units capsule capsule, Take 5,000 Units by mouth Daily., Disp: , Rfl:   •  cyanocobalamin 1000 MCG/ML injection, Inject 1 mL into the appropriate muscle as directed by prescriber Every 30 (Thirty) Days., Disp: 12 mL, Rfl: 1  •  hydrALAZINE (APRESOLINE) 25 MG tablet, Take 1 tablet by mouth 2 (Two) Times a Day., Disp: 60 tablet, Rfl: 3  •  hydrocortisone 0.5 % cream, Apply  topically to the appropriate area as directed 2 (Two) Times a Day As Needed for Irritation. (Patient taking differently: Apply 1 application topically to the appropriate area as directed 2 (Two) Times a Day As Needed for Irritation.), Disp: 15 g, Rfl: 0  •  Multiple Vitamin (MULTI VITAMIN DAILY PO), Take 1 tablet by mouth Daily., Disp: , Rfl:   •  nitroglycerin (NITROSTAT) 0.4 MG SL tablet, Place 1 tablet under the tongue Every 5 (Five) Minutes As Needed for Chest Pain for up to 1 dose. Take no more than 3 doses in 15 minutes., Disp: 100 tablet, Rfl: 3  •  triamcinolone (KENALOG) 0.1 % cream, Apply  topically to the appropriate area as directed 2 (Two) Times a Day. (Patient taking differently: Apply 1 application topically to the appropriate area as directed 2 (Two) Times a Day As Needed.), Disp: 28.4 g, Rfl: 1  Allergies   Allergen Reactions   • Lisinopril Shortness Of Breath     Rash also    • Amlodipine Myalgia     Muscle aches    • Valsartan Rash     muscle aches     Social History     Tobacco Use   • Smoking status: Never Smoker   • Smokeless  "tobacco: Never Used   • Tobacco comment: caffeine use - 2 cups coffee dailyl    Vaping Use   • Vaping Use: Never used   Substance Use Topics   • Alcohol use: Yes     Comment: occasional   • Drug use: No          Objective   Physical Exam  Vitals:    05/27/22 0734   BP: 134/80   Pulse: (!) 44   Temp: 96.8 °F (36 °C)   TempSrc: Temporal   SpO2: 96%   Weight: 97.8 kg (215 lb 9.6 oz)   Height: 188 cm (74\")     Body mass index is 27.68 kg/m².    Constitutional: NAD.  Cardiovascular: RRR. No murmurs. No LE edema b/l. Radial pulses 2+ bilaterally.  Pulmonary: CTA b/l. Good effort.  Integumentary: No rashes or wounds on face or upper extremities.  Lymphatic: No anterior cervical lymphadenopathy.  Endocrine: No thyromegaly or palpable thyroid nodules.  Psychiatric: Normal affect strength intact in the upper and lower extremities bilaterally. Normal thought content.  Musculoskeletal:     Assessment & Plan   Assessment and Plan  Pleasant 62-year-old male with CAD, hypertension, pernicious anemia, multiple thyroid nodules, right lumbar radiculopathy, who presents with the following:    Diagnoses and all orders for this visit:    1. Myalgia (Primary)  -     Rheumatoid Factor  -     C-reactive Protein  -     Sedimentation Rate  -     Ferritin  -     Comprehensive Metabolic Panel  -     CBC & Differential  -     Lipid Panel  -     Urinalysis With Microscopic - Urine, Clean Catch  -     Thyroid Panel With TSH  -     CK    2. Multiple thyroid nodules  -     US Thyroid    Initial evaluation as above.  I would also like him to hold his statin for 1 to 2 weeks and if that improves his muscle aches he will let me know.      Brian Marr MD  Family Medicine  O: [phone]  C: [phone]    Disclaimer: Parts of this note were dictated by speech recognition. Minor errors in transcription may be present. Please call if questions.     "

## 2022-05-28 LAB
ALBUMIN SERPL-MCNC: 4.2 G/DL (ref 3.8–4.8)
ALBUMIN/GLOB SERPL: 1.9 {RATIO} (ref 1.2–2.2)
ALP SERPL-CCNC: 66 IU/L (ref 44–121)
ALT SERPL-CCNC: 22 IU/L (ref 0–44)
APPEARANCE UR: CLEAR
AST SERPL-CCNC: 25 IU/L (ref 0–40)
BACTERIA #/AREA URNS HPF: NORMAL /[HPF]
BASOPHILS # BLD AUTO: 0 X10E3/UL (ref 0–0.2)
BASOPHILS NFR BLD AUTO: 1 %
BILIRUB SERPL-MCNC: 0.6 MG/DL (ref 0–1.2)
BILIRUB UR QL STRIP: NEGATIVE
BUN SERPL-MCNC: 12 MG/DL (ref 8–27)
BUN/CREAT SERPL: 12 (ref 10–24)
CALCIUM SERPL-MCNC: 9.1 MG/DL (ref 8.6–10.2)
CASTS URNS QL MICRO: NORMAL /LPF
CHLORIDE SERPL-SCNC: 102 MMOL/L (ref 96–106)
CHOLEST SERPL-MCNC: 133 MG/DL (ref 100–199)
CK SERPL-CCNC: 94 U/L (ref 41–331)
CO2 SERPL-SCNC: 24 MMOL/L (ref 20–29)
COLOR UR: YELLOW
CREAT SERPL-MCNC: 1.03 MG/DL (ref 0.76–1.27)
CRP SERPL-MCNC: <1 MG/L (ref 0–10)
EGFRCR SERPLBLD CKD-EPI 2021: 83 ML/MIN/1.73
EOSINOPHIL # BLD AUTO: 0.3 X10E3/UL (ref 0–0.4)
EOSINOPHIL NFR BLD AUTO: 5 %
EPI CELLS #/AREA URNS HPF: NORMAL /HPF (ref 0–10)
ERYTHROCYTE [DISTWIDTH] IN BLOOD BY AUTOMATED COUNT: 12.4 % (ref 11.6–15.4)
ERYTHROCYTE [SEDIMENTATION RATE] IN BLOOD BY WESTERGREN METHOD: 2 MM/HR (ref 0–30)
FERRITIN SERPL-MCNC: 16 NG/ML (ref 30–400)
FT4I SERPL CALC-MCNC: 2.1 (ref 1.2–4.9)
GLOBULIN SER CALC-MCNC: 2.2 G/DL (ref 1.5–4.5)
GLUCOSE SERPL-MCNC: 100 MG/DL (ref 65–99)
GLUCOSE UR QL STRIP: NEGATIVE
HCT VFR BLD AUTO: 43.9 % (ref 37.5–51)
HDLC SERPL-MCNC: 38 MG/DL
HGB BLD-MCNC: 15.2 G/DL (ref 13–17.7)
HGB UR QL STRIP: NEGATIVE
IMM GRANULOCYTES # BLD AUTO: 0 X10E3/UL (ref 0–0.1)
IMM GRANULOCYTES NFR BLD AUTO: 0 %
KETONES UR QL STRIP: NEGATIVE
LDLC SERPL CALC-MCNC: 70 MG/DL (ref 0–99)
LEUKOCYTE ESTERASE UR QL STRIP: NEGATIVE
LYMPHOCYTES # BLD AUTO: 1.4 X10E3/UL (ref 0.7–3.1)
LYMPHOCYTES NFR BLD AUTO: 23 %
MCH RBC QN AUTO: 32.3 PG (ref 26.6–33)
MCHC RBC AUTO-ENTMCNC: 34.6 G/DL (ref 31.5–35.7)
MCV RBC AUTO: 93 FL (ref 79–97)
MICRO URNS: NORMAL
MICRO URNS: NORMAL
MONOCYTES # BLD AUTO: 0.5 X10E3/UL (ref 0.1–0.9)
MONOCYTES NFR BLD AUTO: 9 %
NEUTROPHILS # BLD AUTO: 3.9 X10E3/UL (ref 1.4–7)
NEUTROPHILS NFR BLD AUTO: 62 %
NITRITE UR QL STRIP: NEGATIVE
PH UR STRIP: 6 [PH] (ref 5–7.5)
PLATELET # BLD AUTO: 280 X10E3/UL (ref 150–450)
POTASSIUM SERPL-SCNC: 4.4 MMOL/L (ref 3.5–5.2)
PROT SERPL-MCNC: 6.4 G/DL (ref 6–8.5)
PROT UR QL STRIP: NEGATIVE
RBC # BLD AUTO: 4.71 X10E6/UL (ref 4.14–5.8)
RBC #/AREA URNS HPF: NORMAL /HPF (ref 0–2)
RHEUMATOID FACT SERPL-ACNC: <10 IU/ML
SODIUM SERPL-SCNC: 139 MMOL/L (ref 134–144)
SP GR UR STRIP: 1.01 (ref 1–1.03)
T3RU NFR SERPL: 26 % (ref 24–39)
T4 SERPL-MCNC: 7.9 UG/DL (ref 4.5–12)
TRIGL SERPL-MCNC: 145 MG/DL (ref 0–149)
TSH SERPL DL<=0.005 MIU/L-ACNC: 2 UIU/ML (ref 0.45–4.5)
UROBILINOGEN UR STRIP-MCNC: 0.2 MG/DL (ref 0.2–1)
VLDLC SERPL CALC-MCNC: 25 MG/DL (ref 5–40)
WBC # BLD AUTO: 6.2 X10E3/UL (ref 3.4–10.8)
WBC #/AREA URNS HPF: NORMAL /HPF (ref 0–5)

## 2022-05-31 DIAGNOSIS — E53.8 B12 DEFICIENCY: ICD-10-CM

## 2022-05-31 RX ORDER — CYANOCOBALAMIN 1000 UG/ML
INJECTION, SOLUTION INTRAMUSCULAR; SUBCUTANEOUS
Qty: 3 ML | Refills: 3 | Status: SHIPPED | OUTPATIENT
Start: 2022-05-31

## 2022-06-06 ENCOUNTER — HOSPITAL ENCOUNTER (OUTPATIENT)
Dept: ULTRASOUND IMAGING | Facility: HOSPITAL | Age: 62
Discharge: HOME | End: 2022-06-06
Admitting: FAMILY MEDICINE

## 2022-06-06 PROCEDURE — 76536 US EXAM OF HEAD AND NECK: CPT

## 2022-06-07 RX ORDER — HYDRALAZINE HYDROCHLORIDE 50 MG/1
50 TABLET, FILM COATED ORAL 3 TIMES DAILY
Qty: 90 TABLET | Refills: 11 | Status: SHIPPED | OUTPATIENT
Start: 2022-06-07 | End: 2022-08-23

## 2022-06-09 ENCOUNTER — CLINICAL SUPPORT (OUTPATIENT)
Dept: CARDIOLOGY | Facility: CLINIC | Age: 62
End: 2022-06-09

## 2022-06-09 NOTE — PROGRESS NOTES
Patient here today for 2 week BP check   He was seen in office by Shira on 5/12/2022  He has   n appointment with Dr Renteria on 9/28/2022  At that visit his BP was 136/74, Pulse 63  Patient did not bring his Blood Pressure Machine, however He stated he bought a new machine and it seems to be very close to what he gets when it is manually taken. States his BP has been averaging 150/75 when he takes it at home    Per last OV   Hypertension.  Did not tolerate higher dose lisinopril due to rash.  He had myalgias with valsartan.  He has been on amlodipine 5 mg but suspects his muscle aches and fatigue to be related to that so I will stop it and try hydralazine at this time but we also discussed his history of untreated sleep apnea as a secondary hypertensive causes, see below    Patient denies any Chest pain, SOA, Lightheadedness, or edema  Patient states he does have some fatigue.     MANUAL BP TODAY:   BP: 148/76  Pulse:  76    Advised patient to please keep a log of his BP readings for the next week and report them  Back to us. Advised patient to rest 5-10 min before taking his Blood Pressure.  Advised patient if he starts feeling dizzy, increased swelling, headache or anything out of the ordinary to please call us. Patient verbalized understanding.

## 2022-06-22 ENCOUNTER — PATIENT MESSAGE (OUTPATIENT)
Dept: INTERNAL MEDICINE | Facility: CLINIC | Age: 62
End: 2022-06-22

## 2022-06-22 NOTE — TELEPHONE ENCOUNTER
From: Niranjan Peacock  To: Brian Marr MD  Sent: 6/22/2022 9:00 AM EDT  Subject: Atorvastatin    I stopped taking the Atorvaistatin about 3 weeks ago. It helped with the aches quite a bit but did not completely make them go away. I started it back for 3 days and aches got worse so I stopped taking it.  Wondering what to do, I feel with my history that I should keep my cholesterol low as possible.   Nain Peacock

## 2022-07-05 ENCOUNTER — OFFICE VISIT (OUTPATIENT)
Dept: INTERNAL MEDICINE | Facility: CLINIC | Age: 62
End: 2022-07-05

## 2022-07-05 VITALS
OXYGEN SATURATION: 97 % | HEIGHT: 74 IN | SYSTOLIC BLOOD PRESSURE: 144 MMHG | HEART RATE: 63 BPM | WEIGHT: 219 LBS | DIASTOLIC BLOOD PRESSURE: 84 MMHG | TEMPERATURE: 97.5 F | BODY MASS INDEX: 28.11 KG/M2

## 2022-07-05 DIAGNOSIS — D32.9 MENINGIOMA: ICD-10-CM

## 2022-07-05 DIAGNOSIS — I25.10 CORONARY ARTERY DISEASE INVOLVING NATIVE CORONARY ARTERY OF NATIVE HEART WITHOUT ANGINA PECTORIS: ICD-10-CM

## 2022-07-05 DIAGNOSIS — R51.9 FREQUENT HEADACHES: ICD-10-CM

## 2022-07-05 DIAGNOSIS — G47.33 OSA (OBSTRUCTIVE SLEEP APNEA): ICD-10-CM

## 2022-07-05 DIAGNOSIS — R53.83 FATIGUE, UNSPECIFIED TYPE: Primary | ICD-10-CM

## 2022-07-05 PROCEDURE — 99214 OFFICE O/P EST MOD 30 MIN: CPT | Performed by: FAMILY MEDICINE

## 2022-07-05 RX ORDER — SIMVASTATIN 20 MG
TABLET ORAL
Qty: 90 TABLET | Refills: 3 | Status: SHIPPED | OUTPATIENT
Start: 2022-07-05 | End: 2022-11-22

## 2022-07-05 NOTE — PROGRESS NOTES
Date of Encounter: 2022  Patient: Niranjan Peacock,  1960    Subjective   History of Presenting Illness  Chief complaint: Fatigue    At this point approximately 3 months of a constellation of symptoms including headaches, tinnitus, fatigue, muscle aches.  He recently stopped his atorvastatin and his muscle aches and headaches have improved but not completely.  He does not feel well rested throughout the day.  He gets home from work and essentially collapses into bed.  He denies chest pain, exertional dyspnea.  He can still tolerate mowing the grass without any problem.  He does have a prior diagnosis of sleep apnea and was working with the sleep clinic to trial a CPAP machine but has not done that yet.    Review of Systems:  Negative for fever, congestion, chest pain upon exertion, shortness of breath, vision changes, vomiting, dysuria, lymphadenopathy, muscle weakness, numbness, mood changes, rashes.    The following portions of the patient's history were reviewed and updated as appropriate: allergies, current medications, past family history, past medical history, past social history, past surgical history and problem list.    Patient Active Problem List   Diagnosis   • Coronary artery disease involving native coronary artery of native heart without angina pectoris   • Multiple thyroid nodules, benign per  US   • Pernicious anemia   • H/O multiple allergies   • Herniated nucleus pulposus, L3-4 right   • Right lumbar radiculopathy   • Pterygium of right eye   • Degenerative arthritis of cervical spine   • Essential hypertension   • History of DVT (deep vein thrombosis)   • Allergic conjunctivitis of both eyes   • Chronic eczema of hand   • Chest pain   • Mixed hyperlipidemia   • Myalgia   • CHANTEL (obstructive sleep apnea)     Past Medical History:   Diagnosis Date   • Angina at rest (HCC) 2019    Added automatically from request for surgery [id number]   • B12 deficiency anemia    • Cellulitis of  right lower extremity 12/24/2020   • Chest pain    • Chronic fatigue 03/09/2016   • Coronary artery disease involving native coronary artery of native heart without angina pectoris 08/10/2017   • Deep vein thrombosis (HCC)    • Headache    • History of blood clots     blood clot found in right lung    • Hypertension    • Mixed hyperlipidemia 02/02/2022   • Multiple thyroid nodules    • Multiple thyroid nodules    • CHANTEL (obstructive sleep apnea)    • Pernicious anemia    • Pulmonary embolism (HCC)     2015   • Syncope     2 yrs ago one time     Past Surgical History:   Procedure Laterality Date   • CARDIAC CATHETERIZATION N/A 8/3/2017    Procedure: Coronary angiography;  Surgeon: Cynthia Farley MD;  Location:  [facility];  Service:    • CARDIAC CATHETERIZATION  8/3/2017    Procedure: Functional Flow [place];  Surgeon: Cynthia Farley MD;  Location:  [facility];  Service:    • CARDIAC CATHETERIZATION N/A 8/3/2017    Procedure: Stent YI coronary;  Surgeon: Cynthia Farley MD;  [facility];  Service:    • CARDIAC CATHETERIZATION N/A 8/3/2017    Procedure: Left ventriculography;  Surgeon: Cynthia Farley MD;  [facility];  Service:    • CARDIAC CATHETERIZATION N/A 8/3/2017    Procedure: Left Heart Cath;  Surgeon: Cynthia Farley MD;  [facility];  Service:    • CARDIAC CATHETERIZATION N/A 8/20/2019    Procedure: Left Heart Cath;  Surgeon: Mulugeta Ac MD;  [facility];  Service: Cardiology   • CARDIAC CATHETERIZATION N/A 8/20/2019    Procedure: Coronary angiography;  Surgeon: Mulugeta Ac MD;  [facility];  Service: Cardiology   • CARDIAC CATHETERIZATION N/A 8/20/2019    Procedure: Left ventriculography;  Surgeon: Mulugeta Ac MD;  [facility];  Service: Cardiology   • COLONOSCOPY  MMVI    NORMAL.   •  COLONOSCOPY     • FINE NEEDLE ASPIRATION  12/2015    Left thyroid nodule.  [place] category II.  Dr. Socrates Sanchez     Family History   Problem Relation Age of Onset   • Heart disease Other    • Hypertension Other    • Thyroid disease Other    • Heart disease Father    • Prostate cancer Father    • Hypertension Father    • Stroke Father    • Heart disease Mother    • Hypertension Mother    • Heart disease Brother    • Hypertension Brother    • Thyroid disease Sister    • Heart disease Brother    • Hypertension Brother        Current Outpatient Medications:   •  aspirin 81 MG tablet, Take 1 tablet by mouth Daily., Disp: 30 tablet, Rfl: 11  •  Cholecalciferol (VITAMIN D3) 5000 units capsule capsule, Take 5,000 Units by mouth Daily., Disp: , Rfl:   •  cyanocobalamin 1000 MCG/ML injection, INJECT 1ML INTRAMUSCULARLY EVERY 30 DAYS AS DIRECTED, Disp: 3 mL, Rfl: 3  •  hydrALAZINE (APRESOLINE) 50 MG tablet, Take 1 tablet by mouth 3 (Three) Times a Day., Disp: 90 tablet, Rfl: 11  •  Multiple Vitamin (MULTI VITAMIN DAILY PO), Take 1 tablet by mouth Daily., Disp: , Rfl:   •  nitroglycerin (NITROSTAT) 0.4 MG SL tablet, Place 1 tablet under the tongue Every 5 (Five) Minutes As Needed for Chest Pain for up to 1 dose. Take no more than 3 doses in 15 minutes., Disp: 100 tablet, Rfl: 3  •  hydrocortisone 0.5 % cream, Apply  topically to the appropriate area as directed 2 (Two) Times a Day As Needed for Irritation. (Patient taking differently: Apply 1 application topically to the appropriate area as directed 2 (Two) Times a Day As Needed for Irritation.), Disp: 15 g, Rfl: 0  •  simvastatin (ZOCOR) 20 MG tablet, Take 1 tab PO QD for cholesterol and heart health, Disp: 90 tablet, Rfl: 3  •  triamcinolone (KENALOG) 0.1 % cream, Apply  topically to the appropriate area as directed 2 (Two) Times a Day. (Patient taking differently: Apply 1 application topically to the appropriate area as directed 2 (Two) Times a Day As Needed.), Disp: 28.4  "g, Rfl: 1  Allergies   Allergen Reactions   • Lisinopril Shortness Of Breath     Rash also    • Amlodipine Myalgia     Muscle aches    • Valsartan Rash     muscle aches     Social History     Tobacco Use   • Smoking status: Never Smoker   • Smokeless tobacco: Never Used   • Tobacco comment: caffeine use - 2 cups coffee dailyl    Vaping Use   • Vaping Use: Never used   Substance Use Topics   • Alcohol use: Yes     Comment: occasional   • Drug use: No          Objective   Physical Exam  Vitals:    07/05/22 1530   BP: 144/84   BP Location: Right arm   Patient Position: Sitting   Cuff Size: Large Adult   Pulse: 63   Temp: 97.5 °F (36.4 °C)   TempSrc: Infrared   SpO2: 97%   Weight: 99.3 kg (219 lb)   Height: 188 cm (74\")     Body mass index is 28.12 kg/m².    Constitutional: NAD.  Eyes: EOMI. PERRLA. Normal conjunctiva.  Ear, nose, mouth, throat: No tonsillar exudates or erythema.   Normal nasal mucosa. Normal external ear canals and TMs bilaterally.  Cardiovascular: RRR. No murmurs. No LE edema b/l. Radial pulses 2+ bilaterally.  Pulmonary: CTA b/l. Good effort.  Integumentary: No rashes or wounds on face or upper extremities.  Lymphatic: No anterior cervical lymphadenopathy.  Endocrine: No thyromegaly or palpable thyroid nodules.  Psychiatric: Normal affect. Normal thought content.  Gastrointestinal: Nondistended. No hepatosplenomegaly. No focal tenderness to palpation. Normal bowel sounds.  Neurologic: Cranial nerves intact.  Normal [place]berg.  Normal heel shin.  Normal finger-nose.  Intact proprioception.       Assessment & Plan   Assessment and Plan  Pleasant 62-year-old male with CAD, hypertension, pernicious anemia, multiple thyroid nodules, right lumbar radiculopathy, who presents with the following:    Diagnoses and all orders for this visit:    1. Fatigue, unspecified type (Primary)  -     MRI Brain With & Without Contrast; Future  -     Comprehensive Metabolic Panel  -     CBC & Differential  -     Sedimentation " Rate  -     Testosterone    2. Frequent headaches  -     MRI Brain With & Without Contrast; Future  -     Comprehensive Metabolic Panel  -     CBC & Differential  -     Sedimentation Rate  -     Testosterone    3. Meningioma (HCC)  -     MRI Brain With & Without Contrast; Future  -     Comprehensive Metabolic Panel  -     CBC & Differential  -     Sedimentation Rate  -     Testosterone    4. Coronary artery disease involving native coronary artery of native heart without angina pectoris  -     simvastatin (ZOCOR) 20 MG tablet; Take 1 tab PO QD for cholesterol and heart health  Dispense: 90 tablet; Refill: 3    5. CHANTEL (obstructive sleep apnea)    Is constellation of symptoms have narrowed somewhat.  From a cardiopulmonary perspective he had a recent cardiac stress test, Holter monitor, and echocardiogram that did not show any causes of his symptoms.  He had a recent CT angiogram of the chest 10/13/2021 which showed some stable pulmonary nodules but no other explanatory findings for his fatigue.  His blood work-up to this point has been unremarkable.    Because his symptoms have not improved significantly I would like to do the blood work above for further evaluation and also consider an MRI of the brain due to the headaches, tinnitus and fatigue.  He has a CT of the head 1/6/2019 which showed a broad-based dense calcification along the falx cerebri likely a meningioma.  While his neurologic examination is normal, in context of his headaches, tinnitus, and intractable fatigue I would like to evaluate this further.    Other than that I do think that CHANTEL is likely explanatory for least some of his symptoms.  I would like him to call his sleep medicine specialist continue to work on getting a lender CPAP as a trial before we keep giving for other causes.    We will rotate him to simvastatin since he did not tolerate atorvastatin due to myalgias that have improved.    Brian Marr MD  Family Medicine  O: [phone]  C:  [phone]    Disclaimer: Parts of this note were dictated by speech recognition. Minor errors in transcription may be present. Please call if questions.

## 2022-07-06 LAB
ALBUMIN SERPL-MCNC: 4.3 G/DL (ref 3.8–4.8)
ALBUMIN/GLOB SERPL: 2.2 {RATIO} (ref 1.2–2.2)
ALP SERPL-CCNC: 92 IU/L (ref 44–121)
ALT SERPL-CCNC: 32 IU/L (ref 0–44)
AST SERPL-CCNC: 27 IU/L (ref 0–40)
BASOPHILS # BLD AUTO: 0 X10E3/UL (ref 0–0.2)
BASOPHILS NFR BLD AUTO: 1 %
BILIRUB SERPL-MCNC: 0.3 MG/DL (ref 0–1.2)
BUN SERPL-MCNC: 12 MG/DL (ref 8–27)
BUN/CREAT SERPL: 11 (ref 10–24)
CALCIUM SERPL-MCNC: 9.1 MG/DL (ref 8.6–10.2)
CHLORIDE SERPL-SCNC: 104 MMOL/L (ref 96–106)
CO2 SERPL-SCNC: 24 MMOL/L (ref 20–29)
CREAT SERPL-MCNC: 1.1 MG/DL (ref 0.76–1.27)
EGFRCR SERPLBLD CKD-EPI 2021: 76 ML/MIN/1.73
EOSINOPHIL # BLD AUTO: 0.3 X10E3/UL (ref 0–0.4)
EOSINOPHIL NFR BLD AUTO: 7 %
ERYTHROCYTE [DISTWIDTH] IN BLOOD BY AUTOMATED COUNT: 11.9 % (ref 11.6–15.4)
ERYTHROCYTE [SEDIMENTATION RATE] IN BLOOD BY WESTERGREN METHOD: 6 MM/HR (ref 0–30)
GLOBULIN SER CALC-MCNC: 2 G/DL (ref 1.5–4.5)
GLUCOSE SERPL-MCNC: 94 MG/DL (ref 65–99)
HCT VFR BLD AUTO: 40.7 % (ref 37.5–51)
HGB BLD-MCNC: 13.8 G/DL (ref 13–17.7)
IMM GRANULOCYTES # BLD AUTO: 0 X10E3/UL (ref 0–0.1)
IMM GRANULOCYTES NFR BLD AUTO: 0 %
LYMPHOCYTES # BLD AUTO: 1.3 X10E3/UL (ref 0.7–3.1)
LYMPHOCYTES NFR BLD AUTO: 31 %
MCH RBC QN AUTO: 32 PG (ref 26.6–33)
MCHC RBC AUTO-ENTMCNC: 33.9 G/DL (ref 31.5–35.7)
MCV RBC AUTO: 94 FL (ref 79–97)
MONOCYTES # BLD AUTO: 0.5 X10E3/UL (ref 0.1–0.9)
MONOCYTES NFR BLD AUTO: 11 %
NEUTROPHILS # BLD AUTO: 2.2 X10E3/UL (ref 1.4–7)
NEUTROPHILS NFR BLD AUTO: 50 %
PLATELET # BLD AUTO: 294 X10E3/UL (ref 150–450)
POTASSIUM SERPL-SCNC: 4.3 MMOL/L (ref 3.5–5.2)
PROT SERPL-MCNC: 6.3 G/DL (ref 6–8.5)
RBC # BLD AUTO: 4.31 X10E6/UL (ref 4.14–5.8)
SODIUM SERPL-SCNC: 144 MMOL/L (ref 134–144)
TESTOST SERPL-MCNC: 479 NG/DL (ref 264–916)
WBC # BLD AUTO: 4.4 X10E3/UL (ref 3.4–10.8)

## 2022-07-08 NOTE — PROGRESS NOTES
Your follow-up blood work was completely normal.    I think we should pursue the sleep apnea avenue as we discussed    They should call you to schedule the MRI of the brain shortly

## 2022-07-27 ENCOUNTER — HOSPITAL ENCOUNTER (OUTPATIENT)
Dept: MRI IMAGING | Facility: HOSPITAL | Age: 62
Discharge: HOME | End: 2022-07-27
Admitting: FAMILY MEDICINE

## 2022-07-27 DIAGNOSIS — R51.9 FREQUENT HEADACHES: ICD-10-CM

## 2022-07-27 DIAGNOSIS — D32.9 MENINGIOMA: ICD-10-CM

## 2022-07-27 DIAGNOSIS — R53.83 FATIGUE, UNSPECIFIED TYPE: ICD-10-CM

## 2022-07-27 PROCEDURE — 70553 MRI BRAIN STEM W/O & W/DYE: CPT

## 2022-07-27 PROCEDURE — 0 GADOBENATE DIMEGLUMINE 529 MG/ML SOLUTION: Performed by: FAMILY MEDICINE

## 2022-07-27 PROCEDURE — A9577 INJ MULTIHANCE: HCPCS | Performed by: FAMILY MEDICINE

## 2022-07-27 PROCEDURE — 82565 ASSAY OF CREATININE: CPT

## 2022-07-27 RX ADMIN — GADOBENATE DIMEGLUMINE 20 ML: 529 INJECTION, SOLUTION INTRAVENOUS at 10:36

## 2022-07-28 LAB — CREAT BLDA-MCNC: 1 MG/DL (ref 0.6–1.3)

## 2022-08-16 ENCOUNTER — LAB (OUTPATIENT)
Dept: LAB | Facility: HOSPITAL | Age: 62
End: 2022-08-16

## 2022-08-16 ENCOUNTER — OFFICE VISIT (OUTPATIENT)
Dept: INTERNAL MEDICINE | Facility: CLINIC | Age: 62
End: 2022-08-16

## 2022-08-16 ENCOUNTER — HOSPITAL ENCOUNTER (OUTPATIENT)
Dept: GENERAL RADIOLOGY | Facility: HOSPITAL | Age: 62
Discharge: HOME | End: 2022-08-16

## 2022-08-16 ENCOUNTER — TELEPHONE (OUTPATIENT)
Dept: INTERNAL MEDICINE | Facility: CLINIC | Age: 62
End: 2022-08-16

## 2022-08-16 VITALS
HEART RATE: 64 BPM | DIASTOLIC BLOOD PRESSURE: 60 MMHG | WEIGHT: 217.6 LBS | SYSTOLIC BLOOD PRESSURE: 144 MMHG | BODY MASS INDEX: 27.93 KG/M2 | HEIGHT: 74 IN | OXYGEN SATURATION: 97 % | TEMPERATURE: 97.1 F

## 2022-08-16 DIAGNOSIS — R53.83 FATIGUE, UNSPECIFIED TYPE: ICD-10-CM

## 2022-08-16 DIAGNOSIS — R07.89 CHEST DISCOMFORT: ICD-10-CM

## 2022-08-16 DIAGNOSIS — R53.83 FATIGUE, UNSPECIFIED TYPE: Primary | ICD-10-CM

## 2022-08-16 LAB — TROPONIN T SERPL-MCNC: <0.01 NG/ML (ref 0–0.03)

## 2022-08-16 PROCEDURE — 71046 X-RAY EXAM CHEST 2 VIEWS: CPT

## 2022-08-16 PROCEDURE — 93000 ELECTROCARDIOGRAM COMPLETE: CPT | Performed by: FAMILY MEDICINE

## 2022-08-16 PROCEDURE — 36415 COLL VENOUS BLD VENIPUNCTURE: CPT

## 2022-08-16 PROCEDURE — 99214 OFFICE O/P EST MOD 30 MIN: CPT | Performed by: FAMILY MEDICINE

## 2022-08-16 PROCEDURE — 84484 ASSAY OF TROPONIN QUANT: CPT

## 2022-08-16 RX ORDER — CETIRIZINE HYDROCHLORIDE 10 MG/1
10 TABLET ORAL DAILY
COMMUNITY
End: 2022-08-16

## 2022-08-16 NOTE — PROGRESS NOTES
Date of Encounter: 2022  Patient: Niranjan Peacock,  1960    Subjective   History of Presenting Illness  Chief complaint: Fatigue follow-up    We have been following patient for fatigue, please see previous note.    Sincehis last appointment has myalgias have improved after rotation to simvastatin.  However, he has been having right-sided chest pain that radiates through to his back daily for almost the past week and a half.  This is not associated with shortness of breath but is associated with fatigue.  He can exercise and he feels that that may improve it somewhat, but not entirely.  He is having the chest discomfort now.  He has not tried taking nitroglycerin for this.  Before his last stent for mid LAD lesion in 2018 his anginal equivalent was fatigue and right-sided chest pain.  Last cardiac stress test 2021, there were EKG changes but study was similar to previous from 2019.    Review of Systems:  Negative for fever, cough, shortness of breath    The following portions of the patient's history were reviewed and updated as appropriate: allergies, current medications, past family history, past medical history, past social history, past surgical history and problem list.    Patient Active Problem List   Diagnosis   • Coronary artery disease involving native coronary artery of native heart without angina pectoris   • Multiple thyroid nodules, benign per  US   • Pernicious anemia   • H/O multiple allergies   • Herniated nucleus pulposus, L3-4 right   • Right lumbar radiculopathy   • Pterygium of right eye   • Degenerative arthritis of cervical spine   • Essential hypertension   • History of DVT (deep vein thrombosis)   • Allergic conjunctivitis of both eyes   • Chronic eczema of hand   • Chest pain   • Mixed hyperlipidemia   • Myalgia   • CHANTEL (obstructive sleep apnea)     Past Medical History:   Diagnosis Date   • Angina at rest (HCC) 2019    Added automatically from request for  surgery [id number]   • B12 deficiency anemia    • Cellulitis of right lower extremity 12/24/2020   • Chest pain    • Chronic fatigue 03/09/2016   • Coronary artery disease involving native coronary artery of native heart without angina pectoris 08/10/2017   • Deep vein thrombosis (HCC)    • Headache    • History of blood clots     blood clot found in right lung    • Hypertension    • Mixed hyperlipidemia 02/02/2022   • Multiple thyroid nodules    • Multiple thyroid nodules    • CHANTEL (obstructive sleep apnea)    • Pernicious anemia    • Pulmonary embolism (HCC)     2015   • Syncope     2 yrs ago one time     Past Surgical History:   Procedure Laterality Date   • CARDIAC CATHETERIZATION N/A 8/3/2017    Procedure: Coronary angiography;  Surgeon: Cynthia Farley MD;  Location:  [facility];  Service:    • CARDIAC CATHETERIZATION  8/3/2017    Procedure: Functional Flow [place];  Surgeon: Cynthia Farley MD;  [facility];  Service:    • CARDIAC CATHETERIZATION N/A 8/3/2017    Procedure: Stent YI coronary;  Surgeon: Cynthia Farley MD;  [facility];  Service:    • CARDIAC CATHETERIZATION N/A 8/3/2017    Procedure: Left ventriculography;  Surgeon: Cynthia Farley MD;  [facility];  Service:    • CARDIAC CATHETERIZATION N/A 8/3/2017    Procedure: Left Heart Cath;  Surgeon: Cynthia Farley MD;  [facility];  Service:    • CARDIAC CATHETERIZATION N/A 8/20/2019    Procedure: Left Heart Cath;  Surgeon: Mulugeta Ac MD;  Location: [facility] CATH INVASIVE LOC[facility];  Service: Cardiology   • CARDIAC CATHETERIZATION N/A 8/20/2019    Procedure: Coronary angiography;  Surgeon: Mulugeta Ac MD;  [facility];  Service: Cardiology   • CARDIAC CATHETERIZATION N/A 8/20/2019    Procedure: Left ventriculography;  Surgeon: Mulugeta Ac MD;  [facility];   Service: Cardiology   • COLONOSCOPY  MMVI    NORMAL.   • COLONOSCOPY     • FINE NEEDLE ASPIRATION  12/2015    Left thyroid nodule.  [place] category II.  Dr. oScrates Sanchez     Family History   Problem Relation Age of Onset   • Heart disease Other    • Hypertension Other    • Thyroid disease Other    • Heart disease Father    • Prostate cancer Father    • Hypertension Father    • Stroke Father    • Heart disease Mother    • Hypertension Mother    • Heart disease Brother    • Hypertension Brother    • Thyroid disease Sister    • Heart disease Brother    • Hypertension Brother        Current Outpatient Medications:   •  aspirin 81 MG tablet, Take 1 tablet by mouth Daily., Disp: 30 tablet, Rfl: 11  •  Cholecalciferol (VITAMIN D3) 5000 units capsule capsule, Take 5,000 Units by mouth Daily., Disp: , Rfl:   •  cyanocobalamin 1000 MCG/ML injection, INJECT 1ML INTRAMUSCULARLY EVERY 30 DAYS AS DIRECTED, Disp: 3 mL, Rfl: 3  •  hydrALAZINE (APRESOLINE) 50 MG tablet, Take 1 tablet by mouth 3 (Three) Times a Day. (Patient taking differently: Take 50 mg by mouth 2 (Two) Times a Day.), Disp: 90 tablet, Rfl: 11  •  hydrocortisone 0.5 % cream, Apply  topically to the appropriate area as directed 2 (Two) Times a Day As Needed for Irritation. (Patient taking differently: Apply 1 application topically to the appropriate area as directed 2 (Two) Times a Day As Needed for Irritation.), Disp: 15 g, Rfl: 0  •  Multiple Vitamin (MULTI VITAMIN DAILY PO), Take 1 tablet by mouth Daily., Disp: , Rfl:   •  nitroglycerin (NITROSTAT) 0.4 MG SL tablet, Place 1 tablet under the tongue Every 5 (Five) Minutes As Needed for Chest Pain for up to 1 dose. Take no more than 3 doses in 15 minutes., Disp: 100 tablet, Rfl: 3  •  simvastatin (ZOCOR) 20 MG tablet, Take 1 tab PO QD for cholesterol and heart health, Disp: 90 tablet, Rfl: 3  •  triamcinolone (KENALOG) 0.1 % cream, Apply  topically to the appropriate area as directed 2 (Two) Times a Day. (Patient  "taking differently: Apply 1 application topically to the appropriate area as directed 2 (Two) Times a Day As Needed.), Disp: 28.4 g, Rfl: 1  Allergies   Allergen Reactions   • Lisinopril Shortness Of Breath     Rash also    • Amlodipine Myalgia     Muscle aches    • Valsartan Rash     muscle aches     Social History     Tobacco Use   • Smoking status: Never Smoker   • Smokeless tobacco: Never Used   • Tobacco comment: caffeine use - 2 cups coffee dailyl    Vaping Use   • Vaping Use: Never used   Substance Use Topics   • Alcohol use: Yes     Comment: occasional   • Drug use: No          Objective   Physical Exam  Vitals:    08/16/22 0809   BP: 144/60   BP Location: Left arm   Patient Position: Sitting   Cuff Size: Large Adult   Pulse: 64   Temp: 97.1 °F (36.2 °C)   TempSrc: Infrared   SpO2: 97%   Weight: 98.7 kg (217 lb 9.6 oz)   Height: 188 cm (74\")     Body mass index is 27.94 kg/m².    Constitutional: NAD.  Cardiovascular: RRR. No murmurs. No LE edema b/l. Radial pulses 2+ bilaterally.  Pulmonary: CTA b/l. Good effort.  Integumentary: No rashes or wounds on face or upper extremities.  Psychiatric: Normal affect. Normal thought content.     Assessment & Plan   Assessment and Plan  Pleasant 62-year-old male with CAD, hypertension, pernicious anemia, multiple thyroid nodules, right lumbar radiculopathy, who presents with the following:    Diagnoses and all orders for this visit:    1. Fatigue, unspecified type (Primary)    2. Chest discomfort      ECG 12 Lead    Date/Time: 8/16/2022 8:29 AM  Performed by: Brian Marr MD  Authorized by: Brian Marr MD   Comparison: compared with previous ECG from 3/28/2022  Similar to previous ECG  Rhythm: sinus rhythm  Rate: normal  Conduction: conduction normal  ST Segments: ST segments normal  T Waves: T waves normal  QRS axis: normal  Other: no other findings    Clinical impression: normal ECG        EKG is also similar to one performed 2/2/2022.    Since he has having the " chest discomfort in office, I gave him nitroglycerin 0.4 mg after discussing the risks and benefits of doing so.    The nitroglycerin did not change his pain.  Did give him a mild headache.    We will have him proceed to [facility] for a chest x-ray and a troponin just to rule out cardiac etiology but I think the risk is low based on my exam, EKG, lack of improvement with nitroglycerin, and recent cardiac stress test.    He will call his cardiologist for an appointment just as good consultation follow-up.    He has been taking Zyrtec for about 2 months, I would like him to stop this until follow-up.    We will follow-up with him in 1 week for fatigue    Brian Marr MD  Family Medicine  O: [phone]  C: [phone]    Disclaimer: Parts of this note were dictated by speech recognition. Minor errors in transcription may be present. Please call if questions.

## 2022-08-23 ENCOUNTER — OFFICE VISIT (OUTPATIENT)
Dept: INTERNAL MEDICINE | Facility: CLINIC | Age: 62
End: 2022-08-23

## 2022-08-23 VITALS
HEART RATE: 68 BPM | WEIGHT: 215.4 LBS | HEIGHT: 74 IN | OXYGEN SATURATION: 96 % | SYSTOLIC BLOOD PRESSURE: 150 MMHG | BODY MASS INDEX: 27.64 KG/M2 | DIASTOLIC BLOOD PRESSURE: 68 MMHG | TEMPERATURE: 96.9 F

## 2022-08-23 DIAGNOSIS — I10 ESSENTIAL HYPERTENSION: Primary | ICD-10-CM

## 2022-08-23 DIAGNOSIS — R53.83 FATIGUE, UNSPECIFIED TYPE: ICD-10-CM

## 2022-08-23 PROCEDURE — 99214 OFFICE O/P EST MOD 30 MIN: CPT | Performed by: FAMILY MEDICINE

## 2022-08-23 RX ORDER — HYDROCHLOROTHIAZIDE 12.5 MG/1
TABLET ORAL
Qty: 90 TABLET | Refills: 3 | Status: SHIPPED | OUTPATIENT
Start: 2022-08-23

## 2022-08-23 RX ORDER — HYDRALAZINE HYDROCHLORIDE 50 MG/1
50 TABLET, FILM COATED ORAL 2 TIMES DAILY
Qty: 180 TABLET | Refills: 3 | Status: SHIPPED | OUTPATIENT
Start: 2022-08-23

## 2022-08-23 NOTE — PROGRESS NOTES
"Date of Encounter: 2022  Patient: Niranjan Peacock,  1960    Subjective   History of Presenting Illness  Chief complaint: Fatigue    Fatigue, after cessation of cetirizine his fatigue has improved \"over 50%\".  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  He has not made a cardiology follow-up yet but plans to.    His blood pressure remains elevated at home.  He has a history of rash with ARB's and ACEI.  He has never been on a thiazide diuretic.  He does get headaches with elevated blood pressure.    Review of Systems:  Negative for fever, cough, shortness of breath    The following portions of the patient's history were reviewed and updated as appropriate: allergies, current medications, past family history, past medical history, past social history, past surgical history and problem list.    Patient Active Problem List   Diagnosis   • Coronary artery disease involving native coronary artery of native heart without angina pectoris   • Multiple thyroid nodules, benign per  US   • Pernicious anemia   • H/O multiple allergies   • Herniated nucleus pulposus, L3-4 right   • Right lumbar radiculopathy   • Pterygium of right eye   • Degenerative arthritis of cervical spine   • Essential hypertension   • History of DVT (deep vein thrombosis)   • Allergic conjunctivitis of both eyes   • Chronic eczema of hand   • Chest pain   • Mixed hyperlipidemia   • Myalgia   • CHANTEL (obstructive sleep apnea)     Past Medical History:   Diagnosis Date   • Angina at rest (HCC) 2019    Added automatically from request for surgery [id number]   • B12 deficiency anemia    • Cellulitis of right lower extremity 2020   • Chest pain    • Chronic fatigue 2016   • Coronary artery disease involving native coronary artery of native heart without angina pectoris 08/10/2017   • Deep vein thrombosis (HCC)    • Headache    • " History of blood clots     blood clot found in right lung    • Hypertension    • Mixed hyperlipidemia 02/02/2022   • Multiple thyroid nodules    • Multiple thyroid nodules    • CHANTEL (obstructive sleep apnea)    • Pernicious anemia    • Pulmonary embolism (HCC)     2015   • Syncope     2 yrs ago one time     Past Surgical History:   Procedure Laterality Date   • CARDIAC CATHETERIZATION N/A 8/3/2017    Procedure: Coronary angiography;  Surgeon: Cynthia Farley MD;  Location:  [facility];  Service:    • CARDIAC CATHETERIZATION  8/3/2017    Procedure: Functional Flow [place];  Surgeon: Cynthia Farley MD;  Location:  [facility];  Service:    • CARDIAC CATHETERIZATION N/A 8/3/2017    Procedure: Stent YI coronary;  Surgeon: Cynthia Farley MD;  Location:  [facility];  Service:    • CARDIAC CATHETERIZATION N/A 8/3/2017    Procedure: Left ventriculography;  Surgeon: Cynthia Farley MD;  Location:  [facility];  Service:    • CARDIAC CATHETERIZATION N/A 8/3/2017    Procedure: Left Heart Cath;  Surgeon: Cynthia Farley MD;  Location[facility];  Service:    • CARDIAC CATHETERIZATION N/A 8/20/2019    Procedure: Left Heart Cath;  Surgeon: Mulugeta Ac MD;  [facility];  Service: Cardiology   • CARDIAC CATHETERIZATION N/A 8/20/2019    Procedure: Coronary angiography;  Surgeon: Mulugeta Ac MD;  [facility];  Service: Cardiology   • CARDIAC CATHETERIZATION N/A 8/20/2019    Procedure: Left ventriculography;  Surgeon: Mulugeta Ac MD;  Location: [facility] CATH INVAS[facility];  Service: Cardiology   • COLONOSCOPY  MMVI    NORMAL.   • COLONOSCOPY     • FINE NEEDLE ASPIRATION  12/2015    Left thyroid nodule.  [place] category II.  Dr. Socrates Sanchez     Family History   Problem Relation Age of Onset   • Heart disease Other    • Hypertension Other    • Thyroid disease Other    •  Heart disease Father    • Prostate cancer Father    • Hypertension Father    • Stroke Father    • Heart disease Mother    • Hypertension Mother    • Heart disease Brother    • Hypertension Brother    • Thyroid disease Sister    • Heart disease Brother    • Hypertension Brother        Current Outpatient Medications:   •  aspirin 81 MG tablet, Take 1 tablet by mouth Daily., Disp: 30 tablet, Rfl: 11  •  Cholecalciferol (VITAMIN D3) 5000 units capsule capsule, Take 5,000 Units by mouth Daily., Disp: , Rfl:   •  cyanocobalamin 1000 MCG/ML injection, INJECT 1ML INTRAMUSCULARLY EVERY 30 DAYS AS DIRECTED, Disp: 3 mL, Rfl: 3  •  hydrALAZINE (APRESOLINE) 50 MG tablet, Take 1 tablet by mouth 2 (Two) Times a Day., Disp: 180 tablet, Rfl: 3  •  hydrocortisone 0.5 % cream, Apply  topically to the appropriate area as directed 2 (Two) Times a Day As Needed for Irritation. (Patient taking differently: Apply 1 application topically to the appropriate area as directed 2 (Two) Times a Day As Needed for Irritation.), Disp: 15 g, Rfl: 0  •  Multiple Vitamin (MULTI VITAMIN DAILY PO), Take 1 tablet by mouth Daily., Disp: , Rfl:   •  nitroglycerin (NITROSTAT) 0.4 MG SL tablet, Place 1 tablet under the tongue Every 5 (Five) Minutes As Needed for Chest Pain for up to 1 dose. Take no more than 3 doses in 15 minutes., Disp: 100 tablet, Rfl: 3  •  simvastatin (ZOCOR) 20 MG tablet, Take 1 tab PO QD for cholesterol and heart health, Disp: 90 tablet, Rfl: 3  •  triamcinolone (KENALOG) 0.1 % cream, Apply  topically to the appropriate area as directed 2 (Two) Times a Day. (Patient taking differently: Apply 1 application topically to the appropriate area as directed 2 (Two) Times a Day As Needed.), Disp: 28.4 g, Rfl: 1  •  hydroCHLOROthiazide (HYDRODIURIL) 12.5 MG tablet, Take 1 tab PO QD for blood pressure, Disp: 90 tablet, Rfl: 3  Allergies   Allergen Reactions   • Lisinopril Shortness Of Breath     Rash also    • Amlodipine Myalgia     Muscle aches   "  • Valsartan Rash     muscle aches     Social History     Tobacco Use   • Smoking status: Never Smoker   • Smokeless tobacco: Never Used   • Tobacco comment: caffeine use - 2 cups coffee dailyl    Vaping Use   • Vaping Use: Never used   Substance Use Topics   • Alcohol use: Yes     Comment: occasional   • Drug use: No          Objective   Physical Exam  Vitals:    08/23/22 0956   BP: 150/68   BP Location: Left arm   Patient Position: Sitting   Cuff Size: Large Adult   Pulse: 68   Temp: 96.9 °F (36.1 °C)   TempSrc: Infrared   SpO2: 96%   Weight: 97.7 kg (215 lb 6.4 oz)   Height: 188 cm (74\")     Body mass index is 27.66 kg/m².    Constitutional: NAD.  Psychiatric: Normal affect. Normal thought content.     Assessment & Plan   Assessment and Plan  Pleasant 62-year-old male with CAD, hypertension, pernicious anemia, multiple thyroid nodules, right lumbar radiculopathy, who presents with the following:    Diagnoses and all orders for this visit:    1. Essential hypertension (Primary): Discussed the risks and benefits of hydrochlorothiazide, we will add this onto his current regimen.  I do not think beta-blocker is appropriate due to his borderline bradycardic rate.  We will follow-up in about 6 weeks to see how he is doing.  -     hydrALAZINE (APRESOLINE) 50 MG tablet; Take 1 tablet by mouth 2 (Two) Times a Day.  Dispense: 180 tablet; Refill: 3  -     hydroCHLOROthiazide (HYDRODIURIL) 12.5 MG tablet; Take 1 tab PO QD for blood pressure  Dispense: 90 tablet; Refill: 3    2. Fatigue, unspecified type: This is improved significantly with cessation of cetirizine.  We will hold on further evaluation at this time unless worsening.    Brian Marr MD  Family Medicine  O: [phone]  C: [phone]    Disclaimer: Parts of this note were dictated by speech recognition. Minor errors in transcription may be present. Please call if questions.     "

## 2022-09-20 ENCOUNTER — OFFICE VISIT (OUTPATIENT)
Dept: INTERNAL MEDICINE | Facility: CLINIC | Age: 62
End: 2022-09-20

## 2022-09-20 VITALS
SYSTOLIC BLOOD PRESSURE: 136 MMHG | HEART RATE: 58 BPM | BODY MASS INDEX: 27.45 KG/M2 | TEMPERATURE: 96.6 F | DIASTOLIC BLOOD PRESSURE: 78 MMHG | WEIGHT: 213.8 LBS | OXYGEN SATURATION: 96 %

## 2022-09-20 DIAGNOSIS — H10.13 ALLERGIC CONJUNCTIVITIS OF BOTH EYES: Primary | ICD-10-CM

## 2022-09-20 DIAGNOSIS — Z23 NEED FOR INFLUENZA VACCINATION: ICD-10-CM

## 2022-09-20 PROCEDURE — 90471 IMMUNIZATION ADMIN: CPT | Performed by: FAMILY MEDICINE

## 2022-09-20 PROCEDURE — 99213 OFFICE O/P EST LOW 20 MIN: CPT | Performed by: FAMILY MEDICINE

## 2022-09-20 PROCEDURE — 90686 IIV4 VACC NO PRSV 0.5 ML IM: CPT | Performed by: FAMILY MEDICINE

## 2022-09-20 RX ORDER — FEXOFENADINE HYDROCHLORIDE 60 MG/1
60 TABLET, FILM COATED ORAL DAILY PRN
Qty: 90 TABLET | Refills: 3 | Status: SHIPPED | OUTPATIENT
Start: 2022-09-20

## 2022-09-27 ENCOUNTER — OFFICE VISIT (OUTPATIENT)
Dept: CARDIOLOGY | Facility: CLINIC | Age: 62
End: 2022-09-27

## 2022-09-27 VITALS
SYSTOLIC BLOOD PRESSURE: 146 MMHG | RESPIRATION RATE: 18 BRPM | OXYGEN SATURATION: 97 % | HEIGHT: 74 IN | DIASTOLIC BLOOD PRESSURE: 86 MMHG | WEIGHT: 215 LBS | HEART RATE: 53 BPM | BODY MASS INDEX: 27.59 KG/M2

## 2022-09-27 DIAGNOSIS — I25.10 CORONARY ARTERY DISEASE INVOLVING NATIVE CORONARY ARTERY OF NATIVE HEART WITHOUT ANGINA PECTORIS: ICD-10-CM

## 2022-09-27 DIAGNOSIS — E78.2 MIXED HYPERLIPIDEMIA: ICD-10-CM

## 2022-09-27 DIAGNOSIS — I65.23 BILATERAL CAROTID ARTERY STENOSIS: Primary | ICD-10-CM

## 2022-09-27 DIAGNOSIS — I10 ESSENTIAL HYPERTENSION: ICD-10-CM

## 2022-09-27 DIAGNOSIS — G47.33 OSA (OBSTRUCTIVE SLEEP APNEA): ICD-10-CM

## 2022-09-27 DIAGNOSIS — Z86.718 HISTORY OF DVT (DEEP VEIN THROMBOSIS): ICD-10-CM

## 2022-09-27 PROCEDURE — 93000 ELECTROCARDIOGRAM COMPLETE: CPT | Performed by: INTERNAL MEDICINE

## 2022-09-27 PROCEDURE — 99214 OFFICE O/P EST MOD 30 MIN: CPT | Performed by: INTERNAL MEDICINE

## 2022-09-27 NOTE — PROGRESS NOTES
CARDIOLOGY    Shonna Renteria MD    ENCOUNTER DATE:  09/27/2022    Niranjan Peacock / 62 y.o. / male        CHIEF COMPLAINT / REASON FOR OFFICE VISIT     Heart Problem (6 week follow up -- cad, htn )      HISTORY OF PRESENT ILLNESS       HPI    Niranjan Peacock is a 62 y.o. male     This is a patient who followed with Dr. Mancilla. He has coronary artery disease. He has a history of stent to the proximal/mid-LAD in 2018, heart catheterization in 2019 showed mild to moderate nonobstructive disease. Echocardiogram 02/2020 showed a normal ejection fraction at 61%. He does have a history of MRSA cellulitis in 12/2020. He has hypertension, hyperlipidemia, B12 anemia, and mild bilateral carotid artery stenosis which was last checked in 06/2020. He has a history of DVT but is no longer on anticoagulation. He came to the emergency room in 07/2021 with chest pain which he described as a pressure or burning without exacerbating or relieving factors. He ruled out for myocardial infarction. He had a stress test which showed normal LV function and no evidence of ischemia, though he did have some EKG changes with exercise. He was discharged home and followed up with Shira in 08/2021, and was not having any change in his symptoms at that point in time.      I saw him in February 2022.  He had a syncopal episode.  He wore a Zio patch which is unremarkable.  Echocardiogram February 2022 showed normal LV function ejection fraction 65% and no significant valve disease.  Carotid Doppler of February 2022 showed mild bilateral carotid artery stenosis.     He is here today for follow-up.  He has not had any further syncopal spells.  At night he will feel a skipped beat.  It may last 10 seconds.  It makes him feel short of breath.  He has noticed it the last few nights but generally has not had too much problems with this.    REVIEW OF SYSTEMS     Review of Systems   Constitutional: Negative for chills, fever, weight gain and weight  "loss.   Cardiovascular: Negative for leg swelling.   Respiratory: Negative for cough, snoring and wheezing.    Hematologic/Lymphatic: Negative for bleeding problem. Does not bruise/bleed easily.   Skin: Negative for color change.   Musculoskeletal: Positive for joint pain and myalgias. Negative for falls.   Gastrointestinal: Negative for melena.   Genitourinary: Negative for hematuria.   Neurological: Negative for excessive daytime sleepiness.   Psychiatric/Behavioral: Negative for depression. The patient is not nervous/anxious.          VITAL SIGNS     Visit Vitals  /86 (BP Location: Right arm, Patient Position: Sitting, Cuff Size: Adult)   Pulse 53   Resp 18   Ht 188 cm (74\")   Wt 97.5 kg (215 lb)   SpO2 97%   BMI 27.60 kg/m²         Wt Readings from Last 3 Encounters:   09/27/22 97.5 kg (215 lb)   09/20/22 97 kg (213 lb 12.8 oz)   08/23/22 97.7 kg (215 lb 6.4 oz)     Body mass index is 27.6 kg/m².      PHYSICAL EXAMINATION     Constitutional:       General: Not in acute distress.  Neck:      Vascular: No carotid bruit or JVD.   Pulmonary:      Effort: Pulmonary effort is normal.      Breath sounds: Normal breath sounds.   Cardiovascular:      Normal rate. Regular rhythm.      Murmurs: There is no murmur.   Psychiatric:         Mood and Affect: Mood and affect normal.           REVIEWED DATA       ECG 12 Lead    Date/Time: 9/27/2022 11:49 AM  Performed by: Shonna Renteria MD  Authorized by: Shonna Renteria MD   Comparison: compared with previous ECG from 8/16/2022  Similar to previous ECG  Rhythm: sinus rhythm  BPM: 53  Conduction: conduction normal  ST Segments: ST segments normal  T Waves: T waves normal    Clinical impression: normal ECG              Lipid Panel    Lipid Panel 5/27/22   Total Cholesterol 133   Triglycerides 145   HDL Cholesterol 38 (A)   VLDL Cholesterol 25   LDL Cholesterol  70   (A) Abnormal value              Lab Results   Component Value Date    GLUCOSE 94 07/05/2022    BUN 12 " 07/05/2022    CREATININE 1.00 07/27/2022    EGFRIFNONA 85 10/13/2021    EGFRIFAFRI 79 04/29/2021    BCR 11 07/05/2022    K 4.3 07/05/2022    CO2 24 07/05/2022    CALCIUM 9.1 07/05/2022    PROTENTOTREF 6.3 07/05/2022    ALBUMIN 4.3 07/05/2022    LABIL2 2.2 07/05/2022    AST 27 07/05/2022    ALT 32 07/05/2022       ASSESSMENT & PLAN      Diagnosis Plan   1. Bilateral carotid artery stenosis  Duplex Carotid Ultrasound CAR   2. Coronary artery disease involving native coronary artery of native heart without angina pectoris     3. Mixed hyperlipidemia     4. Essential hypertension     5. History of DVT (deep vein thrombosis)     6. CHANTEL (obstructive sleep apnea)         1.  Coronary artery disease with stent to the LAD in 2018.  Negative stress test in Jul 2021.  2.  Hypertension.  He had a rash with lisinopril.  Change to losartan but his blood pressure has not been well controlled.  Overall blood pressure is controlled though a little bit high on today's check.  Continue his current medications.  3.  Hyperlipidemia.  He was switched to simvastatin and says he feels better on it than he was on atorvastatin.  4.  History of DVT but no longer on anticoagulation  5.  History of MRSA cellulitis  6.  B12 anemia  7.  Mild bilateral carotid artery stenosis.  He is due to have those rechecked in February 2023 and I have placed an order.  8.  Palpitations.  He had some short but nonsustained arrhythmia on his Zio patch.  His palpitation sound benign.  I am not going to do any further testing at this time.  Not a good candidate for beta-blockers given his baseline sinus bradycardia.  Consider longer-term monitor if his symptoms persist or get worse.  9.  Syncope. Echo, Zio patch and carotid Doppler all unremarkable.          Orders Placed This Encounter   Procedures   • ECG 12 Lead     This order was created via procedure documentation     Order Specific Question:   Release to patient     Answer:   Routine Release            MEDICATIONS         Discharge Medications          Accurate as of September 27, 2022 11:52 AM. If you have any questions, ask your nurse or doctor.            Changes to Medications      Instructions Start Date   hydrocortisone 0.5 % cream  What changed: how much to take   Topical, 2 Times Daily PRN      triamcinolone 0.1 % cream  Commonly known as: KENALOG  What changed:   · how much to take  · when to take this  · reasons to take this   Topical, 2 Times Daily         Continue These Medications      Instructions Start Date   aspirin 81 MG tablet   81 mg, Oral, Daily      cyanocobalamin 1000 MCG/ML injection   INJECT 1ML INTRAMUSCULARLY EVERY 30 DAYS AS DIRECTED      fexofenadine 60 MG tablet  Commonly known as: ALLEGRA   60 mg, Oral, Daily PRN      hydrALAZINE 50 MG tablet  Commonly known as: APRESOLINE   50 mg, Oral, 2 Times Daily      hydroCHLOROthiazide 12.5 MG tablet  Commonly known as: HYDRODIURIL   Take 1 tab PO QD for blood pressure      loteprednol 0.2 % suspension  Commonly known as: LOTEMAX   1 drop, Both Eyes, 4 Times Daily PRN, Do not use for more than 2 weeks at a time      multivitamin tablet tablet  Commonly known as: THERAGRAN   1 tablet, Oral, Daily      nitroglycerin 0.4 MG SL tablet  Commonly known as: NITROSTAT   0.4 mg, Sublingual, Every 5 Minutes PRN, Take no more than 3 doses in 15 minutes.      simvastatin 20 MG tablet  Commonly known as: ZOCOR   Take 1 tab PO QD for cholesterol and heart health      vitamin D3 125 MCG (5000 UT) capsule capsule   5,000 Units, Oral, Daily               Shonna Renteria MD  09/27/22  11:52 EDT    Part of this note may be an electronic transcription/translation of spoken language to printed text using the Dragon dictation system.

## 2022-11-17 ENCOUNTER — TELEPHONE (OUTPATIENT)
Dept: INTERNAL MEDICINE | Facility: CLINIC | Age: 62
End: 2022-11-17

## 2022-11-17 NOTE — TELEPHONE ENCOUNTER
Pt trying to get a same day appt today. Pt has been experiencing a rash on his eyelids for the last few weeks and have shooting pain all over body and especially down both legs. Pt believes it may be the cholesterol medication that he is taking.     Pt is scheduled for 3:15pm on Tuesday 11/22/22. Pt was advised to go to  if reaction got worse    Please advise

## 2022-11-22 ENCOUNTER — OFFICE VISIT (OUTPATIENT)
Dept: INTERNAL MEDICINE | Facility: CLINIC | Age: 62
End: 2022-11-22

## 2022-11-22 VITALS
SYSTOLIC BLOOD PRESSURE: 140 MMHG | TEMPERATURE: 96.9 F | WEIGHT: 218.6 LBS | DIASTOLIC BLOOD PRESSURE: 64 MMHG | HEART RATE: 63 BPM | HEIGHT: 74 IN | OXYGEN SATURATION: 97 % | BODY MASS INDEX: 28.06 KG/M2

## 2022-11-22 DIAGNOSIS — H01.139 ECZEMA OF EYELID, UNSPECIFIED LATERALITY: ICD-10-CM

## 2022-11-22 DIAGNOSIS — M79.10 MYALGIA: Primary | ICD-10-CM

## 2022-11-22 PROCEDURE — 99213 OFFICE O/P EST LOW 20 MIN: CPT | Performed by: FAMILY MEDICINE

## 2022-11-22 NOTE — PROGRESS NOTES
"Date of Encounter: 2022  Patient: Niranjan Peacock,  1960    Subjective   History of Presenting Illness  Chief complaint: Myalgias    Patient presents with persistent myalgias felt along the hamstrings on both legs.  This does not extend below the knees.  There is no associated numbness.  Symptoms are mild currently but this has been fairly bothersome. He has also noticed fatigue and occasional muscle aches in the upper extremities but not in the shoulders or upper back.  He feels that when he stopped atorvastatin to switch to simvastatin during the summer that \"he felt better than he had in a long time\", and he feels that these muscle aches have returned with restarting the statin.    Allergic dermatitis versus eczematous dermatitis of the eyelids, not improving with oral antihistamines, topical low or medium potency corticosteroids.  He does not feel that this is worsening or improving.    Review of Systems:  Negative for fever, cough, shortness of breath    The following portions of the patient's history were reviewed and updated as appropriate: allergies, current medications, past family history, past medical history, past social history, past surgical history and problem list.    Patient Active Problem List   Diagnosis   • Coronary artery disease involving native coronary artery of native heart without angina pectoris   • Multiple thyroid nodules, benign per  US   • Pernicious anemia   • H/O multiple allergies   • Herniated nucleus pulposus, L3-4 right   • Right lumbar radiculopathy   • Pterygium of right eye   • Degenerative arthritis of cervical spine   • Essential hypertension   • History of DVT (deep vein thrombosis)   • Eczema of eyelid   • Chronic eczema of hand   • Chest pain   • Mixed hyperlipidemia   • Myalgia   • CHANTEL (obstructive sleep apnea)     Past Medical History:   Diagnosis Date   • Angina at rest (HCC) 2019    Added automatically from request for surgery [id number]   • B12 " deficiency anemia    • Cellulitis of right lower extremity 12/24/2020   • Chest pain    • Chronic fatigue 03/09/2016   • Coronary artery disease involving native coronary artery of native heart without angina pectoris 08/10/2017   • Deep vein thrombosis (HCC)    • Headache    • History of blood clots     blood clot found in right lung    • Hypertension    • Mixed hyperlipidemia 02/02/2022   • Multiple thyroid nodules    • Multiple thyroid nodules    • CHANTEL (obstructive sleep apnea)    • Pernicious anemia    • Pulmonary embolism (HCC)     2015   • Syncope     2 yrs ago one time     Past Surgical History:   Procedure Laterality Date   • CARDIAC CATHETERIZATION N/A 8/3/2017    Procedure: Coronary angiography;  Surgeon: Cynthia Farley MD;  Location:  [facility];  Service:    • CARDIAC CATHETERIZATION  8/3/2017    Procedure: Functional Flow [place];  Surgeon: Cynthia Farley MD;  Location:  [facility];  Service:    • CARDIAC CATHETERIZATION N/A 8/3/2017    Procedure: Stent YI coronary;  Surgeon: Cynthia Farley MD;  [facility];  Service:    • CARDIAC CATHETERIZATION N/A 8/3/2017    Procedure: Left ventriculography;  Surgeon: Cynthia Farley MD;  [facility];  Service:    • CARDIAC CATHETERIZATION N/A 8/3/2017    Procedure: Left Heart Cath;  Surgeon: Cynthia Farley MD;  [facility];  Service:    • CARDIAC CATHETERIZATION N/A 8/20/2019    Procedure: Left Heart Cath;  Surgeon: Mulugeta Ac MD;  [facility];  Service: Cardiology   • CARDIAC CATHETERIZATION N/A 8/20/2019    Procedure: Coronary angiography;  Surgeon: Mulugeta Ac MD;  [facility];  Service: Cardiology   • CARDIAC CATHETERIZATION N/A 8/20/2019    Procedure: Left ventriculography;  Surgeon: Mulugeta Ac MD;  [facility];  Service: Cardiology   •  COLONOSCOPY  MMVI    NORMAL.   • COLONOSCOPY     • FINE NEEDLE ASPIRATION  12/2015    Left thyroid nodule.  [place] category II.  Dr. Socrates Sanchez     Family History   Problem Relation Age of Onset   • Heart disease Other    • Hypertension Other    • Thyroid disease Other    • Heart disease Father    • Prostate cancer Father    • Hypertension Father    • Stroke Father    • Heart disease Mother    • Hypertension Mother    • Heart disease Brother    • Hypertension Brother    • Thyroid disease Sister    • Heart disease Brother    • Hypertension Brother        Current Outpatient Medications:   •  aspirin 81 MG tablet, Take 1 tablet by mouth Daily., Disp: 30 tablet, Rfl: 11  •  Cholecalciferol (VITAMIN D3) 5000 units capsule capsule, Take 5,000 Units by mouth Daily., Disp: , Rfl:   •  cyanocobalamin 1000 MCG/ML injection, INJECT 1ML INTRAMUSCULARLY EVERY 30 DAYS AS DIRECTED, Disp: 3 mL, Rfl: 3  •  fexofenadine (ALLEGRA) 60 MG tablet, Take 1 tablet by mouth Daily As Needed (allergies)., Disp: 90 tablet, Rfl: 3  •  hydrALAZINE (APRESOLINE) 50 MG tablet, Take 1 tablet by mouth 2 (Two) Times a Day., Disp: 180 tablet, Rfl: 3  •  hydroCHLOROthiazide (HYDRODIURIL) 12.5 MG tablet, Take 1 tab PO QD for blood pressure, Disp: 90 tablet, Rfl: 3  •  hydrocortisone 0.5 % cream, Apply  topically to the appropriate area as directed 2 (Two) Times a Day As Needed for Irritation. (Patient taking differently: Apply 1 application topically to the appropriate area as directed 2 (Two) Times a Day As Needed for Irritation.), Disp: 15 g, Rfl: 0  •  Multiple Vitamin (MULTI VITAMIN DAILY PO), Take 1 tablet by mouth Daily., Disp: , Rfl:   •  nitroglycerin (NITROSTAT) 0.4 MG SL tablet, Place 1 tablet under the tongue Every 5 (Five) Minutes As Needed for Chest Pain for up to 1 dose. Take no more than 3 doses in 15 minutes., Disp: 100 tablet, Rfl: 3  •  loteprednol (LOTEMAX) 0.2 % suspension, Administer 1 drop to both eyes 4 (Four) Times a Day As  "Needed (allergic conjunctivitis). Do not use for more than 2 weeks at a time, Disp: 5 mL, Rfl: 3  •  triamcinolone (KENALOG) 0.1 % cream, Apply  topically to the appropriate area as directed 2 (Two) Times a Day. (Patient taking differently: Apply 1 application topically to the appropriate area as directed 2 (Two) Times a Day As Needed.), Disp: 28.4 g, Rfl: 1  Allergies   Allergen Reactions   • Lisinopril Shortness Of Breath     Rash also    • Simvastatin Myalgia   • Amlodipine Myalgia     Muscle aches    • Valsartan Rash     muscle aches     Social History     Tobacco Use   • Smoking status: Never   • Smokeless tobacco: Never   • Tobacco comments:     caffeine use - 2 cups coffee dailyl    Vaping Use   • Vaping Use: Never used   Substance Use Topics   • Alcohol use: Yes     Comment: occasional   • Drug use: No          Objective   Physical Exam  Vitals:    11/22/22 1503   BP: 140/64   Pulse: 63   Temp: 96.9 °F (36.1 °C)   SpO2: 97%   Weight: 99.2 kg (218 lb 9.6 oz)   Height: 188 cm (74\")     Body mass index is 28.07 kg/m².    Constitutional: NAD.  Eyes: EOMI. PERRLA. Normal conjunctiva.  There is flakiness and mild erythema overlying the upper eyelids on both sides.  There is no periorbital edema.  Musculoskeletal: No tenderness to palpation of the muscles of the upper legs bilaterally.  Strength in hip flexors and knee extenders are 5/5 bilaterally.  Normal gait.     Assessment & Plan   Assessment and Plan  Pleasant 62-year-old male with CAD, hypertension, pernicious anemia, multiple thyroid nodules, right lumbar radiculopathy, who presents with the following:    Diagnoses and all orders for this visit:    1. Myalgia (Primary): Recent blood work has been unremarkable including ESR, CRP, ferritin, rheumatoid factor, and CK.  Symptoms temporally associated with statin initiation.  Because of his coronary artery disease I do think I would exhaust all statin measures plus or minus co-Q10 before trying ezetimibe alone.  " For now I would like him to stop his simvastatin and see if his myalgias improved.  He will reach out to me on MyChart for follow-up.  If he is not improving with cessation of the statin I would consider empiric treatment for PMR even though his labs have been unremarkable and the distribution is not typical.    2. Eczema of eyelid, unspecified laterality: Not improving with antiallergy therapy or corticosteroids.  No obvious triggers.  He has a dermatologist, I would like him to consult with them for further evaluation    Brian Marr MD  Family Medicine  O: [phone]    Disclaimer: Parts of this note were dictated by speech recognition. Minor errors in transcription may be present. Please call if questions.

## 2022-12-09 ENCOUNTER — PATIENT MESSAGE (OUTPATIENT)
Dept: INTERNAL MEDICINE | Facility: CLINIC | Age: 62
End: 2022-12-09

## 2022-12-09 DIAGNOSIS — E78.2 MIXED HYPERLIPIDEMIA: Primary | ICD-10-CM

## 2022-12-09 DIAGNOSIS — H10.13 ALLERGIC CONJUNCTIVITIS OF BOTH EYES: ICD-10-CM

## 2022-12-09 NOTE — TELEPHONE ENCOUNTER
Rx Refill Note  Requested Prescriptions     Pending Prescriptions Disp Refills   • loteprednol (LOTEMAX) 0.2 % suspension 5 mL 3     Sig: Administer 1 drop to both eyes 4 (Four) Times a Day As Needed (allergic conjunctivitis). Do not use for more than 2 weeks at a time      Last office visit with prescribing clinician: 11/22/2022   Last telemedicine visit with prescribing clinician: Visit date not found   Next office visit with prescribing clinician: Visit date not found                         Would you like a call back once the refill request has been completed: [] Yes [] No    If the office needs to give you a call back, can they leave a voicemail: [] Yes [] No    Irlanda Osborne LPN  12/09/22, 10:47 EST

## 2022-12-13 RX ORDER — ROSUVASTATIN CALCIUM 5 MG/1
TABLET, COATED ORAL
Qty: 90 TABLET | Refills: 3 | Status: SHIPPED | OUTPATIENT
Start: 2022-12-13 | End: 2022-12-19

## 2022-12-13 RX ORDER — VITAMIN B COMPLEX
TABLET ORAL
Qty: 90 EACH | Refills: 3 | Status: SHIPPED | OUTPATIENT
Start: 2022-12-13

## 2022-12-14 NOTE — TELEPHONE ENCOUNTER
From: Niranjan Peacock  To: Brian Marr MD  Sent: 12/9/2022 10:49 AM EST  Subject: Stopping Cholesterol Med.    It has been 2 weeks since I stopped taking the med.  My leg pain has almost gone completely and I do have some additional energy. Generally feel better but still have times I get extremely tired by 5 or 6 PM. ( I do get up at 5AM and daylight is short) Still have some aches but not as bad as before. My wife says she can tell a big difference.  Eyes and lids are still as bad as they were.   Thanks   Nain

## 2022-12-18 ENCOUNTER — PATIENT MESSAGE (OUTPATIENT)
Dept: INTERNAL MEDICINE | Facility: CLINIC | Age: 62
End: 2022-12-18

## 2022-12-18 DIAGNOSIS — E78.2 MIXED HYPERLIPIDEMIA: Primary | ICD-10-CM

## 2022-12-19 RX ORDER — LOVASTATIN 10 MG/1
10 TABLET ORAL NIGHTLY
Qty: 14 TABLET | Refills: 0 | Status: SHIPPED | OUTPATIENT
Start: 2022-12-19 | End: 2023-01-05 | Stop reason: SDUPTHER

## 2022-12-19 NOTE — TELEPHONE ENCOUNTER
Spoke to pt about sx and reactions, pt was advised that he needs to discontinue the Crestor, and if sx worsen, he needs to go to UC or ER since provider is on vacation and no availability to be seen.

## 2022-12-19 NOTE — TELEPHONE ENCOUNTER
From: Niranjan Peacock  To: Brian Marr MD  Sent: 12/18/2022 7:04 PM EST  Subject: Visit Follow-Up Question    I have taken the new medicine for three days. After the first day, I started having aches and it got worse every day. I am having a irregular heartbeat and feel like I can't breathe deep. I've not had irregular heartbeat for about three weeks since I quit taking the other medicine, do you think my body is having a reaction to this medicine. Also loss of energy.   I am not going to take the medicine today 12/18/22   Is there another option?  Thanks   Nain Peacock

## 2023-01-05 ENCOUNTER — HOSPITAL ENCOUNTER (OUTPATIENT)
Dept: CARDIOLOGY | Facility: HOSPITAL | Age: 63
Discharge: HOME | End: 2023-01-05
Admitting: INTERNAL MEDICINE
Payer: COMMERCIAL

## 2023-01-05 DIAGNOSIS — I65.23 BILATERAL CAROTID ARTERY STENOSIS: ICD-10-CM

## 2023-01-05 LAB
BH CV XLRA MEAS LEFT DIST CCA EDV: -16.8 CM/SEC
BH CV XLRA MEAS LEFT DIST CCA PSV: -70 CM/SEC
BH CV XLRA MEAS LEFT DIST ICA EDV: -20.9 CM/SEC
BH CV XLRA MEAS LEFT DIST ICA PSV: -81.6 CM/SEC
BH CV XLRA MEAS LEFT ICA/CCA RATIO: 1.2
BH CV XLRA MEAS LEFT MID CCA EDV: -22.4 CM/SEC
BH CV XLRA MEAS LEFT MID CCA PSV: -127.9 CM/SEC
BH CV XLRA MEAS LEFT MID ICA EDV: -31.8 CM/SEC
BH CV XLRA MEAS LEFT MID ICA PSV: -127.3 CM/SEC
BH CV XLRA MEAS LEFT PROX CCA EDV: 15 CM/SEC
BH CV XLRA MEAS LEFT PROX CCA PSV: 96.9 CM/SEC
BH CV XLRA MEAS LEFT PROX ECA PSV: 161 CM/SEC
BH CV XLRA MEAS LEFT PROX ICA EDV: -34.8 CM/SEC
BH CV XLRA MEAS LEFT PROX ICA PSV: -139.2 CM/SEC
BH CV XLRA MEAS LEFT PROX SCLA PSV: 130 CM/SEC
BH CV XLRA MEAS LEFT VERTEBRAL A PSV: 37 CM/SEC
BH CV XLRA MEAS RIGHT DIST CCA EDV: 13.1 CM/SEC
BH CV XLRA MEAS RIGHT DIST CCA PSV: 72.7 CM/SEC
BH CV XLRA MEAS RIGHT DIST ICA EDV: -24.4 CM/SEC
BH CV XLRA MEAS RIGHT DIST ICA PSV: -86.5 CM/SEC
BH CV XLRA MEAS RIGHT ICA/CCA RATIO: 0.82
BH CV XLRA MEAS RIGHT MID CCA EDV: 18.7 CM/SEC
BH CV XLRA MEAS RIGHT MID CCA PSV: 108.9 CM/SEC
BH CV XLRA MEAS RIGHT MID ICA EDV: -23.1 CM/SEC
BH CV XLRA MEAS RIGHT MID ICA PSV: -83.2 CM/SEC
BH CV XLRA MEAS RIGHT PROX CCA EDV: 10.2 CM/SEC
BH CV XLRA MEAS RIGHT PROX CCA PSV: 86.2 CM/SEC
BH CV XLRA MEAS RIGHT PROX ECA PSV: 141 CM/SEC
BH CV XLRA MEAS RIGHT PROX ICA EDV: -23.1 CM/SEC
BH CV XLRA MEAS RIGHT PROX ICA PSV: -89.8 CM/SEC
BH CV XLRA MEAS RIGHT PROX SCLA PSV: 96 CM/SEC
BH CV XLRA MEAS RIGHT VERTEBRAL A PSV: 48 CM/SEC
MAXIMAL PREDICTED HEART RATE: 159 BPM
STRESS TARGET HR: 135 BPM

## 2023-01-05 PROCEDURE — 93880 EXTRACRANIAL BILAT STUDY: CPT

## 2023-01-05 PROCEDURE — 93880 EXTRACRANIAL BILAT STUDY: CPT | Performed by: INTERNAL MEDICINE

## 2023-01-05 RX ORDER — LOVASTATIN 10 MG/1
10 TABLET ORAL NIGHTLY
Qty: 90 TABLET | Refills: 3 | Status: SHIPPED | OUTPATIENT
Start: 2023-01-05 | End: 2023-03-06 | Stop reason: SINTOL

## 2023-01-06 ENCOUNTER — TELEPHONE (OUTPATIENT)
Dept: CARDIOLOGY | Facility: CLINIC | Age: 63
End: 2023-01-06
Payer: COMMERCIAL

## 2023-01-06 NOTE — TELEPHONE ENCOUNTER
I spoke with Niranjan Peacock and updated pt on results/recommendations from provider.  Pt verbalized understanding and has no further questions at this time.    Thank you,    Jacklyn Gomez, RN  Triage [facility]  01/06/23 11:34 EST

## 2023-01-06 NOTE — TELEPHONE ENCOUNTER
Please let him know that I reviewed his carotid Doppler and he still has mild stenosis on the left side and plaque without significant stenosis on the right side.  This is essentially unchanged from prior study.  Continue current regimen.

## 2023-01-07 DIAGNOSIS — E78.2 MIXED HYPERLIPIDEMIA: ICD-10-CM

## 2023-01-09 RX ORDER — LOVASTATIN 10 MG/1
TABLET ORAL
Qty: 14 TABLET | OUTPATIENT
Start: 2023-01-09

## 2023-01-18 RX ORDER — EZETIMIBE 10 MG/1
TABLET ORAL
Qty: 30 TABLET | Refills: 3 | Status: SHIPPED | OUTPATIENT
Start: 2023-01-18 | End: 2023-03-06 | Stop reason: SINTOL

## 2023-01-30 ENCOUNTER — PATIENT MESSAGE (OUTPATIENT)
Dept: INTERNAL MEDICINE | Facility: CLINIC | Age: 63
End: 2023-01-30
Payer: COMMERCIAL

## 2023-01-30 DIAGNOSIS — I25.10 CORONARY ARTERY DISEASE INVOLVING NATIVE CORONARY ARTERY OF NATIVE HEART WITHOUT ANGINA PECTORIS: ICD-10-CM

## 2023-01-30 DIAGNOSIS — E78.2 MIXED HYPERLIPIDEMIA: Primary | ICD-10-CM

## 2023-02-01 ENCOUNTER — TELEPHONE (OUTPATIENT)
Dept: INTERNAL MEDICINE | Facility: CLINIC | Age: 63
End: 2023-02-01
Payer: COMMERCIAL

## 2023-02-01 PROBLEM — Z78.9 STATIN INTOLERANCE: Status: ACTIVE | Noted: 2023-02-01

## 2023-02-01 NOTE — TELEPHONE ENCOUNTER
"[facility]30[facility]20[facility] re: Ezetimibe Rx, possibly causing pain in arms and wrists.    Pt has not taken Rx since 1/30/2023, but is still experiencing what pt describes as \"burning numbness across shoulders\" down his arms into wrists, R side effected more than L side.    Pt states he took his Nitrogylcerin a few times but didn't notice improvement of symptoms.    Pt is concerned about ongoing side effects, whether this is a medication reaction or something more serious.    Please advise.    Pt can be reached at [phone]. Pt is going out of town later this week.  "

## 2023-02-01 NOTE — TELEPHONE ENCOUNTER
Discussed concerns with Dr. Marr.    Pt was advised, per Dr. Marr, to take ibuprofen or an NSAID, increase water intake, and keep an eye on symptoms for now. Pt was advised that it may be another day or two for zetia washout, and hydration is the best course at this time.     Pt was advised to contact office again if symptoms do not improve or worsen.    Pt verbalized understanding.

## 2023-03-06 ENCOUNTER — OFFICE VISIT (OUTPATIENT)
Dept: INTERNAL MEDICINE | Facility: CLINIC | Age: 63
End: 2023-03-06
Payer: COMMERCIAL

## 2023-03-06 VITALS
HEART RATE: 68 BPM | DIASTOLIC BLOOD PRESSURE: 66 MMHG | OXYGEN SATURATION: 98 % | SYSTOLIC BLOOD PRESSURE: 124 MMHG | TEMPERATURE: 97.5 F

## 2023-03-06 DIAGNOSIS — H60.311 ACUTE DIFFUSE OTITIS EXTERNA OF RIGHT EAR: Primary | ICD-10-CM

## 2023-03-06 DIAGNOSIS — I25.10 CORONARY ARTERY DISEASE INVOLVING NATIVE CORONARY ARTERY OF NATIVE HEART WITHOUT ANGINA PECTORIS: ICD-10-CM

## 2023-03-06 DIAGNOSIS — E78.2 MIXED HYPERLIPIDEMIA: ICD-10-CM

## 2023-03-06 DIAGNOSIS — Z78.9 STATIN INTOLERANCE: ICD-10-CM

## 2023-03-06 PROCEDURE — 99214 OFFICE O/P EST MOD 30 MIN: CPT | Performed by: FAMILY MEDICINE

## 2023-03-06 NOTE — PROGRESS NOTES
Date of Encounter: 2023  Patient: Niranjan Peacock,  1960    Subjective   History of Presenting Illness  Chief complaint: Right ear pain    Status post cerumen disimpaction by irrigation on 3/1/2023.  He has been having a sensation of burning and aching in that ear for the past 3 days.  He was not prescribed any drops after that visit.  Hearing is intact.  No nasal congestion.    Statin and ezetimibe intolerance, tried Repatha and he had muscle aches return.  Asking about bempedoic acid.    The following portions of the patient's history were reviewed and updated as appropriate: allergies, current medications, past family history, past medical history, past social history, past surgical history and problem list.    Patient Active Problem List   Diagnosis   • Coronary artery disease involving native coronary artery of native heart without angina pectoris   • Multiple thyroid nodules, benign per  US   • Pernicious anemia   • H/O multiple allergies   • Herniated nucleus pulposus, L3-4 right   • Right lumbar radiculopathy   • Pterygium of right eye   • Degenerative arthritis of cervical spine   • Essential hypertension   • History of DVT (deep vein thrombosis)   • Eczema of eyelid   • Chronic eczema of hand   • Chest pain   • Mixed hyperlipidemia   • Myalgia   • CHANTEL (obstructive sleep apnea)   • Statin and ezetimibe intolerance     Past Medical History:   Diagnosis Date   • Allergic    • Angina at rest (HCC) 2019    Added automatically from request for surgery [id number]   • B12 deficiency anemia    • Cellulitis of right lower extremity 2020   • Chest pain    • Chronic fatigue 2016   • Coronary artery disease involving native coronary artery of native heart without angina pectoris 08/10/2017   • Deep vein thrombosis (HCC)    • Headache    • History of blood clots     blood clot found in right lung    • HL (hearing loss)    • Hypertension    • Mixed hyperlipidemia 2022   • Multiple  thyroid nodules    • Multiple thyroid nodules    • CHANTEL (obstructive sleep apnea)    • Pernicious anemia    • Pulmonary embolism (HCC)     2015   • Syncope     2 yrs ago one time     Past Surgical History:   Procedure Laterality Date   • CARDIAC CATHETERIZATION N/A 08/03/2017    Procedure: Coronary angiography;  Surgeon: Cynthia Farley MD;  Location:  [facility];  Service:    • CARDIAC CATHETERIZATION  08/03/2017    Procedure: Functional Flow [place];  Surgeon: Cynthia Farley MD;  Location:  [facility];  Service:    • CARDIAC CATHETERIZATION N/A 08/03/2017    Procedure: Stent YI coronary;  Surgeon: Cynthia Farley MD;  Location:  [facility];  Service:    • CARDIAC CATHETERIZATION N/A 08/03/2017    Procedure: Left ventriculography;  Surgeon: Cynthia Farley MD;  Location:  [facility];  Service:    • CARDIAC CATHETERIZATION N/A 08/03/2017    Procedure: Left Heart Cath;  Surgeon: Cynthia Farley MD;  Location:  [facility];  Service:    • CARDIAC CATHETERIZATION N/A 08/20/2019    Procedure: Left Heart Cath;  Surgeon: Mulugeta Ac MD;  Location:  [facility];  Service: Cardiology   • CARDIAC CATHETERIZATION N/A 08/20/2019    Procedure: Coronary angiography;  Surgeon: Mulugeta Ac MD;  Location:  [facility];  Service: Cardiology   • CARDIAC CATHETERIZATION N/A 08/20/2019    Procedure: Left ventriculography;  Surgeon: Mulugeta Ac MD;  Location:  [facility];  Service: Cardiology   • CARDIAC CATHETERIZATION  8/20/2019   • COLONOSCOPY  MMVI    NORMAL.   • COLONOSCOPY     • FINE NEEDLE ASPIRATION  12/2015    Left thyroid nodule.  [place] category II.  Dr. Socrates Sanchez     Family History   Problem Relation Age of Onset   • Heart disease Other    • Hypertension Other    • Thyroid disease Other    • Heart disease Father    • Prostate cancer Father    • Hypertension Father     • Stroke Father    • Heart disease Mother    • Hypertension Mother    • Heart disease Brother    • Hypertension Brother    • Thyroid disease Sister    • Heart disease Brother    • Hypertension Brother        Current Outpatient Medications:   •  aspirin 81 MG tablet, Take 1 tablet by mouth Daily., Disp: 30 tablet, Rfl: 11  •  Bempedoic Acid 180 MG tablet, Take 1 tablet by mouth Daily., Disp: 30 tablet, Rfl: 0  •  Cholecalciferol (VITAMIN D3) 5000 units capsule capsule, Take 5,000 Units by mouth Daily., Disp: , Rfl:   •  Coenzyme Q10 (CoQ10) 100 MG capsule, Take 1 cap daily to reduce side effects of statin, Disp: 90 each, Rfl: 3  •  cyanocobalamin 1000 MCG/ML injection, INJECT 1ML INTRAMUSCULARLY EVERY 30 DAYS AS DIRECTED, Disp: 3 mL, Rfl: 3  •  fexofenadine (ALLEGRA) 60 MG tablet, Take 1 tablet by mouth Daily As Needed (allergies)., Disp: 90 tablet, Rfl: 3  •  hydrALAZINE (APRESOLINE) 50 MG tablet, Take 1 tablet by mouth 2 (Two) Times a Day., Disp: 180 tablet, Rfl: 3  •  hydroCHLOROthiazide (HYDRODIURIL) 12.5 MG tablet, Take 1 tab PO QD for blood pressure, Disp: 90 tablet, Rfl: 3  •  hydrocortisone 0.5 % cream, Apply  topically to the appropriate area as directed 2 (Two) Times a Day As Needed for Irritation. (Patient taking differently: Apply 1 application topically to the appropriate area as directed 2 (Two) Times a Day As Needed for Irritation.), Disp: 15 g, Rfl: 0  •  loteprednol (LOTEMAX) 0.2 % suspension, Administer 1 drop to both eyes 4 (Four) Times a Day As Needed (allergic conjunctivitis). Do not use for more than 2 weeks at a time, Disp: 5 mL, Rfl: 3  •  Multiple Vitamin (MULTI VITAMIN DAILY PO), Take 1 tablet by mouth Daily., Disp: , Rfl:   •  neomycin-polymyxin-hydrocortisone (CORTISPORIN) 3.5-99925-4 otic solution, Administer 3 drops to the right ear 3 (Three) Times a Day for 5 days., Disp: 10 mL, Rfl: 0  •  nitroglycerin (NITROSTAT) 0.4 MG SL tablet, Place 1 tablet under the tongue Every 5 (Five)  Minutes As Needed for Chest Pain for up to 1 dose. Take no more than 3 doses in 15 minutes., Disp: 100 tablet, Rfl: 3  •  triamcinolone (KENALOG) 0.1 % cream, Apply  topically to the appropriate area as directed 2 (Two) Times a Day. (Patient taking differently: Apply 1 application topically to the appropriate area as directed 2 (Two) Times a Day As Needed.), Disp: 28.4 g, Rfl: 1  Allergies   Allergen Reactions   • Lisinopril Shortness Of Breath     Rash also    • Repatha [Evolocumab] Myalgia   • Simvastatin Myalgia   • Amlodipine Myalgia     Muscle aches    • Valsartan Rash     muscle aches     Social History     Tobacco Use   • Smoking status: Never   • Smokeless tobacco: Never   • Tobacco comments:     caffeine use - 2 cups coffee dailyl    Vaping Use   • Vaping Use: Never used   Substance Use Topics   • Alcohol use: Yes     Comment: occasional   • Drug use: No          Objective   Physical Exam  Vitals:    03/06/23 1350   BP: 124/66   Pulse: 68   Temp: 97.5 °F (36.4 °C)   TempSrc: Infrared   SpO2: 98%     There is no height or weight on file to calculate BMI.    Constitutional: NAD.  Ear, nose, mouth, throat: Erythematous right external canal without ulceration or injury.  Tympanic membrane is normal-appearing.  No middle ear effusion.  Psychiatric: Normal affect. Normal thought content.     Assessment & Plan   Assessment and Plan  Pleasant 63-year-old male with CAD, statin intolerance, hypertension, pernicious anemia, multiple thyroid nodules, right lumbar radiculopathy, who presents with the following:    Diagnoses and all orders for this visit:    1. Acute diffuse otitis externa of right ear (Primary): Discussed reasons to return for further evaluation  -     neomycin-polymyxin-hydrocortisone (CORTISPORIN) 3.5-94359-8 otic solution; Administer 3 drops to the right ear 3 (Three) Times a Day for 5 days.  Dispense: 10 mL; Refill: 0    2. Mixed hyperlipidemia: Reviewed risks and benefits of this medication.  He  has tried almost every statin as well as ezetimibe and Repatha with myalgias and fatigue as side effects.  The symptoms almost always resolve after being off of the medications for about 2 weeks.  We should try everything.  Willing to try bempedoic acid.  May have to involve cardiology or endocrinology if he does not tolerate.  -     Bempedoic Acid 180 MG tablet; Take 1 tablet by mouth Daily.  Dispense: 30 tablet; Refill: 0  3. Statin and ezetimibe intolerance  -     Bempedoic Acid 180 MG tablet; Take 1 tablet by mouth Daily.  Dispense: 30 tablet; Refill: 0  4. Coronary artery disease involving native coronary artery of native heart without angina pectoris  -     Bempedoic Acid 180 MG tablet; Take 1 tablet by mouth Daily.  Dispense: 30 tablet; Refill: 0    Brian Marr MD  Family Medicine  O: [phone]    Disclaimer: Parts of this note were dictated by speech recognition. Minor errors in transcription may be present. Please call if questions.

## 2023-03-13 ENCOUNTER — PATIENT MESSAGE (OUTPATIENT)
Dept: INTERNAL MEDICINE | Facility: CLINIC | Age: 63
End: 2023-03-13
Payer: COMMERCIAL

## 2023-03-13 NOTE — TELEPHONE ENCOUNTER
From: Niranjan Peacock  To: Brian Marr  Sent: 3/13/2023 12:01 PM EDT  Subject: Nextletol    Dr Marr,   I have read some on Nexletol and have concerns about the serious side effects of taking it!   Are there any other options?   I don't want to risk getting major side effects. I may even suffer through the side effects of a previous statin over trying Nexletol if it is that important I take some type of cholesterol medication.    Thanks  Nain Peacock

## 2023-03-28 ENCOUNTER — OFFICE VISIT (OUTPATIENT)
Dept: CARDIOLOGY | Facility: CLINIC | Age: 63
End: 2023-03-28
Payer: COMMERCIAL

## 2023-03-28 VITALS
BODY MASS INDEX: 27.59 KG/M2 | HEIGHT: 74 IN | OXYGEN SATURATION: 98 % | HEART RATE: 64 BPM | DIASTOLIC BLOOD PRESSURE: 74 MMHG | RESPIRATION RATE: 16 BRPM | WEIGHT: 215 LBS | SYSTOLIC BLOOD PRESSURE: 126 MMHG

## 2023-03-28 DIAGNOSIS — G47.33 OSA (OBSTRUCTIVE SLEEP APNEA): ICD-10-CM

## 2023-03-28 DIAGNOSIS — I10 ESSENTIAL HYPERTENSION: ICD-10-CM

## 2023-03-28 DIAGNOSIS — Z78.9 STATIN INTOLERANCE: ICD-10-CM

## 2023-03-28 DIAGNOSIS — E78.2 MIXED HYPERLIPIDEMIA: ICD-10-CM

## 2023-03-28 DIAGNOSIS — I65.22 STENOSIS OF LEFT CAROTID ARTERY: ICD-10-CM

## 2023-03-28 DIAGNOSIS — I25.10 CORONARY ARTERY DISEASE INVOLVING NATIVE CORONARY ARTERY OF NATIVE HEART WITHOUT ANGINA PECTORIS: Primary | ICD-10-CM

## 2023-03-28 PROCEDURE — 93000 ELECTROCARDIOGRAM COMPLETE: CPT | Performed by: INTERNAL MEDICINE

## 2023-03-28 PROCEDURE — 99214 OFFICE O/P EST MOD 30 MIN: CPT | Performed by: INTERNAL MEDICINE

## 2023-03-28 NOTE — PROGRESS NOTES
CARDIOLOGY    Shonna Renteria MD    ENCOUNTER DATE:  03/28/2023    Niranjan Peacock / 63 y.o. / male        CHIEF COMPLAINT / REASON FOR OFFICE VISIT     Heart Problem      HISTORY OF PRESENT ILLNESS       HPI    Niranjan Peacock is a 63 y.o. male     This is a patient who followed with Dr. Mancilla. He has coronary artery disease. He has a history of stent to the proximal/mid-LAD in 2018, heart catheterization in 2019 showed mild to moderate nonobstructive disease. Echocardiogram 02/2020 showed a normal ejection fraction at 61%. He does have a history of MRSA cellulitis in 12/2020. He has hypertension, hyperlipidemia, B12 anemia, and mild bilateral carotid artery stenosis which was last checked in 06/2020. He has a history of DVT but is no longer on anticoagulation. He came to the emergency room in 07/2021 with chest pain which he described as a pressure or burning without exacerbating or relieving factors. He ruled out for myocardial infarction. He had a stress test which showed normal LV function and no evidence of ischemia, though he did have some EKG changes with exercise. He was discharged home and followed up with Shira in 08/2021, and was not having any change in his symptoms at that point in time.      I saw him in February 2022.  He had a syncopal episode.  He wore a Zio patch which is unremarkable.  Echocardiogram February 2022 showed normal LV function ejection fraction 65% and no significant valve disease.  Carotid Doppler of February 2022 showed mild bilateral carotid artery stenosis.  Repeat carotid Doppler in January 2023 showed plaque without stenosis of the right internal carotid artery and mild stenosis of the left internal carotid artery.    He comes in today for routine follow-up.  No chest pain, shortness of breath.  He has been intolerant to multiple statins.  He was tried on Repatha but said that it caused muscle aches and a rash around his eyes.  He had the shot a little over 2 weeks  "ago and says it is just now getting out of his system.  He is active and not having exertional symptoms.       REVIEW OF SYSTEMS     Review of Systems   Constitutional: Negative for chills, fever, weight gain and weight loss.   Cardiovascular: Negative for leg swelling.   Respiratory: Negative for cough, snoring and wheezing.    Hematologic/Lymphatic: Negative for bleeding problem. Does not bruise/bleed easily.   Skin: Negative for color change.   Musculoskeletal: Positive for joint pain and myalgias. Negative for falls.   Gastrointestinal: Negative for melena.   Genitourinary: Negative for hematuria.   Neurological: Negative for excessive daytime sleepiness.   Psychiatric/Behavioral: Negative for depression. The patient is not nervous/anxious.          VITAL SIGNS     Visit Vitals  /74 (BP Location: Right arm, Patient Position: Sitting, Cuff Size: Adult)   Pulse 64   Resp 16   Ht 188 cm (74\")   Wt 97.5 kg (215 lb)   SpO2 98%   BMI 27.60 kg/m²         Wt Readings from Last 3 Encounters:   03/28/23 97.5 kg (215 lb)   03/01/23 97.5 kg (215 lb)   11/22/22 99.2 kg (218 lb 9.6 oz)     Body mass index is 27.6 kg/m².      PHYSICAL EXAMINATION     Constitutional:       General: Not in acute distress.  Neck:      Vascular: No carotid bruit or JVD.   Pulmonary:      Effort: Pulmonary effort is normal.      Breath sounds: Normal breath sounds.   Cardiovascular:      Normal rate. Regular rhythm.      Murmurs: There is no murmur.   Psychiatric:         Mood and Affect: Mood and affect normal.           REVIEWED DATA       ECG 12 Lead    Date/Time: 3/28/2023 10:33 AM  Performed by: Shonna Renteria MD  Authorized by: Shonna Renteria MD   Comparison: compared with previous ECG from 9/27/2022  Similar to previous ECG  Rhythm: sinus rhythm  BPM: 64  Conduction: conduction normal  ST Segments: ST segments normal  T Waves: T waves normal    Clinical impression: normal ECG              Lipid Panel    Lipid Panel 5/27/22 "   Total Cholesterol 133   Triglycerides 145   HDL Cholesterol 38 (A)   VLDL Cholesterol 25   LDL Cholesterol  70   (A) Abnormal value              Lab Results   Component Value Date    GLUCOSE 94 07/05/2022    BUN 12 07/05/2022    CREATININE 1.00 07/27/2022    EGFRRESULT 76 07/05/2022    BCR 11 07/05/2022    K 4.3 07/05/2022    CO2 24 07/05/2022    CALCIUM 9.1 07/05/2022    PROTENTOTREF 6.3 07/05/2022    ALBUMIN 4.3 07/05/2022    BILITOT 0.3 07/05/2022    AST 27 07/05/2022    ALT 32 07/05/2022       ASSESSMENT & PLAN      Diagnosis Plan   1. Coronary artery disease involving native coronary artery of native heart without angina pectoris  Lipid Panel    Comprehensive Metabolic Panel    CBC (No Diff)      2. Essential hypertension  Lipid Panel    Comprehensive Metabolic Panel    CBC (No Diff)      3. Mixed hyperlipidemia  Lipid Panel    Comprehensive Metabolic Panel    CBC (No Diff)      4. Stenosis of left carotid artery  Lipid Panel    Comprehensive Metabolic Panel    CBC (No Diff)      5. CHANTEL (obstructive sleep apnea)  Lipid Panel    Comprehensive Metabolic Panel    CBC (No Diff)            1.  Coronary artery disease with stent to the LAD in 2018.  Negative stress test in Jul 2021.  2.  Hypertension.  He had a rash with lisinopril.  His blood pressure is well controlled today.  Continue current medications..  3.  Hyperlipidemia.  He has been intolerant to multiple statins and Zetia.  He tried Repatha but said that it also caused muscle pains and a rash around his eyes.  Its been a little over 2 weeks since he has taken his last dose of Repatha.  He says he is just now starting to feel better.  Dr. Marr prescribed bempedoic acid but he says he read up on the side effects and will not take it.  He has a physical after Memorial Day and I think we should recheck his cholesterol at that point in time and see where he is.  The only other medication I think that might be reasonable would be inclisiran (Leqvio).  I gave  him information from up-to-date about this medication and he is going to review it.  Check lipids in June to assess his LDL and see if he is interested in pursuing further medication.  4.  History of DVT but no longer on anticoagulation  5.  History of MRSA cellulitis  6.  B12 anemia  7.  Mild left carotid artery stenosis by Doppler January 2023.  8.  Palpitations.  He had some short but nonsustained arrhythmia on his Zio patch.    Currently not an issue.  9.  Syncope. Echo, Zio patch and carotid Doppler all unremarkable.       I will see him back in 6 months.    Orders Placed This Encounter   Procedures   • Lipid Panel     Standing Status:   Future     Standing Expiration Date:   3/28/2024     Order Specific Question:   Release to patient     Answer:   Routine Release   • Comprehensive Metabolic Panel     Standing Status:   Future     Standing Expiration Date:   3/28/2024     Order Specific Question:   Release to patient     Answer:   Routine Release   • CBC (No Diff)     Standing Status:   Future     Standing Expiration Date:   3/28/2024     Order Specific Question:   Release to patient     Answer:   Routine Release   • ECG 12 Lead     This order was created via procedure documentation     Order Specific Question:   Release to patient     Answer:   Routine Release           MEDICATIONS         Discharge Medications          Accurate as of March 28, 2023 10:34 AM. If you have any questions, ask your nurse or doctor.            Changes to Medications      Instructions Start Date   hydrocortisone 0.5 % cream  What changed: how much to take   Topical, 2 Times Daily PRN      triamcinolone 0.1 % cream  Commonly known as: KENALOG  What changed:   · how much to take  · when to take this  · reasons to take this   Topical, 2 Times Daily         Continue These Medications      Instructions Start Date   aspirin 81 MG tablet   81 mg, Oral, Daily      CoQ10 100 MG capsule   Take 1 cap daily to reduce side effects of statin       cyanocobalamin 1000 MCG/ML injection   INJECT 1ML INTRAMUSCULARLY EVERY 30 DAYS AS DIRECTED      fexofenadine 60 MG tablet  Commonly known as: ALLEGRA   60 mg, Oral, Daily PRN      hydrALAZINE 50 MG tablet  Commonly known as: APRESOLINE   50 mg, Oral, 2 Times Daily      hydroCHLOROthiazide 12.5 MG tablet  Commonly known as: HYDRODIURIL   Take 1 tab PO QD for blood pressure      loteprednol 0.2 % suspension  Commonly known as: LOTEMAX   1 drop, Both Eyes, 4 Times Daily PRN, Do not use for more than 2 weeks at a time      multivitamin tablet tablet  Commonly known as: THERAGRAN   1 tablet, Oral, Daily      nitroglycerin 0.4 MG SL tablet  Commonly known as: NITROSTAT   0.4 mg, Sublingual, Every 5 Minutes PRN, Take no more than 3 doses in 15 minutes.      vitamin D3 125 MCG (5000 UT) capsule capsule   5,000 Units, Oral, Daily               Shonna Renteria MD  03/28/23  10:34 EDT    Part of this note may be an electronic transcription/translation of spoken language to printed text using the Dragon dictation system.

## 2023-04-03 ENCOUNTER — HOSPITAL ENCOUNTER (OUTPATIENT)
Dept: GENERAL RADIOLOGY | Facility: HOSPITAL | Age: 63
Discharge: HOME | End: 2023-04-03
Admitting: NURSE PRACTITIONER
Payer: COMMERCIAL

## 2023-04-03 ENCOUNTER — PATIENT MESSAGE (OUTPATIENT)
Dept: INTERNAL MEDICINE | Facility: CLINIC | Age: 63
End: 2023-04-03

## 2023-04-03 ENCOUNTER — OFFICE VISIT (OUTPATIENT)
Dept: INTERNAL MEDICINE | Facility: CLINIC | Age: 63
End: 2023-04-03
Payer: COMMERCIAL

## 2023-04-03 VITALS
DIASTOLIC BLOOD PRESSURE: 72 MMHG | SYSTOLIC BLOOD PRESSURE: 126 MMHG | BODY MASS INDEX: 27.59 KG/M2 | TEMPERATURE: 98.2 F | HEART RATE: 60 BPM | OXYGEN SATURATION: 97 % | HEIGHT: 74 IN | WEIGHT: 215 LBS

## 2023-04-03 DIAGNOSIS — R07.81 RIB PAIN ON RIGHT SIDE: Primary | ICD-10-CM

## 2023-04-03 DIAGNOSIS — R07.81 RIB PAIN ON RIGHT SIDE: ICD-10-CM

## 2023-04-03 PROCEDURE — 71101 X-RAY EXAM UNILAT RIBS/CHEST: CPT

## 2023-04-03 PROCEDURE — 99213 OFFICE O/P EST LOW 20 MIN: CPT | Performed by: NURSE PRACTITIONER

## 2023-04-03 NOTE — PROGRESS NOTES
"Chief Complaint  Pain (Right rib, also there is a knot)    Subjective        Niranjan Peacock presents to [facility]  History of Present Illness  Here with complaint of bottom rib pain and a knot, on and off, for at least 2 years. No prior imaging, no medication used. He noticed the knot last night, which is new.     No SOA, can move around but if stretches to left improves the pain. If bends forward, hurts. No known prior injury. No cough.       Objective   Vital Signs:  /72 (BP Location: Right arm, Patient Position: Sitting, Cuff Size: Adult)   Pulse 60   Temp 98.2 °F (36.8 °C) (Infrared)   Ht 188 cm (74.02\")   Wt 97.5 kg (215 lb)   SpO2 97%   BMI 27.59 kg/m²   Estimated body mass index is 27.59 kg/m² as calculated from the following:    Height as of this encounter: 188 cm (74.02\").    Weight as of this encounter: 97.5 kg (215 lb).             Physical Exam  Constitutional:       Appearance: Normal appearance.   Cardiovascular:      Rate and Rhythm: Normal rate and regular rhythm.      Pulses: Normal pulses.      Heart sounds: Normal heart sounds.   Pulmonary:      Effort: Pulmonary effort is normal.      Breath sounds: Normal breath sounds.   Musculoskeletal:        Arms:    Neurological:      Mental Status: He is alert.        Result Review :                   Assessment and Plan   Patient is a pleasant 63-year-old male with history of coronary artery disease, essential hypertension, next hyperlipidemia, stenosis of the left carotid artery, history of DVT, multiple thyroid nodules benign per 2022 ultrasound, pernicious anemia, herniated nucleus pulposis L3-4 right, right lumbar radiculopathy, degenerative arthritis of the cervical spine waxing and waning chronic right rib pain, now with a nodule he noticed about 1 day ago.    Suspect a fatty lipoma, but will obtain x-ray to determine further treatment imaging.    Diagnoses with recommendations\treatment are as " follows:    Diagnoses and all orders for this visit:    1. Rib pain on right side (Primary)  Comments:  May try heat on area to help with pain.   Orders:  -     XR Ribs Right With PA Chest; Future             Follow Up   Return if symptoms worsen or fail to improve.  Patient was given instructions and counseling regarding his condition or for health maintenance advice. Please see specific information pulled into the AVS if appropriate.         Answers for HPI/ROS submitted by the patient on 4/3/2023  What is the primary reason for your visit?: Other  Please describe your symptoms.: Right rib pain and bump  Have you had these symptoms before?: Yes  How long have you been having these symptoms?: Greater than 2 weeks  Please list any medications you are currently taking for this condition.: 0  Please describe any probable cause for these symptoms. : None

## 2023-04-04 NOTE — TELEPHONE ENCOUNTER
From: Niranjan Peacock  To: Danay JACKSON Galen  Sent: 4/3/2023 3:32 PM EDT  Subject: Test Results Question    I have a question about XR RIBS RIGHT W PA CHEST resulted on 4/3/23, 2:18 PM.    Danay, this pain has been going on and off for a couple years or more. The lump is something new. I don't really wanna wait around. I would like to get a checked. Heat and Tylenol will not do it. I have tried it in the past. Thank you, Nain Peacock.

## 2023-04-19 ENCOUNTER — HOSPITAL ENCOUNTER (OUTPATIENT)
Dept: CT IMAGING | Facility: HOSPITAL | Age: 63
Discharge: HOME | End: 2023-04-19
Admitting: NURSE PRACTITIONER
Payer: COMMERCIAL

## 2023-04-19 DIAGNOSIS — R07.81 RIB PAIN ON RIGHT SIDE: ICD-10-CM

## 2023-04-19 LAB
CREAT SERPL-MCNC: 1.02 MG/DL (ref 0.76–1.27)
EGFRCR SERPLBLD CKD-EPI 2021: 83.1 ML/MIN/1.73

## 2023-04-19 PROCEDURE — 25510000001 IOPAMIDOL PER 1 ML: Performed by: NURSE PRACTITIONER

## 2023-04-19 PROCEDURE — 71260 CT THORAX DX C+: CPT

## 2023-04-19 PROCEDURE — 82565 ASSAY OF CREATININE: CPT | Performed by: NURSE PRACTITIONER

## 2023-04-19 RX ADMIN — IOPAMIDOL 100 ML: 755 INJECTION, SOLUTION INTRAVENOUS at 08:23

## 2023-04-21 NOTE — PROGRESS NOTES
Spoke to pt about results, pt states that he is still experiencing pain in ribs. Pt states that it is not so bad when he's standing. But when he is sitting or laying, it wakes him up and hurts constantly.     Pt requests to be scheduled with Dr. Marr, pt was scheduled for 4/24/23

## 2023-04-24 ENCOUNTER — OFFICE VISIT (OUTPATIENT)
Dept: INTERNAL MEDICINE | Facility: CLINIC | Age: 63
End: 2023-04-24
Payer: COMMERCIAL

## 2023-04-24 VITALS
DIASTOLIC BLOOD PRESSURE: 60 MMHG | HEART RATE: 69 BPM | WEIGHT: 215 LBS | OXYGEN SATURATION: 97 % | BODY MASS INDEX: 27.59 KG/M2 | HEIGHT: 74 IN | TEMPERATURE: 98 F | SYSTOLIC BLOOD PRESSURE: 148 MMHG

## 2023-04-24 DIAGNOSIS — Z78.9 STATIN INTOLERANCE: Primary | ICD-10-CM

## 2023-04-24 DIAGNOSIS — M79.10 MYALGIA: ICD-10-CM

## 2023-04-24 RX ORDER — PREDNISONE 10 MG/1
TABLET ORAL
Qty: 28 TABLET | Refills: 0 | Status: SHIPPED | OUTPATIENT
Start: 2023-04-24 | End: 2023-05-06

## 2023-04-24 NOTE — PROGRESS NOTES
Date of Encounter: 2023  Patient: Niranjan Peacock,  1960    Subjective   History of Presenting Illness  Chief complaint: Myalgias    Since taking the Repatha injection patient has had aching particularly in his right bicep, right wrist, left wrist as well. nothing in his shoulder girdle.  He feels strongly that this started with the Repatha and was not having this prior to this medicine.  His injection was about 6 weeks ago, known half-life of Repatha is up to 17 days.    He does have pain in his right rib cage, focal, reproducible with palpation, does not worsen with deep breathing.  Is been present for about 4 weeks and is not getting better but also not getting worse.  He does have a history of a similar pain and that got better with time.  Not interested in medication for this yet, just wants to make sure it is benign    The following portions of the patient's history were reviewed and updated as appropriate: allergies, current medications, past family history, past medical history, past social history, past surgical history and problem list.    Patient Active Problem List   Diagnosis   • Coronary artery disease involving native coronary artery of native heart without angina pectoris   • Multiple thyroid nodules, benign per  US   • Pernicious anemia   • H/O multiple allergies   • Herniated nucleus pulposus, L3-4 right   • Right lumbar radiculopathy   • Pterygium of right eye   • Degenerative arthritis of cervical spine   • Essential hypertension   • History of DVT (deep vein thrombosis)   • Eczema of eyelid   • Chronic eczema of hand   • Chest pain   • Mixed hyperlipidemia   • Myalgia   • CHANTEL (obstructive sleep apnea)   • Statin and ezetimibe intolerance   • Stenosis of left carotid artery     Past Medical History:   Diagnosis Date   • Allergic    • Angina at rest 2019    Added automatically from request for surgery [id number]   • B12 deficiency anemia    • Cellulitis of right lower extremity  12/24/2020   • Chest pain    • Chronic fatigue 03/09/2016   • Coronary artery disease involving native coronary artery of native heart without angina pectoris 08/10/2017   • Deep vein thrombosis    • Headache    • History of blood clots     blood clot found in right lung    • HL (hearing loss)    • Hypertension    • Mixed hyperlipidemia 02/02/2022   • Multiple thyroid nodules    • Multiple thyroid nodules    • CHANTEL (obstructive sleep apnea)    • Pernicious anemia    • Pulmonary embolism     2015   • Stenosis of left carotid artery 3/28/2023   • Syncope     2 yrs ago one time     Past Surgical History:   Procedure Laterality Date   • CARDIAC CATHETERIZATION N/A 08/03/2017    Procedure: Coronary angiography;  Surgeon: Cynthia Farley MD;  Location:  [facility];  Service:    • CARDIAC CATHETERIZATION  08/03/2017    Procedure: Functional Flow [place];  Surgeon: Cynthia Farley MD;  Location: [facility];  Service:    • CARDIAC CATHETERIZATION N/A 08/03/2017    Procedure: Stent YI coronary;  Surgeon: Cynthia Farley MD;  Location: [facility] LOCATION;  Service:    • CARDIAC CATHETERIZATION N/A 08/03/2017    Procedure: Left ventriculography;  Surgeon: Cynthia Farley MD;  Location: [facility] LOCATION;  Service:    • CARDIAC CATHETERIZATION N/A 08/03/2017    Procedure: Left Heart Cath;  Surgeon: Cynthia Farley MD;  Location: [facility]ATH [facility] LOCATION;  Service:    • CARDIAC CATHETERIZATION N/A 08/20/2019    Procedure: Left Heart Cath;  Surgeon: Mulugeta Ac MD;  Location: [facility]ATH [facility] LOC[facility];  Service: Cardiology   • CARDIAC CATHETERIZATION N/A 08/20/2019    Procedure: Coronary angiography;  Surgeon: Mulugeta Ac MD;  Location: [facility]ATH [facility] LOCATION;  Service: Cardiology   • CARDIAC CATHETERIZATION N/A 08/20/2019    Procedure: Left ventriculography;  Surgeon: Mulugeta Ac MD;  Location: [facility];  Service:  Cardiology   • CARDIAC CATHETERIZATION  8/20/2019   • COLONOSCOPY  MMVI    NORMAL.   • COLONOSCOPY     • FINE NEEDLE ASPIRATION  12/2015    Left thyroid nodule.  Pound category II.  Dr. Socrates Sanchez     Family History   Problem Relation Age of Onset   • Heart disease Other    • Hypertension Other    • Thyroid disease Other    • Heart disease Father    • Prostate cancer Father    • Hypertension Father    • Stroke Father    • Heart disease Mother    • Hypertension Mother    • Heart disease Brother    • Hypertension Brother    • Thyroid disease Sister    • Heart disease Brother    • Hypertension Brother        Current Outpatient Medications:   •  aspirin 81 MG tablet, Take 1 tablet by mouth Daily., Disp: 30 tablet, Rfl: 11  •  Cholecalciferol (VITAMIN D3) 5000 units capsule capsule, Take 1 capsule by mouth Daily., Disp: , Rfl:   •  Coenzyme Q10 (CoQ10) 100 MG capsule, Take 1 cap daily to reduce side effects of statin, Disp: 90 each, Rfl: 3  •  cyanocobalamin 1000 MCG/ML injection, INJECT 1ML INTRAMUSCULARLY EVERY 30 DAYS AS DIRECTED, Disp: 3 mL, Rfl: 3  •  fexofenadine (ALLEGRA) 60 MG tablet, Take 1 tablet by mouth Daily As Needed (allergies)., Disp: 90 tablet, Rfl: 3  •  hydrALAZINE (APRESOLINE) 50 MG tablet, Take 1 tablet by mouth 2 (Two) Times a Day., Disp: 180 tablet, Rfl: 3  •  hydroCHLOROthiazide (HYDRODIURIL) 12.5 MG tablet, Take 1 tab PO QD for blood pressure, Disp: 90 tablet, Rfl: 3  •  hydrocortisone 0.5 % cream, Apply  topically to the appropriate area as directed 2 (Two) Times a Day As Needed for Irritation. (Patient taking differently: Apply 1 application topically to the appropriate area as directed 2 (Two) Times a Day As Needed for Irritation.), Disp: 15 g, Rfl: 0  •  Multiple Vitamin (MULTI VITAMIN DAILY PO), Take 1 tablet by mouth Daily., Disp: , Rfl:   •  nitroglycerin (NITROSTAT) 0.4 MG SL tablet, Place 1 tablet under the tongue Every 5 (Five) Minutes As Needed for Chest Pain for up to 1 dose.  "Take no more than 3 doses in 15 minutes., Disp: 100 tablet, Rfl: 3  •  triamcinolone (KENALOG) 0.1 % cream, Apply  topically to the appropriate area as directed 2 (Two) Times a Day. (Patient taking differently: Apply 1 application topically to the appropriate area as directed 2 (Two) Times a Day As Needed.), Disp: 28.4 g, Rfl: 1  •  loteprednol (LOTEMAX) 0.2 % suspension, Administer 1 drop to both eyes 4 (Four) Times a Day As Needed (allergic conjunctivitis). Do not use for more than 2 weeks at a time (Patient not taking: Reported on 4/24/2023), Disp: 5 mL, Rfl: 3  •  predniSONE (DELTASONE) 10 MG tablet, Take 4 tablets by mouth Daily for 4 days, THEN 2 tablets Daily for 4 days, THEN 1 tablet Daily for 4 days., Disp: 28 tablet, Rfl: 0  Allergies   Allergen Reactions   • Lisinopril Shortness Of Breath     Rash also    • Repatha [Evolocumab] Myalgia   • Simvastatin Myalgia   • Amlodipine Myalgia     Muscle aches    • Valsartan Rash     muscle aches     Social History     Tobacco Use   • Smoking status: Never   • Smokeless tobacco: Never   • Tobacco comments:     caffeine use - 2 cups coffee dailyl    Vaping Use   • Vaping Use: Never used   Substance Use Topics   • Alcohol use: Yes     Comment: occasional   • Drug use: No          Objective   Physical Exam  Vitals:    04/24/23 1256   BP: 148/60   BP Location: Right arm   Patient Position: Sitting   Cuff Size: Large Adult   Pulse: 69   Temp: 98 °F (36.7 °C)   TempSrc: Temporal   SpO2: 97%   Weight: 97.5 kg (215 lb)   Height: 188 cm (74\")     Body mass index is 27.6 kg/m².    Constitutional: NAD.  Psychiatric: Normal affect. Normal thought content.     Assessment & Plan   Assessment and Plan  Pleasant 63-year-old male with CAD,  antilipid medication intolerance, hypertension, pernicious anemia, multiple thyroid nodules, right lumbar radiculopathy, who presents with the following:    Diagnoses and all orders for this visit:    1. Statin and ezetimibe intolerance " (Primary)  -     predniSONE (DELTASONE) 10 MG tablet; Take 4 tablets by mouth Daily for 4 days, THEN 2 tablets Daily for 4 days, THEN 1 tablet Daily for 4 days.  Dispense: 28 tablet; Refill: 0  2. Myalgia  -     predniSONE (DELTASONE) 10 MG tablet; Take 4 tablets by mouth Daily for 4 days, THEN 2 tablets Daily for 4 days, THEN 1 tablet Daily for 4 days.  Dispense: 28 tablet; Refill: 0    He just has a very peculiar story of very consistent intolerance to any cholesterol medication that manifest as myalgias and joint aching, almost always resolves with medication cessation.  Symptoms since starting Repatha have not resolved.  May still be in his system based upon half-life.  Do have a history of ESR, CK, CRP, and rheumatoid factor testing which was negative.  Polymyalgia rheumatica is on the differential but his distribution is not classic.  I recommend a course of prednisone empirically to treat as a medication related myopathy, if not improving would recommend further rheumatologic testing to evaluate for comorbid condition.  I do not feel strongly about continuing to try cholesterol medications due to his degree of symptoms every time he takes one.    Brian Marr MD  Family Medicine  O: [phone]    Disclaimer: Parts of this note were dictated by speech recognition. Minor errors in transcription may be present. Please call if questions.

## 2023-05-08 ENCOUNTER — PATIENT MESSAGE (OUTPATIENT)
Dept: INTERNAL MEDICINE | Facility: CLINIC | Age: 63
End: 2023-05-08
Payer: COMMERCIAL

## 2023-05-08 DIAGNOSIS — R07.81 RIB PAIN ON RIGHT SIDE: Primary | ICD-10-CM

## 2023-05-08 NOTE — TELEPHONE ENCOUNTER
From: Niranjan Peacock  To: Brian Marr  Sent: 5/8/2023 8:44 AM EDT  Subject: Right lower rib pain    Dr Marr,   I completed the steroid med. 5/5/23. Still have the lower rib pain.  It is bad enough to keep me awake at night. Not sure what is causing it, but it is painful.  The steroid did seem to help with the lingering side effects of the statin med. Not all are gone but better by quite a bit.   For the about the past week, I have been waking up wet from sweat in the middle of the night. So much the bed is damp in my chest area.  Nain Peacock

## 2023-05-09 RX ORDER — LIDOCAINE 50 MG/G
1 PATCH TOPICAL EVERY 24 HOURS
Qty: 15 EACH | Refills: 1 | Status: SHIPPED | OUTPATIENT
Start: 2023-05-09

## 2023-05-11 RX ORDER — SYRINGE AND NEEDLE,INSULIN,1ML 25GX1"
SYRINGE, EMPTY DISPOSABLE MISCELLANEOUS
Qty: 25 EACH | Refills: 3 | Status: SHIPPED | OUTPATIENT
Start: 2023-05-11

## 2023-05-11 NOTE — TELEPHONE ENCOUNTER
"Rx Refill Note  Requested Prescriptions     Pending Prescriptions Disp Refills   • B-D INSULIN SYRINGE 1CC/25GX1\" 25G X 1\" 1 ML misc [Pharmacy Med Name: BD INSULIN SYR 1 ML 25GX1\"] 25 each      Sig: USE TO ADMINISTER B-12 INJECTIONS AS DIRECTED      Last office visit with prescribing clinician: 4/24/2023   Last telemedicine visit with prescribing clinician: 4/24/2023   Next office visit with prescribing clinician: 5/30/2023                         Would you like a call back once the refill request has been completed: [] Yes [] No    If the office needs to give you a call back, can they leave a voicemail: [] Yes [] No    Ann Beltran MA  05/11/23, 11:37 EDT  "

## 2023-05-30 ENCOUNTER — OFFICE VISIT (OUTPATIENT)
Dept: INTERNAL MEDICINE | Facility: CLINIC | Age: 63
End: 2023-05-30

## 2023-05-30 VITALS
HEART RATE: 72 BPM | BODY MASS INDEX: 27.09 KG/M2 | WEIGHT: 211 LBS | SYSTOLIC BLOOD PRESSURE: 122 MMHG | OXYGEN SATURATION: 97 % | TEMPERATURE: 96 F | DIASTOLIC BLOOD PRESSURE: 64 MMHG

## 2023-05-30 DIAGNOSIS — R35.0 BENIGN PROSTATIC HYPERPLASIA WITH URINARY FREQUENCY: ICD-10-CM

## 2023-05-30 DIAGNOSIS — Z23 NEED FOR TD VACCINE: ICD-10-CM

## 2023-05-30 DIAGNOSIS — Z12.5 SCREENING FOR MALIGNANT NEOPLASM OF PROSTATE: ICD-10-CM

## 2023-05-30 DIAGNOSIS — E78.2 MIXED HYPERLIPIDEMIA: ICD-10-CM

## 2023-05-30 DIAGNOSIS — R73.09 ELEVATED GLUCOSE: ICD-10-CM

## 2023-05-30 DIAGNOSIS — Z00.00 ANNUAL PHYSICAL EXAM: Primary | ICD-10-CM

## 2023-05-30 DIAGNOSIS — D51.0 PERNICIOUS ANEMIA: ICD-10-CM

## 2023-05-30 DIAGNOSIS — I65.22 STENOSIS OF LEFT CAROTID ARTERY: ICD-10-CM

## 2023-05-30 DIAGNOSIS — I25.10 CORONARY ARTERY DISEASE INVOLVING NATIVE CORONARY ARTERY OF NATIVE HEART WITHOUT ANGINA PECTORIS: ICD-10-CM

## 2023-05-30 DIAGNOSIS — G47.33 OSA (OBSTRUCTIVE SLEEP APNEA): ICD-10-CM

## 2023-05-30 DIAGNOSIS — E04.2 MULTIPLE THYROID NODULES: ICD-10-CM

## 2023-05-30 DIAGNOSIS — N40.1 BENIGN PROSTATIC HYPERPLASIA WITH URINARY FREQUENCY: ICD-10-CM

## 2023-05-30 DIAGNOSIS — I10 ESSENTIAL HYPERTENSION: ICD-10-CM

## 2023-05-30 DIAGNOSIS — M54.16 RIGHT LUMBAR RADICULOPATHY: ICD-10-CM

## 2023-05-30 DIAGNOSIS — Z78.9 STATIN INTOLERANCE: ICD-10-CM

## 2023-05-30 PROBLEM — R07.9 CHEST PAIN: Status: RESOLVED | Noted: 2021-07-17 | Resolved: 2023-05-30

## 2023-05-30 PROBLEM — M79.10 MYALGIA: Status: RESOLVED | Noted: 2022-05-27 | Resolved: 2023-05-30

## 2023-05-30 RX ORDER — TAMSULOSIN HYDROCHLORIDE 0.4 MG/1
1 CAPSULE ORAL DAILY
Qty: 30 CAPSULE | Refills: 1 | Status: SHIPPED | OUTPATIENT
Start: 2023-05-30

## 2023-05-30 NOTE — PROGRESS NOTES
Date of Encounter: 2023  Patient: Niranjan Peacock,  1960    Subjective   History of Presenting Illness  Chief complaint: Annual physical     No acute concerns.    Coronary artery disease: Angina with subsequent LAD stent , recatheterization 2019 showed 40 to 50% restenosis.  He does have occasional chest pain that he feels is related to indigestion.  This does not appear to be worsening and is responding to Tums and dietary management.  Since his cardiologist retired he has been seeing a nurse practitioner, he would like to see a cardiologist if possible.    Statin intolerance, we have tried every single statin except for fluvastatin, has not tolerated these with or without co-Q10, did not tolerate ezetimibe, very prolonged myalgias with Repatha.  He is willing to try fluvastatin or any other antilipid medication later this year once he is done doing some housework.    Stenosis of the left carotid artery with plaque on the right, last ultrasound 2023 followed by cardiology.  Less than 50%.    Multiple thyroid nodules benign by  ultrasound per TI-RADS criteria.    Pernicious anemia managed with monthly B12 injections    Longstanding right lumbar radiculopathy manifesting as right anterior lower rib pain.  Improving recently with modification of activities.  CT chest earlier this year was unremarkable.    Urinary hesitancy and reduced flow on review of systems.  Has never been on medications before.  Wakes up twice nightly and water restricts before bedtime.    CHANTEL adherent to CPAP.    Lives with wife Nolvai. 3 children and 7 grandkids.  Never smoker.  Less than 5 alcoholic drinks per week.  Exercises by biking 2-3 times per week.  Diet is fairly healthy low in red meat without sugared beverages.  Works as an  for commercial real estate.  Does not have a living will.  Last colonoscopy  with no polyps.  Due for Td, discussed COVID booster.    Review of Systems:  Negative  for fever, congestion, chest pain upon exertion, shortness of breath, vision changes, vomiting, dysuria, lymphadenopathy, muscle weakness, numbness, mood changes, rashes.    The following portions of the patient's history were reviewed and updated as appropriate: allergies, current medications, past family history, past medical history, past social history, past surgical history and problem list.    Patient Active Problem List   Diagnosis   • Coronary artery disease involving native coronary artery of native heart without angina pectoris   • Multiple thyroid nodules, benign per 2022 US   • Pernicious anemia   • H/O multiple allergies   • Herniated nucleus pulposus, L3-4 right   • Right lumbar radiculopathy   • Pterygium of right eye   • Degenerative arthritis of cervical spine   • Essential hypertension   • History of DVT (deep vein thrombosis)   • Eczema of eyelid   • Chronic eczema of hand   • Mixed hyperlipidemia   • CHANTEL (obstructive sleep apnea)   • Statin and ezetimibe intolerance   • Stenosis of left carotid artery     Past Medical History:   Diagnosis Date   • Allergic    • Angina at rest 08/16/2019    Added automatically from request for surgery [id number]   • B12 deficiency anemia    • Cellulitis of right lower extremity 12/24/2020   • Chest pain    • Chronic fatigue 03/09/2016   • Coronary artery disease involving native coronary artery of native heart without angina pectoris 08/10/2017   • Deep vein thrombosis    • Headache    • History of blood clots     blood clot found in right lung    • HL (hearing loss)    • Hypertension    • Mixed hyperlipidemia 02/02/2022   • Multiple thyroid nodules    • Multiple thyroid nodules    • CHANTEL (obstructive sleep apnea)    • Pernicious anemia    • Pulmonary embolism     2015   • Stenosis of left carotid artery 3/28/2023   • Syncope     2 yrs ago one time     Past Surgical History:   Procedure Laterality Date   • CARDIAC CATHETERIZATION N/A 08/03/2017    Procedure: Coronary  angiography;  Surgeon: Cynthia Farley MD;  Location[facility];  Service:    • CARDIAC CATHETERIZATION  08/03/2017    Procedure: Functional Flow [place];  Surgeon: Cynthia Farley MD;  [facility];  Service:    • CARDIAC CATHETERIZATION N/A 08/03/2017    Procedure: Stent YI coronary;  Surgeon: Cynthia Farley MD;  [facility];  Service:    • CARDIAC CATHETERIZATION N/A 08/03/2017    Procedure: Left ventriculography;  Surgeon: Cynthia Farley MD;  [facility];  Service:    • CARDIAC CATHETERIZATION N/A 08/03/2017    Procedure: Left Heart Cath;  Surgeon: Cynthia Farley MD;  [facility];  Service:    • CARDIAC CATHETERIZATION N/A 08/20/2019    Procedure: Left Heart Cath;  Surgeon: Mulugeta Ac MD;  Location: [facility] CATH INVASIVE LOCATION;  Service: Cardiology   • CARDIAC CATHETERIZATION N/A 08/20/2019    Procedure: Coronary angiography;  Surgeon: Mulugeta Ac MD;  Location: [facility] CATH INVASIVE LOCATION;  Service: Cardiology   • CARDIAC CATHETERIZATION N/A 08/20/2019    Procedure: Left ventriculography;  Surgeon: Mulugeta Ac MD;  Location: [facility] CATH INVASIVE LOCATION;  Service: Cardiology   • CARDIAC CATHETERIZATION  8/20/2019   • COLONOSCOPY  MMVI    NORMAL.   • COLONOSCOPY     • FINE NEEDLE ASPIRATION  12/2015    Left thyroid nodule.  [place] category II.  Dr. Socrates Sanchez     Family History   Problem Relation Age of Onset   • Heart disease Other    • Hypertension Other    • Thyroid disease Other    • Heart disease Father    • Prostate cancer Father    • Hypertension Father    • Stroke Father    • Heart disease Mother    • Hypertension Mother    • Heart disease Brother    • Hypertension Brother    • Thyroid disease Sister    • Heart disease Brother    • Hypertension Brother        Current Outpatient Medications:   •  aspirin 81 MG tablet, Take 1 tablet by mouth Daily.,  "Disp: 30 tablet, Rfl: 11  •  B-D INSULIN SYRINGE 1CC/25GX1\" 25G X 1\" 1 ML misc, USE TO ADMINISTER B-12 INJECTIONS AS DIRECTED, Disp: 25 each, Rfl: 3  •  Cholecalciferol (VITAMIN D3) 5000 units capsule capsule, Take 1 capsule by mouth Daily., Disp: , Rfl:   •  cyanocobalamin 1000 MCG/ML injection, INJECT 1ML INTRAMUSCULARLY EVERY 30 DAYS AS DIRECTED, Disp: 3 mL, Rfl: 3  •  fexofenadine (ALLEGRA) 60 MG tablet, Take 1 tablet by mouth Daily As Needed (allergies)., Disp: 90 tablet, Rfl: 3  •  hydrALAZINE (APRESOLINE) 50 MG tablet, Take 1 tablet by mouth 2 (Two) Times a Day., Disp: 180 tablet, Rfl: 3  •  hydroCHLOROthiazide (HYDRODIURIL) 12.5 MG tablet, Take 1 tab PO QD for blood pressure, Disp: 90 tablet, Rfl: 3  •  hydrocortisone 0.5 % cream, Apply  topically to the appropriate area as directed 2 (Two) Times a Day As Needed for Irritation. (Patient taking differently: Apply 1 application topically to the appropriate area as directed 2 (Two) Times a Day As Needed for Irritation.), Disp: 15 g, Rfl: 0  •  lidocaine (LIDODERM) 5 %, Place 1 patch on the skin as directed by provider Daily. Remove & Discard patch within 12 hours or as directed by MD, Disp: 15 each, Rfl: 1  •  loteprednol (LOTEMAX) 0.2 % suspension, Administer 1 drop to both eyes 4 (Four) Times a Day As Needed (allergic conjunctivitis). Do not use for more than 2 weeks at a time, Disp: 5 mL, Rfl: 3  •  Multiple Vitamin (MULTI VITAMIN DAILY PO), Take 1 tablet by mouth Daily., Disp: , Rfl:   •  nitroglycerin (NITROSTAT) 0.4 MG SL tablet, Place 1 tablet under the tongue Every 5 (Five) Minutes As Needed for Chest Pain for up to 1 dose. Take no more than 3 doses in 15 minutes., Disp: 100 tablet, Rfl: 3  •  tamsulosin (FLOMAX) 0.4 MG capsule 24 hr capsule, Take 1 capsule by mouth Daily., Disp: 30 capsule, Rfl: 1  Allergies   Allergen Reactions   • Lisinopril Shortness Of Breath     Rash also    • Repatha [Evolocumab] Myalgia   • Simvastatin Myalgia   • Amlodipine " Myalgia     Muscle aches    • Valsartan Rash     muscle aches     Social History     Tobacco Use   • Smoking status: Never   • Smokeless tobacco: Never   • Tobacco comments:     caffeine use - 2 cups coffee dailyl    Vaping Use   • Vaping Use: Never used   Substance Use Topics   • Alcohol use: Yes     Comment: occasional   • Drug use: No          Objective   Physical Exam  Vitals:    05/30/23 0742   BP: 122/64   BP Location: Left arm   Patient Position: Sitting   Cuff Size: Large Adult   Pulse: 72   Temp: 96 °F (35.6 °C)   TempSrc: Infrared   SpO2: 97%   Weight: 95.7 kg (211 lb)     Body mass index is 27.09 kg/m².    Constitutional: NAD.  Eyes: EOMI. PERRLA. Normal conjunctiva.  Pterygium right eye.  Ear, nose, mouth, throat: No tonsillar exudates or erythema.   Normal nasal mucosa. Normal external ear canals and TMs bilaterally.  Cardiovascular: RRR. No murmurs. No LE edema b/l. Radial pulses 2+ bilaterally.  Pulmonary: CTA b/l. Good effort.  Integumentary: No rashes or wounds on face or upper extremities.  Lymphatic: No anterior cervical lymphadenopathy.  Endocrine: No thyromegaly or palpable thyroid nodules.  Psychiatric: Normal affect. Normal thought content.  Gastrointestinal: Nondistended. No hepatosplenomegaly. No focal tenderness to palpation. Normal bowel sounds.       Assessment & Plan   Assessment and Plan  Pleasant 63-year-old male with coronary artery disease, upper lipidemia with antilipid medication intolerance, hypertension, left carotid artery stenosis, benign thyroid nodules, pernicious anemia, osteoarthritis of the spine with right lumbar radiculopathy, CHANTEL on CPAP, who presents with the following:    Diagnoses and all orders for this visit:    1. Annual physical exam (Primary): We discussed preventative care including age and patient-appropriate immunizations and cancer screening. We also discussed the importance of exercise and a healthy diet. This is their annual preventative exam.    2.  Coronary artery disease involving native coronary artery of native heart without angina pectoris: Has not tolerated multiple statin medications, ezetimibe, Repatha.  Willing to try fluvastatin later this year, he will message me when ready.  Having indigestion manifesting as chest pain that is improving with diet and Tums.  We will arrange a new cardiologist since his retired.  Continue aspirin.  -     Ambulatory Referral to Cardiology    3. Essential hypertension: Controlled  -     Urinalysis With Microscopic - Urine, Clean Catch    4. Stenosis of left carotid artery: Ultrasound 2023 stable and less than 50%    5. Mixed hyperlipidemia: Per #2  -     Lipid Panel  -     Thyroid Panel With TSH  -     CBC & Differential  -     Comprehensive Metabolic Panel  6. Statin and ezetimibe intolerance  -     Ambulatory Referral to Cardiology    7. Multiple thyroid nodules, benign per 2022 US    8. Pernicious anemia: Controlled  -     Iron Profile  -     Ferritin  -     Vitamin B12    9. Right lumbar radiculopathy: Improving with activity modification, CT 2023 negative    10. Benign prostatic hyperplasia with urinary frequency: Discussed risks and benefits of this medication  -     tamsulosin (FLOMAX) 0.4 MG capsule 24 hr capsule; Take 1 capsule by mouth Daily.  Dispense: 30 capsule; Refill: 1    11. CHANTEL (obstructive sleep apnea): Adherent    12. Elevated glucose  -     Hemoglobin A1c    13. Screening for malignant neoplasm of prostate  -     PSA Screen    14. Need for Td vaccine  -     Td Vaccine Greater Than or Equal To 6yo Preservative Free IM    Return to office in 1 year for annual physical or earlier as needed.    Brian Marr MD  Family Medicine  O: [phone]    Disclaimer: Parts of this note were dictated by speech recognition. Minor errors in transcription may be present. Please call if questions.

## 2023-05-31 DIAGNOSIS — R79.0 LOW FERRITIN: Primary | ICD-10-CM

## 2023-05-31 DIAGNOSIS — D51.0 PERNICIOUS ANEMIA: ICD-10-CM

## 2023-05-31 DIAGNOSIS — E53.8 B12 DEFICIENCY: ICD-10-CM

## 2023-05-31 DIAGNOSIS — R97.20 ELEVATED PSA, LESS THAN 10 NG/ML: ICD-10-CM

## 2023-05-31 PROBLEM — R73.03 PREDIABETES: Status: ACTIVE | Noted: 2023-05-31

## 2023-05-31 LAB
ALBUMIN SERPL-MCNC: 4.2 G/DL (ref 3.8–4.8)
ALBUMIN/GLOB SERPL: 1.9 {RATIO} (ref 1.2–2.2)
ALP SERPL-CCNC: 65 IU/L (ref 44–121)
ALT SERPL-CCNC: 25 IU/L (ref 0–44)
APPEARANCE UR: CLEAR
AST SERPL-CCNC: 27 IU/L (ref 0–40)
BACTERIA #/AREA URNS HPF: NORMAL /[HPF]
BASOPHILS # BLD AUTO: 0 X10E3/UL (ref 0–0.2)
BASOPHILS NFR BLD AUTO: 0 %
BILIRUB SERPL-MCNC: 0.6 MG/DL (ref 0–1.2)
BILIRUB UR QL STRIP: NEGATIVE
BUN SERPL-MCNC: 10 MG/DL (ref 8–27)
BUN/CREAT SERPL: 10 (ref 10–24)
CALCIUM SERPL-MCNC: 9.3 MG/DL (ref 8.6–10.2)
CASTS URNS QL MICRO: NORMAL /LPF
CHLORIDE SERPL-SCNC: 99 MMOL/L (ref 96–106)
CHOLEST SERPL-MCNC: 197 MG/DL (ref 100–199)
CO2 SERPL-SCNC: 26 MMOL/L (ref 20–29)
COLOR UR: YELLOW
CREAT SERPL-MCNC: 1.05 MG/DL (ref 0.76–1.27)
EGFRCR SERPLBLD CKD-EPI 2021: 80 ML/MIN/1.73
EOSINOPHIL # BLD AUTO: 0.1 X10E3/UL (ref 0–0.4)
EOSINOPHIL NFR BLD AUTO: 2 %
EPI CELLS #/AREA URNS HPF: NORMAL /HPF (ref 0–10)
ERYTHROCYTE [DISTWIDTH] IN BLOOD BY AUTOMATED COUNT: 13 % (ref 11.6–15.4)
FERRITIN SERPL-MCNC: 23 NG/ML (ref 30–400)
FT4I SERPL CALC-MCNC: 2.4 (ref 1.2–4.9)
GLOBULIN SER CALC-MCNC: 2.2 G/DL (ref 1.5–4.5)
GLUCOSE SERPL-MCNC: 100 MG/DL (ref 70–99)
GLUCOSE UR QL STRIP: NEGATIVE
HBA1C MFR BLD: 5.9 % (ref 4.8–5.6)
HCT VFR BLD AUTO: 45.2 % (ref 37.5–51)
HDLC SERPL-MCNC: 44 MG/DL
HGB BLD-MCNC: 15.2 G/DL (ref 13–17.7)
HGB UR QL STRIP: NEGATIVE
IMM GRANULOCYTES # BLD AUTO: 0 X10E3/UL (ref 0–0.1)
IMM GRANULOCYTES NFR BLD AUTO: 0 %
IRON SATN MFR SERPL: 38 % (ref 15–55)
IRON SERPL-MCNC: 120 UG/DL (ref 38–169)
KETONES UR QL STRIP: ABNORMAL
LDLC SERPL CALC-MCNC: 124 MG/DL (ref 0–99)
LEUKOCYTE ESTERASE UR QL STRIP: NEGATIVE
LYMPHOCYTES # BLD AUTO: 1 X10E3/UL (ref 0.7–3.1)
LYMPHOCYTES NFR BLD AUTO: 25 %
MCH RBC QN AUTO: 31.5 PG (ref 26.6–33)
MCHC RBC AUTO-ENTMCNC: 33.6 G/DL (ref 31.5–35.7)
MCV RBC AUTO: 94 FL (ref 79–97)
MICRO URNS: ABNORMAL
MICRO URNS: ABNORMAL
MONOCYTES # BLD AUTO: 0.5 X10E3/UL (ref 0.1–0.9)
MONOCYTES NFR BLD AUTO: 12 %
MUCOUS THREADS URNS QL MICRO: PRESENT
NEUTROPHILS # BLD AUTO: 2.5 X10E3/UL (ref 1.4–7)
NEUTROPHILS NFR BLD AUTO: 61 %
NITRITE UR QL STRIP: NEGATIVE
PH UR STRIP: 6.5 [PH] (ref 5–7.5)
PLATELET # BLD AUTO: 303 X10E3/UL (ref 150–450)
POTASSIUM SERPL-SCNC: 3.7 MMOL/L (ref 3.5–5.2)
PROT SERPL-MCNC: 6.4 G/DL (ref 6–8.5)
PROT UR QL STRIP: ABNORMAL
PSA SERPL-MCNC: 4.5 NG/ML (ref 0–4)
RBC # BLD AUTO: 4.82 X10E6/UL (ref 4.14–5.8)
RBC #/AREA URNS HPF: NORMAL /HPF (ref 0–2)
SODIUM SERPL-SCNC: 139 MMOL/L (ref 134–144)
SP GR UR STRIP: 1.02 (ref 1–1.03)
T3RU NFR SERPL: 28 % (ref 24–39)
T4 SERPL-MCNC: 8.7 UG/DL (ref 4.5–12)
TIBC SERPL-MCNC: 313 UG/DL (ref 250–450)
TRIGL SERPL-MCNC: 166 MG/DL (ref 0–149)
TSH SERPL DL<=0.005 MIU/L-ACNC: 2.24 UIU/ML (ref 0.45–4.5)
UIBC SERPL-MCNC: 193 UG/DL (ref 111–343)
UROBILINOGEN UR STRIP-MCNC: 1 MG/DL (ref 0.2–1)
VIT B12 SERPL-MCNC: 272 PG/ML (ref 232–1245)
VLDLC SERPL CALC-MCNC: 29 MG/DL (ref 5–40)
WBC # BLD AUTO: 4.1 X10E3/UL (ref 3.4–10.8)
WBC #/AREA URNS HPF: NORMAL /HPF (ref 0–5)

## 2023-05-31 RX ORDER — FERROUS SULFATE 325(65) MG
TABLET ORAL
Qty: 90 TABLET | Refills: 1 | Status: SHIPPED | OUTPATIENT
Start: 2023-05-31

## 2023-05-31 RX ORDER — CYANOCOBALAMIN 1000 UG/ML
1000 INJECTION, SOLUTION INTRAMUSCULAR; SUBCUTANEOUS
Qty: 6 ML | Refills: 3 | Status: SHIPPED | OUTPATIENT
Start: 2023-05-31

## 2023-05-31 NOTE — PROGRESS NOTES
A few things to discuss:    1. Your sugars are up mildly. Nothing to intervene on right now, but we will recheck in a year and you should try to limit your excess sugar intake.    2. Your B12 and iron levels are low. I recommend increasing your B12 shots to once every 2 weeks since your levels are below 300, and I also recommend you take an iron supplement at least a few days per week with a meal. I will send in the iron supplement and also adjust your B12 prescription to twice monthly.    3. Lastly, Your PSA level is mildly elevated. This test is notoriously nonspecific, and can just be the normal increase with age, but can also signify things like prostate inflammation and even prostate cancer if it stays in this range. If you could I would like to recheck your PSA in 4 months at a lab only appointment. Please call to make this.    If you start to have burning with urination, foul urine odor, or discomfort in the area of your prostate when you sit (usually felt as discomfort in the area between the testicles and the anus) I need to see you before this test for a prostate exam.

## 2023-07-24 DIAGNOSIS — H01.139 ECZEMA OF EYELID, UNSPECIFIED LATERALITY: ICD-10-CM

## 2023-07-24 DIAGNOSIS — Z78.9 STATIN INTOLERANCE: ICD-10-CM

## 2023-07-24 DIAGNOSIS — Z88.9 H/O MULTIPLE ALLERGIES: Primary | ICD-10-CM

## 2023-07-24 DIAGNOSIS — L30.9 CHRONIC ECZEMA OF HAND: ICD-10-CM

## 2023-07-30 DIAGNOSIS — R35.0 BENIGN PROSTATIC HYPERPLASIA WITH URINARY FREQUENCY: ICD-10-CM

## 2023-07-30 DIAGNOSIS — N40.1 BENIGN PROSTATIC HYPERPLASIA WITH URINARY FREQUENCY: ICD-10-CM

## 2023-07-31 RX ORDER — TAMSULOSIN HYDROCHLORIDE 0.4 MG/1
1 CAPSULE ORAL DAILY
Qty: 30 CAPSULE | Refills: 1 | Status: SHIPPED | OUTPATIENT
Start: 2023-07-31

## 2023-08-07 DIAGNOSIS — M79.10 MYALGIA: Primary | ICD-10-CM

## 2023-08-07 DIAGNOSIS — Z88.9 H/O MULTIPLE ALLERGIES: ICD-10-CM

## 2023-08-07 DIAGNOSIS — H01.139 ECZEMA OF EYELID, UNSPECIFIED LATERALITY: ICD-10-CM

## 2023-08-07 DIAGNOSIS — L30.9 CHRONIC ECZEMA OF HAND: ICD-10-CM

## 2023-08-07 RX ORDER — PREDNISONE 20 MG/1
20 TABLET ORAL DAILY
Qty: 30 TABLET | Refills: 0 | Status: SHIPPED | OUTPATIENT
Start: 2023-08-07 | End: 2023-09-06

## 2023-08-12 DIAGNOSIS — I10 ESSENTIAL HYPERTENSION: ICD-10-CM

## 2023-08-14 RX ORDER — HYDROCHLOROTHIAZIDE 12.5 MG/1
TABLET ORAL
Qty: 90 TABLET | Refills: 3 | Status: SHIPPED | OUTPATIENT
Start: 2023-08-14

## 2023-08-23 ENCOUNTER — PATIENT MESSAGE (OUTPATIENT)
Dept: INTERNAL MEDICINE | Facility: CLINIC | Age: 63
End: 2023-08-23
Payer: COMMERCIAL

## 2023-08-23 NOTE — TELEPHONE ENCOUNTER
From: Niranjan Peacock  To: Brian Marr  Sent: 8/23/2023 3:00 PM EDT  Subject: Cardiologist    Dr Tej[facility] just called and wanted to reschedule mu follow up appointment with Dr Renteria. I canceled it thinking it would be a good time to switch to Dr. Srivastava like we talked about. (Copy below) Will you please put in a referral for me to get that scheduled.       Brian Marr  Jun 20, 10:38 AM  I'll refer you to Dr. Srivastava who was the cardiologist I mentioned     Thank you.   Nain Peacock

## 2023-08-28 ENCOUNTER — TELEPHONE (OUTPATIENT)
Dept: CARDIOLOGY | Facility: CLINIC | Age: 63
End: 2023-08-28
Payer: COMMERCIAL

## 2023-08-28 NOTE — TELEPHONE ENCOUNTER
I do not remember him nor do I see that he has had multiple visits rescheduled but I am fine if he wants to switch

## 2023-08-29 DIAGNOSIS — H01.139 ECZEMA OF EYELID, UNSPECIFIED LATERALITY: ICD-10-CM

## 2023-08-29 DIAGNOSIS — M79.10 MYALGIA: ICD-10-CM

## 2023-08-29 RX ORDER — PREDNISONE 20 MG/1
20 TABLET ORAL DAILY
Qty: 30 TABLET | Refills: 0 | Status: SHIPPED | OUTPATIENT
Start: 2023-08-29 | End: 2023-09-28

## 2023-09-05 ENCOUNTER — PATIENT MESSAGE (OUTPATIENT)
Dept: INTERNAL MEDICINE | Facility: CLINIC | Age: 63
End: 2023-09-05
Payer: COMMERCIAL

## 2023-09-05 NOTE — TELEPHONE ENCOUNTER
From: Niranjan Peacock  To: Brian Marr  Sent: 9/5/2023 9:00 AM EDT  Subject: Hydralazine 50 mg     I started back on Hydralazine Aug 24th and my blood pressure and heart rate have gotten better. This morning 133/67 61 heart rate.  But I am getting a rash on my core and eye drainage that burns the corner of my eyes, starting to make the skin raw.   See attached picture.  Should I stop the Hydralazine? If so, what blood pressure med should I take?  Nain Peacock

## 2023-09-06 ENCOUNTER — LAB (OUTPATIENT)
Dept: INTERNAL MEDICINE | Facility: CLINIC | Age: 63
End: 2023-09-06
Payer: COMMERCIAL

## 2023-09-06 DIAGNOSIS — R97.20 ELEVATED PSA, LESS THAN 10 NG/ML: ICD-10-CM

## 2023-09-06 RX ORDER — LOSARTAN POTASSIUM 25 MG/1
TABLET ORAL
Qty: 90 TABLET | Refills: 3 | Status: SHIPPED | OUTPATIENT
Start: 2023-09-06

## 2023-09-07 LAB — PSA SERPL-MCNC: 5.2 NG/ML (ref 0–4)

## 2023-09-17 DIAGNOSIS — I25.10 CORONARY ARTERY DISEASE INVOLVING NATIVE CORONARY ARTERY OF NATIVE HEART WITHOUT ANGINA PECTORIS: ICD-10-CM

## 2023-09-17 DIAGNOSIS — I10 ESSENTIAL HYPERTENSION: ICD-10-CM

## 2023-09-18 RX ORDER — CARVEDILOL 6.25 MG/1
TABLET ORAL
Qty: 60 TABLET | Refills: 1 | OUTPATIENT
Start: 2023-09-18

## 2023-09-20 NOTE — PROGRESS NOTES
Date of Encounter: 2022  Patient: Niranjan Peacock,  1960    Subjective   History of Presenting Illness  Chief complaint: Allergic conjunctivitis    Longstanding history of allergic conjunctivitis.  When he was younger and was receiving allergy shots his allergic conjunctivitis was so bad that his eyes would swell completely shut.  Currently his symptoms include eye discharge which can occasionally be thick, eye itching, and flaking of his eyebrows with erythema and swelling.  He did find benefit from cetirizine but it made him tired.  He has been prescribed topical to ketotifen which was not helpful.  He is not currently receiving allergy shots.  His symptoms are mostly present throughout the year and he has not identified any triggers.  He does not have any pets at home.  He does not have a history of glaucoma.    Review of Systems:  Negative for fever, cough, shortness of breath  Positive for nasal congestion    The following portions of the patient's history were reviewed and updated as appropriate: allergies, current medications, past family history, past medical history, past social history, past surgical history and problem list.    Patient Active Problem List   Diagnosis   • Coronary artery disease involving native coronary artery of native heart without angina pectoris   • Multiple thyroid nodules, benign per  US   • Pernicious anemia   • H/O multiple allergies   • Herniated nucleus pulposus, L3-4 right   • Right lumbar radiculopathy   • Pterygium of right eye   • Degenerative arthritis of cervical spine   • Essential hypertension   • History of DVT (deep vein thrombosis)   • Allergic conjunctivitis of both eyes   • Chronic eczema of hand   • Chest pain   • Mixed hyperlipidemia   • Myalgia   • CHANTEL (obstructive sleep apnea)     Past Medical History:   Diagnosis Date   • Angina at rest (HCC) 2019    Added automatically from request for surgery [id number]   • B12 deficiency anemia    •  Cellulitis of right lower extremity 12/24/2020   • Chest pain    • Chronic fatigue 03/09/2016   • Coronary artery disease involving native coronary artery of native heart without angina pectoris 08/10/2017   • Deep vein thrombosis (HCC)    • Headache    • History of blood clots     blood clot found in right lung    • Hypertension    • Mixed hyperlipidemia 02/02/2022   • Multiple thyroid nodules    • Multiple thyroid nodules    • CHANTEL (obstructive sleep apnea)    • Pernicious anemia    • Pulmonary embolism (HCC)     2015   • Syncope     2 yrs ago one time     Past Surgical History:   Procedure Laterality Date   • CARDIAC CATHETERIZATION N/A 8/3/2017    Procedure: Coronary angiography;  Surgeon: Cynthia Farley MD;  Location:  [facility] CATH INVASIVE LOCATION;  Service:    • CARDIAC CATHETERIZATION  8/3/2017    Procedure: Functional Flow [place];  Surgeon: Cynthia Farley MD;  Location:  [facility] CATH INVASIVE LOCATION;  Service:    • CARDIAC CATHETERIZATION N/A 8/3/2017    Procedure: Stent YI coronary;  Surgeon: Cynthia Farley MD;  [facility];  Service:    • CARDIAC CATHETERIZATION N/A 8/3/2017    Procedure: Left ventriculography;  Surgeon: Cynthia Farley MD;  [facility];  Service:    • CARDIAC CATHETERIZATION N/A 8/3/2017    Procedure: Left Heart Cath;  Surgeon: Cynthia Farley MD;  [facility];  Service:    • CARDIAC CATHETERIZATION N/A 8/20/2019    Procedure: Left Heart Cath;  Surgeon: Mulugeta Ac MD;  [facility];  Service: Cardiology   • CARDIAC CATHETERIZATION N/A 8/20/2019    Procedure: Coronary angiography;  Surgeon: Mulugeta Ac MD;  [facility];  Service: Cardiology   • CARDIAC CATHETERIZATION N/A 8/20/2019    Procedure: Left ventriculography;  Surgeon: Mulugeta Ac MD;  [facility];  Service: Cardiology   • COLONOSCOPY  MMVI     NORMAL.   • COLONOSCOPY     • FINE NEEDLE ASPIRATION  12/2015    Left thyroid nodule.  [place] category II.  Dr. Socrates Sanchez     Family History   Problem Relation Age of Onset   • Heart disease Other    • Hypertension Other    • Thyroid disease Other    • Heart disease Father    • Prostate cancer Father    • Hypertension Father    • Stroke Father    • Heart disease Mother    • Hypertension Mother    • Heart disease Brother    • Hypertension Brother    • Thyroid disease Sister    • Heart disease Brother    • Hypertension Brother        Current Outpatient Medications:   •  aspirin 81 MG tablet, Take 1 tablet by mouth Daily., Disp: 30 tablet, Rfl: 11  •  Cholecalciferol (VITAMIN D3) 5000 units capsule capsule, Take 5,000 Units by mouth Daily., Disp: , Rfl:   •  cyanocobalamin 1000 MCG/ML injection, INJECT 1ML INTRAMUSCULARLY EVERY 30 DAYS AS DIRECTED, Disp: 3 mL, Rfl: 3  •  hydrALAZINE (APRESOLINE) 50 MG tablet, Take 1 tablet by mouth 2 (Two) Times a Day., Disp: 180 tablet, Rfl: 3  •  hydroCHLOROthiazide (HYDRODIURIL) 12.5 MG tablet, Take 1 tab PO QD for blood pressure, Disp: 90 tablet, Rfl: 3  •  hydrocortisone 0.5 % cream, Apply  topically to the appropriate area as directed 2 (Two) Times a Day As Needed for Irritation. (Patient taking differently: Apply 1 application topically to the appropriate area as directed 2 (Two) Times a Day As Needed for Irritation.), Disp: 15 g, Rfl: 0  •  Multiple Vitamin (MULTI VITAMIN DAILY PO), Take 1 tablet by mouth Daily., Disp: , Rfl:   •  nitroglycerin (NITROSTAT) 0.4 MG SL tablet, Place 1 tablet under the tongue Every 5 (Five) Minutes As Needed for Chest Pain for up to 1 dose. Take no more than 3 doses in 15 minutes., Disp: 100 tablet, Rfl: 3  •  simvastatin (ZOCOR) 20 MG tablet, Take 1 tab PO QD for cholesterol and heart health, Disp: 90 tablet, Rfl: 3  •  triamcinolone (KENALOG) 0.1 % cream, Apply  topically to the appropriate area as directed 2 (Two) Times a Day. (Patient  taking differently: Apply 1 application topically to the appropriate area as directed 2 (Two) Times a Day As Needed.), Disp: 28.4 g, Rfl: 1  •  fexofenadine (ALLEGRA) 60 MG tablet, Take 1 tablet by mouth Daily As Needed (allergies)., Disp: 90 tablet, Rfl: 3  •  loteprednol (LOTEMAX) 0.2 % suspension, Administer 1 drop to both eyes 4 (Four) Times a Day As Needed (allergic conjunctivitis). Do not use for more than 2 weeks at a time, Disp: 5 mL, Rfl: 3  Allergies   Allergen Reactions   • Lisinopril Shortness Of Breath     Rash also    • Amlodipine Myalgia     Muscle aches    • Valsartan Rash     muscle aches     Social History     Tobacco Use   • Smoking status: Never Smoker   • Smokeless tobacco: Never Used   • Tobacco comment: caffeine use - 2 cups coffee dailyl    Vaping Use   • Vaping Use: Never used   Substance Use Topics   • Alcohol use: Yes     Comment: occasional   • Drug use: No          Objective   Physical Exam  Vitals:    09/20/22 1019   BP: 136/78   Pulse: 58   Temp: 96.6 °F (35.9 °C)   SpO2: 96%   Weight: 97 kg (213 lb 12.8 oz)     Body mass index is 27.45 kg/m².    Constitutional: NAD.  Eyes: EOMI. PERRLA.  There is erythema of the periorbital skin with mild flaking.  There is conjunctivitis with clear discharge.  Known pterygium.  Psychiatric: Normal affect. Normal thought content.     Assessment & Plan   Assessment and Plan  Pleasant 62-year-old male with CAD, hypertension, pernicious anemia, multiple thyroid nodules, right lumbar radiculopathy, who presents with the following:    Diagnoses and all orders for this visit:    1. Allergic conjunctivitis of both eyes (Primary): No history of glaucoma, will give him loteprednol for as needed use. Continue to follow with ophthalmology.  We will also start him on low-dose fexofenadine, discussed potential risks and benefits.  If not improving recommend allergy consultation.  -     loteprednol (LOTEMAX) 0.2 % suspension; Administer 1 drop to both eyes 4 (Four)  Times a Day As Needed (allergic conjunctivitis). Do not use for more than 2 weeks at a time  Dispense: 5 mL; Refill: 3  -     fexofenadine (ALLEGRA) 60 MG tablet; Take 1 tablet by mouth Daily As Needed (allergies).  Dispense: 90 tablet; Refill: 3    2. Need for influenza vaccination  -     FluLaval/Fluzone >6 mos ([id number])    Brian Marr MD  Family Medicine  O: [phone]  C: [phone]    Disclaimer: Parts of this note were dictated by speech recognition. Minor errors in transcription may be present. Please call if questions.      [place]

## 2023-10-06 ENCOUNTER — OFFICE VISIT (OUTPATIENT)
Dept: CARDIOLOGY | Facility: CLINIC | Age: 63
End: 2023-10-06
Payer: COMMERCIAL

## 2023-10-06 VITALS
HEIGHT: 74 IN | RESPIRATION RATE: 18 BRPM | DIASTOLIC BLOOD PRESSURE: 84 MMHG | BODY MASS INDEX: 27.34 KG/M2 | OXYGEN SATURATION: 97 % | WEIGHT: 213 LBS | HEART RATE: 64 BPM | SYSTOLIC BLOOD PRESSURE: 122 MMHG

## 2023-10-06 DIAGNOSIS — I10 ESSENTIAL HYPERTENSION: ICD-10-CM

## 2023-10-06 DIAGNOSIS — E78.2 MIXED HYPERLIPIDEMIA: ICD-10-CM

## 2023-10-06 DIAGNOSIS — I65.23 BILATERAL CAROTID ARTERY STENOSIS: ICD-10-CM

## 2023-10-06 DIAGNOSIS — I25.10 CORONARY ARTERY DISEASE INVOLVING NATIVE CORONARY ARTERY OF NATIVE HEART WITHOUT ANGINA PECTORIS: Primary | ICD-10-CM

## 2023-10-06 PROCEDURE — 99214 OFFICE O/P EST MOD 30 MIN: CPT | Performed by: INTERNAL MEDICINE

## 2023-10-06 RX ORDER — UBIDECARENONE 100 MG
200 CAPSULE ORAL DAILY
Qty: 60 CAPSULE | Refills: 11 | Status: SHIPPED | OUTPATIENT
Start: 2023-10-06

## 2023-10-06 RX ORDER — ATORVASTATIN CALCIUM 20 MG/1
20 TABLET, FILM COATED ORAL DAILY
Qty: 30 TABLET | Refills: 11 | Status: SHIPPED | OUTPATIENT
Start: 2023-10-06

## 2023-10-06 NOTE — PROGRESS NOTES
[facility]      Patient Name: Niranjan Peacock  :1960  Age: 63 y.o.  Encounter Provider:  Tiago Srivastava Jr, MD      Chief Complaint: Follow-up coronary artery disease      HPI  Niranjan Peacock is a 63 y.o. male past medical history of coronary artery disease status post PCI LAD , dyslipidemia with complaints of myalgias with statins and Repatha, hypertension who presents for routine follow-up.  Previously followed by Dr. Mancilla and I have reviewed all pertinent electronic health records.  Patient was initially seen in 2017 and found to have severe disease in the proximal LAD with drug-eluting stents placed.  He has experienced severe nighttime calf cramping which he associated with statin use however he states that in working through pernicious anemia and being off of statins he still had those nighttime cramping sensations which persisted until he started taking magnesium supplementation.  Blood pressure has been elevated lately and Dr. Marr added losartan and doubled the dose of hydrochlorothiazide.  Since then he notes an increase in orthostatic symptoms.  No episodes of severe dizziness leading to near syncope or syncope but he does notice increased frequency and intensity of symptoms.  He rides his bicycle about 8 miles 3-4 times per week with no chest pain or shortness of air that is above baseline.  He notes some mild chronic stable dyspnea on exertion with stairs only but states that he can walk as far as he wants to after he is up 2 or 3 flights of stairs without stopping.  No orthopnea, PND or edema.  Occasional palpitations but he feels that this is much better controlled after the augmentation of antihypertensive regimen.  Social family history is reviewed and is not pertinent to this clinic visit.  Blood pressure and heart rate are well controlled today in clinic.      The following portions of the patient's history were reviewed and updated as appropriate:  "allergies, current medications, past family history, past medical history, past social history, past surgical history and problem list.      Review of Systems   Constitutional: Negative for chills and fever.   HENT:  Negative for hoarse voice and sore throat.    Eyes:  Negative for double vision and photophobia.   Cardiovascular:  Positive for dyspnea on exertion and palpitations. Negative for chest pain, leg swelling, near-syncope, orthopnea, paroxysmal nocturnal dyspnea and syncope.   Respiratory:  Negative for cough and wheezing.    Skin:  Negative for poor wound healing and rash.   Musculoskeletal:  Negative for arthritis and joint swelling.   Gastrointestinal:  Negative for bloating, abdominal pain, hematemesis and hematochezia.   Neurological:  Positive for dizziness. Negative for focal weakness.   Psychiatric/Behavioral:  Negative for depression and suicidal ideas.      OBJECTIVE:   Vital Signs  Vitals:    10/06/23 0958   BP: 122/84   Pulse: 64   Resp: 18   SpO2: 97%     Estimated body mass index is 27.35 kg/m² as calculated from the following:    Height as of this encounter: 188 cm (74\").    Weight as of this encounter: 96.6 kg (213 lb).    Vitals reviewed.   Constitutional:       Appearance: Healthy appearance. Not in distress.   Neck:      Vascular: No JVR. JVD normal.   Pulmonary:      Effort: Pulmonary effort is normal.      Breath sounds: Normal breath sounds. No wheezing. No rhonchi. No rales.   Chest:      Chest wall: Not tender to palpatation.   Cardiovascular:      PMI at left midclavicular line. Normal rate. Regular rhythm. Normal S1. Normal S2.       Murmurs: There is no murmur.      No gallop.  No click. No rub.   Pulses:     Intact distal pulses.   Edema:     Peripheral edema absent.   Abdominal:      General: Bowel sounds are normal.      Palpations: Abdomen is soft.      Tenderness: There is no abdominal tenderness.   Musculoskeletal: Normal range of motion.         General: No tenderness. " Skin:     General: Skin is warm and dry.   Neurological:      General: No focal deficit present.      Mental Status: Alert and oriented to person, place and time.       Procedures    Lipid Panel          5/30/2023    08:36   Lipid Panel   Total Cholesterol 197    Triglycerides 166    HDL Cholesterol 44    VLDL Cholesterol 29    LDL Cholesterol  124         BUN   Date Value Ref Range Status   05/30/2023 10 8 - 27 mg/dL Final   10/13/2021 14 8 - 23 mg/dL Final     Creatinine   Date Value Ref Range Status   05/30/2023 1.05 0.76 - 1.27 mg/dL Final   04/19/2023 1.02 0.76 - 1.27 mg/dL Final   07/27/2022 1.00 0.60 - 1.30 mg/dL Final     Comment:     Serial Number: [id number]:  [id number]     Potassium   Date Value Ref Range Status   05/30/2023 3.7 3.5 - 5.2 mmol/L Final   10/13/2021 4.2 3.5 - 5.2 mmol/L Final     ALT (SGPT)   Date Value Ref Range Status   05/30/2023 25 0 - 44 IU/L Final   10/13/2021 24 1 - 41 U/L Final     AST (SGOT)   Date Value Ref Range Status   05/30/2023 27 0 - 40 IU/L Final   10/13/2021 21 1 - 40 U/L Final           ASSESSMENT:     63-year-old male with ASCVD presents for routine follow-up      PLAN OF CARE:     Coronary artery disease without angina -continue aspirin.  Not on negative chronotropic therapy due to low resting heart rate.  He believes that muscle pains were not related to statin but did have significant adverse effects of Repatha.  He is not actually taking the fluvastatin that is on his medication regimen.  He is willing to go back on low-dose statin so we will start him on atorvastatin 20 mg with coenzyme every 10 200 mg daily.  Hypertension -he is experiencing some orthostatic symptoms with the higher dose of hydrochlorothiazide.  It is only been about a week or 2 and I asked him to give it another week or 2 unless symptoms increase in frequency and intensity.  We may have to consider increasing losartan dose and decreasing hydrochlorothiazide back to 12.5 mg.  Hyperlipidemia -as  "above    Return to clinic 6 months             Discharge Medications            Accurate as of October 6, 2023 10:38 AM. If you have any questions, ask your nurse or doctor.                Changes to Medications        Instructions Start Date   hydroCHLOROthiazide 12.5 MG tablet  Commonly known as: HYDRODIURIL  What changed: how much to take   TAKE ONE TABLET BY MOUTH DAILY FOR BLOOD PRESSURE             Continue These Medications        Instructions Start Date   aspirin 81 MG tablet   81 mg, Oral, Daily      B-D INSULIN SYRINGE 1CC/25GX1\" 25G X 1\" 1 ML misc  Generic drug: Insulin Syringe-Needle U-100   USE TO ADMINISTER B-12 INJECTIONS AS DIRECTED      cyanocobalamin 1000 MCG/ML injection   1,000 mcg, Intramuscular, Every 14 Days      ferrous sulfate 325 (65 FE) MG tablet   Take 1 tablet three times weekly with food      fexofenadine 60 MG tablet  Commonly known as: ALLEGRA   60 mg, Oral, Daily PRN      fluvastatin 20 MG capsule  Commonly known as: LESCOL   20 mg, Oral, Nightly      hydrocortisone 0.5 % cream   Topical, 2 Times Daily PRN      lidocaine 5 %  Commonly known as: LIDODERM   1 patch, Transdermal, Every 24 Hours, Remove & Discard patch within 12 hours or as directed by MD      losartan 25 MG tablet  Commonly known as: COZAAR   Take 1 tab PO QD for blood pressure      loteprednol 0.2 % suspension  Commonly known as: LOTEMAX   1 drop, Both Eyes, 4 Times Daily PRN, Do not use for more than 2 weeks at a time      multivitamin tablet tablet  Commonly known as: THERAGRAN   1 tablet, Oral, Daily      nitroglycerin 0.4 MG SL tablet  Commonly known as: NITROSTAT   0.4 mg, Sublingual, Every 5 Minutes PRN, Take no more than 3 doses in 15 minutes.      tamsulosin 0.4 MG capsule 24 hr capsule  Commonly known as: FLOMAX   0.4 mg, Oral, Daily      vitamin D3 125 MCG (5000 UT) capsule capsule   5,000 Units, Oral, Daily               Thank you for allowing me to participate in the care of your patient,      Sincerely, "   Tiago Srivastava Jr, MD  [facility]  10/06/23  10:38 EDT

## 2023-10-10 DIAGNOSIS — N40.1 BENIGN PROSTATIC HYPERPLASIA WITH URINARY FREQUENCY: ICD-10-CM

## 2023-10-10 DIAGNOSIS — R35.0 BENIGN PROSTATIC HYPERPLASIA WITH URINARY FREQUENCY: ICD-10-CM

## 2023-10-10 RX ORDER — TAMSULOSIN HYDROCHLORIDE 0.4 MG/1
1 CAPSULE ORAL DAILY
Qty: 30 CAPSULE | Refills: 1 | Status: SHIPPED | OUTPATIENT
Start: 2023-10-10

## 2023-10-16 ENCOUNTER — TELEPHONE (OUTPATIENT)
Dept: URGENT CARE | Facility: CLINIC | Age: 63
End: 2023-10-16
Payer: COMMERCIAL

## 2023-10-16 NOTE — TELEPHONE ENCOUNTER
Pt was seen on 10/10 was prescribed a Z-Jorge, which he finished, He states that he still has symptoms. Please advise.

## 2023-11-06 ENCOUNTER — TELEPHONE (OUTPATIENT)
Dept: CARDIOLOGY | Facility: CLINIC | Age: 63
End: 2023-11-06

## 2023-11-06 ENCOUNTER — TELEPHONE (OUTPATIENT)
Dept: INTERNAL MEDICINE | Facility: CLINIC | Age: 63
End: 2023-11-06
Payer: COMMERCIAL

## 2023-11-06 ENCOUNTER — HOSPITAL ENCOUNTER (OUTPATIENT)
Dept: CARDIOLOGY | Facility: HOSPITAL | Age: 63
Discharge: HOME | End: 2023-11-06
Admitting: INTERNAL MEDICINE
Payer: COMMERCIAL

## 2023-11-06 ENCOUNTER — OFFICE VISIT (OUTPATIENT)
Dept: CARDIOLOGY | Facility: CLINIC | Age: 63
End: 2023-11-06
Payer: COMMERCIAL

## 2023-11-06 VITALS
HEIGHT: 74 IN | HEART RATE: 61 BPM | BODY MASS INDEX: 27.7 KG/M2 | DIASTOLIC BLOOD PRESSURE: 70 MMHG | WEIGHT: 215.8 LBS | SYSTOLIC BLOOD PRESSURE: 128 MMHG

## 2023-11-06 DIAGNOSIS — R55 SYNCOPE AND COLLAPSE: ICD-10-CM

## 2023-11-06 DIAGNOSIS — I25.10 CORONARY ARTERY DISEASE INVOLVING NATIVE CORONARY ARTERY OF NATIVE HEART WITHOUT ANGINA PECTORIS: ICD-10-CM

## 2023-11-06 DIAGNOSIS — R07.2 PRECORDIAL PAIN: ICD-10-CM

## 2023-11-06 DIAGNOSIS — R55 SYNCOPE AND COLLAPSE: Primary | ICD-10-CM

## 2023-11-06 LAB
ANION GAP SERPL CALCULATED.3IONS-SCNC: 9.9 MMOL/L (ref 5–15)
BUN SERPL-MCNC: 12 MG/DL (ref 8–23)
BUN/CREAT SERPL: 13.2 (ref 7–25)
CALCIUM SPEC-SCNC: 9.3 MG/DL (ref 8.6–10.5)
CHLORIDE SERPL-SCNC: 102 MMOL/L (ref 98–107)
CO2 SERPL-SCNC: 26.1 MMOL/L (ref 22–29)
CREAT SERPL-MCNC: 0.91 MG/DL (ref 0.76–1.27)
EGFRCR SERPLBLD CKD-EPI 2021: 95.3 ML/MIN/1.73
GLUCOSE SERPL-MCNC: 89 MG/DL (ref 65–99)
POTASSIUM SERPL-SCNC: 3.6 MMOL/L (ref 3.5–5.2)
SODIUM SERPL-SCNC: 138 MMOL/L (ref 136–145)

## 2023-11-06 PROCEDURE — 80048 BASIC METABOLIC PNL TOTAL CA: CPT | Performed by: INTERNAL MEDICINE

## 2023-11-06 PROCEDURE — 36415 COLL VENOUS BLD VENIPUNCTURE: CPT

## 2023-11-06 PROCEDURE — 93000 ELECTROCARDIOGRAM COMPLETE: CPT | Performed by: INTERNAL MEDICINE

## 2023-11-06 PROCEDURE — 99214 OFFICE O/P EST MOD 30 MIN: CPT | Performed by: INTERNAL MEDICINE

## 2023-11-06 NOTE — TELEPHONE ENCOUNTER
Patient called and stated that on Saturday he was outside doing some strenuous activity. At one point he stood up and got really dizzy. He then sat down and passed out. He said this happened 3 times in a row. His family was with him and called EMS. They said his BP was all over the place. 90/60, 160/80. 145/70. Since Saturday he has not felt good. He complains of a headache and tightness in his shoulders. BP yesterday was running 150-155/70s. He has not checked it today. Below are his meds:    Losartan 25mg daily  Hydrochlorothiazide 12.5mg daily     Arlen Rinaldi RN  Triage [facility]

## 2023-11-06 NOTE — TELEPHONE ENCOUNTER
"    Caller: Niranjan Peacock \"Nain\"    Relationship to patient: Self    Best call back number: [phone]     Chief complaint: LIGHTHEADEDNESS, HEADACHE, PASSED OUT 3 TIMES, TIGHTNESS IN SHOULDER    Type of visit: FOLLOW UP    Requested date: ASAP     If rescheduling, when is the original appointment:      Additional notes:              "

## 2023-11-06 NOTE — TELEPHONE ENCOUNTER
Patient scheduled to see you at 1:30 today.    Arlen Rinaldi RN  Triage [facility]

## 2023-11-06 NOTE — PROGRESS NOTES
[facility]      Patient Name: Niranjan Peacock  :1960  Age: 63 y.o.  Encounter Provider:  Tiago Srivastava Jr, MD      Chief Complaint: Follow-up coronary artery disease      HPI  Niranjan Peacock is a 63 y.o. male past medical history of coronary artery disease status post PCI LAD , dyslipidemia with complaints of myalgias with statins and Repatha, hypertension who presents for routine follow-up.      Last clinic visit note: Previously followed by Dr. Mancilla and I have reviewed all pertinent electronic health records.  Patient was initially seen in 2017 and found to have severe disease in the proximal LAD with drug-eluting stents placed.  He has experienced severe nighttime calf cramping which he associated with statin use however he states that in working through pernicious anemia and being off of statins he still had those nighttime cramping sensations which persisted until he started taking magnesium supplementation.  Blood pressure has been elevated lately and Dr. Marr added losartan and doubled the dose of hydrochlorothiazide.  Since then he notes an increase in orthostatic symptoms.  No episodes of severe dizziness leading to near syncope or syncope but he does notice increased frequency and intensity of symptoms.  He rides his bicycle about 8 miles 3-4 times per week with no chest pain or shortness of air that is above baseline.  He notes some mild chronic stable dyspnea on exertion with stairs only but states that he can walk as far as he wants to after he is up 2 or 3 flights of stairs without stopping.  No orthopnea, PND or edema.  Occasional palpitations but he feels that this is much better controlled after the augmentation of antihypertensive regimen.  Social family history is reviewed and is not pertinent to this clinic visit.  Blood pressure and heart rate are well controlled today in clinic.    He was working clearing some land on his son's property on Saturday  "when he stood up from a kneeling position and passed out 3 times.  He noted some chest pressure that resolved with nitroglycerin use.  He had some more chest pressure this morning and used nitroglycerin again.  He remains on the hydrochlorothiazide 12.5 mg.  He is orthostatic positive today in clinic.  No resting cardiac complaints at time of interview.  EKG shows no evidence of acute ischemia.    The following portions of the patient's history were reviewed and updated as appropriate: allergies, current medications, past family history, past medical history, past social history, past surgical history and problem list.      Review of Systems   Constitutional: Negative for chills and fever.   HENT:  Negative for hoarse voice and sore throat.    Eyes:  Negative for double vision and photophobia.   Cardiovascular:  Positive for dyspnea on exertion and palpitations. Negative for chest pain, leg swelling, near-syncope, orthopnea, paroxysmal nocturnal dyspnea and syncope.   Respiratory:  Negative for cough and wheezing.    Skin:  Negative for poor wound healing and rash.   Musculoskeletal:  Negative for arthritis and joint swelling.   Gastrointestinal:  Negative for bloating, abdominal pain, hematemesis and hematochezia.   Neurological:  Positive for dizziness. Negative for focal weakness.   Psychiatric/Behavioral:  Negative for depression and suicidal ideas.      OBJECTIVE:   Vital Signs  Vitals:    11/06/23 1338   BP: 128/70   Pulse: 61     Estimated body mass index is 27.71 kg/m² as calculated from the following:    Height as of this encounter: 188 cm (74\").    Weight as of this encounter: 97.9 kg (215 lb 12.8 oz).    Vitals reviewed.   Constitutional:       Appearance: Healthy appearance. Not in distress.   Neck:      Vascular: No JVR. JVD normal.   Pulmonary:      Effort: Pulmonary effort is normal.      Breath sounds: Normal breath sounds. No wheezing. No rhonchi. No rales.   Chest:      Chest wall: Not tender to " palpatation.   Cardiovascular:      PMI at left midclavicular line. Normal rate. Regular rhythm. Normal S1. Normal S2.       Murmurs: There is no murmur.      No gallop.  No click. No rub.   Pulses:     Intact distal pulses.   Edema:     Peripheral edema absent.   Abdominal:      General: Bowel sounds are normal.      Palpations: Abdomen is soft.      Tenderness: There is no abdominal tenderness.   Musculoskeletal: Normal range of motion.         General: No tenderness. Skin:     General: Skin is warm and dry.   Neurological:      General: No focal deficit present.      Mental Status: Alert and oriented to person, place and time.         ECG 12 Lead    Date/Time: 11/6/2023 1:44 PM  Performed by: Tiago Srivastava Jr., MD    Authorized by: Tiago Srivastava Jr., MD  Comparison: compared with previous ECG from 3/28/2023  Similar to previous ECG  Rhythm: sinus rhythm    Clinical impression: normal ECG      Lipid Panel          5/30/2023    08:36   Lipid Panel   Total Cholesterol 197    Triglycerides 166    HDL Cholesterol 44    VLDL Cholesterol 29    LDL Cholesterol  124         BUN   Date Value Ref Range Status   05/30/2023 10 8 - 27 mg/dL Final   10/13/2021 14 8 - 23 mg/dL Final     Creatinine   Date Value Ref Range Status   05/30/2023 1.05 0.76 - 1.27 mg/dL Final   04/19/2023 1.02 0.76 - 1.27 mg/dL Final   07/27/2022 1.00 0.60 - 1.30 mg/dL Final     Comment:     Serial Number: [id number]:  [id number]     Potassium   Date Value Ref Range Status   05/30/2023 3.7 3.5 - 5.2 mmol/L Final   10/13/2021 4.2 3.5 - 5.2 mmol/L Final     ALT (SGPT)   Date Value Ref Range Status   05/30/2023 25 0 - 44 IU/L Final   10/13/2021 24 1 - 41 U/L Final     AST (SGOT)   Date Value Ref Range Status   05/30/2023 27 0 - 40 IU/L Final   10/13/2021 21 1 - 40 U/L Final           ASSESSMENT:     63-year-old male with ASCVD presents for routine follow-up      PLAN OF CARE:     Coronary artery disease without angina -continue aspirin.  He could not  "tolerate atorvastatin.  Given the chest pressure responsive to nitroglycerin and the episode of syncope he will need a stress study.  Given his ongoing orthostasis I would be concerned of safety regarding treadmill protocol.  Plan for pharmacological nuclear stress study in the morning as he already had some coffee today  Syncope -orthostatic positive.  We will discontinue hydrochlorothiazide at this time.  We will check a basic metabolic panel today.  Twice daily blood pressure log to be sent in after 1 week.  Hyperlipidemia -as above    Return to clinic at next scheduled follow-up visit             Discharge Medications            Accurate as of November 6, 2023  1:44 PM. If you have any questions, ask your nurse or doctor.                Changes to Medications        Instructions Start Date   hydroCHLOROthiazide 12.5 MG tablet  Commonly known as: HYDRODIURIL  What changed: how much to take   TAKE ONE TABLET BY MOUTH DAILY FOR BLOOD PRESSURE             Continue These Medications        Instructions Start Date   aspirin 81 MG tablet   81 mg, Oral, Daily      atorvastatin 20 MG tablet  Commonly known as: LIPITOR   20 mg, Oral, Daily      azithromycin 250 MG tablet  Commonly known as: Zithromax Z-Jorge   Take 2 tablets at the same time Day 1 and then 1 tablet every 24 hour thereafter.      B-D INSULIN SYRINGE 1CC/25GX1\" 25G X 1\" 1 ML misc  Generic drug: Insulin Syringe-Needle U-100   USE TO ADMINISTER B-12 INJECTIONS AS DIRECTED      coenzyme Q10 100 MG capsule   200 mg, Oral, Daily      cyanocobalamin 1000 MCG/ML injection   1,000 mcg, Intramuscular, Every 14 Days      ferrous sulfate 325 (65 FE) MG tablet   Take 1 tablet three times weekly with food      fexofenadine 60 MG tablet  Commonly known as: ALLEGRA   60 mg, Oral, Daily PRN      guaiFENesin-codeine 100-10 MG/5ML liquid  Commonly known as: GUAIFENESIN AC   1-2 teaspoons PO BID (bedtime/naptime) as needed for cough      hydrocortisone 0.5 % cream   Topical, 2 " Times Daily PRN      lidocaine 5 %  Commonly known as: LIDODERM   1 patch, Transdermal, Every 24 Hours, Remove & Discard patch within 12 hours or as directed by MD      losartan 25 MG tablet  Commonly known as: COZAAR   Take 1 tab PO QD for blood pressure      loteprednol 0.2 % suspension  Commonly known as: LOTEMAX   1 drop, Both Eyes, 4 Times Daily PRN, Do not use for more than 2 weeks at a time      MAGNESIUM PO   Oral      multivitamin tablet tablet  Commonly known as: THERAGRAN   1 tablet, Oral, Daily      nitroglycerin 0.4 MG SL tablet  Commonly known as: NITROSTAT   0.4 mg, Sublingual, Every 5 Minutes PRN, Take no more than 3 doses in 15 minutes.      tamsulosin 0.4 MG capsule 24 hr capsule  Commonly known as: FLOMAX   0.4 mg, Oral, Daily      vitamin D3 125 MCG (5000 UT) capsule capsule   5,000 Units, Oral, Daily               Thank you for allowing me to participate in the care of your patient,      Sincerely,   Tiago Srivastava MD  [facility]  11/06/23  13:44 EST

## 2023-11-06 NOTE — TELEPHONE ENCOUNTER
Pt called stating that over the weekend he has been light headed with tightness in his chest. Per patient he passed out 3x over the weekend as well    Pt did not want to go to ER/UC. Office suggested to call Cardiologist with symptoms due to no availability with pcp. Pt agreed and had no further questions.

## 2023-11-07 ENCOUNTER — TELEPHONE (OUTPATIENT)
Dept: CARDIOLOGY | Facility: CLINIC | Age: 63
End: 2023-11-07
Payer: COMMERCIAL

## 2023-11-07 ENCOUNTER — HOSPITAL ENCOUNTER (OUTPATIENT)
Dept: CARDIOLOGY | Facility: HOSPITAL | Age: 63
Discharge: HOME | End: 2023-11-07
Admitting: INTERNAL MEDICINE
Payer: COMMERCIAL

## 2023-11-07 VITALS — WEIGHT: 216.05 LBS | HEIGHT: 74 IN | BODY MASS INDEX: 27.73 KG/M2

## 2023-11-07 DIAGNOSIS — R55 SYNCOPE AND COLLAPSE: ICD-10-CM

## 2023-11-07 LAB
BH CV NUCLEAR PRIOR STUDY: 1
BH CV REST NUCLEAR ISOTOPE DOSE: 10.3 MCI
BH CV STRESS BP STAGE 1: NORMAL
BH CV STRESS COMMENTS STAGE 1: NORMAL
BH CV STRESS DOSE REGADENOSON STAGE 1: 0.4
BH CV STRESS DURATION MIN STAGE 1: 0
BH CV STRESS DURATION SEC STAGE 1: 10
BH CV STRESS HR STAGE 1: 78
BH CV STRESS NUCLEAR ISOTOPE DOSE: 34.6 MCI
BH CV STRESS PROTOCOL 1: NORMAL
BH CV STRESS RECOVERY BP: NORMAL MMHG
BH CV STRESS RECOVERY HR: 66 BPM
BH CV STRESS STAGE 1: 1
LV EF NUC BP: 61 %
MAXIMAL PREDICTED HEART RATE: 158 BPM
PERCENT MAX PREDICTED HR: 49.37 %
STRESS BASELINE BP: NORMAL MMHG
STRESS BASELINE HR: 55 BPM
STRESS PERCENT HR: 58 %
STRESS POST EXERCISE DUR SEC: 10 SEC
STRESS POST PEAK BP: NORMAL MMHG
STRESS POST PEAK HR: 78 BPM
STRESS TARGET HR: 134 BPM

## 2023-11-07 PROCEDURE — 25010000002 REGADENOSON 0.4 MG/5ML SOLUTION: Performed by: INTERNAL MEDICINE

## 2023-11-07 PROCEDURE — 0 TECHNETIUM TETROFOSMIN KIT: Performed by: INTERNAL MEDICINE

## 2023-11-07 PROCEDURE — A9502 TC99M TETROFOSMIN: HCPCS | Performed by: INTERNAL MEDICINE

## 2023-11-07 PROCEDURE — 93016 CV STRESS TEST SUPVJ ONLY: CPT | Performed by: INTERNAL MEDICINE

## 2023-11-07 PROCEDURE — 93018 CV STRESS TEST I&R ONLY: CPT | Performed by: INTERNAL MEDICINE

## 2023-11-07 PROCEDURE — 78452 HT MUSCLE IMAGE SPECT MULT: CPT

## 2023-11-07 PROCEDURE — 78452 HT MUSCLE IMAGE SPECT MULT: CPT | Performed by: INTERNAL MEDICINE

## 2023-11-07 PROCEDURE — 93017 CV STRESS TEST TRACING ONLY: CPT

## 2023-11-07 RX ORDER — REGADENOSON 0.08 MG/ML
0.4 INJECTION, SOLUTION INTRAVENOUS
Status: COMPLETED | OUTPATIENT
Start: 2023-11-07 | End: 2023-11-07

## 2023-11-07 RX ADMIN — TETROFOSMIN 1 DOSE: 1.38 INJECTION, POWDER, LYOPHILIZED, FOR SOLUTION INTRAVENOUS at 12:35

## 2023-11-07 RX ADMIN — REGADENOSON 0.4 MG: 0.08 INJECTION, SOLUTION INTRAVENOUS at 12:35

## 2023-11-07 RX ADMIN — TETROFOSMIN 1 DOSE: 1.38 INJECTION, POWDER, LYOPHILIZED, FOR SOLUTION INTRAVENOUS at 11:47

## 2023-11-07 NOTE — TELEPHONE ENCOUNTER
Notified patient of results. Patient verbalized understanding.    Arlen Rinaldi RN  Triage [facility]

## 2023-11-07 NOTE — TELEPHONE ENCOUNTER
Please inform patient perfusion stress test shows no evidence of tightly blocked coronary artery and is without change since his last stress test in 2020.  His follow-up kidney function which was checked yesterday is stable.

## 2023-12-03 ENCOUNTER — HOSPITAL ENCOUNTER (EMERGENCY)
Facility: HOSPITAL | Age: 63
Discharge: HOME | End: 2023-12-04
Attending: EMERGENCY MEDICINE | Admitting: EMERGENCY MEDICINE
Payer: COMMERCIAL

## 2023-12-03 VITALS
RESPIRATION RATE: 18 BRPM | SYSTOLIC BLOOD PRESSURE: 178 MMHG | OXYGEN SATURATION: 95 % | HEART RATE: 76 BPM | BODY MASS INDEX: 27.59 KG/M2 | WEIGHT: 215 LBS | HEIGHT: 74 IN | TEMPERATURE: 97 F | DIASTOLIC BLOOD PRESSURE: 81 MMHG

## 2023-12-03 DIAGNOSIS — H66.91 RIGHT OTITIS MEDIA, UNSPECIFIED OTITIS MEDIA TYPE: Primary | ICD-10-CM

## 2023-12-03 PROCEDURE — 99283 EMERGENCY DEPT VISIT LOW MDM: CPT

## 2023-12-04 ENCOUNTER — APPOINTMENT (OUTPATIENT)
Dept: CT IMAGING | Facility: HOSPITAL | Age: 63
End: 2023-12-04
Payer: COMMERCIAL

## 2023-12-04 ENCOUNTER — HOSPITAL ENCOUNTER (OUTPATIENT)
Facility: HOSPITAL | Age: 63
Setting detail: OBSERVATION
Discharge: HOME | End: 2023-12-06
Attending: EMERGENCY MEDICINE | Admitting: INTERNAL MEDICINE
Payer: COMMERCIAL

## 2023-12-04 ENCOUNTER — TELEPHONE (OUTPATIENT)
Dept: INTERNAL MEDICINE | Facility: CLINIC | Age: 63
End: 2023-12-04
Payer: COMMERCIAL

## 2023-12-04 DIAGNOSIS — L03.211 FACIAL CELLULITIS: ICD-10-CM

## 2023-12-04 DIAGNOSIS — H60.01 ABSCESS OF EXTERNAL EAR, RIGHT: ICD-10-CM

## 2023-12-04 DIAGNOSIS — H60.501 ACUTE OTITIS EXTERNA OF RIGHT EAR, UNSPECIFIED TYPE: Primary | ICD-10-CM

## 2023-12-04 LAB
ALBUMIN SERPL-MCNC: 4.1 G/DL (ref 3.5–5.2)
ALBUMIN/GLOB SERPL: 1.6 G/DL
ALP SERPL-CCNC: 77 U/L (ref 39–117)
ALT SERPL W P-5'-P-CCNC: 21 U/L (ref 1–41)
ANION GAP SERPL CALCULATED.3IONS-SCNC: 9.3 MMOL/L (ref 5–15)
AST SERPL-CCNC: 19 U/L (ref 1–40)
BASOPHILS # BLD AUTO: 0.02 10*3/MM3 (ref 0–0.2)
BASOPHILS NFR BLD AUTO: 0.2 % (ref 0–1.5)
BILIRUB SERPL-MCNC: 0.7 MG/DL (ref 0–1.2)
BUN SERPL-MCNC: 13 MG/DL (ref 8–23)
BUN/CREAT SERPL: 13.1 (ref 7–25)
CALCIUM SPEC-SCNC: 9.2 MG/DL (ref 8.6–10.5)
CHLORIDE SERPL-SCNC: 101 MMOL/L (ref 98–107)
CO2 SERPL-SCNC: 26.7 MMOL/L (ref 22–29)
CREAT SERPL-MCNC: 0.99 MG/DL (ref 0.76–1.27)
D-LACTATE SERPL-SCNC: 1 MMOL/L (ref 0.5–2)
DEPRECATED RDW RBC AUTO: 44.2 FL (ref 37–54)
EGFRCR SERPLBLD CKD-EPI 2021: 86.1 ML/MIN/1.73
EOSINOPHIL # BLD AUTO: 0.11 10*3/MM3 (ref 0–0.4)
EOSINOPHIL NFR BLD AUTO: 1.2 % (ref 0.3–6.2)
ERYTHROCYTE [DISTWIDTH] IN BLOOD BY AUTOMATED COUNT: 12.7 % (ref 12.3–15.4)
GLOBULIN UR ELPH-MCNC: 2.6 GM/DL
GLUCOSE BLDC GLUCOMTR-MCNC: 103 MG/DL (ref 70–130)
GLUCOSE SERPL-MCNC: 108 MG/DL (ref 65–99)
HCT VFR BLD AUTO: 42.8 % (ref 37.5–51)
HGB BLD-MCNC: 14 G/DL (ref 13–17.7)
IMM GRANULOCYTES # BLD AUTO: 0.02 10*3/MM3 (ref 0–0.05)
IMM GRANULOCYTES NFR BLD AUTO: 0.2 % (ref 0–0.5)
LYMPHOCYTES # BLD AUTO: 1.4 10*3/MM3 (ref 0.7–3.1)
LYMPHOCYTES NFR BLD AUTO: 14.8 % (ref 19.6–45.3)
MAGNESIUM SERPL-MCNC: 2.1 MG/DL (ref 1.6–2.4)
MCH RBC QN AUTO: 30.7 PG (ref 26.6–33)
MCHC RBC AUTO-ENTMCNC: 32.7 G/DL (ref 31.5–35.7)
MCV RBC AUTO: 93.9 FL (ref 79–97)
MONOCYTES # BLD AUTO: 0.7 10*3/MM3 (ref 0.1–0.9)
MONOCYTES NFR BLD AUTO: 7.4 % (ref 5–12)
NEUTROPHILS NFR BLD AUTO: 7.22 10*3/MM3 (ref 1.7–7)
NEUTROPHILS NFR BLD AUTO: 76.2 % (ref 42.7–76)
PLATELET # BLD AUTO: 279 10*3/MM3 (ref 140–450)
PMV BLD AUTO: 9.5 FL (ref 6–12)
POTASSIUM SERPL-SCNC: 3.8 MMOL/L (ref 3.5–5.2)
PROCALCITONIN SERPL-MCNC: 0.05 NG/ML (ref 0–0.25)
PROT SERPL-MCNC: 6.7 G/DL (ref 6–8.5)
RBC # BLD AUTO: 4.56 10*6/MM3 (ref 4.14–5.8)
SODIUM SERPL-SCNC: 137 MMOL/L (ref 136–145)
WBC NRBC COR # BLD AUTO: 9.47 10*3/MM3 (ref 3.4–10.8)

## 2023-12-04 PROCEDURE — 25010000002 CEFTRIAXONE PER 250 MG: Performed by: EMERGENCY MEDICINE

## 2023-12-04 PROCEDURE — 80053 COMPREHEN METABOLIC PANEL: CPT | Performed by: EMERGENCY MEDICINE

## 2023-12-04 PROCEDURE — 85025 COMPLETE CBC W/AUTO DIFF WBC: CPT | Performed by: EMERGENCY MEDICINE

## 2023-12-04 PROCEDURE — 36415 COLL VENOUS BLD VENIPUNCTURE: CPT

## 2023-12-04 PROCEDURE — 87070 CULTURE OTHR SPECIMN AEROBIC: CPT | Performed by: EMERGENCY MEDICINE

## 2023-12-04 PROCEDURE — 96365 THER/PROPH/DIAG IV INF INIT: CPT

## 2023-12-04 PROCEDURE — 87205 SMEAR GRAM STAIN: CPT | Performed by: EMERGENCY MEDICINE

## 2023-12-04 PROCEDURE — 96366 THER/PROPH/DIAG IV INF ADDON: CPT

## 2023-12-04 PROCEDURE — 25010000002 ONDANSETRON PER 1 MG: Performed by: EMERGENCY MEDICINE

## 2023-12-04 PROCEDURE — 87040 BLOOD CULTURE FOR BACTERIA: CPT | Performed by: EMERGENCY MEDICINE

## 2023-12-04 PROCEDURE — 96367 TX/PROPH/DG ADDL SEQ IV INF: CPT

## 2023-12-04 PROCEDURE — 99285 EMERGENCY DEPT VISIT HI MDM: CPT | Performed by: EMERGENCY MEDICINE

## 2023-12-04 PROCEDURE — 25010000002 VANCOMYCIN HCL IN NACL 2-0.9 GM/500ML-% SOLUTION: Performed by: EMERGENCY MEDICINE

## 2023-12-04 PROCEDURE — 83605 ASSAY OF LACTIC ACID: CPT | Performed by: EMERGENCY MEDICINE

## 2023-12-04 PROCEDURE — 25510000001 IOPAMIDOL PER 1 ML: Performed by: EMERGENCY MEDICINE

## 2023-12-04 PROCEDURE — 83735 ASSAY OF MAGNESIUM: CPT | Performed by: EMERGENCY MEDICINE

## 2023-12-04 PROCEDURE — 70487 CT MAXILLOFACIAL W/DYE: CPT

## 2023-12-04 PROCEDURE — G0378 HOSPITAL OBSERVATION PER HR: HCPCS

## 2023-12-04 PROCEDURE — 96375 TX/PRO/DX INJ NEW DRUG ADDON: CPT

## 2023-12-04 PROCEDURE — 96372 THER/PROPH/DIAG INJ SC/IM: CPT

## 2023-12-04 PROCEDURE — 25010000002 KETOROLAC TROMETHAMINE PER 15 MG: Performed by: PHYSICIAN ASSISTANT

## 2023-12-04 PROCEDURE — 25010000002 KETOROLAC TROMETHAMINE PER 15 MG: Performed by: EMERGENCY MEDICINE

## 2023-12-04 PROCEDURE — 84145 PROCALCITONIN (PCT): CPT | Performed by: EMERGENCY MEDICINE

## 2023-12-04 PROCEDURE — 82948 REAGENT STRIP/BLOOD GLUCOSE: CPT

## 2023-12-04 PROCEDURE — 99285 EMERGENCY DEPT VISIT HI MDM: CPT

## 2023-12-04 RX ORDER — VANCOMYCIN 2 GRAM/500 ML IN 0.9 % SODIUM CHLORIDE INTRAVENOUS
20 ONCE
Status: COMPLETED | OUTPATIENT
Start: 2023-12-04 | End: 2023-12-04

## 2023-12-04 RX ORDER — AMOXICILLIN AND CLAVULANATE POTASSIUM 875; 125 MG/1; MG/1
1 TABLET, FILM COATED ORAL ONCE
Status: COMPLETED | OUTPATIENT
Start: 2023-12-04 | End: 2023-12-04

## 2023-12-04 RX ORDER — ONDANSETRON 2 MG/ML
4 INJECTION INTRAMUSCULAR; INTRAVENOUS ONCE
Status: COMPLETED | OUTPATIENT
Start: 2023-12-04 | End: 2023-12-04

## 2023-12-04 RX ORDER — ACETAMINOPHEN 500 MG
1000 TABLET ORAL ONCE
Status: COMPLETED | OUTPATIENT
Start: 2023-12-04 | End: 2023-12-04

## 2023-12-04 RX ORDER — OFLOXACIN 3 MG/ML
4 SOLUTION/ DROPS OPHTHALMIC ONCE
Status: COMPLETED | OUTPATIENT
Start: 2023-12-04 | End: 2023-12-04

## 2023-12-04 RX ORDER — AMOXICILLIN AND CLAVULANATE POTASSIUM 875; 125 MG/1; MG/1
1 TABLET, FILM COATED ORAL 2 TIMES DAILY
Qty: 20 TABLET | Refills: 0 | Status: SHIPPED | OUTPATIENT
Start: 2023-12-04 | End: 2023-12-06 | Stop reason: HOSPADM

## 2023-12-04 RX ORDER — KETOROLAC TROMETHAMINE 15 MG/ML
15 INJECTION, SOLUTION INTRAMUSCULAR; INTRAVENOUS ONCE
Status: COMPLETED | OUTPATIENT
Start: 2023-12-04 | End: 2023-12-04

## 2023-12-04 RX ORDER — CIPROFLOXACIN/HYDROCORTISONE 0.2 %-1 %
3 SUSPENSION, DROPS(FINAL DOSAGE FORM)(ML) OTIC (EAR) 2 TIMES DAILY
Qty: 3 ML | Refills: 0 | Status: SHIPPED | OUTPATIENT
Start: 2023-12-04 | End: 2023-12-11

## 2023-12-04 RX ORDER — KETOROLAC TROMETHAMINE 30 MG/ML
30 INJECTION, SOLUTION INTRAMUSCULAR; INTRAVENOUS ONCE
Status: COMPLETED | OUTPATIENT
Start: 2023-12-04 | End: 2023-12-04

## 2023-12-04 RX ADMIN — KETOROLAC TROMETHAMINE 30 MG: 30 INJECTION, SOLUTION INTRAMUSCULAR; INTRAVENOUS at 00:47

## 2023-12-04 RX ADMIN — IOPAMIDOL 85 ML: 755 INJECTION, SOLUTION INTRAVENOUS at 19:48

## 2023-12-04 RX ADMIN — ACETAMINOPHEN 1000 MG: 500 TABLET ORAL at 00:47

## 2023-12-04 RX ADMIN — OFLOXACIN 4 DROP: 3 SOLUTION OPHTHALMIC at 00:48

## 2023-12-04 RX ADMIN — ONDANSETRON 4 MG: 2 INJECTION INTRAMUSCULAR; INTRAVENOUS at 21:47

## 2023-12-04 RX ADMIN — Medication 2000 MG: at 19:29

## 2023-12-04 RX ADMIN — AMOXICILLIN AND CLAVULANATE POTASSIUM 1 TABLET: 875; 125 TABLET, FILM COATED ORAL at 00:48

## 2023-12-04 RX ADMIN — CEFTRIAXONE 2000 MG: 2 INJECTION, POWDER, FOR SOLUTION INTRAMUSCULAR; INTRAVENOUS at 18:53

## 2023-12-04 RX ADMIN — KETOROLAC TROMETHAMINE 15 MG: 15 INJECTION, SOLUTION INTRAMUSCULAR; INTRAVENOUS at 21:47

## 2023-12-04 NOTE — ED TRIAGE NOTES
Pt c/o right ear pain that started approx 1wk ago., pt was seen at urgent care on friday and placed on meds with no relief

## 2023-12-04 NOTE — ED PROVIDER NOTES
EMERGENCY DEPARTMENT ENCOUNTER    Room Number:  02/02  PCP: Brian Marr MD      HPI:  Chief Complaint: Right ear pain  A complete HPI/ROS/PMH/PSH/SH/FH are unobtainable due to: None  Context: Niranjan Peacock is a 63 y.o. male with a past medical history of prediabetes who presents to the ED c/o right ear pain has been ongoing for the past 5 days.  Patient states he was seen in immediate care few days ago and was placed on Omnicef.  Pain continues to worsen.  He now has a yellow drainage coming from the right ear.  Pain is said to be moderate.  Lying on his right side putting pressure on the area makes it worse he denies fever or chills.  He denies recent sick contacts.  He denies injury.  He states he does use Q-tips when he gets out of the shower.  He is here for further evaluation.        PAST MEDICAL HISTORY  Active Ambulatory Problems     Diagnosis Date Noted    Multiple thyroid nodules, benign per 2022 US     Pernicious anemia     H/O multiple allergies 03/09/2016    Coronary artery disease involving native coronary artery of native heart without angina pectoris 08/10/2017    Herniated nucleus pulposus, L3-4 right 09/09/2020    Right lumbar radiculopathy 09/09/2020    Pterygium of right eye 10/16/2020    Degenerative arthritis of cervical spine 12/16/2020    Essential hypertension 12/24/2020    History of DVT (deep vein thrombosis) 12/24/2020    Eczema of eyelid 01/19/2021    Chronic eczema of hand 07/01/2021    Mixed hyperlipidemia 02/02/2022    CHANTEL (obstructive sleep apnea) 07/05/2022    Statin and ezetimibe intolerance 02/01/2023    Stenosis of left carotid artery 03/28/2023    Elevated PSA, less than 10 ng/ml 05/31/2023    Prediabetes 05/31/2023     Resolved Ambulatory Problems     Diagnosis Date Noted    Chronic fatigue 03/09/2016    Pernicious anemia 07/31/2017    Abnormal EKG 08/16/2019    Angina at rest 08/16/2019    Abscess of bursa of right knee 12/23/2020    Cellulitis of right lower extremity  12/24/2020    Chest pain 07/17/2021    Myalgia 05/27/2022     Past Medical History:   Diagnosis Date    Allergic     B12 deficiency anemia     Deep vein thrombosis     Headache     History of blood clots     HL (hearing loss)     Hypertension     Pulmonary embolism     Syncope          PAST SURGICAL HISTORY  Past Surgical History:   Procedure Laterality Date    CARDIAC CATHETERIZATION N/A 08/03/2017    Procedure: Coronary angiography;  Surgeon: Cynthia Farley MD;  Location:  [facility];  Service:     CARDIAC CATHETERIZATION  08/03/2017    Procedure: Functional Flow [place];  Surgeon: Cynthia Farley MD;  Location:  [facility];  Service:     CARDIAC CATHETERIZATION N/A 08/03/2017    Procedure: Stent YI coronary;  Surgeon: Cynthia Farley MD;  Location:  [facility];  Service:     CARDIAC CATHETERIZATION N/A 08/03/2017    Procedure: Left ventriculography;  Surgeon: Cynthia Farley MD;  Location:  [facility];  Service:     CARDIAC CATHETERIZATION N/A 08/03/2017    Procedure: Left Heart Cath;  Surgeon: Cynthia Farley MD;  Location: [facility];  Service:     CARDIAC CATHETERIZATION N/A 08/20/2019    Procedure: Left Heart Cath;  Surgeon: Mulugeta Ac MD;  Location: [facility]ATH [facility];  Service: Cardiology    CARDIAC CATHETERIZATION N/A 08/20/2019    Procedure: Coronary angiography;  Surgeon: Mulugeta Ac MD;  Location: [facility]ATH [facility];  Service: Cardiology    CARDIAC CATHETERIZATION N/A 08/20/2019    Procedure: Left ventriculography;  Surgeon: Mulugeta Ac MD;  Location: [facility] CATH IN[facility];  Service: Cardiology    CARDIAC CATHETERIZATION  8/20/2019    COLONOSCOPY  MMVI    NORMAL.    COLONOSCOPY      FINE NEEDLE ASPIRATION  12/2015    Left thyroid nodule.  [place] category II.  Dr. Socrates Sanchez         FAMILY HISTORY  Family History   Problem Relation Age of Onset    Heart disease  Other     Hypertension Other     Thyroid disease Other     Heart disease Father     Prostate cancer Father     Hypertension Father     Stroke Father     Heart disease Mother     Hypertension Mother     Heart disease Brother     Hypertension Brother     Thyroid disease Sister     Heart disease Brother     Hypertension Brother          SOCIAL HISTORY  Social History     Socioeconomic History    Marital status:    Tobacco Use    Smoking status: Never    Smokeless tobacco: Never    Tobacco comments:     caffeine use - 2 cups coffee dailyl    Vaping Use    Vaping Use: Never used   Substance and Sexual Activity    Alcohol use: Yes     Comment: Occasional drinks    Drug use: No    Sexual activity: Defer         ALLERGIES  Lisinopril, Repatha [evolocumab], Simvastatin, Amlodipine, Hydralazine, and Valsartan        REVIEW OF SYSTEMS  Review of Systems     All systems reviewed and negative except for those discussed in HPI.       PHYSICAL EXAM  ED Triage Vitals   Temp Heart Rate Resp BP SpO2   12/03/23 2111 12/03/23 2111 12/03/23 2111 12/03/23 2142 12/03/23 2111   97 °F (36.1 °C) 76 18 178/81 95 %      Temp src Heart Rate Source Patient Position BP Location FiO2 (%)   12/03/23 2111 12/03/23 2111 -- -- --   Tympanic Monitor          Physical Exam      GENERAL: WDWN male, no acute distress  HENT: nares patent.  Right external canal significantly swollen with scant serosanguineous discharge.  I cannot completely visualize the TM but there is no obvious air-fluid level or perforation.  Mastoid nontender  EYES: no scleral icterus  CV: regular rhythm, normal rate  RESPIRATORY: normal effort  ABDOMEN: soft  MUSCULOSKELETAL: no deformity  NEURO: alert, moves all extremities, follows commands  PSYCH:  calm, cooperative  SKIN: warm, dry    Vital signs and nursing notes reviewed.          LAB RESULTS  Recent Results (from the past 24 hour(s))   POC Glucose Once    Collection Time: 12/04/23 12:06 AM    Specimen: Blood   Result  Value Ref Range    Glucose 103 70 - 130 mg/dL       Ordered the above labs and reviewed the results.        RADIOLOGY  No Radiology Exams Resulted Within Past 24 Hours    Ordered the above noted radiological studies. Reviewed by me in PACS.          PROCEDURES  Procedures          MEDICATIONS GIVEN IN ER  Medications   ofloxacin (OCUFLOX) 0.3 % ophthalmic solution 4 drop (4 drops Right Ear Given 12/4/23 0048)   ketorolac (TORADOL) injection 30 mg (30 mg Intramuscular Given 12/4/23 0047)   acetaminophen (TYLENOL) tablet 1,000 mg (1,000 mg Oral Given 12/4/23 0047)   amoxicillin-clavulanate (AUGMENTIN) 875-125 MG per tablet 1 tablet (1 tablet Oral Given 12/4/23 0048)         MEDICAL DECISION MAKING, PROGRESS, and CONSULTS    Discussion below represents my analysis of pertinent findings related to patient's condition, differential diagnosis, treatment plan and final disposition.      Orders placed during this visit:  Orders Placed This Encounter   Procedures    POC Glucose STAT    POC Glucose Once         Additional sources:  - Discussed/obtained information from independent historians: None available  Additional information was obtained to confirm the patient's history.    - External (non-ED) record review: Patient was seen at immediate care on 12/1/2023 by Dr. Patrick Degroot.  TM was said to be injected.  He was diagnosed with AOM and placed on Omnicef.            - Chronic or social conditions impacting care: None        Differential diagnosis:    AOM, OE, mastoiditis.  Physical exam is in line with the significant OE.  Will check glucose given patient's history of prediabetes.  I am going to have to place a wick in the patient's ear and plan to place him on Ciprodex with close ENT follow-up.  Will have him to continue with his Omnicef.  Will write a short course of diclofenac for pain and swelling.    BUN and creatinine were 12 and 0.91 on November 6, 2023.  Will go ahead and give IM Toradol for pain and  swelling      Independent interpretation of labs, radiology studies, and discussions with consultants:  ED Course as of 12/04/23 0405   Mon Dec 04, 2023   0021 Patient presents with failed outpatient treatment on cefdinir, no signs of mastoiditis, patient is well-appearing with no alarm signs or symptoms.  Unclear why patient is not on first-line therapy, plan for transition to Augmentin.  Discussed with patient cannot rule out tympanic membrane perforation, plan for LOC take medication, need for follow-up with primary care and ENT.  Given extensive discussion return precautions, discharging. [JG]      ED Course User Index  [JG] Alexys Mendoza MD               DIAGNOSIS  Final diagnoses:   Right otitis media, unspecified otitis media type         DISPOSITION    Discharge    Latest Documented Vital Signs:  As of 04:05 EST  BP- 178/81 HR- 76 Temp- 97 °F (36.1 °C) (Tympanic) O2 sat- 95%      --    Please note that portions of this were completed with a voice recognition program.       Note Disclaimer: At [facility], we believe that sharing information builds trust and better relationships. You are receiving this note because you are receiving care at [facility] or recently visited. It is possible you will see health information before a provider has talked with you about it. This kind of information can be easy to misunderstand. To help you fully understand what it means for your health, we urge you to discuss this note with your provider.         Rk Galvan III, PA  12/04/23 0405

## 2023-12-04 NOTE — FSED PROVIDER NOTE
Subjective   History of Present Illness  Patient is a 63-year-old male.  He presents with ongoing right ear and facial pain.  He states it has been ongoing for approximately 10 days.  He initially was seen at an urgent treatment care center and placed on oral Omnicef.  He was seen at [facility] emergency department 2 days ago and placed on oral Augmentin as well as Ciprodex eardrops.  He does have a wick in place on the right ear.  He was noted to have otitis externa with significant swelling in the canal 2 days ago.    He presents today with increasing pain and soft tissue swelling anterior to the ear.  No documented fever or chills.    Of note, he does inform me that he was treated for MRSA of his right knee a few years back.  At this time he has not been covered for MRSA.      Review of Systems  Constitutional: No fevers, chills, sweats unless otherwise documented in HPI  Eyes: No recent visual problems, eye discharge, eye pain, redness unless otherwise documented in HPI  HEENT: No ear pain, nasal congestion, sore throat, voice changes unless otherwise documented in HPI  Respiratory: No shortness of breath, cough, pain on breathing, sputum production unless otherwise documented in HPI  Cardiovascular: No chest pain, palpitations, syncope, orthopnea unless otherwise documented in HPI  Gastrointestinal: No nausea, vomiting, diarrhea, constipation unless otherwise documented in HPI  Genitourinary: No hematuria, dysuria, incontinence unless otherwise documented in HPI  Endocrine: Negative for excessive thirst, excessive hunger, excessive urination, heat or cold intolerance unless otherwise documented in HPI  Musculoskeletal: No back pain, neck pain, joint pain, muscle pain, decreased range of motion unless otherwise documented in HPI  Integumentary: No rash, pruritus, abrasion, lesions unless otherwise documented in HPI  Neurologic: No weakness, numbness, frequent headaches, tremors unless otherwise documented in  HPI  Psychiatric: No anxiety, depression, mood changes, hallucinations unless otherwise documented in HPI        Past Medical History:   Diagnosis Date    Allergic     Angina at rest 08/16/2019    Added automatically from request for surgery [id number]    B12 deficiency anemia     Cellulitis of right lower extremity 12/24/2020    Chest pain     Chronic fatigue 03/09/2016    Coronary artery disease involving native coronary artery of native heart without angina pectoris 08/10/2017    Deep vein thrombosis     Headache     History of blood clots     blood clot found in right lung     HL (hearing loss)     Hypertension     Mixed hyperlipidemia 02/02/2022    Multiple thyroid nodules     Multiple thyroid nodules     CHANTEL (obstructive sleep apnea)     Pernicious anemia     Pulmonary embolism     2015    Stenosis of left carotid artery 3/28/2023    Syncope     2 yrs ago one time       Allergies   Allergen Reactions    Lisinopril Shortness Of Breath     Rash also     Repatha [Evolocumab] Myalgia    Simvastatin Myalgia    Amlodipine Myalgia     Muscle aches     Hydralazine Rash    Valsartan Rash     muscle aches       Past Surgical History:   Procedure Laterality Date    CARDIAC CATHETERIZATION N/A 08/03/2017    Procedure: Coronary angiography;  Surgeon: Cynthia Farley MD;  Location:  [facility];  Service:     CARDIAC CATHETERIZATION  08/03/2017    Procedure: Functional Flow [place];  Surgeon: Cynthia Farley MD;  Location:  [facility];  Service:     CARDIAC CATHETERIZATION N/A 08/03/2017    Procedure: Stent YI coronary;  Surgeon: Cynthia Farley MD;  [facility];  Service:     CARDIAC CATHETERIZATION N/A 08/03/2017    Procedure: Left ventriculography;  Surgeon: Cynthia Farley MD;  Location:  [facility];  Service:     CARDIAC CATHETERIZATION N/A 08/03/2017    Procedure: Left Heart Cath;  Surgeon: Cynthia Farley MD;  Location: [facility] CATH  INVASIVE LOCATION;  Service:     CARDIAC CATHETERIZATION N/A 08/20/2019    Procedure: Left Heart Cath;  Surgeon: Mulugeta Ac MD;  Location:  [facility];  Service: Cardiology    CARDIAC CATHETERIZATION N/A 08/20/2019    Procedure: Coronary angiography;  Surgeon: Mulugeta Ac MD;  Location:  [facility];  Service: Cardiology    CARDIAC CATHETERIZATION N/A 08/20/2019    Procedure: Left ventriculography;  Surgeon: Mulugeta Ac MD;  Location:  [facility];  Service: Cardiology    CARDIAC CATHETERIZATION  8/20/2019    COLONOSCOPY  MMVI    NORMAL.    COLONOSCOPY      FINE NEEDLE ASPIRATION  12/2015    Left thyroid nodule.  [place] category II.  Dr. Socrates Sanchez       Family History   Problem Relation Age of Onset    Heart disease Other     Hypertension Other     Thyroid disease Other     Heart disease Father     Prostate cancer Father     Hypertension Father     Stroke Father     Heart disease Mother     Hypertension Mother     Heart disease Brother     Hypertension Brother     Thyroid disease Sister     Heart disease Brother     Hypertension Brother        Social History     Socioeconomic History    Marital status:    Tobacco Use    Smoking status: Never    Smokeless tobacco: Never    Tobacco comments:     caffeine use - 2 cups coffee dailyl    Vaping Use    Vaping Use: Never used   Substance and Sexual Activity    Alcohol use: Yes     Comment: Occasional drinks    Drug use: No    Sexual activity: Defer           Objective   Physical Exam  Vital signs: Reviewed in nurses notes    General: Awake alert.  Does appear to be in mild distress    HEENT: Normocephalic atraumatic.  Pupils are equal round sponsored to light.  The right TM is unable to be visualized.  There is a wick in place with significant soft tissue swelling of the ear canal itself.  There is facial erythema and soft tissue swelling anterior to the ear.  I do not palpate any fluctuance but  there is induration.  Patient does complain of pain with mouth opening.    Neck:   Supple without any evidence of soft tissue swelling or mass    Respiratory:   Nonlabored respirations.  Clear to auscultation bilaterally.  Equal breath sounds bilaterally.  No wheezes or stridor noted.    Cardiovascular: Regular rate and rhythm.  No murmur.  Equal pulses in bilateral lower extremities without edema.    Abdomen: Nondistended    Skin:   Please see HEENT exam    Neurological examination: Patient is awake alert oriented x4.  Speech is normal.  No facial palsy.  No focal motor or sensory deficits.    Procedures           ED Course  ED Course as of 12/08/23 0351   Mon Dec 04, 2023   1915 Care assumed from Dr. Ornelas, pending results of labs and CT face.  Possible ENT consult. [KZ]   1937 White blood cell count is normal with left shift.  Lactic acid is normal.  Metabolic panel is unremarkable. [KZ]   2102 CT scan shows complex infection.  Call placed to ENT.  Dr. Sanchez on-call. [KZ]   2113 Spoke to ENT.  They are agreeable to consult on this patient. [KZ]   2113 Dr. Sanchez on-call. [KZ]   2147 Spoke to Dr. Luis.  He has accepted the patient for admission. [KZ]      ED Course User Index  [KZ] Diaz Tejeda MD      Medications   vancomycin IVPB 2000 mg in 0.9% Sodium Chloride 500 mL (0 mg Intravenous Stopped 12/4/23 2202)   cefTRIAXone (ROCEPHIN) 2,000 mg in sodium chloride 0.9 % 100 mL IVPB-VTB (0 mg Intravenous Stopped 12/4/23 1927)   iopamidol (ISOVUE-370) 76 % injection 100 mL (85 mL Intravenous Given 12/4/23 1948)   ketorolac (TORADOL) injection 15 mg (15 mg Intravenous Given 12/4/23 2147)   ondansetron (ZOFRAN) injection 4 mg (4 mg Intravenous Given 12/4/23 2147)   ketorolac (TORADOL) injection 15 mg (15 mg Intravenous Given 12/5/23 1129)                                1900:  Pt's care will be transitioned to Dr. Tejeda.  Currently pending CT face, laboratory eval.           Medical Decision Making  Problems  Addressed:  Abscess of external ear, right: complicated acute illness or injury  Acute otitis externa of right ear, unspecified type: complicated acute illness or injury  Facial cellulitis: complicated acute illness or injury    Amount and/or Complexity of Data Reviewed  Labs: ordered.  Radiology: ordered.    Risk  Prescription drug management.  Decision regarding hospitalization.        Final diagnoses:   Acute otitis externa of right ear, unspecified type   Facial cellulitis   Abscess of external ear, right       ED Disposition  ED Disposition       ED Disposition   Decision to Admit    Condition   --    Comment   Level of Care: Med/Surg [1]   Diagnosis: Abscess of right external ear [216280]   Admitting Physician: GUS GONZALEZ [[id number]]   Attending Physician: GUS GONZALEZ [[id number]]                 Brian Marr MD  [address]  [facility] [address] [phone]    Follow up in 1 week(s)      Socrates Sanchez MD  [address] [id number] [phone]    Follow up on 12/8/2023           Medication List        New Prescriptions      cefdinir 300 MG capsule  Commonly known as: OMNICEF  Take 1 capsule by mouth 2 (Two) Times a Day for 5 days.     HYDROcodone-acetaminophen 5-325 MG per tablet  Commonly known as: NORCO  Take 1 tablet by mouth Every 4 (Four) Hours As Needed for Moderate Pain for up to 2 days.            Stop      amoxicillin-clavulanate 875-125 MG per tablet  Commonly known as: AUGMENTIN               Where to Get Your Medications        These medications were sent to [facility] [id number] - [facility] [address] AT [address] - [phone] [place] [phone]   [address] [address]      Phone: [phone]   HYDROcodone-acetaminophen 5-325 MG per tablet       Information about where to get these medications is not yet available    Ask your nurse or doctor about these medications  cefdinir 300 MG capsule

## 2023-12-04 NOTE — ED PROVIDER NOTES
MD ATTESTATION NOTE  The GIOVANNI and I have discussed this patient's history, physical exam, and treatment plan. I have reviewed the GIOVANNI documentation and I affirm the documentation and agree with their diagnostics, findings, treatment, plan, and disposition.    I provided a substantive portion of the care of this patient and personally had a face to face interaction with the patient. I personally performed the physical exam, in its entirety.  The attached note describes my personal findings.    PCP: Brian Marr MD  Patient Care Team:  Brian Marr MD as PCP - General (Family Medicine)     Niranjan Peacock is a 63 y.o. male who presents to the ED c/o right ear pain.  Patient reports he has been having symptoms for last 5 days, was seen at immediate care and placed on Omnicef, reports symptoms continue to worsen.  Patient reports he has been having drainage out of his ear.  Patient denies any fevers shakes chills or night sweats.    On exam:  General: NAD.  Head: NCAT.  ENT: nares patent, no scleral icterus.  Right ear: External appearance is normal, normal lie to the pinna, no mastoid swelling or tenderness, no pain with manipulation of tragus.  Ear canal is swollen, trace purulent drainage, can see small area of tympanic membrane but unable to visualize fully.  Neck: Supple, trachea midline.  Cardiac: regular rate and rhythm.  Lungs: normal effort.  Abdomen: Soft, NTTP.   Extremities: Moves all extremities well, no peripheral edema  Neuro: alert, MAEW, follows commands  Psych: calm, cooperative  Skin: Warm, dry.    Medical Decision Making:  After the initial H&P, I discussed pertinent information from history and physical exam with patient/family.  Discussed differential diagnosis.  Discussed plan for ED evaluation/work-up/treatment.  All questions answered.  Patient/family is agreeable with plan.    ED Course as of 12/04/23 0023   Mon Dec 04, 2023   0021 Patient presents with failed outpatient treatment on cefdinir,  no signs of mastoiditis, patient is well-appearing with no alarm signs or symptoms.  Unclear why patient is not on first-line therapy, plan for transition to Augmentin.  Discussed with patient cannot rule out tympanic membrane perforation, plan for LOC take medication, need for follow-up with primary care and ENT.  Given extensive discussion return precautions, discharging. [JG]      ED Course User Index  [JG] Alexys Mendoza MD       Diagnosis  Final diagnoses:   Right otitis media, unspecified otitis media type          Alexys Mendoza MD  12/04/23 0023

## 2023-12-04 NOTE — TELEPHONE ENCOUNTER
"Pt called re: R otitis media. Pt was seen in [facility] 12/1/2023, and ER 12/3/2023.    Pt was originally Rx'd omnicef 300mg caps at [facility].  Pt states he was given injection for pain at ER, records indicate toradol 30mg; pt was also Rx'd ofloxacin, cipro HC drops, augmentin 875-125.     Pt states Toradol injection seemed to work for a few hours, but pain has returned.     Pt states the \"whole side of my face is hot\" and he has a \"sharp, stabbing pain\" in ear.    Pt did not feel he could wait for his f/u w Dr. Marr 12/8/2023, d/t severity of pain.    I advised pt that our office unfortunately did not have any more openings for the day, and I could only offer him an appt with JUSTIN Wong tomorrow (12/5/2023) at 8:00am.    Pt asked if he should return to ER and request another injection for pain. I advised pt that we do have a freestanding ER/[facility] on [facility], and they could determine the need for another injection or a change in treatment until Dr. Marr could assess his infection.    Pt verbalized understanding, agreement to go to [facility] for evaluation.      "

## 2023-12-05 PROBLEM — N40.0 BPH WITHOUT OBSTRUCTION/LOWER URINARY TRACT SYMPTOMS: Status: ACTIVE | Noted: 2023-12-05

## 2023-12-05 LAB
ANION GAP SERPL CALCULATED.3IONS-SCNC: 9.5 MMOL/L (ref 5–15)
BASOPHILS # BLD AUTO: 0.03 10*3/MM3 (ref 0–0.2)
BASOPHILS NFR BLD AUTO: 0.6 % (ref 0–1.5)
BUN SERPL-MCNC: 10 MG/DL (ref 8–23)
BUN/CREAT SERPL: 9.8 (ref 7–25)
CALCIUM SPEC-SCNC: 8.8 MG/DL (ref 8.6–10.5)
CHLORIDE SERPL-SCNC: 103 MMOL/L (ref 98–107)
CO2 SERPL-SCNC: 25.5 MMOL/L (ref 22–29)
CREAT SERPL-MCNC: 1.02 MG/DL (ref 0.76–1.27)
DEPRECATED RDW RBC AUTO: 42.8 FL (ref 37–54)
EGFRCR SERPLBLD CKD-EPI 2021: 83.1 ML/MIN/1.73
EOSINOPHIL # BLD AUTO: 0.22 10*3/MM3 (ref 0–0.4)
EOSINOPHIL NFR BLD AUTO: 4.1 % (ref 0.3–6.2)
ERYTHROCYTE [DISTWIDTH] IN BLOOD BY AUTOMATED COUNT: 12.7 % (ref 12.3–15.4)
GLUCOSE SERPL-MCNC: 105 MG/DL (ref 65–99)
HCT VFR BLD AUTO: 37.1 % (ref 37.5–51)
HGB BLD-MCNC: 12.5 G/DL (ref 13–17.7)
IMM GRANULOCYTES # BLD AUTO: 0.01 10*3/MM3 (ref 0–0.05)
IMM GRANULOCYTES NFR BLD AUTO: 0.2 % (ref 0–0.5)
LYMPHOCYTES # BLD AUTO: 1.41 10*3/MM3 (ref 0.7–3.1)
LYMPHOCYTES NFR BLD AUTO: 26 % (ref 19.6–45.3)
MCH RBC QN AUTO: 31.2 PG (ref 26.6–33)
MCHC RBC AUTO-ENTMCNC: 33.7 G/DL (ref 31.5–35.7)
MCV RBC AUTO: 92.5 FL (ref 79–97)
MONOCYTES # BLD AUTO: 0.73 10*3/MM3 (ref 0.1–0.9)
MONOCYTES NFR BLD AUTO: 13.4 % (ref 5–12)
NEUTROPHILS NFR BLD AUTO: 3.03 10*3/MM3 (ref 1.7–7)
NEUTROPHILS NFR BLD AUTO: 55.7 % (ref 42.7–76)
NRBC BLD AUTO-RTO: 0 /100 WBC (ref 0–0.2)
PLATELET # BLD AUTO: 242 10*3/MM3 (ref 140–450)
PMV BLD AUTO: 9.8 FL (ref 6–12)
POTASSIUM SERPL-SCNC: 3.8 MMOL/L (ref 3.5–5.2)
RBC # BLD AUTO: 4.01 10*6/MM3 (ref 4.14–5.8)
SODIUM SERPL-SCNC: 138 MMOL/L (ref 136–145)
WBC NRBC COR # BLD AUTO: 5.43 10*3/MM3 (ref 3.4–10.8)

## 2023-12-05 PROCEDURE — 25810000003 SODIUM CHLORIDE 0.9 % SOLUTION: Performed by: INTERNAL MEDICINE

## 2023-12-05 PROCEDURE — G0378 HOSPITAL OBSERVATION PER HR: HCPCS

## 2023-12-05 PROCEDURE — 96375 TX/PRO/DX INJ NEW DRUG ADDON: CPT

## 2023-12-05 PROCEDURE — 96376 TX/PRO/DX INJ SAME DRUG ADON: CPT

## 2023-12-05 PROCEDURE — 80048 BASIC METABOLIC PNL TOTAL CA: CPT | Performed by: INTERNAL MEDICINE

## 2023-12-05 PROCEDURE — 25010000002 KETOROLAC TROMETHAMINE PER 15 MG: Performed by: STUDENT IN AN ORGANIZED HEALTH CARE EDUCATION/TRAINING PROGRAM

## 2023-12-05 PROCEDURE — 85025 COMPLETE CBC W/AUTO DIFF WBC: CPT | Performed by: INTERNAL MEDICINE

## 2023-12-05 PROCEDURE — 25010000002 CEFTAZIDIME 2 G RECONSTITUTED SOLUTION 1 EACH VIAL: Performed by: INTERNAL MEDICINE

## 2023-12-05 PROCEDURE — 25010000002 VANCOMYCIN PER 500 MG: Performed by: INTERNAL MEDICINE

## 2023-12-05 PROCEDURE — 25010000002 HYDROMORPHONE PER 4 MG: Performed by: INTERNAL MEDICINE

## 2023-12-05 RX ORDER — CHOLECALCIFEROL (VITAMIN D3) 125 MCG
5 CAPSULE ORAL NIGHTLY PRN
Status: DISCONTINUED | OUTPATIENT
Start: 2023-12-05 | End: 2023-12-06 | Stop reason: HOSPADM

## 2023-12-05 RX ORDER — AMOXICILLIN 250 MG
2 CAPSULE ORAL 2 TIMES DAILY PRN
Status: DISCONTINUED | OUTPATIENT
Start: 2023-12-05 | End: 2023-12-06 | Stop reason: HOSPADM

## 2023-12-05 RX ORDER — NALOXONE HCL 0.4 MG/ML
0.4 VIAL (ML) INJECTION
Status: DISCONTINUED | OUTPATIENT
Start: 2023-12-05 | End: 2023-12-06 | Stop reason: HOSPADM

## 2023-12-05 RX ORDER — KETOROLAC TROMETHAMINE 15 MG/ML
15 INJECTION, SOLUTION INTRAMUSCULAR; INTRAVENOUS ONCE
Status: COMPLETED | OUTPATIENT
Start: 2023-12-05 | End: 2023-12-05

## 2023-12-05 RX ORDER — CALCIUM CARBONATE 500 MG/1
2 TABLET, CHEWABLE ORAL 2 TIMES DAILY PRN
Status: DISCONTINUED | OUTPATIENT
Start: 2023-12-05 | End: 2023-12-06 | Stop reason: HOSPADM

## 2023-12-05 RX ORDER — POLYETHYLENE GLYCOL 3350 17 G/17G
17 POWDER, FOR SOLUTION ORAL DAILY PRN
Status: DISCONTINUED | OUTPATIENT
Start: 2023-12-05 | End: 2023-12-06 | Stop reason: HOSPADM

## 2023-12-05 RX ORDER — ASPIRIN 81 MG/1
81 TABLET ORAL DAILY
Status: DISCONTINUED | OUTPATIENT
Start: 2023-12-05 | End: 2023-12-06 | Stop reason: HOSPADM

## 2023-12-05 RX ORDER — TAMSULOSIN HYDROCHLORIDE 0.4 MG/1
0.4 CAPSULE ORAL DAILY
Status: DISCONTINUED | OUTPATIENT
Start: 2023-12-05 | End: 2023-12-06 | Stop reason: HOSPADM

## 2023-12-05 RX ORDER — SODIUM CHLORIDE 0.9 % (FLUSH) 0.9 %
10 SYRINGE (ML) INJECTION AS NEEDED
Status: DISCONTINUED | OUTPATIENT
Start: 2023-12-05 | End: 2023-12-06 | Stop reason: HOSPADM

## 2023-12-05 RX ORDER — BISACODYL 5 MG/1
5 TABLET, DELAYED RELEASE ORAL DAILY PRN
Status: DISCONTINUED | OUTPATIENT
Start: 2023-12-05 | End: 2023-12-06 | Stop reason: HOSPADM

## 2023-12-05 RX ORDER — BISACODYL 10 MG
10 SUPPOSITORY, RECTAL RECTAL DAILY PRN
Status: DISCONTINUED | OUTPATIENT
Start: 2023-12-05 | End: 2023-12-06 | Stop reason: HOSPADM

## 2023-12-05 RX ORDER — HYDROCODONE BITARTRATE AND ACETAMINOPHEN 5; 325 MG/1; MG/1
1 TABLET ORAL EVERY 4 HOURS PRN
Status: DISCONTINUED | OUTPATIENT
Start: 2023-12-05 | End: 2023-12-06 | Stop reason: HOSPADM

## 2023-12-05 RX ORDER — CIPROFLOXACIN AND DEXAMETHASONE 3; 1 MG/ML; MG/ML
4 SUSPENSION/ DROPS AURICULAR (OTIC) 2 TIMES DAILY
Status: DISCONTINUED | OUTPATIENT
Start: 2023-12-05 | End: 2023-12-06 | Stop reason: HOSPADM

## 2023-12-05 RX ORDER — ACETAMINOPHEN 650 MG/1
650 SUPPOSITORY RECTAL EVERY 4 HOURS PRN
Status: DISCONTINUED | OUTPATIENT
Start: 2023-12-05 | End: 2023-12-06 | Stop reason: HOSPADM

## 2023-12-05 RX ORDER — ONDANSETRON 4 MG/1
4 TABLET, FILM COATED ORAL EVERY 6 HOURS PRN
Status: DISCONTINUED | OUTPATIENT
Start: 2023-12-05 | End: 2023-12-06 | Stop reason: HOSPADM

## 2023-12-05 RX ORDER — ONDANSETRON 2 MG/ML
4 INJECTION INTRAMUSCULAR; INTRAVENOUS EVERY 6 HOURS PRN
Status: DISCONTINUED | OUTPATIENT
Start: 2023-12-05 | End: 2023-12-06 | Stop reason: HOSPADM

## 2023-12-05 RX ORDER — SODIUM CHLORIDE 9 MG/ML
100 INJECTION, SOLUTION INTRAVENOUS CONTINUOUS
Status: DISCONTINUED | OUTPATIENT
Start: 2023-12-05 | End: 2023-12-06 | Stop reason: HOSPADM

## 2023-12-05 RX ORDER — LOSARTAN POTASSIUM 25 MG/1
25 TABLET ORAL
Status: DISCONTINUED | OUTPATIENT
Start: 2023-12-05 | End: 2023-12-06 | Stop reason: HOSPADM

## 2023-12-05 RX ORDER — SODIUM CHLORIDE 0.9 % (FLUSH) 0.9 %
10 SYRINGE (ML) INJECTION EVERY 12 HOURS SCHEDULED
Status: DISCONTINUED | OUTPATIENT
Start: 2023-12-05 | End: 2023-12-06 | Stop reason: HOSPADM

## 2023-12-05 RX ORDER — ACETAMINOPHEN 325 MG/1
650 TABLET ORAL EVERY 4 HOURS PRN
Status: DISCONTINUED | OUTPATIENT
Start: 2023-12-05 | End: 2023-12-06 | Stop reason: HOSPADM

## 2023-12-05 RX ORDER — ACETAMINOPHEN 160 MG/5ML
650 SOLUTION ORAL EVERY 4 HOURS PRN
Status: DISCONTINUED | OUTPATIENT
Start: 2023-12-05 | End: 2023-12-06 | Stop reason: HOSPADM

## 2023-12-05 RX ORDER — VANCOMYCIN HYDROCHLORIDE 1 G/200ML
1000 INJECTION, SOLUTION INTRAVENOUS EVERY 12 HOURS
Status: DISCONTINUED | OUTPATIENT
Start: 2023-12-05 | End: 2023-12-06 | Stop reason: HOSPADM

## 2023-12-05 RX ORDER — SODIUM CHLORIDE 9 MG/ML
40 INJECTION, SOLUTION INTRAVENOUS AS NEEDED
Status: DISCONTINUED | OUTPATIENT
Start: 2023-12-05 | End: 2023-12-06 | Stop reason: HOSPADM

## 2023-12-05 RX ORDER — HYDROMORPHONE HYDROCHLORIDE 1 MG/ML
0.5 INJECTION, SOLUTION INTRAMUSCULAR; INTRAVENOUS; SUBCUTANEOUS
Status: DISCONTINUED | OUTPATIENT
Start: 2023-12-05 | End: 2023-12-06 | Stop reason: HOSPADM

## 2023-12-05 RX ADMIN — CEFTAZIDIME 2000 MG: 2 INJECTION, POWDER, FOR SOLUTION INTRAVENOUS at 20:09

## 2023-12-05 RX ADMIN — SODIUM CHLORIDE 100 ML/HR: 9 INJECTION, SOLUTION INTRAVENOUS at 05:00

## 2023-12-05 RX ADMIN — HYDROCODONE BITARTRATE AND ACETAMINOPHEN 1 TABLET: 5; 325 TABLET ORAL at 10:27

## 2023-12-05 RX ADMIN — TAMSULOSIN HYDROCHLORIDE 0.4 MG: 0.4 CAPSULE ORAL at 09:02

## 2023-12-05 RX ADMIN — LOSARTAN POTASSIUM 25 MG: 25 TABLET, FILM COATED ORAL at 08:53

## 2023-12-05 RX ADMIN — KETOROLAC TROMETHAMINE 15 MG: 15 INJECTION, SOLUTION INTRAMUSCULAR; INTRAVENOUS at 11:29

## 2023-12-05 RX ADMIN — CIPROFLOXACIN AND DEXAMETHASONE 4 DROP: 3; 1 SUSPENSION/ DROPS AURICULAR (OTIC) at 08:53

## 2023-12-05 RX ADMIN — HYDROMORPHONE HYDROCHLORIDE 0.5 MG: 1 INJECTION, SOLUTION INTRAMUSCULAR; INTRAVENOUS; SUBCUTANEOUS at 05:04

## 2023-12-05 RX ADMIN — ASPIRIN 81 MG: 81 TABLET, COATED ORAL at 08:53

## 2023-12-05 RX ADMIN — CEFTAZIDIME 2000 MG: 2 INJECTION, POWDER, FOR SOLUTION INTRAVENOUS at 12:15

## 2023-12-05 RX ADMIN — Medication 10 ML: at 20:06

## 2023-12-05 RX ADMIN — HYDROCODONE BITARTRATE AND ACETAMINOPHEN 1 TABLET: 5; 325 TABLET ORAL at 06:28

## 2023-12-05 RX ADMIN — HYDROCODONE BITARTRATE AND ACETAMINOPHEN 1 TABLET: 5; 325 TABLET ORAL at 18:46

## 2023-12-05 RX ADMIN — CIPROFLOXACIN AND DEXAMETHASONE 4 DROP: 3; 1 SUSPENSION/ DROPS AURICULAR (OTIC) at 20:09

## 2023-12-05 RX ADMIN — VANCOMYCIN HYDROCHLORIDE 1000 MG: 1 INJECTION, SOLUTION INTRAVENOUS at 08:53

## 2023-12-05 RX ADMIN — VANCOMYCIN HYDROCHLORIDE 1000 MG: 1 INJECTION, SOLUTION INTRAVENOUS at 21:44

## 2023-12-05 NOTE — PROGRESS NOTES
"[facility] Clinical Pharmacy Services: Vancomycin Pharmacokinetic Initial Consult Note    Niranjan Peacock is a 63 y.o. male who is on day 1 of pharmacy to dose vancomycin.    Indication:  Right otitis with abscess  Consulting Provider: Dr. Crowder  Planned Duration of Therapy: 7 days  Loading Dose Ordered or Given: 2000 mg on 12/4 at 1930  Culture/Source:   12/4 blood cultures in process  12/4 Wound culture in process  Target: -600 mg/L.hr   Other Antimicrobials: ceftazidime 2 g iv every 8 hours, ciprodex otic suspension    Vitals/Labs  Ht: 188 cm (74\"); Wt: 97.5 kg (215 lb)  Temp Readings from Last 1 Encounters:   12/05/23 98.3 °F (36.8 °C) (Oral)    Estimated Creatinine Clearance: 106.7 mL/min (by C-G formula based on SCr of 0.99 mg/dL).     Results from last 7 days   Lab Units 12/04/23  1852   CREATININE mg/dL 0.99   WBC 10*3/mm3 9.47     Assessment/Plan:    Vancomycin Dose:   1000 mg IV every  12  hours  Predictive AUC level for the dose ordered is 485 mg/L.hr, which is within the target of 400-600 mg/L.hr  Vanc Trough has been ordered for 12/6 at 0730     Pharmacy will follow patient's kidney function and will adjust doses and obtain levels as necessary. Thank you for involving pharmacy in this patient's care. Please contact pharmacy with any questions or concerns.                           Calderon Matthews III, [facility]  Clinical Pharmacist    "

## 2023-12-05 NOTE — PROGRESS NOTES
Patient seen in ENT clinic today.    Showed no acute distress.  Patient is afebrile.    Microscopic ear examination on the right revealed mild edema cartilaginous portion only.  Some active drainage from a small punctate opening posterior canal wall.  Bony canal and tympanic membrane appeared clear.  Moderate debris evacuated.  Postauricular skin crease without focal tenderness or mass.  Parotid soft without focal tenderness.  Neck exam negative.    Impression: Probable external auditory canal furuncle with associated surrounding soft tissue cellulitis, significantly improved on intravenous antibiotics    Comment: I suspect this area spontaneously drained and his edema and overall inflammation is way down.    Today I replaced the wick.    Recommendation: Continue intravenous antibiotic; anticipate discharge tomorrow on antistaphylococcal antibiotic coverage; follow-up in our office later in the week for wick removal and further examination    Following

## 2023-12-05 NOTE — PLAN OF CARE
Goal Outcome Evaluation:  Plan of Care Reviewed With: patient        Progress: no change  Outcome Evaluation: VSS, alert and oriented x4, up ad riley, pt has right ear pain, pain meds not helping with pain but [facility]. No other issues to report.

## 2023-12-05 NOTE — H&P
Patient Name:  Niranjan Peacock  YOB: 1960  MRN:  [id number]  Admit Date:  12/4/2023  Patient Care Team:  Brian Marr MD as PCP - General (Family Medicine)      Subjective   History Present Illness     Chief Complaint   Patient presents with    Earache       Mr. Peacock is a 63 y.o. non-smoker with a history of HTN, HLD with statin intolerance, CAD, and prediabetes that presents to [facility] complaining of right ear pain and associated facial swelling. His symptoms started about 10 days ago and have been progressive since then. He has been to urgent care twice and treated with omnicef and later augmentin and ciprofloxacin drops. He presented to the ER at [facility] and was found to have a possible forming abscess on CT scan. Dr. Sanchez was contacted and the patient was sent to this facility for further workup and treatment.     Review of Systems   All other systems reviewed and are negative.       Personal History     Past Medical History:   Diagnosis Date    Allergic     Angina at rest 08/16/2019    Added automatically from request for surgery [id number]    B12 deficiency anemia     Cellulitis of right lower extremity 12/24/2020    Chest pain     Chronic fatigue 03/09/2016    Coronary artery disease involving native coronary artery of native heart without angina pectoris 08/10/2017    Deep vein thrombosis     Headache     History of blood clots     blood clot found in right lung     HL (hearing loss)     Hypertension     Mixed hyperlipidemia 02/02/2022    Multiple thyroid nodules     Multiple thyroid nodules     CHANTEL (obstructive sleep apnea)     Pernicious anemia     Pulmonary embolism     2015    Stenosis of left carotid artery 3/28/2023    Syncope     2 yrs ago one time     Past Surgical History:   Procedure Laterality Date    CARDIAC CATHETERIZATION N/A 08/03/2017    Procedure: Coronary angiography;  Surgeon: Cynthia Farley MD;  Location: [facility] INVASIVE LOCATION;  Service:      CARDIAC CATHETERIZATION  08/03/2017    Procedure: Functional Flow [place];  Surgeon: Cynthia Farley MD;  Location:  [facility] INVASIVE LOCATION;  Service:     CARDIAC CATHETERIZATION N/A 08/03/2017    Procedure: Stent YI coronary;  Surgeon: Cynthia Farley MD;  Location:  [facility] INV[facility] LOCATION;  Service:     CARDIAC CATHETERIZATION N/A 08/03/2017    Procedure: Left ventriculography;  Surgeon: Cynthia Farley MD;  Location:  [facility] LOCATION;  Service:     CARDIAC CATHETERIZATION N/A 08/03/2017    Procedure: Left Heart Cath;  Surgeon: Cynthia Farley MD;  Location:  [facility] LOCATION;  Service:     CARDIAC CATHETERIZATION N/A 08/20/2019    Procedure: Left Heart Cath;  Surgeon: Mulugeta Ac MD;  [facility];  Service: Cardiology    CARDIAC CATHETERIZATION N/A 08/20/2019    Procedure: Coronary angiography;  Surgeon: Mulugeta Ac MD;  [facility];  Service: Cardiology    CARDIAC CATHETERIZATION N/A 08/20/2019    Procedure: Left ventriculography;  Surgeon: Mulugeta Ac MD;  [facility];  Service: Cardiology    CARDIAC CATHETERIZATION  8/20/2019    COLONOSCOPY  MMVI    NORMAL.    COLONOSCOPY      FINE NEEDLE ASPIRATION  12/2015    Left thyroid nodule.  [place] category II.  Dr. Socrates Sanchez     Family History   Problem Relation Age of Onset    Heart disease Other     Hypertension Other     Thyroid disease Other     Heart disease Father     Prostate cancer Father     Hypertension Father     Stroke Father     Heart disease Mother     Hypertension Mother     Heart disease Brother     Hypertension Brother     Thyroid disease Sister     Heart disease Brother     Hypertension Brother      Social History     Tobacco Use    Smoking status: Never    Smokeless tobacco: Never    Tobacco comments:     caffeine use - 2 cups coffee dailyl    Vaping Use    Vaping Use: Never used   Substance Use Topics     Alcohol use: Yes     Comment: Occasional drinks    Drug use: No     Current Facility-Administered Medications on File Prior to Encounter   Medication Dose Route Frequency Provider Last Rate Last Admin    [COMPLETED] acetaminophen (TYLENOL) tablet 1,000 mg  1,000 mg Oral Once Alexys Mendoza MD   1,000 mg at 12/04/23 0047    [COMPLETED] amoxicillin-clavulanate (AUGMENTIN) 875-125 MG per tablet 1 tablet  1 tablet Oral Once Alexys Mendoza MD   1 tablet at 12/04/23 0048    [COMPLETED] ketorolac (TORADOL) injection 30 mg  30 mg Intramuscular Once Rk Galvan III, PA   30 mg at 12/04/23 0047    [COMPLETED] ofloxacin (OCUFLOX) 0.3 % ophthalmic solution 4 drop  4 drop Right Ear Once Rk Galvan III, PA   4 drop at 12/04/23 0048     Current Outpatient Medications on File Prior to Encounter   Medication Sig Dispense Refill    amoxicillin-clavulanate (AUGMENTIN) 875-125 MG per tablet Take 1 tablet by mouth 2 (Two) Times a Day for 10 days. 20 tablet 0    aspirin 81 MG tablet Take 1 tablet by mouth Daily. 30 tablet 11    Cholecalciferol (VITAMIN D3) 5000 units capsule capsule Take 1 capsule by mouth Daily.      ciprofloxacin-hydrocortisone (Cipro HC) 0.2-1 % otic suspension Administer 3 drops to the right ear 2 (Two) Times a Day for 7 days. 3 mL 0    cyanocobalamin 1000 MCG/ML injection Inject 1 mL into the appropriate muscle as directed by prescriber Every 14 (Fourteen) Days. 6 mL 3    losartan (COZAAR) 25 MG tablet Take 1 tab PO QD for blood pressure 90 tablet 3    MAGNESIUM PO Take  by mouth.      Multiple Vitamin (MULTI VITAMIN DAILY PO) Take 1 tablet by mouth Daily.      tamsulosin (FLOMAX) 0.4 MG capsule 24 hr capsule TAKE 1 CAPSULE BY MOUTH DAILY 30 capsule 1    atorvastatin (LIPITOR) 20 MG tablet Take 1 tablet by mouth Daily. (Patient not taking: Reported on 11/6/2023) 30 tablet 11    azithromycin (Zithromax Z-Jorge) 250 MG tablet Take 2 tablets at the same time Day 1 and then 1 tablet every  "24 hour thereafter. (Patient not taking: Reported on 11/6/2023) 6 tablet 0    B-D INSULIN SYRINGE 1CC/25GX1\" 25G X 1\" 1 ML misc USE TO ADMINISTER B-12 INJECTIONS AS DIRECTED 25 each 3    coenzyme Q10 100 MG capsule Take 2 capsules by mouth Daily. (Patient not taking: Reported on 11/6/2023) 60 capsule 11    ferrous sulfate 325 (65 FE) MG tablet Take 1 tablet three times weekly with food 90 tablet 1    fexofenadine (ALLEGRA) 60 MG tablet Take 1 tablet by mouth Daily As Needed (allergies). 90 tablet 3    guaiFENesin-codeine (GUAIFENESIN AC) 100-10 MG/5ML liquid 1-2 teaspoons PO BID (bedtime/naptime) as needed for cough 180 mL 0    hydroCHLOROthiazide (HYDRODIURIL) 12.5 MG tablet TAKE ONE TABLET BY MOUTH DAILY FOR BLOOD PRESSURE (Patient taking differently: 2 tablets. TAKE ONE TABLET BY MOUTH DAILY FOR BLOOD PRESSURE) 90 tablet 3    hydrocortisone 0.5 % cream Apply  topically to the appropriate area as directed 2 (Two) Times a Day As Needed for Irritation. 15 g 0    lidocaine (LIDODERM) 5 % Place 1 patch on the skin as directed by provider Daily. Remove & Discard patch within 12 hours or as directed by MD 15 each 1    loteprednol (LOTEMAX) 0.2 % suspension Administer 1 drop to both eyes 4 (Four) Times a Day As Needed (allergic conjunctivitis). Do not use for more than 2 weeks at a time 5 mL 3    nitroglycerin (NITROSTAT) 0.4 MG SL tablet Place 1 tablet under the tongue Every 5 (Five) Minutes As Needed for Chest Pain for up to 1 dose. Take no more than 3 doses in 15 minutes. 100 tablet 3    [DISCONTINUED] cefdinir (OMNICEF) 300 MG capsule 2 capsules (at the same time) once a day 14 capsule 0     Allergies   Allergen Reactions    Lisinopril Shortness Of Breath     Rash also     Repatha [Evolocumab] Myalgia    Simvastatin Myalgia    Amlodipine Myalgia     Muscle aches     Hydralazine Rash    Valsartan Rash     muscle aches       Objective    Objective     Vital Signs  Temp:  [99.3 °F (37.4 °C)] 99.3 °F (37.4 °C)  Heart " Rate:  [77-79] 77  Resp:  [16] 16  BP: (169-171)/(75-87) 169/75  SpO2:  [96 %-97 %] 96 %  on   ;   Device (Oxygen Therapy): room air  Body mass index is 27.6 kg/m².    Physical Exam  Vitals and nursing note reviewed.   Constitutional:       General: He is not in acute distress.     Appearance: He is not toxic-appearing or diaphoretic.   HENT:      Head: Normocephalic and atraumatic.      Comments: Right periauricular swelling and tenderness     Nose: Nose normal.      Mouth/Throat:      Mouth: Mucous membranes are moist.      Pharynx: Oropharynx is clear.   Eyes:      Conjunctiva/sclera: Conjunctivae normal.      Pupils: Pupils are equal, round, and reactive to light.   Cardiovascular:      Rate and Rhythm: Normal rate and regular rhythm.      Pulses: Normal pulses.   Pulmonary:      Effort: Pulmonary effort is normal.      Breath sounds: Normal breath sounds.   Abdominal:      General: Bowel sounds are normal.      Palpations: Abdomen is soft.      Tenderness: There is no abdominal tenderness.   Musculoskeletal:         General: No swelling.      Cervical back: Neck supple.   Skin:     General: Skin is warm and dry.      Capillary Refill: Capillary refill takes less than 2 seconds.   Neurological:      General: No focal deficit present.      Mental Status: He is alert and oriented to person, place, and time.   Psychiatric:         Mood and Affect: Mood normal.         Behavior: Behavior normal.     Results Review:  I reviewed the patient's new clinical results.  I reviewed the patient's new imaging results and agree with the interpretation.  I reviewed the patient's other test results and agree with the interpretation  I personally viewed and interpreted the patient's EKG/Telemetry data  Discussed with ED provider.    Lab Results (last 24 hours)       Procedure Component Value Units Date/Time    POC Glucose Once [[id number]]  (Normal) Collected: 12/04/23 0006    Specimen: Blood Updated: 12/04/23 0008     Glucose 103  mg/dL     Blood Culture - Blood, Arm, Left [[id number]] Collected: 12/04/23 1847    Specimen: Blood from Arm, Left Updated: 12/04/23 1851    Wound Culture - Swab, Ear, Right [[id number]] Collected: 12/04/23 1847    Specimen: Swab from Ear, Right Updated: 12/04/23 2108     Gram Stain No WBCs or organisms seen    Comprehensive Metabolic Panel [[id number]]  (Abnormal) Collected: 12/04/23 1852    Specimen: Blood Updated: 12/04/23 1924     Glucose 108 mg/dL      BUN 13 mg/dL      Creatinine 0.99 mg/dL      Sodium 137 mmol/L      Potassium 3.8 mmol/L      Chloride 101 mmol/L      CO2 26.7 mmol/L      Calcium 9.2 mg/dL      Total Protein 6.7 g/dL      Albumin 4.1 g/dL      ALT (SGPT) 21 U/L      AST (SGOT) 19 U/L      Alkaline Phosphatase 77 U/L      Total Bilirubin 0.7 mg/dL      Globulin 2.6 gm/dL      A/G Ratio 1.6 g/dL      BUN/Creatinine Ratio 13.1     Anion Gap 9.3 mmol/L      eGFR 86.1 mL/min/1.73     Narrative:      GFR Normal >60  Chronic Kidney Disease <60  Kidney Failure <15      CBC & Differential [[id number]]  (Abnormal) Collected: 12/04/23 1852    Specimen: Blood Updated: 12/04/23 1902    Narrative:      The following orders were created for panel order CBC & Differential.  Procedure                               Abnormality         Status                     ---------                               -----------         ------                     CBC Auto Differential[[id number]]        Abnormal            Final result                 Please view results for these tests on the individual orders.    Magnesium [[id number]]  (Normal) Collected: 12/04/23 1852    Specimen: Blood Updated: 12/04/23 1924     Magnesium 2.1 mg/dL     Blood Culture - Blood, Arm, Right [[id number]] Collected: 12/04/23 1852    Specimen: Blood from Arm, Right Updated: 12/04/23 1902    Lactic Acid, Plasma [[id number]]  (Normal) Collected: 12/04/23 1852    Specimen: Blood Updated: 12/04/23 1917     Lactate 1.0 mmol/L     Procalcitonin [[id number]]   "(Normal) Collected: 12/04/23 1852    Specimen: Blood Updated: 12/04/23 2120     Procalcitonin 0.05 ng/mL     Narrative:      As a Marker for Sepsis (Non-Neonates):    1. <0.5 ng/mL represents a low risk of severe sepsis and/or septic shock.  2. >2 ng/mL represents a high risk of severe sepsis and/or septic shock.    As a Marker for Lower Respiratory Tract Infections that require antibiotic therapy:    PCT on Admission    Antibiotic Therapy       6-12 Hrs later    >0.5                Strongly Recommended  >0.25 - <0.5        Recommended   0.1 - 0.25          Discouraged              Remeasure/reassess PCT  <0.1                Strongly Discouraged     Remeasure/reassess PCT    As 28 day mortality risk marker: \"Change in Procalcitonin Result\" (>80% or <=80%) if Day 0 (or Day 1) and Day 4 values are available. Refer to [email or web address]://[email or web address][email or web address]    Change in PCT <=80%  A decrease of PCT levels below or equal to 80% defines a positive change in PCT test result representing a higher risk for 28-day all-cause mortality of patients diagnosed with severe sepsis for septic shock.    Change in PCT >80%  A decrease of PCT levels of more than 80% defines a negative change in PCT result representing a lower risk for 28-day all-cause mortality of patients diagnosed with severe sepsis or septic shock.       CBC Auto Differential [[id number]]  (Abnormal) Collected: 12/04/23 1852    Specimen: Blood Updated: 12/04/23 1902     WBC 9.47 10*3/mm3      RBC 4.56 10*6/mm3      Hemoglobin 14.0 g/dL      Hematocrit 42.8 %      MCV 93.9 fL      MCH 30.7 pg      MCHC 32.7 g/dL      RDW 12.7 %      RDW-SD 44.2 fl      MPV 9.5 fL      Platelets 279 10*3/mm3      Neutrophil % 76.2 %      Lymphocyte % 14.8 %      Monocyte % 7.4 %      Eosinophil % 1.2 %      Basophil % 0.2 %      Immature Grans % 0.2 %      Neutrophils, Absolute 7.22 10*3/mm3      Lymphocytes, Absolute 1.40 10*3/mm3      Monocytes, Absolute 0.70 10*3/mm3      Eosinophils, " Absolute 0.11 10*3/mm3      Basophils, Absolute 0.02 10*3/mm3      Immature Grans, Absolute 0.02 10*3/mm3             Imaging Results (Last 24 Hours)       Procedure Component Value Units Date/Time    CT Facial Bones With Contrast [[id number]] Collected: 12/04/23 2017     Updated: 12/04/23 2036    Narrative:      CT FACE WITH INTRAVENOUS CONTRAST     HISTORY: Right ear pain, placed on Omnicef a few days ago, right yellow  drainage coming from right ear     TECHNIQUE: CT scan of the face was obtained with 1 mm axial images with  intravenous contrast.  Radiation dose reduction techniques were  utilized, including automated exposure control and exposure modulation  based on body size.     COMPARISON: CT head 1/6/2019       Impression:      FINDINGS AND IMPRESSION:  There is mild asymmetric opacification of the right mastoid air cells.  There is asymmetric soft tissue swelling involving the subcutaneous  tissues surrounding the right ear with extension into the external right  ear canal. There is significant thickening of the skin projecting into  the right external ear canal. There is also asymmetric soft tissue  density within the right middle ear contacting portions of the right  ossicles. Findings most concerning for infection. Ill-defined, partially  loculated fluid density is present in these areas. There is an apparent  focal hypodense collection along the posterior inferior aspect of the  right ear with a hyperdense rim measuring 1.2 x 1 x 0.9 cm raising  concern for developing abscess. Bubbly hypodense material is present  within the right ear canal. Please note the soft tissue swelling  directly abuts the mastoid as well as the right temporomandibular joint.  The superior aspect of the right parotid gland appears to be mildly  thickened, favored to be related to adjacent inflammation. Please note  that underlying osteomyelitis cannot be excluded and can be further  evaluated with MRI with and without contrast  if clinically indicated.     ENT consultation is recommended as well as continued attention on  follow-up to ensure resolution.        This report was finalized on 12/4/2023 8:33 PM by Dr. Harjit Ornelas M.D  on Workstation: [id number]               Results for orders placed during the hospital encounter of 02/17/22    Adult Transthoracic Echo Complete W/ Cont if Necessary Per Protocol    Interpretation Summary  · Estimated left ventricular EF = 65% Left ventricular systolic function is normal.  · Left ventricular wall thickness is consistent with mild concentric hypertrophy.  · Left ventricular diastolic function was normal.      No orders to display        Assessment/Plan     Active Hospital Problems    Diagnosis  POA    **Abscess of right external ear [H60.01]  Yes    BPH without obstruction/lower urinary tract symptoms [N40.0]  Yes    Prediabetes [R73.03]  Yes    CHANTEL (obstructive sleep apnea) [G47.33]  Yes    Mixed hyperlipidemia [E78.2]  Yes    Essential hypertension [I10]  Yes    History of DVT (deep vein thrombosis) [Z86.718]  Not Applicable    Coronary artery disease involving native coronary artery of native heart without angina pectoris [I25.10]  Yes      Resolved Hospital Problems   No resolved problems to display.   Right Otitis with Abscess  - no signs of systemic illness or neurologic involvement  - cover with vancomycin and fortaz, cultures sent  - resume cipro drops  - consult ENT    HTN/CAD  - BP elevated but acutely acceptable, no anginal symptoms  - resume home losartan  - continue ASA  - intolerant of statin    BPH  - continue flomax    I discussed the patient's findings and my recommendations with patient, nursing staff, and ED provider.    VTE Prophylaxis - SCDs.  Code Status - Full code.       Rigoberto Crowder MD  [facility]  12/04/23  23:49 EST

## 2023-12-05 NOTE — PROGRESS NOTES
Name: Niranjan Peacock ADMIT: 2023   : 1960  PCP: Brian Marr MD    MRN: [id number] LOS: 0 days   AGE/SEX: 63 y.o. male  ROOM: [facility]     Subjective   Subjective   No acute complaints.    Objective   Objective   Vital Signs  Temp:  [97.5 °F (36.4 °C)-99.3 °F (37.4 °C)] 97.5 °F (36.4 °C)  Heart Rate:  [59-79] 59  Resp:  [16] 16  BP: (129-171)/(65-87) 129/65  SpO2:  [96 %-97 %] 96 %  on   ;   Device (Oxygen Therapy): simple face mask  Body mass index is 27.6 kg/m².  Physical Exam  HENT:      Ears:      Comments: Mild erythema of right earlobe  Cardiovascular:      Rate and Rhythm: Normal rate.      Pulses: Normal pulses.   Abdominal:      General: Abdomen is flat. There is no distension.      Palpations: Abdomen is soft.   Musculoskeletal:         General: No swelling. Normal range of motion.      Right lower leg: No edema.      Left lower leg: No edema.   Neurological:      General: No focal deficit present.      Mental Status: He is alert and oriented to person, place, and time.         Results Review     I reviewed the patient's new clinical results.  Results from last 7 days   Lab Units 23  0503 23  1852   WBC 10*3/mm3 5.43 9.47   HEMOGLOBIN g/dL 12.5* 14.0   PLATELETS 10*3/mm3 242 279     Results from last 7 days   Lab Units 23  0503 23  1852   SODIUM mmol/L 138 137   POTASSIUM mmol/L 3.8 3.8   CHLORIDE mmol/L 103 101   CO2 mmol/L 25.5 26.7   BUN mg/dL 10 13   CREATININE mg/dL 1.02 0.99   GLUCOSE mg/dL 105* 108*   EGFR mL/min/1.73 83.1 86.1     Results from last 7 days   Lab Units 23  1852   ALBUMIN g/dL 4.1   BILIRUBIN mg/dL 0.7   ALK PHOS U/L 77   AST (SGOT) U/L 19   ALT (SGPT) U/L 21     Results from last 7 days   Lab Units 23  0503 23  1852   CALCIUM mg/dL 8.8 9.2   ALBUMIN g/dL  --  4.1   MAGNESIUM mg/dL  --  2.1     Results from last 7 days   Lab Units 23  1852   PROCALCITONIN ng/mL 0.05   LACTATE mmol/L 1.0     Glucose   Date/Time Value Ref  Range Status   12/04/2023 0006 103 70 - 130 mg/dL Final       CT Facial Bones With Contrast    Result Date: 12/4/2023  FINDINGS AND IMPRESSION: There is mild asymmetric opacification of the right mastoid air cells. There is asymmetric soft tissue swelling involving the subcutaneous tissues surrounding the right ear with extension into the external right ear canal. There is significant thickening of the skin projecting into the right external ear canal. There is also asymmetric soft tissue density within the right middle ear contacting portions of the right ossicles. Findings most concerning for infection. Ill-defined, partially loculated fluid density is present in these areas. There is an apparent focal hypodense collection along the posterior inferior aspect of the right ear with a hyperdense rim measuring 1.2 x 1 x 0.9 cm raising concern for developing abscess. Bubbly hypodense material is present within the right ear canal. Please note the soft tissue swelling directly abuts the mastoid as well as the right temporomandibular joint. The superior aspect of the right parotid gland appears to be mildly thickened, favored to be related to adjacent inflammation. Please note that underlying osteomyelitis cannot be excluded and can be further evaluated with MRI with and without contrast if clinically indicated.  ENT consultation is recommended as well as continued attention on follow-up to ensure resolution.   This report was finalized on 12/4/2023 8:33 PM by Dr. Harjit Ornelas M.D on Workstation: [id number]     Scheduled Medications  aspirin, 81 mg, Oral, Daily  cefTAZidime, 2,000 mg, Intravenous, Q8H  ciprofloxacin-dexAMETHasone, 4 drop, Right Ear, BID  losartan, 25 mg, Oral, Q24H  sodium chloride, 10 mL, Intravenous, Q12H  tamsulosin, 0.4 mg, Oral, Daily  vancomycin, 1,000 mg, Intravenous, Q12H    Infusions  Pharmacy to dose vancomycin,   sodium chloride, 100 mL/hr, Last Rate: 100 mL/hr (12/05/23 0500)    Diet  Diet:  Cardiac Diets; Healthy Heart (2-3 Na+); Texture: Regular Texture (IDDSI 7); Fluid Consistency: Thin (IDDSI 0)       Assessment/Plan     Active Hospital Problems    Diagnosis  POA    **Abscess of right external ear [H60.01]  Yes    BPH without obstruction/lower urinary tract symptoms [N40.0]  Yes    Prediabetes [R73.03]  Yes    CHANTEL (obstructive sleep apnea) [G47.33]  Yes    Mixed hyperlipidemia [E78.2]  Yes    Essential hypertension [I10]  Yes    History of DVT (deep vein thrombosis) [Z86.718]  Not Applicable    Coronary artery disease involving native coronary artery of native heart without angina pectoris [I25.10]  Yes      Resolved Hospital Problems   No resolved problems to display.       63 y.o. male admitted with Abscess of right external ear.      12/05/23  Seen in ENT clinic today, continue antibiotics and follow-up cultures.    Abscess of R external ear  -ENT following  -in office eval 12/5  -Ceftazidime, vancomycin; Ciprodex drops    HTN  CAD  -Losartan 25 mg  -ASA, statin intolerance    BPH  -Flomax         DVT Ppx: SCDs  Discussed with patient and nursing staff.  Anticipated discharge home, 1-2 days            Matt Garcia MD  [facility]  12/05/23  16:16 EST

## 2023-12-05 NOTE — CASE MANAGEMENT/SOCIAL WORK
Discharge Planning Assessment  [facility]     Patient Name: Niranjan Peacock  MRN: [id number]  Today's Date: 12/5/2023    Admit Date: 12/4/2023    Plan: plans home- CCP will follow   Discharge Needs Assessment       Row Name 12/05/23 1059       Living Environment    People in Home spouse    Name(s) of People in Home Nolvia rogers [phone]    Current Living Arrangements home    Potentially Unsafe Housing Conditions none    Primary Care Provided by self    Provides Primary Care For no one    Family Caregiver if Needed spouse    Family Caregiver Names Nolvia rogers [phone]    Quality of Family Relationships helpful;involved;supportive    Able to Return to Prior Arrangements yes       Resource/Environmental Concerns    Resource/Environmental Concerns none    Transportation Concerns none       Transition Planning    Patient/Family Anticipates Transition to home with family    Patient/Family Anticipated Services at Transition none    Transportation Anticipated family or friend will provide       Discharge Needs Assessment    Readmission Within the Last 30 Days no previous admission in last 30 days    Equipment Currently Used at Home none    Concerns to be Addressed denies needs/concerns at this time    Anticipated Changes Related to Illness none    Equipment Needed After Discharge none                   Discharge Plan       Row Name 12/05/23 1100       Plan    Plan plans home- CCP will follow    Patient/Family in Agreement with Plan yes    Plan Comments Spoke with patient at bedside. Introduced self and explained role. Facesheet verified. Patient lives with his wife, Akshat [phone], and is IADLS. He does not use any DME and does not have a HH or SNF history. At [place], he plans to return home and does not anticipate any needs. CCP will follow.                  Continued Care and Services - Admitted Since 12/4/2023    Coordination has not been started for this encounter.          Demographic Summary        Row Name 12/05/23 1059       General Information    Admission Type observation    Arrived From emergency department    Referral Source admission list    Reason for Consult discharge planning    Preferred Language English                   Functional Status       Row Name 12/05/23 1059       Functional Status    Usual Activity Tolerance good    Current Activity Tolerance good       Functional Status, IADL    Medications independent    Meal Preparation independent    Housekeeping independent    Laundry independent    Shopping independent       Mental Status    General Appearance WDL WDL       Mental Status Summary    Recent Changes in Mental Status/Cognitive Functioning no changes                   Psychosocial    No documentation.                  Abuse/Neglect    No documentation.                  Legal    No documentation.                  Substance Abuse    No documentation.                  Patient Forms    No documentation.                     Pura Ivan RN

## 2023-12-05 NOTE — ED NOTES
Called report to floor, spoke to accepting nurse Jayne. Patient is driving himself to [facility].

## 2023-12-05 NOTE — ED NOTES
"Nursing report ED to floor  Niranjan Peacock  63 y.o.  male    HPI :   Chief Complaint   Patient presents with    Earache       Admitting doctor:   Rigoberto Crowder MD    Admitting diagnosis:   The primary encounter diagnosis was Acute otitis externa of right ear, unspecified type. Diagnoses of Facial cellulitis and Abscess of external ear, right were also pertinent to this visit.    Code status:   Current Code Status       Date Active Code Status Order ID Comments User Context       Prior            Allergies:   Lisinopril, Repatha [evolocumab], Simvastatin, Amlodipine, Hydralazine, and Valsartan    Isolation:   No active isolations    Intake and Output  No intake or output data in the 24 hours ending 12/04/23 2201    Weight:       12/04/23 1731   Weight: 97.5 kg (215 lb)       Most recent vitals:   Vitals:    12/04/23 1731   BP: 171/87   Pulse: 79   Resp: 16   Temp: 99.3 °F (37.4 °C)   TempSrc: Oral   SpO2: 97%   Weight: 97.5 kg (215 lb)   Height: 188 cm (74\")       Active LDAs/IV Access:   Lines, Drains & Airways       Active LDAs       Name Placement date Placement time Site Days    Peripheral IV 12/04/23 1850 Right;Posterior Forearm 12/04/23 1850  Forearm  less than 1                    Labs (abnormal labs have a star):   Labs Reviewed   COMPREHENSIVE METABOLIC PANEL - Abnormal; Notable for the following components:       Result Value    Glucose 108 (*)     All other components within normal limits    Narrative:     GFR Normal >60  Chronic Kidney Disease <60  Kidney Failure <15     CBC WITH AUTO DIFFERENTIAL - Abnormal; Notable for the following components:    Neutrophil % 76.2 (*)     Lymphocyte % 14.8 (*)     Neutrophils, Absolute 7.22 (*)     All other components within normal limits   MAGNESIUM - Normal   LACTIC ACID, PLASMA - Normal   PROCALCITONIN - Normal    Narrative:     As a Marker for Sepsis (Non-Neonates):    1. <0.5 ng/mL represents a low risk of severe sepsis and/or septic shock.  2. >2 ng/mL " "represents a high risk of severe sepsis and/or septic shock.    As a Marker for Lower Respiratory Tract Infections that require antibiotic therapy:    PCT on Admission    Antibiotic Therapy       6-12 Hrs later    >0.5                Strongly Recommended  >0.25 - <0.5        Recommended   0.1 - 0.25          Discouraged              Remeasure/reassess PCT  <0.1                Strongly Discouraged     Remeasure/reassess PCT    As 28 day mortality risk marker: \"Change in Procalcitonin Result\" (>80% or <=80%) if Day 0 (or Day 1) and Day 4 values are available. Refer to http://[email or web address]    Change in PCT <=80%  A decrease of PCT levels below or equal to 80% defines a positive change in PCT test result representing a higher risk for 28-day all-cause mortality of patients diagnosed with severe sepsis for septic shock.    Change in PCT >80%  A decrease of PCT levels of more than 80% defines a negative change in PCT result representing a lower risk for 28-day all-cause mortality of patients diagnosed with severe sepsis or septic shock.      WOUND CULTURE   BLOOD CULTURE   BLOOD CULTURE   CBC AND DIFFERENTIAL    Narrative:     The following orders were created for panel order CBC & Differential.  Procedure                               Abnormality         Status                     ---------                               -----------         ------                     CBC Auto Differential[[id number]]        Abnormal            Final result                 Please view results for these tests on the individual orders.       EKG:   No orders to display       Meds given in ED:   Medications   vancomycin IVPB 2000 mg in 0.9% Sodium Chloride 500 mL (2,000 mg Intravenous New Bag 12/4/23 1929)   cefTRIAXone (ROCEPHIN) 2,000 mg in sodium chloride 0.9 % 100 mL IVPB-VTB (0 mg Intravenous Stopped 12/4/23 1927)   iopamidol (ISOVUE-370) 76 % injection 100 mL (85 mL Intravenous Given 12/4/23 1948)   ketorolac (TORADOL) " injection 15 mg (15 mg Intravenous Given 12/4/23 2147)   ondansetron (ZOFRAN) injection 4 mg (4 mg Intravenous Given 12/4/23 2147)       Imaging results:  CT Facial Bones With Contrast    Result Date: 12/4/2023  FINDINGS AND IMPRESSION: There is mild asymmetric opacification of the right mastoid air cells. There is asymmetric soft tissue swelling involving the subcutaneous tissues surrounding the right ear with extension into the external right ear canal. There is significant thickening of the skin projecting into the right external ear canal. There is also asymmetric soft tissue density within the right middle ear contacting portions of the right ossicles. Findings most concerning for infection. Ill-defined, partially loculated fluid density is present in these areas. There is an apparent focal hypodense collection along the posterior inferior aspect of the right ear with a hyperdense rim measuring 1.2 x 1 x 0.9 cm raising concern for developing abscess. Bubbly hypodense material is present within the right ear canal. Please note the soft tissue swelling directly abuts the mastoid as well as the right temporomandibular joint. The superior aspect of the right parotid gland appears to be mildly thickened, favored to be related to adjacent inflammation. Please note that underlying osteomyelitis cannot be excluded and can be further evaluated with MRI with and without contrast if clinically indicated.  ENT consultation is recommended as well as continued attention on follow-up to ensure resolution.   This report was finalized on 12/4/2023 8:33 PM by Dr. Harjit Orenlas M.D on Workstation: [id number]       Ambulatory status:   - ambulatory    Social issues:   Social History     Socioeconomic History    Marital status:    Tobacco Use    Smoking status: Never    Smokeless tobacco: Never    Tobacco comments:     caffeine use - 2 cups coffee dailyl    Vaping Use    Vaping Use: Never used   Substance and Sexual Activity     Alcohol use: Yes     Comment: Occasional drinks    Drug use: No    Sexual activity: Defer       NIH Stroke Scale:       Conor Campos RN  12/04/23 22:01 EST

## 2023-12-05 NOTE — PLAN OF CARE
Goal Outcome Evaluation:  Plan of Care Reviewed With: patient              Pt admitted 12/4 with abscess on right side face, IV antibiotics, pt went to ENT office today plans to dc tomorrow. Up ad riley,VSS, room air.

## 2023-12-05 NOTE — CONSULTS
63-year-old man with right ear pain.    Imaging reviewed: There is no involvement of the deep bony canal, middle ear, or mastoid.  This is primarily a cutaneous process in the cartilaginous portion of the canal and surrounding soft tissues and should respond well to intravenous antibiotic therapy.  The small fluid collection may resolve on medical therapy alone.    I favor a gram-positive organism in this process but broad coverage is wise for now.    No leukocytosis.    Will transport to our clinic early afternoon for microscopic ear examination, debridement, culture if possible, and consideration of I&D any visible external canal abscess/pustule.

## 2023-12-06 ENCOUNTER — READMISSION MANAGEMENT (OUTPATIENT)
Dept: CALL CENTER | Facility: HOSPITAL | Age: 63
End: 2023-12-06
Payer: COMMERCIAL

## 2023-12-06 VITALS
WEIGHT: 215 LBS | SYSTOLIC BLOOD PRESSURE: 163 MMHG | RESPIRATION RATE: 16 BRPM | DIASTOLIC BLOOD PRESSURE: 74 MMHG | HEIGHT: 74 IN | HEART RATE: 52 BPM | BODY MASS INDEX: 27.59 KG/M2 | TEMPERATURE: 97.8 F | OXYGEN SATURATION: 97 %

## 2023-12-06 LAB
ANION GAP SERPL CALCULATED.3IONS-SCNC: 9.7 MMOL/L (ref 5–15)
BASOPHILS # BLD AUTO: 0.03 10*3/MM3 (ref 0–0.2)
BASOPHILS NFR BLD AUTO: 0.7 % (ref 0–1.5)
BUN SERPL-MCNC: 9 MG/DL (ref 8–23)
BUN/CREAT SERPL: 9.2 (ref 7–25)
CALCIUM SPEC-SCNC: 8.6 MG/DL (ref 8.6–10.5)
CHLORIDE SERPL-SCNC: 107 MMOL/L (ref 98–107)
CO2 SERPL-SCNC: 26.3 MMOL/L (ref 22–29)
CREAT SERPL-MCNC: 0.98 MG/DL (ref 0.76–1.27)
DEPRECATED RDW RBC AUTO: 41.2 FL (ref 37–54)
EGFRCR SERPLBLD CKD-EPI 2021: 87.2 ML/MIN/1.73
EOSINOPHIL # BLD AUTO: 0.34 10*3/MM3 (ref 0–0.4)
EOSINOPHIL NFR BLD AUTO: 7.7 % (ref 0.3–6.2)
ERYTHROCYTE [DISTWIDTH] IN BLOOD BY AUTOMATED COUNT: 12.6 % (ref 12.3–15.4)
GLUCOSE SERPL-MCNC: 105 MG/DL (ref 65–99)
HCT VFR BLD AUTO: 35.7 % (ref 37.5–51)
HGB BLD-MCNC: 12.3 G/DL (ref 13–17.7)
IMM GRANULOCYTES # BLD AUTO: 0.02 10*3/MM3 (ref 0–0.05)
IMM GRANULOCYTES NFR BLD AUTO: 0.5 % (ref 0–0.5)
LYMPHOCYTES # BLD AUTO: 1.57 10*3/MM3 (ref 0.7–3.1)
LYMPHOCYTES NFR BLD AUTO: 35.6 % (ref 19.6–45.3)
MCH RBC QN AUTO: 31.5 PG (ref 26.6–33)
MCHC RBC AUTO-ENTMCNC: 34.5 G/DL (ref 31.5–35.7)
MCV RBC AUTO: 91.5 FL (ref 79–97)
MONOCYTES # BLD AUTO: 0.47 10*3/MM3 (ref 0.1–0.9)
MONOCYTES NFR BLD AUTO: 10.7 % (ref 5–12)
NEUTROPHILS NFR BLD AUTO: 1.98 10*3/MM3 (ref 1.7–7)
NEUTROPHILS NFR BLD AUTO: 44.8 % (ref 42.7–76)
NRBC BLD AUTO-RTO: 0 /100 WBC (ref 0–0.2)
PLATELET # BLD AUTO: 234 10*3/MM3 (ref 140–450)
PMV BLD AUTO: 9.3 FL (ref 6–12)
POTASSIUM SERPL-SCNC: 4 MMOL/L (ref 3.5–5.2)
RBC # BLD AUTO: 3.9 10*6/MM3 (ref 4.14–5.8)
SODIUM SERPL-SCNC: 143 MMOL/L (ref 136–145)
VANCOMYCIN TROUGH SERPL-MCNC: 8.5 MCG/ML (ref 5–20)
WBC NRBC COR # BLD AUTO: 4.41 10*3/MM3 (ref 3.4–10.8)

## 2023-12-06 PROCEDURE — 96376 TX/PRO/DX INJ SAME DRUG ADON: CPT

## 2023-12-06 PROCEDURE — 25010000002 CEFTAZIDIME 2 G RECONSTITUTED SOLUTION 1 EACH VIAL: Performed by: INTERNAL MEDICINE

## 2023-12-06 PROCEDURE — 80202 ASSAY OF VANCOMYCIN: CPT | Performed by: INTERNAL MEDICINE

## 2023-12-06 PROCEDURE — 85025 COMPLETE CBC W/AUTO DIFF WBC: CPT | Performed by: INTERNAL MEDICINE

## 2023-12-06 PROCEDURE — 80048 BASIC METABOLIC PNL TOTAL CA: CPT | Performed by: INTERNAL MEDICINE

## 2023-12-06 PROCEDURE — 25010000002 VANCOMYCIN PER 500 MG: Performed by: INTERNAL MEDICINE

## 2023-12-06 PROCEDURE — 25010000002 HYDROMORPHONE PER 4 MG: Performed by: INTERNAL MEDICINE

## 2023-12-06 PROCEDURE — G0378 HOSPITAL OBSERVATION PER HR: HCPCS

## 2023-12-06 RX ORDER — HYDROCODONE BITARTRATE AND ACETAMINOPHEN 5; 325 MG/1; MG/1
1 TABLET ORAL EVERY 4 HOURS PRN
Qty: 12 TABLET | Refills: 0 | Status: SHIPPED | OUTPATIENT
Start: 2023-12-06 | End: 2023-12-08

## 2023-12-06 RX ORDER — CEFDINIR 300 MG/1
300 CAPSULE ORAL 2 TIMES DAILY
Start: 2023-12-06 | End: 2023-12-11

## 2023-12-06 RX ADMIN — VANCOMYCIN HYDROCHLORIDE 1000 MG: 1 INJECTION, SOLUTION INTRAVENOUS at 08:09

## 2023-12-06 RX ADMIN — CEFTAZIDIME 2000 MG: 2 INJECTION, POWDER, FOR SOLUTION INTRAVENOUS at 05:24

## 2023-12-06 RX ADMIN — HYDROCODONE BITARTRATE AND ACETAMINOPHEN 1 TABLET: 5; 325 TABLET ORAL at 03:13

## 2023-12-06 RX ADMIN — Medication 10 ML: at 08:08

## 2023-12-06 RX ADMIN — LOSARTAN POTASSIUM 25 MG: 25 TABLET, FILM COATED ORAL at 08:07

## 2023-12-06 RX ADMIN — TAMSULOSIN HYDROCHLORIDE 0.4 MG: 0.4 CAPSULE ORAL at 08:07

## 2023-12-06 RX ADMIN — CIPROFLOXACIN AND DEXAMETHASONE 4 DROP: 3; 1 SUSPENSION/ DROPS AURICULAR (OTIC) at 08:08

## 2023-12-06 RX ADMIN — ASPIRIN 81 MG: 81 TABLET, COATED ORAL at 08:07

## 2023-12-06 RX ADMIN — HYDROMORPHONE HYDROCHLORIDE 0.5 MG: 1 INJECTION, SOLUTION INTRAMUSCULAR; INTRAVENOUS; SUBCUTANEOUS at 05:27

## 2023-12-06 NOTE — DISCHARGE SUMMARY
Patient Name: Niranjan Peacock  : 1960  MRN: [id number]    Date of Admission: 2023  Date of Discharge:  2023  Primary Care Physician: Brian Marr MD      Chief Complaint:   Earache      Discharge Diagnoses     Active Hospital Problems    Diagnosis  POA    **Abscess of right external ear [H60.01]  Yes    BPH without obstruction/lower urinary tract symptoms [N40.0]  Yes    Prediabetes [R73.03]  Yes    CHANTEL (obstructive sleep apnea) [G47.33]  Yes    Mixed hyperlipidemia [E78.2]  Yes    Essential hypertension [I10]  Yes    History of DVT (deep vein thrombosis) [Z86.718]  Not Applicable    Coronary artery disease involving native coronary artery of native heart without angina pectoris [I25.10]  Yes      Resolved Hospital Problems   No resolved problems to display.        Admitting HPI     Mr. Peacock is a 63 y.o. non-smoker with a history of HTN, HLD with statin intolerance, CAD, and prediabetes that presents to [facility] complaining of right ear pain and associated facial swelling. His symptoms started about 10 days ago and have been progressive since then. He has been to urgent care twice and treated with omnicef and later augmentin and ciprofloxacin drops. He presented to the ER at [facility] and was found to have a possible forming abscess on CT scan. Dr. Sanchez was contacted and the patient was sent to this facility for further workup and treatment.      Hospital Course     Pt admitted for otitis externa with abscess after failed outpatient treatment.  He was seen in consultation with ENT and was seen in office on 2023.  Based off of exam they suspected the area spontaneously drained and noted good improvement on IV antibiotics.  Wick in the ear was replaced.  Discussed with Dr. Sanchez and at this time we feel patient can continue the cefdinir that he was previously prescribed as he has 5 days of this left to complete treatment for cellulitis.  He will follow-up with ENT  later this week for reevaluation and further antibiotic recommendations can be made at that time.  He is stable for discharge home.    Discharge Plan     Abscess of R external ear  -ENT evaluated  -in office eval 12/5-spontaneously drained, improved cellulitis  -Ceftazidime, vancomycin transition to cefdinir to complete 7 days total; continue Ciprodex drops  -Will follow-up with ENT in office for reevaluation in a few days     HTN  CAD  -Losartan 25 mg  -ASA, statin intolerance     BPH  -Flomax    Day of Discharge     Physical Exam:  Temp:  [97.5 °F (36.4 °C)-98.1 °F (36.7 °C)] 98.1 °F (36.7 °C)  Heart Rate:  [51-59] 57  Resp:  [16] 16  BP: (129-168)/(65-77) 168/77  Body mass index is 27.6 kg/m².  Physical Exam  HENT:      Ears:      Comments: Mild erythema of right earlobe, less tender compared to prior exam  Cardiovascular:      Rate and Rhythm: Normal rate.      Pulses: Normal pulses.   Abdominal:      General: Abdomen is flat. There is no distension.      Palpations: Abdomen is soft.   Musculoskeletal:         General: No swelling. Normal range of motion.      Right lower leg: No edema.      Left lower leg: No edema.   Neurological:      General: No focal deficit present.      Mental Status: He is alert and oriented to person, place, and time.   Consultants     Consult Orders (all) (From admission, onward)       Start     Ordered    12/05/23 0348  Inpatient ENT Consult  Once        Specialty:  Otolaryngology  Provider:  Socrates Sanchez MD    12/05/23 0347                  Procedures     * Surgery not found *      Imaging Results (All)       Procedure Component Value Units Date/Time    CT Facial Bones With Contrast [[id number]] Collected: 12/04/23 2017     Updated: 12/04/23 2036    Narrative:      CT FACE WITH INTRAVENOUS CONTRAST     HISTORY: Right ear pain, placed on Omnicef a few days ago, right yellow  drainage coming from right ear     TECHNIQUE: CT scan of the face was obtained with 1 mm axial images  with  intravenous contrast.  Radiation dose reduction techniques were  utilized, including automated exposure control and exposure modulation  based on body size.     COMPARISON: CT head 1/6/2019       Impression:      FINDINGS AND IMPRESSION:  There is mild asymmetric opacification of the right mastoid air cells.  There is asymmetric soft tissue swelling involving the subcutaneous  tissues surrounding the right ear with extension into the external right  ear canal. There is significant thickening of the skin projecting into  the right external ear canal. There is also asymmetric soft tissue  density within the right middle ear contacting portions of the right  ossicles. Findings most concerning for infection. Ill-defined, partially  loculated fluid density is present in these areas. There is an apparent  focal hypodense collection along the posterior inferior aspect of the  right ear with a hyperdense rim measuring 1.2 x 1 x 0.9 cm raising  concern for developing abscess. Bubbly hypodense material is present  within the right ear canal. Please note the soft tissue swelling  directly abuts the mastoid as well as the right temporomandibular joint.  The superior aspect of the right parotid gland appears to be mildly  thickened, favored to be related to adjacent inflammation. Please note  that underlying osteomyelitis cannot be excluded and can be further  evaluated with MRI with and without contrast if clinically indicated.     ENT consultation is recommended as well as continued attention on  follow-up to ensure resolution.        This report was finalized on 12/4/2023 8:33 PM by Dr. Harjit Ornelas M.D  on Workstation: [id number]             Results for orders placed during the hospital encounter of 01/05/23    Duplex Carotid Ultrasound CAR    Interpretation Summary    Proximal right internal carotid artery plaque without significant stenosis.    Proximal left internal carotid artery mild stenosis.    Mid left internal  "carotid artery mild stenosis.    Results for orders placed during the hospital encounter of 02/17/22    Adult Transthoracic Echo Complete W/ Cont if Necessary Per Protocol    Interpretation Summary  · Estimated left ventricular EF = 65% Left ventricular systolic function is normal.  · Left ventricular wall thickness is consistent with mild concentric hypertrophy.  · Left ventricular diastolic function was normal.    Pertinent Labs     Results from last 7 days   Lab Units 12/06/23  0359 12/05/23  0503 12/04/23  1852   WBC 10*3/mm3 4.41 5.43 9.47   HEMOGLOBIN g/dL 12.3* 12.5* 14.0   PLATELETS 10*3/mm3 234 242 279     Results from last 7 days   Lab Units 12/06/23  0359 12/05/23  0503 12/04/23  1852   SODIUM mmol/L 143 138 137   POTASSIUM mmol/L 4.0 3.8 3.8   CHLORIDE mmol/L 107 103 101   CO2 mmol/L 26.3 25.5 26.7   BUN mg/dL 9 10 13   CREATININE mg/dL 0.98 1.02 0.99   GLUCOSE mg/dL 105* 105* 108*   EGFR mL/min/1.73 87.2 83.1 86.1     Results from last 7 days   Lab Units 12/04/23  1852   ALBUMIN g/dL 4.1   BILIRUBIN mg/dL 0.7   ALK PHOS U/L 77   AST (SGOT) U/L 19   ALT (SGPT) U/L 21     Results from last 7 days   Lab Units 12/06/23  0359 12/05/23  0503 12/04/23  1852   CALCIUM mg/dL 8.6 8.8 9.2   ALBUMIN g/dL  --   --  4.1   MAGNESIUM mg/dL  --   --  2.1               Invalid input(s): \"LDLCALC\"  Results from last 7 days   Lab Units 12/04/23  1852 12/04/23  1847   BLOODCX  No growth at 24 hours No growth at 24 hours   WOUNDCX   --  Rare Normal Skin Kelly  Scant growth (1+) Candida albicans*         Test Results Pending at Discharge     Pending Labs       Order Current Status    Blood Culture - Blood, Arm, Left Preliminary result    Blood Culture - Blood, Arm, Right Preliminary result    Wound Culture - Swab, Ear, Right Preliminary result            Discharge Details        Discharge Medications        New Medications        Instructions Start Date   cefdinir 300 MG capsule  Commonly known as: OMNICEF   300 mg, Oral, 2 " "Times Daily      HYDROcodone-acetaminophen 5-325 MG per tablet  Commonly known as: NORCO   1 tablet, Oral, Every 4 Hours PRN             Continue These Medications        Instructions Start Date   aspirin 81 MG tablet   81 mg, Oral, Daily      B-D INSULIN SYRINGE 1CC/25GX1\" 25G X 1\" 1 ML misc  Generic drug: Insulin Syringe-Needle U-100   USE TO ADMINISTER B-12 INJECTIONS AS DIRECTED      Cipro HC 0.2-1 % otic suspension  Generic drug: ciprofloxacin-hydrocortisone   3 drops, Right Ear, 2 Times Daily      cyanocobalamin 1000 MCG/ML injection   1,000 mcg, Intramuscular, Every 14 Days      fexofenadine 60 MG tablet  Commonly known as: ALLEGRA   60 mg, Oral, Daily PRN      losartan 25 MG tablet  Commonly known as: COZAAR   Take 1 tab PO QD for blood pressure      MAGNESIUM PO   Oral      multivitamin tablet tablet  Commonly known as: THERAGRAN   1 tablet, Oral, Daily      nitroglycerin 0.4 MG SL tablet  Commonly known as: NITROSTAT   0.4 mg, Sublingual, Every 5 Minutes PRN, Take no more than 3 doses in 15 minutes.      tamsulosin 0.4 MG capsule 24 hr capsule  Commonly known as: FLOMAX   0.4 mg, Oral, Daily      vitamin D3 125 MCG (5000 UT) capsule capsule   5,000 Units, Oral, Daily             Stop These Medications      amoxicillin-clavulanate 875-125 MG per tablet  Commonly known as: AUGMENTIN              Allergies   Allergen Reactions    Lisinopril Shortness Of Breath     Rash also     Repatha [Evolocumab] Myalgia    Simvastatin Myalgia    Amlodipine Myalgia     Muscle aches     Hydralazine Rash    Valsartan Rash     muscle aches       Discharge Disposition:  Home or Self Care      Discharge Diet:  Diet Order   Procedures    Diet: Cardiac Diets; Healthy Heart (2-3 Na+); Texture: Regular Texture (IDDSI 7); Fluid Consistency: Thin (IDDSI 0)       Discharge Activity:   Activity Instructions       Activity as Tolerated              CODE STATUS:    Code Status and Medical Interventions:   Ordered at: 12/05/23 0347     " Code Status (Patient has no pulse and is not breathing):    CPR (Attempt to Resuscitate)     Medical Interventions (Patient has pulse or is breathing):    Full Support       Future Appointments   Date Time Provider Department Center   4/26/2024  9:15 AM Tiago Srivastava Jr., MD [facility]   6/4/2024  7:45 AM Brian Marr MD [facility]      Follow-up Information       Brian Marr MD Follow up in 1 week(s).    Specialties: Family Medicine, Emergency Medicine, Urgent Care  Contact information:  [address]   [facility] [address] [phone]               Socrates Sanchez MD Follow up on 12/8/2023.    Specialty: Otolaryngology  Contact information:  [address]  [address]  [facility] [address] [phone]                             Time Spent on Discharge:  Greater than 30 minutes spent on discharge management including final examination, discussion of hospital stay and patient education, preparation of records, medication reconciliation, follow up planning      Matt Garcia MD  [facility]  12/06/23  08:51 EST

## 2023-12-06 NOTE — PLAN OF CARE
Goal Outcome Evaluation:  Plan of Care Reviewed With: patient        Progress: improving  Outcome Evaluation: Patient wih abscess of right ear. Alert and oriented, up at [facility]. Pain controlled with PO meds. IV abx administered. Patient hopes to discharge home today.

## 2023-12-06 NOTE — OUTREACH NOTE
Prep Survey      Flowsheet Row Responses   [facility] patient discharged from? [place]   Is LACE score < 7 ? Yes   Eligibility [facility]   Date of Admission 12/04/23   Date of Discharge 11/29/23   Discharge diagnosis *Abscess of right external ear   Does the patient have one of the following disease processes/diagnoses(primary or secondary)? Other   Does the patient have Home health ordered? No   Is there a DME ordered? No   Prep survey completed? Yes            SARA LOW - Registered Nurse

## 2023-12-07 ENCOUNTER — TRANSITIONAL CARE MANAGEMENT TELEPHONE ENCOUNTER (OUTPATIENT)
Dept: CALL CENTER | Facility: HOSPITAL | Age: 63
End: 2023-12-07
Payer: COMMERCIAL

## 2023-12-07 LAB
BACTERIA SPEC AEROBE CULT: ABNORMAL
BACTERIA SPEC AEROBE CULT: ABNORMAL
GRAM STN SPEC: ABNORMAL

## 2023-12-07 NOTE — OUTREACH NOTE
Call Center TCM Note      Flowsheet Row Responses   [facility] patient discharged from? [place]   Does the patient have one of the following disease processes/diagnoses(primary or secondary)? Other   TCM attempt successful? Yes   Call start time 1324   Call end time 1329   Meds reviewed with patient/caregiver? Yes   Is the patient having any side effects they believe may be caused by any medication additions or changes? No   Does the patient have all medications ordered at discharge? Yes   Is the patient taking all medications as directed (includes completed medication regime)? Yes   Comments ENT appt 12/08/23. Patient wishes to f/u with ENT tomorrow, then plans to call PCP office to schedule hospital f/u appt.   Does the patient have an appointment with their PCP within 7-14 days of discharge? No   Nursing Interventions Routed TCM call to PCP office, Patient desires to follow up with specialty only   Has home health visited the patient within 72 hours of discharge? N/A   Psychosocial issues? No   Did the patient receive a copy of their discharge instructions? Yes   Nursing interventions Reviewed instructions with patient   What is the patient's perception of their health status since discharge? Same   Is the patient/caregiver able to teach back signs and symptoms related to disease process for when to call PCP? Yes   Is the patient/caregiver able to teach back signs and symptoms related to disease process for when to call 911? Yes   Is the patient/caregiver able to teach back the hierarchy of who to call/visit for symptoms/problems? PCP, Specialist, Home health nurse, Urgent Care, ED, 911 Yes   If the patient is a current smoker, are they able to teach back resources for cessation? Not a smoker   Additional teach back comments Reviewed s/s of worsening infection.   TCM call completed? Yes   Wrap up additional comments Patient states is about the same. States ear pain is slowly improving with less swelling  "noted. States ear wick in place with \"a little\" drainage noted. States will see ENT tomorrow. Denies any needs/concerns today. TCM complete.   Call end time 1329   Would this patient benefit from a Referral to [facility] Social Work? No   Is the patient interested in additional calls from an ambulatory ? No            Candi Gomez RN    12/7/2023, 13:30 EST        "

## 2023-12-08 NOTE — CASE MANAGEMENT/SOCIAL WORK
Case Management Discharge Note      Final Note: home         Selected Continued Care - Discharged on 12/6/2023 Admission date: 12/4/2023 - Discharge disposition: Home or Self Care      Destination    No services have been selected for the patient.                Durable Medical Equipment    No services have been selected for the patient.                Dialysis/Infusion    No services have been selected for the patient.                Home Medical Care    No services have been selected for the patient.                Therapy    No services have been selected for the patient.                Community Resources    No services have been selected for the patient.                Community & DME    No services have been selected for the patient.                         Final Discharge Disposition Code: 01 - home or self-care

## 2023-12-09 LAB
BACTERIA SPEC AEROBE CULT: NORMAL
BACTERIA SPEC AEROBE CULT: NORMAL

## 2023-12-10 DIAGNOSIS — N40.1 BENIGN PROSTATIC HYPERPLASIA WITH URINARY FREQUENCY: ICD-10-CM

## 2023-12-10 DIAGNOSIS — R35.0 BENIGN PROSTATIC HYPERPLASIA WITH URINARY FREQUENCY: ICD-10-CM

## 2023-12-11 RX ORDER — TAMSULOSIN HYDROCHLORIDE 0.4 MG/1
1 CAPSULE ORAL DAILY
Qty: 30 CAPSULE | Refills: 5 | Status: SHIPPED | OUTPATIENT
Start: 2023-12-11

## 2023-12-26 NOTE — TELEPHONE ENCOUNTER
----- Message from Brian Marr MD sent at 8/16/2022  1:20 PM EDT -----  Please call the patient about their results with the following discussion points:    Please let him know that his troponin is negative and his chest x-ray is normal.  We will keep the current plan of stopping the cetirizine.  
Spoke to pt about lab results and chest XR, pt understands and has no further questions at this time.  
no

## 2024-01-02 DIAGNOSIS — U07.1 COVID-19 VIRUS INFECTION: Primary | ICD-10-CM

## 2024-04-05 ENCOUNTER — OFFICE VISIT (OUTPATIENT)
Dept: CARDIOLOGY | Facility: CLINIC | Age: 64
End: 2024-04-05
Payer: COMMERCIAL

## 2024-04-05 VITALS
SYSTOLIC BLOOD PRESSURE: 140 MMHG | RESPIRATION RATE: 18 BRPM | DIASTOLIC BLOOD PRESSURE: 74 MMHG | HEIGHT: 74 IN | HEART RATE: 68 BPM | WEIGHT: 220 LBS | OXYGEN SATURATION: 98 % | BODY MASS INDEX: 28.23 KG/M2

## 2024-04-05 DIAGNOSIS — I25.10 CORONARY ARTERY DISEASE INVOLVING NATIVE CORONARY ARTERY OF NATIVE HEART WITHOUT ANGINA PECTORIS: Primary | ICD-10-CM

## 2024-04-05 DIAGNOSIS — R55 SYNCOPE AND COLLAPSE: ICD-10-CM

## 2024-04-05 DIAGNOSIS — E78.2 MIXED HYPERLIPIDEMIA: ICD-10-CM

## 2024-04-05 PROCEDURE — 99214 OFFICE O/P EST MOD 30 MIN: CPT | Performed by: INTERNAL MEDICINE

## 2024-04-05 RX ORDER — EZETIMIBE 10 MG/1
10 TABLET ORAL DAILY
Qty: 30 TABLET | Refills: 11 | Status: SHIPPED | OUTPATIENT
Start: 2024-04-05

## 2024-04-05 NOTE — PROGRESS NOTES
[facility]      Patient Name: Niranjan Peacock  :1960  Age: 64 y.o.  Encounter Provider:  Tiago Srivastava Jr, MD      Chief Complaint: Follow-up coronary artery disease      HPI  Niranjan Peacock is a 64 y.o. male past medical history of coronary artery disease status post PCI LAD , dyslipidemia with complaints of myalgias with statins and Repatha, hypertension who presents for routine follow-up.      Last clinic visit note: Previously followed by Dr. Mancilla and I have reviewed all pertinent electronic health records.  Patient was initially seen in 2017 and found to have severe disease in the proximal LAD with drug-eluting stents placed.  He has experienced severe nighttime calf cramping which he associated with statin use however he states that in working through pernicious anemia and being off of statins he still had those nighttime cramping sensations which persisted until he started taking magnesium supplementation.  Blood pressure has been elevated lately and Dr. Marr added losartan and doubled the dose of hydrochlorothiazide.  Since then he notes an increase in orthostatic symptoms.  No episodes of severe dizziness leading to near syncope or syncope but he does notice increased frequency and intensity of symptoms.  He rides his bicycle about 8 miles 3-4 times per week with no chest pain or shortness of air that is above baseline.  He notes some mild chronic stable dyspnea on exertion with stairs only but states that he can walk as far as he wants to after he is up 2 or 3 flights of stairs without stopping.  No orthopnea, PND or edema.  Occasional palpitations but he feels that this is much better controlled after the augmentation of antihypertensive regimen.  Social family history is reviewed and is not pertinent to this clinic visit.  Blood pressure and heart rate are well controlled today in clinic.    No evidence of ischemia on EKG.  Mild chronic stable dyspnea on  "exertion.  No angina.  No orthopnea, PND or edema.  He is walking 4-5 times a week for a few miles with no limiting symptoms.  Unable to tolerate at least 2 different statins and Repatha as all gave him myalgias.  Last .    The following portions of the patient's history were reviewed and updated as appropriate: allergies, current medications, past family history, past medical history, past social history, past surgical history and problem list.      Review of Systems   Constitutional: Negative for chills and fever.   HENT:  Negative for hoarse voice and sore throat.    Eyes:  Negative for double vision and photophobia.   Cardiovascular:  Positive for dyspnea on exertion and palpitations. Negative for chest pain, leg swelling, near-syncope, orthopnea, paroxysmal nocturnal dyspnea and syncope.   Respiratory:  Negative for cough and wheezing.    Skin:  Negative for poor wound healing and rash.   Musculoskeletal:  Negative for arthritis and joint swelling.   Gastrointestinal:  Negative for bloating, abdominal pain, hematemesis and hematochezia.   Neurological:  Positive for dizziness. Negative for focal weakness.   Psychiatric/Behavioral:  Negative for depression and suicidal ideas.        OBJECTIVE:   Vital Signs  Vitals:    04/05/24 1245   BP: 140/74   Pulse: 68   Resp: 18   SpO2: 98%     Estimated body mass index is 28.25 kg/m² as calculated from the following:    Height as of this encounter: 188 cm (74\").    Weight as of this encounter: 99.8 kg (220 lb).    Vitals reviewed.   Constitutional:       Appearance: Healthy appearance. Not in distress.   Neck:      Vascular: No JVR. JVD normal.   Pulmonary:      Effort: Pulmonary effort is normal.      Breath sounds: Normal breath sounds. No wheezing. No rhonchi. No rales.   Chest:      Chest wall: Not tender to palpatation.   Cardiovascular:      PMI at left midclavicular line. Normal rate. Regular rhythm. Normal S1. Normal S2.       Murmurs: There is no murmur. "      No gallop.  No click. No rub.   Pulses:     Intact distal pulses.   Edema:     Peripheral edema absent.   Abdominal:      General: Bowel sounds are normal.      Palpations: Abdomen is soft.      Tenderness: There is no abdominal tenderness.   Musculoskeletal: Normal range of motion.         General: No tenderness. Skin:     General: Skin is warm and dry.   Neurological:      General: No focal deficit present.      Mental Status: Alert and oriented to person, place and time.         Procedures  Lipid Panel          5/30/2023    08:36   Lipid Panel   Total Cholesterol 197    Triglycerides 166    HDL Cholesterol 44    VLDL Cholesterol 29    LDL Cholesterol  124         BUN   Date Value Ref Range Status   12/06/2023 9 8 - 23 mg/dL Final     Creatinine   Date Value Ref Range Status   12/06/2023 0.98 0.76 - 1.27 mg/dL Final   07/27/2022 1.00 0.60 - 1.30 mg/dL Final     Comment:     Serial Number: [id number]:  [id number]     Potassium   Date Value Ref Range Status   12/06/2023 4.0 3.5 - 5.2 mmol/L Final     ALT (SGPT)   Date Value Ref Range Status   12/04/2023 21 1 - 41 U/L Final     AST (SGOT)   Date Value Ref Range Status   12/04/2023 19 1 - 40 U/L Final           ASSESSMENT:     64-year-old male with ASCVD presents for routine follow-up      PLAN OF CARE:     Coronary artery disease without angina -continue aspirin.  He could not tolerate atorvastatin.  Add Zetia.  Syncope -no further episodes of syncope after discontinuation of hydrochlorothiazide however he still has postural dizziness but this appears tolerable.  Recommend compression stockings, increase fluid intake and routine exercise.  Hyperlipidemia -as above    Return to clinic 6 months             Discharge Medications            Accurate as of April 5, 2024  3:09 PM. If you have any questions, ask your nurse or doctor.                Continue These Medications        Instructions Start Date   aspirin 81 MG tablet   81 mg, Oral, Daily      B-D INSULIN  "SYRINGE 1CC/25GX1\" 25G X 1\" 1 ML misc  Generic drug: Insulin Syringe-Needle U-100   USE TO ADMINISTER B-12 INJECTIONS AS DIRECTED      CHOLESTEROL DEFENSE PO   Oral      cyanocobalamin 1000 MCG/ML injection   1,000 mcg, Intramuscular, Every 14 Days      fexofenadine 60 MG tablet  Commonly known as: ALLEGRA   60 mg, Oral, Daily PRN      losartan 25 MG tablet  Commonly known as: COZAAR   Take 1 tab PO QD for blood pressure      MAGNESIUM PO   Oral, Every other day      multivitamin tablet tablet  Commonly known as: THERAGRAN   1 tablet, Oral, Daily      nitroglycerin 0.4 MG SL tablet  Commonly known as: NITROSTAT   0.4 mg, Sublingual, Every 5 Minutes PRN, Take no more than 3 doses in 15 minutes.      tamsulosin 0.4 MG capsule 24 hr capsule  Commonly known as: FLOMAX   0.4 mg, Oral, Daily      vitamin D3 125 MCG (5000 UT) capsule capsule   5,000 Units, Oral, Daily               Thank you for allowing me to participate in the care of your patient,      Sincerely,   Tiago Srivastava MD  [facility]  04/05/24  15:09 EDT      "

## 2024-04-28 DIAGNOSIS — D51.0 PERNICIOUS ANEMIA: ICD-10-CM

## 2024-04-28 DIAGNOSIS — E53.8 B12 DEFICIENCY: ICD-10-CM

## 2024-04-29 RX ORDER — CYANOCOBALAMIN 1000 UG/ML
INJECTION, SOLUTION INTRAMUSCULAR; SUBCUTANEOUS
Qty: 6 ML | Refills: 3 | Status: SHIPPED | OUTPATIENT
Start: 2024-04-29

## 2024-04-29 NOTE — TELEPHONE ENCOUNTER
Rx Refill Note  Requested Prescriptions     Pending Prescriptions Disp Refills    cyanocobalamin 1000 MCG/ML injection [Pharmacy Med Name: CYANOCOBALAMIN 1,000 MCG/ML MDV] 6 mL 3     Sig: INJECT 1 MILLILITER INTO THE APPROPRIATE MUSCLE AS DIRECTED BY PRESCRIBER EVERY 14 DAYS      Last office visit with prescribing clinician: 7/21/2023   Last telemedicine visit with prescribing clinician: Visit date not found   Next office visit with prescribing clinician: 6/4/2024       Irlanda Osborne LPN  04/29/24, 08:58 EDT

## 2024-05-10 ENCOUNTER — OFFICE VISIT (OUTPATIENT)
Dept: INTERNAL MEDICINE | Facility: CLINIC | Age: 64
End: 2024-05-10
Payer: COMMERCIAL

## 2024-05-10 VITALS
HEIGHT: 74 IN | OXYGEN SATURATION: 97 % | WEIGHT: 226.4 LBS | HEART RATE: 66 BPM | DIASTOLIC BLOOD PRESSURE: 60 MMHG | SYSTOLIC BLOOD PRESSURE: 134 MMHG | TEMPERATURE: 97.8 F | BODY MASS INDEX: 29.05 KG/M2

## 2024-05-10 DIAGNOSIS — K14.8 TONGUE LESION: Primary | ICD-10-CM

## 2024-05-10 DIAGNOSIS — R22.1 NECK FULLNESS: ICD-10-CM

## 2024-05-10 DIAGNOSIS — R49.0 HOARSENESS: ICD-10-CM

## 2024-05-10 PROCEDURE — 99213 OFFICE O/P EST LOW 20 MIN: CPT | Performed by: FAMILY MEDICINE

## 2024-06-04 ENCOUNTER — OFFICE VISIT (OUTPATIENT)
Dept: INTERNAL MEDICINE | Facility: CLINIC | Age: 64
End: 2024-06-04
Payer: COMMERCIAL

## 2024-06-04 VITALS
HEART RATE: 54 BPM | SYSTOLIC BLOOD PRESSURE: 124 MMHG | DIASTOLIC BLOOD PRESSURE: 64 MMHG | OXYGEN SATURATION: 97 % | BODY MASS INDEX: 27.72 KG/M2 | HEIGHT: 74 IN | TEMPERATURE: 97.6 F | WEIGHT: 216 LBS

## 2024-06-04 DIAGNOSIS — I65.22 STENOSIS OF LEFT CAROTID ARTERY: ICD-10-CM

## 2024-06-04 DIAGNOSIS — I10 ESSENTIAL HYPERTENSION: ICD-10-CM

## 2024-06-04 DIAGNOSIS — N40.0 BPH WITHOUT OBSTRUCTION/LOWER URINARY TRACT SYMPTOMS: ICD-10-CM

## 2024-06-04 DIAGNOSIS — Z86.39 HISTORY OF ELEVATED GLUCOSE: ICD-10-CM

## 2024-06-04 DIAGNOSIS — G89.29 CHRONIC NONINTRACTABLE HEADACHE, UNSPECIFIED HEADACHE TYPE: ICD-10-CM

## 2024-06-04 DIAGNOSIS — D51.0 PERNICIOUS ANEMIA: ICD-10-CM

## 2024-06-04 DIAGNOSIS — E04.2 MULTIPLE THYROID NODULES: ICD-10-CM

## 2024-06-04 DIAGNOSIS — H01.139 ECZEMA OF EYELID, UNSPECIFIED LATERALITY: ICD-10-CM

## 2024-06-04 DIAGNOSIS — N40.1 BENIGN PROSTATIC HYPERPLASIA WITH URINARY FREQUENCY: ICD-10-CM

## 2024-06-04 DIAGNOSIS — I25.10 CORONARY ARTERY DISEASE INVOLVING NATIVE CORONARY ARTERY OF NATIVE HEART WITHOUT ANGINA PECTORIS: ICD-10-CM

## 2024-06-04 DIAGNOSIS — Z00.00 ANNUAL PHYSICAL EXAM: Primary | ICD-10-CM

## 2024-06-04 DIAGNOSIS — E78.2 MIXED HYPERLIPIDEMIA: ICD-10-CM

## 2024-06-04 DIAGNOSIS — Z78.9 STATIN INTOLERANCE: ICD-10-CM

## 2024-06-04 DIAGNOSIS — R97.20 ELEVATED PSA, LESS THAN 10 NG/ML: ICD-10-CM

## 2024-06-04 DIAGNOSIS — R73.03 PREDIABETES: ICD-10-CM

## 2024-06-04 DIAGNOSIS — R51.9 CHRONIC NONINTRACTABLE HEADACHE, UNSPECIFIED HEADACHE TYPE: ICD-10-CM

## 2024-06-04 DIAGNOSIS — G47.33 OSA (OBSTRUCTIVE SLEEP APNEA): ICD-10-CM

## 2024-06-04 DIAGNOSIS — R35.0 BENIGN PROSTATIC HYPERPLASIA WITH URINARY FREQUENCY: ICD-10-CM

## 2024-06-04 PROBLEM — H60.01 ABSCESS OF RIGHT EXTERNAL EAR: Status: RESOLVED | Noted: 2023-12-04 | Resolved: 2024-06-04

## 2024-06-04 PROCEDURE — 99396 PREV VISIT EST AGE 40-64: CPT | Performed by: FAMILY MEDICINE

## 2024-06-04 RX ORDER — TAMSULOSIN HYDROCHLORIDE 0.4 MG/1
1 CAPSULE ORAL DAILY
Qty: 90 CAPSULE | Refills: 3 | Status: SHIPPED | OUTPATIENT
Start: 2024-06-04

## 2024-06-04 NOTE — PROGRESS NOTES
Date of Encounter: 2024  Patient: Niranjan Peacock,  1960    Subjective   History of Presenting Illness  Chief complaint: Annual physical     No acute concerns.     Coronary artery disease: Angina with subsequent LAD stent , recatheterization 2019 showed 40 to 50% restenosis.  He does have occasional chest pain that he feels is related to indigestion.  This does not appear to be worsening and is responding to Tums and dietary management.  Since his cardiologist retired he has been seeing a nurse practitioner, he would like to see a cardiologist if possible.     Statin intolerance, we have tried every single statin except for fluvastatin, has not tolerated these with or without co-Q10, did not tolerate ezetimibe, very prolonged myalgias with Repatha.  He is willing to try fluvastatin or any other antilipid medication later this year once he is done doing some housework.     Stenosis of the left carotid artery with plaque on the right, last ultrasound 2023 followed by cardiology.  Less than 50%.     Multiple thyroid nodules benign by  ultrasound per TI-RADS criteria.     Pernicious anemia managed with monthly B12 injections     Longstanding right lumbar radiculopathy manifesting as right anterior lower rib pain.  Improving recently with modification of activities.  CT chest earlier this year was unremarkable.     Urinary hesitancy and reduced flow on review of systems.  Has never been on medications before.  Wakes up twice nightly and water restricts before bedtime.     CHANTEL adherent to CPAP.     Lives with wife Nolvia. 3 children and 7 grandkids.  Never smoker.  Less than 5 alcoholic drinks per week.  Exercises by biking 2-3 times per week.  Diet is fairly healthy low in red meat without sugared beverages.  Works as an  for commercial real estate.  Does not have a living will.  Last colonoscopy  with no polyps.  Due for Td, discussed COVID booster.       Review of  Systems:  Negative for fever, congestion, chest pain upon exertion, shortness of breath, vision changes, vomiting, dysuria, lymphadenopathy, muscle weakness, numbness, mood changes, rashes.    The following portions of the patient's history were reviewed and updated as appropriate: allergies, current medications, past family history, past medical history, past social history, past surgical history and problem list.    Patient Active Problem List   Diagnosis    Coronary artery disease involving native coronary artery of native heart without angina pectoris    Multiple thyroid nodules, benign per 2022     Pernicious anemia    H/O multiple allergies    Herniated nucleus pulposus, L3-4 right    Right lumbar radiculopathy    Pterygium of right eye    Degenerative arthritis of cervical spine    Essential hypertension    History of DVT (deep vein thrombosis)    Eczema of eyelid    Chronic eczema of hand    Mixed hyperlipidemia    CHANTEL (obstructive sleep apnea)    Statin and ezetimibe intolerance    Stenosis of left carotid artery    Elevated PSA, less than 10 ng/ml    Prediabetes    Abscess of right external ear    BPH without obstruction/lower urinary tract symptoms     Past Medical History:   Diagnosis Date    Allergic     Angina at rest 08/16/2019    Added automatically from request for surgery [id number]    B12 deficiency anemia     Cataract 2023    Had cataract surgery both eyes    Cellulitis of right lower extremity 12/24/2020    Chest pain     Chronic fatigue 03/09/2016    Coronary artery disease involving native coronary artery of native heart without angina pectoris 08/10/2017    Deep vein thrombosis     Headache     History of blood clots     blood clot found in right lung     HL (hearing loss)     Hypertension     Mixed hyperlipidemia 02/02/2022    Multiple thyroid nodules     Multiple thyroid nodules     CHANTEL (obstructive sleep apnea)     Pernicious anemia     Pulmonary embolism     2015    Stenosis of left  carotid artery 03/28/2023    Syncope     2 yrs ago one time     Past Surgical History:   Procedure Laterality Date    CARDIAC CATHETERIZATION N/A 08/03/2017    Procedure: Coronary angiography;  Surgeon: Cynthia Farley MD;  Location:  [facility];  Service:     CARDIAC CATHETERIZATION  08/03/2017    Procedure: Functional Flow [place];  Surgeon: Cynthia Farley MD;  Location:  [facility];  Service:     CARDIAC CATHETERIZATION N/A 08/03/2017    Procedure: Stent YI coronary;  Surgeon: Cynthia Farley MD;  Location:  [facility];  Service:     CARDIAC CATHETERIZATION N/A 08/03/2017    Procedure: Left ventriculography;  Surgeon: Cynthia Farley MD;  Location:  [facility];  Service:     CARDIAC CATHETERIZATION N/A 08/03/2017    Procedure: Left Heart Cath;  Surgeon: Cynthia Farley MD;  Location:  [facility];  Service:     CARDIAC CATHETERIZATION N/A 08/20/2019    Procedure: Left Heart Cath;  Surgeon: Mulugeta Ac MD;  Location:  [facility];  Service: Cardiology    CARDIAC CATHETERIZATION N/A 08/20/2019    Procedure: Coronary angiography;  Surgeon: Mulugeta Ac MD;  Location:  [facility];  Service: Cardiology    CARDIAC CATHETERIZATION N/A 08/20/2019    Procedure: Left ventriculography;  Surgeon: Mulugeta Ac MD;  [facility];  Service: Cardiology    CARDIAC CATHETERIZATION  8/20/2019    COLONOSCOPY  MMVI    NORMAL.    COLONOSCOPY      FINE NEEDLE ASPIRATION  12/2015    Left thyroid nodule.  [place] category II.  Dr. Socrates Sanchez     Family History   Problem Relation Age of Onset    Heart disease Other     Hypertension Other     Thyroid disease Other     Heart disease Father     Prostate cancer Father     Hypertension Father     Stroke Father     Heart disease Mother     Hypertension Mother     Heart disease Brother     Hypertension Brother     Thyroid disease  "Sister     Heart disease Brother     Hypertension Brother        Current Outpatient Medications:     aspirin 81 MG tablet, Take 1 tablet by mouth Daily., Disp: 30 tablet, Rfl: 11    B-D INSULIN SYRINGE 1CC/25GX1\" 25G X 1\" 1 ML misc, USE TO ADMINISTER B-12 INJECTIONS AS DIRECTED, Disp: 25 each, Rfl: 3    Cholecalciferol (VITAMIN D3) 5000 units capsule capsule, Take 1 capsule by mouth Daily., Disp: , Rfl:     cyanocobalamin 1000 MCG/ML injection, INJECT 1 MILLILITER INTO THE APPROPRIATE MUSCLE AS DIRECTED BY PRESCRIBER EVERY 14 DAYS, Disp: 6 mL, Rfl: 3    fexofenadine (ALLEGRA) 60 MG tablet, Take 1 tablet by mouth Daily As Needed (allergies)., Disp: 90 tablet, Rfl: 3    losartan (COZAAR) 25 MG tablet, Take 1 tab PO QD for blood pressure, Disp: 90 tablet, Rfl: 3    MAGNESIUM PO, Take  by mouth. Every other day, Disp: , Rfl:     Multiple Vitamin (MULTI VITAMIN DAILY PO), Take 1 tablet by mouth Daily., Disp: , Rfl:     nitroglycerin (NITROSTAT) 0.4 MG SL tablet, Place 1 tablet under the tongue Every 5 (Five) Minutes As Needed for Chest Pain for up to 1 dose. Take no more than 3 doses in 15 minutes., Disp: 100 tablet, Rfl: 3    Nutritional Supplements (CHOLESTEROL DEFENSE PO), Take  by mouth., Disp: , Rfl:     tamsulosin (FLOMAX) 0.4 MG capsule 24 hr capsule, TAKE 1 CAPSULE BY MOUTH DAILY, Disp: 30 capsule, Rfl: 5  Allergies   Allergen Reactions    Lisinopril Shortness Of Breath     Rash also     Repatha [Evolocumab] Myalgia    Simvastatin Myalgia    Amlodipine Myalgia     Muscle aches     Hydralazine Rash    Valsartan Rash     muscle aches     Social History     Tobacco Use    Smoking status: Never     Passive exposure: Never    Smokeless tobacco: Never    Tobacco comments:     caffeine use - 2 cups coffee dailyl    Vaping Use    Vaping status: Never Used   Substance Use Topics    Alcohol use: Yes     Comment: Occasional drinks    Drug use: No          Objective   Physical Exam  There were no vitals filed for this " visit.  There is no height or weight on file to calculate BMI.    Constitutional: NAD.  Eyes: EOMI. PERRLA. Normal conjunctiva.  Ear, nose, mouth, throat: No tonsillar exudates or erythema.   Normal nasal mucosa. Normal external ear canals and TMs bilaterally.  Cardiovascular: RRR. No murmurs. No LE edema b/l. Radial pulses 2+ bilaterally.  Pulmonary: CTA b/l. Good effort.  Integumentary: No rashes or wounds on face or upper extremities.  Lymphatic: No anterior cervical lymphadenopathy.  Endocrine: No thyromegaly or palpable thyroid nodules.  Psychiatric: Normal affect. Normal thought content.  ***       Assessment & Plan   Assessment and Plan  ***    There are no diagnoses linked to this encounter.    {BMI is >= 25 and <30. (Overweight) The following options were offered after discussion;:[id number]}      ***    Brian Marr MD  Family Medicine  O: [phone]    Disclaimer: Parts of this note were dictated by speech recognition. Minor errors in transcription may be present. Please call if questions.

## 2024-06-04 NOTE — PROGRESS NOTES
Date of Encounter: 2024  Patient: Niranjan Peacock,  1960    Subjective   History of Presenting Illness  Chief complaint: Annual physical    Patient reports a chronic, posterior, nonradiating headache for almost 2 years.  Has been worse over the past few weeks.  Not taking any NSAIDs or medications regularly for this.  No reported auras.  He does report light sensitivity since his cataract surgery last November.  He has never been on medication regularly for these headaches.  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  He has not noticed any change in these headaches with his blood pressure.    Coronary artery disease status post LAD stent , recatheterization  showed 40 to 50% restenosis.  No angina, shortness of breath or palpitations.  Unfortunately history of statin, Zetia, Repatha intolerance.  Very hesitant to try anything else for his cholesterol based on the side effects.  He may contact me this winter to try something but does not want to try new medication during the summer.  He does see cardiology.      Stenosis of the left carotid artery with plaque on the right, last ultrasound 2023 followed by cardiology.  Less than 50%.     Multiple thyroid nodules benign by  ultrasound per TI-RADS criteria.     Pernicious anemia managed with monthly B12 injections     Longstanding right lumbar radiculopathy manifesting as right anterior lower rib pain.  Worsens with sitting in lumbar and thoracic flexion.       BPH with history of elevated PSA.  Unsure if tamsulosin helpful.  Does follow with urology.     CHANTEL did not tolerate CPAP     Lives with wife Nolvia. 3 children and 7 grandkids.  Never smoker.  Less than 5 alcoholic drinks per week.  Exercises by biking 2-3 times per week.  Diet is fairly healthy low in red meat without sugared beverages.  Colon cancer screening 2019 with 10-year interval.  Works as an   for commercial real estate. Does not have a living will.  Discussed RSV vaccine.     The following portions of the patient's history were reviewed and updated as appropriate: allergies, current medications, past family history, past medical history, past social history, past surgical history and problem list.    Patient Active Problem List   Diagnosis    Coronary artery disease involving native coronary artery of native heart without angina pectoris    Multiple thyroid nodules, benign per 2022     Pernicious anemia    H/O multiple allergies    Herniated nucleus pulposus, L3-4 right    Right lumbar radiculopathy    Pterygium of right eye    Degenerative arthritis of cervical spine    Essential hypertension    History of DVT (deep vein thrombosis)    Eczema of eyelid    Chronic eczema of hand    Mixed hyperlipidemia    CHANTEL (obstructive sleep apnea)    Statin and ezetimibe intolerance    Stenosis of left carotid artery    Elevated PSA, less than 10 ng/ml    Prediabetes    BPH without obstruction/lower urinary tract symptoms     Past Medical History:   Diagnosis Date    Allergic     Angina at rest 08/16/2019    Added automatically from request for surgery [id number]    B12 deficiency anemia     Cataract 2023    Had cataract surgery both eyes    Cellulitis of right lower extremity 12/24/2020    Chest pain     Chronic fatigue 03/09/2016    Coronary artery disease involving native coronary artery of native heart without angina pectoris 08/10/2017    Deep vein thrombosis     Headache     History of blood clots     blood clot found in right lung     HL (hearing loss)     Hypertension     Mixed hyperlipidemia 02/02/2022    Multiple thyroid nodules     Multiple thyroid nodules     CHANTEL (obstructive sleep apnea)     Pernicious anemia     Pulmonary embolism     2015    Stenosis of left carotid artery 03/28/2023    Syncope     2 yrs ago one time     Past Surgical History:   Procedure Laterality Date    CARDIAC CATHETERIZATION N/A  08/03/2017    Procedure: Coronary angiography;  Surgeon: Cynthia Farley MD;  Location:  [facility];  Service:     CARDIAC CATHETERIZATION  08/03/2017    Procedure: Functional Flow [place];  Surgeon: Cynthia Farley MD;  Location:  [facility];  Service:     CARDIAC CATHETERIZATION N/A 08/03/2017    Procedure: Stent YI coronary;  Surgeon: Cynthia Farley MD;  Location:  [facility];  Service:     CARDIAC CATHETERIZATION N/A 08/03/2017    Procedure: Left ventriculography;  Surgeon: Cynthia Farley MD;  Location:  [facility];  Service:     CARDIAC CATHETERIZATION N/A 08/03/2017    Procedure: Left Heart Cath;  Surgeon: Cynthia Farley MD;  [facility];  Service:     CARDIAC CATHETERIZATION N/A 08/20/2019    Procedure: Left Heart Cath;  Surgeon: Mulugeta Ac MD;  [facility];  Service: Cardiology    CARDIAC CATHETERIZATION N/A 08/20/2019    Procedure: Coronary angiography;  Surgeon: Mulugeta Ac MD;  Location:  [facility];  Service: Cardiology    CARDIAC CATHETERIZATION N/A 08/20/2019    Procedure: Left ventriculography;  Surgeon: Mulugeta Ac MD;  [facility];  Service: Cardiology    CARDIAC CATHETERIZATION  8/20/2019    COLONOSCOPY  MMVI    NORMAL.    COLONOSCOPY      FINE NEEDLE ASPIRATION  12/2015    Left thyroid nodule.  [place] category II.  Dr. Socrates Sanchez     Family History   Problem Relation Age of Onset    Heart disease Other     Hypertension Other     Thyroid disease Other     Heart disease Father     Prostate cancer Father     Hypertension Father     Stroke Father     Heart disease Mother     Hypertension Mother     Heart disease Brother     Hypertension Brother     Thyroid disease Sister     Heart disease Brother     Hypertension Brother        Current Outpatient Medications:     aspirin 81 MG tablet, Take 1 tablet by mouth  "Daily., Disp: 30 tablet, Rfl: 11    B-D INSULIN SYRINGE 1CC/25GX1\" 25G X 1\" 1 ML misc, USE TO ADMINISTER B-12 INJECTIONS AS DIRECTED, Disp: 25 each, Rfl: 3    Cholecalciferol (VITAMIN D3) 5000 units capsule capsule, Take 1 capsule by mouth Daily., Disp: , Rfl:     cyanocobalamin 1000 MCG/ML injection, INJECT 1 MILLILITER INTO THE APPROPRIATE MUSCLE AS DIRECTED BY PRESCRIBER EVERY 14 DAYS, Disp: 6 mL, Rfl: 3    fexofenadine (ALLEGRA) 60 MG tablet, Take 1 tablet by mouth Daily As Needed (allergies)., Disp: 90 tablet, Rfl: 3    losartan (COZAAR) 25 MG tablet, Take 1 tab PO QD for blood pressure, Disp: 90 tablet, Rfl: 3    MAGNESIUM PO, Take  by mouth. Every other day, Disp: , Rfl:     Multiple Vitamin (MULTI VITAMIN DAILY PO), Take 1 tablet by mouth Daily., Disp: , Rfl:     nitroglycerin (NITROSTAT) 0.4 MG SL tablet, Place 1 tablet under the tongue Every 5 (Five) Minutes As Needed for Chest Pain for up to 1 dose. Take no more than 3 doses in 15 minutes., Disp: 100 tablet, Rfl: 3    Nutritional Supplements (CHOLESTEROL DEFENSE PO), Take  by mouth., Disp: , Rfl:     tamsulosin (FLOMAX) 0.4 MG capsule 24 hr capsule, Take 1 capsule by mouth Daily., Disp: 90 capsule, Rfl: 3  Allergies   Allergen Reactions    Lisinopril Shortness Of Breath     Rash also     Repatha [Evolocumab] Myalgia    Simvastatin Myalgia    Amlodipine Myalgia     Muscle aches     Hydralazine Rash    Valsartan Rash     muscle aches     Social History     Tobacco Use    Smoking status: Never     Passive exposure: Never    Smokeless tobacco: Never    Tobacco comments:     caffeine use - 2 cups coffee dailyl    Vaping Use    Vaping status: Never Used   Substance Use Topics    Alcohol use: Yes     Comment: Occasional drinks    Drug use: No          Objective   Physical Exam  Vitals:    06/04/24 0733   BP: 124/64   BP Location: Left arm   Patient Position: Sitting   Cuff Size: Large Adult   Pulse: 54   Temp: 97.6 °F (36.4 °C)   TempSrc: Infrared   SpO2: 97% " "  Weight: 98 kg (216 lb)   Height: 188 cm (74\")     Body mass index is 27.73 kg/m².    Constitutional: NAD.  Eyes: EOMI. PERRLA. Normal conjunctiva.  Ear, nose, mouth, throat: No tonsillar exudates or erythema.  Irregular mucosal surface along the right tongue about to be seen by ENT. normal nasal mucosa.  No sinus tenderness to palpation.  Normal external ear canals and TMs bilaterally.  Cardiovascular: RRR. No murmurs. No LE edema b/l. Radial pulses 2+ bilaterally.  Pulmonary: CTA b/l. Good effort.  Integumentary: No rashes or wounds on face or upper extremities.  Lymphatic: No anterior cervical lymphadenopathy.  Endocrine: No thyromegaly or palpable thyroid nodules.  Psychiatric: Normal affect. Normal thought content.     Assessment & Plan   Assessment and Plan  Pleasant 64-year-old male with coronary artery disease, hyperlipidemia antilipid medication intolerance, hypertension, left carotid artery stenosis, benign thyroid nodules, pernicious anemia, chronic headaches, osteoarthritis of the spine with right lumbar radiculopathy, CHANTEL not on CPAP, who presents with the following:     Diagnoses and all orders for this visit:    1. Annual physical exam (Primary): We discussed preventative care including age and patient-appropriate immunizations and cancer screening. We also discussed the importance of exercise and a healthy diet. This is their annual preventative exam.    2. Chronic nonintractable headache, unspecified headache type: Will check blood work for contributors.  If unremarkable may need to update MRI of the brain due to possible meningioma on 2019 CT.  Take NSAIDs in the interval as needed.  -     Folate  -     Vitamin B12  -     Vitamin D,25-Hydroxy  -     Sedimentation Rate  -     C-reactive Protein    3. Coronary artery disease involving native coronary artery of native heart without angina pectoris: Continue to follow with cardiology.  Willing to try another statin this winter, he will reach out to " me.    4. Stenosis of left carotid artery: Less than 50% per 2023 ultrasound    5. Essential hypertension  -     Urinalysis With Microscopic - Urine, Clean Catch    6. Mixed hyperlipidemia: Per #3  -     CBC & Differential  -     Comprehensive Metabolic Panel  -     TSH  -     T4, Free  -     T3, Free  -     Lipid Panel  7. Statin and ezetimibe intolerance    8. Multiple thyroid nodules, benign per 2022 US    9. Prediabetes    10. BPH without obstruction/lower urinary tract symptoms: Continue to follow with urology  -     PSA DIAGNOSTIC  11. Elevated PSA, less than 10 ng/ml  -     PSA DIAGNOSTIC    12. Pernicious anemia: Controlled with B12 injections  -     Folate  -     Vitamin B12  -     Vitamin D,25-Hydroxy  -     Ferritin  -     Iron Profile    13. Eczema of eyelid, unspecified laterality: Controlled with topical and OTC allergy medications    14. CHANTEL (obstructive sleep apnea): Did not tolerate    15. Benign prostatic hyperplasia with urinary frequency  -     tamsulosin (FLOMAX) 0.4 MG capsule 24 hr capsule; Take 1 capsule by mouth Daily.  Dispense: 90 capsule; Refill: 3    16. History of elevated glucose  -     Hemoglobin A1c      BMI is >= 25 and <30. (Overweight) The following options were offered after discussion;: information on healthy weight added to patient's after visit summary     Return to office in 1 year for annual physical or earlier as needed.    Brian Marr MD  Family Medicine  O: [phone]    Disclaimer: Parts of this note were dictated by speech recognition. Minor errors in transcription may be present. Please call if questions.

## 2024-06-05 LAB
25(OH)D3+25(OH)D2 SERPL-MCNC: 37.6 NG/ML (ref 30–100)
ALBUMIN SERPL-MCNC: 4.4 G/DL (ref 3.9–4.9)
ALBUMIN/GLOB SERPL: 2.2 {RATIO} (ref 1.2–2.2)
ALP SERPL-CCNC: 75 IU/L (ref 44–121)
ALT SERPL-CCNC: 18 IU/L (ref 0–44)
APPEARANCE UR: CLEAR
AST SERPL-CCNC: 22 IU/L (ref 0–40)
BACTERIA #/AREA URNS HPF: NORMAL /[HPF]
BASOPHILS # BLD AUTO: 0 X10E3/UL (ref 0–0.2)
BASOPHILS NFR BLD AUTO: 1 %
BILIRUB SERPL-MCNC: 0.6 MG/DL (ref 0–1.2)
BILIRUB UR QL STRIP: NEGATIVE
BUN SERPL-MCNC: 12 MG/DL (ref 8–27)
BUN/CREAT SERPL: 11 (ref 10–24)
CALCIUM SERPL-MCNC: 9.2 MG/DL (ref 8.6–10.2)
CASTS URNS QL MICRO: NORMAL /LPF
CHLORIDE SERPL-SCNC: 104 MMOL/L (ref 96–106)
CHOLEST SERPL-MCNC: 198 MG/DL (ref 100–199)
CO2 SERPL-SCNC: 21 MMOL/L (ref 20–29)
COLOR UR: YELLOW
CREAT SERPL-MCNC: 1.1 MG/DL (ref 0.76–1.27)
CRP SERPL-MCNC: <1 MG/L (ref 0–10)
EGFRCR SERPLBLD CKD-EPI 2021: 75 ML/MIN/1.73
EOSINOPHIL # BLD AUTO: 0.2 X10E3/UL (ref 0–0.4)
EOSINOPHIL NFR BLD AUTO: 4 %
EPI CELLS #/AREA URNS HPF: NORMAL /HPF (ref 0–10)
ERYTHROCYTE [DISTWIDTH] IN BLOOD BY AUTOMATED COUNT: 12.5 % (ref 11.6–15.4)
ERYTHROCYTE [SEDIMENTATION RATE] IN BLOOD BY WESTERGREN METHOD: 5 MM/HR (ref 0–30)
FERRITIN SERPL-MCNC: 20 NG/ML (ref 30–400)
FOLATE SERPL-MCNC: >20 NG/ML
GLOBULIN SER CALC-MCNC: 2 G/DL (ref 1.5–4.5)
GLUCOSE SERPL-MCNC: 100 MG/DL (ref 70–99)
GLUCOSE UR QL STRIP: NEGATIVE
HBA1C MFR BLD: 5.6 % (ref 4.8–5.6)
HCT VFR BLD AUTO: 44.5 % (ref 37.5–51)
HDLC SERPL-MCNC: 41 MG/DL
HGB BLD-MCNC: 14.7 G/DL (ref 13–17.7)
HGB UR QL STRIP: NEGATIVE
IMM GRANULOCYTES # BLD AUTO: 0 X10E3/UL (ref 0–0.1)
IMM GRANULOCYTES NFR BLD AUTO: 0 %
IRON SATN MFR SERPL: 22 % (ref 15–55)
IRON SERPL-MCNC: 79 UG/DL (ref 38–169)
KETONES UR QL STRIP: NEGATIVE
LDLC SERPL CALC-MCNC: 135 MG/DL (ref 0–99)
LEUKOCYTE ESTERASE UR QL STRIP: NEGATIVE
LYMPHOCYTES # BLD AUTO: 1.4 X10E3/UL (ref 0.7–3.1)
LYMPHOCYTES NFR BLD AUTO: 25 %
MCH RBC QN AUTO: 31.3 PG (ref 26.6–33)
MCHC RBC AUTO-ENTMCNC: 33 G/DL (ref 31.5–35.7)
MCV RBC AUTO: 95 FL (ref 79–97)
MICRO URNS: NORMAL
MICRO URNS: NORMAL
MONOCYTES # BLD AUTO: 0.5 X10E3/UL (ref 0.1–0.9)
MONOCYTES NFR BLD AUTO: 10 %
NEUTROPHILS # BLD AUTO: 3.2 X10E3/UL (ref 1.4–7)
NEUTROPHILS NFR BLD AUTO: 60 %
NITRITE UR QL STRIP: NEGATIVE
PH UR STRIP: 6.5 [PH] (ref 5–7.5)
PLATELET # BLD AUTO: 296 X10E3/UL (ref 150–450)
POTASSIUM SERPL-SCNC: 4.1 MMOL/L (ref 3.5–5.2)
PROT SERPL-MCNC: 6.4 G/DL (ref 6–8.5)
PROT UR QL STRIP: NEGATIVE
PSA SERPL-MCNC: 4.2 NG/ML (ref 0–4)
RBC # BLD AUTO: 4.7 X10E6/UL (ref 4.14–5.8)
RBC #/AREA URNS HPF: NORMAL /HPF (ref 0–2)
SODIUM SERPL-SCNC: 141 MMOL/L (ref 134–144)
SP GR UR STRIP: 1.02 (ref 1–1.03)
T3FREE SERPL-MCNC: 3.4 PG/ML (ref 2–4.4)
T4 FREE SERPL-MCNC: 1.29 NG/DL (ref 0.82–1.77)
TIBC SERPL-MCNC: 352 UG/DL (ref 250–450)
TRIGL SERPL-MCNC: 120 MG/DL (ref 0–149)
TSH SERPL DL<=0.005 MIU/L-ACNC: 2.45 UIU/ML (ref 0.45–4.5)
UIBC SERPL-MCNC: 273 UG/DL (ref 111–343)
UROBILINOGEN UR STRIP-MCNC: 0.2 MG/DL (ref 0.2–1)
VIT B12 SERPL-MCNC: 506 PG/ML (ref 232–1245)
VLDLC SERPL CALC-MCNC: 22 MG/DL (ref 5–40)
WBC # BLD AUTO: 5.4 X10E3/UL (ref 3.4–10.8)
WBC #/AREA URNS HPF: NORMAL /HPF (ref 0–5)

## 2024-06-10 NOTE — PROGRESS NOTES
Your anemia has resolved    PSA has decreased from the last time it was checked in our office    Cholesterol elevated but we expect that.  Still better than my cholesterol.    No other concerns with your blood work.  Stay well.

## 2024-06-11 ENCOUNTER — PATIENT MESSAGE (OUTPATIENT)
Dept: INTERNAL MEDICINE | Facility: CLINIC | Age: 64
End: 2024-06-11
Payer: COMMERCIAL

## 2024-06-11 DIAGNOSIS — R51.9 CHRONIC NONINTRACTABLE HEADACHE, UNSPECIFIED HEADACHE TYPE: Primary | ICD-10-CM

## 2024-06-11 DIAGNOSIS — G89.29 CHRONIC NONINTRACTABLE HEADACHE, UNSPECIFIED HEADACHE TYPE: Primary | ICD-10-CM

## 2024-06-12 NOTE — TELEPHONE ENCOUNTER
From: Niranjan Peacock  To: Brian Marr  Sent: 6/11/2024 8:48 PM EDT  Subject: Visit Follow-Up Question    Dr Marr  I am still having headaches almost all of the time. Maybe we should do the scan you were talking about.    Nain Peacock

## 2024-06-19 DIAGNOSIS — N40.1 BENIGN PROSTATIC HYPERPLASIA WITH URINARY FREQUENCY: ICD-10-CM

## 2024-06-19 DIAGNOSIS — R35.0 BENIGN PROSTATIC HYPERPLASIA WITH URINARY FREQUENCY: ICD-10-CM

## 2024-06-19 RX ORDER — TAMSULOSIN HYDROCHLORIDE 0.4 MG/1
1 CAPSULE ORAL DAILY
Qty: 30 CAPSULE | Refills: 5 | Status: SHIPPED | OUTPATIENT
Start: 2024-06-19

## 2024-07-10 ENCOUNTER — HOSPITAL ENCOUNTER (OUTPATIENT)
Dept: MRI IMAGING | Facility: HOSPITAL | Age: 64
Discharge: HOME | End: 2024-07-10
Admitting: FAMILY MEDICINE
Payer: COMMERCIAL

## 2024-07-10 DIAGNOSIS — R51.9 CHRONIC NONINTRACTABLE HEADACHE, UNSPECIFIED HEADACHE TYPE: ICD-10-CM

## 2024-07-10 DIAGNOSIS — G89.29 CHRONIC NONINTRACTABLE HEADACHE, UNSPECIFIED HEADACHE TYPE: ICD-10-CM

## 2024-07-10 PROCEDURE — A9577 INJ MULTIHANCE: HCPCS | Performed by: FAMILY MEDICINE

## 2024-07-10 PROCEDURE — 0 GADOBENATE DIMEGLUMINE 529 MG/ML SOLUTION: Performed by: FAMILY MEDICINE

## 2024-07-10 PROCEDURE — 70553 MRI BRAIN STEM W/O & W/DYE: CPT

## 2024-07-10 RX ADMIN — GADOBENATE DIMEGLUMINE 20 ML: 529 INJECTION, SOLUTION INTRAVENOUS at 17:00

## 2024-07-11 NOTE — PROGRESS NOTES
Typical blood pressure and cholesterol related changes in the brain, but these are mild and would not be causing any significant symptoms. No tumors, strokes, or any other concerning findings related to your headaches.     If you are still having frequent, severe headaches we should get you to a headache specialist to help. Let me know.

## 2024-07-22 RX ORDER — SYRINGE AND NEEDLE,INSULIN,1ML 25GX1"
SYRINGE, EMPTY DISPOSABLE MISCELLANEOUS
Qty: 25 EACH | Refills: 3 | Status: SHIPPED | OUTPATIENT
Start: 2024-07-22

## 2024-08-08 ENCOUNTER — TELEPHONE (OUTPATIENT)
Dept: CARDIOLOGY | Facility: CLINIC | Age: 64
End: 2024-08-08
Payer: COMMERCIAL

## 2024-08-08 NOTE — TELEPHONE ENCOUNTER
"FRED pt-LOV 4/5/24.  Pt was doing well at last visit.  Hx of CAD with PCI to LAD 2017. HLD.    Patient is calling and was transferred to triage to move up his appt with Dr. Srivastava.    For at least a couple of weeks he is having increased fatigue and generally not feeling well.  He also complains of intermittent chest pressure that sometimes radiates to bilateral shoulders.   He is unable to tell me where the pressure is in the chest.  He denies other symptoms with it.  He says his chest \"feels hallow like he can't catch his breath.\"  The pain occurs everyday and is worse in afternoon/evening.  Happens with rest.  Exertion does not make it worse.  He is, however, having increased SOA with exertion more than his normal.  He does not have any VS to give me.      He had a stress test 11/7/23 that was normal.    He only wanted to see Dr. Srivastava, but there was no appointments at [facility] or [facility] office in a reasonable time.  He is agreeable to see APRN.  I offered him an appointment as soon as tomorrow, but he declined and would like to be seen next week.  I have scheduled him for an appt on Monday 8/12 with BARAK and he is agreeable with that plan.  He is aware it is at the main office at [place].    Shirlene Bobby RN  [facility] T[facility]  08/08/24 11:58 EDT    "

## 2024-08-12 ENCOUNTER — OFFICE VISIT (OUTPATIENT)
Dept: CARDIOLOGY | Facility: CLINIC | Age: 64
End: 2024-08-12
Payer: COMMERCIAL

## 2024-08-12 VITALS
BODY MASS INDEX: 28.31 KG/M2 | OXYGEN SATURATION: 97 % | HEART RATE: 54 BPM | HEIGHT: 74 IN | SYSTOLIC BLOOD PRESSURE: 140 MMHG | WEIGHT: 220.6 LBS | DIASTOLIC BLOOD PRESSURE: 80 MMHG

## 2024-08-12 DIAGNOSIS — E78.2 MIXED HYPERLIPIDEMIA: ICD-10-CM

## 2024-08-12 DIAGNOSIS — I25.10 CORONARY ARTERY DISEASE INVOLVING NATIVE CORONARY ARTERY OF NATIVE HEART WITHOUT ANGINA PECTORIS: Primary | ICD-10-CM

## 2024-08-12 PROCEDURE — 93000 ELECTROCARDIOGRAM COMPLETE: CPT | Performed by: NURSE PRACTITIONER

## 2024-08-12 PROCEDURE — 99214 OFFICE O/P EST MOD 30 MIN: CPT | Performed by: NURSE PRACTITIONER

## 2024-08-12 RX ORDER — AMLODIPINE BESYLATE 2.5 MG/1
2.5 TABLET ORAL DAILY
Qty: 30 TABLET | Refills: 11 | Status: SHIPPED | OUTPATIENT
Start: 2024-08-12

## 2024-08-12 NOTE — ASSESSMENT & PLAN NOTE
LDL is not at goal, history of statin intolerance.  We discussed adding inclisiran, patient given written information which she will review

## 2024-08-12 NOTE — PROGRESS NOTES
CARDIOLOGY        Patient Name: Niranjan Peacock  :1960  Age: 64 y.o.  Primary Cardiologist: Tiago Srivastava MD  Encounter Provider:  GREG Troy    Date of Service: 24    CHIEF COMPLAINT / REASON FOR OFFICE VISIT     Follow-Up and Coronary Artery Disease      HISTORY OF PRESENT ILLNESS       HPI  Niranjan Peacock is a 64 y.o. male who presents today for evaluation of dyspnea with exertion and chest discomfort.     Pt has a  history significant for CAD status post PCI to the LAD in 2017, dyslipidemia with statin intolerance, HTN.    Patient initially evaluated care with cardiology in 2017 was found to have severe disease in the proximal LAD with YI placed.  Patient has had statin intolerance with multiple statins as well as Repatha causing nighttime cramping, add Zetia.  Patient has previously been on HCTZ for hypertension however he experienced episodes of syncope and this was subsequently discontinued.    Patient called the office 2024 with complaints of increased fatigue and generalized weakness.  Reports that he is also experiencing episodes of chest pressure.  Patient also endorsed increased dyspnea with exertion.  Patient was placed on schedule for appointment.    Patient reports that for the past few months he has been experiencing intermittent episodes of chest pressure with radiates to bilateral elbows.  This occurs when resting and typically in the evenings.  He reports that last week he had an episode of near syncope while working outside in the heat in a squatting position and then standing.  Episode lasted longer than it typically does when he has additional related lightheadedness.  At home blood pressures averaging in the 120s.  Patient does cycle and walk on a daily basis for 30 to 45 minutes at a time without any angina or dyspnea.  He does admit that he has had chronic headaches.    The following portions of the patient's history were reviewed and updated as  "appropriate: allergies, current medications, past family history, past medical history, past social history, past surgical history and problem list.      VITAL SIGNS     Visit Vitals  /80 (BP Location: Left arm, Patient Position: Sitting)   Pulse 54   Ht 188 cm (74\")   Wt 100 kg (220 lb 9.6 oz)   SpO2 97%   BMI 28.32 kg/m²         Wt Readings from Last 3 Encounters:   08/12/24 100 kg (220 lb 9.6 oz)   06/04/24 98 kg (216 lb)   05/10/24 103 kg (226 lb 6.4 oz)     Body mass index is 28.32 kg/m².      REVIEW OF SYSTEMS     Review of Systems   Constitutional: Negative for chills, fever, weight gain and weight loss.   Cardiovascular:  Negative for leg swelling.   Respiratory:  Negative for cough, snoring and wheezing.    Hematologic/Lymphatic: Negative for bleeding problem. Does not bruise/bleed easily.   Skin:  Negative for color change.   Musculoskeletal:  Negative for falls, joint pain and myalgias.   Gastrointestinal:  Negative for melena.   Genitourinary:  Negative for hematuria.   Neurological:  Negative for excessive daytime sleepiness.   Psychiatric/Behavioral:  Negative for depression. The patient is not nervous/anxious.            PHYSICAL EXAMINATION     Constitutional:       Appearance: Normal appearance. Well-developed.   Eyes:      Conjunctiva/sclera: Conjunctivae normal.   Neck:      Vascular: No carotid bruit.   Pulmonary:      Effort: Pulmonary effort is normal.      Breath sounds: Normal breath sounds.   Cardiovascular:      Normal rate. Regular rhythm. Normal S1. Normal S2.       Murmurs: There is no murmur.      No gallop.  No click. No rub.   Edema:     Peripheral edema absent.   Musculoskeletal: Normal range of motion. Skin:     General: Skin is warm and dry.   Neurological:      Mental Status: Alert and oriented to person, place, and time.      GCS: GCS eye subscore is 4. GCS verbal subscore is 5. GCS motor subscore is 6.   Psychiatric:         Speech: Speech normal.         Behavior: " Behavior normal.         Thought Content: Thought content normal.         Judgment: Judgment normal.           REVIEWED DATA       ECG 12 Lead    Date/Time: 8/12/2024 1:32 PM  Performed by: Astrid Barber APRN    Authorized by: Astrid Barber APRN  Comparison: compared with previous ECG from 11/6/2023  Rhythm: sinus rhythm  Rate: bradycardic  BPM: 54  ST Segments: ST segments normal  T Waves: T waves normal  QRS axis: normal    Clinical impression: normal ECG          Cardiac Procedures:  Cardiac catheterization 8/20/2019.  Normal left main.  LAD has a stent in the midportion which is patent.  Smooth 40-50% lesion in the mid distal LAD just beyond the stent.  Diagonals are free of disease.  Circumflex with 20-30% proximal disease, 20% mid circumflex disease.  RCA is a dominant vessel.  Diffuse 10% luminal irregularities in the proximal and midportion.  LARRY is high rising vessel with 40% origin lesion.  Echocardiogram 2/17/2020.  LVEF 61%.  Diastolic function is normal.  No evidence of PFO.  No valvular stenosis or insufficiency.  Myocardial perfusion stress test 7/22/2021.  Normal myocardial perfusion study without evidence of ischemia.  Horizontal ST segment depression 1.5 mm noted during stress, beginning at 6 minutes of stress.  Zio monitor 2/18/2022.  Unremarkable event recorder.  No evidence of atrial fibrillation.  Echocardiogram 2/17/2022.  LVEF 65%.  Mild hypertrophy.  Myocardial perfusion stress test 11/7/2023.  Normal myocardial perfusion study without evidence of ischemia.  Impressions consistent with a lower study.  Compared to prior study the study reveals no change.    Lipid Panel          6/4/2024    07:58   Lipid Panel   Total Cholesterol 198    Triglycerides 120    HDL Cholesterol 41    VLDL Cholesterol 22    LDL Cholesterol  135        Lab Results   Component Value Date     06/04/2024     12/06/2023    K 4.1 06/04/2024    K 4.0 12/06/2023     06/04/2024      12/06/2023    CO2 21 06/04/2024    CO2 26.3 12/06/2023    BUN 12 06/04/2024    BUN 9 12/06/2023    CREATININE 1.10 06/04/2024    CREATININE 0.98 12/06/2023    EGFRIFNONA 85 10/13/2021    EGFRIFNONA 75 08/10/2021    EGFRIFAFRI 79 04/29/2021    EGFRIFAFRI 98 10/16/2020    GLUCOSE 100 (H) 06/04/2024    GLUCOSE 105 (H) 12/06/2023    CALCIUM 9.2 06/04/2024    CALCIUM 8.6 12/06/2023    PROTENTOTREF 6.4 06/04/2024    PROTENTOTREF 6.4 05/30/2023    ALBUMIN 4.4 06/04/2024    ALBUMIN 4.1 12/04/2023    BILITOT 0.6 06/04/2024    BILITOT 0.7 12/04/2023    AST 22 06/04/2024    AST 19 12/04/2023    ALT 18 06/04/2024    ALT 21 12/04/2023     Lab Results   Component Value Date    WBC 5.4 06/04/2024    WBC 4.41 12/06/2023    HGB 14.7 06/04/2024    HGB 12.3 (L) 12/06/2023    HCT 44.5 06/04/2024    HCT 35.7 (L) 12/06/2023    MCV 95 06/04/2024    MCV 91.5 12/06/2023     06/04/2024     12/06/2023     Lab Results   Component Value Date    PROBNP 64.6 02/17/2020    PROBNP 64.3 08/16/2019     Lab Results   Component Value Date    CKTOTAL 94 05/27/2022    TROPONINT <0.010 08/16/2022     Lab Results   Component Value Date    TSH 2.450 06/04/2024    TSH 2.240 05/30/2023             ASSESSMENT & PLAN     Diagnoses and all orders for this visit:    1. Coronary artery disease involving native coronary artery of native heart without angina pectoris (Primary)  Assessment & Plan:  Patient reports that he has had episodes of chest pressure with radiation to the arms for a few months.  This occurs in the evenings with rest and then improves and resolves on its own.  He does report some exercise intolerance.  He does exercise every day in the form of cycling or walking daily for 30-45 minutes at a time with no exertional symptoms.  I have discussed this case with Dr. Srivastava, patient had a myocardial perfusion stress test in November 2023 which was negative for ischemia.  He does report similar symptoms at that time.  Recommend adding an  "antianginal.  Unfortunately, patient cannot take beta-blocker secondary to baseline heart rate in the 50s.  We discussed adding isosorbide mononitrate however patient has chronic headaches.  Will add amlodipine 2.5 mg/day      2. Mixed hyperlipidemia  Overview:  Lipid Panel          6/4/2024    07:58   Lipid Panel   Total Cholesterol 198    Triglycerides 120    HDL Cholesterol 41    VLDL Cholesterol 22    LDL Cholesterol  135          Assessment & Plan:  LDL is not at goal, history of statin intolerance.  We discussed adding inclisiran, patient given written information which she will review      Other orders  -     amLODIPine (NORVASC) 2.5 MG tablet; Take 1 tablet by mouth Daily.  Dispense: 30 tablet; Refill: 11  -     ECG 12 Lead        Return in about 2 weeks (around 8/26/2024) for GREG Ang.    Future Appointments         Provider Department Center    8/27/2024 1:00 PM Astrid Barber APRN [facility] C[facility]IOLOG[facility]    10/18/2024 10:15 AM Tiago Srivastava Jr., MD [facility]ARDIOLOGY [facility]    6/10/2025 7:45 AM (Arrive by 7:30 AM) Brian Marr MD [facility] PRIMARY CARE [facility]              MEDICATIONS         Discharge Medications            Accurate as of August 12, 2024  3:56 PM. If you have any questions, ask your nurse or doctor.                New Medications        Instructions Start Date   amLODIPine 2.5 MG tablet  Commonly known as: NORVASC  Started by: GREG Ang   2.5 mg, Oral, Daily             Continue These Medications        Instructions Start Date   aspirin 81 MG tablet   81 mg, Oral, Daily      B-D INSULIN SYRINGE 1CC/25GX1\" 25G X 1\" 1 ML misc  Generic drug: Insulin Syringe-Needle U-100   USE TO ADMINISTER B-12 INJECTIONS AS DIRECTED      CHOLESTEROL DEFENSE PO   Oral      cyanocobalamin 1000 MCG/ML injection   INJECT 1 MILLILITER INTO THE APPROPRIATE MUSCLE AS DIRECTED BY PRESCRIBER EVERY 14 DAYS      fexofenadine 60 MG " tablet  Commonly known as: ALLEGRA   60 mg, Oral, Daily PRN      losartan 25 MG tablet  Commonly known as: COZAAR   Take 1 tab PO QD for blood pressure      MAGNESIUM PO   Oral, Every other day      multivitamin tablet tablet  Commonly known as: THERAGRAN   1 tablet, Oral, Daily      nitroglycerin 0.4 MG SL tablet  Commonly known as: NITROSTAT   0.4 mg, Sublingual, Every 5 Minutes PRN, Take no more than 3 doses in 15 minutes.      tamsulosin 0.4 MG capsule 24 hr capsule  Commonly known as: FLOMAX   0.4 mg, Oral, Daily      vitamin D3 125 MCG (5000 UT) capsule capsule   5,000 Units, Oral, Daily                   **Dragon Disclaimer:   Much of this encounter note is an electronic transcription/translation of spoken language to printed text. The electronic translation of spoken language may permit erroneous, or at times, nonsensical words or phrases to be inadvertently transcribed. Although I have reviewed the note for such errors, some may still exist.

## 2024-08-12 NOTE — ASSESSMENT & PLAN NOTE
Patient reports that he has had episodes of chest pressure with radiation to the arms for a few months.  This occurs in the evenings with rest and then improves and resolves on its own.  He does report some exercise intolerance.  He does exercise every day in the form of cycling or walking daily for 30-45 minutes at a time with no exertional symptoms.  I have discussed this case with Dr. Srivastava, patient had a myocardial perfusion stress test in November 2023 which was negative for ischemia.  He does report similar symptoms at that time.  Recommend adding an antianginal.  Unfortunately, patient cannot take beta-blocker secondary to baseline heart rate in the 50s.  We discussed adding isosorbide mononitrate however patient has chronic headaches.  Will add amlodipine 2.5 mg/day

## 2024-08-16 NOTE — NURSING NOTE
Met with Mr Peacock while in recovery, discussed Cardiac Rehab following discharge. Explained benefits of the program. Questions answered about the program and hours of operation. Information left for him to review, stated will call to set up an appointment in the am.      Constipation

## 2024-08-27 ENCOUNTER — OFFICE VISIT (OUTPATIENT)
Dept: CARDIOLOGY | Facility: CLINIC | Age: 64
End: 2024-08-27
Payer: COMMERCIAL

## 2024-08-27 VITALS
WEIGHT: 215 LBS | SYSTOLIC BLOOD PRESSURE: 120 MMHG | HEART RATE: 60 BPM | HEIGHT: 74 IN | OXYGEN SATURATION: 96 % | DIASTOLIC BLOOD PRESSURE: 75 MMHG | BODY MASS INDEX: 27.59 KG/M2

## 2024-08-27 DIAGNOSIS — Z78.9 STATIN INTOLERANCE: ICD-10-CM

## 2024-08-27 DIAGNOSIS — I25.10 CORONARY ARTERY DISEASE INVOLVING NATIVE CORONARY ARTERY OF NATIVE HEART WITHOUT ANGINA PECTORIS: Primary | ICD-10-CM

## 2024-08-27 DIAGNOSIS — E78.2 MIXED HYPERLIPIDEMIA: ICD-10-CM

## 2024-08-27 PROCEDURE — 99214 OFFICE O/P EST MOD 30 MIN: CPT | Performed by: NURSE PRACTITIONER

## 2024-08-27 RX ORDER — AMLODIPINE BESYLATE 2.5 MG/1
2.5 TABLET ORAL DAILY
Qty: 90 TABLET | Refills: 3 | Status: SHIPPED | OUTPATIENT
Start: 2024-08-27

## 2024-08-27 NOTE — ASSESSMENT & PLAN NOTE
Patient reports that since initiating amlodipine his chest pressure has improved.  Notes that he is also noted improvement of his dyspnea and exertional capacity  Continue the following regimen:  Amlodipine 2.5 mg/day  Aspirin 81 mg/day  Losartan 25 mg/day

## 2024-08-27 NOTE — PROGRESS NOTES
CARDIOLOGY        Patient Name: Niranjan Peacock  :1960  Age: 64 y.o.  Primary Cardiologist: Tiago Srivastava MD  Encounter Provider:  GREG Troy    Date of Service: 24    CHIEF COMPLAINT / REASON FOR OFFICE VISIT     Chest Pain      HISTORY OF PRESENT ILLNESS       HPI  Niranjan Peacock is a 64 y.o. male who presents today for evaluation of dyspnea with exertion and chest discomfort.     Pt has a  history significant for CAD status post PCI to the LAD in 2017, dyslipidemia with statin intolerance, HTN.    Patient initially evaluated care with cardiology in 2017 was found to have severe disease in the proximal LAD with YI placed.  Patient has had statin intolerance with multiple statins as well as Repatha causing nighttime cramping, add Zetia.  Patient has previously been on HCTZ for hypertension however he experienced episodes of syncope and this was subsequently discontinued.    Patient called the office 2024 with complaints of increased fatigue and generalized weakness.  Reports that he is also experiencing episodes of chest pressure.  Patient also endorsed increased dyspnea with exertion.  Patient was placed on schedule for appointment.    Patient was evaluated by me 2024 where he was experiencing intermittent episodes of chest pain, at that time amlodipine was added to his regimen.    Patient reports that he has noted improvement of chest pain and shortness of breath since adding amlodipine.  He also notes minor improvement of headaches.  He is able to perform exertional activities.  He does still have some occasional episodes of lightheadedness,     The following portions of the patient's history were reviewed and updated as appropriate: allergies, current medications, past family history, past medical history, past social history, past surgical history and problem list.      VITAL SIGNS     Visit Vitals  /75 (BP Location: Right arm, Patient Position: Sitting)   Pulse  "60   Ht 188 cm (74\")   Wt 97.5 kg (215 lb)   SpO2 96%   BMI 27.60 kg/m²         Wt Readings from Last 3 Encounters:   08/27/24 97.5 kg (215 lb)   08/12/24 100 kg (220 lb 9.6 oz)   06/04/24 98 kg (216 lb)     Body mass index is 27.6 kg/m².      REVIEW OF SYSTEMS     Review of Systems   Constitutional: Negative for chills, fever, weight gain and weight loss.   Cardiovascular:  Negative for leg swelling.   Respiratory:  Negative for cough, snoring and wheezing.    Hematologic/Lymphatic: Negative for bleeding problem. Does not bruise/bleed easily.   Skin:  Negative for color change.   Musculoskeletal:  Negative for falls, joint pain and myalgias.   Gastrointestinal:  Negative for melena.   Genitourinary:  Negative for hematuria.   Neurological:  Negative for excessive daytime sleepiness.   Psychiatric/Behavioral:  Negative for depression. The patient is not nervous/anxious.            PHYSICAL EXAMINATION     Constitutional:       Appearance: Normal appearance. Well-developed.   Eyes:      Conjunctiva/sclera: Conjunctivae normal.   Neck:      Vascular: No carotid bruit.   Pulmonary:      Effort: Pulmonary effort is normal.      Breath sounds: Normal breath sounds.   Cardiovascular:      Normal rate. Regular rhythm. Normal S1. Normal S2.       Murmurs: There is no murmur.      No gallop.  No click. No rub.   Edema:     Peripheral edema absent.   Musculoskeletal: Normal range of motion. Skin:     General: Skin is warm and dry.   Neurological:      Mental Status: Alert and oriented to person, place, and time.      GCS: GCS eye subscore is 4. GCS verbal subscore is 5. GCS motor subscore is 6.   Psychiatric:         Speech: Speech normal.         Behavior: Behavior normal.         Thought Content: Thought content normal.         Judgment: Judgment normal.           REVIEWED DATA     Procedures    Cardiac Procedures:  Cardiac catheterization 8/20/2019.  Normal left main.  LAD has a stent in the midportion which is patent.  " Smooth 40-50% lesion in the mid distal LAD just beyond the stent.  Diagonals are free of disease.  Circumflex with 20-30% proximal disease, 20% mid circumflex disease.  RCA is a dominant vessel.  Diffuse 10% luminal irregularities in the proximal and midportion.  LARRY is high rising vessel with 40% origin lesion.  Echocardiogram 2/17/2020.  LVEF 61%.  Diastolic function is normal.  No evidence of PFO.  No valvular stenosis or insufficiency.  Myocardial perfusion stress test 7/22/2021.  Normal myocardial perfusion study without evidence of ischemia.  Horizontal ST segment depression 1.5 mm noted during stress, beginning at 6 minutes of stress.  Zio monitor 2/18/2022.  Unremarkable event recorder.  No evidence of atrial fibrillation.  Echocardiogram 2/17/2022.  LVEF 65%.  Mild hypertrophy.  Myocardial perfusion stress test 11/7/2023.  Normal myocardial perfusion study without evidence of ischemia.  Impressions consistent with a lower study.  Compared to prior study the study reveals no change.    Lipid Panel          6/4/2024    07:58   Lipid Panel   Total Cholesterol 198    Triglycerides 120    HDL Cholesterol 41    VLDL Cholesterol 22    LDL Cholesterol  135        Lab Results   Component Value Date     06/04/2024     12/06/2023    K 4.1 06/04/2024    K 4.0 12/06/2023     06/04/2024     12/06/2023    CO2 21 06/04/2024    CO2 26.3 12/06/2023    BUN 12 06/04/2024    BUN 9 12/06/2023    CREATININE 1.10 06/04/2024    CREATININE 0.98 12/06/2023    EGFRIFNONA 85 10/13/2021    EGFRIFNONA 75 08/10/2021    EGFRIFAFRI 79 04/29/2021    EGFRIFAFRI 98 10/16/2020    GLUCOSE 100 (H) 06/04/2024    GLUCOSE 105 (H) 12/06/2023    CALCIUM 9.2 06/04/2024    CALCIUM 8.6 12/06/2023    PROTENTOTREF 6.4 06/04/2024    PROTENTOTREF 6.4 05/30/2023    ALBUMIN 4.4 06/04/2024    ALBUMIN 4.1 12/04/2023    BILITOT 0.6 06/04/2024    BILITOT 0.7 12/04/2023    AST 22 06/04/2024    AST 19 12/04/2023    ALT 18 06/04/2024    ALT 21  12/04/2023     Lab Results   Component Value Date    WBC 5.4 06/04/2024    WBC 4.41 12/06/2023    HGB 14.7 06/04/2024    HGB 12.3 (L) 12/06/2023    HCT 44.5 06/04/2024    HCT 35.7 (L) 12/06/2023    MCV 95 06/04/2024    MCV 91.5 12/06/2023     06/04/2024     12/06/2023     Lab Results   Component Value Date    PROBNP 64.6 02/17/2020    PROBNP 64.3 08/16/2019     Lab Results   Component Value Date    CKTOTAL 94 05/27/2022    TROPONINT <0.010 08/16/2022     Lab Results   Component Value Date    TSH 2.450 06/04/2024    TSH 2.240 05/30/2023             ASSESSMENT & PLAN     Diagnoses and all orders for this visit:    1. Coronary artery disease involving native coronary artery of native heart without angina pectoris (Primary)  Assessment & Plan:  Patient reports that since initiating amlodipine his chest pressure has improved.  Notes that he is also noted improvement of his dyspnea and exertional capacity  Continue the following regimen:  Amlodipine 2.5 mg/day  Aspirin 81 mg/day  Losartan 25 mg/day    Orders:  -     amLODIPine (NORVASC) 2.5 MG tablet; Take 1 tablet by mouth Daily.  Dispense: 90 tablet; Refill: 3  -     Ambulatory Referral to [facility] For Infusion Treatment    2. Mixed hyperlipidemia  Overview:  Lipid Panel          6/4/2024    07:58   Lipid Panel   Total Cholesterol 198    Triglycerides 120    HDL Cholesterol 41    VLDL Cholesterol 22    LDL Cholesterol  135          Assessment & Plan:  Known statin intolerance with goal LDL <70, would be interested in inclisiran.       PCSK9 or Leqvio clinical documentation checklist    Prescribing indications:    [x] Patient has history of ASCVD   [] History of MI   [x] Coronary artery disease   [] Atherosclerosis seen on CT imaging   [] History of stroke   [] History of TIA   [] Peripheral arterial disease    [] Familial hypercholesterolemia    [] Patient has increased risk of ASCVD with the following comorbidities   []  Diabetes Mellitus   []  HTN   [] Family  history coronary disease   [] Active smoker or history of smoking    Current LDL status- (Lipid panel must be within 90 days)   [x] LDL currently not at goal    Previous Lipid Lowering therapy (select all that apply, recommend failure of 3 statin drugs)   [x] Atorvastatin   [] Pravastatin   [x] Simvastatin   [x] Rosuvastatin   [x] Lovastatin   [] Pitavastatin   [x] Ezetimibe   [] PCSK9 monoclonal antibody    Medical history for statin therapy   [] Patient remains on maximum tolerated statin but LDL not at goal   [x] Patient experienced myalgias, myositis, joint pains that resolved when removed from therapy   [x] Patient has undergone rechallenge with lower dose statin with symptom reappearance   [] Patient has known contraindications to statin drugs   [] Patient has CK greater than 10 times of upper limit   [] Other      Orders:  -     Ambulatory Referral to [facility] For Infusion Treatment    3. Statin and ezetimibe intolerance  -     Ambulatory Referral to [facility] For Infusion Treatment    4. Statin intolerance  -     Ambulatory Referral to [facility] For Infusion Treatment    Other orders  -     Inclisiran Sodium solution prefilled syringe 284 mg  -     Inclisiran Sodium solution prefilled syringe 284 mg        No follow-ups on file.    Future Appointments         Provider Department Center    9/5/2024 9:00 AM REFERRED INJECTION CHAIR EP [facility] INFUSION [facility]    2/28/2025 9:15 AM Tiago Srivastava Jr., MD [facility] CARDIOLOGY [facility]    6/10/2025 7:45 AM (Arrive by 7:30 AM) Brian Marr MD [facility] PRIMARY [facility]ARE [facility]              MEDICATIONS         Discharge Medications            Accurate as of August 27, 2024  3:47 PM. If you have any questions, ask your nurse or doctor.                Continue These Medications        Instructions Start Date   amLODIPine 2.5 MG tablet  Commonly known as: NORVASC   2.5 mg, Oral, Daily      aspirin 81 MG tablet   81 mg, Oral, Daily      B-D  "INSULIN SYRINGE 1CC/25GX1\" 25G X 1\" 1 ML misc  Generic drug: Insulin Syringe-Needle U-100   USE TO ADMINISTER B-12 INJECTIONS AS DIRECTED      CHOLESTEROL DEFENSE PO   Oral      cyanocobalamin 1000 MCG/ML injection   INJECT 1 MILLILITER INTO THE APPROPRIATE MUSCLE AS DIRECTED BY PRESCRIBER EVERY 14 DAYS      fexofenadine 60 MG tablet  Commonly known as: ALLEGRA   60 mg, Oral, Daily PRN      losartan 25 MG tablet  Commonly known as: COZAAR   Take 1 tab PO QD for blood pressure      MAGNESIUM PO   Oral, Every other day      multivitamin tablet tablet  Commonly known as: THERAGRAN   1 tablet, Oral, Daily      nitroglycerin 0.4 MG SL tablet  Commonly known as: NITROSTAT   0.4 mg, Sublingual, Every 5 Minutes PRN, Take no more than 3 doses in 15 minutes.      tamsulosin 0.4 MG capsule 24 hr capsule  Commonly known as: FLOMAX   0.4 mg, Oral, Daily      vitamin D3 125 MCG (5000 UT) capsule capsule   5,000 Units, Oral, Daily                   **Dragon Disclaimer:   Much of this encounter note is an electronic transcription/translation of spoken language to printed text. The electronic translation of spoken language may permit erroneous, or at times, nonsensical words or phrases to be inadvertently transcribed. Although I have reviewed the note for such errors, some may still exist.   "

## 2024-08-27 NOTE — ASSESSMENT & PLAN NOTE
Known statin intolerance with goal LDL <70, would be interested in inclisiran.       PCSK9 or Leqvio clinical documentation checklist    Prescribing indications:    [x] Patient has history of ASCVD   [] History of MI   [x] Coronary artery disease   [] Atherosclerosis seen on CT imaging   [] History of stroke   [] History of TIA   [] Peripheral arterial disease    [] Familial hypercholesterolemia    [] Patient has increased risk of ASCVD with the following comorbidities   []  Diabetes Mellitus   []  HTN   [] Family history coronary disease   [] Active smoker or history of smoking    Current LDL status- (Lipid panel must be within 90 days)   [x] LDL currently not at goal    Previous Lipid Lowering therapy (select all that apply, recommend failure of 3 statin drugs)   [x] Atorvastatin   [] Pravastatin   [x] Simvastatin   [x] Rosuvastatin   [x] Lovastatin   [] Pitavastatin   [x] Ezetimibe   [] PCSK9 monoclonal antibody    Medical history for statin therapy   [] Patient remains on maximum tolerated statin but LDL not at goal   [x] Patient experienced myalgias, myositis, joint pains that resolved when removed from therapy   [x] Patient has undergone rechallenge with lower dose statin with symptom reappearance   [] Patient has known contraindications to statin drugs   [] Patient has CK greater than 10 times of upper limit   [] Other

## 2024-09-03 RX ORDER — LOSARTAN POTASSIUM 25 MG/1
TABLET ORAL
Qty: 90 TABLET | Refills: 3 | Status: SHIPPED | OUTPATIENT
Start: 2024-09-03

## 2024-09-05 ENCOUNTER — LAB (OUTPATIENT)
Dept: LAB | Facility: HOSPITAL | Age: 64
End: 2024-09-05
Payer: COMMERCIAL

## 2024-09-05 DIAGNOSIS — E78.2 MIXED HYPERLIPIDEMIA: ICD-10-CM

## 2024-09-05 DIAGNOSIS — E78.2 MIXED HYPERLIPIDEMIA: Primary | ICD-10-CM

## 2024-09-05 LAB
CHOLEST SERPL-MCNC: 185 MG/DL (ref 0–200)
HDLC SERPL-MCNC: 42 MG/DL (ref 40–60)
LDLC SERPL CALC-MCNC: 120 MG/DL (ref 0–100)
LDLC/HDLC SERPL: 2.8 {RATIO}
TRIGL SERPL-MCNC: 128 MG/DL (ref 0–150)
VLDLC SERPL-MCNC: 23 MG/DL (ref 5–40)

## 2024-09-05 PROCEDURE — 36415 COLL VENOUS BLD VENIPUNCTURE: CPT

## 2024-09-05 PROCEDURE — 80061 LIPID PANEL: CPT

## 2024-09-06 NOTE — PROGRESS NOTES
Yes, he has.  I just needed to update his lipid panel.  I thought the order for that was in the smartset, but I guess not.

## 2024-10-16 ENCOUNTER — INFUSION (OUTPATIENT)
Dept: ONCOLOGY | Facility: HOSPITAL | Age: 64
End: 2024-10-16
Payer: COMMERCIAL

## 2024-10-16 DIAGNOSIS — Z78.9 STATIN INTOLERANCE: ICD-10-CM

## 2024-10-16 DIAGNOSIS — E78.2 MIXED HYPERLIPIDEMIA: ICD-10-CM

## 2024-10-16 DIAGNOSIS — I25.10 CORONARY ARTERY DISEASE INVOLVING NATIVE CORONARY ARTERY OF NATIVE HEART WITHOUT ANGINA PECTORIS: Primary | ICD-10-CM

## 2024-10-16 PROCEDURE — 96372 THER/PROPH/DIAG INJ SC/IM: CPT

## 2024-10-16 PROCEDURE — 25010000002 INCLISIRAN SODIUM 284 MG/1.5ML SOLUTION PREFILLED SYRINGE: Performed by: NURSE PRACTITIONER

## 2024-10-16 RX ADMIN — INCLISIRAN 284 MG: 284 INJECTION, SOLUTION SUBCUTANEOUS at 09:09

## 2024-10-16 NOTE — NURSING NOTE
Injection  Leqvio Injection performed in L arm by Marin Elizabeth RN. Patient tolerated the procedure well without complications. Pt stated to contact his prescribing MD with any questions or concerns prior to his next injection (Jan 17th). Pt verbalized understanding and was discharged in a stable condition.   10/16/24   Marin Elizabeth RN

## 2024-11-06 ENCOUNTER — OFFICE VISIT (OUTPATIENT)
Dept: CARDIOLOGY | Facility: CLINIC | Age: 64
End: 2024-11-06
Payer: COMMERCIAL

## 2024-11-06 VITALS
WEIGHT: 215.6 LBS | HEIGHT: 74 IN | HEART RATE: 57 BPM | SYSTOLIC BLOOD PRESSURE: 118 MMHG | OXYGEN SATURATION: 98 % | BODY MASS INDEX: 27.67 KG/M2 | DIASTOLIC BLOOD PRESSURE: 62 MMHG

## 2024-11-06 DIAGNOSIS — R55 SYNCOPE AND COLLAPSE: ICD-10-CM

## 2024-11-06 DIAGNOSIS — E78.2 MIXED HYPERLIPIDEMIA: ICD-10-CM

## 2024-11-06 DIAGNOSIS — I25.10 CORONARY ARTERY DISEASE INVOLVING NATIVE CORONARY ARTERY OF NATIVE HEART WITHOUT ANGINA PECTORIS: Primary | ICD-10-CM

## 2024-11-06 PROCEDURE — 99214 OFFICE O/P EST MOD 30 MIN: CPT | Performed by: INTERNAL MEDICINE

## 2024-11-06 NOTE — PROGRESS NOTES
[facility]      Patient Name: Niranjan Peacock  :1960  Age: 64 y.o.  Encounter Provider:  Tiago Srivastava Jr, MD      Chief Complaint: Follow-up coronary artery disease      HPI  Niranjan Peacock is a 64 y.o. male past medical history of coronary artery disease status post PCI LAD , dyslipidemia with complaints of myalgias with statins and Repatha, hypertension who presents for routine follow-up.      Last clinic visit note: Previously followed by Dr. Mancilla and I have reviewed all pertinent electronic health records.  Patient was initially seen in 2017 and found to have severe disease in the proximal LAD with drug-eluting stents placed.  He has experienced severe nighttime calf cramping which he associated with statin use however he states that in working through pernicious anemia and being off of statins he still had those nighttime cramping sensations which persisted until he started taking magnesium supplementation.  Blood pressure has been elevated lately and Dr. Marr added losartan and doubled the dose of hydrochlorothiazide.  Since then he notes an increase in orthostatic symptoms.  No episodes of severe dizziness leading to near syncope or syncope but he does notice increased frequency and intensity of symptoms.  He rides his bicycle about 8 miles 3-4 times per week with no chest pain or shortness of air that is above baseline.  He notes some mild chronic stable dyspnea on exertion with stairs only but states that he can walk as far as he wants to after he is up 2 or 3 flights of stairs without stopping.  No orthopnea, PND or edema.  Occasional palpitations but he feels that this is much better controlled after the augmentation of antihypertensive regimen.  Social family history is reviewed and is not pertinent to this clinic visit.  Blood pressure and heart rate are well controlled today in clinic.    He just got his first shot of Leqvio and we await near future lipid  "surveillance.  He notes increased shortness of breath with exertion since June.  No angina.  He is having some rash that he only experienced after being started on amlodipine.  Blood pressure and heart rate are well-controlled today in clinic.  He does walk his normal route and rides his bicycle daily.  No orthopnea, PND or edema.  No palpitations, dizziness or syncope.    The following portions of the patient's history were reviewed and updated as appropriate: allergies, current medications, past family history, past medical history, past social history, past surgical history and problem list.      Review of Systems   Constitutional: Negative for chills and fever.   HENT:  Negative for hoarse voice and sore throat.    Eyes:  Negative for double vision and photophobia.   Cardiovascular:  Positive for dyspnea on exertion and palpitations. Negative for chest pain, leg swelling, near-syncope, orthopnea, paroxysmal nocturnal dyspnea and syncope.   Respiratory:  Negative for cough and wheezing.    Skin:  Negative for poor wound healing and rash.   Musculoskeletal:  Negative for arthritis and joint swelling.   Gastrointestinal:  Negative for bloating, abdominal pain, hematemesis and hematochezia.   Neurological:  Positive for dizziness. Negative for focal weakness.   Psychiatric/Behavioral:  Negative for depression and suicidal ideas.        OBJECTIVE:   Vital Signs  Vitals:    11/06/24 1054   BP: 118/62   Pulse: 57   SpO2: 98%     Estimated body mass index is 27.68 kg/m² as calculated from the following:    Height as of this encounter: 188 cm (74\").    Weight as of this encounter: 97.8 kg (215 lb 9.6 oz).    Vitals reviewed.   Constitutional:       Appearance: Healthy appearance. Not in distress.   Neck:      Vascular: No JVR. JVD normal.   Pulmonary:      Effort: Pulmonary effort is normal.      Breath sounds: Normal breath sounds. No wheezing. No rhonchi. No rales.   Chest:      Chest wall: Not tender to palpatation. "   Cardiovascular:      PMI at left midclavicular line. Normal rate. Regular rhythm. Normal S1. Normal S2.       Murmurs: There is no murmur.      No gallop.  No click. No rub.   Pulses:     Intact distal pulses.   Edema:     Peripheral edema absent.   Abdominal:      General: Bowel sounds are normal.      Palpations: Abdomen is soft.      Tenderness: There is no abdominal tenderness.   Musculoskeletal: Normal range of motion.         General: No tenderness. Skin:     General: Skin is warm and dry.   Neurological:      General: No focal deficit present.      Mental Status: Alert and oriented to person, place and time.       Procedures  Lipid Panel          6/4/2024    07:58 9/5/2024    08:30   Lipid Panel   Total Cholesterol  185    Total Cholesterol 198     Triglycerides 120  128    HDL Cholesterol 41  42    VLDL Cholesterol 22  23    LDL Cholesterol  135  120    LDL/HDL Ratio  2.80         BUN   Date Value Ref Range Status   06/04/2024 12 8 - 27 mg/dL Final   12/06/2023 9 8 - 23 mg/dL Final     Creatinine   Date Value Ref Range Status   06/04/2024 1.10 0.76 - 1.27 mg/dL Final   12/06/2023 0.98 0.76 - 1.27 mg/dL Final   07/27/2022 1.00 0.60 - 1.30 mg/dL Final     Comment:     Serial Number: [id number]:  [id number]     Potassium   Date Value Ref Range Status   06/04/2024 4.1 3.5 - 5.2 mmol/L Final   12/06/2023 4.0 3.5 - 5.2 mmol/L Final     ALT (SGPT)   Date Value Ref Range Status   06/04/2024 18 0 - 44 IU/L Final   12/04/2023 21 1 - 41 U/L Final     AST (SGOT)   Date Value Ref Range Status   06/04/2024 22 0 - 40 IU/L Final   12/04/2023 19 1 - 40 U/L Final           ASSESSMENT:     64-year-old male with ASCVD presents for routine follow-up      PLAN OF CARE:     Coronary artery disease without angina -continue aspirin.  He could not tolerate atorvastatin or for PCSK9 inhibitor.  Recently started on Leqvio.  Will follow future lipid surveillance.  Exertional dyspnea -etiology uncertain.  He is going to send me a twice  "daily blood pressure log and we will check an echocardiogram.  If symptoms progress we will consider further testing.  Normal nuclear stress study in November 2023  Syncope -no further episodes of syncope and postural dizziness improved.  Recommend compression stockings, increase fluid intake and routine exercise.  Hyperlipidemia -as above    Return to clinic 6 months             Discharge Medications            Accurate as of November 6, 2024 10:57 AM. If you have any questions, ask your nurse or doctor.                Continue These Medications        Instructions Start Date   aspirin 81 MG tablet   81 mg, Oral, Daily      B-D INSULIN SYRINGE 1CC/25GX1\" 25G X 1\" 1 ML misc  Generic drug: Insulin Syringe-Needle U-100   USE TO ADMINISTER B-12 INJECTIONS AS DIRECTED      CHOLESTEROL DEFENSE PO   Take  by mouth.      cyanocobalamin 1000 MCG/ML injection   INJECT 1 MILLILITER INTO THE APPROPRIATE MUSCLE AS DIRECTED BY PRESCRIBER EVERY 14 DAYS      fexofenadine 60 MG tablet  Commonly known as: ALLEGRA   60 mg, Oral, Daily PRN      LEQVIO SC   Inject  under the skin into the appropriate area as directed.      losartan 25 MG tablet  Commonly known as: COZAAR   TAKE ONE TABLET BY MOUTH DAILY FOR BLOOD PRESSURE      MAGNESIUM PO   Take  by mouth. Every other day      multivitamin tablet tablet  Commonly known as: THERAGRAN   1 tablet, Daily      nitroglycerin 0.4 MG SL tablet  Commonly known as: NITROSTAT   0.4 mg, Sublingual, Every 5 Minutes PRN, Take no more than 3 doses in 15 minutes.      tamsulosin 0.4 MG capsule 24 hr capsule  Commonly known as: FLOMAX   0.4 mg, Oral, Daily      vitamin D3 125 MCG (5000 UT) capsule capsule   5,000 Units, Daily             Stop These Medications      amLODIPine 2.5 MG tablet  Commonly known as: NORVASC  Stopped by: Tiago Srivastava              Thank you for allowing me to participate in the care of your patient,      Sincerely,   Tiago Srivastava MD  [facility] " [facility]  11/06/24  10:57 EST

## 2024-11-15 ENCOUNTER — TELEPHONE (OUTPATIENT)
Dept: CARDIOLOGY | Facility: CLINIC | Age: 64
End: 2024-11-15
Payer: COMMERCIAL

## 2024-11-15 NOTE — TELEPHONE ENCOUNTER
Left a message to increase losartan to 50 mg.  Patient is having higher blood pressure off of amlodipine and we are holding the medication to see if it has anything to do with his rash.  I have asked him to check in on Monday with blood pressure control.

## 2024-11-21 ENCOUNTER — HOSPITAL ENCOUNTER (OUTPATIENT)
Dept: CARDIOLOGY | Facility: HOSPITAL | Age: 64
Discharge: HOME | End: 2024-11-21
Admitting: INTERNAL MEDICINE
Payer: COMMERCIAL

## 2024-11-21 VITALS
HEART RATE: 50 BPM | SYSTOLIC BLOOD PRESSURE: 144 MMHG | BODY MASS INDEX: 29.12 KG/M2 | DIASTOLIC BLOOD PRESSURE: 80 MMHG | WEIGHT: 215 LBS | HEIGHT: 72 IN

## 2024-11-21 DIAGNOSIS — I25.10 CORONARY ARTERY DISEASE INVOLVING NATIVE CORONARY ARTERY OF NATIVE HEART WITHOUT ANGINA PECTORIS: ICD-10-CM

## 2024-11-21 LAB
AORTIC ARCH: 3 CM
ASCENDING AORTA: 3.5 CM
BH CV ECHO LEFT VENTRICLE GLOBAL LONGITUDINAL STRAIN: -22.1 %
BH CV ECHO MEAS - ACS: 1.97 CM
BH CV ECHO MEAS - AO MAX PG: 6 MMHG
BH CV ECHO MEAS - AO MEAN PG: 3 MMHG
BH CV ECHO MEAS - AO ROOT DIAM: 3.4 CM
BH CV ECHO MEAS - AO V2 MAX: 122 CM/SEC
BH CV ECHO MEAS - AO V2 VTI: 29.6 CM
BH CV ECHO MEAS - AVA(I,D): 2.9 CM2
BH CV ECHO MEAS - EDV(CUBED): 74.1 ML
BH CV ECHO MEAS - EDV(MOD-SP2): 115 ML
BH CV ECHO MEAS - EDV(MOD-SP4): 112 ML
BH CV ECHO MEAS - EF(MOD-BP): 64.6 %
BH CV ECHO MEAS - EF(MOD-SP2): 63.5 %
BH CV ECHO MEAS - EF(MOD-SP4): 62.5 %
BH CV ECHO MEAS - ESV(CUBED): 10.7 ML
BH CV ECHO MEAS - ESV(MOD-SP2): 42 ML
BH CV ECHO MEAS - ESV(MOD-SP4): 42 ML
BH CV ECHO MEAS - FS: 47.6 %
BH CV ECHO MEAS - IVS/LVPW: 1 CM
BH CV ECHO MEAS - IVSD: 1.3 CM
BH CV ECHO MEAS - LAT PEAK E' VEL: 8.6 CM/SEC
BH CV ECHO MEAS - LV DIASTOLIC VOL/BSA (35-75): 50 CM2
BH CV ECHO MEAS - LV MASS(C)D: 200.6 GRAMS
BH CV ECHO MEAS - LV MAX PG: 4.2 MMHG
BH CV ECHO MEAS - LV MEAN PG: 2 MMHG
BH CV ECHO MEAS - LV SYSTOLIC VOL/BSA (12-30): 18.7 CM2
BH CV ECHO MEAS - LV V1 MAX: 102 CM/SEC
BH CV ECHO MEAS - LV V1 VTI: 27.7 CM
BH CV ECHO MEAS - LVIDD: 4.2 CM
BH CV ECHO MEAS - LVIDS: 2.2 CM
BH CV ECHO MEAS - LVOT AREA: 3.1 CM2
BH CV ECHO MEAS - LVOT DIAM: 2 CM
BH CV ECHO MEAS - LVPWD: 1.3 CM
BH CV ECHO MEAS - MED PEAK E' VEL: 8.9 CM/SEC
BH CV ECHO MEAS - MR MAX PG: 74.1 MMHG
BH CV ECHO MEAS - MR MAX VEL: 430.3 CM/SEC
BH CV ECHO MEAS - MV A DUR: 0.12 SEC
BH CV ECHO MEAS - MV A MAX VEL: 69 CM/SEC
BH CV ECHO MEAS - MV DEC SLOPE: 407.7 CM/SEC2
BH CV ECHO MEAS - MV DEC TIME: 0.18 SEC
BH CV ECHO MEAS - MV E MAX VEL: 96.8 CM/SEC
BH CV ECHO MEAS - MV E/A: 1.4
BH CV ECHO MEAS - MV MAX PG: 4.1 MMHG
BH CV ECHO MEAS - MV MEAN PG: 1.43 MMHG
BH CV ECHO MEAS - MV P1/2T: 67.3 MSEC
BH CV ECHO MEAS - MV V2 VTI: 41.6 CM
BH CV ECHO MEAS - MVA(P1/2T): 3.3 CM2
BH CV ECHO MEAS - MVA(VTI): 2.09 CM2
BH CV ECHO MEAS - PA ACC TIME: 0.11 SEC
BH CV ECHO MEAS - PA V2 MAX: 95 CM/SEC
BH CV ECHO MEAS - PI END-D VEL: 116.7 CM/SEC
BH CV ECHO MEAS - PULM A REVS DUR: 0.13 SEC
BH CV ECHO MEAS - PULM A REVS VEL: 27.6 CM/SEC
BH CV ECHO MEAS - PULM DIAS VEL: 47.2 CM/SEC
BH CV ECHO MEAS - PULM S/D: 1.6
BH CV ECHO MEAS - PULM SYS VEL: 75.7 CM/SEC
BH CV ECHO MEAS - QP/QS: 0.88
BH CV ECHO MEAS - RAP SYSTOLE: 3 MMHG
BH CV ECHO MEAS - RV MAX PG: 2.07 MMHG
BH CV ECHO MEAS - RV V1 MAX: 72 CM/SEC
BH CV ECHO MEAS - RV V1 VTI: 17.7 CM
BH CV ECHO MEAS - RVOT DIAM: 2.35 CM
BH CV ECHO MEAS - RVSP: 17.2 MMHG
BH CV ECHO MEAS - SUP REN AO DIAM: 1.7 CM
BH CV ECHO MEAS - SV(LVOT): 86.8 ML
BH CV ECHO MEAS - SV(MOD-SP2): 73 ML
BH CV ECHO MEAS - SV(MOD-SP4): 70 ML
BH CV ECHO MEAS - SV(RVOT): 76.6 ML
BH CV ECHO MEAS - SVI(LVOT): 38.7 ML/M2
BH CV ECHO MEAS - SVI(MOD-SP2): 32.6 ML/M2
BH CV ECHO MEAS - SVI(MOD-SP4): 31.2 ML/M2
BH CV ECHO MEAS - TAPSE (>1.6): 2.09 CM
BH CV ECHO MEAS - TR MAX PG: 14.2 MMHG
BH CV ECHO MEAS - TR MAX VEL: 188.7 CM/SEC
BH CV ECHO MEASUREMENTS AVERAGE E/E' RATIO: 11.06
BH CV XLRA - RV BASE: 2.9 CM
BH CV XLRA - RV LENGTH: 7.8 CM
BH CV XLRA - RV MID: 2.41 CM
BH CV XLRA - TDI S': 13.1 CM/SEC
LEFT ATRIUM VOLUME INDEX: 24.5 ML/M2
SINUS: 3.2 CM
STJ: 3.1 CM

## 2024-11-21 PROCEDURE — 93356 MYOCRD STRAIN IMG SPCKL TRCK: CPT

## 2024-11-21 PROCEDURE — 93306 TTE W/DOPPLER COMPLETE: CPT

## 2024-12-05 ENCOUNTER — PATIENT MESSAGE (OUTPATIENT)
Dept: CARDIOLOGY | Facility: CLINIC | Age: 64
End: 2024-12-05
Payer: COMMERCIAL

## 2024-12-23 ENCOUNTER — OFFICE VISIT (OUTPATIENT)
Dept: INTERNAL MEDICINE | Facility: CLINIC | Age: 64
End: 2024-12-23
Payer: COMMERCIAL

## 2024-12-23 VITALS
HEART RATE: 70 BPM | SYSTOLIC BLOOD PRESSURE: 142 MMHG | WEIGHT: 218.4 LBS | BODY MASS INDEX: 29.58 KG/M2 | OXYGEN SATURATION: 97 % | TEMPERATURE: 97.5 F | DIASTOLIC BLOOD PRESSURE: 70 MMHG | HEIGHT: 72 IN

## 2024-12-23 DIAGNOSIS — L30.9 ECZEMA, UNSPECIFIED TYPE: ICD-10-CM

## 2024-12-23 DIAGNOSIS — I10 ESSENTIAL HYPERTENSION: Primary | ICD-10-CM

## 2024-12-23 PROCEDURE — 99214 OFFICE O/P EST MOD 30 MIN: CPT | Performed by: FAMILY MEDICINE

## 2024-12-23 RX ORDER — TRIAMCINOLONE ACETONIDE 0.25 MG/G
1 CREAM TOPICAL 2 TIMES DAILY PRN
Qty: 80 G | Refills: 3 | Status: SHIPPED | OUTPATIENT
Start: 2024-12-23

## 2024-12-23 RX ORDER — LOSARTAN POTASSIUM 25 MG/1
TABLET ORAL
Qty: 120 TABLET | Refills: 3 | Status: SHIPPED | OUTPATIENT
Start: 2024-12-23

## 2024-12-23 NOTE — PROGRESS NOTES
Date of Encounter: 2024  Patient: Niranjan Peacock,  1960    Subjective   History of Presenting Illness  Chief complaint: Elevated blood pressure    Patient recently started on amlodipine for improved blood pressure control, and while this worked and did improve his headaches and tinnitus he developed cracking of his fingertips and flaking around his eyelids and symptoms similar to previous amlodipine related reaction. since stopping this medication he has been taking losartan 50 mg daily with systolic blood pressures greater than 150 at home.  His headache and tinnitus have returned as well.    He does have a rash on his right medial thigh, anterior left thigh, intermittently itchy, has not really been putting steroids on it regularly.  He has seen an allergist in the past who recommended allergy shots due to his eczema but he did not pursue.  He is unsure if leqvio is related to this problem.    The following portions of the patient's history were reviewed and updated as appropriate: allergies, current medications, past family history, past medical history, past social history, past surgical history and problem list.    Patient Active Problem List   Diagnosis    Coronary artery disease involving native coronary artery of native heart without angina pectoris    Multiple thyroid nodules, benign per  US    Pernicious anemia    H/O multiple allergies    Herniated nucleus pulposus, L3-4 right    Right lumbar radiculopathy    Pterygium of right eye    Degenerative arthritis of cervical spine    Essential hypertension    History of DVT (deep vein thrombosis)    Eczema of eyelid    Chronic eczema of hand    Mixed hyperlipidemia    CHANTEL (obstructive sleep apnea)    Statin and ezetimibe intolerance    Stenosis of left carotid artery    Elevated PSA, less than 10 ng/ml    Prediabetes    BPH without obstruction/lower urinary tract symptoms     Past Medical History:   Diagnosis Date    Allergic     Angina at  rest 08/16/2019    Added automatically from request for surgery [id number]    B12 deficiency anemia     Cataract 2023    Had cataract surgery both eyes    Cellulitis of right lower extremity 12/24/2020    Chest pain     Chronic fatigue 03/09/2016    Coronary artery disease involving native coronary artery of native heart without angina pectoris 08/10/2017    Deep vein thrombosis     Headache     History of blood clots     blood clot found in right lung     HL (hearing loss)     Hypertension     Mixed hyperlipidemia 02/02/2022    Multiple thyroid nodules     Multiple thyroid nodules     CHANTEL (obstructive sleep apnea)     Pernicious anemia     Pulmonary embolism     2015    Stenosis of left carotid artery 03/28/2023    Syncope     2 yrs ago one time     Past Surgical History:   Procedure Laterality Date    CARDIAC CATHETERIZATION N/A 08/03/2017    Procedure: Coronary angiography;  Surgeon: Cynthia Farley MD;  Location: [facility] CATH INVASIVE LOCATION;  Service:     CARDIAC CATHETERIZATION  08/03/2017    Procedure: Functional Flow [place];  Surgeon: Cynthia Farley MD;  Location: [facility] CATH INVASIVE LOCATION;  Service:     CARDIAC CATHETERIZATION N/A 08/03/2017    Procedure: Stent YI coronary;  Surgeon: Cynthia Farley MD;  Location: [facility] CATH INVASIVE LOCATION;  Service:     CARDIAC CATHETERIZATION N/A 08/03/2017    Procedure: Left ventriculography;  Surgeon: Cynthia Farley MD;  Location: [facility] CATH INVASIVE LOCATION;  Service:     CARDIAC CATHETERIZATION N/A 08/03/2017    Procedure: Left Heart Cath;  Surgeon: Cynthia Farley MD;  Location: [facility] CATH INVASIVE LOCATION;  Service:     CARDIAC CATHETERIZATION N/A 08/20/2019    Procedure: Left Heart Cath;  Surgeon: Mulugeta Ac MD;  Location: [facility] CATH INVASIVE LOCATION;  Service: Cardiology    CARDIAC CATHETERIZATION N/A 08/20/2019    Procedure: Coronary angiography;  Surgeon: Mulugeta Ac MD;  Location: [facility] CATH INVASIVE LOCATION;  Service: Cardiology     "CARDIAC CATHETERIZATION N/A 08/20/2019    Procedure: Left ventriculography;  Surgeon: Mulugeta Ac MD;  Location: [facility] CATH INVASIVE LOCATION;  Service: Cardiology    CARDIAC CATHETERIZATION  8/20/2019    COLONOSCOPY  MMVI    NORMAL.    COLONOSCOPY      FINE NEEDLE ASPIRATION  12/2015    Left thyroid nodule.  [place] category II.  Dr. Socrates Snachez     Family History   Problem Relation Age of Onset    Heart disease Other     Hypertension Other     Thyroid disease Other     Heart disease Father     Prostate cancer Father     Hypertension Father     Stroke Father     Heart disease Mother     Hypertension Mother     Heart disease Brother     Hypertension Brother     Thyroid disease Sister     Heart disease Brother     Hypertension Brother        Current Outpatient Medications:     aspirin 81 MG tablet, Take 1 tablet by mouth Daily., Disp: 30 tablet, Rfl: 11    B-D INSULIN SYRINGE 1CC/25GX1\" 25G X 1\" 1 ML misc, USE TO ADMINISTER B-12 INJECTIONS AS DIRECTED, Disp: 25 each, Rfl: 3    Cholecalciferol (VITAMIN D3) 5000 units capsule capsule, Take 1 capsule by mouth Daily., Disp: , Rfl:     cyanocobalamin 1000 MCG/ML injection, INJECT 1 MILLILITER INTO THE APPROPRIATE MUSCLE AS DIRECTED BY PRESCRIBER EVERY 14 DAYS, Disp: 6 mL, Rfl: 3    fexofenadine (ALLEGRA) 60 MG tablet, Take 1 tablet by mouth Daily As Needed (allergies)., Disp: 90 tablet, Rfl: 3    Inclisiran Sodium (LEQVIO SC), Inject  under the skin into the appropriate area as directed., Disp: , Rfl:     losartan (COZAAR) 25 MG tablet, TAKE FOUR TABLET BY MOUTH DAILY FOR BLOOD PRESSURE, Disp: 120 tablet, Rfl: 3    MAGNESIUM PO, Take  by mouth. Every other day, Disp: , Rfl:     Multiple Vitamin (MULTI VITAMIN DAILY PO), Take 1 tablet by mouth Daily., Disp: , Rfl:     nitroglycerin (NITROSTAT) 0.4 MG SL tablet, Place 1 tablet under the tongue Every 5 (Five) Minutes As Needed for Chest Pain for up to 1 dose. Take no more than 3 doses in 15 minutes., Disp: 100 " "tablet, Rfl: 3    Nutritional Supplements (CHOLESTEROL DEFENSE PO), Take  by mouth., Disp: , Rfl:     tamsulosin (FLOMAX) 0.4 MG capsule 24 hr capsule, TAKE 1 CAPSULE BY MOUTH DAILY, Disp: 30 capsule, Rfl: 5    triamcinolone (KENALOG) 0.025 % cream, Apply 1 Application topically to the appropriate area as directed 2 (Two) Times a Day As Needed (eczema)., Disp: 80 g, Rfl: 3  Allergies   Allergen Reactions    Lisinopril Shortness Of Breath     Rash also     Hydrochlorothiazide Other (See Comments)     Lightheadedness    Repatha [Evolocumab] Myalgia    Simvastatin Myalgia    Amlodipine Myalgia     Muscle aches , rash    Hydralazine Rash    Valsartan Rash     muscle aches     Social History     Tobacco Use    Smoking status: Never     Passive exposure: Never    Smokeless tobacco: Never    Tobacco comments:     caffeine use - 2 cups coffee dailyl    Vaping Use    Vaping status: Never Used   Substance Use Topics    Alcohol use: Yes     Comment: Occasional drinks    Drug use: No          Objective   Physical Exam  Vitals:    12/23/24 1557   BP: 142/70   BP Location: Left arm   Patient Position: Sitting   Cuff Size: Adult   Pulse: 70   Temp: 97.5 °F (36.4 °C)   TempSrc: Infrared   SpO2: 97%   Weight: 99.1 kg (218 lb 6.4 oz)   Height: 182.9 cm (72\")     Body mass index is 29.62 kg/m².    Constitutional: NAD.  Integumentary: Macular, erythematous and flaking rash on the right medial thigh.  Not tender to palpation.  Not raised.  Left anterior thigh shows a more diffuse eczematous plaque  Psychiatric: Normal affect. Normal thought content.     Assessment & Plan   Assessment and Plan  Pleasant 64-year-old male with coronary artery disease, hyperlipidemia antilipid medication intolerance, hypertension, left carotid artery stenosis, benign thyroid nodules, pernicious anemia, eczema, chronic headaches, osteoarthritis of the spine with right lumbar radiculopathy, CHANTEL not on CPAP, who presents with the following:     Diagnoses and " all orders for this visit:    1. Essential hypertension (Primary): Increase losartan to up to 100 mg daily with goal blood pressure less than 140 systolic/less than 80 diastolic, although could stretch to JNC 8 guidelines if needed.  No more amlodipine due to side effects with trial in 2022 and this year.  -     losartan (COZAAR) 25 MG tablet; TAKE FOUR TABLET BY MOUTH DAILY FOR BLOOD PRESSURE  Dispense: 120 tablet; Refill: 3    2. Eczema, unspecified type: Recommend topical triamcinolone for eczema, consider allergy shots if interested in the future  -     triamcinolone (KENALOG) 0.025 % cream; Apply 1 Application topically to the appropriate area as directed 2 (Two) Times a Day As Needed (eczema).  Dispense: 80 g; Refill: 3    Brian Marr MD  Family Medicine  O: [phone]    Disclaimer: Parts of this note were dictated by speech recognition. Minor errors in transcription may be present. Please call if questions.

## 2025-01-17 ENCOUNTER — INFUSION (OUTPATIENT)
Dept: ONCOLOGY | Facility: HOSPITAL | Age: 65
End: 2025-01-17
Payer: COMMERCIAL

## 2025-01-17 ENCOUNTER — LAB (OUTPATIENT)
Dept: OTHER | Facility: HOSPITAL | Age: 65
End: 2025-01-17
Payer: COMMERCIAL

## 2025-01-17 DIAGNOSIS — I25.10 CORONARY ARTERY DISEASE INVOLVING NATIVE CORONARY ARTERY OF NATIVE HEART WITHOUT ANGINA PECTORIS: ICD-10-CM

## 2025-01-17 DIAGNOSIS — Z78.9 STATIN INTOLERANCE: ICD-10-CM

## 2025-01-17 DIAGNOSIS — I25.10 CORONARY ARTERY DISEASE INVOLVING NATIVE CORONARY ARTERY OF NATIVE HEART WITHOUT ANGINA PECTORIS: Primary | ICD-10-CM

## 2025-01-17 DIAGNOSIS — E78.2 MIXED HYPERLIPIDEMIA: ICD-10-CM

## 2025-01-17 LAB
CHOLEST SERPL-MCNC: 145 MG/DL (ref 0–200)
HDLC SERPL-MCNC: 48 MG/DL (ref 40–60)
LDLC SERPL CALC-MCNC: 79 MG/DL (ref 0–100)
LDLC/HDLC SERPL: 1.61 {RATIO}
TRIGL SERPL-MCNC: 99 MG/DL (ref 0–150)
VLDLC SERPL-MCNC: 18 MG/DL (ref 5–40)

## 2025-01-17 PROCEDURE — 25010000002 INCLISIRAN SODIUM 284 MG/1.5ML SOLUTION PREFILLED SYRINGE: Performed by: NURSE PRACTITIONER

## 2025-01-17 PROCEDURE — 80061 LIPID PANEL: CPT | Performed by: NURSE PRACTITIONER

## 2025-01-17 PROCEDURE — 96372 THER/PROPH/DIAG INJ SC/IM: CPT

## 2025-01-17 RX ADMIN — INCLISIRAN 284 MG: 284 INJECTION, SOLUTION SUBCUTANEOUS at 09:03

## 2025-01-17 NOTE — PROGRESS NOTES
Please let patient know that his LDL is currently at goal at 79!  Total cholesterol 145.  Would recommend continuing with inclisiran

## 2025-01-17 NOTE — PROGRESS NOTES
Reviewed results and recommendations with Niranjan Peacock.  Patient verbalized understanding of results and recommendations.    Thank you,  Katy LIU RN  Triage Nurse [facility]  01/17/25    12:46 EST

## 2025-01-21 ENCOUNTER — PATIENT MESSAGE (OUTPATIENT)
Dept: INTERNAL MEDICINE | Facility: CLINIC | Age: 65
End: 2025-01-21
Payer: COMMERCIAL

## 2025-01-21 DIAGNOSIS — I10 ESSENTIAL HYPERTENSION: ICD-10-CM

## 2025-01-27 RX ORDER — LOSARTAN POTASSIUM 100 MG/1
100 TABLET ORAL DAILY
Qty: 60 TABLET | Refills: 1 | Status: SHIPPED | OUTPATIENT
Start: 2025-01-27

## 2025-01-27 RX ORDER — FUROSEMIDE 20 MG/1
20 TABLET ORAL DAILY
Qty: 60 TABLET | Refills: 1 | Status: SHIPPED | OUTPATIENT
Start: 2025-01-27

## 2025-02-07 ENCOUNTER — TELEPHONE (OUTPATIENT)
Dept: CARDIOLOGY | Facility: CLINIC | Age: 65
End: 2025-02-07

## 2025-02-07 NOTE — TELEPHONE ENCOUNTER
"Caller: Niranjan Peacock \"Nain\"    Relationship to patient: Self    Best call back number: [phone]    Patient is needing: PT CALLING TO GET SOONER APPT. NOV IS 04.28.2025. PT IS EXPERIENCING SOB ON EXERTION AND ISSUES WITH HIS BP MEDICATION. PT STATES HE DOES NOT WANT TO SEE TAMMY COBB. PLEASE CALL TO SCHEDULE AN APPT.           "

## 2025-02-11 ENCOUNTER — OFFICE VISIT (OUTPATIENT)
Dept: INTERNAL MEDICINE | Facility: CLINIC | Age: 65
End: 2025-02-11
Payer: COMMERCIAL

## 2025-02-11 VITALS
TEMPERATURE: 97.8 F | HEIGHT: 72 IN | SYSTOLIC BLOOD PRESSURE: 134 MMHG | BODY MASS INDEX: 29.72 KG/M2 | OXYGEN SATURATION: 97 % | WEIGHT: 219.4 LBS | HEART RATE: 57 BPM | DIASTOLIC BLOOD PRESSURE: 62 MMHG

## 2025-02-11 DIAGNOSIS — H66.92 LEFT OTITIS MEDIA, UNSPECIFIED OTITIS MEDIA TYPE: Primary | ICD-10-CM

## 2025-02-11 PROCEDURE — 99214 OFFICE O/P EST MOD 30 MIN: CPT | Performed by: FAMILY MEDICINE

## 2025-02-11 RX ORDER — CEFDINIR 300 MG/1
300 CAPSULE ORAL 2 TIMES DAILY
Qty: 14 CAPSULE | Refills: 0 | Status: SHIPPED | OUTPATIENT
Start: 2025-02-11 | End: 2025-02-18

## 2025-02-11 NOTE — PROGRESS NOTES
Date of Encounter: 2025  Patient: Niranjan Peacock,  1960    Subjective   History of Presenting Illness  Chief complaint: Hypertension    Patient presents for follow-up hypertension.  He did take furosemide for about 1 week and was having chest and head discomfort, elevated blood pressure.  He has since stopped that medication and his blood pressure has normalized to less than 150/less than 90.  Concordant with in office measurements today.  He has been noticing a bit of an earache similar to previous otitis media in  bilateral ear ache but left greater than right, he has had some nasal congestion and cough.  No sick contacts at home.  No fever or chills.    The following portions of the patient's history were reviewed and updated as appropriate: allergies, current medications, past family history, past medical history, past social history, past surgical history and problem list.    Patient Active Problem List   Diagnosis    Coronary artery disease involving native coronary artery of native heart without angina pectoris    Multiple thyroid nodules, benign per  US    Pernicious anemia    H/O multiple allergies    Herniated nucleus pulposus, L3-4 right    Right lumbar radiculopathy    Pterygium of right eye    Degenerative arthritis of cervical spine    Essential hypertension    History of DVT (deep vein thrombosis)    Eczema of eyelid    Chronic eczema of hand    Mixed hyperlipidemia    CHANTEL (obstructive sleep apnea)    Statin and ezetimibe intolerance    Stenosis of left carotid artery    Elevated PSA, less than 10 ng/ml    Prediabetes    BPH without obstruction/lower urinary tract symptoms     Past Medical History:   Diagnosis Date    Allergic     Angina at rest 2019    Added automatically from request for surgery [id number]    B12 deficiency anemia     Cataract     Had cataract surgery both eyes    Cellulitis of right lower extremity 2020    Chest pain     Chronic fatigue 2016     Coronary artery disease involving native coronary artery of native heart without angina pectoris 08/10/2017    Deep vein thrombosis     Headache     History of blood clots     blood clot found in right lung     HL (hearing loss)     Hypertension     Mixed hyperlipidemia 02/02/2022    Multiple thyroid nodules     Multiple thyroid nodules     CHANTEL (obstructive sleep apnea)     Pernicious anemia     Pulmonary embolism     2015    Stenosis of left carotid artery 03/28/2023    Syncope     2 yrs ago one time     Past Surgical History:   Procedure Laterality Date    CARDIAC CATHETERIZATION N/A 08/03/2017    Procedure: Coronary angiography;  Surgeon: Cynthia Farley MD;  Location: [facility] CATH INVASIVE LOCATION;  Service:     CARDIAC CATHETERIZATION  08/03/2017    Procedure: Functional Flow Mc[place];  Surgeon: Cynthia Farley MD;  Location: [facility] CATH INVASIVE LOCATION;  Service:     CARDIAC CATHETERIZATION N/A 08/03/2017    Procedure: Stent YI coronary;  Surgeon: Cynthia Farley MD;  Location: [facility] CATH INVASIVE LOCATION;  Service:     CARDIAC CATHETERIZATION N/A 08/03/2017    Procedure: Left ventriculography;  Surgeon: Cynthia Farley MD;  Location: [facility] CATH INVASIVE LOCATION;  Service:     CARDIAC CATHETERIZATION N/A 08/03/2017    Procedure: Left Heart Cath;  Surgeon: Cynthia Farley MD;  Location: [facility] CATH INVASIVE LOCATION;  Service:     CARDIAC CATHETERIZATION N/A 08/20/2019    Procedure: Left Heart Cath;  Surgeon: Mulugeta Ac MD;  Location: [facility] CATH INVASIVE LOCATION;  Service: Cardiology    CARDIAC CATHETERIZATION N/A 08/20/2019    Procedure: Coronary angiography;  Surgeon: Mulugeta Ac MD;  Location: [facility] CATH INVASIVE LOCATION;  Service: Cardiology    CARDIAC CATHETERIZATION N/A 08/20/2019    Procedure: Left ventriculography;  Surgeon: Mulugeta Ac MD;  Location: [facility] CATH INVASIVE LOCATION;  Service: Cardiology    CARDIAC CATHETERIZATION  8/20/2019    COLONOSCOPY  MMVI    NORMAL.     "COLONOSCOPY      FINE NEEDLE ASPIRATION  12/2015    Left thyroid nodule.  [place] category II.  Dr. Socrates Sanchez     Family History   Problem Relation Age of Onset    Heart disease Other     Hypertension Other     Thyroid disease Other     Heart disease Father     Prostate cancer Father     Hypertension Father     Stroke Father     Heart disease Mother     Hypertension Mother     Heart disease Brother     Hypertension Brother     Thyroid disease Sister     Heart disease Brother     Hypertension Brother        Current Outpatient Medications:     aspirin 81 MG tablet, Take 1 tablet by mouth Daily., Disp: 30 tablet, Rfl: 11    B-D INSULIN SYRINGE 1CC/25GX1\" 25G X 1\" 1 ML misc, USE TO ADMINISTER B-12 INJECTIONS AS DIRECTED, Disp: 25 each, Rfl: 3    Cholecalciferol (VITAMIN D3) 5000 units capsule capsule, Take 1 capsule by mouth Daily., Disp: , Rfl:     cyanocobalamin 1000 MCG/ML injection, INJECT 1 MILLILITER INTO THE APPROPRIATE MUSCLE AS DIRECTED BY PRESCRIBER EVERY 14 DAYS, Disp: 6 mL, Rfl: 3    fexofenadine (ALLEGRA) 60 MG tablet, Take 1 tablet by mouth Daily As Needed (allergies)., Disp: 90 tablet, Rfl: 3    Inclisiran Sodium (LEQVIO SC), Inject  under the skin into the appropriate area as directed., Disp: , Rfl:     losartan (COZAAR) 100 MG tablet, Take 1 tablet by mouth Daily., Disp: 60 tablet, Rfl: 1    MAGNESIUM PO, Take  by mouth. Every other day, Disp: , Rfl:     Multiple Vitamin (MULTI VITAMIN DAILY PO), Take 1 tablet by mouth Daily., Disp: , Rfl:     nitroglycerin (NITROSTAT) 0.4 MG SL tablet, Place 1 tablet under the tongue Every 5 (Five) Minutes As Needed for Chest Pain for up to 1 dose. Take no more than 3 doses in 15 minutes., Disp: 100 tablet, Rfl: 3    Nutritional Supplements (CHOLESTEROL DEFENSE PO), Take  by mouth., Disp: , Rfl:     tamsulosin (FLOMAX) 0.4 MG capsule 24 hr capsule, TAKE 1 CAPSULE BY MOUTH DAILY, Disp: 30 capsule, Rfl: 5    triamcinolone (KENALOG) 0.025 % cream, Apply 1 Application " "topically to the appropriate area as directed 2 (Two) Times a Day As Needed (eczema)., Disp: 80 g, Rfl: 3    cefdinir (OMNICEF) 300 MG capsule, Take 1 capsule by mouth 2 (Two) Times a Day for 7 days., Disp: 14 capsule, Rfl: 0  Allergies   Allergen Reactions    Lisinopril Shortness Of Breath     Rash also     Hydrochlorothiazide Other (See Comments)     Lightheadedness    Repatha [Evolocumab] Myalgia    Simvastatin Myalgia    Amlodipine Myalgia     Muscle aches , rash    Hydralazine Rash    Valsartan Rash     muscle aches     Social History     Tobacco Use    Smoking status: Never     Passive exposure: Never    Smokeless tobacco: Never    Tobacco comments:     caffeine use - 2 cups coffee dailyl    Vaping Use    Vaping status: Never Used   Substance Use Topics    Alcohol use: Yes     Comment: Occasional drinks    Drug use: No          Objective   Physical Exam  Vitals:    02/11/25 1050   BP: 134/62   BP Location: Left arm   Patient Position: Sitting   Cuff Size: Large Adult   Pulse: 57   Temp: 97.8 °F (36.6 °C)   TempSrc: Infrared   SpO2: 97%   Weight: 99.5 kg (219 lb 6.4 oz)   Height: 182.9 cm (72\")     Body mass index is 29.76 kg/m².    Constitutional: NAD.  Ear, nose, mouth, throat: Has bilateral otitis media with the left ear suggesting purulent fluid, right ear more serous.  Pulmonary: CTA b/l. Good effort.  Psychiatric: Normal affect. Normal thought content.     Assessment & Plan   Assessment and Plan  Pleasant 65-year-old male with coronary artery disease, hyperlipidemia antilipid medication intolerance, hypertension, left carotid artery stenosis, benign thyroid nodules, pernicious anemia, eczema, chronic headaches, osteoarthritis of the spine with right lumbar radiculopathy, CHANTEL not on CPAP, who presents with the following:     Diagnoses and all orders for this visit:    1. Left otitis media, unspecified otitis media type (Primary)  -     cefdinir (OMNICEF) 300 MG capsule; Take 1 capsule by mouth 2 (Two) " Times a Day for 7 days.  Dispense: 14 capsule; Refill: 0    We will continue losartan for his blood pressure regimen.  Backup medication will be nebivolol if we need to add something in the future.  He has frequent medication related side effects.  Did discuss weight loss for his blood pressure and he is going to try to do that over the next year with better food choices and improved exercise    Brian Marr MD  Family Medicine  O: [phone]    Disclaimer: Parts of this note were dictated by speech recognition. Minor errors in transcription may be present. Please call if questions.

## 2025-02-21 ENCOUNTER — OFFICE VISIT (OUTPATIENT)
Dept: CARDIOLOGY | Facility: CLINIC | Age: 65
End: 2025-02-21
Payer: COMMERCIAL

## 2025-02-21 VITALS
BODY MASS INDEX: 29.93 KG/M2 | SYSTOLIC BLOOD PRESSURE: 138 MMHG | DIASTOLIC BLOOD PRESSURE: 58 MMHG | WEIGHT: 221 LBS | HEART RATE: 58 BPM | OXYGEN SATURATION: 99 % | HEIGHT: 72 IN

## 2025-02-21 DIAGNOSIS — E78.2 MIXED HYPERLIPIDEMIA: ICD-10-CM

## 2025-02-21 DIAGNOSIS — I25.10 CORONARY ARTERY DISEASE INVOLVING NATIVE CORONARY ARTERY OF NATIVE HEART WITHOUT ANGINA PECTORIS: Primary | ICD-10-CM

## 2025-02-21 DIAGNOSIS — I10 ESSENTIAL HYPERTENSION: ICD-10-CM

## 2025-02-21 NOTE — ASSESSMENT & PLAN NOTE
Denies angina or dyspnea  Continue the following regimen:  81 mg aspirin per day  Losartan 100 mg/day

## 2025-02-21 NOTE — PROGRESS NOTES
CARDIOLOGY        Patient Name: Niranjan Peacock  :1960  Age: 65 y.o.  Primary Cardiologist: Tiago Srivastava MD  Encounter Provider:  GREG Troy    Date of Service: 25    CHIEF COMPLAINT / REASON FOR OFFICE VISIT     Follow-up (CAD/BP)      HISTORY OF PRESENT ILLNESS       HPI  Niranjan Peacock is a 65 y.o. male who presents today for evaluation blood pressure..     Pt has a  history significant for CAD status post PCI to the LAD in , dyslipidemia with statin intolerance, HTN.    Patient initially evaluated care with cardiology in 2017 was found to have severe disease in the proximal LAD with YI placed.  Patient has had statin intolerance with multiple statins as well as Repatha causing nighttime cramping, add Zetia.  Patient has previously been on HCTZ for hypertension however he experienced episodes of syncope and this was subsequently discontinued.    Patient called the office 2024 with complaints of increased fatigue and generalized weakness.  Reports that he is also experiencing episodes of chest pressure.  Patient also endorsed increased dyspnea with exertion.  Patient was placed on schedule for appointment.    Patient was evaluated by me 2024 where he was experiencing intermittent episodes of chest pain, at that time amlodipine was added to his regimen.    Patient notes that he was experiencing increased BP readings with symptoms of tinnitus and lightheadedness, his PCP started him on furosemide and symptoms worsened, he has since stopped this medication and tinnitus has resolved.  He continues to have elevated BP readings.  He was previously on amlodipine and he unfortunelty had to stop this medication secondary to rash and myalgias, he is uncertain if the adverse effects of myalgias were from amlodipine or something else.     The following portions of the patient's history were reviewed and updated as appropriate: allergies, current medications, past family history,  "past medical history, past social history, past surgical history and problem list.      VITAL SIGNS     Visit Vitals  /58 (BP Location: Left arm, Patient Position: Sitting, Cuff Size: Adult)   Pulse 58   Ht 182.9 cm (72\")   Wt 100 kg (221 lb)   SpO2 99%   BMI 29.97 kg/m²         Wt Readings from Last 3 Encounters:   02/21/25 100 kg (221 lb)   02/11/25 99.5 kg (219 lb 6.4 oz)   12/23/24 99.1 kg (218 lb 6.4 oz)     Body mass index is 29.97 kg/m².      REVIEW OF SYSTEMS     Review of Systems   Constitutional: Negative for chills, fever, weight gain and weight loss.   Cardiovascular:  Negative for leg swelling.   Respiratory:  Negative for cough, snoring and wheezing.    Hematologic/Lymphatic: Negative for bleeding problem. Does not bruise/bleed easily.   Skin:  Negative for color change.   Musculoskeletal:  Negative for falls, joint pain and myalgias.   Gastrointestinal:  Negative for melena.   Genitourinary:  Negative for hematuria.   Neurological:  Negative for excessive daytime sleepiness.   Psychiatric/Behavioral:  Negative for depression. The patient is not nervous/anxious.            PHYSICAL EXAMINATION     Constitutional:       Appearance: Normal appearance. Well-developed.   Eyes:      Conjunctiva/sclera: Conjunctivae normal.   Neck:      Vascular: No carotid bruit.   Pulmonary:      Effort: Pulmonary effort is normal.      Breath sounds: Normal breath sounds.   Cardiovascular:      Normal rate. Regular rhythm. Normal S1. Normal S2.       Murmurs: There is no murmur.      No gallop.  No click. No rub.   Edema:     Peripheral edema absent.   Musculoskeletal: Normal range of motion. Skin:     General: Skin is warm and dry.   Neurological:      Mental Status: Alert and oriented to person, place, and time.      GCS: GCS eye subscore is 4. GCS verbal subscore is 5. GCS motor subscore is 6.   Psychiatric:         Speech: Speech normal.         Behavior: Behavior normal.         Thought Content: Thought content " normal.         Judgment: Judgment normal.           REVIEWED DATA       ECG 12 Lead    Date/Time: 2/21/2025 11:32 AM  Performed by: Astrid Barber APRN    Authorized by: Astrid Barber APRN  Comparison: compared with previous ECG from 8/12/2024  Rhythm: sinus rhythm  Rate: normal  BPM: 58  Conduction: conduction normal  ST Segments: ST segments normal  T Waves: T waves normal  QRS axis: normal    Clinical impression: normal ECG          Cardiac Procedures:  Cardiac catheterization 8/20/2019.  Normal left main.  LAD has a stent in the midportion which is patent.  Smooth 40-50% lesion in the mid distal LAD just beyond the stent.  Diagonals are free of disease.  Circumflex with 20-30% proximal disease, 20% mid circumflex disease.  RCA is a dominant vessel.  Diffuse 10% luminal irregularities in the proximal and midportion.  LARRY is high rising vessel with 40% origin lesion.  Echocardiogram 2/17/2020.  LVEF 61%.  Diastolic function is normal.  No evidence of PFO.  No valvular stenosis or insufficiency.  Myocardial perfusion stress test 7/22/2021.  Normal myocardial perfusion study without evidence of ischemia.  Horizontal ST segment depression 1.5 mm noted during stress, beginning at 6 minutes of stress.  Zio monitor 2/18/2022.  Unremarkable event recorder.  No evidence of atrial fibrillation.  Echocardiogram 2/17/2022.  LVEF 65%.  Mild hypertrophy.  Myocardial perfusion stress test 11/7/2023.  Normal myocardial perfusion study without evidence of ischemia.  Impressions consistent with a lower study.  Compared to prior study the study reveals no change.    Lipid Panel          6/4/2024    07:58 9/5/2024    08:30 1/17/2025    08:46   Lipid Panel   Total Cholesterol  185  145    Total Cholesterol 198      Triglycerides 120  128  99    HDL Cholesterol 41  42  48    VLDL Cholesterol 22  23  18    LDL Cholesterol  135  120  79    LDL/HDL Ratio  2.80  1.61        Lab Results   Component Value Date     06/04/2024      12/06/2023    K 4.1 06/04/2024    K 4.0 12/06/2023     06/04/2024     12/06/2023    CO2 21 06/04/2024    CO2 26.3 12/06/2023    BUN 12 06/04/2024    BUN 9 12/06/2023    CREATININE 1.10 06/04/2024    CREATININE 0.98 12/06/2023    EGFRIFNONA 85 10/13/2021    EGFRIFNONA 75 08/10/2021    EGFRIFAFRI 79 04/29/2021    EGFRIFAFRI 98 10/16/2020    GLUCOSE 100 (H) 06/04/2024    GLUCOSE 105 (H) 12/06/2023    CALCIUM 9.2 06/04/2024    CALCIUM 8.6 12/06/2023    ALBUMIN 4.4 06/04/2024    ALBUMIN 4.1 12/04/2023    BILITOT 0.6 06/04/2024    BILITOT 0.7 12/04/2023    AST 22 06/04/2024    AST 19 12/04/2023    ALT 18 06/04/2024    ALT 21 12/04/2023     Lab Results   Component Value Date    WBC 5.4 06/04/2024    WBC 4.41 12/06/2023    HGB 14.7 06/04/2024    HGB 12.3 (L) 12/06/2023    HCT 44.5 06/04/2024    HCT 35.7 (L) 12/06/2023    MCV 95 06/04/2024    MCV 91.5 12/06/2023     06/04/2024     12/06/2023     Lab Results   Component Value Date    PROBNP 64.6 02/17/2020    PROBNP 64.3 08/16/2019     Lab Results   Component Value Date    CKTOTAL 94 05/27/2022    TROPONINT <0.010 08/16/2022     Lab Results   Component Value Date    TSH 2.450 06/04/2024    TSH 2.240 05/30/2023             ASSESSMENT & PLAN     Diagnoses and all orders for this visit:    1. Coronary artery disease involving native coronary artery of native heart without angina pectoris (Primary)  Overview:  Cardiac catheterization 8/20/2019.  Normal left main.  LAD has a stent in the midportion which is patent.  Smooth 40-50% lesion in the mid distal LAD just beyond the stent.  Diagonals are free of disease.  Circumflex with 20-30% proximal disease, 20% mid circumflex disease.  RCA is a dominant vessel.  Diffuse 10% luminal irregularities in the proximal and midportion.  LARRY is high rising vessel with 40% origin lesion.    Assessment & Plan:  Denies angina or dyspnea  Continue the following regimen:  81 mg aspirin per day  Losartan 100  "mg/day      2. Essential hypertension  Overview:  Losartan 100 mg/day  Add nebivolol 2.5 mg/day    Assessment & Plan:  Hypertension is borderline  Medication changes per orders.  Dietary sodium restriction.  Regular aerobic exercise.  Ambulatory blood pressure monitoring.  Blood pressure will be reassessedin 4 weeks.      3. Mixed hyperlipidemia  Overview:  Lipid Panel          6/4/2024    07:58 9/5/2024    08:30 1/17/2025    08:46   Lipid Panel   Total Cholesterol  185  145    Total Cholesterol 198      Triglycerides 120  128  99    HDL Cholesterol 41  42  48    VLDL Cholesterol 22  23  18    LDL Cholesterol  135  120  79    LDL/HDL Ratio  2.80  1.61          Assessment & Plan:  Doing well with inclisiran injections            Future Appointments         Provider Department Center    3/21/2025 9:20 AM Astrid Barber APRN [facility] CARDIOLOGY [facility]    6/10/2025 7:45 AM (Arrive by 7:30 AM) Brian Marr MD [facility] PRIMARY CARE [facility]    7/23/2025 9:00 AM REFERRED INJECTION CHAIR EP [facility] INFUSION [facility]              MEDICATIONS         Discharge Medications            Accurate as of February 21, 2025  1:08 PM. If you have any questions, ask your nurse or doctor.                Continue These Medications        Instructions Start Date   aspirin 81 MG tablet   81 mg, Oral, Daily      B-D INSULIN SYRINGE 1CC/25GX1\" 25G X 1\" 1 ML misc  Generic drug: Insulin Syringe-Needle U-100   USE TO ADMINISTER B-12 INJECTIONS AS DIRECTED      cyanocobalamin 1000 MCG/ML injection   INJECT 1 MILLILITER INTO THE APPROPRIATE MUSCLE AS DIRECTED BY PRESCRIBER EVERY 14 DAYS      fexofenadine 60 MG tablet  Commonly known as: ALLEGRA   60 mg, Oral, Daily PRN      LEQVIO SC   Inject  under the skin into the appropriate area as directed.      losartan 100 MG tablet  Commonly known as: COZAAR   100 mg, Oral, Daily      MAGNESIUM PO   Take  by mouth. Every other day      multivitamin " tablet tablet  Commonly known as: THERAGRAN   1 tablet, Daily      nitroglycerin 0.4 MG SL tablet  Commonly known as: NITROSTAT   0.4 mg, Sublingual, Every 5 Minutes PRN, Take no more than 3 doses in 15 minutes.      tamsulosin 0.4 MG capsule 24 hr capsule  Commonly known as: FLOMAX   0.4 mg, Oral, Daily      triamcinolone 0.025 % cream  Commonly known as: KENALOG   1 Application, Topical, 2 Times Daily PRN      vitamin D3 125 MCG (5000 UT) capsule capsule   5,000 Units, Daily                   **Dragon Disclaimer:   Much of this encounter note is an electronic transcription/translation of spoken language to printed text. The electronic translation of spoken language may permit erroneous, or at times, nonsensical words or phrases to be inadvertently transcribed. Although I have reviewed the note for such errors, some may still exist.

## 2025-02-21 NOTE — ASSESSMENT & PLAN NOTE
Hypertension is borderline  Medication changes per orders.  Dietary sodium restriction.  Regular aerobic exercise.  Ambulatory blood pressure monitoring.  Blood pressure will be reassessedin 4 weeks.   ambulatory

## 2025-02-25 RX ORDER — NEBIVOLOL 2.5 MG/1
2.5 TABLET ORAL DAILY
Qty: 30 TABLET | Refills: 11 | Status: SHIPPED | OUTPATIENT
Start: 2025-02-25

## 2025-04-04 DIAGNOSIS — E53.8 B12 DEFICIENCY: ICD-10-CM

## 2025-04-04 DIAGNOSIS — D51.0 PERNICIOUS ANEMIA: ICD-10-CM

## 2025-04-04 RX ORDER — CYANOCOBALAMIN 1000 UG/ML
INJECTION, SOLUTION INTRAMUSCULAR; SUBCUTANEOUS
Qty: 6 ML | Refills: 3 | OUTPATIENT
Start: 2025-04-04

## 2025-04-11 DIAGNOSIS — D51.0 PERNICIOUS ANEMIA: ICD-10-CM

## 2025-04-11 DIAGNOSIS — E53.8 B12 DEFICIENCY: ICD-10-CM

## 2025-04-14 RX ORDER — CYANOCOBALAMIN 1000 UG/ML
INJECTION, SOLUTION INTRAMUSCULAR; SUBCUTANEOUS
Qty: 6 ML | Refills: 0 | Status: SHIPPED | OUTPATIENT
Start: 2025-04-14

## 2025-04-17 ENCOUNTER — TELEPHONE (OUTPATIENT)
Dept: CARDIOLOGY | Age: 65
End: 2025-04-17
Payer: COMMERCIAL

## 2025-04-17 NOTE — TELEPHONE ENCOUNTER
"  Caller: Niranjan Peacock \"Nain\"    Relationship: Self    Best call back number: [phone]    What is the best time to reach you: ANYTIME    Who are you requesting to speak with (clinical staff, provider,  specific staff member): CLINICAL      What was the call regarding: PT IS HAVING A REACTION TO LOSARTAN 100 MG, HE IS HAVING  RASH AROUND EYES .AND FINGER TIPS CRACK OPEN AND BLEED.  DOES HE NEED TO BE SEEN IN OFFICE?  PLEASE CALL TO DISCUSS      "

## 2025-04-17 NOTE — TELEPHONE ENCOUNTER
Looks like patient also had this reaction to amlodipine per your office note back in February, but he wasn't for sure if it was because of the amlodipine or something else.     Arlen Neri RN  Triage [facility]

## 2025-04-17 NOTE — TELEPHONE ENCOUNTER
04/17/25  14:13 EDT    The only way to really know if the losartan is actually causing the rash would be for patient to take a holiday from this and see if rash improves.  During this time he will need to keep a close eye on his blood pressure.  He may need to go up on his Bystolic dosing to 5 mg/day if his blood pressure rises with discontinuing of this medication.      GREG Ang  [facility]

## 2025-04-17 NOTE — TELEPHONE ENCOUNTER
Notified patient of results/recommendations. Patient verbalized understanding. He said he will give us an update in a couple weeks on how the rash is and his BP.     Arlen Neri RN  Triage [facility]

## 2025-04-18 ENCOUNTER — TELEPHONE (OUTPATIENT)
Dept: CARDIOLOGY | Age: 65
End: 2025-04-18

## 2025-04-18 NOTE — TELEPHONE ENCOUNTER
"Caller: Niranjan Peacock \"Nain\"    Relationship to patient: Self    Best call back number: [phone]     Additional notes: PT CALLED YESTERDAY ABOUT CONCERNS WITH MEDICINE AND RECENT BP ISSUES - HE WAS ADVISED OF SUGGESTIONS AND IS DOING THEM BUT IS ASKING FOR SOONER APPT THAN SEPT - PT STATES THEY WOULD LIKE TO DISCUSS ISSUES WITH PROVIDER -   "

## 2025-04-28 ENCOUNTER — TELEPHONE (OUTPATIENT)
Dept: CARDIOLOGY | Age: 65
End: 2025-04-28
Payer: COMMERCIAL

## 2025-04-28 RX ORDER — AMLODIPINE BESYLATE 2.5 MG/1
2.5 TABLET ORAL DAILY
Qty: 30 TABLET | Refills: 11 | Status: SHIPPED | OUTPATIENT
Start: 2025-04-28

## 2025-04-28 NOTE — TELEPHONE ENCOUNTER
I am so sorry, he stopped the losartan 10 days ago. Not the amlodipine. Amlodipine was stopped in February. Then Losartan was stopped 10 days ago d/t his symptoms.     Arlen Neri RN  Triage [facility]

## 2025-04-28 NOTE — TELEPHONE ENCOUNTER
"Patient says that he has been off the amlodipine for about 10 days. He said he doesn't know if it is medications causing these problems or if it is something else going on. I told him it may be something else since he seems to be having these issues with both amlodipine and losartan. I asked if he would be agreeable to restart the amlodipine if that is what you recommended and he said he is \"grasping at straws\" right now, so if we think it would be in his best interest he would be agreeable.     Arlen Neri RN  Triage [facility]   "

## 2025-04-28 NOTE — TELEPHONE ENCOUNTER
Called and left VM. Will continue to try to reach patient. HUB transfer call to triage.     Arlen Neri RN  Triage [facility]

## 2025-04-28 NOTE — TELEPHONE ENCOUNTER
If he stopped the amlodipine in February and continues to have the same symptoms that he thought were from the amlodipine then he should restart the amlodipine.

## 2025-04-28 NOTE — TELEPHONE ENCOUNTER
04/28/25  08:54 EDT    How many days has he been off of the losartan?  He complained about similar symptoms when he was on amlodipine and it was discontinued and symptoms seem to be continuing.  He may need to restart his amlodipine since that was clearly not the culprit.      Shirlene Barber, GREG  [facility]

## 2025-04-28 NOTE — TELEPHONE ENCOUNTER
He was supposed to have stopped the amlodipine back in February, is he just now stopping it?  It is going to be very hard to determine a cause and affect if he is stopping both amlodipine and losartan near the same time.

## 2025-04-28 NOTE — TELEPHONE ENCOUNTER
Patient was suppose to see Dr. Srivastava today, but due to him being out, appointment got rescheduled for 5/5. Patient was transferred to triage because he was recently on losartan, but he began to have redness around his eyes, fingers were splitting open, and his legs were achy. He was instructed to stop this medication a couple weeks ago and he said symptoms are improving some, but he is still having these same issues.     He is also complaining of a constant hallow pressure in his chest the last couples days. He feels like he can't get a deep breath. He denies any weight gain. He does have some swelling in his legs. BP 140s-150s/60s. HR 52-53. Med list has been reviewed and is up to date.     Arlen Neri RN  [place] [facility]

## 2025-04-28 NOTE — TELEPHONE ENCOUNTER
Patient called back.  He is agreeable to start amlodipine. He no longer has it.  He is not sure what dose he was on.  Per chart review was 2.5 mg.    He wants that sent in to [facility] in [place].  Can I send that in?    Shirlene Bobby RN  [facility] Triage  04/28/25 11:02 EDT

## 2025-04-28 NOTE — TELEPHONE ENCOUNTER
Niranjan CASTILLO Ayush returned call.  Reviewed with him that amlodipine 2.5 mg daily has been sent to his pharmacy.  He verbalized understanding.  Patient inquired if he is to continue nebivolol.  Review of telephone encounter only notes patient stopping losartan and restarting amlodipine.  Explained to patient provider is just adding back amlodipine and that there was no mention of stopping nebivolol.    Please let me know if there is anything else you would like me to do for this patient.    Thank you,  Katy LIU RN  Triage Nurse [facility] [place]  04/28/25   12:14 EDT

## 2025-05-05 ENCOUNTER — OFFICE VISIT (OUTPATIENT)
Dept: CARDIOLOGY | Age: 65
End: 2025-05-05
Payer: COMMERCIAL

## 2025-05-05 VITALS
DIASTOLIC BLOOD PRESSURE: 72 MMHG | HEART RATE: 51 BPM | WEIGHT: 220 LBS | BODY MASS INDEX: 28.23 KG/M2 | HEIGHT: 74 IN | OXYGEN SATURATION: 98 % | SYSTOLIC BLOOD PRESSURE: 130 MMHG

## 2025-05-05 DIAGNOSIS — I10 ESSENTIAL HYPERTENSION: Primary | ICD-10-CM

## 2025-05-05 DIAGNOSIS — I25.10 CORONARY ARTERY DISEASE INVOLVING NATIVE CORONARY ARTERY OF NATIVE HEART WITHOUT ANGINA PECTORIS: ICD-10-CM

## 2025-05-05 DIAGNOSIS — E78.2 MIXED HYPERLIPIDEMIA: ICD-10-CM

## 2025-05-05 PROCEDURE — 99214 OFFICE O/P EST MOD 30 MIN: CPT | Performed by: INTERNAL MEDICINE

## 2025-05-05 RX ORDER — DIAPER,BRIEF,INFANT-TODD,DISP
EACH MISCELLANEOUS AS NEEDED
COMMUNITY
Start: 2025-04-23

## 2025-05-05 NOTE — PROGRESS NOTES
[facility]      Patient Name: Niranjan Peacock  :1960  Age: 65 y.o.  Encounter Provider:  Tiago Srivastava Jr, MD      Chief Complaint: Follow-up coronary artery disease      HPI  Niranjan Peacock is a 65 y.o. male past medical history of coronary artery disease status post PCI LAD , dyslipidemia with complaints of myalgias with statins and Repatha, hypertension who presents for routine follow-up.      Last clinic visit note: Previously followed by Dr. Mancilla and I have reviewed all pertinent electronic health records.  Patient was initially seen in 2017 and found to have severe disease in the proximal LAD with drug-eluting stents placed.  He has experienced severe nighttime calf cramping which he associated with statin use however he states that in working through pernicious anemia and being off of statins he still had those nighttime cramping sensations which persisted until he started taking magnesium supplementation.  Blood pressure has been elevated lately and Dr. Marr added losartan and doubled the dose of hydrochlorothiazide.  Since then he notes an increase in orthostatic symptoms.  No episodes of severe dizziness leading to near syncope or syncope but he does notice increased frequency and intensity of symptoms.  He rides his bicycle about 8 miles 3-4 times per week with no chest pain or shortness of air that is above baseline.  He notes some mild chronic stable dyspnea on exertion with stairs only but states that he can walk as far as he wants to after he is up 2 or 3 flights of stairs without stopping.  No orthopnea, PND or edema.  Occasional palpitations but he feels that this is much better controlled after the augmentation of antihypertensive regimen.  Social family history is reviewed and is not pertinent to this clinic visit.  Blood pressure and heart rate are well controlled today in clinic.    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  He is almost certain this is related to the amlodipine that was restarted after he saw Shirlene Barber.  Blood pressure seems well-controlled.  He denies palpitations, dizziness syncope.  He is having increasing complaints of orthopnea and exertional dyspnea.  He has had some fairly typical sounding chest pain recently.  No cardiac complaints at time of interview.    The following portions of the patient's history were reviewed and updated as appropriate: allergies, current medications, past family history, past medical history, past social history, past surgical history and problem list.      Review of Systems   Constitutional: Negative for chills and fever.   HENT:  Negative for hoarse voice and sore throat.    Eyes:  Negative for double vision and photophobia.   Cardiovascular:  Positive for dyspnea on exertion and palpitations. Negative for chest pain, leg swelling, near-syncope, orthopnea, paroxysmal nocturnal dyspnea and syncope.   Respiratory:  Negative for cough and wheezing.    Skin:  Negative for poor wound healing and rash.   Musculoskeletal:  Negative for arthritis and joint swelling.   Gastrointestinal:  Negative for bloating, abdominal pain, hematemesis and hematochezia.   Neurological:  Positive for dizziness. Negative for focal weakness.   Psychiatric/Behavioral:  Negative for depression and suicidal ideas.        OBJECTIVE:   Vital Signs  Vitals:    05/05/25 0838   BP: 130/72   Pulse: 51   SpO2: 98%     Estimated body mass index is 28.25 kg/m² as calculated from the following:    Height as of this encounter: 188 cm (74\").    Weight as of this encounter: 99.8 kg (220 lb).    Vitals reviewed.   Constitutional:       Appearance: Healthy appearance. Not in distress.   Neck:      Vascular: No JVR. JVD normal.   Pulmonary:      Effort: Pulmonary effort is normal.      Breath sounds: Normal breath sounds. No wheezing. No rhonchi. No rales.   Chest:      " Chest wall: Not tender to palpatation.   Cardiovascular:      PMI at left midclavicular line. Normal rate. Regular rhythm. Normal S1. Normal S2.       Murmurs: There is no murmur.      No gallop.  No click. No rub.   Pulses:     Intact distal pulses.   Edema:     Peripheral edema absent.   Abdominal:      General: Bowel sounds are normal.      Palpations: Abdomen is soft.      Tenderness: There is no abdominal tenderness.   Musculoskeletal: Normal range of motion.         General: No tenderness. Skin:     General: Skin is warm and dry.   Neurological:      General: No focal deficit present.      Mental Status: Alert and oriented to person, place and time.         Procedures  Lipid Panel          6/4/2024    07:58 9/5/2024    08:30 1/17/2025    08:46   Lipid Panel   Total Cholesterol  185  145    Total Cholesterol 198      Triglycerides 120  128  99    HDL Cholesterol 41  42  48    VLDL Cholesterol 22  23  18    LDL Cholesterol  135  120  79    LDL/HDL Ratio  2.80  1.61         BUN   Date Value Ref Range Status   06/04/2024 12 8 - 27 mg/dL Final   12/06/2023 9 8 - 23 mg/dL Final     Creatinine   Date Value Ref Range Status   06/04/2024 1.10 0.76 - 1.27 mg/dL Final   12/06/2023 0.98 0.76 - 1.27 mg/dL Final   07/27/2022 1.00 0.60 - 1.30 mg/dL Final     Comment:     Serial Number: [id number]:  [id number]     Potassium   Date Value Ref Range Status   06/04/2024 4.1 3.5 - 5.2 mmol/L Final   12/06/2023 4.0 3.5 - 5.2 mmol/L Final     ALT (SGPT)   Date Value Ref Range Status   06/04/2024 18 0 - 44 IU/L Final   12/04/2023 21 1 - 41 U/L Final     AST (SGOT)   Date Value Ref Range Status   06/04/2024 22 0 - 40 IU/L Final   12/04/2023 19 1 - 40 U/L Final           ASSESSMENT:     65-year-old male with ASCVD presents for routine follow-up      PLAN OF CARE:     Coronary artery disease without angina -concerning clinical story and we will plan for left and right heart catheterization as we have done a few stress studies and echoes  "over the last 2 years with no significant cardiac pathology identified.  Exertional dyspnea -etiology uncertain.  Testing as above  Syncope -no further episodes of syncope and postural dizziness improved.  Recommend compression stockings, increase fluid intake and routine exercise.  Hyperlipidemia -as above    Return to clinic 6 months.  His PCP has left the [facility] system.  I made a referral to Dr. Anne.             Discharge Medications            Accurate as of May 5, 2025  8:40 AM. If you have any questions, ask your nurse or doctor.                Continue These Medications        Instructions Start Date   amLODIPine 2.5 MG tablet  Commonly known as: NORVASC   2.5 mg, Oral, Daily      aspirin 81 MG tablet   81 mg, Oral, Daily      B-D INSULIN SYRINGE 1CC/25GX1\" 25G X 1\" 1 ML misc  Generic drug: Insulin Syringe-Needle U-100   USE TO ADMINISTER B-12 INJECTIONS AS DIRECTED      cyanocobalamin 1000 MCG/ML injection   INJECT 1 MILLILITER INTO THE APPROPRIATE MUSCLE AS DIRECTED BY PRESCRIBER EVERY 14 DAYS      fexofenadine 60 MG tablet  Commonly known as: ALLEGRA   60 mg, Oral, Daily PRN      hydrocortisone 0.5 % cream   As Needed      LEQVIO SC   Inject  under the skin into the appropriate area as directed.      MAGNESIUM PO   Take  by mouth. Every other day      multivitamin tablet tablet  Commonly known as: THERAGRAN   1 tablet, Daily      nebivolol 2.5 MG tablet  Commonly known as: Bystolic   2.5 mg, Oral, Daily      nitroglycerin 0.4 MG SL tablet  Commonly known as: NITROSTAT   0.4 mg, Sublingual, Every 5 Minutes PRN, Take no more than 3 doses in 15 minutes.      tamsulosin 0.4 MG capsule 24 hr capsule  Commonly known as: FLOMAX   0.4 mg, Oral, Daily      triamcinolone 0.025 % cream  Commonly known as: KENALOG   1 Application, Topical, 2 Times Daily PRN      vitamin D3 125 MCG (5000 UT) capsule capsule   5,000 Units, Daily               Thank you for allowing me to participate in the care of your " patient,      Sincerely,   Tiago Srivastava MD  [facility]  05/05/25  08:40 EDT

## 2025-05-05 NOTE — H&P (VIEW-ONLY)
[facility]      Patient Name: Niranjan Peacock  :1960  Age: 65 y.o.  Encounter Provider:  Tiago Srivastava Jr, MD      Chief Complaint: Follow-up coronary artery disease      HPI  Niranjan Peacock is a 65 y.o. male past medical history of coronary artery disease status post PCI LAD , dyslipidemia with complaints of myalgias with statins and Repatha, hypertension who presents for routine follow-up.      Last clinic visit note: Previously followed by Dr. Mancilla and I have reviewed all pertinent electronic health records.  Patient was initially seen in 2017 and found to have severe disease in the proximal LAD with drug-eluting stents placed.  He has experienced severe nighttime calf cramping which he associated with statin use however he states that in working through pernicious anemia and being off of statins he still had those nighttime cramping sensations which persisted until he started taking magnesium supplementation.  Blood pressure has been elevated lately and Dr. Marr added losartan and doubled the dose of hydrochlorothiazide.  Since then he notes an increase in orthostatic symptoms.  No episodes of severe dizziness leading to near syncope or syncope but he does notice increased frequency and intensity of symptoms.  He rides his bicycle about 8 miles 3-4 times per week with no chest pain or shortness of air that is above baseline.  He notes some mild chronic stable dyspnea on exertion with stairs only but states that he can walk as far as he wants to after he is up 2 or 3 flights of stairs without stopping.  No orthopnea, PND or edema.  Occasional palpitations but he feels that this is much better controlled after the augmentation of antihypertensive regimen.  Social family history is reviewed and is not pertinent to this clinic visit.  Blood pressure and heart rate are well controlled today in clinic.    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  He is almost certain this is related to the amlodipine that was restarted after he saw Shirlene Barber.  Blood pressure seems well-controlled.  He denies palpitations, dizziness syncope.  He is having increasing complaints of orthopnea and exertional dyspnea.  He has had some fairly typical sounding chest pain recently.  No cardiac complaints at time of interview.    The following portions of the patient's history were reviewed and updated as appropriate: allergies, current medications, past family history, past medical history, past social history, past surgical history and problem list.      Review of Systems   Constitutional: Negative for chills and fever.   HENT:  Negative for hoarse voice and sore throat.    Eyes:  Negative for double vision and photophobia.   Cardiovascular:  Positive for dyspnea on exertion and palpitations. Negative for chest pain, leg swelling, near-syncope, orthopnea, paroxysmal nocturnal dyspnea and syncope.   Respiratory:  Negative for cough and wheezing.    Skin:  Negative for poor wound healing and rash.   Musculoskeletal:  Negative for arthritis and joint swelling.   Gastrointestinal:  Negative for bloating, abdominal pain, hematemesis and hematochezia.   Neurological:  Positive for dizziness. Negative for focal weakness.   Psychiatric/Behavioral:  Negative for depression and suicidal ideas.        OBJECTIVE:   Vital Signs  Vitals:    05/05/25 0838   BP: 130/72   Pulse: 51   SpO2: 98%     Estimated body mass index is 28.25 kg/m² as calculated from the following:    Height as of this encounter: 188 cm (74\").    Weight as of this encounter: 99.8 kg (220 lb).    Vitals reviewed.   Constitutional:       Appearance: Healthy appearance. Not in distress.   Neck:      Vascular: No JVR. JVD normal.   Pulmonary:      Effort: Pulmonary effort is normal.      Breath sounds: Normal breath sounds. No wheezing. No rhonchi. No rales.   Chest:      " Chest wall: Not tender to palpatation.   Cardiovascular:      PMI at left midclavicular line. Normal rate. Regular rhythm. Normal S1. Normal S2.       Murmurs: There is no murmur.      No gallop.  No click. No rub.   Pulses:     Intact distal pulses.   Edema:     Peripheral edema absent.   Abdominal:      General: Bowel sounds are normal.      Palpations: Abdomen is soft.      Tenderness: There is no abdominal tenderness.   Musculoskeletal: Normal range of motion.         General: No tenderness. Skin:     General: Skin is warm and dry.   Neurological:      General: No focal deficit present.      Mental Status: Alert and oriented to person, place and time.         Procedures  Lipid Panel          6/4/2024    07:58 9/5/2024    08:30 1/17/2025    08:46   Lipid Panel   Total Cholesterol  185  145    Total Cholesterol 198      Triglycerides 120  128  99    HDL Cholesterol 41  42  48    VLDL Cholesterol 22  23  18    LDL Cholesterol  135  120  79    LDL/HDL Ratio  2.80  1.61         BUN   Date Value Ref Range Status   06/04/2024 12 8 - 27 mg/dL Final   12/06/2023 9 8 - 23 mg/dL Final     Creatinine   Date Value Ref Range Status   06/04/2024 1.10 0.76 - 1.27 mg/dL Final   12/06/2023 0.98 0.76 - 1.27 mg/dL Final   07/27/2022 1.00 0.60 - 1.30 mg/dL Final     Comment:     Serial Number: [id number]:  [id number]     Potassium   Date Value Ref Range Status   06/04/2024 4.1 3.5 - 5.2 mmol/L Final   12/06/2023 4.0 3.5 - 5.2 mmol/L Final     ALT (SGPT)   Date Value Ref Range Status   06/04/2024 18 0 - 44 IU/L Final   12/04/2023 21 1 - 41 U/L Final     AST (SGOT)   Date Value Ref Range Status   06/04/2024 22 0 - 40 IU/L Final   12/04/2023 19 1 - 40 U/L Final           ASSESSMENT:     65-year-old male with ASCVD presents for routine follow-up      PLAN OF CARE:     Coronary artery disease without angina -concerning clinical story and we will plan for left and right heart catheterization as we have done a few stress studies and echoes  "over the last 2 years with no significant cardiac pathology identified.  Exertional dyspnea -etiology uncertain.  Testing as above  Syncope -no further episodes of syncope and postural dizziness improved.  Recommend compression stockings, increase fluid intake and routine exercise.  Hyperlipidemia -as above    Return to clinic 6 months.  His PCP has left the [facility] system.  I made a referral to Dr. Anne.             Discharge Medications            Accurate as of May 5, 2025  8:40 AM. If you have any questions, ask your nurse or doctor.                Continue These Medications        Instructions Start Date   amLODIPine 2.5 MG tablet  Commonly known as: NORVASC   2.5 mg, Oral, Daily      aspirin 81 MG tablet   81 mg, Oral, Daily      B-D INSULIN SYRINGE 1CC/25GX1\" 25G X 1\" 1 ML misc  Generic drug: Insulin Syringe-Needle U-100   USE TO ADMINISTER B-12 INJECTIONS AS DIRECTED      cyanocobalamin 1000 MCG/ML injection   INJECT 1 MILLILITER INTO THE APPROPRIATE MUSCLE AS DIRECTED BY PRESCRIBER EVERY 14 DAYS      fexofenadine 60 MG tablet  Commonly known as: ALLEGRA   60 mg, Oral, Daily PRN      hydrocortisone 0.5 % cream   As Needed      LEQVIO SC   Inject  under the skin into the appropriate area as directed.      MAGNESIUM PO   Take  by mouth. Every other day      multivitamin tablet tablet  Commonly known as: THERAGRAN   1 tablet, Daily      nebivolol 2.5 MG tablet  Commonly known as: Bystolic   2.5 mg, Oral, Daily      nitroglycerin 0.4 MG SL tablet  Commonly known as: NITROSTAT   0.4 mg, Sublingual, Every 5 Minutes PRN, Take no more than 3 doses in 15 minutes.      tamsulosin 0.4 MG capsule 24 hr capsule  Commonly known as: FLOMAX   0.4 mg, Oral, Daily      triamcinolone 0.025 % cream  Commonly known as: KENALOG   1 Application, Topical, 2 Times Daily PRN      vitamin D3 125 MCG (5000 UT) capsule capsule   5,000 Units, Daily               Thank you for allowing me to participate in the care of your " patient,      Sincerely,   Tiago Srivastava MD  [facility]  05/05/25  08:40 EDT

## 2025-05-08 ENCOUNTER — TELEPHONE (OUTPATIENT)
Dept: PEDIATRICS | Facility: OTHER | Age: 65
End: 2025-05-08

## 2025-05-08 ENCOUNTER — LAB (OUTPATIENT)
Dept: LAB | Facility: HOSPITAL | Age: 65
End: 2025-05-08
Payer: COMMERCIAL

## 2025-05-08 DIAGNOSIS — I25.10 CORONARY ARTERY DISEASE INVOLVING NATIVE CORONARY ARTERY OF NATIVE HEART WITHOUT ANGINA PECTORIS: ICD-10-CM

## 2025-05-08 LAB
ANION GAP SERPL CALCULATED.3IONS-SCNC: 8.3 MMOL/L (ref 5–15)
BASOPHILS # BLD AUTO: 0.04 10*3/MM3 (ref 0–0.2)
BASOPHILS NFR BLD AUTO: 0.5 % (ref 0–1.5)
BUN SERPL-MCNC: 13 MG/DL (ref 8–23)
BUN/CREAT SERPL: 11.6 (ref 7–25)
CALCIUM SPEC-SCNC: 9 MG/DL (ref 8.6–10.5)
CHLORIDE SERPL-SCNC: 104 MMOL/L (ref 98–107)
CO2 SERPL-SCNC: 28.7 MMOL/L (ref 22–29)
CREAT SERPL-MCNC: 1.12 MG/DL (ref 0.76–1.27)
DEPRECATED RDW RBC AUTO: 42.9 FL (ref 37–54)
EGFRCR SERPLBLD CKD-EPI 2021: 73.4 ML/MIN/1.73
EOSINOPHIL # BLD AUTO: 0.21 10*3/MM3 (ref 0–0.4)
EOSINOPHIL NFR BLD AUTO: 2.8 % (ref 0.3–6.2)
ERYTHROCYTE [DISTWIDTH] IN BLOOD BY AUTOMATED COUNT: 12.6 % (ref 12.3–15.4)
GLUCOSE SERPL-MCNC: 93 MG/DL (ref 65–99)
HCT VFR BLD AUTO: 39.8 % (ref 37.5–51)
HGB BLD-MCNC: 13.4 G/DL (ref 13–17.7)
IMM GRANULOCYTES # BLD AUTO: 0.03 10*3/MM3 (ref 0–0.05)
IMM GRANULOCYTES NFR BLD AUTO: 0.4 % (ref 0–0.5)
LYMPHOCYTES # BLD AUTO: 1.79 10*3/MM3 (ref 0.7–3.1)
LYMPHOCYTES NFR BLD AUTO: 24.1 % (ref 19.6–45.3)
MCH RBC QN AUTO: 31.4 PG (ref 26.6–33)
MCHC RBC AUTO-ENTMCNC: 33.7 G/DL (ref 31.5–35.7)
MCV RBC AUTO: 93.2 FL (ref 79–97)
MONOCYTES # BLD AUTO: 0.58 10*3/MM3 (ref 0.1–0.9)
MONOCYTES NFR BLD AUTO: 7.8 % (ref 5–12)
NEUTROPHILS NFR BLD AUTO: 4.77 10*3/MM3 (ref 1.7–7)
NEUTROPHILS NFR BLD AUTO: 64.4 % (ref 42.7–76)
NRBC BLD AUTO-RTO: 0 /100 WBC (ref 0–0.2)
PLATELET # BLD AUTO: 258 10*3/MM3 (ref 140–450)
PMV BLD AUTO: 9.6 FL (ref 6–12)
POTASSIUM SERPL-SCNC: 3.8 MMOL/L (ref 3.5–5.2)
RBC # BLD AUTO: 4.27 10*6/MM3 (ref 4.14–5.8)
SODIUM SERPL-SCNC: 141 MMOL/L (ref 136–145)
WBC NRBC COR # BLD AUTO: 7.42 10*3/MM3 (ref 3.4–10.8)

## 2025-05-08 PROCEDURE — 85025 COMPLETE CBC W/AUTO DIFF WBC: CPT

## 2025-05-08 PROCEDURE — 36415 COLL VENOUS BLD VENIPUNCTURE: CPT

## 2025-05-08 PROCEDURE — 80048 BASIC METABOLIC PNL TOTAL CA: CPT

## 2025-05-08 NOTE — TELEPHONE ENCOUNTER
"  Caller: Niranjan Peacock \"Nain\"    Relationship: Self    Best call back number: [phone]    What was the call regarding: Reached out to patient in regards to referral received from Dr. Tiago Srivastava's office to schedule with Dr Anne. Advised patient that he is not accepting new patients at this time and offered other providers but patient declined. Patient has requested a note be sent to office asking it Dr Anne would be willing to accept patient based on the referral placed by Dr Srivastava. Please advise.           "

## 2025-05-08 NOTE — TELEPHONE ENCOUNTER
Dr. Anne is only taking trans patients he is welcome to schedule with Shonna for his care or look elsewhere for a PCP

## 2025-05-09 ENCOUNTER — TELEPHONE (OUTPATIENT)
Dept: CARDIOLOGY | Age: 65
End: 2025-05-09
Payer: COMMERCIAL

## 2025-05-09 NOTE — TELEPHONE ENCOUNTER
CALLED / SPOKE W/ PT. PROCEDURE INSTRUCTIONS SENT VIA [employer] PT VERBALIZED UNDERSTANDING OF PROCEDURE INSTRUCTIONS. LABS DONE 5.8.2025.

## 2025-05-14 ENCOUNTER — APPOINTMENT (OUTPATIENT)
Dept: GENERAL RADIOLOGY | Facility: HOSPITAL | Age: 65
End: 2025-05-14
Payer: COMMERCIAL

## 2025-05-14 ENCOUNTER — APPOINTMENT (OUTPATIENT)
Dept: CARDIOLOGY | Facility: HOSPITAL | Age: 65
End: 2025-05-14
Payer: COMMERCIAL

## 2025-05-14 ENCOUNTER — HOSPITAL ENCOUNTER (INPATIENT)
Facility: HOSPITAL | Age: 65
LOS: 6 days | Discharge: HOME HEALTH | End: 2025-05-20
Attending: INTERNAL MEDICINE | Admitting: INTERNAL MEDICINE
Payer: COMMERCIAL

## 2025-05-14 DIAGNOSIS — Z95.1 S/P CABG (CORONARY ARTERY BYPASS GRAFT): ICD-10-CM

## 2025-05-14 DIAGNOSIS — I25.10 CORONARY ARTERY DISEASE INVOLVING NATIVE CORONARY ARTERY OF NATIVE HEART WITHOUT ANGINA PECTORIS: ICD-10-CM

## 2025-05-14 DIAGNOSIS — I25.118 CORONARY ARTERY DISEASE OF NATIVE ARTERY OF NATIVE HEART WITH STABLE ANGINA PECTORIS: Primary | ICD-10-CM

## 2025-05-14 LAB
ABO GROUP BLD: NORMAL
ABO GROUP BLD: NORMAL
ALBUMIN SERPL-MCNC: 3.8 G/DL (ref 3.5–5.2)
ALBUMIN/GLOB SERPL: 1.7 G/DL
ALP SERPL-CCNC: 83 U/L (ref 39–117)
ALT SERPL W P-5'-P-CCNC: 19 U/L (ref 1–41)
ANION GAP SERPL CALCULATED.3IONS-SCNC: 5.5 MMOL/L (ref 5–15)
APTT PPP: 25.9 SECONDS (ref 22.7–35.4)
ARTERIAL PATENCY WRIST A: POSITIVE
AST SERPL-CCNC: 22 U/L (ref 1–40)
ATMOSPHERIC PRESS: 742.7 MMHG
BASE EXCESS BLDA CALC-SCNC: 2.5 MMOL/L (ref 0–2)
BASOPHILS # BLD AUTO: 0.03 10*3/MM3 (ref 0–0.2)
BASOPHILS NFR BLD AUTO: 0.5 % (ref 0–1.5)
BDY SITE: ABNORMAL
BH CV XLRA MEAS - DIST GSV CALF DIST LEFT: 0.18 CM
BH CV XLRA MEAS - DIST GSV CALF DIST RIGHT: 0.11 CM
BH CV XLRA MEAS - DIST GSV THIGH DIST LEFT: 0.27 CM
BH CV XLRA MEAS - DIST GSV THIGH DIST RIGHT: 0.31 CM
BH CV XLRA MEAS - DIST LSV CALF DIST LEFT: 0.18 CM
BH CV XLRA MEAS - DIST LSV CALF DIST RIGHT: 0.2 CM
BH CV XLRA MEAS - GSV ANKLE DIST LEFT: 0.19 CM
BH CV XLRA MEAS - GSV ANKLE DIST RIGHT: 0.15 CM
BH CV XLRA MEAS - GSV KNEE DIST LEFT: 0.17 CM
BH CV XLRA MEAS - GSV KNEE DIST RIGHT: 0.31 CM
BH CV XLRA MEAS - GSV ORIGIN DIST LEFT: 1.05 CM
BH CV XLRA MEAS - GSV ORIGIN DIST RIGHT: 0.61 CM
BH CV XLRA MEAS - MID GSV CALF LEFT: 0.16 CM
BH CV XLRA MEAS - MID GSV CALF RIGHT: 0.12 CM
BH CV XLRA MEAS - MID GSV THIGH  LEFT: 0.31 CM
BH CV XLRA MEAS - MID GSV THIGH  RIGHT: 0.32 CM
BH CV XLRA MEAS - MID LSV CALF DIST LEFT: 0.21 CM
BH CV XLRA MEAS - MID LSV CALF DIST RIGHT: 0.17 CM
BH CV XLRA MEAS - PROX GSV CALF DIST LEFT: 0.16 CM
BH CV XLRA MEAS - PROX GSV CALF DIST RIGHT: 0.29 CM
BH CV XLRA MEAS - PROX GSV THIGH  LEFT: 0.43 CM
BH CV XLRA MEAS - PROX GSV THIGH  RIGHT: 0.57 CM
BH CV XLRA MEAS - PROX LSV CALF DIST LEFT: 0.31 CM
BH CV XLRA MEAS - PROX LSV CALF DIST RIGHT: 0.19 CM
BH CV XLRA MEAS LEFT DIST CCA EDV: -23.4 CM/SEC
BH CV XLRA MEAS LEFT DIST CCA PSV: -126.5 CM/SEC
BH CV XLRA MEAS LEFT DIST ICA EDV: -22.3 CM/SEC
BH CV XLRA MEAS LEFT DIST ICA PSV: -114.3 CM/SEC
BH CV XLRA MEAS LEFT ICA/CCA RATIO: 1.66
BH CV XLRA MEAS LEFT MID ICA EDV: -52.4 CM/SEC
BH CV XLRA MEAS LEFT MID ICA PSV: -209.8 CM/SEC
BH CV XLRA MEAS LEFT PROX CCA EDV: 24.3 CM/SEC
BH CV XLRA MEAS LEFT PROX CCA PSV: 117 CM/SEC
BH CV XLRA MEAS LEFT PROX ECA EDV: -15.9 CM/SEC
BH CV XLRA MEAS LEFT PROX ECA PSV: -142.7 CM/SEC
BH CV XLRA MEAS LEFT PROX ICA EDV: -47.6 CM/SEC
BH CV XLRA MEAS LEFT PROX ICA PSV: -200 CM/SEC
BH CV XLRA MEAS LEFT PROX SCLA PSV: 207.2 CM/SEC
BH CV XLRA MEAS LEFT VERTEBRAL A EDV: 9.3 CM/SEC
BH CV XLRA MEAS LEFT VERTEBRAL A PSV: 84 CM/SEC
BH CV XLRA MEAS RIGHT DIST CCA EDV: -14.9 CM/SEC
BH CV XLRA MEAS RIGHT DIST CCA PSV: -88.2 CM/SEC
BH CV XLRA MEAS RIGHT DIST ICA EDV: -34.5 CM/SEC
BH CV XLRA MEAS RIGHT DIST ICA PSV: -107.4 CM/SEC
BH CV XLRA MEAS RIGHT ICA/CCA RATIO: 1.37
BH CV XLRA MEAS RIGHT MID ICA EDV: -34 CM/SEC
BH CV XLRA MEAS RIGHT MID ICA PSV: -120.7 CM/SEC
BH CV XLRA MEAS RIGHT PROX CCA EDV: -14.9 CM/SEC
BH CV XLRA MEAS RIGHT PROX CCA PSV: -94.4 CM/SEC
BH CV XLRA MEAS RIGHT PROX ECA EDV: -22 CM/SEC
BH CV XLRA MEAS RIGHT PROX ECA PSV: -171.2 CM/SEC
BH CV XLRA MEAS RIGHT PROX ICA EDV: -36.2 CM/SEC
BH CV XLRA MEAS RIGHT PROX ICA PSV: -118.5 CM/SEC
BH CV XLRA MEAS RIGHT PROX SCLA PSV: 169.8 CM/SEC
BH CV XLRA MEAS RIGHT VERTEBRAL A EDV: -23 CM/SEC
BH CV XLRA MEAS RIGHT VERTEBRAL A PSV: -60.9 CM/SEC
BILIRUB SERPL-MCNC: 0.4 MG/DL (ref 0–1.2)
BILIRUB UR QL STRIP: NEGATIVE
BLD GP AB SCN SERPL QL: NEGATIVE
BUN SERPL-MCNC: 9 MG/DL (ref 8–23)
BUN/CREAT SERPL: 9.8 (ref 7–25)
CALCIUM SPEC-SCNC: 8.8 MG/DL (ref 8.6–10.5)
CHLORIDE SERPL-SCNC: 105 MMOL/L (ref 98–107)
CHOLEST SERPL-MCNC: 115 MG/DL (ref 0–200)
CLARITY UR: CLEAR
CLOSE TME COLL+ADP + EPINEP PNL BLD: 80 % (ref 86–100)
CO2 SERPL-SCNC: 25.5 MMOL/L (ref 22–29)
COLOR UR: YELLOW
CREAT SERPL-MCNC: 0.92 MG/DL (ref 0.76–1.27)
DEPRECATED RDW RBC AUTO: 41.9 FL (ref 37–54)
DEVICE COMMENT: ABNORMAL
EGFRCR SERPLBLD CKD-EPI 2021: 92.9 ML/MIN/1.73
EOSINOPHIL # BLD AUTO: 0.18 10*3/MM3 (ref 0–0.4)
EOSINOPHIL NFR BLD AUTO: 2.8 % (ref 0.3–6.2)
ERYTHROCYTE [DISTWIDTH] IN BLOOD BY AUTOMATED COUNT: 12.4 % (ref 12.3–15.4)
GLOBULIN UR ELPH-MCNC: 2.3 GM/DL
GLUCOSE SERPL-MCNC: 100 MG/DL (ref 65–99)
GLUCOSE UR STRIP-MCNC: NEGATIVE MG/DL
HBA1C MFR BLD: 5.9 % (ref 4.8–5.6)
HCO3 BLDA-SCNC: 24.2 MMOL/L (ref 21–28)
HCO3 BLDA-SCNC: 24.7 MMOL/L (ref 21–28)
HCO3 BLDA-SCNC: 26.2 MMOL/L (ref 21–28)
HCO3 BLDA-SCNC: 27.2 MMOL/L (ref 22–28)
HCT VFR BLD AUTO: 40.3 % (ref 37.5–51)
HCT VFR BLDA CALC: 38 % (ref 38–51)
HCT VFR BLDA CALC: 38 % (ref 38–51)
HCT VFR BLDA CALC: 39 % (ref 38–51)
HDLC SERPL-MCNC: 38 MG/DL (ref 40–60)
HEMODILUTION: NO
HGB BLD-MCNC: 13.5 G/DL (ref 13–17.7)
HGB BLDA-MCNC: 12.9 G/DL (ref 12–17)
HGB BLDA-MCNC: 12.9 G/DL (ref 12–17)
HGB BLDA-MCNC: 13.3 G/DL (ref 12–17)
HGB UR QL STRIP.AUTO: NEGATIVE
IMM GRANULOCYTES # BLD AUTO: 0.02 10*3/MM3 (ref 0–0.05)
IMM GRANULOCYTES NFR BLD AUTO: 0.3 % (ref 0–0.5)
INR PPP: 1.09 (ref 0.9–1.1)
KETONES UR QL STRIP: NEGATIVE
LDLC SERPL CALC-MCNC: 52 MG/DL (ref 0–100)
LDLC/HDLC SERPL: 1.26 {RATIO}
LEUKOCYTE ESTERASE UR QL STRIP.AUTO: NEGATIVE
LYMPHOCYTES # BLD AUTO: 1.6 10*3/MM3 (ref 0.7–3.1)
LYMPHOCYTES NFR BLD AUTO: 24.6 % (ref 19.6–45.3)
MAGNESIUM SERPL-MCNC: 2 MG/DL (ref 1.6–2.4)
MCH RBC QN AUTO: 31 PG (ref 26.6–33)
MCHC RBC AUTO-ENTMCNC: 33.5 G/DL (ref 31.5–35.7)
MCV RBC AUTO: 92.6 FL (ref 79–97)
MODALITY: ABNORMAL
MONOCYTES # BLD AUTO: 0.53 10*3/MM3 (ref 0.1–0.9)
MONOCYTES NFR BLD AUTO: 8.1 % (ref 5–12)
NEUTROPHILS NFR BLD AUTO: 4.15 10*3/MM3 (ref 1.7–7)
NEUTROPHILS NFR BLD AUTO: 63.7 % (ref 42.7–76)
NITRITE UR QL STRIP: NEGATIVE
NRBC BLD AUTO-RTO: 0 /100 WBC (ref 0–0.2)
NT-PROBNP SERPL-MCNC: 182 PG/ML (ref 0–900)
PCO2 BLDA: 41 MM HG (ref 35–45)
PCO2 BLDA: 41.2 MM HG (ref 35–45)
PCO2 BLDV: 40.2 MMHG (ref 41–51)
PCO2 BLDV: 45.2 MMHG (ref 41–51)
PH BLDA: 7.37 [PH] (ref 7.31–7.41)
PH BLDA: 7.39 PH UNITS (ref 7.35–7.45)
PH BLDA: 7.39 [PH] (ref 7.31–7.41)
PH BLDA: 7.43 PH UNITS (ref 7.35–7.45)
PH UR STRIP.AUTO: 7 [PH] (ref 5–8)
PLATELET # BLD AUTO: 252 10*3/MM3 (ref 140–450)
PMV BLD AUTO: 9.5 FL (ref 6–12)
PO2 BLDA: 78.6 MM HG (ref 80–100)
PO2 BLDA: 83 MMHG (ref 80–105)
PO2 BLDV: 37 MM HG (ref 35–42)
PO2 BLDV: 74 MM HG (ref 35–42)
POTASSIUM BLDA-SCNC: 4 MMOL/L (ref 3.5–4.9)
POTASSIUM SERPL-SCNC: 3.8 MMOL/L (ref 3.5–5.2)
PROT SERPL-MCNC: 6.1 G/DL (ref 6–8.5)
PROT UR QL STRIP: NEGATIVE
PROTHROMBIN TIME: 14.1 SECONDS (ref 11.7–14.2)
RBC # BLD AUTO: 4.35 10*6/MM3 (ref 4.14–5.8)
RH BLD: POSITIVE
RH BLD: POSITIVE
SAO2 % BLDA: 96 % (ref 95–98)
SAO2 % BLDCOA: 68 % (ref 45–75)
SAO2 % BLDCOA: 95 % (ref 45–75)
SAO2 % BLDCOA: 95.8 % (ref 92–98.5)
SODIUM SERPL-SCNC: 136 MMOL/L (ref 136–145)
SP GR UR STRIP: 1.02 (ref 1–1.03)
T&S EXPIRATION DATE: NORMAL
TOTAL RATE: 18 BREATHS/MINUTE
TRIGL SERPL-MCNC: 146 MG/DL (ref 0–150)
UROBILINOGEN UR QL STRIP: NORMAL
VLDLC SERPL-MCNC: 25 MG/DL (ref 5–40)
WBC NRBC COR # BLD AUTO: 6.51 10*3/MM3 (ref 3.4–10.8)

## 2025-05-14 PROCEDURE — 25010000002 LIDOCAINE 2% SOLUTION: Performed by: INTERNAL MEDICINE

## 2025-05-14 PROCEDURE — 93970 EXTREMITY STUDY: CPT

## 2025-05-14 PROCEDURE — 25010000002 MIDAZOLAM PER 1 MG: Performed by: INTERNAL MEDICINE

## 2025-05-14 PROCEDURE — 71046 X-RAY EXAM CHEST 2 VIEWS: CPT

## 2025-05-14 PROCEDURE — 85018 HEMOGLOBIN: CPT

## 2025-05-14 PROCEDURE — 93970 EXTREMITY STUDY: CPT | Performed by: SURGERY

## 2025-05-14 PROCEDURE — 85610 PROTHROMBIN TIME: CPT | Performed by: THORACIC SURGERY (CARDIOTHORACIC VASCULAR SURGERY)

## 2025-05-14 PROCEDURE — 93880 EXTRACRANIAL BILAT STUDY: CPT | Performed by: SURGERY

## 2025-05-14 PROCEDURE — 25010000002 FENTANYL CITRATE (PF) 50 MCG/ML SOLUTION: Performed by: INTERNAL MEDICINE

## 2025-05-14 PROCEDURE — 82803 BLOOD GASES ANY COMBINATION: CPT | Performed by: THORACIC SURGERY (CARDIOTHORACIC VASCULAR SURGERY)

## 2025-05-14 PROCEDURE — B2111ZZ FLUOROSCOPY OF MULTIPLE CORONARY ARTERIES USING LOW OSMOLAR CONTRAST: ICD-10-PCS | Performed by: INTERNAL MEDICINE

## 2025-05-14 PROCEDURE — 83735 ASSAY OF MAGNESIUM: CPT | Performed by: THORACIC SURGERY (CARDIOTHORACIC VASCULAR SURGERY)

## 2025-05-14 PROCEDURE — 83880 ASSAY OF NATRIURETIC PEPTIDE: CPT | Performed by: THORACIC SURGERY (CARDIOTHORACIC VASCULAR SURGERY)

## 2025-05-14 PROCEDURE — 82803 BLOOD GASES ANY COMBINATION: CPT

## 2025-05-14 PROCEDURE — C1769 GUIDE WIRE: HCPCS | Performed by: INTERNAL MEDICINE

## 2025-05-14 PROCEDURE — 82810 BLOOD GASES O2 SAT ONLY: CPT

## 2025-05-14 PROCEDURE — C1894 INTRO/SHEATH, NON-LASER: HCPCS | Performed by: INTERNAL MEDICINE

## 2025-05-14 PROCEDURE — 93456 R HRT CORONARY ARTERY ANGIO: CPT | Performed by: INTERNAL MEDICINE

## 2025-05-14 PROCEDURE — 86901 BLOOD TYPING SEROLOGIC RH(D): CPT | Performed by: THORACIC SURGERY (CARDIOTHORACIC VASCULAR SURGERY)

## 2025-05-14 PROCEDURE — 85014 HEMATOCRIT: CPT

## 2025-05-14 PROCEDURE — 85025 COMPLETE CBC W/AUTO DIFF WBC: CPT | Performed by: THORACIC SURGERY (CARDIOTHORACIC VASCULAR SURGERY)

## 2025-05-14 PROCEDURE — 86923 COMPATIBILITY TEST ELECTRIC: CPT

## 2025-05-14 PROCEDURE — 80053 COMPREHEN METABOLIC PANEL: CPT | Performed by: THORACIC SURGERY (CARDIOTHORACIC VASCULAR SURGERY)

## 2025-05-14 PROCEDURE — 86850 RBC ANTIBODY SCREEN: CPT | Performed by: THORACIC SURGERY (CARDIOTHORACIC VASCULAR SURGERY)

## 2025-05-14 PROCEDURE — 85730 THROMBOPLASTIN TIME PARTIAL: CPT | Performed by: THORACIC SURGERY (CARDIOTHORACIC VASCULAR SURGERY)

## 2025-05-14 PROCEDURE — 4A023N8 MEASUREMENT OF CARDIAC SAMPLING AND PRESSURE, BILATERAL, PERCUTANEOUS APPROACH: ICD-10-PCS | Performed by: INTERNAL MEDICINE

## 2025-05-14 PROCEDURE — 83036 HEMOGLOBIN GLYCOSYLATED A1C: CPT | Performed by: THORACIC SURGERY (CARDIOTHORACIC VASCULAR SURGERY)

## 2025-05-14 PROCEDURE — 93880 EXTRACRANIAL BILAT STUDY: CPT

## 2025-05-14 PROCEDURE — 81003 URINALYSIS AUTO W/O SCOPE: CPT | Performed by: THORACIC SURGERY (CARDIOTHORACIC VASCULAR SURGERY)

## 2025-05-14 PROCEDURE — 25010000002 HEPARIN (PORCINE) PER 1000 UNITS: Performed by: INTERNAL MEDICINE

## 2025-05-14 PROCEDURE — 86900 BLOOD TYPING SEROLOGIC ABO: CPT

## 2025-05-14 PROCEDURE — 36600 WITHDRAWAL OF ARTERIAL BLOOD: CPT | Performed by: THORACIC SURGERY (CARDIOTHORACIC VASCULAR SURGERY)

## 2025-05-14 PROCEDURE — 25810000003 SODIUM CHLORIDE 0.9 % SOLUTION: Performed by: INTERNAL MEDICINE

## 2025-05-14 PROCEDURE — 86901 BLOOD TYPING SEROLOGIC RH(D): CPT

## 2025-05-14 PROCEDURE — 86900 BLOOD TYPING SEROLOGIC ABO: CPT | Performed by: THORACIC SURGERY (CARDIOTHORACIC VASCULAR SURGERY)

## 2025-05-14 PROCEDURE — 80061 LIPID PANEL: CPT | Performed by: THORACIC SURGERY (CARDIOTHORACIC VASCULAR SURGERY)

## 2025-05-14 PROCEDURE — 25510000001 IOPAMIDOL PER 1 ML: Performed by: INTERNAL MEDICINE

## 2025-05-14 PROCEDURE — 85576 BLOOD PLATELET AGGREGATION: CPT | Performed by: THORACIC SURGERY (CARDIOTHORACIC VASCULAR SURGERY)

## 2025-05-14 RX ORDER — MIDAZOLAM HYDROCHLORIDE 1 MG/ML
INJECTION, SOLUTION INTRAMUSCULAR; INTRAVENOUS
Status: DISCONTINUED | OUTPATIENT
Start: 2025-05-14 | End: 2025-05-14 | Stop reason: HOSPADM

## 2025-05-14 RX ORDER — NITROGLYCERIN 0.4 MG/1
0.4 TABLET SUBLINGUAL
Status: DISCONTINUED | OUTPATIENT
Start: 2025-05-14 | End: 2025-05-16

## 2025-05-14 RX ORDER — ASPIRIN 81 MG/1
81 TABLET ORAL DAILY
Status: DISCONTINUED | OUTPATIENT
Start: 2025-05-15 | End: 2025-05-16

## 2025-05-14 RX ORDER — IOPAMIDOL 755 MG/ML
INJECTION, SOLUTION INTRAVASCULAR
Status: DISCONTINUED | OUTPATIENT
Start: 2025-05-14 | End: 2025-05-14 | Stop reason: HOSPADM

## 2025-05-14 RX ORDER — SODIUM CHLORIDE 0.9 % (FLUSH) 0.9 %
10 SYRINGE (ML) INJECTION AS NEEDED
Status: DISCONTINUED | OUTPATIENT
Start: 2025-05-14 | End: 2025-05-14 | Stop reason: HOSPADM

## 2025-05-14 RX ORDER — NEBIVOLOL 5 MG/1
2.5 TABLET ORAL DAILY
Status: DISCONTINUED | OUTPATIENT
Start: 2025-05-14 | End: 2025-05-16

## 2025-05-14 RX ORDER — CHLORHEXIDINE GLUCONATE ORAL RINSE 1.2 MG/ML
15 SOLUTION DENTAL ONCE
Status: COMPLETED | OUTPATIENT
Start: 2025-05-15 | End: 2025-05-15

## 2025-05-14 RX ORDER — FENTANYL CITRATE 50 UG/ML
INJECTION, SOLUTION INTRAMUSCULAR; INTRAVENOUS
Status: DISCONTINUED | OUTPATIENT
Start: 2025-05-14 | End: 2025-05-14 | Stop reason: HOSPADM

## 2025-05-14 RX ORDER — ACETAMINOPHEN 325 MG/1
650 TABLET ORAL EVERY 4 HOURS PRN
Status: DISCONTINUED | OUTPATIENT
Start: 2025-05-14 | End: 2025-05-14 | Stop reason: SDUPTHER

## 2025-05-14 RX ORDER — METOPROLOL TARTRATE 25 MG/1
12.5 TABLET, FILM COATED ORAL
Status: COMPLETED | OUTPATIENT
Start: 2025-05-16 | End: 2025-05-16

## 2025-05-14 RX ORDER — SODIUM CHLORIDE 0.9 % (FLUSH) 0.9 %
10 SYRINGE (ML) INJECTION EVERY 12 HOURS SCHEDULED
Status: DISCONTINUED | OUTPATIENT
Start: 2025-05-14 | End: 2025-05-16

## 2025-05-14 RX ORDER — DIPHENHYDRAMINE HCL 25 MG
25 CAPSULE ORAL NIGHTLY PRN
Status: DISCONTINUED | OUTPATIENT
Start: 2025-05-14 | End: 2025-05-16

## 2025-05-14 RX ORDER — SODIUM CHLORIDE 9 MG/ML
1 INJECTION, SOLUTION INTRAVENOUS CONTINUOUS
Status: ACTIVE | OUTPATIENT
Start: 2025-05-14 | End: 2025-05-14

## 2025-05-14 RX ORDER — SODIUM CHLORIDE 9 MG/ML
100 INJECTION, SOLUTION INTRAVENOUS CONTINUOUS
Status: ACTIVE | OUTPATIENT
Start: 2025-05-15 | End: 2025-05-14

## 2025-05-14 RX ORDER — SODIUM CHLORIDE 0.9 % (FLUSH) 0.9 %
10 SYRINGE (ML) INJECTION AS NEEDED
Status: DISCONTINUED | OUTPATIENT
Start: 2025-05-14 | End: 2025-05-16

## 2025-05-14 RX ORDER — ALPRAZOLAM 0.25 MG
0.25 TABLET ORAL EVERY 8 HOURS PRN
Status: DISCONTINUED | OUTPATIENT
Start: 2025-05-14 | End: 2025-05-16

## 2025-05-14 RX ORDER — SODIUM CHLORIDE 9 MG/ML
40 INJECTION, SOLUTION INTRAVENOUS AS NEEDED
Status: DISCONTINUED | OUTPATIENT
Start: 2025-05-14 | End: 2025-05-16

## 2025-05-14 RX ORDER — CHLORHEXIDINE GLUCONATE 500 MG/1
1 CLOTH TOPICAL EVERY 12 HOURS PRN
Status: DISCONTINUED | OUTPATIENT
Start: 2025-05-15 | End: 2025-05-16

## 2025-05-14 RX ORDER — LIDOCAINE HYDROCHLORIDE 20 MG/ML
INJECTION, SOLUTION INFILTRATION; PERINEURAL
Status: DISCONTINUED | OUTPATIENT
Start: 2025-05-14 | End: 2025-05-14 | Stop reason: HOSPADM

## 2025-05-14 RX ORDER — HEPARIN SODIUM 1000 [USP'U]/ML
INJECTION, SOLUTION INTRAVENOUS; SUBCUTANEOUS
Status: DISCONTINUED | OUTPATIENT
Start: 2025-05-14 | End: 2025-05-14 | Stop reason: HOSPADM

## 2025-05-14 RX ORDER — VERAPAMIL HYDROCHLORIDE 2.5 MG/ML
INJECTION INTRAVENOUS
Status: DISCONTINUED | OUTPATIENT
Start: 2025-05-14 | End: 2025-05-14 | Stop reason: HOSPADM

## 2025-05-14 RX ORDER — ACETAMINOPHEN 325 MG/1
650 TABLET ORAL EVERY 4 HOURS PRN
Status: DISCONTINUED | OUTPATIENT
Start: 2025-05-14 | End: 2025-05-16

## 2025-05-14 RX ADMIN — SODIUM CHLORIDE 1 ML/KG/HR: 9 INJECTION, SOLUTION INTRAVENOUS at 13:09

## 2025-05-14 RX ADMIN — NEBIVOLOL 2.5 MG: 5 TABLET ORAL at 21:31

## 2025-05-14 RX ADMIN — Medication 10 ML: at 21:33

## 2025-05-14 RX ADMIN — Medication 10 ML: at 13:16

## 2025-05-14 RX ADMIN — SODIUM CHLORIDE 100 ML/HR: 9 INJECTION, SOLUTION INTRAVENOUS at 07:46

## 2025-05-14 NOTE — Clinical Note
Hemostasis started on the right brachial vein. Manual pressure applied to vessel. Manual pressure was held by BB. Manual pressure was held for 5 min. Hemostasis achieved successfully. Closure device additional comment: 4x4 and tegaderm applied

## 2025-05-14 NOTE — PROGRESS NOTES
Rt spoke with patient for pre-op pft eval and was informed  by patient that they are/have not been a smoker. No pre-op pft eval required

## 2025-05-14 NOTE — PLAN OF CARE
Goal Outcome Evaluation:  Plan of Care Reviewed With: patient        Progress: improving  Outcome Evaluation: Pt is s/p R/L cath. R brachial and R radial sites CDI and soft. Pt has no complaints of pain. /72, MD made aware. SB on tele, Hr 40-60s. On room air. Plan for cabg on 5/16.

## 2025-05-14 NOTE — CONSULTS
[facility] Cardiac Surgery      Patient Care Team:  Provider, No Known as PCP - Astrid Rodriguez APRN as Referring Physician (Cardiology)    Chief complaint  CAD    Subjective     History of Present Illness  Patient is a 65 y.o. male with a past medical history including CAD s/p PCI, HTN, HLD intolerant of statins, h/o DVT/PE, and CHANTEL. Presented to cardiologist follow-up with complaints of chest discomfort. He was also having some dyspnea on exertion and palpitations. He denies active chest pain, syncope, edema, fever/chills, or nausea/vomiting. Due to his ongoing symptoms he was scheduled for elective cardiac cath. Cardiac cath showed severe multivessel CAD. He is referred for surgical evaluation.     Review of Systems   Respiratory:  Positive for shortness of breath.    Cardiovascular:  Positive for chest pain and palpitations.        Past Medical History:   Diagnosis Date    Allergic     Angina at rest 08/16/2019    Added automatically from request for surgery [id number]    B12 deficiency anemia     Cataract 2023    Had cataract surgery both eyes    Cellulitis of right lower extremity 12/24/2020    Chest pain     Chronic fatigue 03/09/2016    Coronary artery disease involving native coronary artery of native heart without angina pectoris 08/10/2017    Deep vein thrombosis     Headache     History of blood clots     blood clot found in right lung     HL (hearing loss)     Hypertension     Mixed hyperlipidemia 02/02/2022    Multiple thyroid nodules     Multiple thyroid nodules     CHANTEL (obstructive sleep apnea)     Pernicious anemia     Pulmonary embolism     2015    Stenosis of left carotid artery 03/28/2023    Syncope     2 yrs ago one time     Past Surgical History:   Procedure Laterality Date    CARDIAC CATHETERIZATION N/A 08/03/2017    Procedure: Coronary angiography;  Surgeon: Cynthia Farley MD;  Location: [facility] INVASIVE LOCATION;  Service:     CARDIAC CATHETERIZATION  08/03/2017     Procedure: Functional Flow [place];  Surgeon: Cynthia Farley MD;  Location:  [facility] CATH INVASIVE LOCATION;  Service:     CARDIAC CATHETERIZATION N/A 08/03/2017    Procedure: Stent YI coronary;  Surgeon: Cynthia Farley MD;  Location:  [facility] CATH INVASIVE LOCATION;  Service:     CARDIAC CATHETERIZATION N/A 08/03/2017    Procedure: Left ventriculography;  Surgeon: Cynthia Farley MD;  Location:  [facility] CATH INVASIVE LOCATION;  Service:     CARDIAC CATHETERIZATION N/A 08/03/2017    Procedure: Left Heart Cath;  Surgeon: Cynthia Farley MD;  Location:  [facility] CATH INVASIVE LOCATION;  Service:     CARDIAC CATHETERIZATION N/A 08/20/2019    Procedure: Left Heart Cath;  Surgeon: Mulugeta Ac MD;  [facility];  Service: Cardiology    CARDIAC CATHETERIZATION N/A 08/20/2019    Procedure: Coronary angiography;  Surgeon: Mulugeta Ac MD;  Location: [facility] CATH INVASIVE LOCATION;  Service: Cardiology    CARDIAC CATHETERIZATION N/A 08/20/2019    Procedure: Left ventriculography;  Surgeon: Mulugeta Ac MD;  Location: [facility] CATH INVASIVE LOCATION;  Service: Cardiology    CARDIAC CATHETERIZATION  8/20/2019    COLONOSCOPY  MMVI    NORMAL.    COLONOSCOPY      CORONARY STENT PLACEMENT  2017    FINE NEEDLE ASPIRATION  12/2015    Left thyroid nodule.  [place] category II.  Dr. Socrates Sanchez     Family History   Problem Relation Age of Onset    Heart disease Other     Hypertension Other     Thyroid disease Other     Heart disease Father     Prostate cancer Father     Hypertension Father     Stroke Father     Heart disease Mother     Hypertension Mother     Anemia Mother     Heart disease Brother     Hypertension Brother     Thyroid disease Sister     Heart disease Brother     Hypertension Brother      Social History     Tobacco Use    Smoking status: Never     Passive exposure: Never    Smokeless tobacco: Never    Tobacco comments:     caffeine use - 2 cups coffee dailyl    Vaping Use    Vaping  "status: Never Used   Substance Use Topics    Alcohol use: Yes     Comment: Occasional drinks    Drug use: No     Medications Prior to Admission   Medication Sig Dispense Refill Last Dose/Taking    aspirin 81 MG tablet Take 1 tablet by mouth Daily. 30 tablet 11 5/14/2025 Morning    Cholecalciferol (VITAMIN D3) 5000 units capsule capsule Take 1 capsule by mouth Daily.   5/14/2025 Morning    cyanocobalamin 1000 MCG/ML injection INJECT 1 MILLILITER INTO THE APPROPRIATE MUSCLE AS DIRECTED BY PRESCRIBER EVERY 14 DAYS 6 mL 0 Past Week    fexofenadine (ALLEGRA) 60 MG tablet Take 1 tablet by mouth Daily As Needed (allergies). 90 tablet 3 Past Week    hydrocortisone 0.5 % cream As Needed.   Past Month    MAGNESIUM PO Take  by mouth. Every other day   5/14/2025 Morning    Multiple Vitamin (MULTI VITAMIN DAILY PO) Take 1 tablet by mouth Daily.   5/14/2025 Morning    nebivolol (Bystolic) 2.5 MG tablet Take 1 tablet by mouth Daily. 30 tablet 11 5/13/2025    tamsulosin (FLOMAX) 0.4 MG capsule 24 hr capsule TAKE 1 CAPSULE BY MOUTH DAILY 30 capsule 5 5/14/2025 Morning    B-D INSULIN SYRINGE 1CC/25GX1\" 25G X 1\" 1 ML misc USE TO ADMINISTER B-12 INJECTIONS AS DIRECTED 25 each 3     Inclisiran Sodium (LEQVIO SC) Inject  under the skin into the appropriate area as directed.   More than a month    nitroglycerin (NITROSTAT) 0.4 MG SL tablet Place 1 tablet under the tongue Every 5 (Five) Minutes As Needed for Chest Pain for up to 1 dose. Take no more than 3 doses in 15 minutes. 100 tablet 3     triamcinolone (KENALOG) 0.025 % cream Apply 1 Application topically to the appropriate area as directed 2 (Two) Times a Day As Needed (eczema). 80 g 3 More than a month        Allergies:  Lisinopril, Hydrochlorothiazide, Repatha [evolocumab], Simvastatin, Amlodipine, Hydralazine, and Valsartan    Objective      Vital Signs  Temp:  [98.4 °F (36.9 °C)] 98.4 °F (36.9 °C)  Heart Rate:  [42-49] 45  Resp:  [12-18] 15  BP: (150-180)/(69-74) " "167/69    Flowsheet Rows      Flowsheet Row First Filed Value   Admission Height 188 cm (74\") Documented at 05/14/2025 0742   Admission Weight 99.8 kg (220 lb) Documented at 05/14/2025 0742          188 cm (74\")    Physical Exam    Results Review:   Lab Results (last 24 hours)       ** No results found for the last 24 hours. **          Cardiac cath per Dr. Martinez:  Conclusion    Severe multivessel coronary disease involving the mid LAD, ostial circumflex, mid RCA.    Recommendations: Coronary artery bypass graft surgery.     INDICATION FOR PROCEDURE: 64 yo man with known CAD prior LADPCI with recurrent angina and dyspnea     PROCEDURE PERFORMED:   [facility]  Coronary angiography     PROCEDURE COMMENTS:   After informed consent was obtained, the patient was prepped and draped in the usual manner.  Moderate sedation was administered. Right Heart catheterization was performed by exchanging the existing IV a 5Fr. Sheath in the antecubital vein over a wire. A 5r. [place] Chari catheter was advanced in to the PA and PA Saturations recorded. The catheter was then used to measure pressures in the PA, wedge position, RV and RA.      Lidocaine was infused in the right wrist for anesthesia.  Access was obtained in the right radial artery using ultrasound guidance and micropuncture technique. A 5/6 Slender sheath was inserted.  Coronary angiography was performed using 4Fr JL3.5 and JR4 due to signficiant tortuosity and resultant radial artery spasm.Tenseplast was used for hemostasis at both sites.         Hemodynamics:   RA:8-12  RV:40/5  PA: 40/12/25, PA sat 68%  PCWP: 12  CO/CI:4.87 L/min, 2.15 L/mink/m2     Coronary angiography:   LMCA:Large-caliber left main coronary artery.  Bifurcates to the LAD and circumflex arteries.  LAD: The LAD is a large-caliber vessel.  There is a previously placed proximal to mid vessel stent which has minimal in-stent restenosis.  Just distal to that there is an 95% stenosis.  The remainder of the vessel " is normal and wraps the apex.  Circumflex: The circumflex is a large-caliber vessel, nondominant.  The ostium has a 90% stenosis distal to that the mid circumflex has mild luminal irregularities.  Gives rise to a medium caliber mid branching OM1 which is normal in a low branching small caliber OM 2 which is normal.  RCA: The RCA has a high and anterior takeoff.  It is a large-caliber vessel with scattered 30 to 40% proximal stenosis.  The mid vessel has a 70% mid vessel stenosis      Assessment & Plan       Coronary artery disease involving native coronary artery of native heart without angina pectoris      Assessment & Plan    - severe multivessel CAD, s/p PCI (2017)  - HTN  - HLD, intolerant of statins  - h/o DVT/PE  - CHANTEL    Patient seen by Dr. Juarez and recommends surgical revascularization. Continue typical pre-operative workup for CABG to include labs and imaging. Plan for surgery Friday.     Thank you for allowing us to participate in the care of this patient.      Gregorio Jim PA-C  05/14/25  11:30 EDT

## 2025-05-15 LAB
APTT PPP: 25.2 SECONDS (ref 22.7–35.4)
APTT PPP: 33.1 SECONDS (ref 22.7–35.4)
BASOPHILS # BLD AUTO: 0.02 10*3/MM3 (ref 0–0.2)
BASOPHILS NFR BLD AUTO: 0.3 % (ref 0–1.5)
DEPRECATED RDW RBC AUTO: 43.4 FL (ref 37–54)
EOSINOPHIL # BLD AUTO: 0.11 10*3/MM3 (ref 0–0.4)
EOSINOPHIL NFR BLD AUTO: 1.7 % (ref 0.3–6.2)
ERYTHROCYTE [DISTWIDTH] IN BLOOD BY AUTOMATED COUNT: 12.7 % (ref 12.3–15.4)
HCT VFR BLD AUTO: 43.5 % (ref 37.5–51)
HGB BLD-MCNC: 14.4 G/DL (ref 13–17.7)
IMM GRANULOCYTES # BLD AUTO: 0.02 10*3/MM3 (ref 0–0.05)
IMM GRANULOCYTES NFR BLD AUTO: 0.3 % (ref 0–0.5)
INR PPP: 1.1 (ref 0.9–1.1)
LYMPHOCYTES # BLD AUTO: 1.54 10*3/MM3 (ref 0.7–3.1)
LYMPHOCYTES NFR BLD AUTO: 23.5 % (ref 19.6–45.3)
MCH RBC QN AUTO: 31.2 PG (ref 26.6–33)
MCHC RBC AUTO-ENTMCNC: 33.1 G/DL (ref 31.5–35.7)
MCV RBC AUTO: 94.4 FL (ref 79–97)
MONOCYTES # BLD AUTO: 0.51 10*3/MM3 (ref 0.1–0.9)
MONOCYTES NFR BLD AUTO: 7.8 % (ref 5–12)
NEUTROPHILS NFR BLD AUTO: 4.34 10*3/MM3 (ref 1.7–7)
NEUTROPHILS NFR BLD AUTO: 66.4 % (ref 42.7–76)
NRBC BLD AUTO-RTO: 0 /100 WBC (ref 0–0.2)
PLATELET # BLD AUTO: 286 10*3/MM3 (ref 140–450)
PMV BLD AUTO: 9.6 FL (ref 6–12)
PROTHROMBIN TIME: 14.2 SECONDS (ref 11.7–14.2)
QT INTERVAL: 421 MS
QTC INTERVAL: 377 MS
RBC # BLD AUTO: 4.61 10*6/MM3 (ref 4.14–5.8)
WBC NRBC COR # BLD AUTO: 6.54 10*3/MM3 (ref 3.4–10.8)

## 2025-05-15 PROCEDURE — 85730 THROMBOPLASTIN TIME PARTIAL: CPT

## 2025-05-15 PROCEDURE — 93005 ELECTROCARDIOGRAM TRACING: CPT | Performed by: NURSE PRACTITIONER

## 2025-05-15 PROCEDURE — 85610 PROTHROMBIN TIME: CPT

## 2025-05-15 PROCEDURE — 85025 COMPLETE CBC W/AUTO DIFF WBC: CPT

## 2025-05-15 PROCEDURE — 25010000002 HEPARIN (PORCINE) 25000-0.45 UT/250ML-% SOLUTION

## 2025-05-15 PROCEDURE — 99232 SBSQ HOSP IP/OBS MODERATE 35: CPT | Performed by: NURSE PRACTITIONER

## 2025-05-15 PROCEDURE — 85730 THROMBOPLASTIN TIME PARTIAL: CPT | Performed by: NURSE PRACTITIONER

## 2025-05-15 RX ORDER — HEPARIN SODIUM 10000 [USP'U]/100ML
10 INJECTION, SOLUTION INTRAVENOUS
Status: DISCONTINUED | OUTPATIENT
Start: 2025-05-15 | End: 2025-05-16

## 2025-05-15 RX ORDER — AMLODIPINE BESYLATE 5 MG/1
5 TABLET ORAL
Status: DISCONTINUED | OUTPATIENT
Start: 2025-05-15 | End: 2025-05-16

## 2025-05-15 RX ADMIN — Medication 10 ML: at 09:21

## 2025-05-15 RX ADMIN — NITROGLYCERIN 1 INCH: 20 OINTMENT TOPICAL at 18:17

## 2025-05-15 RX ADMIN — ASPIRIN 81 MG: 81 TABLET, COATED ORAL at 09:20

## 2025-05-15 RX ADMIN — CHLORHEXIDINE GLUCONATE 15 ML: 1.2 RINSE ORAL at 21:28

## 2025-05-15 RX ADMIN — MUPIROCIN 1 APPLICATION: 20 OINTMENT TOPICAL at 21:28

## 2025-05-15 RX ADMIN — NITROGLYCERIN 1 INCH: 20 OINTMENT TOPICAL at 14:03

## 2025-05-15 RX ADMIN — NEBIVOLOL 2.5 MG: 5 TABLET ORAL at 21:28

## 2025-05-15 RX ADMIN — MUPIROCIN 1 APPLICATION: 20 OINTMENT TOPICAL at 09:21

## 2025-05-15 RX ADMIN — Medication 10 ML: at 21:28

## 2025-05-15 RX ADMIN — ACETAMINOPHEN 650 MG: 325 TABLET, FILM COATED ORAL at 18:19

## 2025-05-15 RX ADMIN — HEPARIN SODIUM 10 UNITS/KG/HR: 10000 INJECTION, SOLUTION INTRAVENOUS at 13:58

## 2025-05-15 RX ADMIN — AMLODIPINE BESYLATE 5 MG: 5 TABLET ORAL at 10:33

## 2025-05-15 NOTE — DISCHARGE PLACEMENT REQUEST
"Roseanna Peacock R \"Nain\" (65 y.o. Male)       Date of Birth   1960    Social Security Number       Address   [address] [place] [address]    Home Phone   [phone]    MRN   [id number]       Latter-day   Christian    Marital Status                               Admission Date   5/14/2025    Admission Type   Elective    Admitting Provider   Krupa Martinez MD    Attending Provider   Krupa Martinez MD    Department, Room/Bed   [facility] CARDIOVASC UNIT, 2215/1       Discharge Date       Discharge Disposition       Discharge Destination                                 Attending Provider: Krupa Martinez MD    Allergies: Lisinopril, Hydrochlorothiazide, Repatha [Evolocumab], Simvastatin, Amlodipine, Hydralazine, Valsartan    Isolation: None   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)   Code Status: CPR    Ht: 188 cm (74\")   Wt: 99.8 kg (220 lb)    Admission Cmt: None   Principal Problem: Coronary artery disease involving native coronary artery of native heart without angina pectoris [I25.10]                   Active Insurance as of 5/14/2025       Primary Coverage       Payor Plan Insurance Group Employer/Plan Group    ANTHEM BLUE CROSS ANTH BLUE CROSS BLUE SHIELD PPO [id number]       Payor Plan Address Payor Plan Phone Number Payor Plan Fax Number Effective Dates    [address] [phone]  1/1/2023 - None Entered    [facility] [id number]         Subscriber Name Subscriber Birth Date Member ID       ROSEANNA PEACOCK 1960 [id number]                     Emergency Contacts        (Rel.) Home Phone Work Phone Mobile Phone    AyushNolvia (Spouse) [phone] -- [phone]                "

## 2025-05-15 NOTE — PLAN OF CARE
Goal Outcome Evaluation:  Plan of Care Reviewed With: patient        Progress: no change  Outcome Evaluation: Heparin gtt started, Complaints of chest pressure, Nitropaste added. 1st case CABG. Consents signed and on chart. NPO after MN. Continue POC.

## 2025-05-15 NOTE — PROGRESS NOTES
" LOS: 1 day   Patient Care Team:  Provider, No Known as PCP - Astrid Rodriguez APRN as Referring Physician (Cardiology)    Chief Complaint: Multivessel CAD      Subjective: Patient resting in bed.  Complains of chest pressure that is similar to what he has been experiencing.    Vital Signs  Temp:  [98 °F (36.7 °C)-98.5 °F (36.9 °C)] 98.5 °F (36.9 °C)  Heart Rate:  [42-60] 52  Resp:  [15-16] 16  BP: (154-201)/(68-88) 165/74      05/14/25  0742   Weight: 99.8 kg (220 lb)     Body mass index is 28.25 kg/m².    Intake/Output Summary (Last 24 hours) at 5/15/2025 0923  Last data filed at 5/15/2025 0824  Gross per 24 hour   Intake 600 ml   Output 800 ml   Net -200 ml     I/O this shift:  In: 360 [P.O.:360]  Out: -       Objective:  Vital signs: (most recent): Blood pressure 151/70, pulse (!) 45, temperature 98.4 °F (36.9 °C), temperature source Oral, resp. rate 18, height 188 cm (74\"), weight 99.8 kg (220 lb), SpO2 100%.              Physical Examination:   General appearance - alert, well appearing, and in no distress  Mental status - normal mood, behavior, speech, dress, motor activity, and thought processes  Chest - clear to auscultation, no wheezes, rales or rhonchi, symmetric air entry  Heart - normal rate, regular rhythm, normal S1, S2, no murmurs, rubs, clicks or gallops  Abdomen - soft, nontender, nondistended, no masses or organomegaly  bowel sounds normal  Neurological - alert, oriented, normal speech, no focal findings or movement disorder noted  Extremities - peripheral pulses normal, no pedal edema, no clubbing or cyanosis  Skin - normal coloration and turgor, no rashes, no suspicious skin lesions noted     Results Review:      WBC WBC   Date Value Ref Range Status   05/14/2025 6.51 3.40 - 10.80 10*3/mm3 Final      HGB Hemoglobin   Date Value Ref Range Status   05/14/2025 13.5 13.0 - 17.7 g/dL Final   05/14/2025 12.9 12.0 - 17.0 g/dL Final   05/14/2025 13.3 12.0 - 17.0 g/dL Final   05/14/2025 12.9 " 12.0 - 17.0 g/dL Final      HCT Hematocrit   Date Value Ref Range Status   05/14/2025 40.3 37.5 - 51.0 % Final   05/14/2025 38 38 - 51 % Final     Comment:     Serial Number: [id number]:  [id number]   05/14/2025 39 38 - 51 % Final     Comment:     Serial Number: [id number]:  [id number]   05/14/2025 38 38 - 51 % Final     Comment:     Serial Number: [id number]:  [id number]      Platelets Platelets   Date Value Ref Range Status   05/14/2025 252 140 - 450 10*3/mm3 Final        PT/INR:    Protime   Date Value Ref Range Status   05/14/2025 14.1 11.7 - 14.2 Seconds Final   /  INR   Date Value Ref Range Status   05/14/2025 1.09 0.90 - 1.10 Final       Sodium Sodium   Date Value Ref Range Status   05/14/2025 136 136 - 145 mmol/L Final      Potassium Potassium   Date Value Ref Range Status   05/14/2025 3.8 3.5 - 5.2 mmol/L Final      Chloride Chloride   Date Value Ref Range Status   05/14/2025 105 98 - 107 mmol/L Final      Bicarbonate CO2   Date Value Ref Range Status   05/14/2025 25.5 22.0 - 29.0 mmol/L Final      BUN BUN   Date Value Ref Range Status   05/14/2025 9 8 - 23 mg/dL Final      Creatinine Creatinine   Date Value Ref Range Status   05/14/2025 0.92 0.76 - 1.27 mg/dL Final      Calcium Calcium   Date Value Ref Range Status   05/14/2025 8.8 8.6 - 10.5 mg/dL Final      Magnesium Magnesium   Date Value Ref Range Status   05/14/2025 2.0 1.6 - 2.4 mg/dL Final          aspirin, 81 mg, Oral, Daily  [START ON 5/16/2025] ceFAZolin, 2,000 mg, Intravenous, On Call to OR  chlorhexidine, 15 mL, Mouth/Throat, Once  [START ON 5/16/2025] metoprolol tartrate, 12.5 mg, Oral, On Call to OR  mupirocin, 1 Application, Each Nare, Q12H  nebivolol, 2.5 mg, Oral, Daily  sodium chloride, 10 mL, Intravenous, Q12H             Coronary artery disease involving native coronary artery of native heart without angina pectoris    CAD (coronary artery disease)      Assessment & Plan    - Severe multivessel CAD, s/p PCI (2017)  - HTN  - HLD,  intolerant of statins  - History of DVT/PE  - CHANTEL    - Continues to complain of chest pressure.  EKG without any acute changes.  Discussed Dr. Juarez.  Will start heparin drip which will be on-call to the OR.  - Blood pressure significantly elevated today.  Amlodipine added per cardiology.  Patient reports multiple antihypertensive medication allergies.  - Patient reports having fluid drainage from ears bilaterally.  Has seen ENT in the past few weeks.  Occasional dizziness.  Attempted to consult ENT but their services are unavailable at our facility.  - Plan for OR tomorrow.  Labs and diagnostics reviewed.  Pertinent preoperative testing listed below.  Spoke at length regarding surgery and expected postoperative course with patient and spouse.  All questions were answered to patient's satisfaction.  - Continue routine care.     Preop testing:  Vein mapping: Small amount of adequate conduit per report.  Dr. Juarez has reviewed.  Carotid duplex: Left ICA with 50-69% stenosis, right ICA less than 50% stenosis  Platelet aggregation: 80%  A1c: 5.9  ABG: PO2 78.6  UA: WNL      GREG Rene  05/15/25  09:23 EDT

## 2025-05-15 NOTE — CASE MANAGEMENT/SOCIAL WORK
Discharge Planning Assessment  [facility]     Patient Name: Niranjan Peacock  MRN: [id number]  Today's Date: 5/15/2025    Admit Date: 5/14/2025    Plan: Home w/ Fernando EDGAR   Discharge Needs Assessment       Row Name 05/15/25 1615       Living Environment    People in Home spouse    Name(s) of People in Home Nolvia Peacock/wife    Current Living Arrangements home    Potentially Unsafe Housing Conditions none    In the past 12 months has the electric, gas, oil, or water company threatened to shut off services in your home? No    Primary Care Provided by self    Provides Primary Care For no one    Family Caregiver if Needed spouse    Family Caregiver Names Nolvia Peacock/wife; Son/Oh Peacock    Quality of Family Relationships helpful;involved;supportive    Able to Return to Prior Arrangements yes       Resource/Environmental Concerns    Resource/Environmental Concerns none    Transportation Concerns none       Transportation Needs    In the past 12 months, has lack of transportation kept you from medical appointments or from getting medications? no    In the past 12 months, has lack of transportation kept you from meetings, work, or from getting things needed for daily living? No       Food Insecurity    Within the past 12 months, you worried that your food would run out before you got the money to buy more. Never true    Within the past 12 months, the food you bought just didn't last and you didn't have money to get more. Never true       Transition Planning    Patient/Family Anticipates Transition to home with family    Patient/Family Anticipated Services at Transition home health care    Transportation Anticipated family or friend will provide       Discharge Needs Assessment    Readmission Within the Last 30 Days no previous admission in last 30 days    Equipment Currently Used at Home bp cuff;scales;shower chair    Concerns to be Addressed discharge planning    Do you want help with school or training? For example,  starting or completing job training or getting a high school diploma, GED or equivalent No    Anticipated Changes Related to Illness none    Equipment Needed After Discharge none    Discharge Facility/Level of Care Needs home with home health    Provided Post Acute Provider Quality & Resource List? Yes    Post Acute Provider Quality and Resource List Home Health    Delivered To Patient    Method of Delivery In person    Offered/Gave Vendor List yes    Patient's Choice of Community Agency(s) [facility]                   Discharge Plan       Row Name 05/15/25 7567       Plan    Plan Home w/ [facility]    Plan Comments CCP spoke with pleasant patient at bedside.  Explained role, verified face sheet, and discussed discharge plan.  Patient stated he is IADL's, retired and drives.  Patient lives with spouse/Nolvia Peacock in a single-story home with one entrance stair step.  Patients has no current PCP.  CCP gave Pt the [facility] Directory so he could call and find him a new PCP.  Patient pharmacy is [facility].  Patient has the following DME- built-in shower bench, BP cuff and scale.  Patient denies use of past home health or going to a sub-acute rehab.  Patient plans to return home at discharge with 24/7 assistance of spouse.  Patient chose [facility] to follow him post op open heart surgery.  Referral called to Viky/[facility], and she accepted.  The patient has a son/Oh and son-in-law that will care for his lawn while he is recuperating.  Patient denies current discharge needs.  CCP will continue to follow….…Louise PRO /SANIYA.                  Continued Care and Services - Admitted Since 5/14/2025       Home Medical Care Coordination complete.      Service Provider Request Status Services Address Phone Fax Patient Preferred    [facility] [address], [address], [place] [id number] [phone] --                  Expected Discharge  Date and Time       Expected Discharge Date Expected Discharge Time    May 21, 2025            Demographic Summary       Row Name 05/15/25 1613       General Information    Admission Type inpatient    Arrived From home    Referral Source admission list    Reason for Consult discharge planning    Preferred Language English       Contact Information    Permission Granted to Share Info With ;family/designee                   Functional Status       Row Name 05/15/25 1614       Functional Status    Usual Activity Tolerance good    Current Activity Tolerance good       Physical Activity    On average, how many days per week do you engage in moderate to strenuous exercise (like a brisk walk)? 3 days    On average, how many minutes do you engage in exercise at this level? 30 min    Number of minutes of exercise per week 90       Assessment of Health Literacy    How often do you have someone help you read hospital materials? Never    How often do you have problems learning about your medical condition because of difficulty understanding written information? Never    How often do you have a problem understanding what is told to you about your medical condition? Never    How confident are you filling out medical forms by yourself? Extremely    Health Literacy Good       Functional Status, IADL    Medications independent    Meal Preparation independent    Housekeeping independent    Laundry independent    Shopping independent    If for any reason you need help with day-to-day activities such as bathing, preparing meals, shopping, managing finances, etc., do you get the help you need? I don't need any help       Mental Status    General Appearance WDL WDL       Mental Status Summary    Recent Changes in Mental Status/Cognitive Functioning no changes       Employment/    Employment Status retired    Employment/ Comments Electrical [employer]                   Psychosocial    No documentation.                   Abuse/Neglect    No documentation.                  Legal    No documentation.                  Substance Abuse    No documentation.                  Patient Forms    No documentation.                     Louise Nix RN

## 2025-05-15 NOTE — PROGRESS NOTES
Hospital Follow Up    LOS:  LOS: 1 day   Patient Name: Niranjan Peacock  Age/Sex: 65 y.o. male  : 1960  MRN: [id number]    Day of Service: 05/15/25   Length of Stay: 1  Encounter Provider: GREG Dent  Place of Service: [facility] CARDIOLOGY  Patient Care Team:  Provider, No Known as PCP - Astrid Rodriguez APRN as Referring Physician (Cardiology)    Subjective:     Chief Complaint: Multivessel coronary disease    Interval History: Resting comfortably today denies any chest pain pressure or tightness    Objective:     Objective:  Temp:  [98 °F (36.7 °C)-98.5 °F (36.9 °C)] 98.5 °F (36.9 °C)  Heart Rate:  [45-52] 48  Resp:  [15-16] 16  BP: (145-201)/(69-86) 145/69     Intake/Output Summary (Last 24 hours) at 5/15/2025 1258  Last data filed at 5/15/2025 1127  Gross per 24 hour   Intake 600 ml   Output 1700 ml   Net -1100 ml     Body mass index is 28.25 kg/m².      25  0742   Weight: 99.8 kg (220 lb)     Weight change:     Physical Exam:   General Appearance:    Awake alert and oriented in no acute distress.   Color:  Skin:  Neuro:  HEENT:    Lungs:     Pink  Warm and dry  No focal, motor or sensory deficits  Neck supple, pupils equal, round and reactive. No JVD, No Bruit  Clear to auscultation,respirations regular, even and                  unlabored    Heart:    Regular rate and rhythm, S1 and S2, no murmur, no gallop, no rub. No edema, DP/PT pulses are 2+.  Right radial cath site stable   Chest Wall:    No abnormalities observed   Abdomen:     Normal bowel sounds, no masses, no organomegaly, soft        non-tender, non-distended, no guarding, no ascites noted   Extremities:   Moves all extremities well, no edema, no cyanosis, no redness       Lab Review:   Results from last 7 days   Lab Units 25  1422 25  1437   SODIUM mmol/L 136 141   POTASSIUM mmol/L 3.8 3.8   CHLORIDE mmol/L 105 104   CO2 mmol/L 25.5 28.7   BUN mg/dL 9 13   CREATININE  mg/dL 0.92 1.12   GLUCOSE mg/dL 100* 93   CALCIUM mg/dL 8.8 9.0   AST (SGOT) U/L 22  --    ALT (SGPT) U/L 19  --          Results from last 7 days   Lab Units 05/15/25  1225 05/14/25  1422   WBC 10*3/mm3 6.54 6.51   HEMOGLOBIN g/dL 14.4 13.5   HEMATOCRIT % 43.5 40.3   PLATELETS 10*3/mm3 286 252     Results from last 7 days   Lab Units 05/14/25  1422   INR  1.09   APTT seconds 25.9     Results from last 7 days   Lab Units 05/14/25  1422   MAGNESIUM mg/dL 2.0     Results from last 7 days   Lab Units 05/14/25  1422   CHOLESTEROL mg/dL 115   TRIGLYCERIDES mg/dL 146   HDL CHOL mg/dL 38*     Results from last 7 days   Lab Units 05/14/25  1422   PROBNP pg/mL 182.0         I reviewed the patient's new clinical results.  I personally viewed and interpreted the patient's EKG  Current Medications:   Scheduled Meds:amLODIPine, 5 mg, Oral, Q24H  aspirin, 81 mg, Oral, Daily  [START ON 5/16/2025] ceFAZolin, 2,000 mg, Intravenous, On Call to OR  chlorhexidine, 15 mL, Mouth/Throat, Once  [START ON 5/16/2025] metoprolol tartrate, 12.5 mg, Oral, On Call to OR  mupirocin, 1 Application, Each Nare, Q12H  nebivolol, 2.5 mg, Oral, Daily  sodium chloride, 10 mL, Intravenous, Q12H      Continuous Infusions:heparin, 10 Units/kg/hr        Allergies:  Allergies   Allergen Reactions    Lisinopril Shortness Of Breath     Rash also     Hydrochlorothiazide Other (See Comments)     Lightheadedness    Repatha [Evolocumab] Myalgia    Simvastatin Myalgia    Amlodipine Myalgia     Muscle aches , rash    Hydralazine Rash    Valsartan Rash     muscle aches       Assessment/Plan:    1.  Coronary artery disease-history of PCI in the past.  Normal left main, 85% distal LAD, proximal to mid LAD stent patent, ostial circumflex 90%, right coronary mid 70% proximal 30-40%.  Plan CABG tomorrow    2.  Essential hypertension-blood pressure elevated-reports multiple blood pressure class medication intolerances such as ACE inhibitors, angiotensin receptor blockers,  nitrates and calcium channel blockers as well as thiazide diuretics.  On Nebivolol 2.5 mg daily however heart rate in the 40s and 50s.  Blood pressure has been in the 160s spoke with him at length he has a rash and a split in the skin of his index finger which he attributed to amlodipine however has not been on it for several weeks and is still a persistent problem.  I do not think this is related to amlodipine will start on 5 mg daily.    3.  Dyslipidemia-statin intolerance.  LDL 52, HDL 38    4.  History of DVT    5.  Obstructive sleep apnea            GREG Dent  05/15/25  12:58 EDT  Electronically signed by GREG Dent, 05/15/25, 12:58 PM EDT.

## 2025-05-15 NOTE — PAYOR COMM NOTE
"Roseanna Peacock R \"Nain\" (65 y.o. Male)                                 ATTENTION INITIAL CLINICALS AUTH PENDING [id number]                             REPLY TO [facility] DEPT  [phone] OR CALL            Date of Birth   1960    Social Security Number       Address   [address] [place] [address]    Home Phone   [phone]    MRN   [id number]       Bahai   Sikhism    Marital Status                               Admission Date   5/14/2025    Admission Type   Elective    Admitting Provider   Krupa Martinez MD    Attending Provider   Krupa Martinez MD    Department, Room/Bed   [facility] CARDIOVASC UNIT, 2215/1       Discharge Date       Discharge Disposition       Discharge Destination                                 Attending Provider: Krupa Martinez MD    Allergies: Lisinopril, Hydrochlorothiazide, Repatha [Evolocumab], Simvastatin, Amlodipine, Hydralazine, Valsartan    Isolation: None   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)   Code Status: CPR    Ht: 188 cm (74\")   Wt: 99.8 kg (220 lb)    Admission Cmt: None   Principal Problem: Coronary artery disease involving native coronary artery of native heart without angina pectoris [I25.10]                   Active Insurance as of 5/14/2025       Primary Coverage       Payor Plan Insurance Group Employer/Plan Group    [facility] [employer] [facility] [employer] [facility] PPO [id number]       Payor Plan Address Payor Plan Phone Number Payor Plan Fax Number Effective Dates    [address] [phone]  1/1/2023 - None Entered    [place] [address]         Subscriber Name Subscriber Birth Date Member ID       ROSEANNA PEACOCK 1960 [id number]                     Emergency Contacts        (Rel.) Home Phone Work Phone Mobile Phone    Nolvia Peacock (Spouse) [phone] -- [phone]                 History & Physical        Krupa Martinez MD at 05/14/25 0745            H&P reviewed. The patient was examined " and there are no changes to the H&P.          Electronically signed by Krupa Martinez MD at 25 [id number]   Source Note: H&P (View-Only)                  [facility]      Patient Name: Niranjan Peacock  :1960  Age: 65 y.o.  Encounter Provider:  Tiago Srivastava Jr, MD      Chief Complaint: Follow-up coronary artery disease      HPI  Niranjan Peacock is a 65 y.o. male past medical history of coronary artery disease status post PCI LAD , dyslipidemia with complaints of myalgias with statins and Repatha, hypertension who presents for routine follow-up.      Last clinic visit note: Previously followed by Dr. Mancilla and I have reviewed all pertinent electronic health records.  Patient was initially seen in 2017 and found to have severe disease in the proximal LAD with drug-eluting stents placed.  He has experienced severe nighttime calf cramping which he associated with statin use however he states that in working through pernicious anemia and being off of statins he still had those nighttime cramping sensations which persisted until he started taking magnesium supplementation.  Blood pressure has been elevated lately and Dr. Marr added losartan and doubled the dose of hydrochlorothiazide.  Since then he notes an increase in orthostatic symptoms.  No episodes of severe dizziness leading to near syncope or syncope but he does notice increased frequency and intensity of symptoms.  He rides his bicycle about 8 miles 3-4 times per week with no chest pain or shortness of air that is above baseline.  He notes some mild chronic stable dyspnea on exertion with stairs only but states that he can walk as far as he wants to after he is up 2 or 3 flights of stairs without stopping.  No orthopnea, PND or edema.  Occasional palpitations but he feels that this is much better controlled after the augmentation of antihypertensive regimen.  Social family history is reviewed and is not pertinent to this clinic  "visit.  Blood pressure and heart rate are well controlled today in clinic.    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  He is almost certain this is related to the amlodipine that was restarted after he saw Shirlenejonas Ureñaoln.  Blood pressure seems well-controlled.  He denies palpitations, dizziness syncope.  He is having increasing complaints of orthopnea and exertional dyspnea.  He has had some fairly typical sounding chest pain recently.  No cardiac complaints at time of interview.    The following portions of the patient's history were reviewed and updated as appropriate: allergies, current medications, past family history, past medical history, past social history, past surgical history and problem list.      Review of Systems   Constitutional: Negative for chills and fever.   HENT:  Negative for hoarse voice and sore throat.    Eyes:  Negative for double vision and photophobia.   Cardiovascular:  Positive for dyspnea on exertion and palpitations. Negative for chest pain, leg swelling, near-syncope, orthopnea, paroxysmal nocturnal dyspnea and syncope.   Respiratory:  Negative for cough and wheezing.    Skin:  Negative for poor wound healing and rash.   Musculoskeletal:  Negative for arthritis and joint swelling.   Gastrointestinal:  Negative for bloating, abdominal pain, hematemesis and hematochezia.   Neurological:  Positive for dizziness. Negative for focal weakness.   Psychiatric/Behavioral:  Negative for depression and suicidal ideas.        OBJECTIVE:   Vital Signs  Vitals:    05/05/25 0838   BP: 130/72   Pulse: 51   SpO2: 98%     Estimated body mass index is 28.25 kg/m² as calculated from the following:    Height as of this encounter: 188 cm (74\").    Weight as of this encounter: 99.8 kg (220 lb).    Vitals reviewed.   Constitutional:       Appearance: Healthy appearance. Not in distress.   Neck:      Vascular: No JVR. JVD " normal.   Pulmonary:      Effort: Pulmonary effort is normal.      Breath sounds: Normal breath sounds. No wheezing. No rhonchi. No rales.   Chest:      Chest wall: Not tender to palpatation.   Cardiovascular:      PMI at left midclavicular line. Normal rate. Regular rhythm. Normal S1. Normal S2.       Murmurs: There is no murmur.      No gallop.  No click. No rub.   Pulses:     Intact distal pulses.   Edema:     Peripheral edema absent.   Abdominal:      General: Bowel sounds are normal.      Palpations: Abdomen is soft.      Tenderness: There is no abdominal tenderness.   Musculoskeletal: Normal range of motion.         General: No tenderness. Skin:     General: Skin is warm and dry.   Neurological:      General: No focal deficit present.      Mental Status: Alert and oriented to person, place and time.         Procedures  Lipid Panel          6/4/2024    07:58 9/5/2024    08:30 1/17/2025    08:46   Lipid Panel   Total Cholesterol  185  145    Total Cholesterol 198      Triglycerides 120  128  99    HDL Cholesterol 41  42  48    VLDL Cholesterol 22  23  18    LDL Cholesterol  135  120  79    LDL/HDL Ratio  2.80  1.61         BUN   Date Value Ref Range Status   06/04/2024 12 8 - 27 mg/dL Final   12/06/2023 9 8 - 23 mg/dL Final     Creatinine   Date Value Ref Range Status   06/04/2024 1.10 0.76 - 1.27 mg/dL Final   12/06/2023 0.98 0.76 - 1.27 mg/dL Final   07/27/2022 1.00 0.60 - 1.30 mg/dL Final     Comment:     Serial Number: [id number]:  [id number]     Potassium   Date Value Ref Range Status   06/04/2024 4.1 3.5 - 5.2 mmol/L Final   12/06/2023 4.0 3.5 - 5.2 mmol/L Final     ALT (SGPT)   Date Value Ref Range Status   06/04/2024 18 0 - 44 IU/L Final   12/04/2023 21 1 - 41 U/L Final     AST (SGOT)   Date Value Ref Range Status   06/04/2024 22 0 - 40 IU/L Final   12/04/2023 19 1 - 40 U/L Final           ASSESSMENT:     65-year-old male with ASCVD presents for routine follow-up      PLAN OF CARE:     Coronary artery  "disease without angina -concerning clinical story and we will plan for left and right heart catheterization as we have done a few stress studies and echoes over the last 2 years with no significant cardiac pathology identified.  Exertional dyspnea -etiology uncertain.  Testing as above  Syncope -no further episodes of syncope and postural dizziness improved.  Recommend compression stockings, increase fluid intake and routine exercise.  Hyperlipidemia -as above    Return to clinic 6 months.  His PCP has left the [facility] system.  I made a referral to Dr. Anne.             Discharge Medications            Accurate as of May 5, 2025  8:40 AM. If you have any questions, ask your nurse or doctor.                Continue These Medications        Instructions Start Date   amLODIPine 2.5 MG tablet  Commonly known as: NORVASC   2.5 mg, Oral, Daily      aspirin 81 MG tablet   81 mg, Oral, Daily      B-D INSULIN SYRINGE 1CC/25GX1\" 25G X 1\" 1 ML misc  Generic drug: Insulin Syringe-Needle U-100   USE TO ADMINISTER B-12 INJECTIONS AS DIRECTED      cyanocobalamin 1000 MCG/ML injection   INJECT 1 MILLILITER INTO THE APPROPRIATE MUSCLE AS DIRECTED BY PRESCRIBER EVERY 14 DAYS      fexofenadine 60 MG tablet  Commonly known as: ALLEGRA   60 mg, Oral, Daily PRN      hydrocortisone 0.5 % cream   As Needed      LEQVIO SC   Inject  under the skin into the appropriate area as directed.      MAGNESIUM PO   Take  by mouth. Every other day      multivitamin tablet tablet  Commonly known as: THERAGRAN   1 tablet, Daily      nebivolol 2.5 MG tablet  Commonly known as: Bystolic   2.5 mg, Oral, Daily      nitroglycerin 0.4 MG SL tablet  Commonly known as: NITROSTAT   0.4 mg, Sublingual, Every 5 Minutes PRN, Take no more than 3 doses in 15 minutes.      tamsulosin 0.4 MG capsule 24 hr capsule  Commonly known as: FLOMAX   0.4 mg, Oral, Daily      triamcinolone 0.025 % cream  Commonly known as: KENALOG   1 Application, Topical, 2 Times Daily " PRN      vitamin D3 125 MCG (5000 UT) capsule capsule   5,000 Units, Daily               Thank you for allowing me to participate in the care of your patient,      Sincerely,   Tiago Srivastava MD  [facility]  25  08:40 EDT        Electronically signed by Tiago Srivastava Jr., MD at 25 1013                 Krupa Martinez MD at 25 0744            H&P reviewed. The patient was examined and there are no changes to the H&P.          Electronically signed by Krupa Martinez MD at 25 0732   Source Note: H&P (View-Only)                  [facility]      Patient Name: Niranjan Peacock  :1960  Age: 65 y.o.  Encounter Provider:  Tiago Srivastava Jr, MD      Chief Complaint: Follow-up coronary artery disease      HPI  Niranjan Peacock is a 65 y.o. male past medical history of coronary artery disease status post PCI LAD , dyslipidemia with complaints of myalgias with statins and Repatha, hypertension who presents for routine follow-up.      Last clinic visit note: Previously followed by Dr. Mancilla and I have reviewed all pertinent electronic health records.  Patient was initially seen in 2017 and found to have severe disease in the proximal LAD with drug-eluting stents placed.  He has experienced severe nighttime calf cramping which he associated with statin use however he states that in working through pernicious anemia and being off of statins he still had those nighttime cramping sensations which persisted until he started taking magnesium supplementation.  Blood pressure has been elevated lately and Dr. Marr added losartan and doubled the dose of hydrochlorothiazide.  Since then he notes an increase in orthostatic symptoms.  No episodes of severe dizziness leading to near syncope or syncope but he does notice increased frequency and intensity of symptoms.  He rides his bicycle about 8 miles 3-4 times per week with no chest pain or shortness of air that is above  baseline.  He notes some mild chronic stable dyspnea on exertion with stairs only but states that he can walk as far as he wants to after he is up 2 or 3 flights of stairs without stopping.  No orthopnea, PND or edema.  Occasional palpitations but he feels that this is much better controlled after the augmentation of antihypertensive regimen.  Social family history is reviewed and is not pertinent to this clinic visit.  Blood pressure and heart rate are well controlled today in clinic.    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  He is almost certain this is related to the amlodipine that was restarted after he saw Shirlene Barber.  Blood pressure seems well-controlled.  He denies palpitations, dizziness syncope.  He is having increasing complaints of orthopnea and exertional dyspnea.  He has had some fairly typical sounding chest pain recently.  No cardiac complaints at time of interview.    The following portions of the patient's history were reviewed and updated as appropriate: allergies, current medications, past family history, past medical history, past social history, past surgical history and problem list.      Review of Systems   Constitutional: Negative for chills and fever.   HENT:  Negative for hoarse voice and sore throat.    Eyes:  Negative for double vision and photophobia.   Cardiovascular:  Positive for dyspnea on exertion and palpitations. Negative for chest pain, leg swelling, near-syncope, orthopnea, paroxysmal nocturnal dyspnea and syncope.   Respiratory:  Negative for cough and wheezing.    Skin:  Negative for poor wound healing and rash.   Musculoskeletal:  Negative for arthritis and joint swelling.   Gastrointestinal:  Negative for bloating, abdominal pain, hematemesis and hematochezia.   Neurological:  Positive for dizziness. Negative for focal weakness.   Psychiatric/Behavioral:  Negative for depression and  "suicidal ideas.        OBJECTIVE:   Vital Signs  Vitals:    05/05/25 0838   BP: 130/72   Pulse: 51   SpO2: 98%     Estimated body mass index is 28.25 kg/m² as calculated from the following:    Height as of this encounter: 188 cm (74\").    Weight as of this encounter: 99.8 kg (220 lb).    Vitals reviewed.   Constitutional:       Appearance: Healthy appearance. Not in distress.   Neck:      Vascular: No JVR. JVD normal.   Pulmonary:      Effort: Pulmonary effort is normal.      Breath sounds: Normal breath sounds. No wheezing. No rhonchi. No rales.   Chest:      Chest wall: Not tender to palpatation.   Cardiovascular:      PMI at left midclavicular line. Normal rate. Regular rhythm. Normal S1. Normal S2.       Murmurs: There is no murmur.      No gallop.  No click. No rub.   Pulses:     Intact distal pulses.   Edema:     Peripheral edema absent.   Abdominal:      General: Bowel sounds are normal.      Palpations: Abdomen is soft.      Tenderness: There is no abdominal tenderness.   Musculoskeletal: Normal range of motion.         General: No tenderness. Skin:     General: Skin is warm and dry.   Neurological:      General: No focal deficit present.      Mental Status: Alert and oriented to person, place and time.         Procedures  Lipid Panel          6/4/2024    07:58 9/5/2024    08:30 1/17/2025    08:46   Lipid Panel   Total Cholesterol  185  145    Total Cholesterol 198      Triglycerides 120  128  99    HDL Cholesterol 41  42  48    VLDL Cholesterol 22  23  18    LDL Cholesterol  135  120  79    LDL/HDL Ratio  2.80  1.61         BUN   Date Value Ref Range Status   06/04/2024 12 8 - 27 mg/dL Final   12/06/2023 9 8 - 23 mg/dL Final     Creatinine   Date Value Ref Range Status   06/04/2024 1.10 0.76 - 1.27 mg/dL Final   12/06/2023 0.98 0.76 - 1.27 mg/dL Final   07/27/2022 1.00 0.60 - 1.30 mg/dL Final     Comment:     Serial Number: [id number]:  [id number]     Potassium   Date Value Ref Range Status   06/04/2024 4.1 " "3.5 - 5.2 mmol/L Final   12/06/2023 4.0 3.5 - 5.2 mmol/L Final     ALT (SGPT)   Date Value Ref Range Status   06/04/2024 18 0 - 44 IU/L Final   12/04/2023 21 1 - 41 U/L Final     AST (SGOT)   Date Value Ref Range Status   06/04/2024 22 0 - 40 IU/L Final   12/04/2023 19 1 - 40 U/L Final           ASSESSMENT:     65-year-old male with ASCVD presents for routine follow-up      PLAN OF CARE:     Coronary artery disease without angina -concerning clinical story and we will plan for left and right heart catheterization as we have done a few stress studies and echoes over the last 2 years with no significant cardiac pathology identified.  Exertional dyspnea -etiology uncertain.  Testing as above  Syncope -no further episodes of syncope and postural dizziness improved.  Recommend compression stockings, increase fluid intake and routine exercise.  Hyperlipidemia -as above    Return to clinic 6 months.  His PCP has left the [facility].  I made a referral to Dr. Anne.             Discharge Medications            Accurate as of May 5, 2025  8:40 AM. If you have any questions, ask your nurse or doctor.                Continue These Medications        Instructions Start Date   amLODIPine 2.5 MG tablet  Commonly known as: NORVASC   2.5 mg, Oral, Daily      aspirin 81 MG tablet   81 mg, Oral, Daily      B-D INSULIN SYRINGE 1CC/25GX1\" 25G X 1\" 1 ML misc  Generic drug: Insulin Syringe-Needle U-100   USE TO ADMINISTER B-12 INJECTIONS AS DIRECTED      cyanocobalamin 1000 MCG/ML injection   INJECT 1 MILLILITER INTO THE APPROPRIATE MUSCLE AS DIRECTED BY PRESCRIBER EVERY 14 DAYS      fexofenadine 60 MG tablet  Commonly known as: ALLEGRA   60 mg, Oral, Daily PRN      hydrocortisone 0.5 % cream   As Needed      LEQVIO SC   Inject  under the skin into the appropriate area as directed.      MAGNESIUM PO   Take  by mouth. Every other day      multivitamin tablet tablet  Commonly known as: THERAGRAN   1 tablet, Daily      nebivolol 2.5 " MG tablet  Commonly known as: Bystolic   2.5 mg, Oral, Daily      nitroglycerin 0.4 MG SL tablet  Commonly known as: NITROSTAT   0.4 mg, Sublingual, Every 5 Minutes PRN, Take no more than 3 doses in 15 minutes.      tamsulosin 0.4 MG capsule 24 hr capsule  Commonly known as: FLOMAX   0.4 mg, Oral, Daily      triamcinolone 0.025 % cream  Commonly known as: KENALOG   1 Application, Topical, 2 Times Daily PRN      vitamin D3 125 MCG (5000 UT) capsule capsule   5,000 Units, Daily               Thank you for allowing me to participate in the care of your patient,      Sincerely,   Tiago Srivastava MD  [facility]  25  08:40 EDT        Electronically signed by Tiago Srivastava Jr., MD at 25 1013                 Tiago Srivastava Jr., MD at 25 0813                  [facility]      Patient Name: Niranjan Peacock  :1960  Age: 65 y.o.  Encounter Provider:  Tiago Srivastava Jr, MD      Chief Complaint: Follow-up coronary artery disease      HPI  Niranjan Peacock is a 65 y.o. male past medical history of coronary artery disease status post PCI LAD , dyslipidemia with complaints of myalgias with statins and Repatha, hypertension who presents for routine follow-up.      Last clinic visit note: Previously followed by Dr. Mancilla and I have reviewed all pertinent electronic health records.  Patient was initially seen in 2017 and found to have severe disease in the proximal LAD with drug-eluting stents placed.  He has experienced severe nighttime calf cramping which he associated with statin use however he states that in working through pernicious anemia and being off of statins he still had those nighttime cramping sensations which persisted until he started taking magnesium supplementation.  Blood pressure has been elevated lately and Dr. Marr added losartan and doubled the dose of hydrochlorothiazide.  Since then he notes an increase in orthostatic symptoms.  No episodes of  severe dizziness leading to near syncope or syncope but he does notice increased frequency and intensity of symptoms.  He rides his bicycle about 8 miles 3-4 times per week with no chest pain or shortness of air that is above baseline.  He notes some mild chronic stable dyspnea on exertion with stairs only but states that he can walk as far as he wants to after he is up 2 or 3 flights of stairs without stopping.  No orthopnea, PND or edema.  Occasional palpitations but he feels that this is much better controlled after the augmentation of antihypertensive regimen.  Social family history is reviewed and is not pertinent to this clinic visit.  Blood pressure and heart rate are well controlled today in clinic.    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  He is almost certain this is related to the amlodipine that was restarted after he saw Shirlene Barber.  Blood pressure seems well-controlled.  He denies palpitations, dizziness syncope.  He is having increasing complaints of orthopnea and exertional dyspnea.  He has had some fairly typical sounding chest pain recently.  No cardiac complaints at time of interview.    The following portions of the patient's history were reviewed and updated as appropriate: allergies, current medications, past family history, past medical history, past social history, past surgical history and problem list.      Review of Systems   Constitutional: Negative for chills and fever.   HENT:  Negative for hoarse voice and sore throat.    Eyes:  Negative for double vision and photophobia.   Cardiovascular:  Positive for dyspnea on exertion and palpitations. Negative for chest pain, leg swelling, near-syncope, orthopnea, paroxysmal nocturnal dyspnea and syncope.   Respiratory:  Negative for cough and wheezing.    Skin:  Negative for poor wound healing and rash.   Musculoskeletal:  Negative for arthritis and joint  "swelling.   Gastrointestinal:  Negative for bloating, abdominal pain, hematemesis and hematochezia.   Neurological:  Positive for dizziness. Negative for focal weakness.   Psychiatric/Behavioral:  Negative for depression and suicidal ideas.        OBJECTIVE:   Vital Signs  Vitals:    05/05/25 0838   BP: 130/72   Pulse: 51   SpO2: 98%     Estimated body mass index is 28.25 kg/m² as calculated from the following:    Height as of this encounter: 188 cm (74\").    Weight as of this encounter: 99.8 kg (220 lb).    Vitals reviewed.   Constitutional:       Appearance: Healthy appearance. Not in distress.   Neck:      Vascular: No JVR. JVD normal.   Pulmonary:      Effort: Pulmonary effort is normal.      Breath sounds: Normal breath sounds. No wheezing. No rhonchi. No rales.   Chest:      Chest wall: Not tender to palpatation.   Cardiovascular:      PMI at left midclavicular line. Normal rate. Regular rhythm. Normal S1. Normal S2.       Murmurs: There is no murmur.      No gallop.  No click. No rub.   Pulses:     Intact distal pulses.   Edema:     Peripheral edema absent.   Abdominal:      General: Bowel sounds are normal.      Palpations: Abdomen is soft.      Tenderness: There is no abdominal tenderness.   Musculoskeletal: Normal range of motion.         General: No tenderness. Skin:     General: Skin is warm and dry.   Neurological:      General: No focal deficit present.      Mental Status: Alert and oriented to person, place and time.         Procedures  Lipid Panel          6/4/2024    07:58 9/5/2024    08:30 1/17/2025    08:46   Lipid Panel   Total Cholesterol  185  145    Total Cholesterol 198      Triglycerides 120  128  99    HDL Cholesterol 41  42  48    VLDL Cholesterol 22  23  18    LDL Cholesterol  135  120  79    LDL/HDL Ratio  2.80  1.61         BUN   Date Value Ref Range Status   06/04/2024 12 8 - 27 mg/dL Final   12/06/2023 9 8 - 23 mg/dL Final     Creatinine   Date Value Ref Range Status   06/04/2024 1.10 " "0.76 - 1.27 mg/dL Final   12/06/2023 0.98 0.76 - 1.27 mg/dL Final   07/27/2022 1.00 0.60 - 1.30 mg/dL Final     Comment:     Serial Number: [id number]:  [id number]     Potassium   Date Value Ref Range Status   06/04/2024 4.1 3.5 - 5.2 mmol/L Final   12/06/2023 4.0 3.5 - 5.2 mmol/L Final     ALT (SGPT)   Date Value Ref Range Status   06/04/2024 18 0 - 44 IU/L Final   12/04/2023 21 1 - 41 U/L Final     AST (SGOT)   Date Value Ref Range Status   06/04/2024 22 0 - 40 IU/L Final   12/04/2023 19 1 - 40 U/L Final           ASSESSMENT:     65-year-old male with ASCVD presents for routine follow-up      PLAN OF CARE:     Coronary artery disease without angina -concerning clinical story and we will plan for left and right heart catheterization as we have done a few stress studies and echoes over the last 2 years with no significant cardiac pathology identified.  Exertional dyspnea -etiology uncertain.  Testing as above  Syncope -no further episodes of syncope and postural dizziness improved.  Recommend compression stockings, increase fluid intake and routine exercise.  Hyperlipidemia -as above    Return to clinic 6 months.  His PCP has left the [facility] system.  I made a referral to Dr. Anne.             Discharge Medications            Accurate as of May 5, 2025  8:40 AM. If you have any questions, ask your nurse or doctor.                Continue These Medications        Instructions Start Date   amLODIPine 2.5 MG tablet  Commonly known as: NORVASC   2.5 mg, Oral, Daily      aspirin 81 MG tablet   81 mg, Oral, Daily      B-D INSULIN SYRINGE 1CC/25GX1\" 25G X 1\" 1 ML misc  Generic drug: Insulin Syringe-Needle U-100   USE TO ADMINISTER B-12 INJECTIONS AS DIRECTED      cyanocobalamin 1000 MCG/ML injection   INJECT 1 MILLILITER INTO THE APPROPRIATE MUSCLE AS DIRECTED BY PRESCRIBER EVERY 14 DAYS      fexofenadine 60 MG tablet  Commonly known as: ALLEGRA   60 mg, Oral, Daily PRN      hydrocortisone 0.5 % cream   As Needed    "   LEQVIO SC   Inject  under the skin into the appropriate area as directed.      MAGNESIUM PO   Take  by mouth. Every other day      multivitamin tablet tablet  Commonly known as: THERAGRAN   1 tablet, Daily      nebivolol 2.5 MG tablet  Commonly known as: Bystolic   2.5 mg, Oral, Daily      nitroglycerin 0.4 MG SL tablet  Commonly known as: NITROSTAT   0.4 mg, Sublingual, Every 5 Minutes PRN, Take no more than 3 doses in 15 minutes.      tamsulosin 0.4 MG capsule 24 hr capsule  Commonly known as: FLOMAX   0.4 mg, Oral, Daily      triamcinolone 0.025 % cream  Commonly known as: KENALOG   1 Application, Topical, 2 Times Daily PRN      vitamin D3 125 MCG (5000 UT) capsule capsule   5,000 Units, Daily               Thank you for allowing me to participate in the care of your patient,      Sincerely,   Tiago Srivastava MD  [facility]  05/05/25  08:40 EDT        Electronically signed by Tiago Srivastava Jr., MD at 05/05/25 1013       Vital Signs (last 2 days)       Date/Time Temp Temp src Pulse Resp BP Patient Position SpO2    05/15/25 0720 98.5 (36.9) Oral 52 16 165/74 Lying 97    05/15/25 0442 98.4 (36.9) Oral 50 16 163/78 Lying 95    05/15/25 0000 98 (36.7) Oral 47 16 154/73 Lying 94    05/14/25 2131 -- -- 51 -- 165/71 -- --    05/14/25 1952 98.1 (36.7) Oral 46 15 178/70 Lying 99    05/14/25 1905 -- -- 49 -- -- -- 97    05/14/25 1900 -- -- 45 -- 167/80 -- 96    05/14/25 1855 -- -- 49 -- -- -- 97    05/14/25 1850 -- -- 48 -- -- -- 96    05/14/25 1845 -- -- 49 -- 167/72 -- 97    05/14/25 1800 -- -- 47 -- 191/74 -- 99    05/14/25 1745 -- -- 49 -- 193/76 -- 99    05/14/25 1730 -- -- 47 -- 190/77 -- 98    05/14/25 1715 -- -- 52 -- 185/79 -- 98    05/14/25 1700 -- -- 47 -- -- -- 100    05/14/25 1645 -- -- 48 -- 201/86 -- 99    05/14/25 1230 -- -- 46 15 164/88 -- 96    05/14/25 1215 -- -- 46 15 182/80 -- 98    05/14/25 1200 -- -- 49 15 183/82 -- 98    05/14/25 1130 -- -- 45 15 179/79 -- 98    05/14/25 1115 -- --  60 15 162/74 -- 99    05/14/25 1100 -- -- 49 15 175/68 -- 96    05/14/25 1030 -- -- 45 15 167/69 -- 96    05/14/25 1015 -- -- 42 15 179/73 -- 96    05/14/25 1000 -- -- 44 15 180/73 -- 98    05/14/25 0945 -- -- 42 15 168/70 -- 98    05/14/25 0930 -- -- 44 15 154/70 -- 97    05/14/25 0915 -- -- 46 15 156/72 -- 96    05/14/25 0900 -- -- 49 15 150/69 Lying 96    05/14/25 08:44:49 -- -- -- 13 -- -- --    05/14/25 08:37:39 -- -- -- 13 -- -- --    05/14/25 08:32:42 -- -- -- 12 -- -- --    05/14/25 08:27:51 -- -- -- 13 -- -- --    05/14/25 08:20:54 -- -- -- 14 -- -- --    05/14/25 08:15:50 -- -- -- 14 -- -- --    05/14/25 08:11:14 -- -- -- 14 -- -- --    05/14/25 08:05:57 -- -- -- 14 -- -- --    05/14/25 08:01:20 -- -- -- 14 -- -- --    05/14/25 0742 98.4 (36.9) Oral 47 18 170/74 Lying 98          Oxygen Therapy (last 2 days)       Date/Time SpO2 Device (Oxygen Therapy) Flow (L/min) (Oxygen Therapy) Oxygen Concentration (%) ETCO2 (mmHg)    05/15/25 0720 97 -- -- -- --    05/15/25 0442 95 -- -- -- --    05/15/25 0401 -- room air -- -- --    05/15/25 0000 94 -- -- -- --    05/14/25 2003 -- room air -- -- --    05/14/25 1952 99 -- -- -- --    05/14/25 1905 97 -- -- -- --    05/14/25 1900 96 -- -- -- --    05/14/25 1855 97 -- -- -- --    05/14/25 1850 96 -- -- -- --    05/14/25 1845 97 -- -- -- --    05/14/25 1800 99 -- -- -- --    05/14/25 1745 99 -- -- -- --    05/14/25 1730 98 -- -- -- --    05/14/25 1715 98 -- -- -- --    05/14/25 1700 100 -- -- -- --    05/14/25 1645 99 -- -- -- --    05/14/25 1313 -- room air -- -- --    05/14/25 1230 96 -- -- -- --    05/14/25 1215 98 -- -- -- --    05/14/25 1200 98 -- -- -- --    05/14/25 1130 98 -- -- -- --    05/14/25 1115 99 -- -- -- --    05/14/25 1100 96 -- -- -- --    05/14/25 1030 96 -- -- -- --    05/14/25 1015 96 -- -- -- --    05/14/25 1000 98 -- -- -- --    05/14/25 0945 98 -- -- -- --    05/14/25 0930 97 -- -- -- --    05/14/25 0915 96 -- -- -- --    05/14/25 0900 96 room  air -- -- --    05/14/25 08:44:49 -- -- -- -- 36 05/14/25 08:37:39 -- -- -- -- 38 05/14/25 08:32:42 -- -- -- -- 37 05/14/25 08:27:51 -- -- -- -- 35 05/14/25 08:20:54 -- -- -- -- 36 05/14/25 08:15:50 -- -- -- -- 35 05/14/25 08:11:14 -- -- -- -- 36 05/14/25 08:05:57 -- -- -- -- 35 05/14/25 08:01:20 -- -- -- -- 34 05/14/25 0742 98 room air -- -- --          Lines, Drains & Airways       Active LDAs       Name Placement date Placement time Site Days    Peripheral IV 05/14/25 0745 20 G Left Antecubital 05/14/25  0745  Antecubital  1              Inactive LDAs       Name Placement date Placement time Removal date Removal time Site Days    [REMOVED] Peripheral IV 05/14/25 0745 20 G Right;Medial Antecubital 05/14/25  0745  05/14/25  0821  Antecubital  less than 1    [REMOVED] Arterial Sheath 6 Fr. Right Radial 05/14/25  0825  05/14/25  0844  Radial  less than 1    [REMOVED] Venous Sheath 5 Fr. Right Brachial 05/14/25  0821  05/14/25  0845  Brachial  less than 1                  Facility-Administered Medications as of 5/15/2025   Medication Dose Route Frequency Provider Last Rate Last Admin    acetaminophen (TYLENOL) tablet 650 mg  650 mg Oral Q4H PRN Krupa Martinez MD        ALPRAZolam (XANAX) tablet 0.25 mg  0.25 mg Oral Q8H PRN Jr James Juarez MD        aspirin EC tablet 81 mg  81 mg Oral Daily Krupa Martinez MD        [START ON 5/16/2025] ceFAZolin 2000 mg IVPB in 100 mL NS (MBP)  2,000 mg Intravenous On Call to OR Jr James Juarez MD        chlorhexidine (PERIDEX) 0.12 % solution 15 mL  15 mL Mouth/Throat Once Jr James Juarez MD        Chlorhexidine Gluconate Cloth 2 % pads 1 Application  1 Application Topical Q12H PRN Jr James Juarez MD        diphenhydrAMINE (BENADRYL) capsule 25 mg  25 mg Oral Nightly PRN Jr James Juarez MD        [START ON 5/16/2025] metoprolol tartrate (LOPRESSOR) tablet 12.5 mg  12.5 mg Oral On Call to OR Jr James Juarez MD         mupirocin (BACTROBAN) 2 % nasal ointment 1 Application  1 Application Each Nare Q12H Jr James Juarez MD        nebivolol (BYSTOLIC) tablet 2.5 mg  2.5 mg Oral Daily Krupa Martinez MD   2.5 mg at 25    nitroglycerin (NITROSTAT) SL tablet 0.4 mg  0.4 mg Sublingual Q5 Min PRN Krupa Martinez MD        sodium chloride 0.9 % flush 10 mL  10 mL Intravenous Q12H Jr James Juarez MD   10 mL at 25    sodium chloride 0.9 % flush 10 mL  10 mL Intravenous PRN Jr James Juarez MD        sodium chloride 0.9 % infusion 40 mL  40 mL Intravenous PRN Jr James Juarez MD        [] sodium chloride 0.9 % infusion  100 mL/hr Intravenous Continuous Krupa Martinez MD   Stopped at 25 1500    [] sodium chloride 0.9 % infusion  1 mL/kg/hr Intravenous Continuous Krupa Martinez MD   Stopped at 25 1722     Orders (all)        Start     Ordered    25 0600  metoprolol tartrate (LOPRESSOR) tablet 12.5 mg  On Call to O.R.         25 1158    25 0600  ceFAZolin 2000 mg IVPB in 100 mL NS (MBP)  On Call to O.R.         25 1158    25 0001  NPO Diet NPO Type: Sips with Meds  Diet Effective Midnight         25 1158    05/15/25 0900  aspirin EC tablet 81 mg  Daily         25 1338    05/15/25 0821  ECG 12 Lead Chest Pain  STAT        Comments: Pressure pain that has been there since admit    05/15/25 0821    05/15/25 0613  Diet: Cardiac; Healthy Heart (2-3 Na+); Fluid Consistency: Thin (IDDSI 0)  Diet Effective Now         05/15/25 0613    05/15/25 0001  NPO Diet NPO Type: Strict NPO  Diet Effective Midnight,   Status:  Canceled         25 0715    05/15/25 0000  sodium chloride 0.9 % infusion  Continuous         25 0715    05/15/25 0000  chlorhexidine (PERIDEX) 0.12 % solution 15 mL  Once         25 1158    05/15/25 0000  Chlorhexidine Gluconate Cloth 2 % pads 1 Application  Every 12 Hours PRN         25 1158    05/15/25 0000   Clip Hair Chin to Ankles  Once         05/14/25 1158    05/14/25 2100  mupirocin (BACTROBAN) 2 % nasal ointment 1 Application  Every 12 Hours         05/14/25 1158    05/14/25 2100  Weigh Patient Night Before Surgery  Once        Comments: Need Actual Weight, Not Stated Weight    05/14/25 1158    05/14/25 2100  nebivolol (BYSTOLIC) tablet 2.5 mg  Daily         05/14/25 1338    05/14/25 1700  POCT OR PANEL, Venous  PROCEDURE ONCE         05/14/25 0825    05/14/25 1700  POC Panel 9, ISTAT  PROCEDURE ONCE         05/14/25 0837    05/14/25 1658  POCT OR PANEL, Venous  PROCEDURE ONCE         05/14/25 0829    05/14/25 1600  Strict Intake & Output  Every 8 Hours         05/14/25 1158    05/14/25 1551  ABO RH Specimen Verification  STAT         05/14/25 1550    05/14/25 1400  Incentive Spirometry  Every 2 Hours While Awake,   Status:  Canceled       05/14/25 1308    05/14/25 1400  sodium chloride 0.9 % infusion  Continuous         05/14/25 1308    05/14/25 1400  Instruct Patient on Coughing, Deep Breathing and Incentive Spirometry  Every 4 Hours While Awake,   Status:  Canceled       05/14/25 1158    05/14/25 1400  Instruct Patient on Coughing, Deep Breathing and Incentive Spirometry  Every 4 Hours While Awake       05/14/25 1158    05/14/25 1309  Code Status and Medical Interventions: CPR (Attempt to Resuscitate); Full Support  Continuous         05/14/25 1308    05/14/25 1309  Continuous Cardiac Monitoring  Continuous        Comments: Follow Standing Orders As Outlined in Process Instructions (Open Order Report to View Full Instructions)    05/14/25 1308    05/14/25 1309  Maintain IV Access  Continuous         05/14/25 1308    05/14/25 1309  Telemetry - Place Orders & Notify Provider of Results When Patient Experiences Acute Chest Pain, Dysrhythmia or Respiratory Distress  Continuous        Comments: Open Order Report to View Parameters Requiring Provider Notification    05/14/25 1308    05/14/25 1309  May Be Off  Telemetry for Tests  Continuous         05/14/25 1308    05/14/25 1309  Continuous Pulse Oximetry  Continuous         05/14/25 1308    05/14/25 1309  Advance Diet As Tolerated -  Until Discontinued         05/14/25 1308    05/14/25 1309  Diet: Regular/House; Fluid Consistency: Thin (IDDSI 0)  Diet Effective Now,   Status:  Canceled         05/14/25 1308    05/14/25 1308  nitroglycerin (NITROSTAT) SL tablet 0.4 mg  Every 5 Minutes PRN         05/14/25 1308    05/14/25 1308  acetaminophen (TYLENOL) tablet 650 mg  Every 4 Hours PRN         05/14/25 1308    05/14/25 1200  Strict Intake & Output  Every 4 Hours       05/14/25 1142    05/14/25 1200  sodium chloride 0.9 % flush 10 mL  Every 12 Hours Scheduled         05/14/25 1158    05/14/25 1200  Neurovascular Checks  Every 4 Hours       05/14/25 1158    05/14/25 1159  Urinalysis With Microscopic If Indicated (No Culture) - Urine, Clean Catch  Once         05/14/25 1158    05/14/25 1156  Follow Anesthesia Guidelines / Protocol  Once         05/14/25 1158    05/14/25 1156  Insert Peripheral IV  Once         05/14/25 1158    05/14/25 1156  Saline Lock & Maintain IV Access  Continuous,   Status:  Canceled         05/14/25 1158    05/14/25 1156  Follow Anesthesia Guidelines / Protocol  Continuous         05/14/25 1158    05/14/25 1156  Measure Height  Once         05/14/25 1158    05/14/25 1156  Skin Assessment  Every Shift       05/14/25 1158    05/14/25 1156  POC Glucose Once  Once        Comments: Obtain glucose at 0400 the day of surgery, if greater than 200, initiate insulin IV protocol      05/14/25 1158    05/14/25 1156  Give Patient Pre-Op Education Booklet / Materials  Once        Comments: Give Patient Pre-Op Education Booklet / Materials    05/14/25 1158    05/14/25 1156  Evaluation For Pre-Op PFT Need  Once         05/14/25 1158    05/14/25 1156  RN To Place Hospitalist & Diabetes Educator Consult Orders if Patient Diabetic, POC Glucose is Greater Than 180 or  HgbA1c Greater Than 7  Continuous        Comments: Hospitalist Reason for Consult - Blood Glucose Management  Diabetes Educator Reason for Consult - Diabetes Education    05/14/25 1158    05/14/25 1156  Inpatient Anesthesiology Consult  Once        Specialty:  Anesthesiology  Provider:  (Not yet assigned)    05/14/25 1158    05/14/25 1156  CBC & Differential  Once         05/14/25 1158    05/14/25 1156  Comprehensive Metabolic Panel  Once         05/14/25 1158    05/14/25 1156  Magnesium  Once         05/14/25 1158    05/14/25 1156  Hemoglobin A1c  Once         05/14/25 1158    05/14/25 1156  BNP  Once         05/14/25 1158    05/14/25 1156  Lipid Panel  Once         05/14/25 1158    05/14/25 1156  aPTT  Once         05/14/25 1158    05/14/25 1156  Protime-INR  Once         05/14/25 1158    05/14/25 1156  Platelet Function ADP  Once         05/14/25 1158    05/14/25 1156  Blood Gas, Arterial -  Once         05/14/25 1158    05/14/25 1156  Type & Screen  Once         05/14/25 1158    05/14/25 1156  Prepare RBC, 2 Units  Blood - Once         05/14/25 1158    05/14/25 1156  Prepare Platelet Pheresis, 2 Units  Blood - Once         05/14/25 1158    05/14/25 1156  XR Chest PA & Lateral  1 Time Imaging         05/14/25 1158    05/14/25 1156  Carotid Duplex - Bilateral  Once         05/14/25 1158    05/14/25 1156  Duplex Vein Mapping Lower Extremity - Bilateral CAR  Once         05/14/25 1158    05/14/25 1156  CBC Auto Differential  PROCEDURE ONCE         05/14/25 1158    05/14/25 1155  sodium chloride 0.9 % flush 10 mL  As Needed         05/14/25 1158    05/14/25 1155  sodium chloride 0.9 % infusion 40 mL  As Needed         05/14/25 1158    05/14/25 1155  acetaminophen (TYLENOL) tablet 650 mg  Every 4 Hours PRN,   Status:  Discontinued         05/14/25 1158    05/14/25 1155  ALPRAZolam (XANAX) tablet 0.25 mg  Every 8 Hours PRN         05/14/25 1158    05/14/25 1155  diphenhydrAMINE (BENADRYL) capsule 25 mg  Nightly PRN          05/14/25 1158    05/14/25 1150  Case request  Once         05/14/25 1150    05/14/25 1142  Vital Signs, Site Check & Distal Extremity Check for Warmth, Color, Sensation & Pulses  Per Order Details         05/14/25 1142    05/14/25 1142  Encourage Fluids  Until Discontinued         05/14/25 1142    05/14/25 1142  Notify MD if platelet count is less than 100,000, is less than 1/2 baseline, or if Hgb drops by more than 3mg/dl.  Until Discontinued         05/14/25 1142    05/14/25 1142  Notify MD of hypotension (SBP less than 95), bleeding, or dysrythmia and follow Sheath Removal Policy if needed.  Continuous         05/14/25 1142    05/14/25 1142  Cardiac Rehab Evaluation and Enrollment  Once        Provider:  (Not yet assigned)    05/14/25 1142    05/14/25 1142  Hold metFORMIN (GLUCOPHAGE) for 48 Hours  Continuous         05/14/25 1142    05/14/25 1142  Inpatient Admission  Once         05/14/25 1142    05/14/25 1142  Discontinue Radial TR Band Per Removal Protocol  Per Order Details        Comments: If Bleeding Occurs Re-Inflate 2 mL & Then Continue With 2 mL Every 15 Minute Deflations. Gently Roll Band Off Insertion Site After Completely Deflated.    05/14/25 1142    05/14/25 1142  Keep Affected Arm Straight & Elevated  Continuous         05/14/25 1142    05/14/25 1142  Activity As Tolerated  Until Discontinued         05/14/25 1142    05/14/25 0840  iopamidol (ISOVUE-370) 76 % injection  Code / Trauma / Sedation Medication,   Status:  Discontinued         05/14/25 0840    05/14/25 0825  heparin (porcine) injection  Code / Trauma / Sedation Medication,   Status:  Discontinued         05/14/25 0826    05/14/25 0825  verapamil (ISOPTIN) injection  Code / Trauma / Sedation Medication,   Status:  Discontinued         05/14/25 0825    05/14/25 0825  nitroGLYCERIN 200 mcg/mL 30 mL syringe  Code / Trauma / Sedation Medication,   Status:  Discontinued         05/14/25 0825    05/14/25 0820  lidocaine (XYLOCAINE) 2%  injection  Code / Trauma / Sedation Medication,   Status:  Discontinued         05/14/25 0820    05/14/25 0803  midazolam (VERSED) injection  Code / Trauma / Sedation Medication,   Status:  Discontinued         05/14/25 0803    05/14/25 0803  fentaNYL citrate (PF) (SUBLIMAZE) injection  Code / Trauma / Sedation Medication,   Status:  Discontinued         05/14/25 0803    05/14/25 0716  Obtain Informed Consent in Pre-Op  Once         05/14/25 0715    05/14/25 0716  Byron Test Prior to Procedure  Once         05/14/25 0715    05/14/25 0716  Clip Bilateral Arms  Once         05/14/25 0715    05/14/25 0716  Clip Bilateral Groins  Once         05/14/25 0715    05/14/25 0716  Notify Provider If Patient Taking Any of Listed Medications  Continuous,   Status:  Canceled        Comments: Open Order Report to View Parameters Requiring Provider Notification    05/14/25 0715    05/14/25 0716  Notify Provider If Patient Has Contrast Allergy  Continuous,   Status:  Canceled        Comments: Open Order Report to View Parameters Requiring Provider Notification    05/14/25 0715    05/14/25 0716  Notify Provider Lab Results  Continuous,   Status:  Canceled         05/14/25 0715    05/14/25 0716  Notify Provider Vital SIgns  Continuous,   Status:  Canceled        Comments: Open Order Report to View Parameters Requiring Provider Notification    05/14/25 0715    05/14/25 0716  Insert Peripheral IV  Once         05/14/25 0715    05/14/25 0716  Saline Lock & Maintain IV Access  Continuous,   Status:  Canceled         05/14/25 0715    05/14/25 0716  Pre-Procedure IV Hydration Not Needed  Once         05/14/25 0715    05/14/25 0715  Oxygen Therapy- Nasal Cannula; Titrate 1-6 LPM Per SpO2; 90 - 95%  Continuous PRN,   Status:  Canceled       05/14/25 0715    05/14/25 0715  sodium chloride 0.9 % flush 10 mL  As Needed,   Status:  Discontinued         05/14/25 0715    05/06/25 0949  Cardiac Catheterization/Vascular Study  Once         05/06/25  0948    Unscheduled  Change Site Dressing  As Needed       05/14/25 1142    Unscheduled  Oxygen Therapy- Nasal Cannula; Titrate 1-6 LPM Per SpO2; 90 - 95%  Continuous PRN       05/14/25 1308    Unscheduled  Chlorhexidine Skin Prep - Chin to Toes 12 Hours Prior to Surgery & Morning of Surgery  As Needed      Comments: Chlorhexidine Skin wipes and instructions for all patients having a procedure requiring an outward incision if not allergic.  If allergic, give antibacterial skin wipes and instructions.  Do not use for facial cases or on any mucus membranes.    12 Hours Prior to Surgery & The Morning of Surgery    05/14/25 1158                          Consult Notes (all)        Gregorio Jim PA-C at 05/14/25 1129       Attestation signed by Jr James Juarez MD at 05/14/25 1237      I have reviewed this documentation and agree.                      [facility]      Patient Care Team:  Provider, No Known as PCP - Astrid Rodriguez APRN as Referring Physician (Cardiology)    Chief complaint  CAD    Subjective     History of Present Illness  Patient is a 65 y.o. male with a past medical history including CAD s/p PCI, HTN, HLD intolerant of statins, h/o DVT/PE, and CHANTEL. Presented to cardiologist follow-up with complaints of chest discomfort. He was also having some dyspnea on exertion and palpitations. He denies active chest pain, syncope, edema, fever/chills, or nausea/vomiting. Due to his ongoing symptoms he was scheduled for elective cardiac cath. Cardiac cath showed severe multivessel CAD. He is referred for surgical evaluation.     Review of Systems   Respiratory:  Positive for shortness of breath.    Cardiovascular:  Positive for chest pain and palpitations.        Past Medical History:   Diagnosis Date    Allergic     Angina at rest 08/16/2019    Added automatically from request for surgery [id number]    B12 deficiency anemia     Cataract 2023    Had cataract surgery both eyes     Cellulitis of right lower extremity 12/24/2020    Chest pain     Chronic fatigue 03/09/2016    Coronary artery disease involving native coronary artery of native heart without angina pectoris 08/10/2017    Deep vein thrombosis     Headache     History of blood clots     blood clot found in right lung     HL (hearing loss)     Hypertension     Mixed hyperlipidemia 02/02/2022    Multiple thyroid nodules     Multiple thyroid nodules     CHANTEL (obstructive sleep apnea)     Pernicious anemia     Pulmonary embolism     2015    Stenosis of left carotid artery 03/28/2023    Syncope     2 yrs ago one time     Past Surgical History:   Procedure Laterality Date    CARDIAC CATHETERIZATION N/A 08/03/2017    Procedure: Coronary angiography;  Surgeon: Cynthia Farley MD;  [facility];  Service:     CARDIAC CATHETERIZATION  08/03/2017    Procedure: Functional Flow Cummings;  Surgeon: Cynthia Farley MD;  [facility];  Service:     CARDIAC CATHETERIZATION N/A 08/03/2017    Procedure: Stent YI coronary;  Surgeon: Cynthia Farley MD;  [facility];  Service:     CARDIAC CATHETERIZATION N/A 08/03/2017    Procedure: Left ventriculography;  Surgeon: Cynthia Farley MD;  [facility];  Service:     CARDIAC CATHETERIZATION N/A 08/03/2017    Procedure: Left Heart Cath;  Surgeon: Cynthia Farley MD;  [facility];  Service:     CARDIAC CATHETERIZATION N/A 08/20/2019    Procedure: Left Heart Cath;  Surgeon: Mulugeta Ac MD;  [facility];  Service: Cardiology    CARDIAC CATHETERIZATION N/A 08/20/2019    Procedure: Coronary angiography;  Surgeon: Mulugeta Ac MD;  [facility];  Service: Cardiology    CARDIAC CATHETERIZATION N/A 08/20/2019    Procedure: Left ventriculography;  Surgeon: Mulugeta Ac MD;  Location: [facility] CATH INVASIVE LOCATION;   Service: Cardiology    CARDIAC CATHETERIZATION  8/20/2019    COLONOSCOPY  MMVI    NORMAL.    COLONOSCOPY      CORONARY STENT PLACEMENT  2017    FINE NEEDLE ASPIRATION  12/2015    Left thyroid nodule.  [place] category II.  Dr. Socrates Sanchez     Family History   Problem Relation Age of Onset    Heart disease Other     Hypertension Other     Thyroid disease Other     Heart disease Father     Prostate cancer Father     Hypertension Father     Stroke Father     Heart disease Mother     Hypertension Mother     Anemia Mother     Heart disease Brother     Hypertension Brother     Thyroid disease Sister     Heart disease Brother     Hypertension Brother      Social History     Tobacco Use    Smoking status: Never     Passive exposure: Never    Smokeless tobacco: Never    Tobacco comments:     caffeine use - 2 cups coffee dailyl    Vaping Use    Vaping status: Never Used   Substance Use Topics    Alcohol use: Yes     Comment: Occasional drinks    Drug use: No     Medications Prior to Admission   Medication Sig Dispense Refill Last Dose/Taking    aspirin 81 MG tablet Take 1 tablet by mouth Daily. 30 tablet 11 5/14/2025 Morning    Cholecalciferol (VITAMIN D3) 5000 units capsule capsule Take 1 capsule by mouth Daily.   5/14/2025 Morning    cyanocobalamin 1000 MCG/ML injection INJECT 1 MILLILITER INTO THE APPROPRIATE MUSCLE AS DIRECTED BY PRESCRIBER EVERY 14 DAYS 6 mL 0 Past Week    fexofenadine (ALLEGRA) 60 MG tablet Take 1 tablet by mouth Daily As Needed (allergies). 90 tablet 3 Past Week    hydrocortisone 0.5 % cream As Needed.   Past Month    MAGNESIUM PO Take  by mouth. Every other day   5/14/2025 Morning    Multiple Vitamin (MULTI VITAMIN DAILY PO) Take 1 tablet by mouth Daily.   5/14/2025 Morning    nebivolol (Bystolic) 2.5 MG tablet Take 1 tablet by mouth Daily. 30 tablet 11 5/13/2025    tamsulosin (FLOMAX) 0.4 MG capsule 24 hr capsule TAKE 1 CAPSULE BY MOUTH DAILY 30 capsule 5 5/14/2025 Morning    B-D INSULIN SYRINGE  "1CC/25GX1\" 25G X 1\" 1 ML misc USE TO ADMINISTER B-12 INJECTIONS AS DIRECTED 25 each 3     Inclisiran Sodium (LEQVIO SC) Inject  under the skin into the appropriate area as directed.   More than a month    nitroglycerin (NITROSTAT) 0.4 MG SL tablet Place 1 tablet under the tongue Every 5 (Five) Minutes As Needed for Chest Pain for up to 1 dose. Take no more than 3 doses in 15 minutes. 100 tablet 3     triamcinolone (KENALOG) 0.025 % cream Apply 1 Application topically to the appropriate area as directed 2 (Two) Times a Day As Needed (eczema). 80 g 3 More than a month        Allergies:  Lisinopril, Hydrochlorothiazide, Repatha [evolocumab], Simvastatin, Amlodipine, Hydralazine, and Valsartan    Objective      Vital Signs  Temp:  [98.4 °F (36.9 °C)] 98.4 °F (36.9 °C)  Heart Rate:  [42-49] 45  Resp:  [12-18] 15  BP: (150-180)/(69-74) 167/69    Flowsheet Rows      Flowsheet Row First Filed Value   Admission Height 188 cm (74\") Documented at 05/14/2025 0742   Admission Weight 99.8 kg (220 lb) Documented at 05/14/2025 0742          188 cm (74\")    Physical Exam    Results Review:   Lab Results (last 24 hours)       ** No results found for the last 24 hours. **          Cardiac cath per Dr. Martinez:  Conclusion    Severe multivessel coronary disease involving the mid LAD, ostial circumflex, mid RCA.    Recommendations: Coronary artery bypass graft surgery.     INDICATION FOR PROCEDURE: 64 yo man with known CAD prior LADPCI with recurrent angina and dyspnea     PROCEDURE PERFORMED:   [facility]  Coronary angiography     PROCEDURE COMMENTS:   After informed consent was obtained, the patient was prepped and draped in the usual manner.  Moderate sedation was administered. Right Heart catheterization was performed by exchanging the existing IV a 5Fr. Sheath in the antecubital vein over a wire. A 5r. [place] Chari catheter was advanced in to the PA and PA Saturations recorded. The catheter was then used to measure pressures in the PA, wedge " position, RV and RA.      Lidocaine was infused in the right wrist for anesthesia.  Access was obtained in the right radial artery using ultrasound guidance and micropuncture technique. A 5/6 Slender sheath was inserted.  Coronary angiography was performed using 4Fr JL3.5 and JR4 due to signficiant tortuosity and resultant radial artery spasm.Tenseplast was used for hemostasis at both sites.         Hemodynamics:   RA:8-12  RV:40/5  PA: 40/12/25, PA sat 68%  PCWP: 12  CO/CI:4.87 L/min, 2.15 L/mink/m2     Coronary angiography:   LMCA:Large-caliber left main coronary artery.  Bifurcates to the LAD and circumflex arteries.  LAD: The LAD is a large-caliber vessel.  There is a previously placed proximal to mid vessel stent which has minimal in-stent restenosis.  Just distal to that there is an 95% stenosis.  The remainder of the vessel is normal and wraps the apex.  Circumflex: The circumflex is a large-caliber vessel, nondominant.  The ostium has a 90% stenosis distal to that the mid circumflex has mild luminal irregularities.  Gives rise to a medium caliber mid branching OM1 which is normal in a low branching small caliber OM 2 which is normal.  RCA: The RCA has a high and anterior takeoff.  It is a large-caliber vessel with scattered 30 to 40% proximal stenosis.  The mid vessel has a 70% mid vessel stenosis      Assessment & Plan       Coronary artery disease involving native coronary artery of native heart without angina pectoris      Assessment & Plan    - severe multivessel CAD, s/p PCI (2017)  - HTN  - HLD, intolerant of statins  - h/o DVT/PE  - CHANTEL    Patient seen by Dr. Juarez and recommends surgical revascularization. Continue typical pre-operative workup for CABG to include labs and imaging. Plan for surgery Friday.     Thank you for allowing us to participate in the care of this patient.      Gregorio Jim PA-C  05/14/25  11:30 EDT        Electronically signed by Jr James Juarez MD at 05/14/25 6758    "     Megan Hylton, RN   Registered Nurse  Nursing     Plan of Care     Signed     Date of Service: 25  Creation Time: 25     Signed         Goal Outcome Evaluation:  Plan of Care Reviewed With: patient  Progress: improving  Outcome Evaluation: Pt is s/p R/L cath. R brachial and R radial sites CDI and soft. Pt has no complaints of pain. /72, MD made aware. SB on tele, Hr 40-60s. On room air. Plan for cabg on .                           [facility] CATH LAB  [address] [facility] [address] [phone]              Cardiac Catheterization/Vascular Study    Patient Name: Niranjan Peacock \"Nain\"   Patient MRN: [id number]   Patient : 1960 (65 y.o.)   Legal Sex: Male    Accession Number: [id number]   Date of Study: 25   Ordering Provider: Tiago Srivastava Jr., MD    Clinical Indications: Coronary artery disease involving native coronary artery of native heart without angina pectoris [I25.10 (ICD-10-CM)]       Performing Physician  Performing Staff   Primary: Krupa Martinez MD     Invasive Nurse: Marcelina Corrigan RN    Scrub Person: Jhonny Estrada    Documenter: Joanna Winchester   Invasive Nurse: Danitza Rinaldi RN    Invasive Nurse: Lu Gregory RN           Procedures    Right Heart Cath   Left Heart Cath       Patient Information    Patient Name  Niranjan Peacock MRN  [id number] Legal Sex  Male  (Age)  1960 (65 y.o.)     Race Ethnicity Encounter Category   White or  Not  or  Elective        [facility] PACS Images     Show images for Cardiac Catheterization/Vascular Study         Printable Vessel Diagram    Printable Vessel Diagram       Indications    Coronary artery disease involving native coronary artery of native heart without angina pectoris [I25.10 (ICD-10-CM)]             Conclusion         Severe multivessel coronary disease involving the mid LAD, ostial circumflex, mid RCA.    Recommendations: Coronary artery " bypass graft surgery.     INDICATION FOR PROCEDURE: 66 yo man with known CAD prior LADPCI with recurrent angina and dyspnea     PROCEDURE PERFORMED:   [facility]  Coronary angiography     PROCEDURE COMMENTS:   After informed consent was obtained, the patient was prepped and draped in the usual manner.  Moderate sedation was administered. Right Heart catheterization was performed by exchanging the existing IV a 5Fr. Sheath in the antecubital vein over a wire. A 5r. [place] Chari catheter was advanced in to the PA and PA Saturations recorded. The catheter was then used to measure pressures in the PA, wedge position, RV and RA.      Lidocaine was infused in the right wrist for anesthesia.  Access was obtained in the right radial artery using ultrasound guidance and micropuncture technique. A 5/6 Slender sheath was inserted.  Coronary angiography was performed using 4Fr JL3.5 and JR4 due to signficiant tortuosity and resultant radial artery spasm.Tenseplast was used for hemostasis at both sites.         Hemodynamics:   RA:8-12  RV:40/5  PA: 40/12/25, PA sat 68%  PCWP: 12  CO/CI:4.87 L/min, 2.15 L/mink/m2     Coronary angiography:   LMCA:Large-caliber left main coronary artery.  Bifurcates to the LAD and circumflex arteries.  LAD: The LAD is a large-caliber vessel.  There is a previously placed proximal to mid vessel stent which has minimal in-stent restenosis.  Just distal to that there is an 95% stenosis.  The remainder of the vessel is normal and wraps the apex.  Circumflex: The circumflex is a large-caliber vessel, nondominant.  The ostium has a 90% stenosis distal to that the mid circumflex has mild luminal irregularities.  Gives rise to a medium caliber mid branching OM1 which is normal in a low branching small caliber OM 2 which is normal.  RCA: The RCA has a high and anterior takeoff.  It is a large-caliber vessel with scattered 30 to 40% proximal stenosis.  The mid vessel has a 70% mid vessel stenosis                      Procedure Narrative    Risks, benefits and alternatives were discussed with the patient and/or family. Plan is for moderate sedation. An immediate assessment was done prior to the administration of moderate sedation. Under my direct supervision, intravenous moderate sedation sedation was administered during the course of this procedure with continuous monitoring of hemodynamic parameters and level of consciousness by an independent trained observer. Less than 20 mL of estimated blood loss during the case. No specimen was collected during the case.       Time Under Physician Observation    Name Panel Role Time Period   Krupa Martinez MD Panel 1 Primary 5/14/2025 0732 - 5/14/2025 0857      Total Sedation Time    Moderate sedation event time was not documented.                     Vessel Access    A 5 fr sheath was successfully inserted into the right brachial vein. A 6 fr sheath was successfully inserted into the right radial artery.           Sheath Disposition    Hemostasis started on the right radial artery. R-Band was used in achieving hemostasis. Radial compression device applied to vessel. Hemostasis achieved successfully. Closure device additional comment: TR Band applied-12atm Hemostasis started on the right brachial vein. Manual pressure applied to vessel. Manual pressure was held by BB. Manual pressure was held for 5 min. Hemostasis achieved successfully. Closure device additional comment: 4x4 and tegaderm applied                          Radiation Dose Tracking    Panel Radiation (mSv) Cumulative Air Kerma (mGy) Fluoro Time (min) DAP (mGy-cm2) Physician   Panel 1  201.000 6.4 57691.000 Krupa Martinez MD        Total Contrast        Administrations occurring from 0755 to 0857 on 05/14/25:   Medication Name Total Dose   iopamidol (ISOVUE-370) 76 % injection 37 mL         Patient Hx Of Height, Weight, and Vitals    Height Weight BSA (Calculated - sq m) BMI (Calculated) Retired BMI (kg/m2) Pulse BP  "  188 cm (74\") 99.8 kg (220 lb) 2.26 sq meters 28.2  45 167/69       Admission Information    Admission Date/Time Discharge Date/Time Room/Bed   05/14/25  0646  2215/1       Medications  (Filter: Administrations occurring from 0000 to 1039 on 05/14/25)   important  Continuous medications are totaled by the amount administered until 05/14/25 1039.       fentaNYL citrate (PF) (SUBLIMAZE) injection (mcg)   Total dose: 100 mcg  Date/Time Rate/Dose/Volume Action    05/14/25 0803 50 mcg Given    0825 25 mcg Given    0830 25 mcg Given      midazolam (VERSED) injection (mg)   Total dose: 3 mg  Date/Time Rate/Dose/Volume Action    05/14/25 0803 1 mg Given    0820 1 mg Given    0832 1 mg Given      lidocaine (XYLOCAINE) 2% injection (mL)   Total volume: 10 mL  Date/Time Rate/Dose/Volume Action    05/14/25 0819 5 mL Given    0824 5 mL Given      nitroGLYCERIN 200 mcg/mL 30 mL syringe (mcg)   Total dose: 200 mcg  Date/Time Rate/Dose/Volume Action    05/14/25 0825 200 mcg Given      verapamil (ISOPTIN) injection (mcg)   Total dose: 500 mcg  Date/Time Rate/Dose/Volume Action    05/14/25 0825 500 mcg Given      heparin (porcine) injection (Units)   Total dose: 3,000 Units  Date/Time Rate/Dose/Volume Action    05/14/25 0825 3,000 Units Given      iopamidol (ISOVUE-370) 76 % injection (mL)   Total volume: 37 mL  Date/Time Rate/Dose/Volume Action    05/14/25 0840 37 mL Given      sodium chloride 0.9 % infusion (mL/hr)   Total volume: 288.33 mL Dosing weight: 99.8  Date/Time Rate/Dose/Volume Action    05/14/25 0746 100 mL/hr New Bag        Implants    No implant documentation for this case.       Supplies    Name ID Temporary Type Charge Code Description Charge Code Quantity   KT MANIFLD CARDIAC 302 No Supply HC OR SUPPLY SHELL 272/NO CPT 461749 1   CATH VENT MIV RADL PIG ST TIP 5F 110CM 680 No Diagnostic Catheter HC OR SUPPLY SHELL 272/NO CPT 308127 0   GW INQWIRE FC PTFE J/3MM . No Guidewire HC OR SUPPLY SHELL 272/ " 459082 1   CATH DIAG CARD PERFORMA JL3.5 BT [id number] 4286 No Diagnostic Catheter HC OR SUPPLY SHELL 272/NO CPT 339784 1   SHIELD RADPAD FEM ENTRY 4421 No Supply   1   INTRO GLIDESHEATH .021 [id number] 93061 No Sheath HC OR SUPPLY SHELL 272/ 407030 1   CATH CORNRY OPTITORQUE 1SH TR4 5F 110 19110 No Diagnostic Catheter HC OR SUPPLY SHELL 272/NO CPT 053767 1   DEV COMPR RAD TR BND REG 24CM 21963 No Hemostasis Device HC OR SUPPLY SHELL 272/NO CPT 761416 1   INTRO GLIDESHEATH SLENDER SS 21G .021 6F 10CM 45CM 28237 No Supply HC OR SUPPLY SHELL 272/ 916692 1   CATH DIAG IMPULSE FR4 5F 100CM 74904 No Diagnostic Catheter HC OR SUPPLY SHELL 272/NO CPT 159689 0   CATH DIAG IMPULSE FL3.5 5F 100CM 62759 No Diagnostic Catheter HC OR SUPPLY SHELL 272/NO CPT 233762 1   GW EMR FIX EXCHG J STD .035 3MM 260CM 98713 No Guidewire HC OR SUPPLY SHELL 272/ 039957 1   GW GLIDEWIRE STD ANGL .035IN [id number] 94779 No Guidewire HC OR SUPPLY SHELL 272/ 741821 1   CATH DIAG CARD PERFORM JR4.0 BT 0C124MY 26028 No Diagnostic Catheter HC OR SUPPLY SHELL 272/NO CPT 448961 1   BALN PRESS WEDGE 5F 110CM 56418 No Diagnostic Catheter HC OR SUPPLY SHELL 272/NO CPT 850447 1   PK CATH CARD 40 72071 No Supply   1   PK DEFIB PACE 40 31433 No Supply   1       Signed    Electronically signed by Krupa Martinez MD on 5/14/25 at 1039 T     Cardiac Catheterization/Vascular Study  Order: [id number]   Status: Final result      Result Care Coordination

## 2025-05-16 ENCOUNTER — ANCILLARY PROCEDURE (OUTPATIENT)
Dept: PERIOP | Facility: HOSPITAL | Age: 65
End: 2025-05-16
Payer: COMMERCIAL

## 2025-05-16 ENCOUNTER — ANESTHESIA (OUTPATIENT)
Dept: PERIOP | Facility: HOSPITAL | Age: 65
End: 2025-05-16
Payer: COMMERCIAL

## 2025-05-16 ENCOUNTER — APPOINTMENT (OUTPATIENT)
Dept: GENERAL RADIOLOGY | Facility: HOSPITAL | Age: 65
End: 2025-05-16
Payer: COMMERCIAL

## 2025-05-16 ENCOUNTER — ANESTHESIA EVENT (OUTPATIENT)
Dept: PERIOP | Facility: HOSPITAL | Age: 65
End: 2025-05-16
Payer: COMMERCIAL

## 2025-05-16 LAB
ACT BLD: 118 SECONDS (ref 82–152)
ACT BLD: 130 SECONDS (ref 82–152)
ACT BLD: 377 SECONDS (ref 82–152)
ACT BLD: 389 SECONDS (ref 82–152)
ACT BLD: 412 SECONDS (ref 82–152)
ACT BLD: 429 SECONDS (ref 82–152)
ALBUMIN SERPL-MCNC: 4.1 G/DL (ref 3.5–5.2)
ALBUMIN SERPL-MCNC: 4.3 G/DL (ref 3.5–5.2)
ANION GAP SERPL CALCULATED.3IONS-SCNC: 10.3 MMOL/L (ref 5–15)
ANION GAP SERPL CALCULATED.3IONS-SCNC: 15.9 MMOL/L (ref 5–15)
APTT PPP: 27.6 SECONDS (ref 22.7–35.4)
APTT PPP: 42.5 SECONDS (ref 22.7–35.4)
ARTERIAL PATENCY WRIST A: ABNORMAL
ATMOSPHERIC PRESS: 737.8 MMHG
ATMOSPHERIC PRESS: 741.6 MMHG
ATMOSPHERIC PRESS: 742.3 MMHG
BASE EXCESS BLDA CALC-SCNC: -0.7 MMOL/L (ref 0–2)
BASE EXCESS BLDA CALC-SCNC: -1 MMOL/L (ref -5–5)
BASE EXCESS BLDA CALC-SCNC: -2 MMOL/L (ref -5–5)
BASE EXCESS BLDA CALC-SCNC: -2.3 MMOL/L (ref 0–2)
BASE EXCESS BLDA CALC-SCNC: 1 MMOL/L (ref -5–5)
BASE EXCESS BLDA CALC-SCNC: 1.4 MMOL/L (ref 0–2)
BASE EXCESS BLDA CALC-SCNC: 10 MMOL/L (ref -5–5)
BASE EXCESS BLDA CALC-SCNC: 3 MMOL/L (ref -5–5)
BASE EXCESS BLDA CALC-SCNC: 5 MMOL/L (ref -5–5)
BASOPHILS # BLD AUTO: 0.03 10*3/MM3 (ref 0–0.2)
BASOPHILS # BLD AUTO: 0.03 10*3/MM3 (ref 0–0.2)
BASOPHILS NFR BLD AUTO: 0.2 % (ref 0–1.5)
BASOPHILS NFR BLD AUTO: 0.4 % (ref 0–1.5)
BDY SITE: ABNORMAL
BUN SERPL-MCNC: 15 MG/DL (ref 8–23)
BUN SERPL-MCNC: 16 MG/DL (ref 8–23)
BUN/CREAT SERPL: 11.3 (ref 7–25)
BUN/CREAT SERPL: 11.8 (ref 7–25)
CA-I SERPL ISE-MCNC: 1.2 MMOL/L (ref 1.15–1.35)
CALCIUM SPEC-SCNC: 8.9 MG/DL (ref 8.6–10.5)
CALCIUM SPEC-SCNC: 9 MG/DL (ref 8.6–10.5)
CHLORIDE SERPL-SCNC: 107 MMOL/L (ref 98–107)
CHLORIDE SERPL-SCNC: 108 MMOL/L (ref 98–107)
CO2 BLDA-SCNC: 26 MMOL/L (ref 24–29)
CO2 BLDA-SCNC: 26 MMOL/L (ref 24–29)
CO2 BLDA-SCNC: 27 MMOL/L (ref 24–29)
CO2 BLDA-SCNC: 30 MMOL/L (ref 24–29)
CO2 BLDA-SCNC: 30 MMOL/L (ref 24–29)
CO2 BLDA-SCNC: 37 MMOL/L (ref 24–29)
CO2 SERPL-SCNC: 19.1 MMOL/L (ref 22–29)
CO2 SERPL-SCNC: 23.7 MMOL/L (ref 22–29)
CREAT SERPL-MCNC: 1.33 MG/DL (ref 0.76–1.27)
CREAT SERPL-MCNC: 1.36 MG/DL (ref 0.76–1.27)
DEPRECATED RDW RBC AUTO: 43.6 FL (ref 37–54)
DEPRECATED RDW RBC AUTO: 44.3 FL (ref 37–54)
DEPRECATED RDW RBC AUTO: 44.9 FL (ref 37–54)
DEVICE COMMENT: ABNORMAL
EGFRCR SERPLBLD CKD-EPI 2021: 58.1 ML/MIN/1.73
EGFRCR SERPLBLD CKD-EPI 2021: 59.7 ML/MIN/1.73
EOSINOPHIL # BLD AUTO: 0.12 10*3/MM3 (ref 0–0.4)
EOSINOPHIL # BLD AUTO: 0.18 10*3/MM3 (ref 0–0.4)
EOSINOPHIL NFR BLD AUTO: 0.7 % (ref 0.3–6.2)
EOSINOPHIL NFR BLD AUTO: 2.5 % (ref 0.3–6.2)
ERYTHROCYTE [DISTWIDTH] IN BLOOD BY AUTOMATED COUNT: 12.6 % (ref 12.3–15.4)
ERYTHROCYTE [DISTWIDTH] IN BLOOD BY AUTOMATED COUNT: 12.8 % (ref 12.3–15.4)
ERYTHROCYTE [DISTWIDTH] IN BLOOD BY AUTOMATED COUNT: 12.9 % (ref 12.3–15.4)
FIBRINOGEN PPP-MCNC: 247 MG/DL (ref 219–464)
FIBRINOGEN PPP-MCNC: 256 MG/DL (ref 219–464)
GLUCOSE BLDC GLUCOMTR-MCNC: 103 MG/DL (ref 70–130)
GLUCOSE BLDC GLUCOMTR-MCNC: 103 MG/DL (ref 70–130)
GLUCOSE BLDC GLUCOMTR-MCNC: 107 MG/DL (ref 70–130)
GLUCOSE BLDC GLUCOMTR-MCNC: 108 MG/DL (ref 70–130)
GLUCOSE BLDC GLUCOMTR-MCNC: 116 MG/DL (ref 70–130)
GLUCOSE BLDC GLUCOMTR-MCNC: 119 MG/DL (ref 70–130)
GLUCOSE BLDC GLUCOMTR-MCNC: 125 MG/DL (ref 70–130)
GLUCOSE BLDC GLUCOMTR-MCNC: 133 MG/DL (ref 70–130)
GLUCOSE BLDC GLUCOMTR-MCNC: 136 MG/DL (ref 70–130)
GLUCOSE BLDC GLUCOMTR-MCNC: 136 MG/DL (ref 70–130)
GLUCOSE BLDC GLUCOMTR-MCNC: 138 MG/DL (ref 70–130)
GLUCOSE BLDC GLUCOMTR-MCNC: 139 MG/DL (ref 70–130)
GLUCOSE BLDC GLUCOMTR-MCNC: 156 MG/DL (ref 70–130)
GLUCOSE BLDC GLUCOMTR-MCNC: 98 MG/DL (ref 70–130)
GLUCOSE SERPL-MCNC: 101 MG/DL (ref 65–99)
GLUCOSE SERPL-MCNC: 134 MG/DL (ref 65–99)
HCO3 BLDA-SCNC: 21.9 MMOL/L (ref 22–28)
HCO3 BLDA-SCNC: 22 MMOL/L (ref 22–28)
HCO3 BLDA-SCNC: 24.3 MMOL/L (ref 22–26)
HCO3 BLDA-SCNC: 24.3 MMOL/L (ref 22–26)
HCO3 BLDA-SCNC: 24.4 MMOL/L (ref 22–28)
HCO3 BLDA-SCNC: 26.1 MMOL/L (ref 22–26)
HCO3 BLDA-SCNC: 28.3 MMOL/L (ref 22–26)
HCO3 BLDA-SCNC: 29.1 MMOL/L (ref 22–26)
HCO3 BLDA-SCNC: 35.1 MMOL/L (ref 22–26)
HCT VFR BLD AUTO: 35.7 % (ref 37.5–51)
HCT VFR BLD AUTO: 36 % (ref 37.5–51)
HCT VFR BLD AUTO: 41.6 % (ref 37.5–51)
HCT VFR BLDA CALC: 27 % (ref 38–51)
HCT VFR BLDA CALC: 28 % (ref 38–51)
HCT VFR BLDA CALC: 28 % (ref 38–51)
HCT VFR BLDA CALC: 29 % (ref 38–51)
HCT VFR BLDA CALC: 31 % (ref 38–51)
HCT VFR BLDA CALC: 36 % (ref 38–51)
HEMODILUTION: NO
HGB BLD-MCNC: 11.9 G/DL (ref 13–17.7)
HGB BLD-MCNC: 12.1 G/DL (ref 13–17.7)
HGB BLD-MCNC: 14 G/DL (ref 13–17.7)
HGB BLDA-MCNC: 10.5 G/DL (ref 12–17)
HGB BLDA-MCNC: 12.2 G/DL (ref 12–17)
HGB BLDA-MCNC: 9.2 G/DL (ref 12–17)
HGB BLDA-MCNC: 9.5 G/DL (ref 12–17)
HGB BLDA-MCNC: 9.5 G/DL (ref 12–17)
HGB BLDA-MCNC: 9.9 G/DL (ref 12–17)
IMM GRANULOCYTES # BLD AUTO: 0.04 10*3/MM3 (ref 0–0.05)
IMM GRANULOCYTES # BLD AUTO: 0.13 10*3/MM3 (ref 0–0.05)
IMM GRANULOCYTES NFR BLD AUTO: 0.6 % (ref 0–0.5)
IMM GRANULOCYTES NFR BLD AUTO: 0.7 % (ref 0–0.5)
INHALED O2 CONCENTRATION: 100 %
INHALED O2 CONCENTRATION: 40 %
INHALED O2 CONCENTRATION: 40 %
INR PPP: 1.44 (ref 0.9–1.1)
INR PPP: 1.91 (ref 0.9–1.1)
LYMPHOCYTES # BLD AUTO: 1.62 10*3/MM3 (ref 0.7–3.1)
LYMPHOCYTES # BLD AUTO: 2.02 10*3/MM3 (ref 0.7–3.1)
LYMPHOCYTES NFR BLD AUTO: 28.1 % (ref 19.6–45.3)
LYMPHOCYTES NFR BLD AUTO: 9.1 % (ref 19.6–45.3)
MAGNESIUM SERPL-MCNC: 2.5 MG/DL (ref 1.6–2.4)
MAGNESIUM SERPL-MCNC: 2.9 MG/DL (ref 1.6–2.4)
MCH RBC QN AUTO: 31.4 PG (ref 26.6–33)
MCH RBC QN AUTO: 31.7 PG (ref 26.6–33)
MCH RBC QN AUTO: 31.7 PG (ref 26.6–33)
MCHC RBC AUTO-ENTMCNC: 33.3 G/DL (ref 31.5–35.7)
MCHC RBC AUTO-ENTMCNC: 33.6 G/DL (ref 31.5–35.7)
MCHC RBC AUTO-ENTMCNC: 33.7 G/DL (ref 31.5–35.7)
MCV RBC AUTO: 94.1 FL (ref 79–97)
MCV RBC AUTO: 94.2 FL (ref 79–97)
MCV RBC AUTO: 94.2 FL (ref 79–97)
MODALITY: ABNORMAL
MONOCYTES # BLD AUTO: 0.71 10*3/MM3 (ref 0.1–0.9)
MONOCYTES # BLD AUTO: 1.03 10*3/MM3 (ref 0.1–0.9)
MONOCYTES NFR BLD AUTO: 5.8 % (ref 5–12)
MONOCYTES NFR BLD AUTO: 9.9 % (ref 5–12)
NEUTROPHILS NFR BLD AUTO: 14.84 10*3/MM3 (ref 1.7–7)
NEUTROPHILS NFR BLD AUTO: 4.21 10*3/MM3 (ref 1.7–7)
NEUTROPHILS NFR BLD AUTO: 58.5 % (ref 42.7–76)
NEUTROPHILS NFR BLD AUTO: 83.5 % (ref 42.7–76)
NRBC BLD AUTO-RTO: 0 /100 WBC (ref 0–0.2)
NRBC BLD AUTO-RTO: 0 /100 WBC (ref 0–0.2)
O2 A-A PPRESDIFF RESPIRATORY: 0.2 MMHG
O2 A-A PPRESDIFF RESPIRATORY: 0.4 MMHG
O2 A-A PPRESDIFF RESPIRATORY: 0.5 MMHG
PCO2 BLDA: 29 MM HG (ref 35–45)
PCO2 BLDA: 32.2 MM HG (ref 35–45)
PCO2 BLDA: 35.2 MM HG (ref 35–45)
PCO2 BLDA: 41.3 MM HG (ref 35–45)
PCO2 BLDA: 42.6 MM HG (ref 35–45)
PCO2 BLDA: 43.6 MM HG (ref 35–45)
PCO2 BLDA: 46.9 MM HG (ref 35–45)
PCO2 BLDA: 50.6 MM HG (ref 35–45)
PCO2 BLDA: 58.2 MM HG (ref 35–45)
PEEP RESPIRATORY: 8 CM[H2O]
PH BLDA: 7.36 PH UNITS (ref 7.35–7.6)
PH BLDA: 7.36 PH UNITS (ref 7.35–7.6)
PH BLDA: 7.37 PH UNITS (ref 7.35–7.6)
PH BLDA: 7.4 PH UNITS (ref 7.35–7.45)
PH BLDA: 7.42 PH UNITS (ref 7.35–7.6)
PH BLDA: 7.43 PH UNITS (ref 7.35–7.6)
PH BLDA: 7.48 PH UNITS (ref 7.35–7.6)
PH BLDA: 7.49 PH UNITS (ref 7.35–7.45)
PH BLDA: 7.49 PH UNITS (ref 7.35–7.45)
PHOSPHATE SERPL-MCNC: 1.8 MG/DL (ref 2.5–4.5)
PHOSPHATE SERPL-MCNC: 2.7 MG/DL (ref 2.5–4.5)
PLATELET # BLD AUTO: 212 10*3/MM3 (ref 140–450)
PLATELET # BLD AUTO: 221 10*3/MM3 (ref 140–450)
PLATELET # BLD AUTO: 230 10*3/MM3 (ref 140–450)
PLATELET # BLD AUTO: 279 10*3/MM3 (ref 140–450)
PMV BLD AUTO: 10 FL (ref 6–12)
PMV BLD AUTO: 9.7 FL (ref 6–12)
PMV BLD AUTO: 9.7 FL (ref 6–12)
PO2 BLD: 141 MM[HG] (ref 0–500)
PO2 BLD: 244 MM[HG] (ref 0–500)
PO2 BLD: 304 MM[HG] (ref 0–500)
PO2 BLDA: 121.6 MM HG (ref 80–100)
PO2 BLDA: 141.3 MM HG (ref 80–100)
PO2 BLDA: 291 MMHG (ref 80–105)
PO2 BLDA: 353 MMHG (ref 80–105)
PO2 BLDA: 406 MMHG (ref 80–105)
PO2 BLDA: 463 MMHG (ref 80–105)
PO2 BLDA: 468 MMHG (ref 80–105)
PO2 BLDA: 52 MMHG (ref 80–105)
PO2 BLDA: 97.5 MM HG (ref 80–100)
POTASSIUM BLDA-SCNC: 3.4 MMOL/L (ref 3.5–4.9)
POTASSIUM BLDA-SCNC: 4.9 MMOL/L (ref 3.5–4.9)
POTASSIUM BLDA-SCNC: 5.6 MMOL/L (ref 3.5–4.9)
POTASSIUM BLDA-SCNC: 5.8 MMOL/L (ref 3.5–4.9)
POTASSIUM BLDA-SCNC: 6.3 MMOL/L (ref 3.5–4.9)
POTASSIUM BLDA-SCNC: 6.3 MMOL/L (ref 3.5–4.9)
POTASSIUM SERPL-SCNC: 4.2 MMOL/L (ref 3.5–5.2)
POTASSIUM SERPL-SCNC: 4.3 MMOL/L (ref 3.5–5.2)
PROTHROMBIN TIME: 17.5 SECONDS (ref 11.7–14.2)
PROTHROMBIN TIME: 22 SECONDS (ref 11.7–14.2)
QT INTERVAL: 434 MS
QTC INTERVAL: 343 MS
RBC # BLD AUTO: 3.79 10*6/MM3 (ref 4.14–5.8)
RBC # BLD AUTO: 3.82 10*6/MM3 (ref 4.14–5.8)
RBC # BLD AUTO: 4.42 10*6/MM3 (ref 4.14–5.8)
SAO2 % BLDA: 100 % (ref 95–98)
SAO2 % BLDA: 85 % (ref 95–98)
SAO2 % BLDCOA: 98.2 % (ref 92–98.5)
SAO2 % BLDCOA: 98.8 % (ref 92–98.5)
SAO2 % BLDCOA: 99.4 % (ref 92–98.5)
SET MECH RESP RATE: 16
SET MECH RESP RATE: 16
SODIUM SERPL-SCNC: 142 MMOL/L (ref 136–145)
SODIUM SERPL-SCNC: 142 MMOL/L (ref 136–145)
TOTAL RATE: 16 BREATHS/MINUTE
TOTAL RATE: 16 BREATHS/MINUTE
TOTAL RATE: 18 BREATHS/MINUTE
VENTILATOR MODE: ABNORMAL
VENTILATOR MODE: AC
VENTILATOR MODE: AC
VT ON VENT VENT: 650 ML
VT ON VENT VENT: 750 ML
WBC NRBC COR # BLD AUTO: 14.96 10*3/MM3 (ref 3.4–10.8)
WBC NRBC COR # BLD AUTO: 17.77 10*3/MM3 (ref 3.4–10.8)
WBC NRBC COR # BLD AUTO: 7.19 10*3/MM3 (ref 3.4–10.8)

## 2025-05-16 PROCEDURE — 25810000003 SODIUM CHLORIDE 0.9 % SOLUTION 250 ML FLEX CONT: Performed by: ANESTHESIOLOGY

## 2025-05-16 PROCEDURE — 85014 HEMATOCRIT: CPT

## 2025-05-16 PROCEDURE — 02100Z9 BYPASS CORONARY ARTERY, ONE ARTERY FROM LEFT INTERNAL MAMMARY, OPEN APPROACH: ICD-10-PCS | Performed by: THORACIC SURGERY (CARDIOTHORACIC VASCULAR SURGERY)

## 2025-05-16 PROCEDURE — 63710000001 INSULIN REGULAR HUMAN PER 5 UNITS: Performed by: ANESTHESIOLOGY

## 2025-05-16 PROCEDURE — 85610 PROTHROMBIN TIME: CPT | Performed by: THORACIC SURGERY (CARDIOTHORACIC VASCULAR SURGERY)

## 2025-05-16 PROCEDURE — 85384 FIBRINOGEN ACTIVITY: CPT | Performed by: NURSE PRACTITIONER

## 2025-05-16 PROCEDURE — 85384 FIBRINOGEN ACTIVITY: CPT | Performed by: THORACIC SURGERY (CARDIOTHORACIC VASCULAR SURGERY)

## 2025-05-16 PROCEDURE — 94799 UNLISTED PULMONARY SVC/PX: CPT

## 2025-05-16 PROCEDURE — 85049 AUTOMATED PLATELET COUNT: CPT | Performed by: THORACIC SURGERY (CARDIOTHORACIC VASCULAR SURGERY)

## 2025-05-16 PROCEDURE — 93010 ELECTROCARDIOGRAM REPORT: CPT | Performed by: INTERNAL MEDICINE

## 2025-05-16 PROCEDURE — 25010000002 MIDAZOLAM PER 1 MG: Performed by: ANESTHESIOLOGY

## 2025-05-16 PROCEDURE — 25010000002 AMINOCAPROIC ACID PER 5 G: Performed by: ANESTHESIOLOGY

## 2025-05-16 PROCEDURE — 25010000002 FENTANYL CITRATE (PF) 250 MCG/5ML SOLUTION: Performed by: ANESTHESIOLOGY

## 2025-05-16 PROCEDURE — 71045 X-RAY EXAM CHEST 1 VIEW: CPT

## 2025-05-16 PROCEDURE — 25010000002 LIDOCAINE 2% SOLUTION: Performed by: ANESTHESIOLOGY

## 2025-05-16 PROCEDURE — 25010000002 MAGNESIUM SULFATE IN D5W 1G/100ML (PREMIX) 1-5 GM/100ML-% SOLUTION: Performed by: NURSE PRACTITIONER

## 2025-05-16 PROCEDURE — 25010000002 MORPHINE PER 10 MG: Performed by: NURSE PRACTITIONER

## 2025-05-16 PROCEDURE — 85347 COAGULATION TIME ACTIVATED: CPT

## 2025-05-16 PROCEDURE — 94761 N-INVAS EAR/PLS OXIMETRY MLT: CPT

## 2025-05-16 PROCEDURE — 82948 REAGENT STRIP/BLOOD GLUCOSE: CPT

## 2025-05-16 PROCEDURE — 82803 BLOOD GASES ANY COMBINATION: CPT | Performed by: NURSE PRACTITIONER

## 2025-05-16 PROCEDURE — 33518 CABG ARTERY-VEIN TWO: CPT | Performed by: SPECIALIST/TECHNOLOGIST, OTHER

## 2025-05-16 PROCEDURE — 94002 VENT MGMT INPAT INIT DAY: CPT

## 2025-05-16 PROCEDURE — 85730 THROMBOPLASTIN TIME PARTIAL: CPT | Performed by: THORACIC SURGERY (CARDIOTHORACIC VASCULAR SURGERY)

## 2025-05-16 PROCEDURE — 82803 BLOOD GASES ANY COMBINATION: CPT

## 2025-05-16 PROCEDURE — 25010000002 HEPARIN (PORCINE) PER 1000 UNITS: Performed by: THORACIC SURGERY (CARDIOTHORACIC VASCULAR SURGERY)

## 2025-05-16 PROCEDURE — 25010000002 ALBUMIN HUMAN 5% PER 50 ML: Performed by: NURSE PRACTITIONER

## 2025-05-16 PROCEDURE — 80069 RENAL FUNCTION PANEL: CPT | Performed by: NURSE PRACTITIONER

## 2025-05-16 PROCEDURE — 33508 ENDOSCOPIC VEIN HARVEST: CPT | Performed by: THORACIC SURGERY (CARDIOTHORACIC VASCULAR SURGERY)

## 2025-05-16 PROCEDURE — 02100Z8 BYPASS CORONARY ARTERY, ONE ARTERY FROM RIGHT INTERNAL MAMMARY, OPEN APPROACH: ICD-10-PCS | Performed by: THORACIC SURGERY (CARDIOTHORACIC VASCULAR SURGERY)

## 2025-05-16 PROCEDURE — 33508 ENDOSCOPIC VEIN HARVEST: CPT | Performed by: SPECIALIST/TECHNOLOGIST, OTHER

## 2025-05-16 PROCEDURE — 25010000002 PAPAVERINE PER 60 MG: Performed by: THORACIC SURGERY (CARDIOTHORACIC VASCULAR SURGERY)

## 2025-05-16 PROCEDURE — 25010000003 PROTAMINE SULFATE PER 10 MG: Performed by: THORACIC SURGERY (CARDIOTHORACIC VASCULAR SURGERY)

## 2025-05-16 PROCEDURE — A4648 IMPLANTABLE TISSUE MARKER: HCPCS | Performed by: THORACIC SURGERY (CARDIOTHORACIC VASCULAR SURGERY)

## 2025-05-16 PROCEDURE — 99291 CRITICAL CARE FIRST HOUR: CPT | Performed by: ANESTHESIOLOGY

## 2025-05-16 PROCEDURE — 25010000002 ACETAMINOPHEN 10 MG/ML SOLUTION: Performed by: NURSE PRACTITIONER

## 2025-05-16 PROCEDURE — 25010000002 METOCLOPRAMIDE PER 10 MG: Performed by: NURSE PRACTITIONER

## 2025-05-16 PROCEDURE — 93318 ECHO TRANSESOPHAGEAL INTRAOP: CPT | Performed by: ANESTHESIOLOGY

## 2025-05-16 PROCEDURE — C1729 CATH, DRAINAGE: HCPCS | Performed by: THORACIC SURGERY (CARDIOTHORACIC VASCULAR SURGERY)

## 2025-05-16 PROCEDURE — 33534 CABG ARTERIAL TWO: CPT | Performed by: THORACIC SURGERY (CARDIOTHORACIC VASCULAR SURGERY)

## 2025-05-16 PROCEDURE — 82947 ASSAY GLUCOSE BLOOD QUANT: CPT

## 2025-05-16 PROCEDURE — P9041 ALBUMIN (HUMAN),5%, 50ML: HCPCS | Performed by: NURSE PRACTITIONER

## 2025-05-16 PROCEDURE — 93005 ELECTROCARDIOGRAM TRACING: CPT | Performed by: NURSE PRACTITIONER

## 2025-05-16 PROCEDURE — 021209W BYPASS CORONARY ARTERY, THREE ARTERIES FROM AORTA WITH AUTOLOGOUS VENOUS TISSUE, OPEN APPROACH: ICD-10-PCS | Performed by: THORACIC SURGERY (CARDIOTHORACIC VASCULAR SURGERY)

## 2025-05-16 PROCEDURE — 25010000002 PROPOFOL 10 MG/ML EMULSION: Performed by: ANESTHESIOLOGY

## 2025-05-16 PROCEDURE — 25010000002 CEFAZOLIN PER 500 MG: Performed by: NURSE PRACTITIONER

## 2025-05-16 PROCEDURE — 85025 COMPLETE CBC W/AUTO DIFF WBC: CPT | Performed by: NURSE PRACTITIONER

## 2025-05-16 PROCEDURE — 83735 ASSAY OF MAGNESIUM: CPT | Performed by: NURSE PRACTITIONER

## 2025-05-16 PROCEDURE — 82330 ASSAY OF CALCIUM: CPT | Performed by: NURSE PRACTITIONER

## 2025-05-16 PROCEDURE — 25010000002 NICARDIPINE 2.5 MG/ML SOLUTION 10 ML VIAL: Performed by: ANESTHESIOLOGY

## 2025-05-16 PROCEDURE — 85018 HEMOGLOBIN: CPT

## 2025-05-16 PROCEDURE — 25010000002 HEPARIN (PORCINE) PER 1000 UNITS: Performed by: ANESTHESIOLOGY

## 2025-05-16 PROCEDURE — 06BQ4ZZ EXCISION OF LEFT SAPHENOUS VEIN, PERCUTANEOUS ENDOSCOPIC APPROACH: ICD-10-PCS | Performed by: THORACIC SURGERY (CARDIOTHORACIC VASCULAR SURGERY)

## 2025-05-16 PROCEDURE — 25010000002 MAGNESIUM SULFATE PER 500 MG OF MAGNESIUM: Performed by: ANESTHESIOLOGY

## 2025-05-16 PROCEDURE — C1713 ANCHOR/SCREW BN/BN,TIS/BN: HCPCS | Performed by: THORACIC SURGERY (CARDIOTHORACIC VASCULAR SURGERY)

## 2025-05-16 PROCEDURE — 33518 CABG ARTERY-VEIN TWO: CPT | Performed by: THORACIC SURGERY (CARDIOTHORACIC VASCULAR SURGERY)

## 2025-05-16 PROCEDURE — 25010000002 CEFAZOLIN PER 500 MG: Performed by: THORACIC SURGERY (CARDIOTHORACIC VASCULAR SURGERY)

## 2025-05-16 PROCEDURE — 85610 PROTHROMBIN TIME: CPT | Performed by: NURSE PRACTITIONER

## 2025-05-16 PROCEDURE — 85027 COMPLETE CBC AUTOMATED: CPT | Performed by: NURSE PRACTITIONER

## 2025-05-16 PROCEDURE — 33534 CABG ARTERIAL TWO: CPT | Performed by: SPECIALIST/TECHNOLOGIST, OTHER

## 2025-05-16 PROCEDURE — 5A1221Z PERFORMANCE OF CARDIAC OUTPUT, CONTINUOUS: ICD-10-PCS | Performed by: THORACIC SURGERY (CARDIOTHORACIC VASCULAR SURGERY)

## 2025-05-16 PROCEDURE — 85730 THROMBOPLASTIN TIME PARTIAL: CPT | Performed by: NURSE PRACTITIONER

## 2025-05-16 PROCEDURE — 25010000002 PHENYLEPHRINE 10 MG/ML SOLUTION 5 ML VIAL: Performed by: ANESTHESIOLOGY

## 2025-05-16 PROCEDURE — C1751 CATH, INF, PER/CENT/MIDLINE: HCPCS | Performed by: ANESTHESIOLOGY

## 2025-05-16 PROCEDURE — 85025 COMPLETE CBC W/AUTO DIFF WBC: CPT

## 2025-05-16 DEVICE — SS SUTURE, 4 PER SLEEVE
Type: IMPLANTABLE DEVICE | Site: STERNUM | Status: FUNCTIONAL
Brand: MYO/WIRE II

## 2025-05-16 DEVICE — DEV CLS STERN FIBERTAPE W/BLNT/NDL: Type: IMPLANTABLE DEVICE | Site: STERNUM | Status: FUNCTIONAL

## 2025-05-16 DEVICE — LIGACLIP MCA MULTIPLE CLIP APPLIERS, 20 MEDIUM CLIPS
Type: IMPLANTABLE DEVICE | Site: CHEST WALL | Status: FUNCTIONAL
Brand: LIGACLIP

## 2025-05-16 DEVICE — SS SUTURE, 3 PER SLEEVE
Type: IMPLANTABLE DEVICE | Site: STERNUM | Status: FUNCTIONAL
Brand: MYO/WIRE II

## 2025-05-16 DEVICE — ABSORBABLE HEMOSTAT (OXIDIZED REGENERATED CELLULOSE)
Type: IMPLANTABLE DEVICE | Site: CHEST WALL | Status: FUNCTIONAL
Brand: SURGICEL

## 2025-05-16 RX ORDER — HEPARIN SODIUM 5000 [USP'U]/ML
INJECTION, SOLUTION INTRAVENOUS; SUBCUTANEOUS AS NEEDED
Status: DISCONTINUED | OUTPATIENT
Start: 2025-05-16 | End: 2025-05-16 | Stop reason: HOSPADM

## 2025-05-16 RX ORDER — HEPARIN SODIUM 1000 [USP'U]/ML
INJECTION, SOLUTION INTRAVENOUS; SUBCUTANEOUS AS NEEDED
Status: DISCONTINUED | OUTPATIENT
Start: 2025-05-16 | End: 2025-05-16 | Stop reason: SURG

## 2025-05-16 RX ORDER — POLYETHYLENE GLYCOL 3350 17 G/17G
17 POWDER, FOR SOLUTION ORAL DAILY PRN
Status: DISCONTINUED | OUTPATIENT
Start: 2025-05-16 | End: 2025-05-20 | Stop reason: HOSPADM

## 2025-05-16 RX ORDER — CYCLOBENZAPRINE HCL 10 MG
10 TABLET ORAL EVERY 8 HOURS PRN
Status: DISCONTINUED | OUTPATIENT
Start: 2025-05-17 | End: 2025-05-16

## 2025-05-16 RX ORDER — AMINOCAPROIC ACID 250 MG/ML
INJECTION, SOLUTION INTRAVENOUS AS NEEDED
Status: DISCONTINUED | OUTPATIENT
Start: 2025-05-16 | End: 2025-05-16 | Stop reason: SURG

## 2025-05-16 RX ORDER — ROCURONIUM BROMIDE 10 MG/ML
INJECTION, SOLUTION INTRAVENOUS AS NEEDED
Status: DISCONTINUED | OUTPATIENT
Start: 2025-05-16 | End: 2025-05-16 | Stop reason: SURG

## 2025-05-16 RX ORDER — ACETAMINOPHEN 325 MG/1
650 TABLET ORAL EVERY 4 HOURS PRN
Status: DISCONTINUED | OUTPATIENT
Start: 2025-05-17 | End: 2025-05-20 | Stop reason: HOSPADM

## 2025-05-16 RX ORDER — ACETAMINOPHEN 650 MG/1
650 SUPPOSITORY RECTAL EVERY 4 HOURS
Status: DISCONTINUED | OUTPATIENT
Start: 2025-05-16 | End: 2025-05-17

## 2025-05-16 RX ORDER — MIDAZOLAM HYDROCHLORIDE 1 MG/ML
INJECTION, SOLUTION INTRAMUSCULAR; INTRAVENOUS AS NEEDED
Status: DISCONTINUED | OUTPATIENT
Start: 2025-05-16 | End: 2025-05-16 | Stop reason: SURG

## 2025-05-16 RX ORDER — PHENYLEPHRINE HCL IN 0.9% NACL 0.5 MG/5ML
.2-2 SYRINGE (ML) INTRAVENOUS CONTINUOUS PRN
Status: DISCONTINUED | OUTPATIENT
Start: 2025-05-16 | End: 2025-05-17

## 2025-05-16 RX ORDER — CHLORHEXIDINE GLUCONATE ORAL RINSE 1.2 MG/ML
15 SOLUTION DENTAL EVERY 12 HOURS SCHEDULED
Status: DISCONTINUED | OUTPATIENT
Start: 2025-05-16 | End: 2025-05-16

## 2025-05-16 RX ORDER — ASPIRIN 81 MG/1
81 TABLET ORAL DAILY
Status: DISCONTINUED | OUTPATIENT
Start: 2025-05-17 | End: 2025-05-20 | Stop reason: HOSPADM

## 2025-05-16 RX ORDER — TAMSULOSIN HYDROCHLORIDE 0.4 MG/1
0.4 CAPSULE ORAL NIGHTLY
Status: DISCONTINUED | OUTPATIENT
Start: 2025-05-17 | End: 2025-05-20 | Stop reason: HOSPADM

## 2025-05-16 RX ORDER — METOPROLOL TARTRATE 25 MG/1
12.5 TABLET, FILM COATED ORAL EVERY 12 HOURS SCHEDULED
Status: DISCONTINUED | OUTPATIENT
Start: 2025-05-16 | End: 2025-05-17

## 2025-05-16 RX ORDER — OXYCODONE HYDROCHLORIDE 10 MG/1
10 TABLET ORAL EVERY 4 HOURS PRN
Status: DISCONTINUED | OUTPATIENT
Start: 2025-05-16 | End: 2025-05-20 | Stop reason: HOSPADM

## 2025-05-16 RX ORDER — CYCLOBENZAPRINE HCL 10 MG
10 TABLET ORAL EVERY 8 HOURS PRN
Status: DISCONTINUED | OUTPATIENT
Start: 2025-05-16 | End: 2025-05-20 | Stop reason: HOSPADM

## 2025-05-16 RX ORDER — ALPRAZOLAM 0.25 MG
0.25 TABLET ORAL EVERY 8 HOURS PRN
Status: DISCONTINUED | OUTPATIENT
Start: 2025-05-16 | End: 2025-05-20 | Stop reason: HOSPADM

## 2025-05-16 RX ORDER — MORPHINE SULFATE 2 MG/ML
4 INJECTION, SOLUTION INTRAMUSCULAR; INTRAVENOUS
Status: DISCONTINUED | OUTPATIENT
Start: 2025-05-16 | End: 2025-05-20 | Stop reason: HOSPADM

## 2025-05-16 RX ORDER — MEPERIDINE HYDROCHLORIDE 25 MG/ML
25 INJECTION INTRAMUSCULAR; INTRAVENOUS; SUBCUTANEOUS EVERY 4 HOURS PRN
Status: ACTIVE | OUTPATIENT
Start: 2025-05-16 | End: 2025-05-16

## 2025-05-16 RX ORDER — PANTOPRAZOLE SODIUM 40 MG/1
40 TABLET, DELAYED RELEASE ORAL EVERY MORNING
Status: DISCONTINUED | OUTPATIENT
Start: 2025-05-17 | End: 2025-05-20 | Stop reason: HOSPADM

## 2025-05-16 RX ORDER — ACETAMINOPHEN 650 MG/1
650 SUPPOSITORY RECTAL EVERY 4 HOURS PRN
Status: DISCONTINUED | OUTPATIENT
Start: 2025-05-17 | End: 2025-05-20 | Stop reason: HOSPADM

## 2025-05-16 RX ORDER — NITROGLYCERIN 0.4 MG/1
0.4 TABLET SUBLINGUAL
Status: DISCONTINUED | OUTPATIENT
Start: 2025-05-16 | End: 2025-05-20 | Stop reason: HOSPADM

## 2025-05-16 RX ORDER — MIDAZOLAM HYDROCHLORIDE 1 MG/ML
2 INJECTION, SOLUTION INTRAMUSCULAR; INTRAVENOUS
Status: DISCONTINUED | OUTPATIENT
Start: 2025-05-16 | End: 2025-05-17

## 2025-05-16 RX ORDER — NITROGLYCERIN 20 MG/100ML
5-200 INJECTION INTRAVENOUS
Status: DISCONTINUED | OUTPATIENT
Start: 2025-05-16 | End: 2025-05-17

## 2025-05-16 RX ORDER — BISACODYL 5 MG/1
10 TABLET, DELAYED RELEASE ORAL DAILY PRN
Status: DISCONTINUED | OUTPATIENT
Start: 2025-05-16 | End: 2025-05-20 | Stop reason: HOSPADM

## 2025-05-16 RX ORDER — NICOTINE POLACRILEX 4 MG
15 LOZENGE BUCCAL
Status: DISCONTINUED | OUTPATIENT
Start: 2025-05-16 | End: 2025-05-20 | Stop reason: HOSPADM

## 2025-05-16 RX ORDER — DEXTROSE MONOHYDRATE 25 G/50ML
10-50 INJECTION, SOLUTION INTRAVENOUS
Status: DISCONTINUED | OUTPATIENT
Start: 2025-05-16 | End: 2025-05-20 | Stop reason: HOSPADM

## 2025-05-16 RX ORDER — AMOXICILLIN 250 MG
2 CAPSULE ORAL NIGHTLY
Status: DISCONTINUED | OUTPATIENT
Start: 2025-05-17 | End: 2025-05-17

## 2025-05-16 RX ORDER — NALOXONE HCL 0.4 MG/ML
0.4 VIAL (ML) INJECTION
Status: DISCONTINUED | OUTPATIENT
Start: 2025-05-16 | End: 2025-05-20 | Stop reason: HOSPADM

## 2025-05-16 RX ORDER — ACETAMINOPHEN 10 MG/ML
1000 INJECTION, SOLUTION INTRAVENOUS EVERY 8 HOURS
Status: COMPLETED | OUTPATIENT
Start: 2025-05-16 | End: 2025-05-17

## 2025-05-16 RX ORDER — ACETAMINOPHEN 160 MG/5ML
650 SOLUTION ORAL EVERY 4 HOURS PRN
Status: DISCONTINUED | OUTPATIENT
Start: 2025-05-17 | End: 2025-05-20 | Stop reason: HOSPADM

## 2025-05-16 RX ORDER — FENTANYL CITRATE 50 UG/ML
INJECTION, SOLUTION INTRAMUSCULAR; INTRAVENOUS AS NEEDED
Status: DISCONTINUED | OUTPATIENT
Start: 2025-05-16 | End: 2025-05-16 | Stop reason: SURG

## 2025-05-16 RX ORDER — ACETAMINOPHEN 160 MG/5ML
650 SOLUTION ORAL EVERY 4 HOURS
Status: DISCONTINUED | OUTPATIENT
Start: 2025-05-16 | End: 2025-05-17

## 2025-05-16 RX ORDER — DEXTROSE MONOHYDRATE 25 G/50ML
INJECTION, SOLUTION INTRAVENOUS AS NEEDED
Status: DISCONTINUED | OUTPATIENT
Start: 2025-05-16 | End: 2025-05-16 | Stop reason: SURG

## 2025-05-16 RX ORDER — MAGNESIUM SULFATE 1 G/100ML
1 INJECTION INTRAVENOUS EVERY 8 HOURS
Status: DISCONTINUED | OUTPATIENT
Start: 2025-05-16 | End: 2025-05-17

## 2025-05-16 RX ORDER — HYDROCODONE BITARTRATE AND ACETAMINOPHEN 5; 325 MG/1; MG/1
2 TABLET ORAL EVERY 4 HOURS PRN
Status: DISCONTINUED | OUTPATIENT
Start: 2025-05-16 | End: 2025-05-20 | Stop reason: HOSPADM

## 2025-05-16 RX ORDER — MILRINONE LACTATE 0.2 MG/ML
.25-.375 INJECTION, SOLUTION INTRAVENOUS CONTINUOUS PRN
Status: DISCONTINUED | OUTPATIENT
Start: 2025-05-16 | End: 2025-05-17

## 2025-05-16 RX ORDER — PROTAMINE SULFATE 10 MG/ML
INJECTION, SOLUTION INTRAVENOUS AS NEEDED
Status: DISCONTINUED | OUTPATIENT
Start: 2025-05-16 | End: 2025-05-16 | Stop reason: HOSPADM

## 2025-05-16 RX ORDER — PHENYLEPHRINE HCL IN 0.9% NACL 1 MG/10 ML
SYRINGE (ML) INTRAVENOUS AS NEEDED
Status: DISCONTINUED | OUTPATIENT
Start: 2025-05-16 | End: 2025-05-16 | Stop reason: SURG

## 2025-05-16 RX ORDER — PANTOPRAZOLE SODIUM 40 MG/10ML
40 INJECTION, POWDER, LYOPHILIZED, FOR SOLUTION INTRAVENOUS ONCE
Status: COMPLETED | OUTPATIENT
Start: 2025-05-16 | End: 2025-05-16

## 2025-05-16 RX ORDER — DEXMEDETOMIDINE HYDROCHLORIDE 4 UG/ML
.2-1.5 INJECTION, SOLUTION INTRAVENOUS
Status: DISCONTINUED | OUTPATIENT
Start: 2025-05-16 | End: 2025-05-17

## 2025-05-16 RX ORDER — ACETAMINOPHEN 325 MG/1
650 TABLET ORAL EVERY 4 HOURS
Status: DISCONTINUED | OUTPATIENT
Start: 2025-05-16 | End: 2025-05-17

## 2025-05-16 RX ORDER — NOREPINEPHRINE BITARTRATE 0.03 MG/ML
.02-.2 INJECTION, SOLUTION INTRAVENOUS CONTINUOUS PRN
Status: DISCONTINUED | OUTPATIENT
Start: 2025-05-16 | End: 2025-05-20 | Stop reason: HOSPADM

## 2025-05-16 RX ORDER — MORPHINE SULFATE 2 MG/ML
1 INJECTION, SOLUTION INTRAMUSCULAR; INTRAVENOUS EVERY 4 HOURS PRN
Status: DISCONTINUED | OUTPATIENT
Start: 2025-05-16 | End: 2025-05-20 | Stop reason: HOSPADM

## 2025-05-16 RX ORDER — ATORVASTATIN CALCIUM 20 MG/1
40 TABLET, FILM COATED ORAL NIGHTLY
Status: DISCONTINUED | OUTPATIENT
Start: 2025-05-16 | End: 2025-05-17

## 2025-05-16 RX ORDER — PAPAVERINE HYDROCHLORIDE 30 MG/ML
INJECTION INTRAMUSCULAR; INTRAVENOUS AS NEEDED
Status: DISCONTINUED | OUTPATIENT
Start: 2025-05-16 | End: 2025-05-16 | Stop reason: HOSPADM

## 2025-05-16 RX ORDER — BISACODYL 10 MG
10 SUPPOSITORY, RECTAL RECTAL DAILY PRN
Status: DISCONTINUED | OUTPATIENT
Start: 2025-05-17 | End: 2025-05-20 | Stop reason: HOSPADM

## 2025-05-16 RX ORDER — DOPAMINE HYDROCHLORIDE 160 MG/100ML
2-20 INJECTION, SOLUTION INTRAVENOUS CONTINUOUS PRN
Status: DISCONTINUED | OUTPATIENT
Start: 2025-05-16 | End: 2025-05-17

## 2025-05-16 RX ORDER — ALBUMIN HUMAN 50 G/1000ML
1500 SOLUTION INTRAVENOUS AS NEEDED
Status: DISCONTINUED | OUTPATIENT
Start: 2025-05-16 | End: 2025-05-17

## 2025-05-16 RX ORDER — IBUPROFEN 600 MG/1
1 TABLET ORAL
Status: DISCONTINUED | OUTPATIENT
Start: 2025-05-16 | End: 2025-05-20 | Stop reason: HOSPADM

## 2025-05-16 RX ORDER — METOCLOPRAMIDE HYDROCHLORIDE 5 MG/ML
10 INJECTION INTRAMUSCULAR; INTRAVENOUS EVERY 6 HOURS
Status: COMPLETED | OUTPATIENT
Start: 2025-05-16 | End: 2025-05-17

## 2025-05-16 RX ORDER — OXYCODONE HYDROCHLORIDE 5 MG/1
5 TABLET ORAL EVERY 4 HOURS PRN
Refills: 0 | Status: DISCONTINUED | OUTPATIENT
Start: 2025-05-16 | End: 2025-05-20 | Stop reason: HOSPADM

## 2025-05-16 RX ORDER — ENOXAPARIN SODIUM 100 MG/ML
40 INJECTION SUBCUTANEOUS DAILY
Status: DISCONTINUED | OUTPATIENT
Start: 2025-05-17 | End: 2025-05-20 | Stop reason: HOSPADM

## 2025-05-16 RX ORDER — ONDANSETRON 2 MG/ML
4 INJECTION INTRAMUSCULAR; INTRAVENOUS EVERY 6 HOURS PRN
Status: DISCONTINUED | OUTPATIENT
Start: 2025-05-16 | End: 2025-05-20 | Stop reason: HOSPADM

## 2025-05-16 RX ORDER — LIDOCAINE HYDROCHLORIDE 20 MG/ML
INJECTION, SOLUTION INFILTRATION; PERINEURAL AS NEEDED
Status: DISCONTINUED | OUTPATIENT
Start: 2025-05-16 | End: 2025-05-16 | Stop reason: SURG

## 2025-05-16 RX ORDER — MAGNESIUM SULFATE HEPTAHYDRATE 500 MG/ML
INJECTION, SOLUTION INTRAMUSCULAR; INTRAVENOUS AS NEEDED
Status: DISCONTINUED | OUTPATIENT
Start: 2025-05-16 | End: 2025-05-16 | Stop reason: SURG

## 2025-05-16 RX ORDER — SODIUM CHLORIDE 9 MG/ML
INJECTION, SOLUTION INTRAVENOUS CONTINUOUS PRN
Status: DISCONTINUED | OUTPATIENT
Start: 2025-05-16 | End: 2025-05-16 | Stop reason: SURG

## 2025-05-16 RX ADMIN — NITROGLYCERIN 1 INCH: 20 OINTMENT TOPICAL at 01:00

## 2025-05-16 RX ADMIN — INSULIN HUMAN 5 UNITS: 100 INJECTION, SOLUTION PARENTERAL at 09:19

## 2025-05-16 RX ADMIN — ACETAMINOPHEN 1000 MG: 10 INJECTION INTRAVENOUS at 11:34

## 2025-05-16 RX ADMIN — PROPOFOL 25 MCG/KG/MIN: 10 INJECTION, EMULSION INTRAVENOUS at 06:51

## 2025-05-16 RX ADMIN — METOCLOPRAMIDE 10 MG: 5 INJECTION, SOLUTION INTRAMUSCULAR; INTRAVENOUS at 23:11

## 2025-05-16 RX ADMIN — MAGNESIUM SULFATE HEPTAHYDRATE 2 G: 500 INJECTION, SOLUTION INTRAMUSCULAR; INTRAVENOUS at 09:46

## 2025-05-16 RX ADMIN — OXYCODONE HYDROCHLORIDE 10 MG: 10 TABLET ORAL at 20:48

## 2025-05-16 RX ADMIN — MORPHINE SULFATE 4 MG: 2 INJECTION, SOLUTION INTRAMUSCULAR; INTRAVENOUS at 19:53

## 2025-05-16 RX ADMIN — ACETAMINOPHEN 1000 MG: 10 INJECTION INTRAVENOUS at 19:51

## 2025-05-16 RX ADMIN — METOPROLOL TARTRATE 12.5 MG: 25 TABLET, FILM COATED ORAL at 06:26

## 2025-05-16 RX ADMIN — Medication 100 MCG: at 07:02

## 2025-05-16 RX ADMIN — FENTANYL CITRATE 100 MCG: 50 INJECTION, SOLUTION INTRAMUSCULAR; INTRAVENOUS at 07:20

## 2025-05-16 RX ADMIN — MUPIROCIN 1 APPLICATION: 20 OINTMENT TOPICAL at 06:26

## 2025-05-16 RX ADMIN — METOCLOPRAMIDE 10 MG: 5 INJECTION, SOLUTION INTRAMUSCULAR; INTRAVENOUS at 17:29

## 2025-05-16 RX ADMIN — METOCLOPRAMIDE 10 MG: 5 INJECTION, SOLUTION INTRAMUSCULAR; INTRAVENOUS at 11:34

## 2025-05-16 RX ADMIN — INSULIN HUMAN 5 UNITS: 100 INJECTION, SOLUTION PARENTERAL at 09:49

## 2025-05-16 RX ADMIN — SODIUM CHLORIDE 5 MG/HR: 9 INJECTION, SOLUTION INTRAVENOUS at 07:21

## 2025-05-16 RX ADMIN — ROCURONIUM BROMIDE 30 MG: 10 INJECTION, SOLUTION INTRAVENOUS at 08:35

## 2025-05-16 RX ADMIN — OXYCODONE HYDROCHLORIDE 5 MG: 5 TABLET ORAL at 16:30

## 2025-05-16 RX ADMIN — MUPIROCIN 1 APPLICATION: 20 OINTMENT TOPICAL at 20:46

## 2025-05-16 RX ADMIN — MIDAZOLAM 2 MG: 1 INJECTION INTRAMUSCULAR; INTRAVENOUS at 06:30

## 2025-05-16 RX ADMIN — FENTANYL CITRATE 100 MCG: 50 INJECTION, SOLUTION INTRAMUSCULAR; INTRAVENOUS at 09:53

## 2025-05-16 RX ADMIN — CYCLOBENZAPRINE HYDROCHLORIDE 10 MG: 10 TABLET, FILM COATED ORAL at 18:50

## 2025-05-16 RX ADMIN — HEPARIN SODIUM 30000 UNITS: 1000 INJECTION, SOLUTION INTRAVENOUS; SUBCUTANEOUS at 08:08

## 2025-05-16 RX ADMIN — ALPRAZOLAM 0.25 MG: 0.25 TABLET ORAL at 20:48

## 2025-05-16 RX ADMIN — SODIUM CHLORIDE 2 G: 900 INJECTION INTRAVENOUS at 06:58

## 2025-05-16 RX ADMIN — SODIUM CHLORIDE 0.4 MG: 9 INJECTION, SOLUTION INTRAVENOUS at 07:22

## 2025-05-16 RX ADMIN — SODIUM CHLORIDE: 9 INJECTION, SOLUTION INTRAVENOUS at 06:27

## 2025-05-16 RX ADMIN — PHENYLEPHRINE HYDROCHLORIDE 0.5 MCG/KG/MIN: 10 INJECTION, SOLUTION INTRAVENOUS at 07:36

## 2025-05-16 RX ADMIN — DEXMEDETOMIDINE HYDROCHLORIDE 0.5 MCG/KG/HR: 400 INJECTION INTRAVENOUS at 14:04

## 2025-05-16 RX ADMIN — SODIUM CHLORIDE 2 G: 900 INJECTION INTRAVENOUS at 09:57

## 2025-05-16 RX ADMIN — FENTANYL CITRATE 100 MCG: 50 INJECTION, SOLUTION INTRAMUSCULAR; INTRAVENOUS at 10:01

## 2025-05-16 RX ADMIN — SODIUM CHLORIDE 0.5 MCG/KG/HR: 9 INJECTION, SOLUTION INTRAVENOUS at 06:51

## 2025-05-16 RX ADMIN — ROCURONIUM BROMIDE 100 MG: 10 INJECTION, SOLUTION INTRAVENOUS at 06:51

## 2025-05-16 RX ADMIN — ALBUMIN (HUMAN) 250 ML: 12.5 INJECTION, SOLUTION INTRAVENOUS at 19:52

## 2025-05-16 RX ADMIN — DEXTROSE MONOHYDRATE 6.25 G: 25 INJECTION, SOLUTION INTRAVENOUS at 09:49

## 2025-05-16 RX ADMIN — AMINOCAPROIC ACID 10 G: 250 INJECTION, SOLUTION INTRAVENOUS at 08:15

## 2025-05-16 RX ADMIN — AMINOCAPROIC ACID 10 G: 250 INJECTION, SOLUTION INTRAVENOUS at 10:00

## 2025-05-16 RX ADMIN — PHENYLEPHRINE HYDROCHLORIDE 0.5 MCG/KG/MIN: 10 INJECTION, SOLUTION INTRAVENOUS at 07:00

## 2025-05-16 RX ADMIN — CEFAZOLIN 2 G: 2 INJECTION, POWDER, FOR SOLUTION INTRAMUSCULAR; INTRAVENOUS at 16:38

## 2025-05-16 RX ADMIN — LIDOCAINE HYDROCHLORIDE 100 MG: 20 INJECTION, SOLUTION INFILTRATION; PERINEURAL at 06:51

## 2025-05-16 RX ADMIN — PANTOPRAZOLE SODIUM 40 MG: 40 INJECTION, POWDER, FOR SOLUTION INTRAVENOUS at 11:34

## 2025-05-16 RX ADMIN — FENTANYL CITRATE 200 MCG: 50 INJECTION, SOLUTION INTRAMUSCULAR; INTRAVENOUS at 06:51

## 2025-05-16 RX ADMIN — MAGNESIUM SULFATE IN DEXTROSE 1 G: 10 INJECTION, SOLUTION INTRAVENOUS at 19:52

## 2025-05-16 RX ADMIN — ATORVASTATIN CALCIUM 40 MG: 20 TABLET, FILM COATED ORAL at 20:46

## 2025-05-16 NOTE — PROGRESS NOTES
Pt in OR this morning for CABG, will see today if necessary otherwise will see in the morning tomorrow.

## 2025-05-16 NOTE — ANESTHESIA PROCEDURE NOTES
Airway  Reason: elective    Date/Time: 5/16/2025 6:54 AM  Airway not difficult    General Information and Staff    Patient location during procedure: OR  Anesthesiologist: Astrid Carrillo MD    Indications and Patient Condition  Indications for airway management: airway protection    Preoxygenated: yes  MILS maintained throughout    Mask difficulty assessment: 1 - vent by mask    Final Airway Details    Final airway type: endotracheal airway      Successful airway: ETT  Cuffed: yes   Successful intubation technique: direct laryngoscopy  Adjuncts used in placement: anterior pressure/BURP  Blade: Mary  Blade size: 4  ETT size (mm): 8.0  Cormack-Lehane Classification: grade IIb - view of arytenoids or posterior of glottis only  Placement verified by: chest auscultation and capnometry   Measured from: teeth  Number of attempts at approach: 1  Assessment: lips, teeth, and gum same as pre-op and atraumatic intubation    Additional Comments  Small mouth opening and stiff neck limited view.

## 2025-05-16 NOTE — ANESTHESIA PROCEDURE NOTES
Diagnostic IntraOp Dany    Procedure Performed: Diagnostic IntraOp Dany       Start Time:        End Time:

## 2025-05-16 NOTE — CONSULTS
CVICU Intensivist Progress Note    HPI/Chief Complaint: 66 yo male with severe multivessel CAD presents to the CVICU immediately s/p urgent 5v CABG    PMHx: HTN, HL w/ statin intolerance, CAD s/p stents, h/o DVT and PE (2015), CHANTEL, B12 deficiency anemia, cataracts s/p surgical correction    Procedure: 5/16/25 5V CABG (LIMA - LAD, KI - D1, SVG - RCA, PDA and OM1) by Dr. Juarez. He was an easy mask ventilation but challenging intubation due to small mouth opening (grade 2b view with Mac 4). BRANDON showed normal biventricular function pre-op and hyperdynamic function post-op. He received 405ml of cellsaver and no other products, did not require any shocks to come off CPB but was bradycardic to the 30s pre-operatively. He was brought to the CVICU on sedation and cardene, paced AAI @ 80 bpm.    Hospital Course:  5/14 elective [facility] for ongoing anginal symptoms, admitted post-[facility] for operative intervention  5/16 operative course      Daily Assessment and Plan 5/16: Maintain sedation until hemodynamically appropriate for extubation. Initially hypertensive out of OR on Cardene, now weaned off and mildly hypotensive. Volume resuscitation and norepi/josie as needed to maintain MAP >65. CI on accumen has been above 2.5 since arrival. He is quite bradycardic in the 30s requiring pacing, set to AAI @ 70 bpm with appropriate capture. Oxygenation marginal on 100% Fio2 on initial ABG, will increase PEEP to 10 to address atelectasis and hopeful that oxygenation will improve with time. Making adequate urine, Cr did rise to 1.33 post-op; will ensure adequate perfusion and hope this will downtrend. Minimal chest tube output. Cefazolin for periop prophy.      Neuro: propofol/precedex for sedation in immediate post-op period. Wean when hemodynamically appropriate for SBT. Pain control w/ morphine vs. Apap vs. Oxy prn    CV: CAD s/p CABG. Norepi and volume as needed to address hypotension.   Rhythm: epicardial wires in place, pacing AAI @  70bpm due to underlying bradycardia.     Pulm: maintain mechanical ventilation for now, adjust vent settings for appropriate oxygenation/ventilation. Hypocarbic respiratory alkalosis, adjust vent settings to decrease minute ventilation.    Renal: appropriate UOP at this time, continue volume resuscitation as needed. Replete electrolytes. JAVIER, follow closely. On tamsulosin as outpatient, will restart when hemodynamically appropriate    GI: start bowel regimen to avoid constipation/ileus. NPO for now, advance diet once appropriate. PPI for prophy.    Endo: stress induced hyperglycemia, A1C 5.9. Insulin drip for goal euglycemia, transition when appropriate    ID: periop cefazolin for prophylaxis    Heme: acute blood loss anemia, received cellsaver only in OR. Follow chest tube output closely, transfuse as necessary. Start Lovenox POD#1 for prophy    Relevant Physical Exam:  Feet warm, 1+ pulses bilaterally  Abdomen soft, nontender  Lungs clear bilaterally    acetaminophen, 1,000 mg, Intravenous, Q8H  acetaminophen, 650 mg, Oral, Q4H   Or  acetaminophen, 650 mg, Oral, Q4H   Or  acetaminophen, 650 mg, Rectal, Q4H  [START ON 5/17/2025] aspirin, 81 mg, Oral, Daily  atorvastatin, 40 mg, Oral, Nightly  ceFAZolin, 2 g, Intravenous, Q8H  [START ON 5/17/2025] enoxaparin sodium, 40 mg, Subcutaneous, Daily  magnesium sulfate, 1 g, Intravenous, Q8H  metoclopramide, 10 mg, Intravenous, Q6H  metoprolol tartrate, 12.5 mg, Oral, Q12H  mupirocin, 1 Application, Each Nare, BID  [START ON 5/17/2025] pantoprazole, 40 mg, Oral, QAM  [START ON 5/17/2025] senna-docusate sodium, 2 tablet, Oral, Nightly  [START ON 5/17/2025] tamsulosin, 0.4 mg, Oral, Nightly    ------------------------------------------------------------------------------------------------------------------------------------------------------  Prophy: HOB elevated + oral care + scds + cota stat lock + PPI + subglottic suction + no chemical DVT prophy 2/2 risk for  bleeding  Code Status: full      Critical Care time: 36 mins on 5/16/25 by Otilia Shahid MD for the assessment and treatment of: interpretation of serial cardiac output measurements, evaluation of CXR, interpretation of serial blood gases, ventilator management, respiratory alkalosis, hypotension

## 2025-05-16 NOTE — PLAN OF CARE
Goal Outcome Evaluation:         Received from OR s/p CABGx5. Weaned from cardene and propofol. Failed SBT x4. Oxycodone and flexeril given per OG tube.

## 2025-05-16 NOTE — OP NOTE
[facility] CARDIAC SURGERY OP NOTE    Preop Diagnosis: Severe coronary artery disease.  Status post LAD stent     Postop Diagnosis: Same    Chronic Comorbid Conditions relative to CABG include:  Cardiovascular: Coronary Artery Disease, Hyperlipidemia, Hypertension, and Carotid Stenosis  Respiratory: None  Endocrine: None   Nephrology: None  Hematology: None   Other: None    Indications: This patient had progressive angina 2 unstable pattern.  Because of severe disease he was admitted and placed on heparin.  Operation was advisable to prolong life and relieve symptoms.The  calculated STS Risk score was discussed with the patient and family. All risks and alternatives were discussed with the patient and family.  Counseling was done regarding abuse of tobacco, alcohol and drugs as needed.  A discussion about advanced directive was done with the patient. They understand and wish to proceed.    Procedure: Urgent CABG x 5.  Bilateral SULLY's.  Skeletonized LIMA to LAD.  Skeletonized KI to D1.  Vein graft to RCA.  Vein graft to PDA.  Vein graft to OM1.  Temporary cardiopulmonary bypass.  Antegrade and retrograde cold blood cardioplegia with warm reperfusion.  Neurologic monitoring.  Transesophageal echo.  Endoscopic vein harvest left greater saphenous vein.    Surgeon: James Juarez MD    Assistant: Assistant: Mayda Guerrero CSA was responsible for performing the following activities: Cardiac Surgery First assist, Endoscopic Vein [place] if needed for CABG,  surgical wound closure and their skilled assistance was necessary for the success of this case.     Anesthesia: GET    Findings : Body mass index is 26.89 kg/m².  The heart and cardiac chambers were normal.  There was no scarring on the ventricles.  Both mammary's were 2.5 mm with excellent blood flow.  The vein was 4 mm and good quality.  PDA was 1.5 mm the right was 2.5 mm and little bit thick.  The marginal branch was 2.5 mm.   The diagonal branch was 2 mm and the LAD was bypassed distally where it was 1.5 mm in diameter.     Operative Procedure:  A primary median sternotomy was made while the left greater saphenous vein was harvested with the endoscope.  The internal mammary artery was dissected off the left chest wall in the right chest wall with the cautery at low voltage.  A lateral thoracic artery was clipped on the right side but I did not see it on the left.  Cardiopulmonary bypass was then established for 83 minutes drifting to 34 °C and appropriate flow rates.  The aorta was crossclamped for 65 minutes and we gave 900 cc of antegrade cold blood cardioplegia then 1800 cc of retrograde cold blood cardioplegia and repeated doses every 10 to 15 minutes to good effect.  3 veins were anastomosed the ascending aorta with 6-0 Prolene and marked with washers.  The veins were sewn to the PDA, RCA, and the OM1 with 7-0 or 6-0 Prolene.  The right mammary was brought through an incision in the right side of the pericardium away from the phrenic nerve and brought through the transverse sinus and sewn into James to the diagonal branch with 7-0 Prolene.  The LIMA was then sewn to the LAD with 7-0 Prolene.  A warm dose of retrograde cardioplegia was given and then with strong suction on the aortic needle vent the cross-clamp was released.  The patient was rewarmed and cardiopulmonary bypass weaned and discontinued.  Decannulation was effected and usual devices were placed.  Hemostasis was obtained.  Protamine was administered to reverse heparin.  4 chest aids were inserted.  We applied vancomycin enriched platelet rich plasma.  The sternum was closed with stainless steel wires.  The fascia, soft tissues and skin were closed as usual.  The sponge, needle and instrument counts noted to be correct and he went the open-heart recovery room in good condition.  All the grafts lay nicely and all anastomoses were hemostatic.    Complications: None    Tubes:  4    Epicardial Wires: 3    Blood Loss:  Minimal, 405 ml Cell Saver Reinfused    Ultrafiltration: 2700 ml     Bypass Time: 83 min    Aortic cross-clamp time: 65 min    Specimen: None    Condition: Good      Patient Care Team:  Provider, No Known as PCP - Astrid Rodriguez APRN as Referring Physician (Cardiology)        James Juarez MD  5/16/2025  10:57 EDT

## 2025-05-16 NOTE — PLAN OF CARE
Goal Outcome Evaluation:  Plan of Care Reviewed With: patient        Progress: no change  Outcome Evaluation: Pt pre-op for surgery. Left CVU at 0625 by jade. Wife to report to CVU collect Pt items.

## 2025-05-16 NOTE — ANESTHESIA PROCEDURE NOTES
Central Line      Patient reassessed immediately prior to procedure    Patient location during procedure: OR  Start time: 5/16/2025 6:41 AM  Stop Time:5/16/2025 6:49 AM  Indications: central pressure monitoring, vascular access and MD/Surgeon request  Staff  Anesthesiologist: Astrid Carrillo MD  Preanesthetic Checklist  Completed: patient identified, IV checked, site marked, risks and benefits discussed, surgical consent, monitors and equipment checked, pre-op evaluation and timeout performed  Central Line Prep  Sterile Tech:cap, gloves, gown, mask and sterile barriers  Prep: chloraprep  Patient monitoring: blood pressure monitoring, EKG and continuous pulse oximetry  Central Line Procedure  Laterality:right  Location:internal jugular  Catheter Type:Cordis (MAC Introducer)  Catheter Size:9 Fr  Guidance:ultrasound guided  PROCEDURE NOTE/ULTRASOUND INTERPRETATION.  Using ultrasound guidance the potential vascular sites for insertion of the catheter were visualized to determine the patency of the vessel to be used for vascular access.  After selecting the appropriate site for insertion, the needle was visualized under ultrasound being inserted into the internal jugular vein, followed by ultrasound confirmation of wire and catheter placement. There were no abnormalities seen on ultrasound; an image was taken; and the patient tolerated the procedure with no complications. Images: still images obtained, printed/placed on chart  Assessment  Post procedure:biopatch applied, line sutured, occlusive dressing applied and secured with tape  Assessement:blood return through all ports, free fluid flow, chest x-ray ordered and Gary Test  Complications:no  Patient Tolerance:patient tolerated the procedure well with no apparent complications  Additional Notes  Slic catheter placed sterilely in introducer port.

## 2025-05-16 NOTE — PAYOR COMM NOTE
"Roseanna Peacock R \"Nain\" (65 y.o. Male)                      ATTENTION; CONTINUED CLINICALS CASE [id number] FOR 5/15                     REPLY TO [facility] DEPT  [phone] OR CALL            Date of Birth   1960    Social Security Number       Address   [address] [place] [address]    Home Phone   [phone]    MRN   [id number]       Evangelical   Mormon    Marital Status                               Admission Date   5/14/2025    Admission Type   Elective    Admitting Provider   Krupa Martinez MD    Attending Provider   Krupa Martinez MD    Department, Room/Bed   [facility] CARDIOVASCULAR OPERATING ROOM, [facility] CVOR/CVOR       Discharge Date       Discharge Disposition       Discharge Destination                                 Attending Provider: Krupa Martinez MD    Allergies: Lisinopril, Hydrochlorothiazide, Repatha [Evolocumab], Simvastatin, Amlodipine, Hydralazine, Valsartan    Isolation: None   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)   Code Status: CPR    Ht: 188 cm (74\")   Wt: 95 kg (209 lb 6.4 oz)    Admission Cmt: None   Principal Problem: Coronary artery disease involving native coronary artery of native heart without angina pectoris [I25.10]                   Active Insurance as of 5/14/2025       Primary Coverage       Payor Plan Insurance Group Employer/Plan Group    [facility] [id number]       Payor Plan Address Payor Plan Phone Number Payor Plan Fax Number Effective Dates    [address] [phone]  1/1/2023 - None Entered    [place] [address]         Subscriber Name Subscriber Birth Date Member ID       ROSEANNA PEACOCK 1960 [id number]                     Emergency Contacts        (Rel.) Home Phone Work Phone Mobile Phone    Nolvia Peacock (Spouse) [phone] -- [phone]              Vital Signs (last day)       Date/Time Temp Temp src Pulse Resp BP Patient Position SpO2    05/16/25 " 0310 98.6 (37) Oral 60 -- 134/72 Lying --    05/16/25 0100 -- -- 52 -- 133/59 -- --    05/16/25 0030 98.3 (36.8) Oral 47 -- 133/59 Lying --    05/15/25 2128 -- -- 51 -- -- -- --    05/15/25 2000 98.4 (36.9) Oral 59 -- 152/75 Lying --    05/15/25 1817 -- -- 52 -- 137/69 -- 96    05/15/25 1352 98.4 (36.9) Oral 45 18 151/70 Lying 100    05/15/25 1033 -- -- 48 -- 145/69 Lying 97    05/15/25 0720 98.5 (36.9) Oral 52 16 165/74 Lying 97    05/15/25 0442 98.4 (36.9) Oral 50 16 163/78 Lying 95    05/15/25 0000 98 (36.7) Oral 47 16 154/73 Lying 94          Oxygen Therapy (last day)       Date/Time SpO2 Device (Oxygen Therapy) Flow (L/min) (Oxygen Therapy) Oxygen Concentration (%) ETCO2 (mmHg)    05/16/25 0330 -- room air -- -- --    05/15/25 1930 -- room air -- -- --    05/15/25 1817 96 room air -- -- --    05/15/25 1352 100 -- -- -- --    05/15/25 1033 97 room air -- -- --    05/15/25 0720 97 -- -- -- --    05/15/25 0442 95 -- -- -- --    05/15/25 0401 -- room air -- -- --    05/15/25 0000 94 -- -- -- --                   Orders (last 24 hrs)        Start     Ordered    05/16/25 0715  [Transfer Hold]  chlorhexidine (PERIDEX) 0.12 % solution 15 mL  Every 12 Hours Scheduled        (Medication was not reviewed on transfer)    05/16/25 0620    05/16/25 0630  OR Blood Pickup  Once         05/16/25 0629    05/16/25 0620  [Transfer Hold]  mupirocin (BACTROBAN) 2 % nasal ointment 1 Application  Every 12 Hours        (Medication was not reviewed on transfer)    05/16/25 0620    05/16/25 0600  metoprolol tartrate (LOPRESSOR) tablet 12.5 mg  On Call to O.R.         05/14/25 1158    05/16/25 0600  ceFAZolin 2000 mg IVPB in 100 mL NS (MBP)  On Call to O.R.         05/14/25 1158    05/16/25 0600  CBC Auto Differential  PROCEDURE ONCE,   Status:  Canceled        Comments: Discontinue After Heparin Stopped      05/15/25 2202    05/16/25 0537  POC Glucose Once  PROCEDURE ONCE        Comments: Complete no more than 45 minutes prior to patient  eating      05/16/25 0535    05/16/25 0430  aPTT  Daily      Comments: Cancel If Patient Has Infusion Changes      05/15/25 1103    05/16/25 0430  CBC & Differential  Daily      Comments: Discontinue After Heparin Stopped      05/15/25 1103    05/16/25 0430  CBC Auto Differential  PROCEDURE ONCE        Comments: Discontinue After Heparin Stopped      05/15/25 2030    05/16/25 0330  aPTT  STAT         05/16/25 0031    05/16/25 0001  NPO Diet NPO Type: Sips with Meds  Diet Effective Midnight         05/14/25 1158    05/15/25 2000  aPTT  Timed         05/15/25 1549    05/15/25 1430  [Transfer Hold]  nitroglycerin (NITROSTAT) ointment 1 inch  Every 6 Hours Scheduled        (Medication was not reviewed on transfer)    05/15/25 1330    05/15/25 1200  heparin 88231 units/250 mL (100 units/mL) in 0.45 % NaCl infusion  Titrated         05/15/25 1103    05/15/25 1103  Initiate & Follow Heparin Protocol  Continuous         05/15/25 1103    05/15/25 1103  Adjust Heparin Rate Based on aPTT Using Nomogram  Continuous         05/15/25 1103    05/15/25 1103  Verify All Anticoagulant Orders Are Discontinued Upon Initiation of Heparin Protocol (eg Enoxaparin, Fondaparinux, Apixaban, Dabigatran, Edoxaban, or Rivaroxaban)  Once         05/15/25 1103    05/15/25 1103  Protime-INR  STAT        Comments: Prior to Initial Heparin Bolus      05/15/25 1103    05/15/25 1103  aPTT  STAT        Comments: Prior to Initial Heparin Bolus      05/15/25 1103    05/15/25 1103  RN to release aPTT order 6 hours after Heparin infusion INITIATION & 6 hours after EACH infusion change until 2 consecutive therapeutic aPTTs are achieved. Then switch to daily aPTT orders. If aPTT is outside target range, resume every 6 hour aPTT order schedule until therapeutic x 2  Continuous         05/15/25 1103    05/15/25 1103  CBC & Differential  STAT        Comments: Prior to Initial Heparin Bolus      05/15/25 1103    05/15/25 1103  CBC Auto Differential  PROCEDURE  ONCE        Comments: Prior to Initial Heparin Bolus      05/15/25 1103    05/15/25 1045  amLODIPine (NORVASC) tablet 5 mg  Every 24 Hours Scheduled         05/15/25 0949    05/15/25 0900  [Transfer Hold]  aspirin EC tablet 81 mg  Daily        (Medication was not reviewed on transfer)    05/14/25 1338    05/15/25 0821  ECG 12 Lead Chest Pain  STAT        Comments: Pressure pain that has been there since admit    05/15/25 0821    05/15/25 0000  chlorhexidine (PERIDEX) 0.12 % solution 15 mL  Once         05/14/25 1158    05/15/25 0000  [Transfer Hold]  Chlorhexidine Gluconate Cloth 2 % pads 1 Application  Every 12 Hours PRN        (Medication was not reviewed on transfer)    05/14/25 1158    05/14/25 2100  mupirocin (BACTROBAN) 2 % nasal ointment 1 Application  Every 12 Hours         05/14/25 1158    05/14/25 2100  nebivolol (BYSTOLIC) tablet 2.5 mg  Daily         05/14/25 1338    05/14/25 1400  Incentive Spirometry  Every 2 Hours While Awake,   Status:  Canceled       05/14/25 1308    05/14/25 1400  Instruct Patient on Coughing, Deep Breathing and Incentive Spirometry  Every 4 Hours While Awake,   Status:  Canceled       05/14/25 1158    05/14/25 1400  Instruct Patient on Coughing, Deep Breathing and Incentive Spirometry  Every 4 Hours While Awake       05/14/25 1158    05/14/25 1308  [Transfer Hold]  nitroglycerin (NITROSTAT) SL tablet 0.4 mg  Every 5 Minutes PRN        (Medication was not reviewed on transfer)    05/14/25 1308    05/14/25 1308  [Transfer Hold]  acetaminophen (TYLENOL) tablet 650 mg  Every 4 Hours PRN        (Medication was not reviewed on transfer)    05/14/25 1308    05/14/25 1200  Strict Intake & Output  Every 4 Hours       05/14/25 1142    05/14/25 1200  [Transfer Hold]  sodium chloride 0.9 % flush 10 mL  Every 12 Hours Scheduled        (Medication was not reviewed on transfer)    05/14/25 1158    05/14/25 1200  Neurovascular Checks  Every 4 Hours       05/14/25 1158    05/14/25 1156  Skin  Assessment  Every Shift       05/14/25 1158    05/14/25 1155  [Transfer Hold]  sodium chloride 0.9 % flush 10 mL  As Needed        (Medication was not reviewed on transfer)    05/14/25 1158    05/14/25 1155  [Transfer Hold]  sodium chloride 0.9 % infusion 40 mL  As Needed        (Medication was not reviewed on transfer)    05/14/25 1158    05/14/25 1155  [Transfer Hold]  ALPRAZolam (XANAX) tablet 0.25 mg  Every 8 Hours PRN        (Medication was not reviewed on transfer)    05/14/25 1158    05/14/25 1155  [Transfer Hold]  diphenhydrAMINE (BENADRYL) capsule 25 mg  Nightly PRN        (Medication was not reviewed on transfer)    05/14/25 1158    Unscheduled  Change Site Dressing  As Needed       05/14/25 1142    Unscheduled  Oxygen Therapy- Nasal Cannula; Titrate 1-6 LPM Per SpO2; 90 - 95%  Continuous PRN       05/14/25 1308    Unscheduled  Chlorhexidine Skin Prep - Chin to Toes 12 Hours Prior to Surgery & Morning of Surgery  As Needed      Comments: Chlorhexidine Skin wipes and instructions for all patients having a procedure requiring an outward incision if not allergic.  If allergic, give antibacterial skin wipes and instructions.  Do not use for facial cases or on any mucus membranes.    12 Hours Prior to Surgery & The Morning of Surgery    05/14/25 1158    Unscheduled  aPTT  As Needed       05/15/25 1103    Signed and Held  Insert Indwelling Urinary Catheter  Once        Comments: Follow Protocol As Outlined in Process Instructions (Open Order Report to View Full Instructions)    Signed and Held    Signed and Held  Assess Need for Indwelling Urinary Catheter - Follow Removal Protocol  Continuous        Comments: Follow Protocol As Outlined in Process Instructions (Open Order Report to View Full Instructions)    Signed and Held    Signed and Held  Urinary Catheter Care  Every Shift       Signed and Held                     Physician Progress Notes (last 24 hours)        Irma Dos Santos, GREG at 05/15/25 1257               Hospital Follow Up    LOS:  LOS: 1 day   Patient Name: Niranjan Peacock  Age/Sex: 65 y.o. male  : 1960  MRN: [id number]    Day of Service: 05/15/25   Length of Stay: 1  Encounter Provider: GREG Dent  Place of Service: [facility] CARDIOLOGY  Patient Care Team:  Provider, No Known as PCP - Astrid Rodriguez APRN as Referring Physician (Cardiology)    Subjective:     Chief Complaint: Multivessel coronary disease    Interval History: Resting comfortably today denies any chest pain pressure or tightness    Objective:     Objective:  Temp:  [98 °F (36.7 °C)-98.5 °F (36.9 °C)] 98.5 °F (36.9 °C)  Heart Rate:  [45-52] 48  Resp:  [15-16] 16  BP: (145-201)/(69-86) 145/69     Intake/Output Summary (Last 24 hours) at 5/15/2025 1258  Last data filed at 5/15/2025 1127  Gross per 24 hour   Intake 600 ml   Output 1700 ml   Net -1100 ml     Body mass index is 28.25 kg/m².      25  0742   Weight: 99.8 kg (220 lb)     Weight change:     Physical Exam:   General Appearance:    Awake alert and oriented in no acute distress.   Color:  Skin:  Neuro:  HEENT:    Lungs:     Pink  Warm and dry  No focal, motor or sensory deficits  Neck supple, pupils equal, round and reactive. No JVD, No Bruit  Clear to auscultation,respirations regular, even and                  unlabored    Heart:    Regular rate and rhythm, S1 and S2, no murmur, no gallop, no rub. No edema, DP/PT pulses are 2+.  Right radial cath site stable   Chest Wall:    No abnormalities observed   Abdomen:     Normal bowel sounds, no masses, no organomegaly, soft        non-tender, non-distended, no guarding, no ascites noted   Extremities:   Moves all extremities well, no edema, no cyanosis, no redness       Lab Review:   Results from last 7 days   Lab Units 25  1422 25  1437   SODIUM mmol/L 136 141   POTASSIUM mmol/L 3.8 3.8   CHLORIDE mmol/L 105 104   CO2 mmol/L 25.5 28.7   BUN mg/dL 9 13    CREATININE mg/dL 0.92 1.12   GLUCOSE mg/dL 100* 93   CALCIUM mg/dL 8.8 9.0   AST (SGOT) U/L 22  --    ALT (SGPT) U/L 19  --          Results from last 7 days   Lab Units 05/15/25  1225 05/14/25  1422   WBC 10*3/mm3 6.54 6.51   HEMOGLOBIN g/dL 14.4 13.5   HEMATOCRIT % 43.5 40.3   PLATELETS 10*3/mm3 286 252     Results from last 7 days   Lab Units 05/14/25  1422   INR  1.09   APTT seconds 25.9     Results from last 7 days   Lab Units 05/14/25  1422   MAGNESIUM mg/dL 2.0     Results from last 7 days   Lab Units 05/14/25  1422   CHOLESTEROL mg/dL 115   TRIGLYCERIDES mg/dL 146   HDL CHOL mg/dL 38*     Results from last 7 days   Lab Units 05/14/25  1422   PROBNP pg/mL 182.0         I reviewed the patient's new clinical results.  I personally viewed and interpreted the patient's EKG  Current Medications:   Scheduled Meds:amLODIPine, 5 mg, Oral, Q24H  aspirin, 81 mg, Oral, Daily  [START ON 5/16/2025] ceFAZolin, 2,000 mg, Intravenous, On Call to OR  chlorhexidine, 15 mL, Mouth/Throat, Once  [START ON 5/16/2025] metoprolol tartrate, 12.5 mg, Oral, On Call to OR  mupirocin, 1 Application, Each Nare, Q12H  nebivolol, 2.5 mg, Oral, Daily  sodium chloride, 10 mL, Intravenous, Q12H      Continuous Infusions:heparin, 10 Units/kg/hr        Allergies:  Allergies   Allergen Reactions    Lisinopril Shortness Of Breath     Rash also     Hydrochlorothiazide Other (See Comments)     Lightheadedness    Repatha [Evolocumab] Myalgia    Simvastatin Myalgia    Amlodipine Myalgia     Muscle aches , rash    Hydralazine Rash    Valsartan Rash     muscle aches       Assessment/Plan:    1.  Coronary artery disease-history of PCI in the past.  Normal left main, 85% distal LAD, proximal to mid LAD stent patent, ostial circumflex 90%, right coronary mid 70% proximal 30-40%.  Plan CABG tomorrow    2.  Essential hypertension-blood pressure elevated-reports multiple blood pressure class medication intolerances such as ACE inhibitors, angiotensin  "receptor blockers, nitrates and calcium channel blockers as well as thiazide diuretics.  On Nebivolol 2.5 mg daily however heart rate in the 40s and 50s.  Blood pressure has been in the 160s spoke with him at length he has a rash and a split in the skin of his index finger which he attributed to amlodipine however has not been on it for several weeks and is still a persistent problem.  I do not think this is related to amlodipine will start on 5 mg daily.    3.  Dyslipidemia-statin intolerance.  LDL 52, HDL 38    4.  History of DVT    5.  Obstructive sleep apnea            GREG Dent  05/15/25  12:58 EDT  Electronically signed by GREG Dent, 05/15/25, 12:58 PM EDT.       Electronically signed by Irma Dos Santos APRN at 05/15/25 1305       Michelle Dominguez APRN at 05/15/25 0923       Attestation signed by Jr James Juarez MD at 05/15/25 1710      I have reviewed this documentation and agree.                       LOS: 1 day   Patient Care Team:  Provider, No Known as PCP - Astrid Rodriguez APRN as Referring Physician (Cardiology)    Chief Complaint: Multivessel CAD      Subjective: Patient resting in bed.  Complains of chest pressure that is similar to what he has been experiencing.    Vital Signs  Temp:  [98 °F (36.7 °C)-98.5 °F (36.9 °C)] 98.5 °F (36.9 °C)  Heart Rate:  [42-60] 52  Resp:  [15-16] 16  BP: (154-201)/(68-88) 165/74      05/14/25  0742   Weight: 99.8 kg (220 lb)     Body mass index is 28.25 kg/m².    Intake/Output Summary (Last 24 hours) at 5/15/2025 0923  Last data filed at 5/15/2025 0824  Gross per 24 hour   Intake 600 ml   Output 800 ml   Net -200 ml     I/O this shift:  In: 360 [P.O.:360]  Out: -       Objective:  Vital signs: (most recent): Blood pressure 151/70, pulse (!) 45, temperature 98.4 °F (36.9 °C), temperature source Oral, resp. rate 18, height 188 cm (74\"), weight 99.8 kg (220 lb), SpO2 100%.              Physical Examination:   General appearance - " alert, well appearing, and in no distress  Mental status - normal mood, behavior, speech, dress, motor activity, and thought processes  Chest - clear to auscultation, no wheezes, rales or rhonchi, symmetric air entry  Heart - normal rate, regular rhythm, normal S1, S2, no murmurs, rubs, clicks or gallops  Abdomen - soft, nontender, nondistended, no masses or organomegaly  bowel sounds normal  Neurological - alert, oriented, normal speech, no focal findings or movement disorder noted  Extremities - peripheral pulses normal, no pedal edema, no clubbing or cyanosis  Skin - normal coloration and turgor, no rashes, no suspicious skin lesions noted     Results Review:      WBC WBC   Date Value Ref Range Status   05/14/2025 6.51 3.40 - 10.80 10*3/mm3 Final      HGB Hemoglobin   Date Value Ref Range Status   05/14/2025 13.5 13.0 - 17.7 g/dL Final   05/14/2025 12.9 12.0 - 17.0 g/dL Final   05/14/2025 13.3 12.0 - 17.0 g/dL Final   05/14/2025 12.9 12.0 - 17.0 g/dL Final      HCT Hematocrit   Date Value Ref Range Status   05/14/2025 40.3 37.5 - 51.0 % Final   05/14/2025 38 38 - 51 % Final     Comment:     Serial Number: [id number]:  [id number]   05/14/2025 39 38 - 51 % Final     Comment:     Serial Number: [id number]:  [id number]   05/14/2025 38 38 - 51 % Final     Comment:     Serial Number: [id number]:  [id number]      Platelets Platelets   Date Value Ref Range Status   05/14/2025 252 140 - 450 10*3/mm3 Final        PT/INR:    Protime   Date Value Ref Range Status   05/14/2025 14.1 11.7 - 14.2 Seconds Final   /  INR   Date Value Ref Range Status   05/14/2025 1.09 0.90 - 1.10 Final       Sodium Sodium   Date Value Ref Range Status   05/14/2025 136 136 - 145 mmol/L Final      Potassium Potassium   Date Value Ref Range Status   05/14/2025 3.8 3.5 - 5.2 mmol/L Final      Chloride Chloride   Date Value Ref Range Status   05/14/2025 105 98 - 107 mmol/L Final      Bicarbonate CO2   Date Value Ref Range Status   05/14/2025 25.5 22.0  - 29.0 mmol/L Final      BUN BUN   Date Value Ref Range Status   05/14/2025 9 8 - 23 mg/dL Final      Creatinine Creatinine   Date Value Ref Range Status   05/14/2025 0.92 0.76 - 1.27 mg/dL Final      Calcium Calcium   Date Value Ref Range Status   05/14/2025 8.8 8.6 - 10.5 mg/dL Final      Magnesium Magnesium   Date Value Ref Range Status   05/14/2025 2.0 1.6 - 2.4 mg/dL Final          aspirin, 81 mg, Oral, Daily  [START ON 5/16/2025] ceFAZolin, 2,000 mg, Intravenous, On Call to OR  chlorhexidine, 15 mL, Mouth/Throat, Once  [START ON 5/16/2025] metoprolol tartrate, 12.5 mg, Oral, On Call to OR  mupirocin, 1 Application, Each Nare, Q12H  nebivolol, 2.5 mg, Oral, Daily  sodium chloride, 10 mL, Intravenous, Q12H             Coronary artery disease involving native coronary artery of native heart without angina pectoris    CAD (coronary artery disease)      Assessment & Plan    - Severe multivessel CAD, s/p PCI (2017)  - HTN  - HLD, intolerant of statins  - History of DVT/PE  - CHANTEL    - Continues to complain of chest pressure.  EKG without any acute changes.  Discussed Dr. Juarez.  Will start heparin drip which will be on-call to the OR.  - Blood pressure significantly elevated today.  Amlodipine added per cardiology.  Patient reports multiple antihypertensive medication allergies.  - Patient reports having fluid drainage from ears bilaterally.  Has seen ENT in the past few weeks.  Occasional dizziness.  Attempted to consult ENT but their services are unavailable at our facility.  - Plan for OR tomorrow.  Labs and diagnostics reviewed.  Pertinent preoperative testing listed below.  Spoke at length regarding surgery and expected postoperative course with patient and spouse.  All questions were answered to patient's satisfaction.  - Continue routine care.     Preop testing:  Vein mapping: Small amount of adequate conduit per report.  Dr. Juarez has reviewed.  Carotid duplex: Left ICA with 50-69% stenosis, right ICA  less than 50% stenosis  Platelet aggregation: 80%  A1c: 5.9  ABG: PO2 78.6  UA: WNL      Michelle Dominguez, GREG  05/15/25  09:23 EDT      Electronically signed by Jr James Juarez MD at 05/15/25 1710          Louise Nix, RN     Case Management     Case Management/Social Work     Signed     Date of Service: 05/15/25 1621  Creation Time: 05/15/25 1621     Signed         Discharge Planning Assessment  [facility]     Patient Name: Niranjan Peacock                     MRN: [id number]  Today's Date: 5/15/2025                Admit Date: 5/14/2025     Plan: Home w/ [facility]    Discharge Needs Assessment         Row Name 05/15/25 1615           Living Environment     People in Home spouse     Name(s) of People in Home Nolvia Peacock/wife     Current Living Arrangements home     Potentially Unsafe Housing Conditions none     In the past 12 months has the electric, gas, oil, or water company threatened to shut off services in your home? No     Primary Care Provided by self     Provides Primary Care For no one     Family Caregiver if Needed spouse     Family Caregiver Names Nolvia Peacock/wife; Son/Oh Peacock     Quality of Family Relationships helpful;involved;supportive     Able to Return to Prior Arrangements yes           Resource/Environmental Concerns     Resource/Environmental Concerns none     Transportation Concerns none           Transportation Needs     In the past 12 months, has lack of transportation kept you from medical appointments or from getting medications? no     In the past 12 months, has lack of transportation kept you from meetings, work, or from getting things needed for daily living? No           Food Insecurity     Within the past 12 months, you worried that your food would run out before you got the money to buy more. Never true     Within the past 12 months, the food you bought just didn't last and you didn't have money to get more. Never true           Transition Planning      Patient/Family Anticipates Transition to home with family     Patient/Family Anticipated Services at Transition home health care     Transportation Anticipated family or friend will provide           Discharge Needs Assessment     Readmission Within the Last 30 Days no previous admission in last 30 days     Equipment Currently Used at Home bp cuff;scales;shower chair     Concerns to be Addressed discharge planning     Do you want help with school or training? For example, starting or completing job training or getting a high school diploma, GED or equivalent No     Anticipated Changes Related to Illness none     Equipment Needed After Discharge none     Discharge Facility/Level of Care Needs home with home health     Provided Post Acute Provider Quality & Resource List? Yes     Post Acute Provider Quality and Resource List Home Health     Delivered To Patient     Method of Delivery In person     Offered/Gave Vendor List yes     Patient's Choice of Community Agency(s) [facility]                         Discharge Plan         Row Name 05/15/25 7747           Plan     Plan Home w/ [facility]     Plan Comments CCP spoke with pleasant patient at bedside.  Explained role, verified face sheet, and discussed discharge plan.  Patient stated he is IADL's, retired and drives.  Patient lives with spouse/Nolvia Peacock in a single-story home with one entrance stair step.  Patients has no current PCP.  CCP gave Pt the [facility] Directory so he could call and find him a new PCP.  [facility]  Patient has the following DME- built-in shower bench, BP cuff and scale.  Patient denies use of past home health or going to a sub-acute rehab.  Patient plans to return home at discharge with 24/7 assistance of spouse.  Patient chose [facility] to follow him post op open heart surgery.  Referral called to Vik[facility], and she accepted.  The patient has a son/Oh and son-in-law that will care for his lawn  while he is recuperating.  Patient denies current discharge needs.  CCP will continue to follow….…Louise COOMBS. /CCP.                       Continued Care and Services - Admitted Since 5/14/2025         Home Medical Care Coordination complete.        Service Provider Request Status Services Address Phone Fax Patient Preferred     [facility] [address], [address], [place] [address] [phone] --                       Expected Discharge Date and Time         Expected Discharge Date Expected Discharge Time     May 21, 2025                 Demographic Summary         Row Name 05/15/25 1613           General Information     Admission Type inpatient     Arrived From home     Referral Source admission list     Reason for Consult discharge planning     Preferred Language English           Contact Information     Permission Granted to Share Info With ;family/designee                         Functional Status         Row Name 05/15/25 1614           Functional Status     Usual Activity Tolerance good     Current Activity Tolerance good           Physical Activity     On average, how many days per week do you engage in moderate to strenuous exercise (like a brisk walk)? 3 days     On average, how many minutes do you engage in exercise at this level? 30 min     Number of minutes of exercise per week 90           Assessment of Health Literacy     How often do you have someone help you read hospital materials? Never     How often do you have problems learning about your medical condition because of difficulty understanding written information? Never     How often do you have a problem understanding what is told to you about your medical condition? Never     How confident are you filling out medical forms by yourself? Extremely     Health Literacy Good           Functional Status, IADL     Medications independent     Meal Preparation independent  "    Housekeeping independent     Laundry independent     Shopping independent     If for any reason you need help with day-to-day activities such as bathing, preparing meals, shopping, managing finances, etc., do you get the help you need? I don't need any help           Mental Status     General Appearance WDL WDL           Mental Status Summary     Recent Changes in Mental Status/Cognitive Functioning no changes           Employment/     Employment Status retired     Employment/ Comments [employer]                         Psychosocial    No documentation.                        Abuse/Neglect    No documentation.                        Legal    No documentation.                        Substance Abuse    No documentation.                        Patient Forms    No documentation.                            Louise Nix, Hannah Kelley RN   Registered Nurse  Nursing     Plan of Care     Signed     Date of Service: 05/15/25 1552  Creation Time: 05/15/25 1552     Signed         Goal Outcome Evaluation:  Plan of Care Reviewed With: patient  Progress: no change  Outcome Evaluation: Heparin gtt started, Complaints of chest pressure, Nitropaste added. 1st case CABG. Consents signed and on chart. NPO after MN. Continue POC.                             All medication doses during the admission are shown, including meds that are no longer on order.  Scheduled Meds Sorted by Name  for Niranjan Peacock \"Nain\" as of 5/14/25 through 5/16/25    1 Day 3 Days 7 Days 10 Days < Today >   Legend:       Medications 05/14/25 05/15/25 05/16/25   amLODIPine (NORVASC) tablet 5 mg  Dose: 5 mg  Freq: Every 24 Hours Scheduled Route: PO  Start: 05/15/25 1045   Admin Instructions:        1033        0900          aspirin EC tablet 81 mg  Dose: 81 mg  Freq: Daily Route: PO  Start: 05/15/25 0900   Admin Instructions:        0920        0628   0900         ceFAZolin 2000 mg " "IVPB in 100 mL NS (MBP)  Dose: 2,000 mg  Freq: On Call to O.R. Route: IV  Indications of Use: PERIOPERATIVE PHARMACOPROPHYLAXIS  Start: 05/16/25 0600 End: 05/17/25 0559   Admin Instructions:         0600          chlorhexidine (PERIDEX) 0.12 % solution 15 mL  Dose: 15 mL  Freq: Every 12 Hours Scheduled Route: MT  Start: 05/16/25 0715   Admin Instructions:         0628   0715   2100        chlorhexidine (PERIDEX) 0.12 % solution 15 mL  Dose: 15 mL  Freq: Once Route: MT  Start: 05/15/25 0000 End: 05/15/25 2128   Admin Instructions:        2128           metoprolol tartrate (LOPRESSOR) tablet 12.5 mg  Dose: 12.5 mg  Freq: On Call to O.R. Route: PO  Start: 05/16/25 0600 End: 05/16/25 0626   Admin Instructions:         0626          mupirocin (BACTROBAN) 2 % nasal ointment 1 Application  Dose: 1 Application  Freq: Every 12 Hours Route: EACH NARE  Start: 05/16/25 0620 End: 05/17/25 0714   Admin Instructions:         0626   0628   1915        mupirocin (BACTROBAN) 2 % nasal ointment 1 Application  Dose: 1 Application  Freq: Every 12 Hours Route: EACH NARE  Start: 05/14/25 2100 End: 05/15/25 2128   Admin Instructions:        0921   2128          nebivolol (BYSTOLIC) tablet 2.5 mg  Dose: 2.5 mg  Freq: Daily Route: PO  Start: 05/14/25 2100   Admin Instructions:       2131 2128        2100          nitroglycerin (NITROSTAT) ointment 1 inch  Dose: 1 inch  Freq: Every 6 Hours Scheduled Route: TOP  Start: 05/15/25 1430   Admin Instructions:        1403   1817       0100   (0658) [C]   0628     1200   1800         sodium chloride 0.9 % flush 10 mL  Dose: 10 mL  Freq: Every 12 Hours Scheduled Route: IV  Start: 05/14/25 1200    1316   2133       0921   2128       0628   0900   2100                    Continuous Meds Sorted by Name  for Niranjan Peacock R \"Nain\" as of 5/14/25 through 5/16/25  Legend:       Medications 05/14/25 05/15/25 05/16/25   heparin 15739 units/250 mL (100 units/mL) in 0.45 % NaCl infusion  Rate: 9.98 mL/hr " "Dose: 10 Units/kg/hr  Weight Dosing Info: 99.8 kg  Freq: Titrated Route: IV  Indications of Use: ACUTE CORONARY SYNDROME  Last Dose: Stopped (05/16/25 0626)  Start: 05/15/25 1200 End: 05/16/25 0600   Admin Instructions:        1358      1910     2126 [C]   2127       0504   0600-D/C'd  0626 [C]         sodium chloride 0.9 % infusion  Rate: 99.8 mL/hr Dose: 1 mL/kg/hr  Weight Dosing Info: 99.8 kg  Freq: Continuous Route: IV  Last Dose: Stopped (05/14/25 1722)  Start: 05/14/25 1400 End: 05/14/25 1708    1309   1708-D/C'd  1722 [C]           sodium chloride 0.9 % infusion  Rate: 100 mL/hr Dose: 100 mL/hr  Freq: Continuous Route: IV  Indications Comment: Pre-Procedure Hydration  Last Dose: Stopped (05/14/25 1500)  Start: 05/15/25 0000 End: 05/14/25 1545    0746   (1333) [C]   1500 [C]     1545-D/C'd      0011                       PRN Meds Sorted by Name  for Niranjan Peacock R \"Nain\" as of 5/14/25 through 5/16/25  Legend:       Medications 05/14/25 05/15/25 05/16/25   acetaminophen (TYLENOL) tablet 650 mg  Dose: 650 mg  Freq: Every 4 Hours PRN Route: PO  PRN Reason: Mild Pain  Start: 05/14/25 1155 End: 05/14/25 1315   Admin Instructions:       1315-D/C'd          acetaminophen (TYLENOL) tablet 650 mg  Dose: 650 mg  Freq: Every 4 Hours PRN Route: PO  PRN Reasons: Mild Pain,Fever  PRN Comment: temperature greater than 101F  Start: 05/14/25 1308   Admin Instructions:        1819        0628          ALPRAZolam (XANAX) tablet 0.25 mg  Dose: 0.25 mg  Freq: Every 8 Hours PRN Route: PO  PRN Reason: Anxiety  Start: 05/14/25 1155 End: 05/24/25 1154   Admin Instructions:         0628          Chlorhexidine Gluconate Cloth 2 % pads 1 Application  Dose: 1 Application  Freq: Every 12 Hours PRN Route: TOP  PRN Comment: 12 hours night before surgery then repeat in AM prior to surgery.  Start: 05/15/25 0000      0628          diphenhydrAMINE (BENADRYL) capsule 25 mg  Dose: 25 mg  Freq: Nightly PRN Route: PO  PRN Reason: Sleep  Start: " 05/14/25 1155   Admin Instructions:         0628          fentaNYL citrate (PF) (SUBLIMAZE) injection  Freq: Code / Trauma / Sedation Medication  Start: 05/14/25 0803 End: 05/14/25 0900 0803 0825 0830 0900-D/C'd          heparin (porcine) injection  Freq: Code / Trauma / Sedation Medication  Start: 05/14/25 0825 End: 05/14/25 0900 0825 0900-D/C'd         iopamidol (ISOVUE-370) 76 % injection  Freq: Code / Trauma / Sedation Medication  Start: 05/14/25 0840 End: 05/14/25 0900 0840   0900-D/C'd         lidocaine (XYLOCAINE) 2% injection  Freq: Code / Trauma / Sedation Medication  Start: 05/14/25 0820 End: 05/14/25 0900 0819 0824 0900-D/C'd        midazolam (VERSED) injection  Freq: Code / Trauma / Sedation Medication  Start: 05/14/25 0803 End: 05/14/25 0900 0803 0820   0832     0900-D/C'd          nitroglycerin (NITROSTAT) SL tablet 0.4 mg  Dose: 0.4 mg  Freq: Every 5 Minutes PRN Route: SL  PRN Reason: Chest Pain  Start: 05/14/25 1308   Admin Instructions:         0628          nitroGLYCERIN 200 mcg/mL 30 mL syringe  Freq: Code / Trauma / Sedation Medication  Start: 05/14/25 0825 End: 05/14/25 0900 0825 0900-D/C'd         sodium chloride 0.9 % flush 10 mL  Dose: 10 mL  Freq: As Needed Route: IV  PRN Reason: Line Care  Start: 05/14/25 1155      0628          sodium chloride 0.9 % flush 10 mL  Dose: 10 mL  Freq: As Needed Route: IV  PRN Reason: Line Care  Start: 05/14/25 0715 End: 05/14/25 1302    1302-D/C'd          sodium chloride 0.9 % infusion 40 mL  Dose: 40 mL  Freq: As Needed Route: IV  PRN Reason: Line Care  Start: 05/14/25 1155   Admin Instructions:         0628          verapamil (ISOPTIN) injection  Freq: Code / Trauma / Sedation Medication  Start: 05/14/25 0825 End: 05/14/25 0900    0825   0900-D/C'd

## 2025-05-16 NOTE — ANESTHESIA PROCEDURE NOTES
Diagnostic IntraOp Dany    Procedure Performed: Diagnostic IntraOp Dany       Start Time:  5/16/2025 10:20 AM       End Time:   5/16/2025 10:20 AM    Preanesthesia Checklist:  Patient identified, IV assessed, risks and benefits discussed, monitors and equipment assessed, procedure being performed at surgeon's request and anesthesia consent obtained.    General Procedure Information  Diagnostic Indications for Echo:  assessment of ascending aorta, assessment of surgical repair, defect repair evaluation and hemodynamic monitoring  Physician Requesting Echo: Jr James Juarez MD  Location performed:  OR  Intubated  Bite block placed  Heart visualized  Probe Insertion:  Easy  Probe Type:  Multiplane  Modalities:  2D only, color flow mapping, continuous wave Doppler and pulse wave Doppler    Echocardiographic and Doppler Measurements    Ventricles    Right Ventricle:  Cavity size normal.  Hypertrophy not present.  Thrombus not present.    Left Ventricle:  Cavity size normal.  Thrombus not present.  Global Function normal.  Ejection Fraction 55%.          Valves    Aortic Valve:  Annulus normal.  Stenosis not present.  Regurgitation trace.  Leaflets normal.  Leaflet motions normal.      Mitral Valve:  Annulus normal.  Stenosis not present.  Regurgitation trace.  Leaflets normal.  Leaflet motions normal.      Tricuspid Valve:  Annulus normal.  Stenosis not present.  Regurgitation trace.        Aorta    Ascending Aorta:  Size normal.  Dissection not present.  Plaque thickness less than 3 mm.  Mobile plaque not present.    Aortic Arch:  Size normal.  Dissection not present.  Plaque thickness less than 3 mm.  Mobile plaque not present.    Descending Aorta:  Size normal.  Dissection not present.  Plaque thickness less than 3 mm.  Mobile plaque not present.        Atria    Right Atrium:  Size normal.  Spontaneous echo contrast not present.  Thrombus not present.  Tumor not present.  Device present.    Left Atrium:  Size  normal.  Spontaneous echo contrast not present.  Thrombus not present.  Tumor not present.  Device not present.    Left atrial appendage normal.      Septa        Ventricular Septum:  Intra-ventricular septum morphology hypertrophy.          Other Findings  Pericardium:  normal  Pleural Effusion:  none  Pulmonary Arteries:  normal  Pulmonary Venous Flow:  normal    Anesthesia Information  Performed Personally  Anesthesiologist:  Astrid Carrillo MD      Echocardiogram Comments:       Post CPB  Hyperdynamic LV function, EF 65-70%  Normal RV  Valves unchanged  No aortic dissection post decannulation

## 2025-05-16 NOTE — ANESTHESIA PROCEDURE NOTES
Arterial Line      Patient reassessed immediately prior to procedure    Start time: 5/16/2025 6:31 AM  Stop Time:5/16/2025 6:35 AM       Line placed for hemodynamic monitoring and ABGs/Labs/ISTAT.  Performed By   Anesthesiologist: Astrid Carrillo MD   Preanesthetic Checklist  Completed: patient identified, IV checked, site marked, risks and benefits discussed, surgical consent, monitors and equipment checked, pre-op evaluation and timeout performed  Arterial Line Prep    Sterile Tech: gloves, mask and cap  Prep: ChloraPrep  Patient monitoring: blood pressure monitoring, continuous pulse oximetry and EKG  Arterial Line Procedure   Laterality:left  Location:  radial artery  Catheter size: 20 G   Guidance: palpation technique  Number of attempts: 1  Successful placement: yes   Post Assessment   Dressing Type: secured with tape and occlusive dressing applied.   Complications no  Circ/Move/Sens Assessment: normal and unchanged.   Patient Tolerance: patient tolerated the procedure well with no apparent complications

## 2025-05-16 NOTE — ANESTHESIA PREPROCEDURE EVALUATION
Anesthesia Evaluation     NPO Solid Status: > 8 hours  NPO Liquid Status: > 2 hours           Airway   Dental      Pulmonary    (+) pulmonary embolism,sleep apnea  Cardiovascular   Exercise tolerance: poor (<4 METS)    (+) hypertension 2 medications or greater, CAD, angina with exertion, DVT resolved, hyperlipidemia,  carotid artery disease left carotid      Neuro/Psych  (+) headaches, syncope, numbness  GI/Hepatic/Renal/Endo    (+) thyroid problem thyroid nodules    Musculoskeletal     Abdominal    Substance History      OB/GYN          Other                    Anesthesia Plan    ASA 4     general, Yoly and CVL     (BRANDON)  intravenous induction   Postoperative Plan: Expected vent after surgery  Anesthetic plan, risks, benefits, and alternatives have been provided, discussed and informed consent has been obtained with: patient.    Use of blood products discussed with patient .      CODE STATUS:    Code Status (Patient has no pulse and is not breathing): CPR (Attempt to Resuscitate)  Medical Interventions (Patient has pulse or is breathing): Full Support  Level Of Support Discussed With: Patient

## 2025-05-17 ENCOUNTER — APPOINTMENT (OUTPATIENT)
Dept: GENERAL RADIOLOGY | Facility: HOSPITAL | Age: 65
End: 2025-05-17
Payer: COMMERCIAL

## 2025-05-17 LAB
ALBUMIN SERPL-MCNC: 4.1 G/DL (ref 3.5–5.2)
ANION GAP SERPL CALCULATED.3IONS-SCNC: 10.4 MMOL/L (ref 5–15)
BASOPHILS # BLD AUTO: 0.01 10*3/MM3 (ref 0–0.2)
BASOPHILS NFR BLD AUTO: 0.1 % (ref 0–1.5)
BUN SERPL-MCNC: 20 MG/DL (ref 8–23)
BUN/CREAT SERPL: 17.7 (ref 7–25)
CA-I SERPL ISE-MCNC: 1.22 MMOL/L (ref 1.15–1.35)
CALCIUM SPEC-SCNC: 8.7 MG/DL (ref 8.6–10.5)
CHLORIDE SERPL-SCNC: 111 MMOL/L (ref 98–107)
CO2 SERPL-SCNC: 23.6 MMOL/L (ref 22–29)
CREAT SERPL-MCNC: 1.13 MG/DL (ref 0.76–1.27)
DEPRECATED RDW RBC AUTO: 43.9 FL (ref 37–54)
EGFRCR SERPLBLD CKD-EPI 2021: 72.6 ML/MIN/1.73
EOSINOPHIL # BLD AUTO: 0 10*3/MM3 (ref 0–0.4)
EOSINOPHIL NFR BLD AUTO: 0 % (ref 0.3–6.2)
ERYTHROCYTE [DISTWIDTH] IN BLOOD BY AUTOMATED COUNT: 12.4 % (ref 12.3–15.4)
GLUCOSE BLDC GLUCOMTR-MCNC: 121 MG/DL (ref 70–130)
GLUCOSE BLDC GLUCOMTR-MCNC: 133 MG/DL (ref 70–130)
GLUCOSE BLDC GLUCOMTR-MCNC: 139 MG/DL (ref 70–130)
GLUCOSE BLDC GLUCOMTR-MCNC: 143 MG/DL (ref 70–130)
GLUCOSE BLDC GLUCOMTR-MCNC: 144 MG/DL (ref 70–130)
GLUCOSE BLDC GLUCOMTR-MCNC: 149 MG/DL (ref 70–130)
GLUCOSE BLDC GLUCOMTR-MCNC: 153 MG/DL (ref 70–130)
GLUCOSE BLDC GLUCOMTR-MCNC: 173 MG/DL (ref 70–130)
GLUCOSE SERPL-MCNC: 142 MG/DL (ref 65–99)
HCT VFR BLD AUTO: 31.9 % (ref 37.5–51)
HGB BLD-MCNC: 10.7 G/DL (ref 13–17.7)
IMM GRANULOCYTES # BLD AUTO: 0.04 10*3/MM3 (ref 0–0.05)
IMM GRANULOCYTES NFR BLD AUTO: 0.4 % (ref 0–0.5)
INR PPP: 1.33 (ref 0.9–1.1)
LYMPHOCYTES # BLD AUTO: 0.72 10*3/MM3 (ref 0.7–3.1)
LYMPHOCYTES NFR BLD AUTO: 7.1 % (ref 19.6–45.3)
MAGNESIUM SERPL-MCNC: 2.4 MG/DL (ref 1.6–2.4)
MCH RBC QN AUTO: 31.8 PG (ref 26.6–33)
MCHC RBC AUTO-ENTMCNC: 33.5 G/DL (ref 31.5–35.7)
MCV RBC AUTO: 94.7 FL (ref 79–97)
MONOCYTES # BLD AUTO: 0.64 10*3/MM3 (ref 0.1–0.9)
MONOCYTES NFR BLD AUTO: 6.3 % (ref 5–12)
NEUTROPHILS NFR BLD AUTO: 8.77 10*3/MM3 (ref 1.7–7)
NEUTROPHILS NFR BLD AUTO: 86.1 % (ref 42.7–76)
NRBC BLD AUTO-RTO: 0 /100 WBC (ref 0–0.2)
PHOSPHATE SERPL-MCNC: 4.7 MG/DL (ref 2.5–4.5)
PLATELET # BLD AUTO: 181 10*3/MM3 (ref 140–450)
PMV BLD AUTO: 9.6 FL (ref 6–12)
POTASSIUM SERPL-SCNC: 4.1 MMOL/L (ref 3.5–5.2)
PROTHROMBIN TIME: 16.5 SECONDS (ref 11.7–14.2)
QT INTERVAL: 414 MS
QTC INTERVAL: 421 MS
RBC # BLD AUTO: 3.37 10*6/MM3 (ref 4.14–5.8)
SODIUM SERPL-SCNC: 145 MMOL/L (ref 136–145)
WBC NRBC COR # BLD AUTO: 10.18 10*3/MM3 (ref 3.4–10.8)

## 2025-05-17 PROCEDURE — 25010000002 CEFAZOLIN PER 500 MG: Performed by: THORACIC SURGERY (CARDIOTHORACIC VASCULAR SURGERY)

## 2025-05-17 PROCEDURE — 25010000002 ENOXAPARIN PER 10 MG: Performed by: THORACIC SURGERY (CARDIOTHORACIC VASCULAR SURGERY)

## 2025-05-17 PROCEDURE — 25010000002 CEFAZOLIN PER 500 MG: Performed by: NURSE PRACTITIONER

## 2025-05-17 PROCEDURE — 83735 ASSAY OF MAGNESIUM: CPT | Performed by: NURSE PRACTITIONER

## 2025-05-17 PROCEDURE — 97116 GAIT TRAINING THERAPY: CPT

## 2025-05-17 PROCEDURE — 82330 ASSAY OF CALCIUM: CPT | Performed by: NURSE PRACTITIONER

## 2025-05-17 PROCEDURE — 25010000002 MAGNESIUM SULFATE IN D5W 1G/100ML (PREMIX) 1-5 GM/100ML-% SOLUTION: Performed by: NURSE PRACTITIONER

## 2025-05-17 PROCEDURE — 71045 X-RAY EXAM CHEST 1 VIEW: CPT

## 2025-05-17 PROCEDURE — 99232 SBSQ HOSP IP/OBS MODERATE 35: CPT | Performed by: ANESTHESIOLOGY

## 2025-05-17 PROCEDURE — 85610 PROTHROMBIN TIME: CPT | Performed by: NURSE PRACTITIONER

## 2025-05-17 PROCEDURE — 80069 RENAL FUNCTION PANEL: CPT | Performed by: NURSE PRACTITIONER

## 2025-05-17 PROCEDURE — 25010000002 METOCLOPRAMIDE PER 10 MG: Performed by: NURSE PRACTITIONER

## 2025-05-17 PROCEDURE — 93005 ELECTROCARDIOGRAM TRACING: CPT | Performed by: NURSE PRACTITIONER

## 2025-05-17 PROCEDURE — 97162 PT EVAL MOD COMPLEX 30 MIN: CPT

## 2025-05-17 PROCEDURE — 85025 COMPLETE CBC W/AUTO DIFF WBC: CPT | Performed by: NURSE PRACTITIONER

## 2025-05-17 PROCEDURE — 25010000002 ACETAMINOPHEN 10 MG/ML SOLUTION: Performed by: NURSE PRACTITIONER

## 2025-05-17 PROCEDURE — 82948 REAGENT STRIP/BLOOD GLUCOSE: CPT

## 2025-05-17 PROCEDURE — 99232 SBSQ HOSP IP/OBS MODERATE 35: CPT | Performed by: INTERNAL MEDICINE

## 2025-05-17 RX ORDER — GABAPENTIN 100 MG/1
100 CAPSULE ORAL EVERY 8 HOURS SCHEDULED
Status: DISCONTINUED | OUTPATIENT
Start: 2025-05-17 | End: 2025-05-17

## 2025-05-17 RX ORDER — DIPHENOXYLATE HYDROCHLORIDE AND ATROPINE SULFATE 2.5; .025 MG/1; MG/1
1 TABLET ORAL DAILY
Status: DISCONTINUED | OUTPATIENT
Start: 2025-05-17 | End: 2025-05-20 | Stop reason: HOSPADM

## 2025-05-17 RX ORDER — GUAIFENESIN 600 MG/1
1200 TABLET, EXTENDED RELEASE ORAL EVERY 12 HOURS SCHEDULED
Status: COMPLETED | OUTPATIENT
Start: 2025-05-17 | End: 2025-05-19

## 2025-05-17 RX ORDER — FERROUS SULFATE 325(65) MG
325 TABLET ORAL
Status: DISCONTINUED | OUTPATIENT
Start: 2025-05-18 | End: 2025-05-20 | Stop reason: HOSPADM

## 2025-05-17 RX ORDER — LABETALOL HYDROCHLORIDE 5 MG/ML
10 INJECTION, SOLUTION INTRAVENOUS EVERY 6 HOURS PRN
Status: DISCONTINUED | OUTPATIENT
Start: 2025-05-17 | End: 2025-05-20 | Stop reason: HOSPADM

## 2025-05-17 RX ORDER — NICOTINE POLACRILEX 4 MG
15 LOZENGE BUCCAL
Status: DISCONTINUED | OUTPATIENT
Start: 2025-05-17 | End: 2025-05-20 | Stop reason: HOSPADM

## 2025-05-17 RX ORDER — IBUPROFEN 600 MG/1
1 TABLET ORAL
Status: DISCONTINUED | OUTPATIENT
Start: 2025-05-17 | End: 2025-05-20 | Stop reason: HOSPADM

## 2025-05-17 RX ORDER — GABAPENTIN 100 MG/1
200 CAPSULE ORAL EVERY 8 HOURS SCHEDULED
Status: DISCONTINUED | OUTPATIENT
Start: 2025-05-17 | End: 2025-05-20 | Stop reason: HOSPADM

## 2025-05-17 RX ORDER — AMOXICILLIN 250 MG
2 CAPSULE ORAL 2 TIMES DAILY
Status: DISCONTINUED | OUTPATIENT
Start: 2025-05-17 | End: 2025-05-20 | Stop reason: HOSPADM

## 2025-05-17 RX ORDER — INSULIN LISPRO 100 [IU]/ML
2-9 INJECTION, SOLUTION INTRAVENOUS; SUBCUTANEOUS
Status: DISCONTINUED | OUTPATIENT
Start: 2025-05-17 | End: 2025-05-19

## 2025-05-17 RX ORDER — LIDOCAINE 4 G/G
1 PATCH TOPICAL
Status: DISCONTINUED | OUTPATIENT
Start: 2025-05-17 | End: 2025-05-20 | Stop reason: HOSPADM

## 2025-05-17 RX ORDER — POLYETHYLENE GLYCOL 3350 17 G/17G
17 POWDER, FOR SOLUTION ORAL DAILY
Status: DISCONTINUED | OUTPATIENT
Start: 2025-05-17 | End: 2025-05-20 | Stop reason: HOSPADM

## 2025-05-17 RX ORDER — NEBIVOLOL 5 MG/1
2.5 TABLET ORAL
Status: DISCONTINUED | OUTPATIENT
Start: 2025-05-17 | End: 2025-05-18

## 2025-05-17 RX ORDER — DEXTROSE MONOHYDRATE 25 G/50ML
25 INJECTION, SOLUTION INTRAVENOUS
Status: DISCONTINUED | OUTPATIENT
Start: 2025-05-17 | End: 2025-05-20 | Stop reason: HOSPADM

## 2025-05-17 RX ADMIN — CYCLOBENZAPRINE HYDROCHLORIDE 10 MG: 10 TABLET, FILM COATED ORAL at 13:46

## 2025-05-17 RX ADMIN — CYCLOBENZAPRINE HYDROCHLORIDE 10 MG: 10 TABLET, FILM COATED ORAL at 05:08

## 2025-05-17 RX ADMIN — LIDOCAINE 1 PATCH: 4 PATCH TOPICAL at 08:40

## 2025-05-17 RX ADMIN — Medication 10 MG: at 17:11

## 2025-05-17 RX ADMIN — CEFAZOLIN 2 G: 2 INJECTION, POWDER, FOR SOLUTION INTRAMUSCULAR; INTRAVENOUS at 00:17

## 2025-05-17 RX ADMIN — SENNOSIDES AND DOCUSATE SODIUM 2 TABLET: 50; 8.6 TABLET ORAL at 10:39

## 2025-05-17 RX ADMIN — PANTOPRAZOLE SODIUM 40 MG: 40 TABLET, DELAYED RELEASE ORAL at 06:12

## 2025-05-17 RX ADMIN — OXYCODONE HYDROCHLORIDE 10 MG: 10 TABLET ORAL at 19:34

## 2025-05-17 RX ADMIN — CEFAZOLIN 2 G: 2 INJECTION, POWDER, FOR SOLUTION INTRAMUSCULAR; INTRAVENOUS at 08:42

## 2025-05-17 RX ADMIN — MUPIROCIN 1 APPLICATION: 20 OINTMENT TOPICAL at 08:40

## 2025-05-17 RX ADMIN — METOCLOPRAMIDE 10 MG: 5 INJECTION, SOLUTION INTRAMUSCULAR; INTRAVENOUS at 05:08

## 2025-05-17 RX ADMIN — SENNOSIDES AND DOCUSATE SODIUM 2 TABLET: 50; 8.6 TABLET ORAL at 21:23

## 2025-05-17 RX ADMIN — NEBIVOLOL 2.5 MG: 5 TABLET ORAL at 10:03

## 2025-05-17 RX ADMIN — GABAPENTIN 200 MG: 100 CAPSULE ORAL at 10:39

## 2025-05-17 RX ADMIN — ENOXAPARIN SODIUM 40 MG: 100 INJECTION SUBCUTANEOUS at 17:11

## 2025-05-17 RX ADMIN — OXYCODONE HYDROCHLORIDE 10 MG: 10 TABLET ORAL at 09:52

## 2025-05-17 RX ADMIN — OXYCODONE HYDROCHLORIDE 10 MG: 10 TABLET ORAL at 06:14

## 2025-05-17 RX ADMIN — GABAPENTIN 200 MG: 100 CAPSULE ORAL at 21:22

## 2025-05-17 RX ADMIN — ACETAMINOPHEN 650 MG: 325 TABLET, FILM COATED ORAL at 13:46

## 2025-05-17 RX ADMIN — TAMSULOSIN HYDROCHLORIDE 0.4 MG: 0.4 CAPSULE ORAL at 21:22

## 2025-05-17 RX ADMIN — GUAIFENESIN 1200 MG: 600 TABLET, MULTILAYER, EXTENDED RELEASE ORAL at 13:29

## 2025-05-17 RX ADMIN — ASPIRIN 81 MG: 81 TABLET, COATED ORAL at 08:40

## 2025-05-17 RX ADMIN — MUPIROCIN 1 APPLICATION: 20 OINTMENT TOPICAL at 21:22

## 2025-05-17 RX ADMIN — ACETAMINOPHEN 650 MG: 325 TABLET, FILM COATED ORAL at 19:34

## 2025-05-17 RX ADMIN — OXYCODONE HYDROCHLORIDE 10 MG: 10 TABLET ORAL at 16:05

## 2025-05-17 RX ADMIN — ACETAMINOPHEN 1000 MG: 10 INJECTION INTRAVENOUS at 05:08

## 2025-05-17 RX ADMIN — GUAIFENESIN 1200 MG: 600 TABLET, MULTILAYER, EXTENDED RELEASE ORAL at 21:22

## 2025-05-17 RX ADMIN — OXYCODONE HYDROCHLORIDE 5 MG: 5 TABLET ORAL at 01:08

## 2025-05-17 RX ADMIN — MAGNESIUM SULFATE IN DEXTROSE 1 G: 10 INJECTION, SOLUTION INTRAVENOUS at 05:08

## 2025-05-17 RX ADMIN — CEFAZOLIN 2 G: 2 INJECTION, POWDER, FOR SOLUTION INTRAMUSCULAR; INTRAVENOUS at 16:09

## 2025-05-17 RX ADMIN — Medication 1 TABLET: at 13:29

## 2025-05-17 NOTE — ANESTHESIA POSTPROCEDURE EVALUATION
"Patient: Niranjan Peacock    Procedure Summary       Date: 05/16/25 Room / Location: [place] [address] / [facility] CARDIOVASCULAR OPERATING ROOM    Anesthesia Start: 0627 Anesthesia Stop: 1111    Procedure: BRANDON STERNOTOMY CORONARY ARTERY BYPASS GRAFT TIMES 5 USING LEFT AND RIGHT INTERNAL MAMMARY ARTERIES AND LEFT GREATER SAPHENOUS VEIN GRAFT PER ENDOSCOPIC VEIN HARVESTING AND PRP (Chest) Diagnosis:       Coronary artery disease of native artery of native heart with stable angina pectoris      (Coronary artery disease of native artery of native heart with stable angina pectoris [I25.118])    Surgeons: Jr James Juarez MD Provider: Astrid Carrillo MD    Anesthesia Type: general, Yoly, CVL ASA Status: 4            Anesthesia Type: general, Yoly, CVL    Vitals  Vitals Value Taken Time   /68 05/17/25 16:23   Temp 36.7 °C (98.1 °F) 05/17/25 16:15   Pulse 67 05/17/25 16:53   Resp 18 05/17/25 04:00   SpO2 100 % 05/17/25 16:53   Vitals shown include unfiled device data.        Post Anesthesia Care and Evaluation    Patient location during evaluation: bedside  Pain management: adequate    Airway patency: patent  Anesthetic complications: No anesthetic complications    Cardiovascular status: acceptable  Respiratory status: acceptable  Hydration status: acceptable    Comments: /69 (BP Location: Right arm, Patient Position: Lying)   Pulse 69   Temp 36.7 °C (98.1 °F) (Oral)   Resp 18   Ht 188 cm (74\")   Wt 100 kg (220 lb 11.2 oz)   SpO2 97%   BMI 28.34 kg/m²     "

## 2025-05-17 NOTE — PLAN OF CARE
Goal Outcome Evaluation:  Plan of Care Reviewed With: patient        Progress: improving  Outcome Evaluation: POD #1= CABG x5, 3L/NC, no gtts, NSR in 70's, up to chair without any issues, will continue with plan of care.

## 2025-05-17 NOTE — PLAN OF CARE
Goal Outcome Evaluation:              Outcome Evaluation: S/p CABGx5. Transferred out of CVR to stepdown. Progressing well. PRN pain meds given. UIC for meals. Currently resting in bed with wife at bedside. Will continue with routine postop care and update POC as needed.

## 2025-05-17 NOTE — THERAPY EVALUATION
Patient Name: Niranjan Peacock  : 1960    MRN: [id number]                              Today's Date: 2025       Admit Date: 2025    Visit Dx:     ICD-10-CM ICD-9-CM   1. Coronary artery disease of native artery of native heart with stable angina pectoris  I25.118 414.01     413.9   2. Coronary artery disease involving native coronary artery of native heart without angina pectoris  I25.10 414.01     Patient Active Problem List   Diagnosis    Coronary artery disease involving native coronary artery of native heart without angina pectoris    Multiple thyroid nodules, benign per      Pernicious anemia    H/O multiple allergies    Herniated nucleus pulposus, L3-4 right    Right lumbar radiculopathy    Pterygium of right eye    Degenerative arthritis of cervical spine    Essential hypertension    History of DVT (deep vein thrombosis)    Eczema of eyelid    Chronic eczema of hand    Mixed hyperlipidemia    CHANTEL (obstructive sleep apnea)    Statin and ezetimibe intolerance    Stenosis of left carotid artery    Elevated PSA, less than 10 ng/ml    Prediabetes    BPH without obstruction/lower urinary tract symptoms    CAD (coronary artery disease)     Past Medical History:   Diagnosis Date    Allergic     Angina at rest 2019    Added automatically from request for surgery [id number]    B12 deficiency anemia     Cataract     Had cataract surgery both eyes    Cellulitis of right lower extremity 2020    Chest pain     Chronic fatigue 2016    Coronary artery disease involving native coronary artery of native heart without angina pectoris 08/10/2017    Deep vein thrombosis     Headache     History of blood clots     blood clot found in right lung     HL (hearing loss)     Hypertension     Mixed hyperlipidemia 2022    Multiple thyroid nodules     Multiple thyroid nodules     CHANTEL (obstructive sleep apnea)     Pernicious anemia     Pulmonary embolism         Stenosis of left carotid  artery 03/28/2023    Syncope     2 yrs ago one time     Past Surgical History:   Procedure Laterality Date    CARDIAC CATHETERIZATION N/A 08/03/2017    Procedure: Coronary angiography;  Surgeon: Cynthia Farley MD;  Location:  [facility];  Service:     CARDIAC CATHETERIZATION  08/03/2017    Procedure: Functional Flow [place];  Surgeon: Cynthia Farley MD;  Location:  [facility];  Service:     CARDIAC CATHETERIZATION N/A 08/03/2017    Procedure: Stent YI coronary;  Surgeon: Cynthia Farley MD;  Location:  [facility];  Service:     CARDIAC CATHETERIZATION N/A 08/03/2017    Procedure: Left ventriculography;  Surgeon: Cynthia Farley MD;  Location:  [facility];  Service:     CARDIAC CATHETERIZATION N/A 08/03/2017    Procedure: Left Heart Cath;  Surgeon: Cynthia Farley MD;  Location: [facility];  Service:     CARDIAC CATHETERIZATION N/A 08/20/2019    Procedure: Left Heart Cath;  Surgeon: Mulugeta Ac MD;  Location: [facility];  Service: Cardiology    CARDIAC CATHETERIZATION N/A 08/20/2019    Procedure: Coronary angiography;  Surgeon: Mulugeta Ac MD;  Location: [facility] LOC[facility];  Service: Cardiology    CARDIAC CATHETERIZATION N/A 08/20/2019    Procedure: Left ventriculography;  Surgeon: Mulugeta Ac MD;  Location: [facility];  Service: Cardiology    CARDIAC CATHETERIZATION  8/20/2019    CARDIAC CATHETERIZATION N/A 5/14/2025    Procedure: Right Heart Cath;  Surgeon: Krupa Martinez MD;  Location: [facility] LOC[facility];  Service: Cardiovascular;  Laterality: N/A;    CARDIAC CATHETERIZATION N/A 5/14/2025    Procedure: Left Heart Cath;  Surgeon: Krupa Martinez MD;  Location: [facility] C[facility] LOC[facility];  Service: Cardiovascular;  Laterality: N/A;    COLONOSCOPY  MMVI    NORMAL.    COLONOSCOPY      CORONARY STENT PLACEMENT  2017    FINE NEEDLE ASPIRATION  12/2015     Left thyroid nodule.  [place] category II.  Dr. Socrates Sanchez      General Information       [facility] Name 05/17/25 1008          Physical Therapy Time and Intention    Document Type evaluation  -     Mode of Treatment individual therapy;physical therapy  -       Row Name 05/17/25 1008          General Information    Patient Profile Reviewed yes  -     Prior Level of Function independent:;gait;transfer;bed mobility  -     Existing Precautions/Restrictions fall;cardiac;sternal;oxygen therapy device and L/min  -     Barriers to Rehab medically complex  -       Row Name 05/17/25 1008          Living Environment    Current Living Arrangements home  -     People in Home spouse  -       Row Name 05/17/25 1008          Home Main Entrance    Number of Stairs, Main Entrance one  -[facility] Name 05/17/25 1008          Stairs Within Home, Primary    Number of Stairs, Within Home, Primary none  -[facility] Name 05/17/25 1008          Cognition    Orientation Status (Cognition) oriented x 4  -[facility] Name 05/17/25 1008          Safety Issues/Impairments Affecting Functional Mobility    Impairments Affecting Function (Mobility) endurance/activity tolerance;strength;shortness of breath;pain;range of motion (ROM)  -               User Key  (r) = Recorded By, (t) = Taken By, (c) = Cosigned By      Initials Name Provider Type     Paula Vargas PT Physical Therapist                   Mobility       Row Name 05/17/25 1008          Bed Mobility    Comment, (Bed Mobility) NT - UIC  -       Row Name 05/17/25 1008          Sit-Stand Transfer    Sit-Stand [place] (Transfers) minimum assist (75% patient effort);1 person to manage equipment;1 person assist;verbal cues  -     Assistive Device (Sit-Stand Transfers) walker, front-wheeled  -       Row Name 05/17/25 1008          Gait/Stairs (Locomotion)    [place] Level (Gait) verbal cues;minimum assist (75% patient effort);1 person to manage  equipment;1 person assist  -     Assistive Device (Gait) walker, front-wheeled  -     Patient was able to Ambulate yes  -     Distance in Feet (Gait) 30  -     Deviations/Abnormal Patterns (Gait) antalgic;bakari decreased;gait speed decreased;stride length decreased  -     Bilateral Gait Deviations forward flexed posture  -               User Key  (r) = Recorded By, (t) = Taken By, (c) = Cosigned By      Initials Name Provider Type     Paula Vargas PT Physical Therapist                   Obj/Interventions       [facility] Name 05/17/25 1009          Range of Motion Comprehensive    General Range of Motion bilateral lower extremity ROM WFL  -[facility] Name 05/17/25 1009          Strength Comprehensive (MMT)    General Manual Muscle Testing (MMT) Assessment lower extremity strength deficits identified  -     Comment, General Manual Muscle Testing (MMT) Assessment Generalized weakness, BLE grossly 4/5  -[facility] Name 05/17/25 1009          Balance    Balance Assessment sitting static balance;sitting dynamic balance;standing static balance;standing dynamic balance  -     Static Sitting Balance standby assist  -     Dynamic Sitting Balance standby assist  -     Position, Sitting Balance sitting in chair  -     Static Standing Balance contact guard;verbal cues  -     Dynamic Standing Balance minimal assist;verbal cues  -     Position/Device Used, Standing Balance supported;walker, front-wheeled  -     Balance Interventions sitting;standing;sit to stand;supported;static;dynamic  -BH       Row Name 05/17/25 1009          Sensory Assessment (Somatosensory)    Sensory Assessment (Somatosensory) LE sensation intact  -               User Key  (r) = Recorded By, (t) = Taken By, (c) = Cosigned By      Initials Name Provider Type     Paula Vargas PT Physical Therapist                   Goals/Plan       Row Name 05/17/25 1016          Problem Specific Goal 1 (PT)    Problem Specific Goal  1 (PT) Cardiac Level 3  -     Time Frame (Problem Specific Goal 1, PT) 1 week  -       Row Name 05/17/25 1016          Therapy Assessment/Plan (PT)    Planned Therapy Interventions (PT) balance training;bed mobility training;gait training;home exercise program;patient/family education;ROM (range of motion);strengthening;stretching;stair training;transfer training  -               User Key  (r) = Recorded By, (t) = Taken By, (c) = Cosigned By      Initials Name Provider Type     Paula Vargas, PT Physical Therapist                   Clinical Impression       Row Name 05/17/25 1009          Pain    Pretreatment Pain Rating 4/10  -     Posttreatment Pain Rating 6/10  -     Pain Location chest  -     Pain Side/Orientation medial  -     Pain Management Interventions exercise or physical activity utilized  -     Response to Pain Interventions activity participation with tolerable pain  -       Row Name 05/17/25 1009          Plan of Care Review    Plan of Care Reviewed With patient  -     Outcome Evaluation Pt is a 66 yo M POD 1 CABG x5. PMHx HTN, CAD, CHANTEL. Pt lives with his wife and reports independence at BL with no AD. Pt has 1 LADARIUS with no steps inside, no recent falls, 4/10 pain at rest. Pt presents to PT with impaired strength, endurance, and pain limiting overall mobility. Pt sitting UIC on arrival, stood with min A x1, and ambulated 30ft with rwx and min A  x2 - additional person for equipment management. Gait distance limited by fatigue and increased chest pain/pressure. Pt returned to room and back to chair, left with needs met. PT will continue to follow, anticipate DC home with HHPT.  -       Row Name 05/17/25 100          Therapy Assessment/Plan (PT)    Patient/Family Therapy Goals Statement (PT) Return to PLOF  -     Rehab Potential (PT) good  -     Criteria for Skilled Interventions Met (PT) yes  -     Therapy Frequency (PT) daily  -       Row Name 05/17/25 1004           Vital Signs    O2 Delivery Pre Treatment supplemental O2  -BH     O2 Delivery Intra Treatment supplemental O2  -     O2 Delivery Post Treatment supplemental O2  -       Row Name 05/17/25 1009          Positioning and Restraints    Pre-Treatment Position sitting in chair/recliner  -     Post Treatment Position chair  -BH     In Chair notified nsg;reclined;call light within reach;encouraged to call for assist  -               User Key  (r) = Recorded By, (t) = Taken By, (c) = Cosigned By      Initials Name Provider Type     Paula Vargas PT Physical Therapist                   Outcome Measures       Row Name 05/17/25 1016          How much help from another person do you currently need...    Turning from your back to your side while in flat bed without using bedrails? 3  -BH     Moving from lying on back to sitting on the side of a flat bed without bedrails? 3  -BH     Moving to and from a bed to a chair (including a wheelchair)? 3  -BH     Standing up from a chair using your arms (e.g., wheelchair, bedside chair)? 3  -BH     Climbing 3-5 steps with a railing? 2  -BH     To walk in hospital room? 3  -BH     AM-PAC 6 Clicks Score (PT) 17  -     Highest Level of Mobility Goal Stand (1 or More Minutes)-5  -       Row Name 05/17/25 1016          Functional Assessment    Outcome Measure Options AM-PAC 6 Clicks Basic Mobility (PT)  -               User Key  (r) = Recorded By, (t) = Taken By, (c) = Cosigned By      Initials Name Provider Type     Paula Vargas PT Physical Therapist                                 Physical Therapy Education       Title: PT OT SLP Therapies (In Progress)       Topic: Physical Therapy (In Progress)       Point: Mobility training (Done)       Learning Progress Summary            Patient Acceptance, E,TB,D, VU,NR by  at 5/17/2025 1017                      Point: Home exercise program (Not Started)       Learner Progress:  Not documented in this visit.               Point: Body mechanics (Done)       Learning Progress Summary            Patient Acceptance, E,TB,D, VU,NR by  at 5/17/2025 1017                      Point: Precautions (Done)       Learning Progress Summary            Patient Acceptance, E,TB,D, VU,NR by  at 5/17/2025 1017                                      User Key       Initials Effective Dates Name Provider Type Discipline     04/08/22 -  Paula Vargas, PT Physical Therapist PT                  PT Recommendation and Plan  Planned Therapy Interventions (PT): balance training, bed mobility training, gait training, home exercise program, patient/family education, ROM (range of motion), strengthening, stretching, stair training, transfer training  Outcome Evaluation: Pt is a 66 yo M POD 1 CABG x5. PMHx HTN, CAD, CHANTEL. Pt lives with his wife and reports independence at BL with no AD. Pt has 1 LADARIUS with no steps inside, no recent falls, 4/10 pain at rest. Pt presents to PT with impaired strength, endurance, and pain limiting overall mobility. Pt sitting UIC on arrival, stood with min A x1, and ambulated 30ft with rwx and min A  x2 - additional person for equipment management. Gait distance limited by fatigue and increased chest pain/pressure. Pt returned to room and back to chair, left with needs met. PT will continue to follow, anticipate DC home with HHPT.     Time Calculation:   PT Evaluation Complexity  History, PT Evaluation Complexity: 1-2 personal factors and/or comorbidities  Examination of Body Systems (PT Eval Complexity): total of 3 or more elements  Clinical Presentation (PT Evaluation Complexity): evolving  Clinical Decision Making (PT Evaluation Complexity): moderate complexity  Overall Complexity (PT Evaluation Complexity): moderate complexity     PT Charges       Row Name 05/17/25 1017             Time Calculation    Start Time 0853  -      Stop Time 0909  -      Time Calculation (min) 16 min  -      PT Received On 05/17/25  -      PT -  Next Appointment 05/18/25  -      PT Goal Re-Cert Due Date 05/24/25  -         Time Calculation- PT    Total Timed Code Minutes- PT 12 minute(s)  -         Timed Charges    70468 - Gait Training Minutes  8  -      20426 - PT Therapeutic Activity Minutes 4  -         Total Minutes    Timed Charges Total Minutes 12  -       Total Minutes 12  -                User Key  (r) = Recorded By, (t) = Taken By, (c) = Cosigned By      Initials Name Provider Type     Paula Vargas, PT Physical Therapist                  Therapy Charges for Today       Code Description Service Date Service Provider Modifiers Qty    09312297481 HC GAIT TRAINING EA 15 MIN 5/17/2025 Paula Vargas, PT GP 1    20994475863 HC PT EVAL MOD COMPLEXITY 3 5/17/2025 Paula Vargas, PT GP 1    91527387537 HC PT THER SUPP EA 15 MIN 5/17/2025 Paula Vargas, PT GP 1            PT G-Codes  Outcome Measure Options: AM-PAC 6 Clicks Basic Mobility (PT)  AM-PAC 6 Clicks Score (PT): 17  PT Discharge Summary  Anticipated Discharge Disposition (PT): home with assist, home with home health    Paula Vargas, PT  5/17/2025

## 2025-05-17 NOTE — PROGRESS NOTES
"Niranjan Peacock  1960 65 y.o.  [id number]      Patient Care Team:  Provider, No Known as PCP - Astrid Rodriguez APRN as Referring Physician (Cardiology)    CC: Normal LV function, severe three-vessel disease status post CABG    Interval History: He is sore which is not unexpected      Objective   Vital Signs  Temp:  [97 °F (36.1 °C)-99.7 °F (37.6 °C)] 99 °F (37.2 °C)  Heart Rate:  [61-80] 74  Resp:  [16-19] 18  BP: ()/(53-78) 119/67  Arterial Line BP: (103-126)/(41-56) 126/41  FiO2 (%):  [39 %-40 %] 40 %    Intake/Output Summary (Last 24 hours) at 5/17/2025 1451  Last data filed at 5/17/2025 1100  Gross per 24 hour   Intake 1959.68 ml   Output 1430 ml   Net 529.68 ml     Flowsheet Rows      Flowsheet Row First Filed Value   Admission Height 188 cm (74\") Documented at 05/14/2025 0742   Admission Weight 99.8 kg (220 lb) Documented at 05/14/2025 0742            Physical Exam:   General Appearance:    Alert,oriented, in no acute distress   Lungs:     Clear to auscultation,BS are equal    Heart:    Normal S1 and S2, RRR without murmur, gallop or rub   HEENT:    Sclerae are clear, no JVD or adenopathy   Abdomen:     Normal bowel sounds, soft nontender, nondistended, no HSM   Extremities:   Moves all extremities well, no edema, no cyanosis, no             Redness, no rash     Medication Review:      aspirin, 81 mg, Oral, Daily  ceFAZolin, 2 g, Intravenous, Q8H  enoxaparin sodium, 40 mg, Subcutaneous, Daily  [START ON 5/18/2025] ferrous sulfate, 325 mg, Oral, Daily With Breakfast  gabapentin, 200 mg, Oral, Q8H  guaiFENesin, 1,200 mg, Oral, Q12H  insulin lispro, 2-9 Units, Subcutaneous, 4x Daily AC & at Bedtime  Lidocaine, 1 patch, Transdermal, Q24H  multivitamin, 1 tablet, Oral, Daily  mupirocin, 1 Application, Each Nare, BID  nebivolol, 2.5 mg, Oral, Q24H  pantoprazole, 40 mg, Oral, QAM  polyethylene glycol, 17 g, Oral, Daily  senna-docusate sodium, 2 tablet, Oral, BID  tamsulosin, 0.4 mg, Oral, " Nightly      clevidipine, 2-32 mg/hr  norepinephrine, 0.02-0.2 mcg/kg/min          I reviewed the patient's new clinical results.  I personally viewed and interpreted the patient's EKG/Telemetry data    Assessment/Plan  Active Hospital Problems    Diagnosis  POA    **Coronary artery disease involving native coronary artery of native heart without angina pectoris [I25.10]  Yes    CAD (coronary artery disease) [I25.10]  Yes      Resolved Hospital Problems   No resolved problems to display.       Doing well early after bypass he has pericarditis with a loud rub in the ECG findings but I would just watch this until his chest tubes are out I did start some iron and a multivitamin on him continue with other routine postop care    Mulugeta Ac MD  05/17/25  14:51 EDT

## 2025-05-17 NOTE — PLAN OF CARE
Goal Outcome Evaluation:  Plan of Care Reviewed With: patient           Outcome Evaluation: Pt is a 66 yo M POD 1 CABG x5. PMHx HTN, CAD, CHANTEL. Pt lives with his wife and reports independence at BL with no AD. Pt has 1 LADARIUS with no steps inside, no recent falls, 4/10 pain at rest. Pt presents to PT with impaired strength, endurance, and pain limiting overall mobility. Pt sitting UIC on arrival, stood with min A x1, and ambulated 30ft with rwx and min A  x2 - additional person for equipment management. Gait distance limited by fatigue and increased chest pain/pressure. Pt returned to room and back to chair, left with needs met. PT will continue to follow, anticipate DC home with HHPT.    Anticipated Discharge Disposition (PT): home with assist, home with home health

## 2025-05-17 NOTE — PROGRESS NOTES
CVICU Intensivist Progress Note    HPI/Chief Complaint: 64 yo male with severe multivessel CAD presents to the CVICU immediately s/p urgent 5v CABG    PMHx: HTN, HL w/ statin and repatha intolerance, CAD s/p stents, h/o DVT and PE (2015), CHANTEL, B12 deficiency anemia, cataracts s/p surgical correction    Procedure: 5/16/25 5V CABG (LIMA - LAD, KI - D1, SVG - RCA, PDA and OM1) by Dr. Juarez. He was an easy mask ventilation but challenging intubation due to small mouth opening (grade 2b view with Mac 4). BRANDON showed normal biventricular function pre-op and hyperdynamic function post-op. He received 405ml of cellsaver and no other products, did not require any shocks to come off CPB but was bradycardic to the 30s pre-operatively. He was brought to the CVICU on sedation and cardene, paced AAI @ 80 bpm.    Hospital Course:  5/14 elective LH for ongoing anginal symptoms, admitted post-LHC for operative intervention  5/16 operative course      Daily Assessment and Plan 5/17: Looks well this morning, but having more pain w/ attempted IS/flutter and mobilization. Will add gabapentin 200mg q8 hrs, lidocaine patch just placed as well. Hemodynamics appropriate, nebivolol (home med) started this AM, prn labetalol available for HTN given his hydralazine allergy. HR in the 70s, no longer paced. ASA, no statin 2/2 intolerance. He receives inclisiran injections q6 months for hyperlipidemia. Appropriate oxygenation on supplemental O2, working w/ IS/flutter. Guaifenesin added to assist w/ secretion clearance. Making adequate urine, CVP only 5 and he did not receive much in the way of volume resuscitation post-op so actually slightly hypovolemic at this point; no need for diuresis as of yet. Cr downtrending. Home flomax for BPH to start tonight. Clear liquid diet started, advance as tolerated. Will start with relatively aggressive bowel regimen today as patient is quite concerned about constipation and states that pain meds make him  extremely constipated; prns available as well. SSI to address hyperglycemia. Periop cefazolin to complete today, afebrile. Minimal chest tube output, start lovenox for prophy.       Relevant Physical Exam:  Feet warm, 1+ pulses bilaterally  Abdomen soft, nontender  Lungs clear bilaterally    aspirin, 81 mg, Oral, Daily  ceFAZolin, 2 g, Intravenous, Q8H  enoxaparin sodium, 40 mg, Subcutaneous, Daily  gabapentin, 200 mg, Oral, Q8H  guaiFENesin, 1,200 mg, Oral, Q12H  insulin lispro, 2-9 Units, Subcutaneous, 4x Daily AC & at Bedtime  Lidocaine, 1 patch, Transdermal, Q24H  mupirocin, 1 Application, Each Nare, BID  nebivolol, 2.5 mg, Oral, Q24H  pantoprazole, 40 mg, Oral, QAM  polyethylene glycol, 17 g, Oral, Daily  senna-docusate sodium, 2 tablet, Oral, BID  tamsulosin, 0.4 mg, Oral, Nightly    ------------------------------------------------------------------------------------------------------------------------------------------------------  Prophy: HOB elevated + oral care + scds + cota stat lock + PPI + lovenox  Code Status: full

## 2025-05-18 ENCOUNTER — APPOINTMENT (OUTPATIENT)
Dept: GENERAL RADIOLOGY | Facility: HOSPITAL | Age: 65
End: 2025-05-18
Payer: COMMERCIAL

## 2025-05-18 LAB
ANION GAP SERPL CALCULATED.3IONS-SCNC: 8.9 MMOL/L (ref 5–15)
BH BB BLOOD EXPIRATION DATE: NORMAL
BH BB BLOOD EXPIRATION DATE: NORMAL
BH BB BLOOD TYPE BARCODE: 6200
BH BB BLOOD TYPE BARCODE: 8400
BH BB DISPENSE STATUS: NORMAL
BH BB DISPENSE STATUS: NORMAL
BH BB PRODUCT CODE: NORMAL
BH BB PRODUCT CODE: NORMAL
BH BB UNIT NUMBER: NORMAL
BH BB UNIT NUMBER: NORMAL
BUN SERPL-MCNC: 25 MG/DL (ref 8–23)
BUN/CREAT SERPL: 25.3 (ref 7–25)
CALCIUM SPEC-SCNC: 9.1 MG/DL (ref 8.6–10.5)
CHLORIDE SERPL-SCNC: 105 MMOL/L (ref 98–107)
CO2 SERPL-SCNC: 27.1 MMOL/L (ref 22–29)
CREAT SERPL-MCNC: 0.99 MG/DL (ref 0.76–1.27)
CROSSMATCH INTERPRETATION: NORMAL
CROSSMATCH INTERPRETATION: NORMAL
DEPRECATED RDW RBC AUTO: 45 FL (ref 37–54)
EGFRCR SERPLBLD CKD-EPI 2021: 85.1 ML/MIN/1.73
ERYTHROCYTE [DISTWIDTH] IN BLOOD BY AUTOMATED COUNT: 12.7 % (ref 12.3–15.4)
GLUCOSE BLDC GLUCOMTR-MCNC: 102 MG/DL (ref 70–130)
GLUCOSE BLDC GLUCOMTR-MCNC: 114 MG/DL (ref 70–130)
GLUCOSE BLDC GLUCOMTR-MCNC: 118 MG/DL (ref 70–130)
GLUCOSE BLDC GLUCOMTR-MCNC: 120 MG/DL (ref 70–130)
GLUCOSE SERPL-MCNC: 124 MG/DL (ref 65–99)
HCT VFR BLD AUTO: 33.2 % (ref 37.5–51)
HGB BLD-MCNC: 10.7 G/DL (ref 13–17.7)
MCH RBC QN AUTO: 30.8 PG (ref 26.6–33)
MCHC RBC AUTO-ENTMCNC: 32.2 G/DL (ref 31.5–35.7)
MCV RBC AUTO: 95.7 FL (ref 79–97)
PLATELET # BLD AUTO: 229 10*3/MM3 (ref 140–450)
PMV BLD AUTO: 10 FL (ref 6–12)
POTASSIUM SERPL-SCNC: 4.1 MMOL/L (ref 3.5–5.2)
RBC # BLD AUTO: 3.47 10*6/MM3 (ref 4.14–5.8)
SODIUM SERPL-SCNC: 141 MMOL/L (ref 136–145)
UNIT  ABO: NORMAL
UNIT  ABO: NORMAL
UNIT  RH: NORMAL
UNIT  RH: NORMAL
WBC NRBC COR # BLD AUTO: 14.04 10*3/MM3 (ref 3.4–10.8)

## 2025-05-18 PROCEDURE — 85027 COMPLETE CBC AUTOMATED: CPT | Performed by: THORACIC SURGERY (CARDIOTHORACIC VASCULAR SURGERY)

## 2025-05-18 PROCEDURE — 25010000002 CEFAZOLIN PER 500 MG: Performed by: THORACIC SURGERY (CARDIOTHORACIC VASCULAR SURGERY)

## 2025-05-18 PROCEDURE — 71045 X-RAY EXAM CHEST 1 VIEW: CPT

## 2025-05-18 PROCEDURE — 97116 GAIT TRAINING THERAPY: CPT

## 2025-05-18 PROCEDURE — 82948 REAGENT STRIP/BLOOD GLUCOSE: CPT

## 2025-05-18 PROCEDURE — 99232 SBSQ HOSP IP/OBS MODERATE 35: CPT | Performed by: NURSE PRACTITIONER

## 2025-05-18 PROCEDURE — 25010000002 ENOXAPARIN PER 10 MG: Performed by: THORACIC SURGERY (CARDIOTHORACIC VASCULAR SURGERY)

## 2025-05-18 PROCEDURE — 80048 BASIC METABOLIC PNL TOTAL CA: CPT | Performed by: THORACIC SURGERY (CARDIOTHORACIC VASCULAR SURGERY)

## 2025-05-18 PROCEDURE — 93005 ELECTROCARDIOGRAM TRACING: CPT | Performed by: NURSE PRACTITIONER

## 2025-05-18 RX ORDER — COLCHICINE 0.6 MG/1
0.6 TABLET ORAL DAILY
Status: DISCONTINUED | OUTPATIENT
Start: 2025-05-18 | End: 2025-05-19

## 2025-05-18 RX ORDER — NEBIVOLOL 5 MG/1
5 TABLET ORAL
Status: DISCONTINUED | OUTPATIENT
Start: 2025-05-18 | End: 2025-05-20 | Stop reason: HOSPADM

## 2025-05-18 RX ORDER — AMLODIPINE BESYLATE 5 MG/1
5 TABLET ORAL
Status: DISCONTINUED | OUTPATIENT
Start: 2025-05-18 | End: 2025-05-20 | Stop reason: HOSPADM

## 2025-05-18 RX ADMIN — GUAIFENESIN 1200 MG: 600 TABLET, MULTILAYER, EXTENDED RELEASE ORAL at 10:21

## 2025-05-18 RX ADMIN — AMLODIPINE BESYLATE 5 MG: 5 TABLET ORAL at 10:28

## 2025-05-18 RX ADMIN — HYDROCODONE BITARTRATE AND ACETAMINOPHEN 2 TABLET: 5; 325 TABLET ORAL at 05:35

## 2025-05-18 RX ADMIN — NEBIVOLOL 5 MG: 5 TABLET ORAL at 10:28

## 2025-05-18 RX ADMIN — MUPIROCIN 1 APPLICATION: 20 OINTMENT TOPICAL at 10:23

## 2025-05-18 RX ADMIN — LIDOCAINE 1 PATCH: 4 PATCH TOPICAL at 10:22

## 2025-05-18 RX ADMIN — SENNOSIDES AND DOCUSATE SODIUM 2 TABLET: 50; 8.6 TABLET ORAL at 10:21

## 2025-05-18 RX ADMIN — ASPIRIN 81 MG: 81 TABLET, COATED ORAL at 10:21

## 2025-05-18 RX ADMIN — TAMSULOSIN HYDROCHLORIDE 0.4 MG: 0.4 CAPSULE ORAL at 21:19

## 2025-05-18 RX ADMIN — GABAPENTIN 200 MG: 100 CAPSULE ORAL at 21:20

## 2025-05-18 RX ADMIN — PANTOPRAZOLE SODIUM 40 MG: 40 TABLET, DELAYED RELEASE ORAL at 05:37

## 2025-05-18 RX ADMIN — GABAPENTIN 200 MG: 100 CAPSULE ORAL at 16:11

## 2025-05-18 RX ADMIN — HYDROCODONE BITARTRATE AND ACETAMINOPHEN 2 TABLET: 5; 325 TABLET ORAL at 12:39

## 2025-05-18 RX ADMIN — GUAIFENESIN 1200 MG: 600 TABLET, MULTILAYER, EXTENDED RELEASE ORAL at 21:19

## 2025-05-18 RX ADMIN — MUPIROCIN 1 APPLICATION: 20 OINTMENT TOPICAL at 21:20

## 2025-05-18 RX ADMIN — HYDROCODONE BITARTRATE AND ACETAMINOPHEN 2 TABLET: 5; 325 TABLET ORAL at 00:37

## 2025-05-18 RX ADMIN — FERROUS SULFATE TAB 325 MG (65 MG ELEMENTAL FE) 325 MG: 325 (65 FE) TAB at 10:21

## 2025-05-18 RX ADMIN — SENNOSIDES AND DOCUSATE SODIUM 2 TABLET: 50; 8.6 TABLET ORAL at 21:19

## 2025-05-18 RX ADMIN — ENOXAPARIN SODIUM 40 MG: 100 INJECTION SUBCUTANEOUS at 10:20

## 2025-05-18 RX ADMIN — HYDROCODONE BITARTRATE AND ACETAMINOPHEN 2 TABLET: 5; 325 TABLET ORAL at 19:35

## 2025-05-18 RX ADMIN — POLYETHYLENE GLYCOL 3350 17 G: 17 POWDER, FOR SOLUTION ORAL at 10:20

## 2025-05-18 RX ADMIN — HYDROCODONE BITARTRATE AND ACETAMINOPHEN 2 TABLET: 5; 325 TABLET ORAL at 23:44

## 2025-05-18 RX ADMIN — CYCLOBENZAPRINE HYDROCHLORIDE 10 MG: 10 TABLET, FILM COATED ORAL at 00:37

## 2025-05-18 RX ADMIN — Medication 1 TABLET: at 10:23

## 2025-05-18 RX ADMIN — GABAPENTIN 200 MG: 100 CAPSULE ORAL at 05:35

## 2025-05-18 RX ADMIN — CEFAZOLIN 2 G: 2 INJECTION, POWDER, FOR SOLUTION INTRAMUSCULAR; INTRAVENOUS at 00:37

## 2025-05-18 RX ADMIN — COLCHICINE 0.6 MG: 0.6 TABLET ORAL at 10:27

## 2025-05-18 NOTE — PLAN OF CARE
Goal Outcome Evaluation:              Outcome Evaluation: POD2 CABG.NSR on tele. External pacer on standby. On 1L NC. CTs and FC intact; RIJ infusing KVO fluids; Pain treated per MAR. Will continue POC as ordered.

## 2025-05-18 NOTE — THERAPY TREATMENT NOTE
Patient Name: Niranjan Peacock  : 1960    MRN: [id number]                              Today's Date: 2025       Admit Date: 2025    Visit Dx:     ICD-10-CM ICD-9-CM   1. Coronary artery disease of native artery of native heart with stable angina pectoris  I25.118 414.01     413.9   2. Coronary artery disease involving native coronary artery of native heart without angina pectoris  I25.10 414.01     Patient Active Problem List   Diagnosis    Coronary artery disease involving native coronary artery of native heart without angina pectoris    Multiple thyroid nodules, benign per      Pernicious anemia    H/O multiple allergies    Herniated nucleus pulposus, L3-4 right    Right lumbar radiculopathy    Pterygium of right eye    Degenerative arthritis of cervical spine    Essential hypertension    History of DVT (deep vein thrombosis)    Eczema of eyelid    Chronic eczema of hand    Mixed hyperlipidemia    CHANTEL (obstructive sleep apnea)    Statin and ezetimibe intolerance    Stenosis of left carotid artery    Elevated PSA, less than 10 ng/ml    Prediabetes    BPH without obstruction/lower urinary tract symptoms    CAD (coronary artery disease)     Past Medical History:   Diagnosis Date    Allergic     Angina at rest 2019    Added automatically from request for surgery [id number]    B12 deficiency anemia     Cataract     Had cataract surgery both eyes    Cellulitis of right lower extremity 2020    Chest pain     Chronic fatigue 2016    Coronary artery disease involving native coronary artery of native heart without angina pectoris 08/10/2017    Deep vein thrombosis     Headache     History of blood clots     blood clot found in right lung     HL (hearing loss)     Hypertension     Mixed hyperlipidemia 2022    Multiple thyroid nodules     Multiple thyroid nodules     CHANTEL (obstructive sleep apnea)     Pernicious anemia     Pulmonary embolism         Stenosis of left carotid  artery 03/28/2023    Syncope     2 yrs ago one time     Past Surgical History:   Procedure Laterality Date    CARDIAC CATHETERIZATION N/A 08/03/2017    Procedure: Coronary angiography;  Surgeon: Cynthia Farley MD;  Location:  [facility];  Service:     CARDIAC CATHETERIZATION  08/03/2017    Procedure: Functional Flow [place];  Surgeon: Cynthia Farley MD;  Location:  [facility];  Service:     CARDIAC CATHETERIZATION N/A 08/03/2017    Procedure: Stent YI coronary;  Surgeon: Cynthia Farley MD;  Location:  [facility];  Service:     CARDIAC CATHETERIZATION N/A 08/03/2017    Procedure: Left ventriculography;  Surgeon: Cynthia Farley MD;  Location:  [facility];  Service:     CARDIAC CATHETERIZATION N/A 08/03/2017    Procedure: Left Heart Cath;  Surgeon: Cynthia Farley MD;  Location[facility];  Service:     CARDIAC CATHETERIZATION N/A 08/20/2019    Procedure: Left Heart Cath;  Surgeon: Mulugeta Ac MD;  Location[facility];  Service: Cardiology    CARDIAC CATHETERIZATION N/A 08/20/2019    Procedure: Coronary angiography;  Surgeon: Mulugeta Ac MD;  [facility];  Service: Cardiology    CARDIAC CATHETERIZATION N/A 08/20/2019    Procedure: Left ventriculography;  Surgeon: Mulugeta Ac MD;  Location[facility];  Service: Cardiology    CARDIAC CATHETERIZATION  8/20/2019    CARDIAC CATHETERIZATION N/A 5/14/2025    Procedure: Right Heart Cath;  Surgeon: Krupa Martinez MD;  [facility];  Service: Cardiovascular;  Laterality: N/A;    CARDIAC CATHETERIZATION N/A 5/14/2025    Procedure: Left Heart Cath;  Surgeon: Krupa Martinez MD;  [facility]: [facility];  Service: Cardiovascular;  Laterality: N/A;    COLONOSCOPY  MMVI    NORMAL.    COLONOSCOPY      CORONARY STENT PLACEMENT  2017    FINE NEEDLE ASPIRATION  12/2015     Left thyroid nodule.  Suring category II.  Dr. Socrates Sanchez      General Information       [facility] Name 05/18/25 1011          Physical Therapy Time and Intention    Document Type therapy note (daily note)  -     Mode of Treatment individual therapy;physical therapy  -[facility] Name 05/18/25 1011          General Information    Patient Profile Reviewed yes  -     Existing Precautions/Restrictions fall;cardiac;sternal;oxygen therapy device and L/min  -[facility] Name 05/18/25 1011          Cognition    Orientation Status (Cognition) oriented x 4  -       Row Name 05/18/25 1011          Safety Issues/Impairments Affecting Functional Mobility    Impairments Affecting Function (Mobility) endurance/activity tolerance;strength;shortness of breath;pain;range of motion (ROM)  -               User Key  (r) = Recorded By, (t) = Taken By, (c) = Cosigned By      Initials Name Provider Type     Paula Vargas PT Physical Therapist                   Mobility       Row Name 05/18/25 1011          Bed Mobility    Comment, (Bed Mobility) NT - UIC  -[facility] Name 05/18/25 1011          Sit-Stand Transfer    Sit-Stand [place] (Transfers) minimum assist (75% patient effort);1 person to manage equipment;1 person assist;verbal cues  -     Assistive Device (Sit-Stand Transfers) walker, front-wheeled  -[facility] Name 05/18/25 1011          Gait/Stairs (Locomotion)    [place] Level (Gait) verbal cues;contact guard  -     Assistive Device (Gait) walker, front-wheeled  -     Distance in Feet (Gait) 50  -     Deviations/Abnormal Patterns (Gait) antalgic;bakari decreased;gait speed decreased;stride length decreased  -     Bilateral Gait Deviations forward flexed posture  -               User Key  (r) = Recorded By, (t) = Taken By, (c) = Cosigned By      Initials Name Provider Type     Paula Vargas PT Physical Therapist                   Obj/Interventions    No documentation.                   Goals/Plan    No documentation.                  Clinical Impression       Row Name 05/18/25 1012          Pain    Pretreatment Pain Rating 2/10  -     Posttreatment Pain Rating 6/10  -     Pain Location chest  -     Pain Side/Orientation medial  -     Pain Management Interventions exercise or physical activity utilized  -     Response to Pain Interventions activity participation with increased pain  -       Row Name 05/18/25 1012          Plan of Care Review    Plan of Care Reviewed With patient  -     Progress improving  -     Outcome Evaluation Pt seen for PT this AM and tolerated mobility well. Pt sitting UIC on arrival and stood with min A x1. Pt ambulated 50ft with rwx and CGA. Pt with slow pace and fatigues quickly, c/o dizziness with ambulation. Pt demo'd independence wit rwx management. Returned to room and back to chair, notably fatigued and requested to be reclined quickly. Satting >90% on 2L O2 with activity. PT will continue to follow, anticipate DC home with HHPT and family assist pending progress. Hopeful pt will progress more quickly when chest tubes removed.  -[facility] Name 05/18/25 1012          Vital Signs    O2 Delivery Pre Treatment supplemental O2  -     O2 Delivery Intra Treatment supplemental O2  -     O2 Delivery Post Treatment supplemental O2  -[facility] Name 05/18/25 1012          Positioning and Restraints    Pre-Treatment Position sitting in chair/recliner  -     Post Treatment Position chair  -     In Chair reclined;call light within reach;encouraged to call for assist;exit alarm on  [facility]               User Key  (r) = Recorded By, (t) = Taken By, (c) = Cosigned By      Initials Name Provider Type     Paula Vargas, PT Physical Therapist                   Outcome Measures       Row Name 05/18/25 1037          How much help from another person do you currently need...    Turning from your back to your side while in flat bed without using bedrails? 3   -BH     Moving from lying on back to sitting on the side of a flat bed without bedrails? 3  -BH     Moving to and from a bed to a chair (including a wheelchair)? 3  -BH     Standing up from a chair using your arms (e.g., wheelchair, bedside chair)? 3  -BH     Climbing 3-5 steps with a railing? 2  -BH     To walk in hospital room? 3  -BH     AM-PAC 6 Clicks Score (PT) 17  -BH     Highest Level of Mobility Goal Stand (1 or More Minutes)-5  -       Row Name 05/18/25 [facility]          Functional Assessment    Outcome Measure Options AM-PAC 6 Clicks Basic Mobility (PT)  -               User Key  (r) = Recorded By, (t) = Taken By, (c) = Cosigned By      Initials Name Provider Type     Paula Vargas PT Physical Therapist                                 Physical Therapy Education       Title: PT OT SLP Therapies (In Progress)       Topic: Physical Therapy (In Progress)       Point: Mobility training (Done)       Learning Progress Summary            Patient Acceptance, E,TB,D, VU,NR by  at 5/18/2025 1038    Acceptance, E,TB,D, VU,NR by  at 5/17/2025 1017                      Point: Home exercise program (Not Started)       Learner Progress:  Not documented in this visit.              Point: Body mechanics (Done)       Learning Progress Summary            Patient Acceptance, E,TB,D, VU,NR by  at 5/18/2025 1038    Acceptance, E,TB,D, VU,NR by  at 5/17/2025 1017                      Point: Precautions (Done)       Learning Progress Summary            Patient Acceptance, E,TB,D, VU,NR by  at 5/18/2025 1038    Acceptance, E,TB,D, VU,NR by  at 5/17/2025 1017                                      User Key       Initials Effective Dates Name Provider Type Discipline     04/08/22 -  Paula Vargas PT Physical Therapist PT                  PT Recommendation and Plan  Planned Therapy Interventions (PT): balance training, bed mobility training, gait training, home exercise program, patient/family education, ROM  (range of motion), strengthening, stretching, stair training, transfer training  Progress: improving  Outcome Evaluation: Pt seen for PT this AM and tolerated mobility well. Pt sitting UIC on arrival and stood with min A x1. Pt ambulated 50ft with rwx and CGA. Pt with slow pace and fatigues quickly, c/o dizziness with ambulation. Pt demo'd independence wit rwx management. Returned to room and back to chair, notably fatigued and requested to be reclined quickly. Satting >90% on 2L O2 with activity. PT will continue to follow, anticipate DC home with HHPT and family assist pending progress. Hopeful pt will progress more quickly when chest tubes removed.     Time Calculation:   PT Evaluation Complexity  History, PT Evaluation Complexity: 1-2 personal factors and/or comorbidities  Examination of Body Systems (PT Eval Complexity): total of 3 or more elements  Clinical Presentation (PT Evaluation Complexity): evolving  Clinical Decision Making (PT Evaluation Complexity): moderate complexity  Overall Complexity (PT Evaluation Complexity): moderate complexity     PT Charges       Row Name 05/18/25 1038             Time Calculation    Start Time 0942  -      Stop Time 0958  -      Time Calculation (min) 16 min  -      PT Received On 05/18/25  -      PT - Next Appointment 05/19/25  -         Time Calculation- PT    Total Timed Code Minutes- PT 16 minute(s)  -         Timed Charges    28215 - Gait Training Minutes  8  -      34879 - PT Therapeutic Activity Minutes 8  -         Total Minutes    Timed Charges Total Minutes 16  -       Total Minutes 16  -                User Key  (r) = Recorded By, (t) = Taken By, (c) = Cosigned By      Initials Name Provider Type     Paula Vargas, PT Physical Therapist                  Therapy Charges for Today       Code Description Service Date Service Provider Modifiers Qty    15425160645 HC GAIT TRAINING EA 15 MIN 5/17/2025 Paula Vargas, PT GP 1    27439455622  HC PT EVAL MOD COMPLEXITY 3 5/17/2025 Paula Vargas, PT GP 1    97248962646 HC PT THER SUPP EA 15 MIN 5/17/2025 Paula Vargas, PT GP 1    11813929719 HC GAIT TRAINING EA 15 MIN 5/18/2025 Paula Vargas, PT GP 1    14934819829 HC PT THER SUPP EA 15 MIN 5/18/2025 Paula Vargas, PT GP 1            PT G-Codes  Outcome Measure Options: AM-PAC 6 Clicks Basic Mobility (PT)  AM-PAC 6 Clicks Score (PT): 17  PT Discharge Summary  Anticipated Discharge Disposition (PT): home with assist, home with home health    Paula Vargas, PT  5/18/2025

## 2025-05-18 NOTE — PROGRESS NOTES
Normal course.  Chest is a little bit stiff.  Pericarditis.  Loud rub.  DC chest tubes.  Colchicine ordered.

## 2025-05-18 NOTE — PLAN OF CARE
Goal Outcome Evaluation:            Patient POD 2. A&Ox4. VSS, NSR, Pacer wires in place (AAI 60-10-.5). BP 110s-130s systolic. Patient on room air when awake/2L sleep. Given PRN norco for c/o pain. IJ d/c, cota d/c, 3/4 chest tubes d/c. Nathaniel to bulb in place, 32cc drained. Due to void by 7pm. Patient stated he was decreasing his water intake to avoid walking to the restroom, educated patient on the importance of fluid intake and ambulating. Encouraging pulmonary toilet. Care ongoing.

## 2025-05-18 NOTE — PLAN OF CARE
Goal Outcome Evaluation:  Plan of Care Reviewed With: patient        Progress: improving  Outcome Evaluation: Pt seen for PT this AM and tolerated mobility well. Pt sitting UIC on arrival and stood with min A x1. Pt ambulated 50ft with rwx and CGA. Pt with slow pace and fatigues quickly, c/o dizziness with ambulation. Pt demo'd independence wit rwx management. Returned to room and back to chair, notably fatigued and requested to be reclined quickly. Satting >90% on 2L O2 with activity. PT will continue to follow, anticipate DC home with HHPT and family assist pending progress. Hopeful pt will progress more quickly when chest tubes removed.    Anticipated Discharge Disposition (PT): home with assist, home with home health

## 2025-05-18 NOTE — PROGRESS NOTES
LOS: 4 days   Patient Care Team:  Provider, No Known as PCP - Astrid Rodriguez APRN as Referring Physician (Cardiology)      Chief Complaint: Follow up coronary artery disease       Interval History: He's doing okay. Chest tubes are causing a lot of discomfort.       Objective   Vital Signs  Temp:  [97.6 °F (36.4 °C)-99.5 °F (37.5 °C)] 97.7 °F (36.5 °C)  Heart Rate:  [67-78] 69  Resp:  [16-18] 16  BP: (118-155)/(65-78) 155/69    Intake/Output Summary (Last 24 hours) at 5/18/2025 0816  Last data filed at 5/18/2025 0606  Gross per 24 hour   Intake 951 ml   Output 1175 ml   Net -224 ml         Physical Exam:  Constitutional: Well appearing, well developed, no acute distress   HENT: Oropharynx clear and membrane moist  Eyes: Normal conjunctiva, no sclera icterus.  Neck: Supple, no carotid bruit bilaterally.  Cardiovascular: Regular rate and rhythm, No Murmur, pericardial rub, No bilateral lower extremity edema.  Pulmonary: Normal respiratory effort, normal lung sounds, no wheezing.  Abdominal: Soft, nontender, no hepatosplenomegaly, liver is non-pulsatile.  Neurological: Alert and orient x 3.   Skin: Warm, dry, no ecchymosis, no rash.  Psych: Appropriate mood and affect. Normal judgment and insight.      Results Review:      Results from last 7 days   Lab Units 05/18/25  0332 05/17/25  0247 05/16/25  1503   SODIUM mmol/L 141 145 142   POTASSIUM mmol/L 4.1 4.1 4.3   CHLORIDE mmol/L 105 111* 107   CO2 mmol/L 27.1 23.6 19.1*   BUN mg/dL 25* 20 16   CREATININE mg/dL 0.99 1.13 1.36*   GLUCOSE mg/dL 124* 142* 134*   CALCIUM mg/dL 9.1 8.7 9.0         Results from last 7 days   Lab Units 05/18/25  0332 05/17/25  0247 05/16/25  1503   WBC 10*3/mm3 14.04* 10.18 14.96*   HEMOGLOBIN g/dL 10.7* 10.7* 12.1*   HEMATOCRIT % 33.2* 31.9* 36.0*   PLATELETS 10*3/mm3 229 181 221     Results from last 7 days   Lab Units 05/17/25  0247 05/16/25  1122 05/16/25  0948 05/16/25  0340 05/15/25  2006   INR  1.33* 1.44* 1.91*  --   --     APTT seconds  --  27.6  --  42.5* 33.1     Results from last 7 days   Lab Units 05/14/25  1422   CHOLESTEROL mg/dL 115     Results from last 7 days   Lab Units 05/17/25  0247   MAGNESIUM mg/dL 2.4     Results from last 7 days   Lab Units 05/14/25  1422   CHOLESTEROL mg/dL 115   TRIGLYCERIDES mg/dL 146   HDL CHOL mg/dL 38*   LDL CHOL mg/dL 52       I reviewed the patient's new clinical results.  I personally viewed and interpreted the patient's EKG/Telemetry data        Medication Review:   aspirin, 81 mg, Oral, Daily  colchicine, 0.6 mg, Oral, Daily  enoxaparin sodium, 40 mg, Subcutaneous, Daily  ferrous sulfate, 325 mg, Oral, Daily With Breakfast  gabapentin, 200 mg, Oral, Q8H  guaiFENesin, 1,200 mg, Oral, Q12H  insulin lispro, 2-9 Units, Subcutaneous, 4x Daily AC & at Bedtime  Lidocaine, 1 patch, Transdermal, Q24H  multivitamin, 1 tablet, Oral, Daily  mupirocin, 1 Application, Each Nare, BID  nebivolol, 2.5 mg, Oral, Q24H  pantoprazole, 40 mg, Oral, QAM  polyethylene glycol, 17 g, Oral, Daily  senna-docusate sodium, 2 tablet, Oral, BID  tamsulosin, 0.4 mg, Oral, Nightly        clevidipine, 2-32 mg/hr  norepinephrine, 0.02-0.2 mcg/kg/min        Assessment & Plan       Coronary artery disease involving native coronary artery of native heart without angina pectoris    CAD (coronary artery disease)    Coronary artery disease. C/p CABG x 5 (Bilateral SULLY's. Skeletonized LIMA to LAD. Skeletonized KI to D1. Vein graft to RCA. Vein graft to PDA. Vein graft to OM1). POD # 2. He has a rub and worsening ST elevation consistent with pericarditis. This should improve once the chest tubes are removed. Will also start colchicine. Continue aspirin. Statin intolerant.   HTN. BP is trending up. Nebivolol started yesterday. He was on amlodipine preoperatively which will likely need to be resumed. Will hold off for now.    HLD. Statin intolerant. On Leqvio outpatient.     GREG Sands  05/18/25  08:16 EDT

## 2025-05-19 ENCOUNTER — APPOINTMENT (OUTPATIENT)
Dept: GENERAL RADIOLOGY | Facility: HOSPITAL | Age: 65
End: 2025-05-19
Payer: COMMERCIAL

## 2025-05-19 LAB
ANION GAP SERPL CALCULATED.3IONS-SCNC: 10 MMOL/L (ref 5–15)
BUN SERPL-MCNC: 25 MG/DL (ref 8–23)
BUN/CREAT SERPL: 26.3 (ref 7–25)
CALCIUM SPEC-SCNC: 8.8 MG/DL (ref 8.6–10.5)
CHLORIDE SERPL-SCNC: 103 MMOL/L (ref 98–107)
CO2 SERPL-SCNC: 24 MMOL/L (ref 22–29)
CREAT SERPL-MCNC: 0.95 MG/DL (ref 0.76–1.27)
DEPRECATED RDW RBC AUTO: 44.6 FL (ref 37–54)
EGFRCR SERPLBLD CKD-EPI 2021: 89.4 ML/MIN/1.73
ERYTHROCYTE [DISTWIDTH] IN BLOOD BY AUTOMATED COUNT: 12.5 % (ref 12.3–15.4)
GLUCOSE BLDC GLUCOMTR-MCNC: 111 MG/DL (ref 70–130)
GLUCOSE BLDC GLUCOMTR-MCNC: 113 MG/DL (ref 70–130)
GLUCOSE SERPL-MCNC: 108 MG/DL (ref 65–99)
HCT VFR BLD AUTO: 30.8 % (ref 37.5–51)
HGB BLD-MCNC: 10.4 G/DL (ref 13–17.7)
MCH RBC QN AUTO: 32.4 PG (ref 26.6–33)
MCHC RBC AUTO-ENTMCNC: 33.8 G/DL (ref 31.5–35.7)
MCV RBC AUTO: 96 FL (ref 79–97)
PLATELET # BLD AUTO: 195 10*3/MM3 (ref 140–450)
PMV BLD AUTO: 10.3 FL (ref 6–12)
POTASSIUM SERPL-SCNC: 3.9 MMOL/L (ref 3.5–5.2)
POTASSIUM SERPL-SCNC: 4.1 MMOL/L (ref 3.5–5.2)
QT INTERVAL: 379 MS
QTC INTERVAL: 417 MS
RBC # BLD AUTO: 3.21 10*6/MM3 (ref 4.14–5.8)
SODIUM SERPL-SCNC: 137 MMOL/L (ref 136–145)
WBC NRBC COR # BLD AUTO: 9.16 10*3/MM3 (ref 3.4–10.8)

## 2025-05-19 PROCEDURE — 97530 THERAPEUTIC ACTIVITIES: CPT

## 2025-05-19 PROCEDURE — 82948 REAGENT STRIP/BLOOD GLUCOSE: CPT

## 2025-05-19 PROCEDURE — 25010000002 ENOXAPARIN PER 10 MG: Performed by: THORACIC SURGERY (CARDIOTHORACIC VASCULAR SURGERY)

## 2025-05-19 PROCEDURE — 99232 SBSQ HOSP IP/OBS MODERATE 35: CPT | Performed by: INTERNAL MEDICINE

## 2025-05-19 PROCEDURE — 94762 N-INVAS EAR/PLS OXIMTRY CONT: CPT

## 2025-05-19 PROCEDURE — 85027 COMPLETE CBC AUTOMATED: CPT | Performed by: THORACIC SURGERY (CARDIOTHORACIC VASCULAR SURGERY)

## 2025-05-19 PROCEDURE — 84132 ASSAY OF SERUM POTASSIUM: CPT | Performed by: INTERNAL MEDICINE

## 2025-05-19 PROCEDURE — 71046 X-RAY EXAM CHEST 2 VIEWS: CPT

## 2025-05-19 PROCEDURE — 80048 BASIC METABOLIC PNL TOTAL CA: CPT | Performed by: THORACIC SURGERY (CARDIOTHORACIC VASCULAR SURGERY)

## 2025-05-19 RX ORDER — POTASSIUM CHLORIDE 1500 MG/1
20 TABLET, EXTENDED RELEASE ORAL ONCE
Status: COMPLETED | OUTPATIENT
Start: 2025-05-19 | End: 2025-05-19

## 2025-05-19 RX ORDER — COLCHICINE 0.6 MG/1
0.6 TABLET ORAL EVERY 12 HOURS SCHEDULED
Status: DISCONTINUED | OUTPATIENT
Start: 2025-05-19 | End: 2025-05-20 | Stop reason: HOSPADM

## 2025-05-19 RX ORDER — DOCUSATE SODIUM 100 MG/1
100 CAPSULE, LIQUID FILLED ORAL 2 TIMES DAILY
Status: DISCONTINUED | OUTPATIENT
Start: 2025-05-19 | End: 2025-05-20 | Stop reason: HOSPADM

## 2025-05-19 RX ADMIN — ASPIRIN 81 MG: 81 TABLET, COATED ORAL at 08:41

## 2025-05-19 RX ADMIN — AMLODIPINE BESYLATE 5 MG: 5 TABLET ORAL at 08:41

## 2025-05-19 RX ADMIN — COLCHICINE 0.6 MG: 0.6 TABLET ORAL at 08:41

## 2025-05-19 RX ADMIN — GUAIFENESIN 1200 MG: 600 TABLET, MULTILAYER, EXTENDED RELEASE ORAL at 08:41

## 2025-05-19 RX ADMIN — HYDROCODONE BITARTRATE AND ACETAMINOPHEN 2 TABLET: 5; 325 TABLET ORAL at 21:15

## 2025-05-19 RX ADMIN — DOCUSATE SODIUM 100 MG: 100 CAPSULE, LIQUID FILLED ORAL at 21:14

## 2025-05-19 RX ADMIN — HYDROCODONE BITARTRATE AND ACETAMINOPHEN 2 TABLET: 5; 325 TABLET ORAL at 11:55

## 2025-05-19 RX ADMIN — COLCHICINE 0.6 MG: 0.6 TABLET ORAL at 21:15

## 2025-05-19 RX ADMIN — DOCUSATE SODIUM 100 MG: 100 CAPSULE, LIQUID FILLED ORAL at 08:41

## 2025-05-19 RX ADMIN — HYDROCODONE BITARTRATE AND ACETAMINOPHEN 2 TABLET: 5; 325 TABLET ORAL at 05:55

## 2025-05-19 RX ADMIN — SENNOSIDES AND DOCUSATE SODIUM 2 TABLET: 50; 8.6 TABLET ORAL at 11:55

## 2025-05-19 RX ADMIN — POLYETHYLENE GLYCOL 3350 17 G: 17 POWDER, FOR SOLUTION ORAL at 11:55

## 2025-05-19 RX ADMIN — MUPIROCIN 1 APPLICATION: 20 OINTMENT TOPICAL at 21:14

## 2025-05-19 RX ADMIN — POTASSIUM CHLORIDE 20 MEQ: 1500 TABLET, EXTENDED RELEASE ORAL at 05:55

## 2025-05-19 RX ADMIN — GABAPENTIN 200 MG: 100 CAPSULE ORAL at 21:14

## 2025-05-19 RX ADMIN — GUAIFENESIN 1200 MG: 600 TABLET, MULTILAYER, EXTENDED RELEASE ORAL at 21:14

## 2025-05-19 RX ADMIN — CYCLOBENZAPRINE HYDROCHLORIDE 10 MG: 10 TABLET, FILM COATED ORAL at 23:30

## 2025-05-19 RX ADMIN — FERROUS SULFATE TAB 325 MG (65 MG ELEMENTAL FE) 325 MG: 325 (65 FE) TAB at 08:41

## 2025-05-19 RX ADMIN — PANTOPRAZOLE SODIUM 40 MG: 40 TABLET, DELAYED RELEASE ORAL at 05:55

## 2025-05-19 RX ADMIN — NEBIVOLOL 5 MG: 5 TABLET ORAL at 11:55

## 2025-05-19 RX ADMIN — MUPIROCIN 1 APPLICATION: 20 OINTMENT TOPICAL at 08:30

## 2025-05-19 RX ADMIN — GABAPENTIN 200 MG: 100 CAPSULE ORAL at 05:55

## 2025-05-19 RX ADMIN — SENNOSIDES AND DOCUSATE SODIUM 2 TABLET: 50; 8.6 TABLET ORAL at 21:15

## 2025-05-19 RX ADMIN — TAMSULOSIN HYDROCHLORIDE 0.4 MG: 0.4 CAPSULE ORAL at 21:15

## 2025-05-19 RX ADMIN — Medication 1 TABLET: at 08:41

## 2025-05-19 RX ADMIN — ENOXAPARIN SODIUM 40 MG: 100 INJECTION SUBCUTANEOUS at 08:42

## 2025-05-19 NOTE — PLAN OF CARE
Goal Outcome Evaluation:  Plan of Care Reviewed With: patient        Progress: improving  Outcome Evaluation: VSS; A&Ox4. Pt is POD#3 s/p CABG. Pt with -128. Pt remains SR on the monitor. Pt with c/o of pain controlled by prn pain med. Pt continues with a en to bulb with 60ml out. Pt has external pacer that is on, connected, locked and secured; pt is not pacing on the monitor. Plan of care is ongoing.

## 2025-05-19 NOTE — THERAPY TREATMENT NOTE
Patient Name: Niranjan Peacock  : 1960    MRN: [id number]                              Today's Date: 2025       Admit Date: 2025    Visit Dx:     ICD-10-CM ICD-9-CM   1. Coronary artery disease of native artery of native heart with stable angina pectoris  I25.118 414.01     413.9   2. Coronary artery disease involving native coronary artery of native heart without angina pectoris  I25.10 414.01   3. S/P CABG (coronary artery bypass graft)  Z95.1 V45.81     Patient Active Problem List   Diagnosis    Coronary artery disease involving native coronary artery of native heart without angina pectoris    Multiple thyroid nodules, benign per  US    Pernicious anemia    H/O multiple allergies    Herniated nucleus pulposus, L3-4 right    Right lumbar radiculopathy    Pterygium of right eye    Degenerative arthritis of cervical spine    Essential hypertension    History of DVT (deep vein thrombosis)    Eczema of eyelid    Chronic eczema of hand    Mixed hyperlipidemia    CHANTEL (obstructive sleep apnea)    Statin and ezetimibe intolerance    Stenosis of left carotid artery    Elevated PSA, less than 10 ng/ml    Prediabetes    BPH without obstruction/lower urinary tract symptoms    CAD (coronary artery disease)     Past Medical History:   Diagnosis Date    Allergic     Angina at rest 2019    Added automatically from request for surgery [id number]    B12 deficiency anemia     Cataract     Had cataract surgery both eyes    Cellulitis of right lower extremity 2020    Chest pain     Chronic fatigue 2016    Coronary artery disease involving native coronary artery of native heart without angina pectoris 08/10/2017    Deep vein thrombosis     Headache     History of blood clots     blood clot found in right lung     HL (hearing loss)     Hypertension     Mixed hyperlipidemia 2022    Multiple thyroid nodules     Multiple thyroid nodules     CHANTEL (obstructive sleep apnea)     Pernicious anemia      Pulmonary embolism     2015    Stenosis of left carotid artery 03/28/2023    Syncope     2 yrs ago one time     Past Surgical History:   Procedure Laterality Date    CARDIAC CATHETERIZATION N/A 08/03/2017    Procedure: Coronary angiography;  Surgeon: Cynthia Farley MD;  Location:  [facility]VAS[facility];  Service:     CARDIAC CATHETERIZATION  08/03/2017    Procedure: Functional Flow [place];  Surgeon: Cynthia Farley MD;  Location: [facility] CATH [facility]VASIVE LOCATION;  Service:     CARDIAC CATHETERIZATION N/A 08/03/2017    Procedure: Stent YI coronary;  Surgeon: Cynthia Farley MD;  Location:  [facility];  Service:     CARDIAC CATHETERIZATION N/A 08/03/2017    Procedure: Left ventriculography;  Surgeon: Cynthia Farley MD;  Location: [facility] C[facility]VASIVE LOCATION;  Service:     CARDIAC CATHETERIZATION N/A 08/03/2017    Procedure: Left Heart Cath;  Surgeon: Cynthia Farley MD;  Location: [facility] C[facility]VASIVE LOCATION;  Service:     CARDIAC CATHETERIZATION N/A 08/20/2019    Procedure: Left Heart Cath;  Surgeon: Mulugeta Ac MD;  Location: [facility] CATH INVASIVE LOCATION;  Service: Cardiology    CARDIAC CATHETERIZATION N/A 08/20/2019    Procedure: Coronary angiography;  Surgeon: Mulugeta Ac MD;  Location: [facility] CATH [facility]VASIVE LOCATION;  Service: Cardiology    CARDIAC CATHETERIZATION N/A 08/20/2019    Procedure: Left ventriculography;  Surgeon: Mulugeta Ac MD;  Location: [facility] CATH [facility]VASIVE LOCATION;  Service: Cardiology    CARDIAC CATHETERIZATION  8/20/2019    CARDIAC CATHETERIZATION N/A 5/14/2025    Procedure: Right Heart Cath;  Surgeon: Krupa Martinez MD;  Location: [facility] CATH [facility]VASIVE LOCATION;  Service: Cardiovascular;  Laterality: N/A;    CARDIAC CATHETERIZATION N/A 5/14/2025    Procedure: Left Heart Cath;  Surgeon: Krupa Martinez MD;  Location: [facility] CATH [facility]VASIVE LOCATION;  Service: Cardiovascular;  Laterality: N/A;    COLONOSCOPY  MMVI    NORMAL.    COLONOSCOPY      CORONARY  ARTERY BYPASS GRAFT N/A 5/16/2025    Procedure: BRANDON STERNOTOMY CORONARY ARTERY BYPASS GRAFT TIMES 5 USING LEFT AND RIGHT INTERNAL MAMMARY ARTERIES AND LEFT GREATER SAPHENOUS VEIN GRAFT PER ENDOSCOPIC VEIN HARVESTING AND PRP;  Surgeon: Jr James Juarez MD;  Location: [facility];  Service: Cardiothoracic;  Laterality: N/A;    CORONARY STENT PLACEMENT  2017    FINE NEEDLE ASPIRATION  12/2015    Left thyroid nodule.  [place] category II.  Dr. Socrates Sanchez      General Information       Row Name 05/19/25 1544          Physical Therapy Time and Intention    Document Type therapy note (daily note)  -ST     Mode of Treatment individual therapy;physical therapy  -ST       Row Name 05/19/25 1544          General Information    Patient Profile Reviewed yes  -ST     Existing Precautions/Restrictions fall;cardiac;sternal  -ST       Row Name 05/19/25 1544          Cognition    Orientation Status (Cognition) oriented x 4  -ST       Row Name 05/19/25 1544          Safety Issues/Impairments Affecting Functional Mobility    Impairments Affecting Function (Mobility) endurance/activity tolerance;strength;shortness of breath;pain;range of motion (ROM)  -ST     Comment, Safety Issues/Impairments (Mobility) nonskid socks donned  -ST               User Key  (r) = Recorded By, (t) = Taken By, (c) = Cosigned By      Initials Name Provider Type    ST Ann Correa PT Physical Therapist                   Mobility       Row Name 05/19/25 1544          Bed Mobility    Bed Mobility supine-sit  -ST     Supine-Sit [place] (Bed Mobility) contact guard  -ST     Assistive Device (Bed Mobility) head of bed elevated  -ST     Comment, (Bed Mobility) good follow through on sternal precautions  -ST       Row Name 05/19/25 1544          Sit-Stand Transfer    Sit-Stand [place] (Transfers) verbal cues;contact guard  -ST     Assistive Device (Sit-Stand Transfers) walker, front-wheeled  -ST     Comment, (Sit-Stand Transfer) cues for hand  placement  -ST       Row Name 05/19/25 1544          Gait/Stairs (Locomotion)    [place] Level (Gait) verbal cues;contact guard  -ST     Assistive Device (Gait) walker, front-wheeled  -ST     Distance in Feet (Gait) 100  -ST     Deviations/Abnormal Patterns (Gait) antalgic;bakari decreased;gait speed decreased;stride length decreased  -ST     Bilateral Gait Deviations forward flexed posture  -ST     Comment, (Gait/Stairs) c/o chest pressure/tightness - appears more related to breathing. cues for PLB throughout  -ST               User Key  (r) = Recorded By, (t) = Taken By, (c) = Cosigned By      Initials Name Provider Type    Ann Hernandez, RADHA Physical Therapist                   Obj/Interventions       Row Name 05/19/25 1545          Motor Skills    Therapeutic Exercise --  cardiac protocol x 10 bilat  -ST       Row Name 05/19/25 1545          Balance    Comment, Balance CGA for dynamic balance with mild unsteadiness with fatigue  -ST               User Key  (r) = Recorded By, (t) = Taken By, (c) = Cosigned By      Initials Name Provider Type    Ann Hernandez PT Physical Therapist                   Goals/Plan    No documentation.                  Clinical Impression       Row Name 05/19/25 1546          Pain    Pain Location chest  -ST     Pain Side/Orientation bilateral  -ST     Pain Management Interventions exercise or physical activity utilized  -ST     Response to Pain Interventions activity participation with tolerable pain  -ST     Pre/Posttreatment Pain Comment more tightness with breathing, does not rate  -ST       Row Name 05/19/25 1546          Plan of Care Review    Plan of Care Reviewed With patient  -ST     Progress improving  -ST     Outcome Evaluation Pt seen for PT this PM with improved activity tolerance. CGA to stand and ambulate 100' with rwx. good awareness of precautions, handout issued after review of cardiac protocol exercises and move in the tube. Cues for PLB as pt  occasionally holds breath and feels like chest tightness is most limited with breathing at this time.  -ST       Row Name 05/19/25 1546          Therapy Assessment/Plan (PT)    Rehab Potential (PT) good  -ST     Criteria for Skilled Interventions Met (PT) yes  -ST     Therapy Frequency (PT) daily  -ST       Row Name 05/19/25 1546          Positioning and Restraints    Pre-Treatment Position in bed  -ST     Post Treatment Position chair  -ST     In Chair notified nsg;reclined;call light within reach;encouraged to call for assist;with family/caregiver  -ST               User Key  (r) = Recorded By, (t) = Taken By, (c) = Cosigned By      Initials Name Provider Type     Ann Correa PT Physical Therapist                   Outcome Measures       Row Name 05/19/25 1548 05/19/25 0819       How much help from another person do you currently need...    Turning from your back to your side while in flat bed without using bedrails? 3  -ST 3  -TH    Moving from lying on back to sitting on the side of a flat bed without bedrails? 3  -ST 3  -TH    Moving to and from a bed to a chair (including a wheelchair)? 3  -ST 3  -TH    Standing up from a chair using your arms (e.g., wheelchair, bedside chair)? 3  -ST 3  -TH    Climbing 3-5 steps with a railing? 3  -ST 3  -TH    To walk in hospital room? 3  -ST 3  -TH    AM-PAC 6 Clicks Score (PT) 18  -ST 18  -TH    Highest Level of Mobility Goal Walk 10 Steps or More-6  -ST Walk 10 Steps or More-6  -TH              User Key  (r) = Recorded By, (t) = Taken By, (c) = Cosigned By      Initials Name Provider Type     Marlene Quarles, RN Registered Nurse    Ann Hernandez PT Physical Therapist                                 Physical Therapy Education       Title: PT OT SLP Therapies (In Progress)       Topic: Physical Therapy (In Progress)       Point: Mobility training (Done)       Learning Progress Summary            Patient Acceptance, E,TB, VU,NR by  at 5/19/2025  1548    Acceptance, E,TB,D, VU,NR by  at 5/18/2025 1038    Acceptance, E,TB,D, VU,NR by  at 5/17/2025 1017                      Point: Home exercise program (Not Started)       Learner Progress:  Not documented in this visit.              Point: Body mechanics (Done)       Learning Progress Summary            Patient Acceptance, E,TB, VU,NR by  at 5/19/2025 1548    Acceptance, E,TB,D, VU,NR by  at 5/18/2025 1038    Acceptance, E,TB,D, VU,NR by  at 5/17/2025 1017                      Point: Precautions (Done)       Learning Progress Summary            Patient Acceptance, E,TB, VU,NR by  at 5/19/2025 1548    Acceptance, E,TB,D, VU,NR by  at 5/18/2025 1038    Acceptance, E,TB,D, VU,NR by  at 5/17/2025 1017                                      User Key       Initials Effective Dates Name Provider Type Discipline     04/08/22 -  Paula Vargas, PT Physical Therapist PT     09/22/22 -  Ann Correa PT Physical Therapist PT                  PT Recommendation and Plan     Progress: improving  Outcome Evaluation: Pt seen for PT this PM with improved activity tolerance. CGA to stand and ambulate 100' with rwx. good awareness of precautions, handout issued after review of cardiac protocol exercises and move in the tube. Cues for PLB as pt occasionally holds breath and feels like chest tightness is most limited with breathing at this time.     Time Calculation:         PT Charges       Row Name 05/19/25 1548             Time Calculation    Start Time 1405  -ST      Stop Time 1428  -ST      Time Calculation (min) 23 min  -ST      PT Received On 05/19/25  -ST      PT - Next Appointment 05/20/25  -ST         Time Calculation- PT    Total Timed Code Minutes- PT 23 minute(s)  -ST         Timed Charges    10075 - PT Therapeutic Activity Minutes 23  -ST         Total Minutes    Timed Charges Total Minutes 23  -ST       Total Minutes 23  -ST                User Key  (r) = Recorded By, (t) = Taken By, (c) =  Cosigned By      Initials Name Provider Type     Ann Correa, PT Physical Therapist                  Therapy Charges for Today       Code Description Service Date Service Provider Modifiers Qty    05452878901 HC PT THERAPEUTIC ACT EA 15 MIN 5/19/2025 Ann Correa PT GP 2            PT G-Codes  Outcome Measure Options: AM-PAC 6 Clicks Basic Mobility (PT)  AM-PAC 6 Clicks Score (PT): 18  PT Discharge Summary  Anticipated Discharge Disposition (PT): home with assist, home with home health    Ann Correa, PT  5/19/2025

## 2025-05-19 NOTE — PROGRESS NOTES
" LOS: 5 days   Patient Care Team:  Provider, No Known as PCP - Astrid Rodriguez APRN as Referring Physician (Cardiology)    Chief Complaint: post op    Subjective  Feeling better. Pain improved    Vital Signs  Temp:  [97.8 °F (36.6 °C)-98.3 °F (36.8 °C)] 98.3 °F (36.8 °C)  Heart Rate:  [66-85] 67  Resp:  [16] 16  BP: (114-135)/(61-79) 128/63  Body mass index is 27.32 kg/m².    Intake/Output Summary (Last 24 hours) at 5/19/2025 0727  Last data filed at 5/19/2025 0625  Gross per 24 hour   Intake 600 ml   Output 1067 ml   Net -467 ml     No intake/output data recorded.    Chest tube drainage last 8 hours         05/17/25  0500 05/18/25  0607 05/19/25  0645   Weight: 100 kg (220 lb 11.2 oz) 96.5 kg (212 lb 11.2 oz) 96.5 kg (212 lb 12.8 oz)         Objective:  Vital signs: (most recent): Blood pressure 117/68, pulse 72, temperature 98.2 °F (36.8 °C), temperature source Oral, resp. rate 16, height 188 cm (74\"), weight 96.5 kg (212 lb 12.8 oz), SpO2 97%.                    Results Review:        WBC WBC   Date Value Ref Range Status   05/19/2025 9.16 3.40 - 10.80 10*3/mm3 Final   05/18/2025 14.04 (H) 3.40 - 10.80 10*3/mm3 Final   05/17/2025 10.18 3.40 - 10.80 10*3/mm3 Final   05/16/2025 14.96 (H) 3.40 - 10.80 10*3/mm3 Final   05/16/2025 17.77 (H) 3.40 - 10.80 10*3/mm3 Final      HGB Hemoglobin   Date Value Ref Range Status   05/19/2025 10.4 (L) 13.0 - 17.7 g/dL Final   05/18/2025 10.7 (L) 13.0 - 17.7 g/dL Final   05/17/2025 10.7 (L) 13.0 - 17.7 g/dL Final   05/16/2025 12.1 (L) 13.0 - 17.7 g/dL Final   05/16/2025 11.9 (L) 13.0 - 17.7 g/dL Final   05/16/2025 9.5 (L) 12.0 - 17.0 g/dL Final   05/16/2025 9.9 (L) 12.0 - 17.0 g/dL Final   05/16/2025 10.5 (L) 12.0 - 17.0 g/dL Final   05/16/2025 9.5 (L) 12.0 - 17.0 g/dL Final   05/16/2025 9.2 (L) 12.0 - 17.0 g/dL Final      HCT Hematocrit   Date Value Ref Range Status   05/19/2025 30.8 (L) 37.5 - 51.0 % Final   05/18/2025 33.2 (L) 37.5 - 51.0 % Final   05/17/2025 31.9 " (L) 37.5 - 51.0 % Final   05/16/2025 36.0 (L) 37.5 - 51.0 % Final   05/16/2025 35.7 (L) 37.5 - 51.0 % Final   05/16/2025 28 (L) 38 - 51 % Final   05/16/2025 29 (L) 38 - 51 % Final   05/16/2025 31 (L) 38 - 51 % Final   05/16/2025 28 (L) 38 - 51 % Final   05/16/2025 27 (L) 38 - 51 % Final      Platelets Platelets   Date Value Ref Range Status   05/19/2025 195 140 - 450 10*3/mm3 Final   05/18/2025 229 140 - 450 10*3/mm3 Final   05/17/2025 181 140 - 450 10*3/mm3 Final   05/16/2025 221 140 - 450 10*3/mm3 Final   05/16/2025 212 140 - 450 10*3/mm3 Final   05/16/2025 230 140 - 450 10*3/mm3 Final        PT/INR:    Protime   Date Value Ref Range Status   05/17/2025 16.5 (H) 11.7 - 14.2 Seconds Final   05/16/2025 17.5 (H) 11.7 - 14.2 Seconds Final   05/16/2025 22.0 (H) 11.7 - 14.2 Seconds Final   /  INR   Date Value Ref Range Status   05/17/2025 1.33 (H) 0.90 - 1.10 Final   05/16/2025 1.44 (H) 0.90 - 1.10 Final   05/16/2025 1.91 (H) 0.90 - 1.10 Final       Sodium Sodium   Date Value Ref Range Status   05/19/2025 137 136 - 145 mmol/L Final   05/18/2025 141 136 - 145 mmol/L Final   05/17/2025 145 136 - 145 mmol/L Final   05/16/2025 142 136 - 145 mmol/L Final   05/16/2025 142 136 - 145 mmol/L Final      Potassium Potassium   Date Value Ref Range Status   05/19/2025 3.9 3.5 - 5.2 mmol/L Final   05/18/2025 4.1 3.5 - 5.2 mmol/L Final   05/17/2025 4.1 3.5 - 5.2 mmol/L Final   05/16/2025 4.3 3.5 - 5.2 mmol/L Final   05/16/2025 4.2 3.5 - 5.2 mmol/L Final      Chloride Chloride   Date Value Ref Range Status   05/19/2025 103 98 - 107 mmol/L Final   05/18/2025 105 98 - 107 mmol/L Final   05/17/2025 111 (H) 98 - 107 mmol/L Final   05/16/2025 107 98 - 107 mmol/L Final   05/16/2025 108 (H) 98 - 107 mmol/L Final      Bicarbonate CO2   Date Value Ref Range Status   05/19/2025 24.0 22.0 - 29.0 mmol/L Final   05/18/2025 27.1 22.0 - 29.0 mmol/L Final   05/17/2025 23.6 22.0 - 29.0 mmol/L Final   05/16/2025 19.1 (L) 22.0 - 29.0 mmol/L Final    05/16/2025 23.7 22.0 - 29.0 mmol/L Final      BUN BUN   Date Value Ref Range Status   05/19/2025 25 (H) 8 - 23 mg/dL Final   05/18/2025 25 (H) 8 - 23 mg/dL Final   05/17/2025 20 8 - 23 mg/dL Final   05/16/2025 16 8 - 23 mg/dL Final   05/16/2025 15 8 - 23 mg/dL Final      Creatinine Creatinine   Date Value Ref Range Status   05/19/2025 0.95 0.76 - 1.27 mg/dL Final   05/18/2025 0.99 0.76 - 1.27 mg/dL Final   05/17/2025 1.13 0.76 - 1.27 mg/dL Final   05/16/2025 1.36 (H) 0.76 - 1.27 mg/dL Final   05/16/2025 1.33 (H) 0.76 - 1.27 mg/dL Final      Calcium Calcium   Date Value Ref Range Status   05/19/2025 8.8 8.6 - 10.5 mg/dL Final   05/18/2025 9.1 8.6 - 10.5 mg/dL Final   05/17/2025 8.7 8.6 - 10.5 mg/dL Final   05/16/2025 9.0 8.6 - 10.5 mg/dL Final   05/16/2025 8.9 8.6 - 10.5 mg/dL Final      Magnesium Magnesium   Date Value Ref Range Status   05/17/2025 2.4 1.6 - 2.4 mg/dL Final   05/16/2025 2.5 (H) 1.6 - 2.4 mg/dL Final   05/16/2025 2.9 (H) 1.6 - 2.4 mg/dL Final          amLODIPine, 5 mg, Oral, Q24H  aspirin, 81 mg, Oral, Daily  colchicine, 0.6 mg, Oral, Daily  enoxaparin sodium, 40 mg, Subcutaneous, Daily  ferrous sulfate, 325 mg, Oral, Daily With Breakfast  gabapentin, 200 mg, Oral, Q8H  guaiFENesin, 1,200 mg, Oral, Q12H  Lidocaine, 1 patch, Transdermal, Q24H  multivitamin, 1 tablet, Oral, Daily  mupirocin, 1 Application, Each Nare, BID  nebivolol, 5 mg, Oral, Q24H  pantoprazole, 40 mg, Oral, QAM  polyethylene glycol, 17 g, Oral, Daily  senna-docusate sodium, 2 tablet, Oral, BID  tamsulosin, 0.4 mg, Oral, Nightly      clevidipine, 2-32 mg/hr  norepinephrine, 0.02-0.2 mcg/kg/min              Coronary artery disease involving native coronary artery of native heart without angina pectoris    CAD (coronary artery disease)      Assessment & Plan    - Severe multivessel CAD, s/p PCI (2017)- s/p urgent CABGx5 BIMA/FAVIAN- Larry 5/16/2025  - HTN  - HLD, intolerant of statins  - History of DVT/PE  - CHANTEL  -Post op anemia-  expected acute blood loss    POD#3  Looks good this morning.  Up in the chair.  1L NC--wean as able  PA and lateral- with stable pneumothoraces-- will repeat in AM  Remains in sinus-- tolerating beta blocker.  Colchicine for pericarditis, pain has improved.  Encourage pulmonary toilet.  Discontinue AV wires.  Will check an overnight tonight.  May be able to go home with home health tomorrow.     GREG Mansfield  05/19/25  07:27 EDT

## 2025-05-19 NOTE — PROGRESS NOTES
"Niranjan Peacock  1960 65 y.o.  [id number]      Patient Care Team:  Provider, No Known as PCP - Astrid Rodriguez APRN as Referring Physician (Cardiology)    CC: Normal LV function, severe three-vessel disease status post CABG    Interval History: feels better with Chest tube out. Small apical PTX on yesterday CXR, repeat pending.       Objective   Vital Signs  Temp:  [97.8 °F (36.6 °C)-98.3 °F (36.8 °C)] 98.1 °F (36.7 °C)  Heart Rate:  [66-79] 79  Resp:  [16] 16  BP: (114-135)/(61-79) 118/62    Intake/Output Summary (Last 24 hours) at 5/19/2025 0959  Last data filed at 5/19/2025 0804  Gross per 24 hour   Intake 480 ml   Output 1067 ml   Net -587 ml     Flowsheet Rows      Flowsheet Row First Filed Value   Admission Height 188 cm (74\") Documented at 05/14/2025 0742   Admission Weight 99.8 kg (220 lb) Documented at 05/14/2025 0742            Physical Exam:   General Appearance:    Alert,oriented, in no acute distress   Lungs:     Clear to auscultation,BS are equal    Heart:    Normal S1 and S2, RRR without murmur, gallop or rub   HEENT:    Sclerae are clear, no JVD or adenopathy   Abdomen:     Normal bowel sounds, soft nontender, nondistended, no HSM   Extremities:   Moves all extremities well, no edema, no cyanosis, no             Redness, no rash     Medication Review:      amLODIPine, 5 mg, Oral, Q24H  aspirin, 81 mg, Oral, Daily  colchicine, 0.6 mg, Oral, Daily  docusate sodium, 100 mg, Oral, BID  enoxaparin sodium, 40 mg, Subcutaneous, Daily  ferrous sulfate, 325 mg, Oral, Daily With Breakfast  gabapentin, 200 mg, Oral, Q8H  guaiFENesin, 1,200 mg, Oral, Q12H  Lidocaine, 1 patch, Transdermal, Q24H  multivitamin, 1 tablet, Oral, Daily  mupirocin, 1 Application, Each Nare, BID  nebivolol, 5 mg, Oral, Q24H  pantoprazole, 40 mg, Oral, QAM  polyethylene glycol, 17 g, Oral, Daily  senna-docusate sodium, 2 tablet, Oral, BID  tamsulosin, 0.4 mg, Oral, Nightly      clevidipine, 2-32 mg/hr  norepinephrine, " 0.02-0.2 mcg/kg/min          I reviewed the patient's new clinical results.  I personally viewed and interpreted the patient's EKG/Telemetry data    Assessment/Plan  Active Hospital Problems    Diagnosis  POA    **Coronary artery disease involving native coronary artery of native heart without angina pectoris [I25.10]  Yes    CAD (coronary artery disease) [I25.10]  Yes      Resolved Hospital Problems   No resolved problems to display.     CAD s/p bilateral SULLY, LIMA to LAD, KI to DI, SVG RCA, SVG PDA, SVG Om1.   Pericarditis  HTN  HLD    Pain is reasonably controlled, can increase ASA to 650 TID if necessary. Continue colchicine will increase to BID today.     Krupa Martinez MD  05/19/25  09:59 EDT

## 2025-05-19 NOTE — PLAN OF CARE
Goal Outcome Evaluation:  Plan of Care Reviewed With: patient        Progress: improving  Outcome Evaluation: Pt seen for PT this PM with improved activity tolerance. CGA to stand and ambulate 100' with rwx. good awareness of precautions, handout issued after review of cardiac protocol exercises and move in the tube. Cues for PLB as pt occasionally holds breath and feels like chest tightness is most limited with breathing at this time.    Anticipated Discharge Disposition (PT): home with assist, home with home health

## 2025-05-20 ENCOUNTER — READMISSION MANAGEMENT (OUTPATIENT)
Dept: CALL CENTER | Facility: HOSPITAL | Age: 65
End: 2025-05-20
Payer: COMMERCIAL

## 2025-05-20 ENCOUNTER — APPOINTMENT (OUTPATIENT)
Dept: GENERAL RADIOLOGY | Facility: HOSPITAL | Age: 65
End: 2025-05-20
Payer: COMMERCIAL

## 2025-05-20 VITALS
HEART RATE: 65 BPM | BODY MASS INDEX: 27.37 KG/M2 | HEIGHT: 74 IN | RESPIRATION RATE: 16 BRPM | TEMPERATURE: 98.4 F | OXYGEN SATURATION: 95 % | WEIGHT: 213.3 LBS | DIASTOLIC BLOOD PRESSURE: 54 MMHG | SYSTOLIC BLOOD PRESSURE: 120 MMHG

## 2025-05-20 LAB
ANION GAP SERPL CALCULATED.3IONS-SCNC: 10 MMOL/L (ref 5–15)
BUN SERPL-MCNC: 19 MG/DL (ref 8–23)
BUN/CREAT SERPL: 25 (ref 7–25)
CALCIUM SPEC-SCNC: 8.5 MG/DL (ref 8.6–10.5)
CHLORIDE SERPL-SCNC: 104 MMOL/L (ref 98–107)
CO2 SERPL-SCNC: 23 MMOL/L (ref 22–29)
CREAT SERPL-MCNC: 0.76 MG/DL (ref 0.76–1.27)
DEPRECATED RDW RBC AUTO: 43.3 FL (ref 37–54)
EGFRCR SERPLBLD CKD-EPI 2021: 100.4 ML/MIN/1.73
ERYTHROCYTE [DISTWIDTH] IN BLOOD BY AUTOMATED COUNT: 12.3 % (ref 12.3–15.4)
GLUCOSE BLDC GLUCOMTR-MCNC: 99 MG/DL (ref 70–130)
GLUCOSE SERPL-MCNC: 105 MG/DL (ref 65–99)
HCT VFR BLD AUTO: 30.8 % (ref 37.5–51)
HGB BLD-MCNC: 10.2 G/DL (ref 13–17.7)
MCH RBC QN AUTO: 31.6 PG (ref 26.6–33)
MCHC RBC AUTO-ENTMCNC: 33.1 G/DL (ref 31.5–35.7)
MCV RBC AUTO: 95.4 FL (ref 79–97)
PLATELET # BLD AUTO: 241 10*3/MM3 (ref 140–450)
PMV BLD AUTO: 10.1 FL (ref 6–12)
POTASSIUM SERPL-SCNC: 3.8 MMOL/L (ref 3.5–5.2)
POTASSIUM SERPL-SCNC: 4 MMOL/L (ref 3.5–5.2)
RBC # BLD AUTO: 3.23 10*6/MM3 (ref 4.14–5.8)
SODIUM SERPL-SCNC: 137 MMOL/L (ref 136–145)
WBC NRBC COR # BLD AUTO: 7.57 10*3/MM3 (ref 3.4–10.8)

## 2025-05-20 PROCEDURE — 85027 COMPLETE CBC AUTOMATED: CPT | Performed by: THORACIC SURGERY (CARDIOTHORACIC VASCULAR SURGERY)

## 2025-05-20 PROCEDURE — 71045 X-RAY EXAM CHEST 1 VIEW: CPT

## 2025-05-20 PROCEDURE — 99239 HOSP IP/OBS DSCHRG MGMT >30: CPT | Performed by: NURSE PRACTITIONER

## 2025-05-20 PROCEDURE — 80048 BASIC METABOLIC PNL TOTAL CA: CPT | Performed by: THORACIC SURGERY (CARDIOTHORACIC VASCULAR SURGERY)

## 2025-05-20 PROCEDURE — 82948 REAGENT STRIP/BLOOD GLUCOSE: CPT

## 2025-05-20 PROCEDURE — 84132 ASSAY OF SERUM POTASSIUM: CPT | Performed by: THORACIC SURGERY (CARDIOTHORACIC VASCULAR SURGERY)

## 2025-05-20 RX ORDER — BISACODYL 10 MG
10 SUPPOSITORY, RECTAL RECTAL ONCE
Status: COMPLETED | OUTPATIENT
Start: 2025-05-20 | End: 2025-05-20

## 2025-05-20 RX ORDER — AMLODIPINE BESYLATE 5 MG/1
5 TABLET ORAL
Qty: 30 TABLET | Refills: 11 | Status: SHIPPED | OUTPATIENT
Start: 2025-05-21

## 2025-05-20 RX ORDER — AMOXICILLIN 250 MG
2 CAPSULE ORAL 2 TIMES DAILY
Qty: 60 TABLET | Refills: 1 | Status: SHIPPED | OUTPATIENT
Start: 2025-05-20

## 2025-05-20 RX ORDER — POTASSIUM CHLORIDE 1500 MG/1
20 TABLET, EXTENDED RELEASE ORAL ONCE
Status: COMPLETED | OUTPATIENT
Start: 2025-05-20 | End: 2025-05-20

## 2025-05-20 RX ORDER — COLCHICINE 0.6 MG/1
0.6 TABLET ORAL EVERY 12 HOURS SCHEDULED
Qty: 60 TABLET | Refills: 0 | Status: SHIPPED | OUTPATIENT
Start: 2025-05-20

## 2025-05-20 RX ORDER — HYDROCODONE BITARTRATE AND ACETAMINOPHEN 5; 325 MG/1; MG/1
1 TABLET ORAL EVERY 4 HOURS PRN
Qty: 42 TABLET | Refills: 0 | Status: SHIPPED | OUTPATIENT
Start: 2025-05-20 | End: 2025-05-27

## 2025-05-20 RX ORDER — COLCHICINE 0.6 MG/1
0.6 TABLET ORAL EVERY 12 HOURS SCHEDULED
Qty: 14 TABLET | Refills: 0 | Status: SHIPPED | OUTPATIENT
Start: 2025-05-20 | End: 2025-05-20

## 2025-05-20 RX ORDER — GABAPENTIN 100 MG/1
200 CAPSULE ORAL EVERY 8 HOURS SCHEDULED
Qty: 42 CAPSULE | Refills: 0 | Status: SHIPPED | OUTPATIENT
Start: 2025-05-20 | End: 2025-05-27

## 2025-05-20 RX ORDER — NEBIVOLOL 5 MG/1
5 TABLET ORAL
Qty: 30 TABLET | Refills: 11 | Status: SHIPPED | OUTPATIENT
Start: 2025-05-21

## 2025-05-20 RX ORDER — FERROUS SULFATE 325(65) MG
325 TABLET ORAL
Qty: 30 TABLET | Refills: 11 | Status: SHIPPED | OUTPATIENT
Start: 2025-05-21 | End: 2025-05-30

## 2025-05-20 RX ADMIN — NEBIVOLOL 5 MG: 5 TABLET ORAL at 08:34

## 2025-05-20 RX ADMIN — GABAPENTIN 200 MG: 100 CAPSULE ORAL at 06:00

## 2025-05-20 RX ADMIN — GABAPENTIN 200 MG: 100 CAPSULE ORAL at 13:31

## 2025-05-20 RX ADMIN — POTASSIUM CHLORIDE 20 MEQ: 1500 TABLET, EXTENDED RELEASE ORAL at 08:34

## 2025-05-20 RX ADMIN — POLYETHYLENE GLYCOL 3350 17 G: 17 POWDER, FOR SOLUTION ORAL at 08:35

## 2025-05-20 RX ADMIN — COLCHICINE 0.6 MG: 0.6 TABLET ORAL at 08:34

## 2025-05-20 RX ADMIN — Medication 1 TABLET: at 08:34

## 2025-05-20 RX ADMIN — AMLODIPINE BESYLATE 5 MG: 5 TABLET ORAL at 08:34

## 2025-05-20 RX ADMIN — DOCUSATE SODIUM 100 MG: 100 CAPSULE, LIQUID FILLED ORAL at 08:35

## 2025-05-20 RX ADMIN — ASPIRIN 81 MG: 81 TABLET, COATED ORAL at 08:34

## 2025-05-20 RX ADMIN — HYDROCODONE BITARTRATE AND ACETAMINOPHEN 2 TABLET: 5; 325 TABLET ORAL at 06:00

## 2025-05-20 RX ADMIN — POTASSIUM CHLORIDE 20 MEQ: 1500 TABLET, EXTENDED RELEASE ORAL at 06:00

## 2025-05-20 RX ADMIN — MUPIROCIN 1 APPLICATION: 20 OINTMENT TOPICAL at 08:35

## 2025-05-20 RX ADMIN — PANTOPRAZOLE SODIUM 40 MG: 40 TABLET, DELAYED RELEASE ORAL at 06:00

## 2025-05-20 RX ADMIN — SENNOSIDES AND DOCUSATE SODIUM 2 TABLET: 50; 8.6 TABLET ORAL at 08:34

## 2025-05-20 RX ADMIN — FERROUS SULFATE TAB 325 MG (65 MG ELEMENTAL FE) 325 MG: 325 (65 FE) TAB at 08:34

## 2025-05-20 RX ADMIN — BISACODYL 10 MG: 10 SUPPOSITORY RECTAL at 11:40

## 2025-05-20 NOTE — CASE MANAGEMENT/SOCIAL WORK
Continued Stay Note  [facility]     Patient Name: Niranjan Peacock  MRN: [id number]  Today's Date: 5/20/2025    Admit Date: 5/14/2025    Plan: Home w/ [facility]; [employer] to provide rolling walker   Discharge Plan       Row Name 05/20/25 1143       Plan    Plan Home w/ [facility]; [employer] to provide rolling walker                   Discharge Codes    No documentation.                 Expected Discharge Date and Time       Expected Discharge Date Expected Discharge Time    May 20, 2025               Louise Nix RN

## 2025-05-20 NOTE — PROGRESS NOTES
Start PACC Note    Home Health Referral    Evaluated patient and on Home Care and services available. Patient offered choice of available HHC and agreeable to SN services with [facility].    Isolation Precautions: No active isolations    START PATIENT REGISTRATION INFORMATION  Order Information  Order Signing Physician: Dr. James Juarez  Service Ordered RN?: Yes  Service Ordered PT?: No  Service Ordered OT?: No  Service Ordered ST?: No  Service Ordered MSW?: No  Service Ordered HHA?: No  Following Physician: Dr. James Juarez  Following Physician Phone: [phone]  Overseeing Physician: Dr. James Juarez  (Required for Residents Only)  Agreeable to Follow ? Yes  Date/Time of Call 05/20/25 10:34 EDT, Spoke with: Written order in [facility] chart    Care Coordination  Same Day SOC?: No  Primary Care Physician: Provider, No Known  Primary Care Physician Phone: [phone]  Primary Care Physician Address: [facility] / [place] [address]  Visit Instructions:   Service Discharge Location Type: Home  Service Facility Name:   Service Floor Facility:   Service Room No:     Demographics  Patient Last Name: Ayush  Patient First Name: Niranjan  Language/Communication Barrier: no  Service Address: [address]  Service City: [facility] State: [place]  Service Zip: [address]  Service Home Phone: [phone]  Other Phone Numbers:   Telephone Information:   Mobile [phone]     Emergency Contact:   Extended Emergency Contact Information  Primary Emergency Contact: Nolvia Peacock  Address: [address] [place] [address] of [facility]  Home Phone: [phone]  Mobile Phone: [phone]  Relation: Spouse  Hearing or visual needs: None  Other needs: None  Preferred language: English   needed? No    Admission Information  Admit Date: 5/14/2025  Patient Status at Discharge: Inpatient  Admitting Diagnosis: Coronary artery disease involving native coronary artery of  native heart without angina pectoris [I25.10]  CAD (coronary artery disease) [I25.10]    Caregiver Information  Caregiver First Name:   Caregiver Last Name:   Caregiver Relationship to Patient:   Caregiver Phone Number:   Caregiver Notes:     HITECH  [employer]  No      END PATIENT REGISTRATION INFORMATION    Start PACC Summary    Additional Comments:     END PACC Summary    Discharge Date: Pending    Referral Source: [facility]    Signed By: Meme Coleman RN, 5/20/2025, 10:34 EDT     Date/Time: 05/20/25 10:34 EDT    End PACC Note

## 2025-05-20 NOTE — DISCHARGE SUMMARY
Patient Name: Niranjan Peacock  :1960  65 y.o.    Date of Admit: 2025  Date of Discharge:  2025    Discharge Diagnosis:  1.Coronary artery disease S/P CABG  2. Pericarditis  3. Essential HTN  4. Dyslipidemia        Hospital Course:   The patient is a 65-year-old male with past medical history of coronary disease with stenting to the LAD in 2017.  Has a history of dyslipidemia with myalgias on statin currently on [facility] outpatient and hypertension.  He was previously followed by Dr. Mancilla.  He had noted some shortness of breath with stairs.  We scheduled him for a right and left heart cath.  He was noted to have multivessel CAD with a normal left main, 85% distal LAD, patent stent in the proximal to mid LAD, ostial circumflex 90%, right coronary mid 70% proximal 3040% and he was referred for CABG.  His blood pressures were elevated he has multiple drug intolerances to ACE inhibitor's angiotensin receptor blockers, nitrates and calcium channel blockers as well as thiazide diuretics.  He does tolerate Nebivolol.  However his blood pressures were elevated before his surgery his symptom which she correlated to an allergy was a rash on his index finger and peeling of the skin we ended up putting him back on the amlodipine and his blood pressures been better.  He had bilateral SULLY to the LAD and KI to the first diagonal, vein graft to the right coronary artery, vein graft to the PDA and vein graft to the OM1.  He had some ST elevation in pericardial rub postop consistent with pericarditis.  We put him on colchicine.  He had a normal postoperative course no arrhythmia as he has been doing well.  He is getting a SUNSHINE drain removed today and the plan is for him to be discharged home with home health.  He is george follow-up with Shirlene Barber in a week and Dr. Srivastava in a month. He did an overnight oxymetry and does not need oxygen.    Procedures Performed  Procedure(s):  BRANDON STERNOTOMY CORONARY ARTERY  BYPASS GRAFT TIMES 5 USING LEFT AND RIGHT INTERNAL MAMMARY ARTERIES AND LEFT GREATER SAPHENOUS VEIN GRAFT PER ENDOSCOPIC VEIN HARVESTING AND PRP       Consults       Date and Time Order Name Status Description    5/16/2025 11:07 AM Inpatient Intensivist Consult Completed             Pertinent Test Results:   Results from last 7 days   Lab Units 05/20/25  0227 05/16/25  1122 05/14/25  1422   SODIUM mmol/L 137   < > 136   POTASSIUM mmol/L 3.8   < > 3.8   CHLORIDE mmol/L 104   < > 105   CO2 mmol/L 23.0   < > 25.5   BUN mg/dL 19   < > 9   CREATININE mg/dL 0.76   < > 0.92   CALCIUM mg/dL 8.5*   < > 8.8   BILIRUBIN mg/dL  --   --  0.4   ALK PHOS U/L  --   --  83   ALT (SGPT) U/L  --   --  19   AST (SGOT) U/L  --   --  22   GLUCOSE mg/dL 105*   < > 100*    < > = values in this interval not displayed.         @LABRCNT(bnp)@  Results from last 7 days   Lab Units 05/20/25 0227   WBC 10*3/mm3 7.57   HEMOGLOBIN g/dL 10.2*   HEMATOCRIT % 30.8*   PLATELETS 10*3/mm3 241     Results from last 7 days   Lab Units 05/17/25 0247 05/16/25  1122 05/16/25  0948 05/16/25  0340 05/15/25  2006   INR  1.33* 1.44* 1.91*  --   --    APTT seconds  --  27.6  --  42.5* 33.1     Results from last 7 days   Lab Units 05/17/25 0247   MAGNESIUM mg/dL 2.4     Results from last 7 days   Lab Units 05/14/25  1422   CHOLESTEROL mg/dL 115   TRIGLYCERIDES mg/dL 146   HDL CHOL mg/dL 38*   LDL CHOL mg/dL 52       Condition on Discharge: stable    Discharge Medications     Discharge Medications        New Medications        Instructions Start Date   amLODIPine 5 MG tablet  Commonly known as: NORVASC   5 mg, Oral, Every 24 Hours Scheduled   Start Date: May 21, 2025     colchicine 0.6 MG tablet   0.6 mg, Oral, Every 12 Hours Scheduled      ferrous sulfate 325 (65 FE) MG tablet   325 mg, Oral, Daily With Breakfast   Start Date: May 21, 2025     gabapentin 100 MG capsule  Commonly known as: NEURONTIN   200 mg, Oral, Every 8 Hours Scheduled     "  HYDROcodone-acetaminophen 5-325 MG per tablet  Commonly known as: NORCO   1 tablet, Oral, Every 4 Hours PRN      sennosides-docusate 8.6-50 MG per tablet  Commonly known as: PERICOLACE   2 tablets, Oral, 2 Times Daily             Changes to Medications        Instructions Start Date   nebivolol 5 MG tablet  Commonly known as: BYSTOLIC  What changed:   medication strength  how much to take  when to take this   5 mg, Oral, Every 24 Hours Scheduled   Start Date: May 21, 2025            Continue These Medications        Instructions Start Date   aspirin 81 MG tablet   81 mg, Oral, Daily      B-D INSULIN SYRINGE 1CC/25GX1\" 25G X 1\" 1 ML misc  Generic drug: Insulin Syringe-Needle U-100   USE TO ADMINISTER B-12 INJECTIONS AS DIRECTED      cyanocobalamin 1000 MCG/ML injection   INJECT 1 MILLILITER INTO THE APPROPRIATE MUSCLE AS DIRECTED BY PRESCRIBER EVERY 14 DAYS      fexofenadine 60 MG tablet  Commonly known as: ALLEGRA   60 mg, Oral, Daily PRN      hydrocortisone 0.5 % cream   As Needed      LEQVIO SC   Inject  under the skin into the appropriate area as directed.      MAGNESIUM PO   Take  by mouth. Every other day      multivitamin tablet tablet  Commonly known as: THERAGRAN   1 tablet, Daily      nitroglycerin 0.4 MG SL tablet  Commonly known as: NITROSTAT   0.4 mg, Sublingual, Every 5 Minutes PRN, Take no more than 3 doses in 15 minutes.      tamsulosin 0.4 MG capsule 24 hr capsule  Commonly known as: FLOMAX   0.4 mg, Oral, Daily      triamcinolone 0.025 % cream  Commonly known as: KENALOG   1 Application, Topical, 2 Times Daily PRN      vitamin D3 125 MCG (5000 UT) capsule capsule   5,000 Units, Daily               Discharge Diet:     Activity at Discharge:     Discharge disposition: home    Follow-up Appointments  Future Appointments   Date Time Provider Department Center   7/23/2025  9:00 AM REFERRED INJECTION CHAIR [facility]   8/6/2025  8:00 AM Willian Lara MD [facility] DR CASIMIRO WHITE   11/5/2025  " 9:20 AM Astrid Barber, G[id number]     Additional Instructions for the Follow-ups that You Need to Schedule       Ambulatory Referral to Cardiac Rehab   As directed      Ambulatory Referral to Home Health   As directed      Face to Face Visit Date: 5/20/2025   Follow-up provider for Plan of Care?: I will be treating the patient on an ongoing basis.  Please send me the Plan of Care for signature.   Follow-up provider: JR RAMBO TEMPLE [[id number]]   Reason/Clinical Findings: post op open heart   Describe mobility limitations that make leaving home difficult: weakness   Nursing/Therapeutic Services Requested: Skilled Nursing   Skilled nursing orders: Post CABG care   Frequency: 1 Week 1        Call MD With Problems / Concerns   As directed      Instructions:  Call office at [phone] for any drainage, increased redness, or fever over 100.5    Order Comments: Instructions:  Call office at [phone] for any drainage, increased redness, or fever over 100.5         Discharge Follow-up with PCP   As directed       Currently Documented PCP:    Provider, No Known    PCP Phone Number:    [phone]     Follow Up Details: in 1 week        Discharge Follow-up with Specialty: Cardiologist APRN/PA; 1 Week   As directed      Specialty: Cardiologist APRN/PA   Follow Up: 1 Week   Follow Up Details: bring all prescription bottles to appointment, call for appointment        Discharge Follow-up with Specified Provider: Cardiologist; 1 Month   As directed      To: Cardiologist   Follow Up: 1 Month   Follow Up Details: call for appointment, bring all medication bottles to appointment        Discharge Follow-up with Specified Provider: Dr. Temple; 1 Month   As directed      To: Dr. Temple   Follow Up: 1 Month   Follow Up Details: 4-6 weeks, bring all current medications to appointment                Test Results Pending at Discharge  Pending Labs       Order Current Status    Potassium Collected (05/20/25 0937)              GREG Dent, [facility]  05/20/25  09:39 EDT    Time: Discharge 45 min  Electronically signed by GREG Dent, 05/20/25, 9:36 AM EDT.

## 2025-05-20 NOTE — PLAN OF CARE
Problem: Adult Inpatient Plan of Care  Goal: Plan of Care Review  Outcome: Progressing  Goal: Patient-Specific Goal (Individualized)  Outcome: Progressing  Goal: Absence of Hospital-Acquired Illness or Injury  Outcome: Progressing  Intervention: Identify and Manage Fall Risk  Recent Flowsheet Documentation  Taken 5/20/2025 0228 by Amada Sanders RN  Safety Promotion/Fall Prevention:   activity supervised   toileting scheduled   safety round/check completed   room organization consistent   nonskid shoes/slippers when out of bed   muscle strengthening facilitated   lighting adjusted   fall prevention program maintained   clutter free environment maintained  Taken 5/19/2025 2115 by Amada Sanders RN  Safety Promotion/Fall Prevention:   activity supervised   toileting scheduled   safety round/check completed   room organization consistent   nonskid shoes/slippers when out of bed   muscle strengthening facilitated   lighting adjusted   fall prevention program maintained   clutter free environment maintained  Intervention: Prevent Skin Injury  Recent Flowsheet Documentation  Taken 5/20/2025 0228 by Amada Sanders RN  Body Position:   turned   position changed independently  Taken 5/19/2025 2115 by Amada Sanders RN  Body Position:   turned   position changed independently  Intervention: Prevent and Manage VTE (Venous Thromboembolism) Risk  Recent Flowsheet Documentation  Taken 5/19/2025 2115 by Amada Sanders RN  VTE Prevention/Management: compression stockings on  Intervention: Prevent Infection  Recent Flowsheet Documentation  Taken 5/20/2025 0228 by Amaad Sanders RN  Infection Prevention:   single patient room provided   rest/sleep promoted   personal protective equipment utilized   hand hygiene promoted   equipment surfaces disinfected  Taken 5/19/2025 2115 by Amada Sanders RN  Infection Prevention:   single patient room provided   rest/sleep promoted   personal protective equipment utilized   hand hygiene  promoted   equipment surfaces disinfected  Goal: Optimal Comfort and Wellbeing  Outcome: Progressing  Intervention: Provide Person-Centered Care  Recent Flowsheet Documentation  Taken 5/19/2025 2115 by Amada Sanders RN  Trust Relationship/Rapport:   choices provided   care explained  Goal: Readiness for Transition of Care  Outcome: Progressing     Problem: Cardiac Catheterization (Diagnostic/Interventional)  Goal: Absence of Bleeding  Outcome: Progressing  Goal: Absence of Contrast-Induced Injury  Outcome: Progressing  Goal: Stable Heart Rate and Rhythm  Outcome: Progressing  Goal: Absence of Embolism Signs and Symptoms  Outcome: Progressing  Intervention: Prevent or Manage Embolism  Recent Flowsheet Documentation  Taken 5/19/2025 2115 by Amada Sanders RN  VTE Prevention/Management: compression stockings on  Goal: Anesthesia/Sedation Recovery  Outcome: Progressing  Intervention: Optimize Anesthesia Recovery  Recent Flowsheet Documentation  Taken 5/20/2025 0228 by Amada Sanders RN  Safety Promotion/Fall Prevention:   activity supervised   toileting scheduled   safety round/check completed   room organization consistent   nonskid shoes/slippers when out of bed   muscle strengthening facilitated   lighting adjusted   fall prevention program maintained   clutter free environment maintained  Taken 5/19/2025 2115 by Amada Sanders RN  Safety Promotion/Fall Prevention:   activity supervised   toileting scheduled   safety round/check completed   room organization consistent   nonskid shoes/slippers when out of bed   muscle strengthening facilitated   lighting adjusted   fall prevention program maintained   clutter free environment maintained  Taken 5/19/2025 2100 by Amada Sanders, RN  Administration (IS): self-administered  Incentive Spirometer Predicted Level (mL): 2500  Goal: Optimal Pain Control and Function  Outcome: Progressing  Goal: Absence of Vascular Access Complication  Outcome: Progressing  Intervention:  Prevent and Manage Access Complications  Recent Flowsheet Documentation  Taken 5/20/2025 0228 by Amada Sanders RN  Activity Management: activity encouraged  Head of Bed (HOB) Positioning: HOB at 20-30 degrees  Taken 5/19/2025 2115 by Amada Sanders RN  Activity Management: activity encouraged  Head of Bed (HOB) Positioning: HOB at 20-30 degrees     Problem: Comorbidity Management  Goal: Blood Pressure in Desired Range  Outcome: Progressing  Intervention: Maintain Blood Pressure Management  Recent Flowsheet Documentation  Taken 5/20/2025 0228 by Amada Sanders RN  Medication Review/Management: medications reviewed  Taken 5/19/2025 2115 by Amada Sanders RN  Medication Review/Management: medications reviewed     Problem: Fall Injury Risk  Goal: Absence of Fall and Fall-Related Injury  Outcome: Progressing  Intervention: Identify and Manage Contributors  Recent Flowsheet Documentation  Taken 5/20/2025 0228 by Amada Sanders RN  Medication Review/Management: medications reviewed  Taken 5/19/2025 2115 by Amada Sanders RN  Medication Review/Management: medications reviewed  Intervention: Promote Injury-Free Environment  Recent Flowsheet Documentation  Taken 5/20/2025 0228 by Amada Sanders RN  Safety Promotion/Fall Prevention:   activity supervised   toileting scheduled   safety round/check completed   room organization consistent   nonskid shoes/slippers when out of bed   muscle strengthening facilitated   lighting adjusted   fall prevention program maintained   clutter free environment maintained  Taken 5/19/2025 2115 by Amada Sanders RN  Safety Promotion/Fall Prevention:   activity supervised   toileting scheduled   safety round/check completed   room organization consistent   nonskid shoes/slippers when out of bed   muscle strengthening facilitated   lighting adjusted   fall prevention program maintained   clutter free environment maintained     Problem: Skin Injury Risk Increased  Goal: Skin Health and  Integrity  Outcome: Progressing  Intervention: Optimize Skin Protection  Recent Flowsheet Documentation  Taken 5/20/2025 0228 by Amada Sanders RN  Activity Management: activity encouraged  Head of Bed (HOB) Positioning: HOB at 20-30 degrees  Taken 5/19/2025 2115 by Amada Sanders RN  Activity Management: activity encouraged  Pressure Reduction Techniques: frequent weight shift encouraged  Head of Bed (HOB) Positioning: HOB at 20-30 degrees  Pressure Reduction Devices: alternating pressure pump (GIOVANNI)     Problem: Cardiovascular Surgery  Goal: Improved Activity Tolerance  Outcome: Progressing  Intervention: Optimize Tolerance for Activity  Recent Flowsheet Documentation  Taken 5/19/2025 2115 by Amada Sanders RN  Environmental Support: calm environment promoted  Goal: Optimal Coping with Heart Surgery  Outcome: Progressing  Intervention: Support Psychosocial Response to Surgery  Recent Flowsheet Documentation  Taken 5/19/2025 2115 by Amada Sadners RN  Supportive Measures: active listening utilized  Goal: Absence of Bleeding  Outcome: Progressing  Goal: Effective Bowel Elimination  Outcome: Progressing  Goal: Effective Cardiac Function  Outcome: Progressing  Goal: Optimal Cerebral Tissue Perfusion  Outcome: Progressing  Intervention: Protect and Optimize Cerebral Perfusion  Recent Flowsheet Documentation  Taken 5/20/2025 0228 by Amada Sanders RN  Head of Bed (HOB) Positioning: HOB at 20-30 degrees  Taken 5/19/2025 2115 by Amada Sanders RN  Head of Bed (HOB) Positioning: HOB at 20-30 degrees  Goal: Fluid and Electrolyte Balance  Outcome: Progressing  Goal: Blood Glucose Level Within Target Range  Outcome: Progressing  Goal: Absence of Infection Signs and Symptoms  Outcome: Progressing  Intervention: Prevent or Manage Infection  Recent Flowsheet Documentation  Taken 5/20/2025 0228 by Amada Sanders RN  Infection Prevention:   single patient room provided   rest/sleep promoted   personal protective equipment  utilized   hand hygiene promoted   equipment surfaces disinfected  Taken 5/19/2025 2115 by Amada Sanders RN  Infection Prevention:   single patient room provided   rest/sleep promoted   personal protective equipment utilized   hand hygiene promoted   equipment surfaces disinfected  Goal: Anesthesia/Sedation Recovery  Outcome: Progressing  Intervention: Optimize Anesthesia Recovery  Recent Flowsheet Documentation  Taken 5/20/2025 0228 by Amada Sanders RN  Safety Promotion/Fall Prevention:   activity supervised   toileting scheduled   safety round/check completed   room organization consistent   nonskid shoes/slippers when out of bed   muscle strengthening facilitated   lighting adjusted   fall prevention program maintained   clutter free environment maintained  Taken 5/19/2025 2115 by Amada Sanders RN  Safety Promotion/Fall Prevention:   activity supervised   toileting scheduled   safety round/check completed   room organization consistent   nonskid shoes/slippers when out of bed   muscle strengthening facilitated   lighting adjusted   fall prevention program maintained   clutter free environment maintained  Taken 5/19/2025 2100 by Amada Sanders RN  Administration (IS): self-administered  Incentive Spirometer Predicted Level (mL): 2500  Goal: Acceptable Pain Control  Outcome: Progressing  Goal: Nausea and Vomiting Relief  Outcome: Progressing  Goal: Effective Urinary Elimination  Outcome: Progressing  Goal: Effective Oxygenation and Ventilation  Outcome: Progressing  Intervention: Promote Airway Secretion Clearance  Recent Flowsheet Documentation  Taken 5/19/2025 2115 by Amada Sanders RN  Cough And Deep Breathing: done independently per patient  Taken 5/19/2025 2100 by Amada Sanders RN  Administration (IS): self-administered  Incentive Spirometer Predicted Level (mL): 2500   Goal Outcome Evaluation:

## 2025-05-20 NOTE — PROGRESS NOTES
[facility] Clinical Pharmacy Services: Patient Counseling Consult    Niranjan Peacock has been counseled on their amlodipine, nebivolol, colchicine, ferrous sulfate, gabapentin, hydrocodone-acetaminophen, pericolace. Counseling points included the following:    Explained indication of each medication, and patient's need for these medications.  Went over dosing and frequency of each medication. (See discharge summary)  Discussed any special administration, storage, or monitoring instructions with medications.   Discussed all important drug interactions, including over-the-counter medications and supplements. (Avoiding over the counter NSAIDs such as ibuprofen, aleve, naproxen, etc; keeping utilization of tylenol to less than 4 g (4,000 mg) a day including tylenol in Norco; avoiding alcohol with use of norco and gabapentin due to increase drowsiness)  Explained possible side effects for each medication. (Drowsiness with gabapentin, drowsiness with hydrocodone and risk of respiratory depression; dark stools and GI upset with ferrous sulfate; monitoring blood pressure and keeping a daily log; constipation risk with hydrocodone use)  Instructed the patient not to begin or discontinue any medications without informing his/her physician/pharmacist.  Addressed any specific questions the patient had in regards to their medication: patient is to continue to take a baby aspirin a day.    Patient expressed understanding and had no further questions.      Porsche Ureña, PharmD  Clinical Pharmacist

## 2025-05-20 NOTE — PROGRESS NOTES
" LOS: 6 days   Patient Care Team:  Provider, No Known as PCP - Astrid Rodriguez APRN as Referring Physician (Cardiology)    Chief Complaint: post op    Subjective  Feeling better. Pain improved    Vital Signs  Temp:  [97.7 °F (36.5 °C)-98.3 °F (36.8 °C)] 97.7 °F (36.5 °C)  Heart Rate:  [68-79] 76  Resp:  [16-17] 16  BP: (117-131)/(57-69) 131/68  Body mass index is 27.39 kg/m².    Intake/Output Summary (Last 24 hours) at 5/20/2025 0808  Last data filed at 5/20/2025 0553  Gross per 24 hour   Intake 480 ml   Output 458 ml   Net 22 ml     No intake/output data recorded.    Chest tube drainage last 8 hours         05/18/25  0607 05/19/25  0645 05/20/25  0553   Weight: 96.5 kg (212 lb 11.2 oz) 96.5 kg (212 lb 12.8 oz) 96.8 kg (213 lb 4.8 oz)         Objective:  Vital signs: (most recent): Blood pressure 122/64, pulse 64, temperature 98.5 °F (36.9 °C), temperature source Oral, resp. rate 18, height 188 cm (74\"), weight 96.8 kg (213 lb 4.8 oz), SpO2 94%.                    Results Review:        WBC WBC   Date Value Ref Range Status   05/20/2025 7.57 3.40 - 10.80 10*3/mm3 Final   05/19/2025 9.16 3.40 - 10.80 10*3/mm3 Final   05/18/2025 14.04 (H) 3.40 - 10.80 10*3/mm3 Final      HGB Hemoglobin   Date Value Ref Range Status   05/20/2025 10.2 (L) 13.0 - 17.7 g/dL Final   05/19/2025 10.4 (L) 13.0 - 17.7 g/dL Final   05/18/2025 10.7 (L) 13.0 - 17.7 g/dL Final      HCT Hematocrit   Date Value Ref Range Status   05/20/2025 30.8 (L) 37.5 - 51.0 % Final   05/19/2025 30.8 (L) 37.5 - 51.0 % Final   05/18/2025 33.2 (L) 37.5 - 51.0 % Final      Platelets Platelets   Date Value Ref Range Status   05/20/2025 241 140 - 450 10*3/mm3 Final   05/19/2025 195 140 - 450 10*3/mm3 Final   05/18/2025 229 140 - 450 10*3/mm3 Final        PT/INR:    No results found for: \"PROTIME\"  /  No results found for: \"INR\"      Sodium Sodium   Date Value Ref Range Status   05/20/2025 137 136 - 145 mmol/L Final   05/19/2025 137 136 - 145 mmol/L Final " "  05/18/2025 141 136 - 145 mmol/L Final      Potassium Potassium   Date Value Ref Range Status   05/20/2025 3.8 3.5 - 5.2 mmol/L Final   05/19/2025 4.1 3.5 - 5.2 mmol/L Final   05/19/2025 3.9 3.5 - 5.2 mmol/L Final   05/18/2025 4.1 3.5 - 5.2 mmol/L Final      Chloride Chloride   Date Value Ref Range Status   05/20/2025 104 98 - 107 mmol/L Final   05/19/2025 103 98 - 107 mmol/L Final   05/18/2025 105 98 - 107 mmol/L Final      Bicarbonate CO2   Date Value Ref Range Status   05/20/2025 23.0 22.0 - 29.0 mmol/L Final   05/19/2025 24.0 22.0 - 29.0 mmol/L Final   05/18/2025 27.1 22.0 - 29.0 mmol/L Final      BUN BUN   Date Value Ref Range Status   05/20/2025 19 8 - 23 mg/dL Final   05/19/2025 25 (H) 8 - 23 mg/dL Final   05/18/2025 25 (H) 8 - 23 mg/dL Final      Creatinine Creatinine   Date Value Ref Range Status   05/20/2025 0.76 0.76 - 1.27 mg/dL Final   05/19/2025 0.95 0.76 - 1.27 mg/dL Final   05/18/2025 0.99 0.76 - 1.27 mg/dL Final      Calcium Calcium   Date Value Ref Range Status   05/20/2025 8.5 (L) 8.6 - 10.5 mg/dL Final   05/19/2025 8.8 8.6 - 10.5 mg/dL Final   05/18/2025 9.1 8.6 - 10.5 mg/dL Final      Magnesium No results found for: \"MG\"         amLODIPine, 5 mg, Oral, Q24H  aspirin, 81 mg, Oral, Daily  colchicine, 0.6 mg, Oral, Q12H  docusate sodium, 100 mg, Oral, BID  enoxaparin sodium, 40 mg, Subcutaneous, Daily  ferrous sulfate, 325 mg, Oral, Daily With Breakfast  gabapentin, 200 mg, Oral, Q8H  Lidocaine, 1 patch, Transdermal, Q24H  multivitamin, 1 tablet, Oral, Daily  mupirocin, 1 Application, Each Nare, BID  nebivolol, 5 mg, Oral, Q24H  pantoprazole, 40 mg, Oral, QAM  polyethylene glycol, 17 g, Oral, Daily  potassium chloride, 20 mEq, Oral, Once  senna-docusate sodium, 2 tablet, Oral, BID  tamsulosin, 0.4 mg, Oral, Nightly      clevidipine, 2-32 mg/hr  norepinephrine, 0.02-0.2 mcg/kg/min              Coronary artery disease involving native coronary artery of native heart without angina pectoris    CAD " (coronary artery disease)      Assessment & Plan    - Severe multivessel CAD, s/p PCI (2017)- s/p urgent CABGx5 BIMA/LSVG- Larry 5/16/2025  - HTN  - HLD, intolerant of statins  - History of DVT/PE  - CHANTEL  -Post op anemia- expected acute blood loss    POD#4  Looks good this morning.  Up in the chair.  On room air--tolerating. Overnight reportedly without any oxygen needs, I cannot see where it has been scanned in yet.  CXR with decreased in pneumothoraces.  Remains in sinus-- tolerating beta blocker.  Discontinue en tube.  No BM yet, but he states he feels like he is getting close. Will give a suppository today.  Encourage pulmonary toilet.  Likely home with home health later today or in the morning.   Will place home health and pain rx orders.    Lashae Glover, GREG  05/20/25  08:08 EDT

## 2025-05-20 NOTE — CASE MANAGEMENT/SOCIAL WORK
Case Management Discharge Note      Final Note: Pt discharged home with [facility] and [employer] provided rolling walker.....Louise S/RN CM    Provided Post Acute Provider Quality & Resource List?: Yes  Post Acute Provider Quality and Resource List: Home Health  Delivered To: Patient  Method of Delivery: In person    Selected Continued Care - Discharged on 5/20/2025 Admission date: 5/14/2025 - Discharge disposition: Home-Health Care Svc      Destination    No services have been selected for the patient.                Durable Medical Equipment Coordination complete.      Service Provider Services Address Phone Fax Patient Preferred    [facility] [address] [place] [place] [address] [phone] --              Dialysis/Infusion    No services have been selected for the patient.                Home Medical Care Coordination complete.      Service Provider Services Address Phone Fax Patient Preferred    [facility] [address], [address] [address] [phone] --              Therapy    No services have been selected for the patient.                Community Resources    No services have been selected for the patient.                [facility]    No services have been selected for the patient.                    Transportation Services  Private: Car    Final Discharge Disposition Code: 06 - home with home health care

## 2025-05-20 NOTE — PROGRESS NOTES
[facility] Clinical Pharmacy Services: National Cardiology Data Registry (NCDR) Medication Review    Niranjan JONATHAN Peacock was counseled over medications for medical management of CABG x 5 prior to discharge. Pharmacy to review discharge medications to make sure appropriate medications have been prescribed.     Patient has been discharged on the following:  P2Y12 Inhibitor: Our providers do not prescribe P2Y12 agents for CABG patients without ACS due to high risk of hematoma and bleeding.   Aspirin 81 mg daily  High Intensity Statin: Statin was not prescribed due to patient intolerance to statins secondary to myalgia, patient on Leqvio and lipid panel currently within goals  Beta-blocker: Nebivolol 5 mg daily  LVEF 55%: ACE/ARB not indicated at this time     These medications meet the requirements for NCDR discharge medication for chest pain and MI.     Porsche Ureña, PharmD  Clinical Pharmacist

## 2025-05-21 ENCOUNTER — TELEPHONE (OUTPATIENT)
Dept: CARDIOLOGY | Age: 65
End: 2025-05-21

## 2025-05-21 NOTE — PAYOR COMM NOTE
"Roseanna Peacock R \"Nain\" (65 y.o. Male)                            ATTENTION; DISCHARGE CASE [id number]                          REPLY TO [facility] DEPT  [phone]            Date of Birth   1960    Social Security Number       Address   [address] [place] [address]    Home Phone   [phone]    MRN   [id number]       Worship   [employer]    Marital Status                               Admission Date   5/14/2025    Admission Type   Elective    Admitting Provider   Krupa Martinez MD    Attending Provider       Department, Room/Bed   [facility] CARDIOVASCULAR CARE UNIT, 2218/1       Discharge Date   5/20/2025    Discharge Disposition   Home-Health Care Svc    Discharge Destination                                 Attending Provider: (none)   Allergies: Lisinopril, Hydrochlorothiazide, Repatha [Evolocumab], Simvastatin, Amlodipine, Hydralazine, Valsartan    Isolation: None   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)   Code Status: Prior    Ht: 188 cm (74\")   Wt: 96.8 kg (213 lb 4.8 oz)    Admission Cmt: None   Principal Problem: Coronary artery disease involving native coronary artery of native heart without angina pectoris [I25.10]                   Active Insurance as of 5/14/2025       Primary Coverage       Payor Plan Insurance Group Employer/Plan Group    [employer] [facility] PPO [id number]       Payor Plan Address Payor Plan Phone Number Payor Plan Fax Number Effective Dates    [address] [phone]  1/1/2023 - None Entered    [place] [address]         Subscriber Name Subscriber Birth Date Member ID       ROSEANNA PEACOCK 1960 [id number]                     Emergency Contacts        (Rel.) Home Phone Work Phone Mobile Phone    Nolvia Peacock (Spouse) [phone] -- [phone]                 Discharge Summary        Irma Dos Santos APRN at 05/20/25 0936       Attestation signed by Krupa Martinez MD at 05/20/25 1048      I " have reviewed this documentation and agree.                      Patient Name: Niranjan Peacock  :1960  65 y.o.    Date of Admit: 2025  Date of Discharge:  2025    Discharge Diagnosis:  1.Coronary artery disease S/P CABG  2. Pericarditis  3. Essential HTN  4. Dyslipidemia        Hospital Course:   The patient is a 65-year-old male with past medical history of coronary disease with stenting to the LAD in 2017.  Has a history of dyslipidemia with myalgias on statin currently on Leqvio outpatient and hypertension.  He was previously followed by Dr. Mancilla.  He had noted some shortness of breath with stairs.  We scheduled him for a right and left heart cath.  He was noted to have multivessel CAD with a normal left main, 85% distal LAD, patent stent in the proximal to mid LAD, ostial circumflex 90%, right coronary mid 70% proximal 3040% and he was referred for CABG.  His blood pressures were elevated he has multiple drug intolerances to ACE inhibitor's angiotensin receptor blockers, nitrates and calcium channel blockers as well as thiazide diuretics.  He does tolerate Nebivolol.  However his blood pressures were elevated before his surgery his symptom which she correlated to an allergy was a rash on his index finger and peeling of the skin we ended up putting him back on the amlodipine and his blood pressures been better.  He had bilateral SULLY to the LAD and KI to the first diagonal, vein graft to the right coronary artery, vein graft to the PDA and vein graft to the OM1.  He had some ST elevation in pericardial rub postop consistent with pericarditis.  We put him on colchicine.  He had a normal postoperative course no arrhythmia as he has been doing well.  He is getting a SUNSHINE drain removed today and the plan is for him to be discharged home with home health.  He is george follow-up with Shirlene Barber in a week and Dr. Srivastava in a month. He did an overnight oxymetry and does not need  oxygen.    Procedures Performed  Procedure(s):  BRANDON STERNOTOMY CORONARY ARTERY BYPASS GRAFT TIMES 5 USING LEFT AND RIGHT INTERNAL MAMMARY ARTERIES AND LEFT GREATER SAPHENOUS VEIN GRAFT PER ENDOSCOPIC VEIN HARVESTING AND PRP       Consults       Date and Time Order Name Status Description    5/16/2025 11:07 AM Inpatient Intensivist Consult Completed             Pertinent Test Results:   Results from last 7 days   Lab Units 05/20/25  0227 05/16/25  1122 05/14/25  1422   SODIUM mmol/L 137   < > 136   POTASSIUM mmol/L 3.8   < > 3.8   CHLORIDE mmol/L 104   < > 105   CO2 mmol/L 23.0   < > 25.5   BUN mg/dL 19   < > 9   CREATININE mg/dL 0.76   < > 0.92   CALCIUM mg/dL 8.5*   < > 8.8   BILIRUBIN mg/dL  --   --  0.4   ALK PHOS U/L  --   --  83   ALT (SGPT) U/L  --   --  19   AST (SGOT) U/L  --   --  22   GLUCOSE mg/dL 105*   < > 100*    < > = values in this interval not displayed.         @LABRCNT(bnp)@  Results from last 7 days   Lab Units 05/20/25 0227   WBC 10*3/mm3 7.57   HEMOGLOBIN g/dL 10.2*   HEMATOCRIT % 30.8*   PLATELETS 10*3/mm3 241     Results from last 7 days   Lab Units 05/17/25 0247 05/16/25  1122 05/16/25  0948 05/16/25  0340 05/15/25  2006   INR  1.33* 1.44* 1.91*  --   --    APTT seconds  --  27.6  --  42.5* 33.1     Results from last 7 days   Lab Units 05/17/25 0247   MAGNESIUM mg/dL 2.4     Results from last 7 days   Lab Units 05/14/25  1422   CHOLESTEROL mg/dL 115   TRIGLYCERIDES mg/dL 146   HDL CHOL mg/dL 38*   LDL CHOL mg/dL 52       Condition on Discharge: stable    Discharge Medications     Discharge Medications        New Medications        Instructions Start Date   amLODIPine 5 MG tablet  Commonly known as: NORVASC   5 mg, Oral, Every 24 Hours Scheduled   Start Date: May 21, 2025     colchicine 0.6 MG tablet   0.6 mg, Oral, Every 12 Hours Scheduled      ferrous sulfate 325 (65 FE) MG tablet   325 mg, Oral, Daily With Breakfast   Start Date: May 21, 2025     gabapentin 100 MG capsule  Commonly  "known as: NEURONTIN   200 mg, Oral, Every 8 Hours Scheduled      HYDROcodone-acetaminophen 5-325 MG per tablet  Commonly known as: NORCO   1 tablet, Oral, Every 4 Hours PRN      sennosides-docusate 8.6-50 MG per tablet  Commonly known as: PERICOLACE   2 tablets, Oral, 2 Times Daily             Changes to Medications        Instructions Start Date   nebivolol 5 MG tablet  Commonly known as: BYSTOLIC  What changed:   medication strength  how much to take  when to take this   5 mg, Oral, Every 24 Hours Scheduled   Start Date: May 21, 2025            Continue These Medications        Instructions Start Date   aspirin 81 MG tablet   81 mg, Oral, Daily      B-D INSULIN SYRINGE 1CC/25GX1\" 25G X 1\" 1 ML misc  Generic drug: Insulin Syringe-Needle U-100   USE TO ADMINISTER B-12 INJECTIONS AS DIRECTED      cyanocobalamin 1000 MCG/ML injection   INJECT 1 MILLILITER INTO THE APPROPRIATE MUSCLE AS DIRECTED BY PRESCRIBER EVERY 14 DAYS      fexofenadine 60 MG tablet  Commonly known as: ALLEGRA   60 mg, Oral, Daily PRN      hydrocortisone 0.5 % cream   As Needed      LEQVIO SC   Inject  under the skin into the appropriate area as directed.      MAGNESIUM PO   Take  by mouth. Every other day      multivitamin tablet tablet  Commonly known as: THERAGRAN   1 tablet, Daily      nitroglycerin 0.4 MG SL tablet  Commonly known as: NITROSTAT   0.4 mg, Sublingual, Every 5 Minutes PRN, Take no more than 3 doses in 15 minutes.      tamsulosin 0.4 MG capsule 24 hr capsule  Commonly known as: FLOMAX   0.4 mg, Oral, Daily      triamcinolone 0.025 % cream  Commonly known as: KENALOG   1 Application, Topical, 2 Times Daily PRN      vitamin D3 125 MCG (5000 UT) capsule capsule   5,000 Units, Daily               Discharge Diet:     Activity at Discharge:     Discharge disposition: home    Follow-up Appointments  Future Appointments   Date Time Provider Department Center   7/23/2025  9:00 AM REFERRED INJECTION CHAIR [facility]   8/6/2025  8:00 " AM Willian Lara MD [facility]   11/5/2025  9:20 AM Astrid Barber, APRN [id number]     Additional Instructions for the Follow-ups that You Need to Schedule       Ambulatory Referral to Cardiac Rehab   As directed      Ambulatory Referral to Home Health   As directed      Face to Face Visit Date: 5/20/2025   Follow-up provider for Plan of Care?: I will be treating the patient on an ongoing basis.  Please send me the Plan of Care for signature.   Follow-up provider: JR RAMBO JUAREZ [[id number]]   Reason/Clinical Findings: post op open heart   Describe mobility limitations that make leaving home difficult: weakness   Nursing/Therapeutic Services Requested: Skilled Nursing   Skilled nursing orders: Post CABG care   Frequency: 1 Week 1        Call MD With Problems / Concerns   As directed      Instructions:  Call office at [phone] for any drainage, increased redness, or fever over 100.5    Order Comments: Instructions:  Call office at [phone] for any drainage, increased redness, or fever over 100.5         Discharge Follow-up with PCP   As directed       Currently Documented PCP:    Provider, No Known    PCP Phone Number:    [phone]     Follow Up Details: in 1 week        Discharge Follow-up with Specialty: Cardiologist APRN/PA; 1 Week   As directed      Specialty: Cardiologist APRN/PA   Follow Up: 1 Week   Follow Up Details: bring all prescription bottles to appointment, call for appointment        Discharge Follow-up with Specified Provider: Cardiologist; 1 Month   As directed      To: Cardiologist   Follow Up: 1 Month   Follow Up Details: call for appointment, bring all medication bottles to appointment        Discharge Follow-up with Specified Provider: Dr. Juarez; 1 Month   As directed      To: Dr. Juarez   Follow Up: 1 Month   Follow Up Details: 4-6 weeks, bring all current medications to appointment                Test Results Pending at Discharge  Pending Labs        Order Current Status    Potassium Collected (05/20/25 0937)             GREG Dent, [facility]  05/20/25  09:39 EDT    Time: Discharge 45 min  Electronically signed by GREG Dent, 05/20/25, 9:36 AM EDT.      Electronically signed by Krupa Yoder MD at 05/20/25 1048       Discharge Order (From admission, onward)       Start     Ordered    05/20/25 0931  Discharge patient  Once        Expected Discharge Date: 05/20/25   Discharge Disposition: Home-Health Care [facility]   Physician of Record for Attribution - Please select from Treatment Team: KRUPA YODER [[id number]]   Review needed by CMO to determine Physician of Record: No      Question Answer Comment   Physician of Record for Attribution - Please select from Treatment Team KRUPA YODER    Review needed by CMO to determine Physician of Record No        05/20/25 0934

## 2025-05-21 NOTE — OUTREACH NOTE
Prep Survey      Flowsheet Row Responses   [facility] facility patient discharged from? [place]   Is LACE score < 7 ? No   Eligibility Readm Mgmt   Discharge diagnosis BRANDON STERNOTOMY CORONARY ARTERY BYPASS GRAFT TIMES 5 USING LEFT AND RIGHT INTERNAL MAMMARY ARTERIES AND LEFT GREATER SAPHENOUS VEIN GRAFT PER ENDOSCOPIC VEIN HARVESTING AND PRP   Does the patient have one of the following disease processes/diagnoses(primary or secondary)? Cardiothoracic surgery   Does the patient have Home health ordered? Yes   What is the Home health agency?  [facility] [place]   Prep survey completed? Yes            Luiza STUART - Registered Nurse

## 2025-05-21 NOTE — TELEPHONE ENCOUNTER
"    Caller: Niranjan Peacock \"Nain\"    Relationship to patient: Self    Best call back number: [phone]    Patient is needing: HUB SCHEDULED FIRST AVAILABLE 1 WEEK HOSPITAL F/U APPOINTMENT WITH ADAM DE LUNA ON 06.09.25  . ADDED TO WAIT LIST.IF PATIENT NEEDS TO BE SEEN SOONER PLEASE CONTACT PATIENT TO RESCHEDULE. THANK YOU.          "

## 2025-05-30 ENCOUNTER — PATIENT ROUNDING (BHMG ONLY) (OUTPATIENT)
Dept: FAMILY MEDICINE CLINIC | Facility: CLINIC | Age: 65
End: 2025-05-30

## 2025-05-30 ENCOUNTER — OFFICE VISIT (OUTPATIENT)
Dept: FAMILY MEDICINE CLINIC | Facility: CLINIC | Age: 65
End: 2025-05-30
Payer: COMMERCIAL

## 2025-05-30 VITALS
SYSTOLIC BLOOD PRESSURE: 130 MMHG | WEIGHT: 205 LBS | BODY MASS INDEX: 26.31 KG/M2 | OXYGEN SATURATION: 96 % | DIASTOLIC BLOOD PRESSURE: 64 MMHG | HEIGHT: 74 IN | HEART RATE: 71 BPM | TEMPERATURE: 97.6 F

## 2025-05-30 DIAGNOSIS — I10 ESSENTIAL HYPERTENSION: Primary | ICD-10-CM

## 2025-05-30 DIAGNOSIS — I25.810 CORONARY ARTERY DISEASE INVOLVING CORONARY BYPASS GRAFT OF NATIVE HEART WITHOUT ANGINA PECTORIS: ICD-10-CM

## 2025-05-30 DIAGNOSIS — G47.33 OSA (OBSTRUCTIVE SLEEP APNEA): ICD-10-CM

## 2025-05-30 DIAGNOSIS — E78.2 MIXED HYPERLIPIDEMIA: ICD-10-CM

## 2025-05-30 DIAGNOSIS — H60.61 CHRONIC OTITIS EXTERNA OF RIGHT EAR, UNSPECIFIED TYPE: ICD-10-CM

## 2025-05-30 RX ORDER — CIPROFLOXACIN AND DEXAMETHASONE 3; 1 MG/ML; MG/ML
4 SUSPENSION/ DROPS AURICULAR (OTIC) 2 TIMES DAILY
Qty: 7.5 ML | Refills: 0 | Status: SHIPPED | OUTPATIENT
Start: 2025-05-30

## 2025-05-30 NOTE — PROGRESS NOTES
May 30, 2025        I am  with [facility]  [address] [address] [phone].    Tell me about your visit with us. What things went well?  It was very pleasant.        We're always looking for ways to make our patients' experiences even better. Do you have recommendations on ways we may improve?  no    Overall were you satisfied with your first visit to our practice? yes       I appreciate you taking the time to speak with me today. Is there anything else I can do for you? no      Thank you, and have a great day.

## 2025-05-30 NOTE — PROGRESS NOTES
"  Subjective   Niranjan Peacock is a 65 y.o. male who is here for   Chief Complaint   Patient presents with    Establish Care   .     History of Present Illness   History of Present Illness  Niranjan Peacock presents to the office to establish care.  Patient is a previous patient of Dr. Marr.  Patient has multiple medical problems including coronary artery disease status post bypass 2 weeks prior, hypertension, hyperlipidemia, sleep apnea.  He states he is recovering well.  Patient denies any chest pain with exertion.  Patient does complain of some chest pain over surgical incision.  Patient does have follow-up with cardiothoracic surgeon in 2 weeks.    Patient states his blood pressure has been well-controlled.  Patient also states he has been unable to tolerate cholesterol medications secondary to myalgias.    He states he is recovering well.  Patient denies any fever or chills or shortness of breath.  He states he has been doing some slow walks with his wife daily.    Review of Systems   Constitutional:  Negative for activity change and appetite change.   Respiratory:  Negative for cough and shortness of breath.    Cardiovascular:  Negative for chest pain and leg swelling.   Skin:  Negative for color change and rash.       Objective   Vitals:    05/30/25 0903   BP: 130/64   BP Location: Left arm   Patient Position: Sitting   Cuff Size: Large Adult   Pulse: 71   Temp: 97.6 °F (36.4 °C)   SpO2: 96%   Weight: 93 kg (205 lb)   Height: 188 cm (74.02\")      Physical Exam  Vitals and nursing note reviewed.   Constitutional:       Appearance: Normal appearance. He is normal weight.   HENT:      Head: Normocephalic and atraumatic.      Right Ear: Drainage present.      Ears:      Comments: Canal has debris and slight erythema  Cardiovascular:      Rate and Rhythm: Normal rate and regular rhythm.      Pulses: Normal pulses.      Heart sounds: Normal heart sounds. No murmur heard.  Pulmonary:      Effort: Pulmonary effort " is normal. No respiratory distress.      Breath sounds: Normal breath sounds. No wheezing.   Musculoskeletal:      Right lower leg: No edema.      Left lower leg: No edema.   Skin:     General: Skin is warm and dry.             Comments: Surgical incision: C/D/I. No evidence of any infection.    Neurological:      General: No focal deficit present.      Mental Status: He is alert.   Psychiatric:         Mood and Affect: Mood normal.         Thought Content: Thought content normal.       Physical Exam        Assessment & Plan   Assessment & Plan    Diagnoses and all orders for this visit:    1. Essential hypertension (Primary)  Stable.  Continue current medications.  On amlodipine 5 mg daily, Nebivolol 5 mg every 24 hours.    2. Mixed hyperlipidemia  Patient has been tried on several statins as well as Repatha in the past which has not been able to tolerate.    3. Coronary artery disease involving coronary bypass graft of native heart without angina pectoris  Stable.  Continue follow-up with cardiothoracic surgeon.  Likely would benefit from cardiac rehab.  4. CHANTEL (obstructive sleep apnea)  Stable  5. Chronic otitis externa of right ear, unspecified type  We will start Ciprodex 4 drops right ear twice daily.  -     ciprofloxacin-dexAMETHasone (Ciprodex) 0.3-0.1 % otic suspension; Administer 4 drops to the right ear 2 (Two) Times a Day.  Dispense: 7.5 mL; Refill: 0      Results      There are no Patient Instructions on file for this visit.    Medications Discontinued During This Encounter   Medication Reason    ferrous sulfate 325 (65 FE) MG tablet Patient Reported Not Taking        Return in about 3 months (around 8/30/2025) for Annual physical.       Tima Tucker MD  [place], [place]

## 2025-06-02 RX ORDER — AMOXICILLIN 500 MG/1
2000 CAPSULE ORAL SEE ADMIN INSTRUCTIONS
Qty: 12 CAPSULE | Refills: 0 | Status: SHIPPED | OUTPATIENT
Start: 2025-06-02

## 2025-06-06 NOTE — PROGRESS NOTES
RM:________     PCP: Tima Tucker MD                                     Last EKG :  2025    : 1960  AGE: 65 y.o.    REASON FOR VISIT: __________________________________________    ____________________________________________________________                                                       Wt Readings from Last 3 Encounters:   25 93 kg (205 lb)   25 96.8 kg (213 lb 4.8 oz)   25 99.8 kg (220 lb)      BP Readings from Last 3 Encounters:   25 130/64   25 120/54   25 130/72          WT: ____________ HT: ______ BP: __________ HR ______   02% _______                 Lipid Panel          2024    08:30 2025    08:46 2025    14:22   Lipid Panel   Total Cholesterol 185  145  115    Triglycerides 128  99  146    HDL Cholesterol 42  48  38    VLDL Cholesterol 23  18  25    LDL Cholesterol  120  79  52    LDL/HDL Ratio 2.80  1.61  1.26

## 2025-06-09 ENCOUNTER — OFFICE VISIT (OUTPATIENT)
Dept: CARDIOLOGY | Age: 65
End: 2025-06-09
Payer: COMMERCIAL

## 2025-06-09 VITALS
HEART RATE: 64 BPM | BODY MASS INDEX: 26.82 KG/M2 | HEIGHT: 74 IN | SYSTOLIC BLOOD PRESSURE: 112 MMHG | DIASTOLIC BLOOD PRESSURE: 60 MMHG | OXYGEN SATURATION: 96 % | WEIGHT: 209 LBS

## 2025-06-09 DIAGNOSIS — I25.10 CORONARY ARTERY DISEASE INVOLVING NATIVE CORONARY ARTERY OF NATIVE HEART WITHOUT ANGINA PECTORIS: ICD-10-CM

## 2025-06-09 DIAGNOSIS — I10 ESSENTIAL HYPERTENSION: Primary | ICD-10-CM

## 2025-06-09 RX ORDER — NEBIVOLOL 5 MG/1
5 TABLET ORAL EVERY EVENING
Qty: 90 TABLET | Refills: 3 | Status: SHIPPED | OUTPATIENT
Start: 2025-06-09 | End: 2025-06-13 | Stop reason: SDUPTHER

## 2025-06-09 NOTE — PROGRESS NOTES
CARDIOLOGY        Patient Name: Niranjan Peacock  :1960  Age: 65 y.o.  Primary Cardiologist: Tiago Srivastava MD  Encounter Provider:  GREG Troy    Date of Service: 25    CHIEF COMPLAINT / REASON FOR OFFICE VISIT     Hospital Follow Up Visit (1 week) and Coronary Artery Disease        HPI  Niranjan Peacock is a 65 y.o. male who presents today for hospital follow-up.    Pt has a  history significant for CAD status post PCI to the LAD in 2017, dyslipidemia with statin intolerance, HTN.    Medical record review reveals patient was hospitalized with discharge date 2025.  Presented to [facility] with dyspnea with climbing steps.  Patient was scheduled for right and left cardiac catheterization.  Patient was noted to have multivessel CAD on cardiac catheterization.  Patient subsequently went for a CABG.  Postoperatively patient had a pericardial rub with ST elevation concerning for pericarditis.  Patient was placed on colchicine.    Cardiac catheterization 2025.  Severe multivessel CAD involving the mid LAD, ostial circumflex, mid RCA.  Recommend CABG.    CABG x 5 vessels 2025.  Bilateral Marvin with LIMA to LAD, KI to D1.  Vein graft to RCA, vein graft to PDA, vein graft to OM1.    Patient reports that he has been doing fairly well since discharge from the hospital.  He does note that amlodipine is again splitting the skin on his fingers.  He reports that he has been walking daily.  He does have some generalized fatigue and still gets some shortness of breath with exertion.  Plans to start cardiac rehab in 4 weeks.    HISTORY OF PRESENT ILLNESS       Patient initially evaluated care with cardiology in  was found to have severe disease in the proximal LAD with YI placed.  Patient has had statin intolerance with multiple statins as well as Repatha causing nighttime cramping, add Zetia.  Patient has previously been on HCTZ for hypertension however he experienced  episodes of syncope and this was subsequently discontinued.    Cardiac catheterization 8/20/2019.  Normal left main.  LAD has a stent in the midportion which is patent.  Smooth 40-50% lesion in the mid distal LAD just beyond the stent.  Diagonals are free of disease.  Circumflex with 20-30% proximal disease, 20% mid circumflex disease.  RCA is a dominant vessel.  Diffuse 10% luminal irregularities in the proximal and midportion.  LARRY is high rising vessel with 40% origin lesion.    Echocardiogram 2/17/2020.  LVEF 61%.  Diastolic function is normal.  No evidence of PFO.  No valvular stenosis or insufficiency.    Myocardial perfusion stress test 7/22/2021.  Normal myocardial perfusion study without evidence of ischemia.    Horizontal ST segment depression 1.5 mm noted during stress, beginning at 6 minutes of stress.    Zio monitor 2/18/2022.  Unremarkable event recorder.  No evidence of atrial fibrillation.    Echocardiogram 2/17/2022.  LVEF 65%.  Mild hypertrophy.    Myocardial perfusion stress test 11/7/2023.  Normal myocardial perfusion study without evidence of ischemia.  Impressions consistent with a lower study.  Compared to prior study the study reveals no change.    Patient called the office 8/8/2024 with complaints of increased fatigue and generalized weakness.  Reports that he is also experiencing episodes of chest pressure.  Patient also endorsed increased dyspnea with exertion.  Patient was placed on schedule for appointment.    Patient was evaluated by me 8/12/2024 where he was experiencing intermittent episodes of chest pain, at that time amlodipine was added to his regimen.      The following portions of the patient's history were reviewed and updated as appropriate: allergies, current medications, past family history, past medical history, past social history, past surgical history and problem list.      VITAL SIGNS     Visit Vitals  /60 (BP Location: Left arm, Patient Position: Sitting, Cuff  "Size: Adult)   Pulse 64   Ht 188 cm (74.02\")   Wt 94.8 kg (209 lb)   SpO2 96%   BMI 26.82 kg/m²         Wt Readings from Last 3 Encounters:   06/09/25 94.8 kg (209 lb)   05/30/25 93 kg (205 lb)   05/20/25 96.8 kg (213 lb 4.8 oz)     Body mass index is 26.82 kg/m².      REVIEW OF SYSTEMS     Review of Systems   Constitutional: Positive for malaise/fatigue. Negative for chills, fever, weight gain and weight loss.   Cardiovascular:  Negative for leg swelling.   Respiratory:  Negative for cough, snoring and wheezing.    Hematologic/Lymphatic: Negative for bleeding problem. Does not bruise/bleed easily.   Skin:  Negative for color change.   Musculoskeletal:  Negative for falls, joint pain and myalgias.   Gastrointestinal:  Negative for melena.   Genitourinary:  Negative for hematuria.   Neurological:  Negative for excessive daytime sleepiness.   Psychiatric/Behavioral:  Negative for depression. The patient is not nervous/anxious.            PHYSICAL EXAMINATION     Constitutional:       Appearance: Normal appearance. Well-developed.   Eyes:      Conjunctiva/sclera: Conjunctivae normal.   Neck:      Vascular: No carotid bruit.   Pulmonary:      Effort: Pulmonary effort is normal.      Breath sounds: Normal breath sounds.   Cardiovascular:      Normal rate. Regular rhythm. Normal S1. Normal S2.       Murmurs: There is no murmur.      No gallop.  No click. No rub.      Comments: Midsternal incision is well approximated without signs of wound dehiscence, erythema, soft tissue swelling, drainage.    Edema:     Peripheral edema absent.   Musculoskeletal: Normal range of motion. Skin:     General: Skin is warm and dry.   Neurological:      Mental Status: Alert and oriented to person, place, and time.      GCS: GCS eye subscore is 4. GCS verbal subscore is 5. GCS motor subscore is 6.   Psychiatric:         Speech: Speech normal.         Behavior: Behavior normal.         Thought Content: Thought content normal.         Judgment: " Judgment normal.           REVIEWED DATA     Procedures        Lipid Panel          9/5/2024    08:30 1/17/2025    08:46 5/14/2025    14:22   Lipid Panel   Total Cholesterol 185  145  115    Triglycerides 128  99  146    HDL Cholesterol 42  48  38    VLDL Cholesterol 23  18  25    LDL Cholesterol  120  79  52    LDL/HDL Ratio 2.80  1.61  1.26        Lab Results   Component Value Date     05/20/2025     05/19/2025    K 4.0 05/20/2025    K 3.8 05/20/2025     05/20/2025     05/19/2025    CO2 23.0 05/20/2025    CO2 24.0 05/19/2025    BUN 19 05/20/2025    BUN 25 (H) 05/19/2025    CREATININE 0.76 05/20/2025    CREATININE 0.95 05/19/2025    EGFRIFNONA 85 10/13/2021    EGFRIFNONA 75 08/10/2021    EGFRIFAFRI 79 04/29/2021    EGFRIFAFRI 98 10/16/2020    GLUCOSE 105 (H) 05/20/2025    GLUCOSE 108 (H) 05/19/2025    CALCIUM 8.5 (L) 05/20/2025    CALCIUM 8.8 05/19/2025    ALBUMIN 4.1 05/17/2025    ALBUMIN 4.3 05/16/2025    BILITOT 0.4 05/14/2025    BILITOT 0.6 06/04/2024    AST 22 05/14/2025    AST 22 06/04/2024    ALT 19 05/14/2025    ALT 18 06/04/2024     Lab Results   Component Value Date    WBC 7.57 05/20/2025    WBC 9.16 05/19/2025    HGB 10.2 (L) 05/20/2025    HGB 10.4 (L) 05/19/2025    HCT 30.8 (L) 05/20/2025    HCT 30.8 (L) 05/19/2025    MCV 95.4 05/20/2025    MCV 96.0 05/19/2025     05/20/2025     05/19/2025     Lab Results   Component Value Date    PROBNP 182.0 05/14/2025    PROBNP 64.6 02/17/2020     Lab Results   Component Value Date    CKTOTAL 94 05/27/2022    TROPONINT <0.010 08/16/2022     Lab Results   Component Value Date    TSH 2.450 06/04/2024    TSH 2.240 05/30/2023             ASSESSMENT & PLAN     Diagnoses and all orders for this visit:    1. Essential hypertension (Primary)  Overview:  Nebivolol 5 mg/day    Assessment & Plan:  Hypertension is stable and controlled  Continue current treatment regimen.  Dietary sodium restriction.  Regular aerobic exercise.  Ambulatory  blood pressure monitoring.  Blood pressure will be reassessed in 3 months.    Orders:  -     nebivolol (BYSTOLIC) 5 MG tablet; Take 1 tablet by mouth Every Evening.  Dispense: 90 tablet; Refill: 3    2. Coronary artery disease involving native coronary artery of native heart without angina pectoris  Overview:  Cardiac catheterization 5/14/2025.  Severe multivessel CAD involving the mid LAD, ostial circumflex, mid RCA.  Recommend CABG.    CABG x 5 vessels 5/16/2025.  Bilateral Marvin with LIMA to LAD, KI to D1.  Vein graft to RCA, vein graft to PDA, vein graft to OM1.    Assessment & Plan:  Patient with some generalized fatigue postoperatively.  He is walking daily and plans to start cardiac rehab.  Patient experiencing splits to his skin on his fingers with amlodipine, this has happened in the past.  Blood pressure is controlled and I think he can discontinue it.  Continue the following:  Aspirin  Nebivolol    The patient presents today for follow up after open heart surgery.  The following were reviewed/discussed:    [x] Incision Check   [] ECG for rhythm review  [] Was patient placed on Amiodarone ? [] Continue  [] Discontinue  [] Was patient placed on Diuretics ?  [] Continue  [] Discontinue  [x] Medication Education Review for GDMT medications  [] Labs ordered for any new medications  [x] Cardiac Rehab/Home Health ordered  [x] Nutrition Education  [x] Exercise  [] Post op Echo ordered if valvular surgery  [] SBE antibiotic education for TAVR, AV surgery, aortic repair  [] Driving Release - Per CT surgery  [] Return to Work - Per CT surgery        Orders:  -     nebivolol (BYSTOLIC) 5 MG tablet; Take 1 tablet by mouth Every Evening.  Dispense: 90 tablet; Refill: 3          Future Appointments         Provider Department Center    6/25/2025 1:15 PM Jr James Juarez MD [facility] GROUP CARDIAC SURGERY [facility]    7/23/2025 9:00 AM REFERRED INJECTION CHAIR RAI [facility] INFUSION CBC [facility]     "8/18/2025 9:30 AM (Arrive by 9:15 AM) [facility] PRIMARY CARE [facility]    8/26/2025 9:30 AM (Arrive by 9:15 AM) Tima Tucker MD [facility] PRIMARY CARE [facility]    9/18/2025 11:30 AM (Arrive by 11:15 AM) Tiago Srivastava Jr., MD [facility] CARDIOLOGY [facility]    11/5/2025 9:20 AM (Arrive by 9:05 AM) Astrid Barber APRN [facility] CARDIOLOGY [facility]              MEDICATIONS         Discharge Medications            Accurate as of June 9, 2025  3:21 PM. If you have any questions, ask your nurse or doctor.                Changes to Medications        Instructions Start Date   nebivolol 5 MG tablet  Commonly known as: BYSTOLIC  What changed: when to take this  Changed by: GREG Ang   5 mg, Oral, Every Evening             Continue These Medications        Instructions Start Date   amoxicillin 500 MG capsule  Commonly known as: AMOXIL   2,000 mg, Oral, See Admin Instructions, 4 capsules 1 hour prior to ALL dental procedures      aspirin 81 MG tablet   81 mg, Oral, Daily      B-D INSULIN SYRINGE 1CC/25GX1\" 25G X 1\" 1 ML misc  Generic drug: Insulin Syringe-Needle U-100   USE TO ADMINISTER B-12 INJECTIONS AS DIRECTED      ciprofloxacin-dexAMETHasone 0.3-0.1 % otic suspension  Commonly known as: Ciprodex   4 drops, Right Ear, 2 Times Daily      colchicine 0.6 MG tablet   0.6 mg, Oral, Every 12 Hours Scheduled      cyanocobalamin 1000 MCG/ML injection   INJECT 1 MILLILITER INTO THE APPROPRIATE MUSCLE AS DIRECTED BY PRESCRIBER EVERY 14 DAYS      fexofenadine 60 MG tablet  Commonly known as: ALLEGRA   60 mg, Oral, Daily PRN      hydrocortisone 0.5 % cream   As Needed      LEQVIO SC   Inject  under the skin into the appropriate area as directed.      MAGNESIUM PO   Take  by mouth. Every other day      multivitamin tablet tablet  Commonly known as: THERAGRAN   1 tablet, Daily      nitroglycerin 0.4 MG SL tablet  Commonly known as: " NITROSTAT   0.4 mg, Sublingual, Every 5 Minutes PRN, Take no more than 3 doses in 15 minutes.      sennosides-docusate 8.6-50 MG per tablet  Commonly known as: PERICOLACE   2 tablets, Oral, 2 Times Daily      tamsulosin 0.4 MG capsule 24 hr capsule  Commonly known as: FLOMAX   0.4 mg, Oral, Daily      triamcinolone 0.025 % cream  Commonly known as: KENALOG   1 Application, Topical, 2 Times Daily PRN      vitamin D3 125 MCG (5000 UT) capsule capsule   5,000 Units, Daily             Stop These Medications      amLODIPine 5 MG tablet  Commonly known as: NORVASC  Stopped by: GREG Ang     gabapentin 100 MG capsule  Commonly known as: NEURONTIN  Stopped by: GREG Ang                  **Dragon Disclaimer:   Much of this encounter note is an electronic transcription/translation of spoken language to printed text. The electronic translation of spoken language may permit erroneous, or at times, nonsensical words or phrases to be inadvertently transcribed. Although I have reviewed the note for such errors, some may still exist.

## 2025-06-09 NOTE — ASSESSMENT & PLAN NOTE
Patient with some generalized fatigue postoperatively.  He is walking daily and plans to start cardiac rehab.  Patient experiencing splits to his skin on his fingers with amlodipine, this has happened in the past.  Blood pressure is controlled and I think he can discontinue it.  Continue the following:  Aspirin  Nebivolol    The patient presents today for follow up after open heart surgery.  The following were reviewed/discussed:    [x] Incision Check   [] ECG for rhythm review  [] Was patient placed on Amiodarone ? [] Continue  [] Discontinue  [] Was patient placed on Diuretics ?  [] Continue  [] Discontinue  [x] Medication Education Review for GDMT medications  [] Labs ordered for any new medications  [x] Cardiac Rehab/Home Health ordered  [x] Nutrition Education  [x] Exercise  [] Post op Echo ordered if valvular surgery  [] SBE antibiotic education for TAVR, AV surgery, aortic repair  [] Driving Release - Per CT surgery  [] Return to Work - Per CT surgery

## 2025-06-11 ENCOUNTER — READMISSION MANAGEMENT (OUTPATIENT)
Dept: CALL CENTER | Facility: HOSPITAL | Age: 65
End: 2025-06-11
Payer: COMMERCIAL

## 2025-06-11 NOTE — OUTREACH NOTE
"CT Surgery Week 3 Survey      Flowsheet Row Responses   [facility] patient discharged from? May   Does the patient have one of the following disease processes/diagnoses(primary or secondary)? Cardiothoracic surgery   Week 3 attempt successful? Yes   Call start time 1040   Call end time 1056   Discharge diagnosis BRANDON STERNOTOMY CORONARY ARTERY BYPASS GRAFT TIMES 5 USING LEFT AND RIGHT INTERNAL MAMMARY ARTERIES AND LEFT GREATER SAPHENOUS VEIN GRAFT PER ENDOSCOPIC VEIN HARVESTING AND PRP   Meds reviewed with patient/caregiver? Yes   Is the patient having any side effects they believe may be caused by any medication additions or changes? Yes   Side effects comments  Pt reports that his skin on fingers were \"splitting\" on norvasc, MD stopped med.   Is the patient taking all medications as directed (includes completed medication regime)? Yes   Medication comments Cardiology reported to stop Norvasc yesterday at f/u appt, per pt report. Patient reports this morning /70's, he decided to take his Norvasc r/t BP being high and his concern over getting a HA when BP gets to this level. This nurse recommended pt contact Cardiology to discuss BP elevation, his concerns, and other options MD may prefer, pt v/u.  The pt reports that he no longer takes the iron or gabapentin.   Does the patient have a primary care provider?  Yes   Does the patient have an appointment scheduled with their C/T surgeon? Yes   Has the patient kept scheduled appointments due by today? Yes   What is the Home health agency?  .   Has home health visited the patient within 72 hours of discharge? N/A   Comments PT c/o dizziness upon standing, since DC, he monitors BP daily. This nurse suggested taking BP upon standing, call MD if significant difference than his standing BP, pt v/u. Pt educated on safety precautions to avoid falling r/t dizziness, pt v/u. Pt reports that he has had no chest pain but has had some general soreness from " incision. Pt has no sign/symptoms of infection at his chest incision or LLE x3 incisions. Pt reports that he remains active throughout the day and tolerating po intake WNL. Pt denies issues voiding, CP, SOA or edema.   What is the patient's perception of their health status since discharge? Improving   Nursing interventions Nurse provided patient education   Is the patient/caregiver able to teach back normal signs of recovery? Nausea and lack of appetite   Nursing interventions Reassured on normal signs of recovery   Is the patient /caregiver able to teach back basic post-op care? Practice cough and deep breath every 4 hours while awake   Is the patient/caregiver able to teach back steps to recovery at home? Eat a well-balance diet   Is the patient /caregiver able to teach back the importance of cardiac rehab? Yes   Nursing interventions Referral made to cardiac rehab   If the patient is a current smoker, are they able to teach back resources for cessation? Not a smoker   Is the patient/caregiver able to teach back the hierarchy of who to call/visit for symptoms/problems? PCP, Specialist, Home health nurse, Urgent Care, ED, 911 Yes   Week 3 call completed? Yes   Graduated Yes   Call end time 1056            Ana PAZ - Registered Nurse

## 2025-06-13 DIAGNOSIS — I25.10 CORONARY ARTERY DISEASE INVOLVING NATIVE CORONARY ARTERY OF NATIVE HEART WITHOUT ANGINA PECTORIS: ICD-10-CM

## 2025-06-13 DIAGNOSIS — I10 ESSENTIAL HYPERTENSION: ICD-10-CM

## 2025-06-13 RX ORDER — NEBIVOLOL 10 MG/1
10 TABLET ORAL EVERY EVENING
Qty: 90 TABLET | Refills: 3 | Status: SHIPPED | OUTPATIENT
Start: 2025-06-13

## 2025-06-25 ENCOUNTER — OFFICE VISIT (OUTPATIENT)
Dept: CARDIAC SURGERY | Facility: CLINIC | Age: 65
End: 2025-06-25
Payer: COMMERCIAL

## 2025-06-25 VITALS
WEIGHT: 208 LBS | DIASTOLIC BLOOD PRESSURE: 54 MMHG | BODY MASS INDEX: 26.69 KG/M2 | SYSTOLIC BLOOD PRESSURE: 148 MMHG | HEART RATE: 58 BPM | TEMPERATURE: 97.3 F

## 2025-06-25 DIAGNOSIS — Z95.1 S/P CABG (CORONARY ARTERY BYPASS GRAFT): Primary | ICD-10-CM

## 2025-06-25 PROCEDURE — 99024 POSTOP FOLLOW-UP VISIT: CPT | Performed by: NURSE PRACTITIONER

## 2025-06-25 NOTE — PROGRESS NOTES
CARDIOVASCULAR SURGERY FOLLOW-UP PROGRESS NOTE  Chief Complaint: post op        HPI:   Dear Dr. Tucker, Tima Isidro MD and colleagues:    It was nice to see Niranjan Peacock in follow up 5 weeks  after surgery.  As you know, he is a 65 y.o. male with CAD who underwent s/p CABGx5 by Dr. Juarez on 5/16. He did well postoperatively and continues to do well. He comes in today complaining of nothing.  His activity level has been good.   He starts cardiac rehab tomorrow. From a surgical standpoint he is doing well.  He denies any popping or clicking with cough or deep inspiration.  Incisions healing well.    Physical Exam:         /54   Pulse 58   Temp 97.3 °F (36.3 °C)   Wt 94.3 kg (208 lb)   BMI 26.69 kg/m²   Heart:  regular rate and rhythm  Lungs:  clear to auscultation bilaterally  Extremities:  no edema  Incision(s):  sternum stable, incisions healed    Assessment/Plan:     S/P CABG. Overall, he is doing well.    No significant post-op complications    No heavy lifting > 10 pounds for 2 more weeks, no more than 50lbs for 3months.  OK to drive if not taking narcotic pain medicine  OK to begin cardiac rehab  Follow-up as scheduled with cardiology  Follow-up as scheduled with PCP        Thank you for allowing me to participate in the care of your   patient.  Regards,  GREG Mansfield

## 2025-07-02 ENCOUNTER — OFFICE VISIT (OUTPATIENT)
Dept: CARDIAC REHAB | Facility: HOSPITAL | Age: 65
End: 2025-07-02
Payer: COMMERCIAL

## 2025-07-02 DIAGNOSIS — Z95.1 S/P CABG (CORONARY ARTERY BYPASS GRAFT): Primary | ICD-10-CM

## 2025-07-02 PROCEDURE — 93798 PHYS/QHP OP CAR RHAB W/ECG: CPT

## 2025-07-07 DIAGNOSIS — E53.8 B12 DEFICIENCY: ICD-10-CM

## 2025-07-07 DIAGNOSIS — D51.0 PERNICIOUS ANEMIA: ICD-10-CM

## 2025-07-07 RX ORDER — CYANOCOBALAMIN 1000 UG/ML
1000 INJECTION, SOLUTION INTRAMUSCULAR; SUBCUTANEOUS
Qty: 6 ML | Refills: 0 | Status: SHIPPED | OUTPATIENT
Start: 2025-07-07

## 2025-07-09 ENCOUNTER — TREATMENT (OUTPATIENT)
Dept: CARDIAC REHAB | Facility: HOSPITAL | Age: 65
End: 2025-07-09
Payer: COMMERCIAL

## 2025-07-09 DIAGNOSIS — Z95.1 S/P CABG (CORONARY ARTERY BYPASS GRAFT): Primary | ICD-10-CM

## 2025-07-09 PROCEDURE — 93798 PHYS/QHP OP CAR RHAB W/ECG: CPT

## 2025-07-11 ENCOUNTER — TELEPHONE (OUTPATIENT)
Dept: CARDIAC SURGERY | Facility: CLINIC | Age: 65
End: 2025-07-11
Payer: COMMERCIAL

## 2025-07-11 ENCOUNTER — TREATMENT (OUTPATIENT)
Dept: CARDIAC REHAB | Facility: HOSPITAL | Age: 65
End: 2025-07-11
Payer: COMMERCIAL

## 2025-07-11 DIAGNOSIS — Z95.1 S/P CABG (CORONARY ARTERY BYPASS GRAFT): Primary | ICD-10-CM

## 2025-07-11 PROCEDURE — 93798 PHYS/QHP OP CAR RHAB W/ECG: CPT

## 2025-07-11 NOTE — TELEPHONE ENCOUNTER
Spoke with Lashae GARZA she called and spoke with the pt. After speaking with pt her and pt both believe it is more muscle soreness than anything and pt will call office if it gets worse.

## 2025-07-14 ENCOUNTER — TREATMENT (OUTPATIENT)
Dept: CARDIAC REHAB | Facility: HOSPITAL | Age: 65
End: 2025-07-14
Payer: COMMERCIAL

## 2025-07-14 DIAGNOSIS — Z95.1 S/P CABG (CORONARY ARTERY BYPASS GRAFT): Primary | ICD-10-CM

## 2025-07-14 PROCEDURE — 93798 PHYS/QHP OP CAR RHAB W/ECG: CPT

## 2025-07-15 ENCOUNTER — OFFICE VISIT (OUTPATIENT)
Dept: FAMILY MEDICINE CLINIC | Facility: CLINIC | Age: 65
End: 2025-07-15
Payer: COMMERCIAL

## 2025-07-15 VITALS
TEMPERATURE: 97.7 F | WEIGHT: 202 LBS | HEART RATE: 60 BPM | OXYGEN SATURATION: 97 % | SYSTOLIC BLOOD PRESSURE: 124 MMHG | DIASTOLIC BLOOD PRESSURE: 60 MMHG | HEIGHT: 74 IN | BODY MASS INDEX: 25.93 KG/M2

## 2025-07-15 DIAGNOSIS — J40 BRONCHITIS: ICD-10-CM

## 2025-07-15 DIAGNOSIS — R05.1 ACUTE COUGH: Primary | ICD-10-CM

## 2025-07-15 PROCEDURE — 99213 OFFICE O/P EST LOW 20 MIN: CPT | Performed by: FAMILY MEDICINE

## 2025-07-15 RX ORDER — DOXYCYCLINE 100 MG/1
100 CAPSULE ORAL 2 TIMES DAILY
Qty: 20 CAPSULE | Refills: 0 | Status: SHIPPED | OUTPATIENT
Start: 2025-07-15

## 2025-07-15 NOTE — PROGRESS NOTES
"  Subjective   Niranjan Peacock is a 65 y.o. male who is here for   Chief Complaint   Patient presents with    Cough    URI   .     Cough  Chronicity:  New  Onset:  In the past 7 days  Progression since onset:  Unchanged  Cough characteristics:  Productive of yellow sputum  Associated symptoms: no chest pain, no rash and no shortness of breath    Aggravated by:  Nothing  Treatments tried: Flonase.  PMH includes: bronchitis       History of Present Illness      Review of Systems   Constitutional:  Negative for activity change and appetite change.   Respiratory:  Positive for cough. Negative for shortness of breath.    Cardiovascular:  Negative for chest pain and leg swelling.   Skin:  Negative for color change and rash.       Objective   Vitals:    07/15/25 0802   BP: 124/60   BP Location: Left arm   Patient Position: Sitting   Cuff Size: Adult   Pulse: 60   Temp: 97.7 °F (36.5 °C)   SpO2: 97%   Weight: 91.6 kg (202 lb)   Height: 188 cm (74.02\")      Physical Exam  Vitals and nursing note reviewed.   Constitutional:       Appearance: Normal appearance. He is normal weight.   HENT:      Head: Normocephalic and atraumatic.   Cardiovascular:      Rate and Rhythm: Normal rate and regular rhythm.      Pulses: Normal pulses.      Heart sounds: No murmur heard.  Pulmonary:      Effort: Pulmonary effort is normal. No respiratory distress.      Breath sounds: Normal breath sounds. No wheezing.   Skin:     General: Skin is warm and dry.   Neurological:      General: No focal deficit present.      Mental Status: He is alert.   Psychiatric:         Mood and Affect: Mood normal.         Thought Content: Thought content normal.       Physical Exam        Assessment & Plan   Assessment & Plan    Diagnoses and all orders for this visit:    1. Acute cough (Primary)  Likely related to bronchitis.  2. Bronchitis  Will empirically treat with doxycycline 100 mg 1 p.o. twice daily x 10 days.  -     doxycycline (MONODOX) 100 MG capsule; " Take 1 capsule by mouth 2 (Two) Times a Day.  Dispense: 20 capsule; Refill: 0      Results      There are no Patient Instructions on file for this visit.    Medications Discontinued During This Encounter   Medication Reason    ciprofloxacin-dexAMETHasone (Ciprodex) 0.3-0.1 % otic suspension *Therapy completed        Return if symptoms worsen or fail to improve.    Tima Tucker MD  [place], [place]

## 2025-07-16 ENCOUNTER — TREATMENT (OUTPATIENT)
Dept: CARDIAC REHAB | Facility: HOSPITAL | Age: 65
End: 2025-07-16
Payer: COMMERCIAL

## 2025-07-16 DIAGNOSIS — Z95.1 S/P CABG (CORONARY ARTERY BYPASS GRAFT): Primary | ICD-10-CM

## 2025-07-16 PROCEDURE — 93798 PHYS/QHP OP CAR RHAB W/ECG: CPT

## 2025-07-18 ENCOUNTER — TREATMENT (OUTPATIENT)
Dept: CARDIAC REHAB | Facility: HOSPITAL | Age: 65
End: 2025-07-18
Payer: COMMERCIAL

## 2025-07-18 DIAGNOSIS — Z95.1 S/P CABG (CORONARY ARTERY BYPASS GRAFT): Primary | ICD-10-CM

## 2025-07-18 DIAGNOSIS — I25.10 CORONARY ARTERY DISEASE INVOLVING NATIVE CORONARY ARTERY OF NATIVE HEART WITHOUT ANGINA PECTORIS: ICD-10-CM

## 2025-07-18 DIAGNOSIS — Z78.9 STATIN INTOLERANCE: ICD-10-CM

## 2025-07-18 DIAGNOSIS — E78.2 MIXED HYPERLIPIDEMIA: Primary | ICD-10-CM

## 2025-07-18 PROCEDURE — 93798 PHYS/QHP OP CAR RHAB W/ECG: CPT

## 2025-07-21 ENCOUNTER — TREATMENT (OUTPATIENT)
Dept: CARDIAC REHAB | Facility: HOSPITAL | Age: 65
End: 2025-07-21
Payer: COMMERCIAL

## 2025-07-21 DIAGNOSIS — Z95.1 S/P CABG (CORONARY ARTERY BYPASS GRAFT): Primary | ICD-10-CM

## 2025-07-21 PROCEDURE — 93798 PHYS/QHP OP CAR RHAB W/ECG: CPT

## 2025-07-23 ENCOUNTER — INFUSION (OUTPATIENT)
Dept: ONCOLOGY | Facility: HOSPITAL | Age: 65
End: 2025-07-23
Payer: COMMERCIAL

## 2025-07-23 ENCOUNTER — APPOINTMENT (OUTPATIENT)
Dept: CARDIAC REHAB | Facility: HOSPITAL | Age: 65
End: 2025-07-23
Payer: COMMERCIAL

## 2025-07-23 VITALS — RESPIRATION RATE: 16 BRPM | TEMPERATURE: 97.8 F

## 2025-07-23 DIAGNOSIS — E78.2 MIXED HYPERLIPIDEMIA: ICD-10-CM

## 2025-07-23 DIAGNOSIS — Z78.9 STATIN INTOLERANCE: ICD-10-CM

## 2025-07-23 DIAGNOSIS — I25.10 CORONARY ARTERY DISEASE INVOLVING NATIVE CORONARY ARTERY OF NATIVE HEART WITHOUT ANGINA PECTORIS: Primary | ICD-10-CM

## 2025-07-23 PROCEDURE — 96372 THER/PROPH/DIAG INJ SC/IM: CPT

## 2025-07-23 PROCEDURE — 25010000002 INCLISIRAN SODIUM 284 MG/1.5ML SOLUTION PREFILLED SYRINGE: Performed by: NURSE PRACTITIONER

## 2025-07-23 RX ADMIN — INCLISIRAN 284 MG: 284 INJECTION, SOLUTION SUBCUTANEOUS at 09:10

## 2025-07-23 NOTE — NURSING NOTE
Injection  Leqvio Injection performed in left arm by Pura Sal RN. Patient tolerated the procedure well without complications.  07/23/25   Pura Sal RN

## 2025-07-25 ENCOUNTER — TREATMENT (OUTPATIENT)
Dept: CARDIAC REHAB | Facility: HOSPITAL | Age: 65
End: 2025-07-25
Payer: COMMERCIAL

## 2025-07-25 DIAGNOSIS — Z95.1 S/P CABG (CORONARY ARTERY BYPASS GRAFT): Primary | ICD-10-CM

## 2025-07-25 PROCEDURE — 93798 PHYS/QHP OP CAR RHAB W/ECG: CPT

## 2025-07-28 ENCOUNTER — TREATMENT (OUTPATIENT)
Dept: CARDIAC REHAB | Facility: HOSPITAL | Age: 65
End: 2025-07-28
Payer: COMMERCIAL

## 2025-07-28 DIAGNOSIS — Z95.1 S/P CABG (CORONARY ARTERY BYPASS GRAFT): Primary | ICD-10-CM

## 2025-07-28 PROCEDURE — 93798 PHYS/QHP OP CAR RHAB W/ECG: CPT

## 2025-07-30 ENCOUNTER — TREATMENT (OUTPATIENT)
Dept: CARDIAC REHAB | Facility: HOSPITAL | Age: 65
End: 2025-07-30
Payer: COMMERCIAL

## 2025-07-30 DIAGNOSIS — Z95.1 S/P CABG (CORONARY ARTERY BYPASS GRAFT): Primary | ICD-10-CM

## 2025-07-30 PROCEDURE — 93798 PHYS/QHP OP CAR RHAB W/ECG: CPT

## 2025-08-01 ENCOUNTER — TREATMENT (OUTPATIENT)
Dept: CARDIAC REHAB | Facility: HOSPITAL | Age: 65
End: 2025-08-01
Payer: COMMERCIAL

## 2025-08-01 DIAGNOSIS — Z95.1 S/P CABG (CORONARY ARTERY BYPASS GRAFT): Primary | ICD-10-CM

## 2025-08-01 PROCEDURE — 93798 PHYS/QHP OP CAR RHAB W/ECG: CPT

## 2025-08-04 ENCOUNTER — TREATMENT (OUTPATIENT)
Dept: CARDIAC REHAB | Facility: HOSPITAL | Age: 65
End: 2025-08-04
Payer: COMMERCIAL

## 2025-08-04 DIAGNOSIS — Z95.1 S/P CABG (CORONARY ARTERY BYPASS GRAFT): Primary | ICD-10-CM

## 2025-08-04 PROCEDURE — 93798 PHYS/QHP OP CAR RHAB W/ECG: CPT

## 2025-08-06 ENCOUNTER — TREATMENT (OUTPATIENT)
Dept: CARDIAC REHAB | Facility: HOSPITAL | Age: 65
End: 2025-08-06
Payer: COMMERCIAL

## 2025-08-06 DIAGNOSIS — Z95.1 S/P CABG (CORONARY ARTERY BYPASS GRAFT): Primary | ICD-10-CM

## 2025-08-06 PROCEDURE — 93798 PHYS/QHP OP CAR RHAB W/ECG: CPT

## 2025-08-15 DIAGNOSIS — Z12.5 ENCOUNTER FOR SCREENING PROSTATE SPECIFIC ANTIGEN (PSA) MEASUREMENT: ICD-10-CM

## 2025-08-15 DIAGNOSIS — R73.03 PREDIABETES: ICD-10-CM

## 2025-08-15 DIAGNOSIS — R97.20 ELEVATED PSA, LESS THAN 10 NG/ML: ICD-10-CM

## 2025-08-15 DIAGNOSIS — E78.2 MIXED HYPERLIPIDEMIA: ICD-10-CM

## 2025-08-15 DIAGNOSIS — I10 ESSENTIAL HYPERTENSION: Primary | ICD-10-CM

## 2025-08-15 DIAGNOSIS — Z00.00 ANNUAL PHYSICAL EXAM: ICD-10-CM

## 2025-08-19 LAB
ALBUMIN SERPL-MCNC: 4 G/DL (ref 3.5–5.2)
ALBUMIN/GLOB SERPL: 1.5 G/DL
ALP SERPL-CCNC: 114 U/L (ref 39–117)
ALT SERPL-CCNC: 19 U/L (ref 1–41)
APPEARANCE UR: CLEAR
AST SERPL-CCNC: 24 U/L (ref 1–40)
BACTERIA #/AREA URNS HPF: NORMAL /HPF
BASOPHILS # BLD AUTO: 0.03 10*3/MM3 (ref 0–0.2)
BASOPHILS NFR BLD AUTO: 0.6 % (ref 0–1.5)
BILIRUB SERPL-MCNC: 0.5 MG/DL (ref 0–1.2)
BILIRUB UR QL STRIP: NEGATIVE
BUN SERPL-MCNC: 13 MG/DL (ref 8–23)
BUN/CREAT SERPL: 11.5 (ref 7–25)
CALCIUM SERPL-MCNC: 9.2 MG/DL (ref 8.6–10.5)
CASTS URNS MICRO: NORMAL
CHLORIDE SERPL-SCNC: 105 MMOL/L (ref 98–107)
CHOLEST SERPL-MCNC: 110 MG/DL (ref 0–200)
CO2 SERPL-SCNC: 26.6 MMOL/L (ref 22–29)
COLOR UR: ABNORMAL
CREAT SERPL-MCNC: 1.13 MG/DL (ref 0.76–1.27)
EGFRCR SERPLBLD CKD-EPI 2021: 72.6 ML/MIN/1.73
EOSINOPHIL # BLD AUTO: 0.21 10*3/MM3 (ref 0–0.4)
EOSINOPHIL NFR BLD AUTO: 3.9 % (ref 0.3–6.2)
EPI CELLS #/AREA URNS HPF: NORMAL /HPF
ERYTHROCYTE [DISTWIDTH] IN BLOOD BY AUTOMATED COUNT: 14.1 % (ref 12.3–15.4)
GLOBULIN SER CALC-MCNC: 2.7 GM/DL
GLUCOSE SERPL-MCNC: 95 MG/DL (ref 65–99)
GLUCOSE UR QL STRIP: NEGATIVE
HBA1C MFR BLD: 5.7 % (ref 4.8–5.6)
HCT VFR BLD AUTO: 38.3 % (ref 37.5–51)
HDLC SERPL-MCNC: 37 MG/DL (ref 40–60)
HGB BLD-MCNC: 11.7 G/DL (ref 13–17.7)
HGB UR QL STRIP: NEGATIVE
IMM GRANULOCYTES # BLD AUTO: 0.02 10*3/MM3 (ref 0–0.05)
IMM GRANULOCYTES NFR BLD AUTO: 0.4 % (ref 0–0.5)
KETONES UR QL STRIP: ABNORMAL
LDLC SERPL CALC-MCNC: 52 MG/DL (ref 0–100)
LEUKOCYTE ESTERASE UR QL STRIP: NEGATIVE
LYMPHOCYTES # BLD AUTO: 1.28 10*3/MM3 (ref 0.7–3.1)
LYMPHOCYTES NFR BLD AUTO: 23.7 % (ref 19.6–45.3)
MCH RBC QN AUTO: 26.8 PG (ref 26.6–33)
MCHC RBC AUTO-ENTMCNC: 30.5 G/DL (ref 31.5–35.7)
MCV RBC AUTO: 87.6 FL (ref 79–97)
MONOCYTES # BLD AUTO: 0.58 10*3/MM3 (ref 0.1–0.9)
MONOCYTES NFR BLD AUTO: 10.7 % (ref 5–12)
NEUTROPHILS # BLD AUTO: 3.29 10*3/MM3 (ref 1.7–7)
NEUTROPHILS NFR BLD AUTO: 60.7 % (ref 42.7–76)
NITRITE UR QL STRIP: NEGATIVE
NRBC BLD AUTO-RTO: 0 /100 WBC (ref 0–0.2)
PH UR STRIP: 6 [PH] (ref 5–8)
PLATELET # BLD AUTO: 307 10*3/MM3 (ref 140–450)
POTASSIUM SERPL-SCNC: 4.5 MMOL/L (ref 3.5–5.2)
PROT SERPL-MCNC: 6.7 G/DL (ref 6–8.5)
PROT UR QL STRIP: ABNORMAL
PSA SERPL-MCNC: 4.44 NG/ML (ref 0–4)
RBC # BLD AUTO: 4.37 10*6/MM3 (ref 4.14–5.8)
RBC #/AREA URNS HPF: NORMAL /HPF
SODIUM SERPL-SCNC: 141 MMOL/L (ref 136–145)
SP GR UR STRIP: 1.02 (ref 1–1.03)
TRIGL SERPL-MCNC: 118 MG/DL (ref 0–150)
UROBILINOGEN UR STRIP-MCNC: ABNORMAL MG/DL
VLDLC SERPL CALC-MCNC: 21 MG/DL (ref 5–40)
WBC # BLD AUTO: 5.41 10*3/MM3 (ref 3.4–10.8)
WBC #/AREA URNS HPF: NORMAL /HPF

## 2025-08-26 ENCOUNTER — OFFICE VISIT (OUTPATIENT)
Dept: FAMILY MEDICINE CLINIC | Facility: CLINIC | Age: 65
End: 2025-08-26
Payer: COMMERCIAL

## 2025-08-26 VITALS
HEART RATE: 64 BPM | OXYGEN SATURATION: 97 % | DIASTOLIC BLOOD PRESSURE: 60 MMHG | TEMPERATURE: 97.8 F | SYSTOLIC BLOOD PRESSURE: 122 MMHG | HEIGHT: 74 IN | WEIGHT: 201.6 LBS | BODY MASS INDEX: 25.87 KG/M2

## 2025-08-26 DIAGNOSIS — R97.20 ELEVATED PSA, LESS THAN 10 NG/ML: ICD-10-CM

## 2025-08-26 DIAGNOSIS — Z00.00 ANNUAL PHYSICAL EXAM: Primary | ICD-10-CM

## 2025-08-26 DIAGNOSIS — E78.2 MIXED HYPERLIPIDEMIA: ICD-10-CM

## 2025-08-26 DIAGNOSIS — L30.9 DERMATITIS: ICD-10-CM

## 2025-08-26 DIAGNOSIS — I10 ESSENTIAL HYPERTENSION: ICD-10-CM

## 2025-08-26 PROCEDURE — 99396 PREV VISIT EST AGE 40-64: CPT | Performed by: FAMILY MEDICINE

## 2025-08-26 RX ORDER — TRIAMCINOLONE ACETONIDE 1 MG/G
1 OINTMENT TOPICAL 2 TIMES DAILY
Qty: 80 G | Refills: 0 | Status: SHIPPED | OUTPATIENT
Start: 2025-08-26

## (undated) DEVICE — DRSNG WND GEL FIBR OPTICELL AG PLS W/SLV LF 4X5IN  STRL

## (undated) DEVICE — LOU OPEN HEART DR POLLOCK: Brand: MEDLINE INDUSTRIES, INC.

## (undated) DEVICE — CATH DIAG IMPULSE FR4 5F 100CM

## (undated) DEVICE — CATH DIAG IMPULSE FL3.5 5F 100CM

## (undated) DEVICE — EXOFIN PRECISION PEN HIGH VISCOSITY TOPICAL SKIN ADHESIVE: Brand: EXOFIN PRECISION PEN, 1G

## (undated) DEVICE — BG TRANSF W/COUPLER SPK 600ML

## (undated) DEVICE — BND PRESS RADL COMFRT 14IN STRL

## (undated) DEVICE — BLOWER/MISTER AXIOUS OPCAB W/TBG

## (undated) DEVICE — Device

## (undated) DEVICE — PK ATS CUST W CARDIOTOMY RESEVOIR

## (undated) DEVICE — CONN TBG Y 6 IN 1 LF STRL

## (undated) DEVICE — GW EMR FIX EXCHG J STD .035 3MM 260CM

## (undated) DEVICE — CATH DIAG CARD PERFORM 145 D PIG 4F110CM

## (undated) DEVICE — RADIFOCUS GLIDEWIRE: Brand: GLIDEWIRE

## (undated) DEVICE — GLIDESHEATH BASIC HYDROPHILIC COATED INTRODUCER SHEATH: Brand: GLIDESHEATH

## (undated) DEVICE — PK HEART OPN 40

## (undated) DEVICE — SOL ISO/ALC 70PCT 4OZ

## (undated) DEVICE — DEV INDEFLATOR

## (undated) DEVICE — NC TREK CORONARY DILATATION CATHETER 3.5 MM X 20 MM / RAPID-EXCHANGE: Brand: NC TREK

## (undated) DEVICE — GLV SURG BIOGEL M LTX PF 7 1/2

## (undated) DEVICE — CANN AORT SFT/FLW STR 24F8MM

## (undated) DEVICE — PK CATH CARD 40

## (undated) DEVICE — SYR LUERLOK 30CC

## (undated) DEVICE — 1LYRTR 16FR10ML100%SILTMPS SNP: Brand: MEDLINE INDUSTRIES, INC.

## (undated) DEVICE — THE SAPHENA MEDICAL ONEPASS ENDOSCOPIC VESSEL HARVESTING SYSTEM IS INDICATED FOR USE IN MINIMALLY INVASIVE SURGERY ALLOWING ACCESS FOR VESSEL HARVESTING, AND IS PRIMARILY INDICATED FOR PATIENTS UNDERGOING ENDOSCOPIC SURGERY FOR ARTERIAL BYPASS. IT IS INDICATED FOR CUTTING TISSUE AND CONTROLLING BLEEDING THROUGH COAGULATION, AND FOR PATIENTS REQUIRING BLUNT DISSECTION OF TISSUE INCLUDING DISSECTION OF BLOOD VESSELS, DISSECTION OF BLOOD VESSELS OF THE EXTREMITIES, DISSECTION OF DUCTS AND OTHER STRUCTURES IN THE EXTRA PERITONEALL OR SUBCUTANEOUS EXTREMITY AND THORACIC SPACE. EXTREMITY PROCEDURES INCLUDE TISSUE DISSECTION ALONG THE SAPHENOUS VEIN FOR USE IN CORONARY ARTERY BYPASS GRAFTING AND PERIPHERAL ARTERY BYPASS OR RADIAL ARTERY FOR USE IN CORONARY ARTERY BYPASS GRAFTING. THORASCOPIC PROCEDURES INCLUDE EXPOSURE AND DISSECTION OF STRUCTURES EXTERNAL TOTHE PARIETAL PLEURA, INCLUDING NERVES, BLOOD VESSELS, AND OTHER TISSUES OF THE CHEST WALL.: Brand: VENAPAX

## (undated) DEVICE — SYR LUERLOK 5CC

## (undated) DEVICE — ROTATING SURGICAL PUNCHES, 1 PER POUCH: Brand: A&E MEDICAL / ROTATING SURGICAL PUNCHES

## (undated) DEVICE — DRP SLUSH WARMR MACH CIR 44X44IN

## (undated) DEVICE — CATH DIAG CARD PERFORMA JL3.5 BT 4F100CM

## (undated) DEVICE — SOL IRR NACL 0.9PCT BO 1000ML

## (undated) DEVICE — CLAMP INSERT: Brand: STEALTH® CLAMP INSERT

## (undated) DEVICE — LOU PACE DEFIB: Brand: MEDLINE INDUSTRIES, INC.

## (undated) DEVICE — RADIFOCUS OPTITORQUE ANGIOGRAPHIC CATHETER: Brand: OPTITORQUE

## (undated) DEVICE — GLV SURG BIOGEL LTX PF 6 1/2

## (undated) DEVICE — PK PERFUS CUST W/CARDIOPLEGIA

## (undated) DEVICE — 6F.070 XB LAD 3.5 SH: Brand: VISTA BRITE TIP

## (undated) DEVICE — ARGYLE TUBING EXTENSION SET: Brand: ARGYLE

## (undated) DEVICE — 12 FOOT DISPOSABLE EXTENSION CABLE WITH SAFE CONNECT / SCREW-DOWN

## (undated) DEVICE — CATH VENT MIV RADL PIG ST TIP 5F 110CM

## (undated) DEVICE — 32 FR RIGHT ANGLE – SOFT PVC CATHETER: Brand: PVC THORACIC CATHETERS

## (undated) DEVICE — KT MANIFLD CARDIAC

## (undated) DEVICE — SYS PERFUS SEP PLATLT W TIPS CUST

## (undated) DEVICE — 28 FR STRAIGHT – SOFT PVC CATHETER: Brand: PVC THORACIC CATHETERS

## (undated) DEVICE — SYR LL TP 10ML STRL

## (undated) DEVICE — BALN PRESS WEDGE 5F 110CM

## (undated) DEVICE — FEMORAL ENTRY ANGIOGRAPHY SHIELD-YELLOW: Brand: RADPAD

## (undated) DEVICE — HEMOCONCENTRATOR PERFUS LPS06

## (undated) DEVICE — DECANTER BAG 9": Brand: MEDLINE INDUSTRIES, INC.

## (undated) DEVICE — GW INQWIRE FC PTFE J/3MM .021 180

## (undated) DEVICE — 3M™ IOBAN™ 2 ANTIMICROBIAL INCISE DRAPE 6650EZ: Brand: IOBAN™ 2

## (undated) DEVICE — 3M™ TEGADERM™ CHG DRESSING 25/CARTON 4 CARTONS/CASE 1658: Brand: TEGADERM™

## (undated) DEVICE — CORONARY ARTERY BYPASS GRAFT MARKERS, STAINLESS STEEL, DISTAL, WITHOUT HOLDER: Brand: ANASTOMARK CORONARY ARTERY BYPASS GRAFT MARKERS, STAINLESS STEEL, DISTAL

## (undated) DEVICE — OASIS DRAIN, SINGLE, INLINE & ATS COMPATIBLE: Brand: OASIS

## (undated) DEVICE — CVR PROB 96IN LF STRL

## (undated) DEVICE — DGW .035 FC J3MM 260CM TEF: Brand: EMERALD

## (undated) DEVICE — SENSR CERBRL O2 PK/2

## (undated) DEVICE — GLIDESHEATH SLENDER STAINLESS STEEL KIT: Brand: GLIDESHEATH SLENDER

## (undated) DEVICE — DRSNG WND GZ PAD BORDERED 4X8IN STRL

## (undated) DEVICE — GW PRESSUREWIRE AERIS W/ AGILE TP 175CM

## (undated) DEVICE — TR BAND RADIAL ARTERY COMPRESSION DEVICE: Brand: TR BAND

## (undated) DEVICE — CATH DIAG CARD PERFORM JR4.0 BT 4F100CM

## (undated) DEVICE — TBG INSUFFLATION LUER LOCK: Brand: MEDLINE INDUSTRIES, INC.